# Patient Record
Sex: FEMALE | Race: BLACK OR AFRICAN AMERICAN | NOT HISPANIC OR LATINO | Employment: OTHER | URBAN - METROPOLITAN AREA
[De-identification: names, ages, dates, MRNs, and addresses within clinical notes are randomized per-mention and may not be internally consistent; named-entity substitution may affect disease eponyms.]

---

## 2017-01-05 ENCOUNTER — OFFICE VISIT (OUTPATIENT)
Dept: RHEUMATOLOGY | Facility: CLINIC | Age: 52
End: 2017-01-05
Payer: COMMERCIAL

## 2017-01-05 VITALS
BODY MASS INDEX: 26.19 KG/M2 | SYSTOLIC BLOOD PRESSURE: 115 MMHG | WEIGHT: 159.81 LBS | DIASTOLIC BLOOD PRESSURE: 78 MMHG | HEART RATE: 105 BPM

## 2017-01-05 DIAGNOSIS — G05.3 CNS LUPUS: Primary | ICD-10-CM

## 2017-01-05 DIAGNOSIS — M32.19 CNS LUPUS: Primary | ICD-10-CM

## 2017-01-05 PROCEDURE — 1159F MED LIST DOCD IN RCRD: CPT | Mod: S$GLB,,, | Performed by: INTERNAL MEDICINE

## 2017-01-05 PROCEDURE — 99215 OFFICE O/P EST HI 40 MIN: CPT | Mod: S$GLB,,, | Performed by: INTERNAL MEDICINE

## 2017-01-05 PROCEDURE — 99999 PR PBB SHADOW E&M-EST. PATIENT-LVL III: CPT | Mod: PBBFAC,,, | Performed by: INTERNAL MEDICINE

## 2017-01-05 RX ORDER — SODIUM CHLORIDE 0.9 % (FLUSH) 0.9 %
10 SYRINGE (ML) INJECTION
Status: CANCELLED | OUTPATIENT
Start: 2017-01-05

## 2017-01-05 RX ORDER — PREDNISONE 5 MG/1
5 TABLET ORAL DAILY
Qty: 30 TABLET | Refills: 3 | Status: SHIPPED | OUTPATIENT
Start: 2017-01-05 | End: 2017-05-26 | Stop reason: SDUPTHER

## 2017-01-05 RX ORDER — METHYLPREDNISOLONE SODIUM SUCCINATE 1 G/16ML
500 INJECTION INTRAMUSCULAR; INTRAVENOUS ONCE
Status: CANCELLED | OUTPATIENT
Start: 2017-01-05 | End: 2017-01-05

## 2017-01-05 RX ORDER — HEPARIN SODIUM (PORCINE) LOCK FLUSH IV SOLN 100 UNIT/ML 100 UNIT/ML
100 SOLUTION INTRAVENOUS
Status: CANCELLED | OUTPATIENT
Start: 2017-01-05

## 2017-01-05 NOTE — PROGRESS NOTES
RHEUMATOLOGY CLINIC FOLLOW UP VISIT  Chief complaints:-  I have no energy.    HPI:-  Ruth Melendrez a 51 y.o. pleasant female comes in for a follow up visit with above chief complaints. She has severe CNS lupus and is only on 5 mg daily prednisone because of intolerance to multiple therapies. She used to see rheumatologist here in our clinic - last visit was in 05/2013. Since then she has been following up with Dr. Sue at [facility] . She was hospitalized in 02/2016 and 05/2016 for severe flare up in her neuropsychiatric lupus with delusions, hallucinations and headaches which was treated with iv steroids. In her most recent visit with  , she was strongly recommended to try cytoxan or at least benlysta to treat her condition . She declined and establish care with me 4 weeks ago.  She reports running out of prednisone 2 weeks ago and has been having severe fatigue.  She denies any pain today. Her pain scale is zero. She denies alopecia, photosensitivity, malar rash, eye redness, dry eyes, dry mouth, nasal ulcers, oral ulcers, difficulty chewing, difficulty swallowing, choking on dry foods, exertional or respirophasic chest pain, abdominal pain with eating, blood in stools, nausea, vomiting, blood in urine, back pain radiating to legs, leg claudication, headaches, hallucinations, delusions, paranoia, numbness , tingling or weakness.     Review of Systems   Constitutional: Positive for malaise/fatigue. Negative for chills, fever and weight loss.   HENT: Negative for ear discharge, ear pain, hearing loss, nosebleeds and sore throat.    Eyes: Negative for blurred vision, double vision, photophobia, discharge and redness.   Respiratory: Negative for cough, hemoptysis, sputum production and shortness of breath.    Cardiovascular: Negative for chest pain, palpitations and claudication.   Gastrointestinal: Negative for abdominal pain, constipation, diarrhea, melena,  nausea and vomiting.   Genitourinary: Negative for dysuria, frequency, hematuria and urgency.   Musculoskeletal: Negative for back pain, falls, joint pain, myalgias and neck pain.   Skin: Negative for itching and rash.   Neurological: Negative for dizziness, tremors, sensory change, speech change, focal weakness, seizures, loss of consciousness, weakness and headaches.   Endo/Heme/Allergies: Negative for environmental allergies. Does not bruise/bleed easily.   Psychiatric/Behavioral: Negative for depression, hallucinations and memory loss. The patient is not nervous/anxious and does not have insomnia.        Past Medical History   Diagnosis Date    Hypertension     Lupus        Past Surgical History   Procedure Laterality Date    Widsom tooth extraction          Social History   Substance Use Topics    Smoking status: Never Smoker    Smokeless tobacco: None    Alcohol use No       Family History   Problem Relation Age of Onset    Arthritis Mother     Heart disease Mother     Hypertension Mother     Diabetes Father     Heart disease Father     Hypertension Father     Cancer Brother        Review of patient's allergies indicates:   Allergen Reactions    Ace inhibitors     Amoxicillin     Aspirin     Cellcept [mycophenolate mofetil]     Cephalosporins     Clindamycin     Hydrochlorothiazide     Imuran [azathioprine sodium]     Lasix [furosemide]     Methotrexate analogues     Nsaids (non-steroidal anti-inflammatory drug)     Pcn [penicillins]     Plaquenil [hydroxychloroquine]     Shellfish containing products     Sulfa (sulfonamide antibiotics)     Zofran [ondansetron hcl (pf)]            Physical examination:-    Vitals:    01/05/17 1014   BP: 115/78   Pulse: 105   Weight: 72.5 kg (159 lb 13.3 oz)   PainSc: 0-No pain       Physical Exam   Constitutional: She is oriented to person, place, and time and well-developed, well-nourished, and in no distress. No distress.   HENT:   Head:  Normocephalic and atraumatic.   Nose: Nose normal.   Mouth/Throat: Oropharynx is clear and moist. No oropharyngeal exudate.   Eyes: Conjunctivae and EOM are normal. Pupils are equal, round, and reactive to light. Right eye exhibits no discharge. Left eye exhibits no discharge. No scleral icterus.   Neck: Normal range of motion. Neck supple.   Cardiovascular: Normal rate and intact distal pulses.    Pulmonary/Chest: Effort normal. No respiratory distress. She exhibits no tenderness.   Abdominal: Soft. There is no tenderness.   Musculoskeletal:   No synovitis in small joints of hands or feet. No effusion in large joints. No tenderness.    Lymphadenopathy:     She has no cervical adenopathy.   Neurological: She is alert and oriented to person, place, and time. No cranial nerve deficit.   Skin: Skin is warm. No rash noted. She is not diaphoretic. No erythema. No pallor.   Psychiatric: Mood, memory, affect and judgment normal.   Nursing note and vitals reviewed.      Labs:-  Results for ADELA LEMON (MRN [id number]) as of 1/5/2017 12:40   Ref. Range 12/8/2016 09:04   SHEILA HEP-2 Titer Unknown Positive 1:640 Sp...   Anti-SSA Antibody Latest Ref Range: 0.00 - 19.99 EU 31.31 (H)   Anti-SSA Interpretation Latest Ref Range: Negative  Positive (A)   Anti-SSB Antibody Latest Ref Range: 0.00 - 19.99 EU 9.10   Anti-SSB Interpretation Latest Ref Range: Negative  Negative   ds DNA Ab Latest Ref Range: Negative 1:10  Positive 1:40 (A)   Anti Sm Antibody Latest Ref Range: 0.00 - 19.99 .08 (H)   Anti-Sm Interpretation Latest Ref Range: Negative  Positive (A)   Anti Sm/RNP Antibody Latest Ref Range: 0.00 - 19.99 .83 (H)   Anti-Sm/RNP Interpretation Latest Ref Range: Negative  Positive (A)   Complement (C-3) Latest Ref Range: 50 - 180 mg/dL 60   Complement (C-4) Latest Ref Range: 11 - 44 mg/dL 7 (L)     Old and Outside medical records :-  Reviewed old and all outside medical records available in Care Everywhere.      Medication List with Changes/Refills   Current Medications    AMLODIPINE (NORVASC) 5 MG TABLET    Take 5 mg by mouth once daily.    FLUTICASONE (FLONASE) 50 MCG/ACTUATION NASAL SPRAY    1 spray by Each Nare route 2 (two) times daily as needed for Rhinitis.    LOSARTAN (COZAAR) 100 MG TABLET    Take 100 mg by mouth once daily.    OLANZAPINE (ZYPREXA) 10 MG TABLET    Take 10 mg by mouth every evening.   Changed and/or Refilled Medications    Modified Medication Previous Medication    PREDNISONE (DELTASONE) 5 MG TABLET predniSONE (DELTASONE) 5 MG tablet       Take 1 tablet (5 mg total) by mouth once daily.    Take 5 mg by mouth once daily.       Assessment/Plans:-  # CNS lupus:-  Extremely complicated uncontrolled lupus with fatigue and periodic exacerbations of serious neurological and psychiatric complications , extreme serological activity of lupus, not on any medications other than 5 mg daily prednisone. She reports intolerance to all the lupus medications tried so far including plaquenil, cellcept, imuran and methotrexate. She does not want to try cytoxan or benlysta because of their potential side effect profile.  Labs from last visit shows severe activity of lupus.  Since she does not want to start any disease modifying agents for lupus will try at least 500 mg of Solu-Medrol since she did not have any adverse reactions to it in the past.  She comprehends the severity of her lupus and how it could affect her organs and even death , still she would like not to try any therapy which could cause any side effects. I discussed in detail about the lupus and its severe complications.  Will continue discussions in future visits.   - predniSONE (DELTASONE) 5 MG tablet; Take 1 tablet (5 mg total) by mouth once daily.  Dispense: 30 tablet; Refill: 3  1 dose of  mg Solu-Medrol as soon as possible.  Consider Imuran low-dose in future if she agrees.     # Return in about 2 months (around 3/5/2017).    Disclaimer:  This note was prepared using voice recognition system and is likely to have sound alike errors and is not proof read.  Please call me with any questions.

## 2017-01-05 NOTE — ASSESSMENT & PLAN NOTE
Extremely complicated uncontrolled lupus with fatigue and periodic exacerbations of serious neurological and psychiatric complications , extreme serological activity of lupus, not on any medications other than 5 mg daily prednisone. She reports intolerance to all the lupus medications tried so far including plaquenil, cellcept, imuran and methotrexate. She does not want to try cytoxan or benlysta because of their potential side effect profile.  Labs from last visit shows severe activity of lupus.  Since she does not want to start any disease modifying agents for lupus will try at least 500 mg of Solu-Medrol since she did not have any adverse reactions to it in the past.  She comprehends the severity of her lupus and how it could affect her organs and even death , still she would like not to try any therapy which could cause any side effects. I discussed in detail about the lupus and its severe complications.  Will continue discussions in future visits.

## 2017-01-05 NOTE — MR AVS SNAPSHOT
[facility] - Rheumatology  [address] [facility] [place] [address]  Phone: [phone]  Fax: [phone]                  Ruth Messina   2017 10:00 AM   Office Visit    Description:  Female : 1965   Provider:  Chirag Turner MD   Department:  [facility] - Rheumatology           Reason for Visit     Disease Management           Diagnoses this Visit        Comments    CNS lupus    -  Primary            To Do List           Goals (5 Years of Data)     None      Follow-Up and Disposition     Return in about 2 months (around 3/5/2017).       These Medications        Disp Refills Start End    predniSONE (DELTASONE) 5 MG tablet 30 tablet 3 2017     Take 1 tablet (5 mg total) by mouth once daily. - Oral      [facility]     [facility] -  Assistance  Registered nurses in the [facility] provide clinical advisement, health education, appointment booking, and other advisory services.  Call for this free service at [phone].             Medications           Message regarding Medications     Verify the changes and/or additions to your medication regime listed below are the same as discussed with your clinician today.  If any of these changes or additions are incorrect, please notify your healthcare provider.        CHANGE how you are taking these medications     Start Taking Instead of    predniSONE (DELTASONE) 5 MG tablet predniSONE (DELTASONE) 5 MG tablet    Dosage:  Take 1 tablet (5 mg total) by mouth once daily. Dosage:  Take 5 mg by mouth once daily.    Reason for Change:  Reorder            Verify that the below list of medications is an accurate representation of the medications you are currently taking.  If none reported, the list may be blank. If incorrect, please contact your healthcare provider. Carry this list with you in case of emergency.           Current Medications     amlodipine (NORVASC) 5 MG tablet Take 5 mg by mouth once daily.     fluticasone (FLONASE) 50 mcg/actuation nasal spray 1 spray by Each Nare route 2 (two) times daily as needed for Rhinitis.    losartan (COZAAR) 100 MG tablet Take 100 mg by mouth once daily.    olanzapine (ZYPREXA) 10 MG tablet Take 10 mg by mouth every evening.    predniSONE (DELTASONE) 5 MG tablet Take 1 tablet (5 mg total) by mouth once daily.           Clinical Reference Information           Vital Signs - Last Recorded  Most recent update: 1/5/2017 10:15 AM by Sumanth Puentes LPN    BP Pulse Wt BMI       115/78 105 72.5 kg (159 lb 13.3 oz) 26.19 kg/m2       Blood Pressure          Most Recent Value    BP  115/78      Allergies as of 1/5/2017     Ace Inhibitors    Amoxicillin    Aspirin    Cellcept [Mycophenolate Mofetil]    Cephalosporins    Clindamycin    Hydrochlorothiazide    Imuran [Azathioprine Sodium]    Lasix [Furosemide]    Methotrexate Analogues    Nsaids (Non-steroidal Anti-inflammatory Drug)    Pcn [Penicillins]    Plaquenil [Hydroxychloroquine]    Shellfish Containing Products    Sulfa (Sulfonamide Antibiotics)    Zofran [Ondansetron Hcl (Pf)]      Immunizations Administered on Date of Encounter - 1/5/2017     None

## 2017-01-09 ENCOUNTER — TELEPHONE (OUTPATIENT)
Dept: RHEUMATOLOGY | Facility: CLINIC | Age: 52
End: 2017-01-09

## 2017-01-09 DIAGNOSIS — G05.3 CNS LUPUS: Primary | ICD-10-CM

## 2017-01-09 DIAGNOSIS — M32.19 CNS LUPUS: Primary | ICD-10-CM

## 2017-01-09 RX ORDER — AZATHIOPRINE 75 MG/1
50 TABLET ORAL DAILY
Qty: 30 TABLET | Refills: 1 | Status: SHIPPED | OUTPATIENT
Start: 2017-01-09 | End: 2017-01-09 | Stop reason: DRUGHIGH

## 2017-01-09 RX ORDER — AZATHIOPRINE 50 MG/1
50 TABLET ORAL DAILY
Qty: 30 TABLET | Refills: 1 | Status: SHIPPED | OUTPATIENT
Start: 2017-01-09 | End: 2017-01-25

## 2017-01-09 NOTE — TELEPHONE ENCOUNTER
Spoke with pt and advised her of below. PT states she has a concern of taking the Imuran and it dropping her WBC. She would like to know if taking it would help her other labs like her sed rate and if the benefits out the risks. Please Advise.

## 2017-01-09 NOTE — TELEPHONE ENCOUNTER
Spoke with pt and she spoke with [facility] regarding solu medrol infusion, states she has a 500$ deductible and once that is met then she has 20% co insurance. States she spoke with the billing department here at [facility] and they told her that the only way they can tell her what she will be responsible for is after the infusion is completed and the claim is filed. Pt states that [facility] can not tell her how much she would have to pay for unless she can find out what [facility] allowable billed amount is. Pt states she does not want to get the infusion unless she knows how much it will be since she has a lot of medical bills from hospital stays. Pt states that she was on Imuran 50 mg in 2012 and you had talked to her about a lower dose. Please Advise

## 2017-01-09 NOTE — TELEPHONE ENCOUNTER
Sure. Lets hold off on infusion then. Start imuran 50 mg tablet once every other day and watch for any side effects like before. If no problem after a week, increase to 50 mg one time daily.

## 2017-01-09 NOTE — TELEPHONE ENCOUNTER
----- Message from Rehana Gan sent at 1/9/2017 11:35 AM CST -----  Contact: [employer] Cid called to speak with the nurse; the directions aren't clear for the azathioprine.    [place] [id number] [facility] [address]  Phone: [phone] Fax: [phone]    Thanks,  Rehana

## 2017-01-11 ENCOUNTER — TELEPHONE (OUTPATIENT)
Dept: RHEUMATOLOGY | Facility: CLINIC | Age: 52
End: 2017-01-11

## 2017-01-11 NOTE — TELEPHONE ENCOUNTER
----- Message from Negrito Ordonez sent at 1/11/2017  9:41 AM CST -----  Contact: [employer] ( [employer] )  [employer] ( [employer] ) is requesting a call from nurse to verify work restrictions on the pt.            Please call [employer] ( [employer] ) back at  [phone]

## 2017-01-12 ENCOUNTER — TELEPHONE (OUTPATIENT)
Dept: RHEUMATOLOGY | Facility: CLINIC | Age: 52
End: 2017-01-12

## 2017-01-12 NOTE — TELEPHONE ENCOUNTER
----- Message from Karina Manjarrez sent at 1/12/2017  4:12 PM CST -----  Patient would like for you to call her regarding something the doctor was ordering for her.   This is all she would tell me.  Call her at [phone].                                                  castillo

## 2017-01-12 NOTE — TELEPHONE ENCOUNTER
Spoke with pt and she states she was approved for financial assistance and she can do the infusion if you still wanted her to do that.Please Advise.

## 2017-01-16 ENCOUNTER — TELEPHONE (OUTPATIENT)
Dept: RHEUMATOLOGY | Facility: CLINIC | Age: 52
End: 2017-01-16

## 2017-01-16 NOTE — TELEPHONE ENCOUNTER
----- Message from Christel Alfred sent at 1/16/2017 10:28 AM CST -----  Contact: pt  Please call pt [phone] regarding new medication, pt states she have some issues with medication.

## 2017-01-16 NOTE — TELEPHONE ENCOUNTER
Please continue taking once every other day for now. Its very likely that these reactions would get better in a week or two.

## 2017-01-16 NOTE — TELEPHONE ENCOUNTER
Spoke with pt and she states she started in the Imuran, states it makes her want to sleep all the time, is causing her muscle pain, and she was lightheaded one day also. States she has been taking it every other day and she took it yesterday. Would like to know something before her infusion tomorrow. Please Advise.

## 2017-01-17 ENCOUNTER — INFUSION (OUTPATIENT)
Dept: RHEUMATOLOGY | Facility: HOSPITAL | Age: 52
End: 2017-01-17
Attending: INTERNAL MEDICINE
Payer: COMMERCIAL

## 2017-01-17 VITALS
WEIGHT: 159.81 LBS | DIASTOLIC BLOOD PRESSURE: 70 MMHG | HEART RATE: 93 BPM | SYSTOLIC BLOOD PRESSURE: 105 MMHG | BODY MASS INDEX: 26.19 KG/M2 | RESPIRATION RATE: 18 BRPM | TEMPERATURE: 99 F

## 2017-01-17 DIAGNOSIS — M32.19 CNS LUPUS: Primary | ICD-10-CM

## 2017-01-17 DIAGNOSIS — G05.3 CNS LUPUS: Primary | ICD-10-CM

## 2017-01-17 PROCEDURE — 96365 THER/PROPH/DIAG IV INF INIT: CPT | Mod: PO

## 2017-01-17 PROCEDURE — 25000003 PHARM REV CODE 250: Mod: PO | Performed by: INTERNAL MEDICINE

## 2017-01-17 PROCEDURE — 63600175 PHARM REV CODE 636 W HCPCS: Mod: PO | Performed by: INTERNAL MEDICINE

## 2017-01-17 RX ORDER — METHYLPREDNISOLONE SODIUM SUCCINATE 1 G/16ML
500 INJECTION INTRAMUSCULAR; INTRAVENOUS ONCE
Status: CANCELLED | OUTPATIENT
Start: 2017-01-17 | End: 2017-01-17

## 2017-01-17 RX ORDER — HEPARIN SODIUM (PORCINE) LOCK FLUSH IV SOLN 100 UNIT/ML 100 UNIT/ML
100 SOLUTION INTRAVENOUS
Status: CANCELLED | OUTPATIENT
Start: 2017-01-17

## 2017-01-17 RX ORDER — METHYLPREDNISOLONE SODIUM SUCCINATE 1 G/16ML
500 INJECTION INTRAMUSCULAR; INTRAVENOUS ONCE
Status: DISCONTINUED | OUTPATIENT
Start: 2017-01-17 | End: 2017-01-17

## 2017-01-17 RX ORDER — SODIUM CHLORIDE 0.9 % (FLUSH) 0.9 %
10 SYRINGE (ML) INJECTION
Status: CANCELLED | OUTPATIENT
Start: 2017-01-17

## 2017-01-17 RX ORDER — SODIUM CHLORIDE 0.9 % (FLUSH) 0.9 %
10 SYRINGE (ML) INJECTION
Status: DISCONTINUED | OUTPATIENT
Start: 2017-01-17 | End: 2017-01-17 | Stop reason: HOSPADM

## 2017-01-17 RX ADMIN — SODIUM CHLORIDE: 9 INJECTION, SOLUTION INTRAVENOUS at 11:01

## 2017-01-17 RX ADMIN — SODIUM CHLORIDE, PRESERVATIVE FREE 10 ML: 5 INJECTION INTRAVENOUS at 12:01

## 2017-01-17 NOTE — PROGRESS NOTES
Infusion# 1    Recent labs? 12/8/16    Premeds? no    Solumedrol 500 mg administered IV at a 30 minute rate per orders; see MAR and vitals for more  details. Tolerated well without adverse events, discharged and ambulatory out of clinic.

## 2017-01-17 NOTE — MR AVS SNAPSHOT
[facility]  [address] [facility] [place] [address]  Phone: [phone]  Fax: [phone]                  Ruth Messina   2017 11:00 AM   Infusion    Description:  Female : 1965   Provider:  RHEUMATOLOGY, INFUSION   Department:  [facility]           Diagnoses this Visit        Comments    CNS lupus    -  Primary            To Do List           Future Appointments        Provider Department Dept Phone    3/2/2017 11:00 AM Chirag Turner MD [facility] - Rheumatology [phone]      Goals (5 Years of Data)     None      [facility]     [facility] Care [facility] -  Assistance  Registered nurses in the [facility] provide clinical advisement, health education, appointment booking, and other advisory services.  Call for this free service at [phone].             Medications           Message regarding Medications     Verify the changes and/or additions to your medication regime listed below are the same as discussed with your clinician today.  If any of these changes or additions are incorrect, please notify your healthcare provider.        These medications were administered today        Dose Freq    sodium chloride 0.9% flush 10 mL 10 mL As needed (PRN)    Sig: Inject 10 mLs into the vein as needed for Line Care.    Class: Normal    Route: Intravenous    Non-formulary Exception Code: Specific indication for non-formulary alternative    methylPREDNISolone sod suc(PF) (SOLU-MEDROL) 125 mg/2 mL 500 mg in sodium chloride 0.9% 100 mL IVPB  Clinic/HOD 1 time    Sig: Inject into the vein one time.    Class: Normal    Route: Intravenous           Verify that the below list of medications is an accurate representation of the medications you are currently taking.  If none reported, the list may be blank. If incorrect, please contact your healthcare provider. Carry this list with you in case of emergency.           Current  Medications     amlodipine (NORVASC) 5 MG tablet Take 5 mg by mouth once daily.    azathioprine (IMURAN) 50 mg Tab Take 1 tablet (50 mg total) by mouth once daily.    fluticasone (FLONASE) 50 mcg/actuation nasal spray 1 spray by Each Nare route 2 (two) times daily as needed for Rhinitis.    losartan (COZAAR) 100 MG tablet Take 100 mg by mouth once daily.    olanzapine (ZYPREXA) 10 MG tablet Take 10 mg by mouth every evening.    predniSONE (DELTASONE) 5 MG tablet Take 1 tablet (5 mg total) by mouth once daily.           Clinical Reference Information           Vital Signs - Last Recorded  Most recent update: 1/17/2017  1:22 PM by Nneka Palm RN    BP Pulse Temp Resp Wt BMI    105/70 93 99.1 °F (37.3 °C) (Oral) 18 72.5 kg (159 lb 13.3 oz) 26.19 kg/m2      Allergies as of 1/17/2017     Ace Inhibitors    Amoxicillin    Aspirin    Cellcept [Mycophenolate Mofetil]    Cephalosporins    Clindamycin    Hydrochlorothiazide    Imuran [Azathioprine Sodium]    Lasix [Furosemide]    Methotrexate Analogues    Nsaids (Non-steroidal Anti-inflammatory Drug)    Pcn [Penicillins]    Plaquenil [Hydroxychloroquine]    Shellfish Containing Products    Sulfa (Sulfonamide Antibiotics)    Zofran [Ondansetron Hcl (Pf)]      Immunizations Administered on Date of Encounter - 1/17/2017     None      Orders Placed During Today's Visit      Normal Orders This Visit    Nursing communication     Saline lock IV       Administrations This Visit     methylPREDNISolone sod suc(PF) (SOLU-MEDROL) 125 mg/2 mL 500 mg in sodium chloride 0.9% 100 mL IVPB     Admin Date Action Dose Rate Route Administered By          01/17/2017 New Bag   200 mL/hr Intravenous Nneka Palm RN                   sodium chloride 0.9% flush 10 mL     Admin Date Action Dose Route Administered By             01/17/2017 Given 10 mL Intravenous Nneka Palm RN

## 2017-01-17 NOTE — PLAN OF CARE
Problem: Patient Care Overview  Goal: Plan of Care Review  Outcome: Ongoing (interventions implemented as appropriate)  I feel ok other than a little stomach pain.

## 2017-01-18 ENCOUNTER — TELEPHONE (OUTPATIENT)
Dept: ORTHOPEDICS | Facility: CLINIC | Age: 52
End: 2017-01-18

## 2017-01-18 NOTE — TELEPHONE ENCOUNTER
----- Message from Adelfo Guido sent at 1/18/2017  8:54 AM CST -----  Contact: pt  Stating she coming in for Carpal tunnel pain states left hand is worse than right hand pt will bring in past records and can be reached at [phone]//ThanksMW

## 2017-01-18 NOTE — TELEPHONE ENCOUNTER
Spoke with patient who stated that she is scheduled to come in on 2-. She also stated [facility] emg [facility].

## 2017-01-25 ENCOUNTER — OFFICE VISIT (OUTPATIENT)
Dept: DERMATOLOGY | Facility: CLINIC | Age: 52
End: 2017-01-25
Payer: COMMERCIAL

## 2017-01-25 DIAGNOSIS — L93.0 DISCOID LUPUS: Primary | ICD-10-CM

## 2017-01-25 DIAGNOSIS — L65.8 FEMALE PATTERN ALOPECIA: ICD-10-CM

## 2017-01-25 PROCEDURE — 99202 OFFICE O/P NEW SF 15 MIN: CPT | Mod: S$GLB,,, | Performed by: DERMATOLOGY

## 2017-01-25 PROCEDURE — 1159F MED LIST DOCD IN RCRD: CPT | Mod: S$GLB,,, | Performed by: DERMATOLOGY

## 2017-01-25 PROCEDURE — 99999 PR PBB SHADOW E&M-EST. PATIENT-LVL III: CPT | Mod: PBBFAC,,, | Performed by: DERMATOLOGY

## 2017-01-25 RX ORDER — FLUOCINOLONE ACETONIDE 0.11 MG/ML
OIL TOPICAL
Qty: 1 BOTTLE | Refills: 5 | Status: SHIPPED | OUTPATIENT
Start: 2017-01-25 | End: 2018-07-26

## 2017-01-25 NOTE — MR AVS SNAPSHOT
[facility]  [address]  [place] [address]  Phone: [phone]  Fax: [phone]                  Ruth Messina   2017 1:45 PM   Office Visit    Description:  Female : 1965   Provider:  Andreia Demarco MD   Department:  [facility]           Reason for Visit     Hair Loss           Diagnoses this Visit        Comments    Discoid lupus    -  Primary     Female pattern alopecia                To Do List           Future Appointments        Provider Department Dept Phone    2017 10:00 AM Abhishek Decker MD [facility] [phone]    3/2/2017 11:00 AM Chirag Turner MD [facility] [phone]      Goals (5 Years of Data)     None      Follow-Up and Disposition     Return in about 3 months (around 2017).       These Medications        Disp Refills Start End    fluocinolone (DERMA-SMOOTHE) 0.01 % external oil 1 Bottle 5 2017    Apply oil to scalp twice a day    Pharmacy: [address] #: [phone]         [facility]     [facility]  [facility]  Registered nurses in the [facility] provide clinical advisement, health education, appointment booking, and other advisory services.  Call for this free service at [phone].             Medications           Message regarding Medications     Verify the changes and/or additions to your medication regime listed below are the same as discussed with your clinician today.  If any of these changes or additions are incorrect, please notify your healthcare provider.        START taking these NEW medications        Refills    fluocinolone (DERMA-SMOOTHE) 0.01 % external oil 5    Sig: Apply oil to scalp twice a day    Class: Normal      STOP taking these medications     azathioprine (IMURAN) 50 mg Tab Take 1 tablet (50 mg total) by mouth once daily.    olanzapine  (ZYPREXA) 10 MG tablet Take 10 mg by mouth every evening.           Verify that the below list of medications is an accurate representation of the medications you are currently taking.  If none reported, the list may be blank. If incorrect, please contact your healthcare provider. Carry this list with you in case of emergency.           Current Medications     amlodipine (NORVASC) 5 MG tablet Take 5 mg by mouth once daily.    predniSONE (DELTASONE) 5 MG tablet Take 1 tablet (5 mg total) by mouth once daily.    fluocinolone (DERMA-SMOOTHE) 0.01 % external oil Apply oil to scalp twice a day    fluticasone (FLONASE) 50 mcg/actuation nasal spray 1 spray by Each Nare route 2 (two) times daily as needed for Rhinitis.    losartan (COZAAR) 100 MG tablet Take 100 mg by mouth once daily.           Clinical Reference Information           Allergies as of 1/25/2017     Ace Inhibitors    Amoxicillin    Aspirin    Cellcept [Mycophenolate Mofetil]    Cephalosporins    Clindamycin    Hydrochlorothiazide    Imuran [Azathioprine Sodium]    Lasix [Furosemide]    Methotrexate Analogues    Nsaids (Non-steroidal Anti-inflammatory Drug)    Pcn [Penicillins]    Plaquenil [Hydroxychloroquine]    Shellfish Containing Products    Sulfa (Sulfonamide Antibiotics)    Zofran [Ondansetron Hcl (Pf)]      Immunizations Administered on Date of Encounter - 1/25/2017     None      Instructions    Start biotin 1470-4176 mcg daily or purchase Purex Viviscal vitamin online    Use 5% Rogaine (or generic minoxidil) solution or foam daily.  Apply directly to balding areas of the scalp.  Use for at least 3 months to assess hair growth.     Patient Education   Minoxidil Topical foam   Minoxidil Topical solution    Minoxidil Topical solution   What is this medicine?   MINOXIDIL (mi NOX i dill) is used to increase new hair growth in cases of hereditary hair loss. When applied to the scalp, this medicine can promote hair growth in men with male pattern baldness.  This medicine can also help women with thin hair and frontal hair loss. Women should not use extra-strength minoxidil products.   This medicine may be used for other purposes; ask your health care provider or pharmacist if you have questions.   What should I tell my health care provider before I take this medicine?   They need to know if you have any of these conditions:   frontal hair loss or receding hairline   no family history of hair loss   sudden or patchy hair loss   unknown reason for hair loss   your scalp is red, inflamed, infected or painful   an unusual or allergic reaction to minoxidil, other medicines, foods, dyes, or preservatives   pregnant or trying to get pregnant   breast-feeding  How should I use this medicine?   This medicine is for external use on the scalp only. Do not take by mouth. Follow the directions that come with the product. The hair and scalp should be dry before you use this medicine. You do not need to shampoo your hair before each application. Do not use more often than directed.   Talk to your pediatrician regarding the use of this medicine in children. This medicine is not approved for use in children.   Overdosage: If you think you have taken too much of this medicine contact a poison control center or emergency room at once.   NOTE: This medicine is only for you. Do not share this medicine with others.   What if I miss a dose?   If you miss a dose, use it as soon as you can. If it is almost time for your next dose, use only that dose. Do not use double or extra doses.   What may interact with this medicine?   Interactions are not expected. Do not use any other medicines on the scalp without asking your doctor or health care professional.   This list may not describe all possible interactions. Give your health care provider a list of all the medicines, herbs, non-prescription drugs, or dietary supplements you use. Also tell them if you smoke, drink alcohol, or use illegal drugs.  Some items may interact with your medicine.   What should I watch for while using this medicine?   This medicine must be used on a regular basis for your hair to regrow. It may take 2 to 4 months of regular use before you notice any improvement. It is important to continue to use this product to maintain regrowth of hair. Once you stop using it, the regrown hair will usually fall out within 3 months. If you do not see any new hair growth after 4 months, stop using this product and contact your doctor or health care professional.   Do not get this medicine in your eyes, nose, mouth, or on other sensitive areas. If you do, rinse off with plenty of cool tap water. Always wash your hands after use. Do not apply if your scalp is cut, scraped, or sunburned.   Some people may notice changes in hair color or texture after using this medicine.   What side effects may I notice from receiving this medicine?   Side effects that you should report to your doctor or health care professional as soon as possible:   chest pain or palpitations   dizziness or fainting   skin rash, blisters, or itching   sudden weight gain   swelling of the hands or feet  Side effects that usually do not require medical attention (report to your doctor or health care professional if they continue or are bothersome):   headache   redness, irritation and itching at the site of application   unusual hair growth, on the face, arm, and back  This list may not describe all possible side effects. Call your doctor for medical advice about side effects. You may report side effects to FDA at 7-613-FDA-2866.   Where should I keep my medicine?   Keep out of the reach of children.   Store at room temperature between 20 and 25 degrees C (68 and 77 degrees F). Some products may be flammable. Keep away from heat, fire or flame. Throw away any unused medicine after the expiration date.   NOTE:This sheet is a summary. It may not cover all possible information. If you  have questions about this medicine, talk to your doctor, pharmacist, or health care provider. Copyright© 2014 Gold Standard

## 2017-01-25 NOTE — PATIENT INSTRUCTIONS
Start biotin 8297-9260 mcg daily or purchase Purex Viviscal vitamin online    Use 5% Rogaine (or generic minoxidil) solution or foam daily.  Apply directly to balding areas of the scalp.  Use for at least 3 months to assess hair growth.     Patient Education   Minoxidil Topical foam   Minoxidil Topical solution    Minoxidil Topical solution   What is this medicine?   MINOXIDIL (mi NOX i dill) is used to increase new hair growth in cases of hereditary hair loss. When applied to the scalp, this medicine can promote hair growth in men with male pattern baldness. This medicine can also help women with thin hair and frontal hair loss. Women should not use extra-strength minoxidil products.   This medicine may be used for other purposes; ask your health care provider or pharmacist if you have questions.   What should I tell my health care provider before I take this medicine?   They need to know if you have any of these conditions:   frontal hair loss or receding hairline   no family history of hair loss   sudden or patchy hair loss   unknown reason for hair loss   your scalp is red, inflamed, infected or painful   an unusual or allergic reaction to minoxidil, other medicines, foods, dyes, or preservatives   pregnant or trying to get pregnant   breast-feeding  How should I use this medicine?   This medicine is for external use on the scalp only. Do not take by mouth. Follow the directions that come with the product. The hair and scalp should be dry before you use this medicine. You do not need to shampoo your hair before each application. Do not use more often than directed.   Talk to your pediatrician regarding the use of this medicine in children. This medicine is not approved for use in children.   Overdosage: If you think you have taken too much of this medicine contact a poison control center or emergency room at once.   NOTE: This medicine is only for you. Do not share this medicine with others.   What if I miss a  dose?   If you miss a dose, use it as soon as you can. If it is almost time for your next dose, use only that dose. Do not use double or extra doses.   What may interact with this medicine?   Interactions are not expected. Do not use any other medicines on the scalp without asking your doctor or health care professional.   This list may not describe all possible interactions. Give your health care provider a list of all the medicines, herbs, non-prescription drugs, or dietary supplements you use. Also tell them if you smoke, drink alcohol, or use illegal drugs. Some items may interact with your medicine.   What should I watch for while using this medicine?   This medicine must be used on a regular basis for your hair to regrow. It may take 2 to 4 months of regular use before you notice any improvement. It is important to continue to use this product to maintain regrowth of hair. Once you stop using it, the regrown hair will usually fall out within 3 months. If you do not see any new hair growth after 4 months, stop using this product and contact your doctor or health care professional.   Do not get this medicine in your eyes, nose, mouth, or on other sensitive areas. If you do, rinse off with plenty of cool tap water. Always wash your hands after use. Do not apply if your scalp is cut, scraped, or sunburned.   Some people may notice changes in hair color or texture after using this medicine.   What side effects may I notice from receiving this medicine?   Side effects that you should report to your doctor or health care professional as soon as possible:   chest pain or palpitations   dizziness or fainting   skin rash, blisters, or itching   sudden weight gain   swelling of the hands or feet  Side effects that usually do not require medical attention (report to your doctor or health care professional if they continue or are bothersome):   headache   redness, irritation and itching at the site of application   unusual  hair growth, on the face, arm, and back  This list may not describe all possible side effects. Call your doctor for medical advice about side effects. You may report side effects to FDA at 4-473-WYF-1650.   Where should I keep my medicine?   Keep out of the reach of children.   Store at room temperature between 20 and 25 degrees C (68 and 77 degrees F). Some products may be flammable. Keep away from heat, fire or flame. Throw away any unused medicine after the expiration date.   NOTE:This sheet is a summary. It may not cover all possible information. If you have questions about this medicine, talk to your doctor, pharmacist, or health care provider. Copyright© 2014 Gold Standard

## 2017-01-25 NOTE — PROGRESS NOTES
Subjective:       Patient ID:  Ruth Messina is a 51 y.o. female who presents for   Chief Complaint   Patient presents with    Hair Loss     c/o alopecia x 1 yr     HPI Comments: Pt with CNS lupus (+ SHEILA 1:640 speckled, + anti-SSA, Sm, dsDNA, Sm/RNP), currently followed by Dr. Turner     History of Present Illness: The patient presents with chief complaint of alopecia.  Location: scalp  Duration: 1 year  Signs/Symptoms: hair loss    Prior treatments: ILK x 2, clobetasol foam       Hair Loss         Review of Systems   Constitutional: Negative for fever and chills.   Gastrointestinal: Negative for nausea and vomiting.   Skin: Positive for rash. Negative for itching, daily sunscreen use, activity-related sunscreen use and recent sunburn.   Hematologic/Lymphatic: Does not bruise/bleed easily.        Objective:    Physical Exam   Constitutional: She appears well-developed and well-nourished. No distress.   Neurological: She is alert and oriented to person, place, and time. She is not disoriented.   Psychiatric: She has a normal mood and affect.   Skin:   Areas Examined (abnormalities noted in diagram):   Scalp / Hair Palpated and Inspected  Head / Face Inspection Performed  Neck Inspection Performed  Chest / Axilla Inspection Performed  Abdomen Inspection Performed  Back Inspection Performed  RUE Inspected  LUE Inspection Performed  Nails and Digits Inspection Performed                                     Assessment / Plan:        Discoid lupus  Female pattern alopecia  -     fluocinolone (DERMA-SMOOTHE) 0.01 % external oil; Apply oil to scalp twice a day  Dispense: 1 Bottle; Refill: 5  -     Pt defers ILK.  Will start above med with rogaine and biotin.  Possible female pattern alopecia component with non-scarring alopecia noted of the vertex scalp.  Discussed risks, benefits and side effect profile of minoxidil, including irritation, dryness, and unwanted facial hair growth.  Discussed that patient should  undertake 6 month trial before cessation if no improvement.  Discussed that patient may note hair loss prior to hair regrowth in the first 4 weeks. Patient instructed on how to apply the medication.             Return in about 3 months (around 4/25/2017).

## 2017-01-25 NOTE — LETTER
January 25, 2017      Deja Brown, NP  [address] [place] [address] [address] [address]           [facility]  [address] [place] [address]  Phone: [phone]  Fax: [phone]          Patient: Ruth Messina   MR Number: [id number]   YOB: 1965   Date of Visit: 1/25/2017       Dear Deja Brown:    Thank you for referring Ruth Messina to me for evaluation. Attached you will find relevant portions of my assessment and plan of care.    If you have questions, please do not hesitate to call me. I look forward to following Ruth Messina along with you.    Sincerely,    Andreia Demarco MD    Enclosure  CC:  No Recipients    If you would like to receive this communication electronically, please contact [email or web address] or [phone] to request more information on [email or web address] Link access.    For providers and/or their staff who would like to refer a patient to [facility], please contact us through our one-stop-shop provider referral line, [facility] , at [phone].    If you feel you have received this communication in error or would no longer like to receive these types of communications, please e-mail [email or web address]

## 2017-01-27 ENCOUNTER — TELEPHONE (OUTPATIENT)
Dept: INTERNAL MEDICINE | Facility: CLINIC | Age: 52
End: 2017-01-27

## 2017-01-27 ENCOUNTER — TELEPHONE (OUTPATIENT)
Dept: RHEUMATOLOGY | Facility: CLINIC | Age: 52
End: 2017-01-27

## 2017-01-27 DIAGNOSIS — D63.8 ANEMIA OF CHRONIC ILLNESS: ICD-10-CM

## 2017-01-27 DIAGNOSIS — M32.19 OTHER SYSTEMIC LUPUS ERYTHEMATOSUS WITH OTHER ORGAN INVOLVEMENT: Primary | ICD-10-CM

## 2017-01-27 RX ORDER — METHYLPREDNISOLONE SODIUM SUCCINATE 1 G/16ML
500 INJECTION INTRAMUSCULAR; INTRAVENOUS DAILY
Status: CANCELLED | OUTPATIENT
Start: 2017-01-30

## 2017-01-27 RX ORDER — HEPARIN SODIUM (PORCINE) LOCK FLUSH IV SOLN 100 UNIT/ML 100 UNIT/ML
100 SOLUTION INTRAVENOUS
Status: CANCELLED | OUTPATIENT
Start: 2017-01-30

## 2017-01-27 RX ORDER — SODIUM CHLORIDE 0.9 % (FLUSH) 0.9 %
10 SYRINGE (ML) INJECTION
Status: CANCELLED | OUTPATIENT
Start: 2017-01-30

## 2017-01-27 NOTE — TELEPHONE ENCOUNTER
----- Message from Patricia Zaragoza sent at 1/26/2017 11:44 AM CST -----  Contact: pt  Please call pt at the above number re a notation she noticed on her med instructions

## 2017-01-27 NOTE — TELEPHONE ENCOUNTER
----- Message from Ana Pitt sent at 1/27/2017  3:03 PM CST -----  Contact: pt  Pt states calling concerning medication on having some problems would like the nurse give her a call...[phone] (home)

## 2017-01-27 NOTE — TELEPHONE ENCOUNTER
Systemic lupus erythematosus with CNS involvement.  Progressively worsening fatigue with no response to IV Solu-Medrol.  Along with worsening anemia the fatigue is likely secondary to anemia of chronic inflammation related to her systemic lupus erythematosus.  Referred to hematologist for iron infusions and for further anemia management.

## 2017-01-27 NOTE — TELEPHONE ENCOUNTER
Spoke with pt and advised her of below, pt stated that before she was on MTX and had blood work completed and the results showed abnormal levels and it was stopped, pt wanted to know if the same thing is happening with the Imuran and that is what is causing her these problems. Telephone call then transferred to Dr. MERCHANT

## 2017-01-27 NOTE — TELEPHONE ENCOUNTER
Spoke with pt and she states that she still does not have any energy, even after the infusion. States that after taking the imuran she is drained, light headed and dizzy. States she just wants to sleep all the time. Has been on this since the 9th and does not know if her body just needs to get used to it or what. Please Advise.

## 2017-01-27 NOTE — PROGRESS NOTES
Progressively worsening fatigue and couldn't tolerate imuran because of fatigue. Felt better after one dose of iv solumedrol. Give 3 more doses of 500 mg iv solumedrol . Not giving 1 gram because she couldn't tolerate 1 gram dose in past when given by her prior rheumatologist .

## 2017-01-30 ENCOUNTER — TELEPHONE (OUTPATIENT)
Dept: DERMATOLOGY | Facility: CLINIC | Age: 52
End: 2017-01-30

## 2017-01-30 ENCOUNTER — TELEPHONE (OUTPATIENT)
Dept: RHEUMATOLOGY | Facility: CLINIC | Age: 52
End: 2017-01-30

## 2017-01-30 NOTE — TELEPHONE ENCOUNTER
Spoke with pt and setup an apt with Dr. Montanez for 1.31.17 at 7.40 am. Pt verbalized understanding.

## 2017-01-30 NOTE — TELEPHONE ENCOUNTER
Pt contacted 's office to inform us that she is no longer taking the Imeran (spelling) per 's orders. Patient informed that this information will be noted in her chart. Patient verbalized an understanding.

## 2017-01-30 NOTE — TELEPHONE ENCOUNTER
----- Message from Evelin Tello sent at 1/30/2017  2:10 PM CST -----  Contact: pt  Please call pt regarding rescheduling the appt with Dr Montanez.

## 2017-01-30 NOTE — TELEPHONE ENCOUNTER
----- Message from Leticianeeru Avila sent at 1/27/2017  2:46 PM CST -----  Contact: pt  Pt calling to speak to nurse...states that she left previous message yesterday and has not heard anything back...please call pt back at [phone]///thx jw

## 2017-01-31 ENCOUNTER — INFUSION (OUTPATIENT)
Dept: RHEUMATOLOGY | Facility: HOSPITAL | Age: 52
End: 2017-01-31
Attending: INTERNAL MEDICINE
Payer: COMMERCIAL

## 2017-01-31 VITALS
RESPIRATION RATE: 18 BRPM | DIASTOLIC BLOOD PRESSURE: 60 MMHG | TEMPERATURE: 97 F | HEART RATE: 106 BPM | BODY MASS INDEX: 26.23 KG/M2 | WEIGHT: 160.06 LBS | SYSTOLIC BLOOD PRESSURE: 101 MMHG

## 2017-01-31 DIAGNOSIS — M32.19 CNS LUPUS: Primary | ICD-10-CM

## 2017-01-31 DIAGNOSIS — G05.3 CNS LUPUS: Primary | ICD-10-CM

## 2017-01-31 PROCEDURE — 25000003 PHARM REV CODE 250: Mod: PO | Performed by: INTERNAL MEDICINE

## 2017-01-31 PROCEDURE — 63600175 PHARM REV CODE 636 W HCPCS: Mod: PO | Performed by: INTERNAL MEDICINE

## 2017-01-31 PROCEDURE — 96365 THER/PROPH/DIAG IV INF INIT: CPT | Mod: PO

## 2017-01-31 RX ORDER — METHYLPREDNISOLONE SODIUM SUCCINATE 1 G/16ML
500 INJECTION INTRAMUSCULAR; INTRAVENOUS DAILY
Status: DISCONTINUED | OUTPATIENT
Start: 2017-01-31 | End: 2017-01-31

## 2017-01-31 RX ORDER — METHYLPREDNISOLONE SODIUM SUCCINATE 1 G/16ML
500 INJECTION INTRAMUSCULAR; INTRAVENOUS DAILY
Status: CANCELLED | OUTPATIENT
Start: 2017-01-31

## 2017-01-31 RX ORDER — SODIUM CHLORIDE 0.9 % (FLUSH) 0.9 %
10 SYRINGE (ML) INJECTION
Status: CANCELLED | OUTPATIENT
Start: 2017-01-31

## 2017-01-31 RX ORDER — HEPARIN SODIUM (PORCINE) LOCK FLUSH IV SOLN 100 UNIT/ML 100 UNIT/ML
100 SOLUTION INTRAVENOUS
Status: CANCELLED | OUTPATIENT
Start: 2017-01-31

## 2017-01-31 RX ORDER — SODIUM CHLORIDE 0.9 % (FLUSH) 0.9 %
10 SYRINGE (ML) INJECTION
Status: DISCONTINUED | OUTPATIENT
Start: 2017-01-31 | End: 2017-01-31 | Stop reason: HOSPADM

## 2017-01-31 RX ADMIN — SODIUM CHLORIDE: 9 INJECTION, SOLUTION INTRAVENOUS at 11:01

## 2017-01-31 RX ADMIN — Medication 10 ML: at 11:01

## 2017-01-31 NOTE — PROGRESS NOTES
Infusion# 1/3  S/S infection noted or voiced? denies  Recent labs? yes    Premeds? no    Solumedrol 100 mg administered IV at a 30 minute rate per orders; see MAR and vitals for more  details. Tolerated well without adverse events, discharged and ambulatory out of clinic.

## 2017-01-31 NOTE — MR AVS SNAPSHOT
[facility]  [address] [facility] [place] [address]  Phone: [phone]  Fax: [phone]                  Ruth Messina   2017 11:00 AM   Infusion    Description:  Female : 1965   Provider:  RHEUMATOLOGY, INFUSION   Department:  [facility]           Diagnoses this Visit        Comments    CNS lupus    -  Primary            To Do List           Future Appointments        Provider Department Dept Phone    2017 11:00 AM RHEUMATOLOGY, INFUSION [facility] [phone]    2017 8:00 AM Chris Montanez Jr., MD [facility] - Hemotology Oncology [phone]    2017 11:00 AM RHEUMATOLOGY, INFUSION [facility] [phone]    2017 10:00 AM Abhishek Decker MD '[place] - Orthopedics [phone]    3/2/2017 11:00 AM Chirag Turner MD [facility] - Rheumatology [phone]      Goals (5 Years of Data)     None      [facility]     [facility] -  Assistance  Registered nurses in the [facility] provide clinical advisement, health education, appointment booking, and other advisory services.  Call for this free service at [phone].             Medications           Message regarding Medications     Verify the changes and/or additions to your medication regime listed below are the same as discussed with your clinician today.  If any of these changes or additions are incorrect, please notify your healthcare provider.        These medications were administered today        Dose Freq    sodium chloride 0.9% flush 10 mL 10 mL As needed (PRN)    Sig: Inject 10 mLs into the vein as needed for Line Care.    Class: Normal    Route: Intravenous    Non-formulary Exception Code: Specific indication for non-formulary alternative    methylPREDNISolone sod suc(PF) (SOLU-MEDROL) 125 mg/2 mL 500 mg in sodium chloride 0.9% 100 mL IVPB  Clinic/HOD 1 time    Sig: Inject into the vein one time.     Class: Normal    Route: Intravenous      STOP taking these medications     fluticasone (FLONASE) 50 mcg/actuation nasal spray 1 spray by Each Nare route 2 (two) times daily as needed for Rhinitis.           Verify that the below list of medications is an accurate representation of the medications you are currently taking.  If none reported, the list may be blank. If incorrect, please contact your healthcare provider. Carry this list with you in case of emergency.           Current Medications     amlodipine (NORVASC) 5 MG tablet Take 5 mg by mouth once daily.    fluocinolone (DERMA-SMOOTHE) 0.01 % external oil Apply oil to scalp twice a day    losartan (COZAAR) 100 MG tablet Take 100 mg by mouth once daily.    predniSONE (DELTASONE) 5 MG tablet Take 1 tablet (5 mg total) by mouth once daily.           Clinical Reference Information           Vital Signs - Last Recorded  Most recent update: 1/31/2017 10:54 AM by Marycruz Shields RN    BP Pulse Temp Resp Wt BMI    102/73 110 97.4 °F (36.3 °C) 18 72.6 kg (160 lb 0.9 oz) 26.23 kg/m2      Allergies as of 1/31/2017     Ace Inhibitors    Amoxicillin    Aspirin    Cellcept [Mycophenolate Mofetil]    Cephalosporins    Clindamycin    Hydrochlorothiazide    Imuran [Azathioprine Sodium]    Lasix [Furosemide]    Methotrexate Analogues    Nsaids (Non-steroidal Anti-inflammatory Drug)    Pcn [Penicillins]    Plaquenil [Hydroxychloroquine]    Shellfish Containing Products    Sulfa (Sulfonamide Antibiotics)    Zofran [Ondansetron Hcl (Pf)]      Immunizations Administered on Date of Encounter - 1/31/2017     None      Administrations This Visit     methylPREDNISolone sod suc(PF) (SOLU-MEDROL) 125 mg/2 mL 500 mg in sodium chloride 0.9% 100 mL IVPB     Admin Date Action Dose Rate Route Administered By          01/31/2017 New Bag   200 mL/hr Intravenous Marycruz Shields RN                   sodium chloride 0.9% flush 10 mL     Admin Date Action Dose Route Administered By              01/31/2017 Given 10 mL Intravenous Marycruz Shields RN                      Instructions    Methylprednisolone Sodium Succinate Solution for injection  What is this medicine?  METHYLPREDNISOLONE (meth ill pred NISS oh lone) is a corticosteroid. It is commonly used to treat inflammation of the skin, joints, lungs, and other organs. Common conditions treated include asthma, allergies, and arthritis. It is also used for other conditions, such as blood disorders and diseases of the adrenal glands.  This medicine may be used for other purposes; ask your health care provider or pharmacist if you have questions.  What should I tell my health care provider before I take this medicine?  They need to know if you have any of these conditions:  · cataracts or glaucoma  · Cushing's syndrome  · heart disease  · high blood pressure  · infection including tuberculosis  · low calcium or potassium levels in the blood  · recent surgery  · seizures  · stomach or intestinal disease, including colitis  · threadworms  · thyroid problems  · an unusual or allergic reaction to methylprednisolone, corticosteroids, benzyl alcohol, other medicines, foods, dyes, or preservatives  · pregnant or trying to get pregnant  · breast-feeding  How should I use this medicine?  This medicine is for injection or infusion into a vein. It is also for injection into a muscle. It is given by a health care professional in a hospital or clinic setting.  Talk to your pediatrician regarding the use of this medicine in children. While this drug may be prescribed for selected conditions, precautions do apply.  Overdosage: If you think you have taken too much of this medicine contact a poison control center or emergency room at once.  NOTE: This medicine is only for you. Do not share this medicine with others.  What if I miss a dose?  This does not apply.  What may interact with this medicine?  Do not take this medicine with any of the following  medications:  · mifepristone  This medicine may also interact with the following medications:  · aspirin and aspirin-like medicines  · cyclosporin  · ketoconazole  · phenobarbital  · phenytoin  · rifampin  · tacrolimus  · troleandomycin  · vaccines  · warfarin  This list may not describe all possible interactions. Give your health care provider a list of all the medicines, herbs, non-prescription drugs, or dietary supplements you use. Also tell them if you smoke, drink alcohol, or use illegal drugs. Some items may interact with your medicine.  What should I watch for while using this medicine?  Visit your doctor or health care professional for regular checks on your progress. If you are taking this medicine for a long time, carry an identification card with your name and address, the type and dose of your medicine, and your doctor's name and address.  The medicine may increase your risk of getting an infection. Stay away from people who are sick. Tell your doctor or health care professional if you are around anyone with measles or chickenpox.  You may need to avoid some vaccines. Talk to your health care provider for more information.  If you are going to have surgery, tell your doctor or health care professional that you have taken this medicine within the last twelve months.  Ask your doctor or health care professional about your diet. You may need to lower the amount of salt you eat.  The medicine can increase your blood sugar. If you are a diabetic check with your doctor if you need help adjusting the dose of your diabetic medicine.  What side effects may I notice from receiving this medicine?  Side effects that you should report to your doctor or health care professional as soon as possible:  · allergic reactions like skin rash, itching or hives, swelling of the face, lips, or tongue  · bloody or tarry stools  · changes in vision  · eye pain or bulging eyes  · fever, sore throat, sneezing, cough, or other signs  of infection, wounds that will not heal  · increased thirst  · irregular heartbeat  · muscle cramps  · pain in hips, back, ribs, arms, shoulders, or legs  · swelling of the ankles, feet, hands  · trouble passing urine or change in the amount of urine  · unusual bleeding or bruising  · unusually weak or tired  · weight gain or weight loss  Side effects that usually do not require medical attention (report to your doctor or health care professional if they continue or are bothersome):  · changes in emotions or moods  · constipation or diarrhea  · headache  · irritation at site where injected  · nausea, vomiting  · skin problems, acne, thin and shiny skin  · trouble sleeping  · unusual hair growth on the face or body  This list may not describe all possible side effects. Call your doctor for medical advice about side effects. You may report side effects to FDA at 6-080-FDA-0873.  Where should I keep my medicine?  This drug is given in a hospital or clinic and will not be stored at home.  NOTE:This sheet is a summary. It may not cover all possible information. If you have questions about this medicine, talk to your doctor, pharmacist, or health care provider. Copyright© 2016 [employer]      WAYS TO HELP PREVENT INFECTION         WASH YOUR HANDS OFTEN DURING THE DAY, ESPECIALLY BEFORE YOU EAT, AFTER USING THE BATHROOM, AND AFTER TOUCHING ANIMALS     STAY AWAY FROM PEOPLE WHO HAVE ILLNESSES YOU CAN CATCH; SUCH AS COLDS, FLU, CHICKEN POX     TRY TO AVOID CROWDS     STAY AWAY FROM CHILDREN WHO RECENTLY HAVE RECEIVED LIVE VIRUS VACCINES     MAINTAIN GOOD MOUTH CARE     DO NOT SQUEEZE OR SCRATCH PIMPLES     CLEAN CUTS & SCRAPES RIGHT AWAY AND DAILY UNTIL HEALED WITH WARM WATER, SOAP & AN ANTISEPTIC     AVOID CONTACT WITH LITTER BOXES, BIRD CAGES, & FISH TANKS     AVOID STANDING WATER, IE., BIRD BATHS, FLOWER POTS/VASES, OR HUMIDIFIERS     WEAR GLOVES WHEN GARDENING OR CLEANING UP AFTER OTHERS, ESPECIALLY BABIES  & SMALL CHILDREN    DO NOT EAT RAW FISH, SEAFOOD, MEAT, OR EGGS

## 2017-01-31 NOTE — PATIENT INSTRUCTIONS
Methylprednisolone Sodium Succinate Solution for injection  What is this medicine?  METHYLPREDNISOLONE (meth ill pred NISS oh lone) is a corticosteroid. It is commonly used to treat inflammation of the skin, joints, lungs, and other organs. Common conditions treated include asthma, allergies, and arthritis. It is also used for other conditions, such as blood disorders and diseases of the adrenal glands.  This medicine may be used for other purposes; ask your health care provider or pharmacist if you have questions.  What should I tell my health care provider before I take this medicine?  They need to know if you have any of these conditions:  · cataracts or glaucoma  · Cushing's syndrome  · heart disease  · high blood pressure  · infection including tuberculosis  · low calcium or potassium levels in the blood  · recent surgery  · seizures  · stomach or intestinal disease, including colitis  · threadworms  · thyroid problems  · an unusual or allergic reaction to methylprednisolone, corticosteroids, benzyl alcohol, other medicines, foods, dyes, or preservatives  · pregnant or trying to get pregnant  · breast-feeding  How should I use this medicine?  This medicine is for injection or infusion into a vein. It is also for injection into a muscle. It is given by a health care professional in a hospital or clinic setting.  Talk to your pediatrician regarding the use of this medicine in children. While this drug may be prescribed for selected conditions, precautions do apply.  Overdosage: If you think you have taken too much of this medicine contact a poison control center or emergency room at once.  NOTE: This medicine is only for you. Do not share this medicine with others.  What if I miss a dose?  This does not apply.  What may interact with this medicine?  Do not take this medicine with any of the following medications:  · mifepristone  This medicine may also interact with the following medications:  · aspirin and  aspirin-like medicines  · cyclosporin  · ketoconazole  · phenobarbital  · phenytoin  · rifampin  · tacrolimus  · troleandomycin  · vaccines  · warfarin  This list may not describe all possible interactions. Give your health care provider a list of all the medicines, herbs, non-prescription drugs, or dietary supplements you use. Also tell them if you smoke, drink alcohol, or use illegal drugs. Some items may interact with your medicine.  What should I watch for while using this medicine?  Visit your doctor or health care professional for regular checks on your progress. If you are taking this medicine for a long time, carry an identification card with your name and address, the type and dose of your medicine, and your doctor's name and address.  The medicine may increase your risk of getting an infection. Stay away from people who are sick. Tell your doctor or health care professional if you are around anyone with measles or chickenpox.  You may need to avoid some vaccines. Talk to your health care provider for more information.  If you are going to have surgery, tell your doctor or health care professional that you have taken this medicine within the last twelve months.  Ask your doctor or health care professional about your diet. You may need to lower the amount of salt you eat.  The medicine can increase your blood sugar. If you are a diabetic check with your doctor if you need help adjusting the dose of your diabetic medicine.  What side effects may I notice from receiving this medicine?  Side effects that you should report to your doctor or health care professional as soon as possible:  · allergic reactions like skin rash, itching or hives, swelling of the face, lips, or tongue  · bloody or tarry stools  · changes in vision  · eye pain or bulging eyes  · fever, sore throat, sneezing, cough, or other signs of infection, wounds that will not heal  · increased thirst  · irregular heartbeat  · muscle cramps  · pain  in hips, back, ribs, arms, shoulders, or legs  · swelling of the ankles, feet, hands  · trouble passing urine or change in the amount of urine  · unusual bleeding or bruising  · unusually weak or tired  · weight gain or weight loss  Side effects that usually do not require medical attention (report to your doctor or health care professional if they continue or are bothersome):  · changes in emotions or moods  · constipation or diarrhea  · headache  · irritation at site where injected  · nausea, vomiting  · skin problems, acne, thin and shiny skin  · trouble sleeping  · unusual hair growth on the face or body  This list may not describe all possible side effects. Call your doctor for medical advice about side effects. You may report side effects to FDA at 3-383-QAV-7709.  Where should I keep my medicine?  This drug is given in a hospital or clinic and will not be stored at home.  NOTE:This sheet is a summary. It may not cover all possible information. If you have questions about this medicine, talk to your doctor, pharmacist, or health care provider. Copyright© 2016 [employer] Standard      WAYS TO HELP PREVENT INFECTION         WASH YOUR HANDS OFTEN DURING THE DAY, ESPECIALLY BEFORE YOU EAT, AFTER USING THE BATHROOM, AND AFTER TOUCHING ANIMALS     STAY AWAY FROM PEOPLE WHO HAVE ILLNESSES YOU CAN CATCH; SUCH AS COLDS, FLU, CHICKEN POX     TRY TO AVOID CROWDS     STAY AWAY FROM CHILDREN WHO RECENTLY HAVE RECEIVED LIVE VIRUS VACCINES     MAINTAIN GOOD MOUTH CARE     DO NOT SQUEEZE OR SCRATCH PIMPLES     CLEAN CUTS & SCRAPES RIGHT AWAY AND DAILY UNTIL HEALED WITH WARM WATER, SOAP & AN ANTISEPTIC     AVOID CONTACT WITH LITTER BOXES, BIRD CAGES, & FISH TANKS     AVOID STANDING WATER, IE., BIRD BATHS, FLOWER POTS/VASES, OR HUMIDIFIERS     WEAR GLOVES WHEN GARDENING OR CLEANING UP AFTER OTHERS, ESPECIALLY BABIES & SMALL CHILDREN    DO NOT EAT RAW FISH, SEAFOOD, MEAT, OR EGGS

## 2017-02-01 ENCOUNTER — INFUSION (OUTPATIENT)
Dept: RHEUMATOLOGY | Facility: HOSPITAL | Age: 52
End: 2017-02-01
Attending: INTERNAL MEDICINE
Payer: COMMERCIAL

## 2017-02-01 ENCOUNTER — TELEPHONE (OUTPATIENT)
Dept: HEMATOLOGY/ONCOLOGY | Facility: CLINIC | Age: 52
End: 2017-02-01

## 2017-02-01 VITALS
SYSTOLIC BLOOD PRESSURE: 112 MMHG | WEIGHT: 162.94 LBS | TEMPERATURE: 98 F | DIASTOLIC BLOOD PRESSURE: 70 MMHG | HEART RATE: 94 BPM | BODY MASS INDEX: 26.7 KG/M2

## 2017-02-01 DIAGNOSIS — G05.3 CNS LUPUS: Primary | ICD-10-CM

## 2017-02-01 DIAGNOSIS — M32.19 CNS LUPUS: Primary | ICD-10-CM

## 2017-02-01 PROCEDURE — 25000003 PHARM REV CODE 250: Mod: PO | Performed by: INTERNAL MEDICINE

## 2017-02-01 PROCEDURE — 63600175 PHARM REV CODE 636 W HCPCS: Mod: PO | Performed by: INTERNAL MEDICINE

## 2017-02-01 PROCEDURE — 96365 THER/PROPH/DIAG IV INF INIT: CPT | Mod: PO

## 2017-02-01 RX ORDER — HEPARIN SODIUM (PORCINE) LOCK FLUSH IV SOLN 100 UNIT/ML 100 UNIT/ML
100 SOLUTION INTRAVENOUS
Status: CANCELLED | OUTPATIENT
Start: 2017-02-01

## 2017-02-01 RX ORDER — SODIUM CHLORIDE 0.9 % (FLUSH) 0.9 %
10 SYRINGE (ML) INJECTION
Status: CANCELLED | OUTPATIENT
Start: 2017-02-01

## 2017-02-01 RX ORDER — SODIUM CHLORIDE 0.9 % (FLUSH) 0.9 %
10 SYRINGE (ML) INJECTION
Status: DISCONTINUED | OUTPATIENT
Start: 2017-02-01 | End: 2017-02-01 | Stop reason: HOSPADM

## 2017-02-01 RX ADMIN — Medication 10 ML: at 11:02

## 2017-02-01 RX ADMIN — SODIUM CHLORIDE: 9 INJECTION, SOLUTION INTRAVENOUS at 11:02

## 2017-02-01 NOTE — TELEPHONE ENCOUNTER
----- Message from Negrito Ordonez sent at 2/1/2017 10:05 AM CST -----  Pt is requesting a call from nurse to f/u on medical records request from another provider.          Please call pt back at [phone]

## 2017-02-01 NOTE — PROGRESS NOTES
Infusion# 2 of 3  S/S infection noted or voiced? None noted/denied  Recent labs? yes    Premeds? none    Solumedrol Pulse 500 mg administered IV at a 30 minute rate per orders; see MAR and vitals for more  details. Tolerated well without adverse events, discharged and ambulatory out of clinic.

## 2017-02-02 ENCOUNTER — TELEPHONE (OUTPATIENT)
Dept: RHEUMATOLOGY | Facility: CLINIC | Age: 52
End: 2017-02-02

## 2017-02-02 ENCOUNTER — INFUSION (OUTPATIENT)
Dept: RHEUMATOLOGY | Facility: HOSPITAL | Age: 52
End: 2017-02-02
Attending: INTERNAL MEDICINE
Payer: COMMERCIAL

## 2017-02-02 ENCOUNTER — INITIAL CONSULT (OUTPATIENT)
Dept: HEMATOLOGY/ONCOLOGY | Facility: CLINIC | Age: 52
End: 2017-02-02
Payer: COMMERCIAL

## 2017-02-02 ENCOUNTER — LAB VISIT (OUTPATIENT)
Dept: LAB | Facility: HOSPITAL | Age: 52
End: 2017-02-02
Attending: INTERNAL MEDICINE
Payer: COMMERCIAL

## 2017-02-02 VITALS
RESPIRATION RATE: 20 BRPM | SYSTOLIC BLOOD PRESSURE: 106 MMHG | HEART RATE: 86 BPM | TEMPERATURE: 98 F | BODY MASS INDEX: 27.11 KG/M2 | WEIGHT: 162.94 LBS | DIASTOLIC BLOOD PRESSURE: 69 MMHG

## 2017-02-02 VITALS
TEMPERATURE: 97 F | WEIGHT: 162.25 LBS | HEART RATE: 90 BPM | DIASTOLIC BLOOD PRESSURE: 70 MMHG | SYSTOLIC BLOOD PRESSURE: 120 MMHG | RESPIRATION RATE: 18 BRPM | OXYGEN SATURATION: 100 % | BODY MASS INDEX: 27.03 KG/M2 | HEIGHT: 65 IN

## 2017-02-02 DIAGNOSIS — D64.9 NORMOCYTIC ANEMIA: Primary | ICD-10-CM

## 2017-02-02 DIAGNOSIS — G05.3 CNS LUPUS: Primary | ICD-10-CM

## 2017-02-02 DIAGNOSIS — M32.19 CNS LUPUS: Primary | ICD-10-CM

## 2017-02-02 DIAGNOSIS — D64.9 NORMOCYTIC ANEMIA: ICD-10-CM

## 2017-02-02 LAB
ALBUMIN SERPL BCP-MCNC: 3.5 G/DL
ALP SERPL-CCNC: 90 U/L
ALT SERPL W/O P-5'-P-CCNC: 25 U/L
ANION GAP SERPL CALC-SCNC: 14 MMOL/L
AST SERPL-CCNC: 23 U/L
BASOPHILS # BLD AUTO: 0.01 K/UL
BASOPHILS NFR BLD: 0.1 %
BILIRUB SERPL-MCNC: 0.4 MG/DL
BUN SERPL-MCNC: 20 MG/DL
CALCIUM SERPL-MCNC: 9.8 MG/DL
CHLORIDE SERPL-SCNC: 105 MMOL/L
CO2 SERPL-SCNC: 21 MMOL/L
CREAT SERPL-MCNC: 0.9 MG/DL
CRP SERPL-MCNC: 0.3 MG/L
CRP SERPL-MCNC: 0.3 MG/L
DIFFERENTIAL METHOD: ABNORMAL
EOSINOPHIL # BLD AUTO: 0 K/UL
EOSINOPHIL NFR BLD: 0 %
ERYTHROCYTE [DISTWIDTH] IN BLOOD BY AUTOMATED COUNT: 14.3 %
EST. GFR  (AFRICAN AMERICAN): >60 ML/MIN/1.73 M^2
EST. GFR  (NON AFRICAN AMERICAN): >60 ML/MIN/1.73 M^2
FERRITIN SERPL-MCNC: 919 NG/ML
FOLATE SERPL-MCNC: 11.4 NG/ML
GLUCOSE SERPL-MCNC: 226 MG/DL
HAPTOGLOB SERPL-MCNC: 271 MG/DL
HCT VFR BLD AUTO: 30.8 %
HGB BLD-MCNC: 9.9 G/DL
IRON SERPL-MCNC: 103 UG/DL
LDH SERPL L TO P-CCNC: 180 U/L
LYMPHOCYTES # BLD AUTO: 1.3 K/UL
LYMPHOCYTES NFR BLD: 9.2 %
MCH RBC QN AUTO: 28.9 PG
MCHC RBC AUTO-ENTMCNC: 32.1 %
MCV RBC AUTO: 90 FL
MONOCYTES # BLD AUTO: 0.5 K/UL
MONOCYTES NFR BLD: 3.2 %
NEUTROPHILS # BLD AUTO: 12.5 K/UL
NEUTROPHILS NFR BLD: 87.8 %
PLATELET # BLD AUTO: 216 K/UL
PLATELET BLD QL SMEAR: ABNORMAL
PMV BLD AUTO: 10 FL
POTASSIUM SERPL-SCNC: 3.6 MMOL/L
PROT SERPL-MCNC: 8 G/DL
RBC # BLD AUTO: 3.42 M/UL
RETICS/RBC NFR AUTO: 1.6 %
SATURATED IRON: 37 %
SODIUM SERPL-SCNC: 140 MMOL/L
T4 FREE SERPL-MCNC: 0.9 NG/DL
TOTAL IRON BINDING CAPACITY: 277 UG/DL
TRANSFERRIN SERPL-MCNC: 187 MG/DL
TSH SERPL DL<=0.005 MIU/L-ACNC: 0.16 UIU/ML
VIT B12 SERPL-MCNC: 461 PG/ML
WBC # BLD AUTO: 14.32 K/UL

## 2017-02-02 PROCEDURE — 63600175 PHARM REV CODE 636 W HCPCS: Mod: PO | Performed by: INTERNAL MEDICINE

## 2017-02-02 PROCEDURE — 86334 IMMUNOFIX E-PHORESIS SERUM: CPT | Mod: 26,,, | Performed by: PATHOLOGY

## 2017-02-02 PROCEDURE — 96365 THER/PROPH/DIAG IV INF INIT: CPT | Mod: PO

## 2017-02-02 PROCEDURE — 82607 VITAMIN B-12: CPT

## 2017-02-02 PROCEDURE — 84165 PROTEIN E-PHORESIS SERUM: CPT

## 2017-02-02 PROCEDURE — 85045 AUTOMATED RETICULOCYTE COUNT: CPT

## 2017-02-02 PROCEDURE — 84439 ASSAY OF FREE THYROXINE: CPT

## 2017-02-02 PROCEDURE — 82728 ASSAY OF FERRITIN: CPT

## 2017-02-02 PROCEDURE — 84443 ASSAY THYROID STIM HORMONE: CPT

## 2017-02-02 PROCEDURE — 99205 OFFICE O/P NEW HI 60 MIN: CPT | Mod: S$GLB,,, | Performed by: INTERNAL MEDICINE

## 2017-02-02 PROCEDURE — 36415 COLL VENOUS BLD VENIPUNCTURE: CPT | Mod: PO

## 2017-02-02 PROCEDURE — 83010 ASSAY OF HAPTOGLOBIN QUANT: CPT

## 2017-02-02 PROCEDURE — 25000003 PHARM REV CODE 250: Mod: PO | Performed by: INTERNAL MEDICINE

## 2017-02-02 PROCEDURE — 86334 IMMUNOFIX E-PHORESIS SERUM: CPT

## 2017-02-02 PROCEDURE — 86140 C-REACTIVE PROTEIN: CPT

## 2017-02-02 PROCEDURE — 83520 IMMUNOASSAY QUANT NOS NONAB: CPT | Mod: 59

## 2017-02-02 PROCEDURE — 83615 LACTATE (LD) (LDH) ENZYME: CPT | Mod: PO

## 2017-02-02 PROCEDURE — 85025 COMPLETE CBC W/AUTO DIFF WBC: CPT | Mod: PO

## 2017-02-02 PROCEDURE — 82746 ASSAY OF FOLIC ACID SERUM: CPT

## 2017-02-02 PROCEDURE — 83540 ASSAY OF IRON: CPT

## 2017-02-02 PROCEDURE — 80053 COMPREHEN METABOLIC PANEL: CPT | Mod: PO

## 2017-02-02 PROCEDURE — 84165 PROTEIN E-PHORESIS SERUM: CPT | Mod: 26,,, | Performed by: PATHOLOGY

## 2017-02-02 PROCEDURE — 99999 PR PBB SHADOW E&M-EST. PATIENT-LVL III: CPT | Mod: PBBFAC,,, | Performed by: INTERNAL MEDICINE

## 2017-02-02 RX ORDER — SODIUM CHLORIDE 0.9 % (FLUSH) 0.9 %
10 SYRINGE (ML) INJECTION
Status: DISCONTINUED | OUTPATIENT
Start: 2017-02-02 | End: 2017-02-02 | Stop reason: HOSPADM

## 2017-02-02 RX ORDER — HEPARIN SODIUM (PORCINE) LOCK FLUSH IV SOLN 100 UNIT/ML 100 UNIT/ML
100 SOLUTION INTRAVENOUS
Status: CANCELLED | OUTPATIENT
Start: 2017-02-02

## 2017-02-02 RX ORDER — SODIUM CHLORIDE 0.9 % (FLUSH) 0.9 %
10 SYRINGE (ML) INJECTION
Status: CANCELLED | OUTPATIENT
Start: 2017-02-02

## 2017-02-02 RX ADMIN — SODIUM CHLORIDE: 9 INJECTION, SOLUTION INTRAVENOUS at 09:02

## 2017-02-02 RX ADMIN — Medication 10 ML: at 09:02

## 2017-02-02 NOTE — PLAN OF CARE
Problem: Patient Care Overview  Goal: Plan of Care Review  Outcome: Ongoing (interventions implemented as appropriate)  Pt verbalized understanding of POT

## 2017-02-02 NOTE — PROGRESS NOTES
Check activity labs ordered today next week- preferably after Wednesday to evaluate effects of the solumedrol infusion. Thanks.

## 2017-02-02 NOTE — MR AVS SNAPSHOT
[facility] Hemotology Oncology  [address] [place] [address]  Phone: [phone]  Fax: [phone]                  Ruth Messina   2017 8:00 AM   Initial consult    Description:  Female : 1965   Provider:  Chris Montanez Jr., MD   Department:  [facility] Hemotology Oncology           Reason for Visit     Anemia           Diagnoses this Visit        Comments    Normocytic anemia    -  Primary            To Do List           Future Appointments        Provider Department Dept Phone    2017 11:00 AM RHEUMATOLOGY, INFUSION [facility] [phone]    2017 11:30 AM LAB[place]ATORY, [place] [facility] [phone]    2017 8:40 AM Chris Montanez Jr., MD [facility] Hemotology Oncology [phone]    2017 10:00 AM Abhishek Decker MD '[place] - Orthopedics [phone]    3/2/2017 11:00 AM Chirag Turner MD [facility] Rheumatology [phone]      Goals (5 Years of Data)     None      [facility]     [facility] -  Assistance  Registered nurses in the [facility] provide clinical advisement, health education, appointment booking, and other advisory services.  Call for this free service at [phone].             Medications           Message regarding Medications     Verify the changes and/or additions to your medication regime listed below are the same as discussed with your clinician today.  If any of these changes or additions are incorrect, please notify your healthcare provider.             Verify that the below list of medications is an accurate representation of the medications you are currently taking.  If none reported, the list may be blank. If incorrect, please contact your healthcare provider. Carry this list with you in case of emergency.           Current Medications     amlodipine (NORVASC) 5 MG tablet Take 5 mg by mouth once daily.    fluocinolone (DERMA-SMOOTHE) 0.01 % external oil  "Apply oil to scalp twice a day    losartan (COZAAR) 100 MG tablet Take 100 mg by mouth once daily.    predniSONE (DELTASONE) 5 MG tablet Take 1 tablet (5 mg total) by mouth once daily.           Clinical Reference Information           Vital Signs - Last Recorded  Most recent update: 2/2/2017  8:10 AM by Valentina Mims LPN    BP Pulse Temp Resp Ht Wt    120/70 90 97.4 °F (36.3 °C) 18 5' 5" (1.651 m) 73.6 kg (162 lb 4.1 oz)    SpO2 BMI             100% 27 kg/m2         Blood Pressure          Most Recent Value    BP  120/70      Allergies as of 2/2/2017     Ace Inhibitors    Amoxicillin    Aspirin    Beta-blockers (Beta-adrenergic Blocking Agts)    Cellcept [Mycophenolate Mofetil]    Cephalosporins    Clindamycin    Hydrochlorothiazide    Ibuprofen    Imuran [Azathioprine Sodium]    Lasix [Furosemide]    Lisinopril    Methotrexate Analogues    Nsaids (Non-steroidal Anti-inflammatory Drug)    Pcn [Penicillins]    Plaquenil [Hydroxychloroquine]    Shellfish Containing Products    Sulfa (Sulfonamide Antibiotics)    Zofran [Ondansetron Hcl (Pf)]      Immunizations Administered on Date of Encounter - 2/2/2017     None      Orders Placed During Today's Visit     Future Labs/Procedures Expected by Expires    C-reactive protein  2/2/2017 4/3/2018    C-reactive protein  2/2/2017 4/3/2018    CBC auto differential  2/2/2017 4/3/2018    Comprehensive metabolic panel  2/2/2017 4/3/2018    Ferritin  2/2/2017 4/3/2018    Folate  2/2/2017 4/3/2018    Haptoglobin  2/2/2017 4/3/2018    Immunofixation electrophoresis  2/2/2017 4/3/2018    Immunoglobulin free LT chains blood  2/2/2017 4/3/2018    Iron and TIBC  2/2/2017 4/3/2018    Lactate dehydrogenase  2/2/2017 4/3/2018    Protein electrophoresis, serum  2/2/2017 4/3/2018    Reticulocytes  2/2/2017 4/3/2018    TSH  2/2/2017 4/3/2018    Vitamin B12  2/2/2017 4/3/2018      "

## 2017-02-02 NOTE — TELEPHONE ENCOUNTER
----- Message from Chirag Turner MD sent at 2/2/2017 10:00 AM CST -----  Please set up lab for next week. Thanks.

## 2017-02-02 NOTE — PROGRESS NOTES
Pulse 3 of 3  Solumedrol 500 mg administered IV over 30 min. See MAR and vitals for further details.  Pt tolerated well without adverse events.   Discharged ambulatory from clinic.

## 2017-02-02 NOTE — PROGRESS NOTES
Hematology/Oncology Office Note    Reason for referral:  Progressive normocytic anemia    CC:    Referred by:  Chirag Turner,*    Diagnosis:  Progressive normocytic anemia    Treatment:  Treated with IV iron in the past    History of present illness:  51-year-old female with long-standing history of SLE with skin, joint, and cytopenias from SLE.  She underwent a bone marrow biopsy in 2/2016 which demonstrated normal trilineage hematopoiesis without significant abnormality.  She underwent EGD and colonoscopy in 12/2016 which failed to identify any source of blood loss.  She was treated with IV iron by outside oncologist with some improvement in anemia.  Hemoglobin was recently noted to be 9.6 with MCV of 91.  She is currently on prednisone/Solu-Medrol for SLE.  She has complaints of mild fatigue and malaise without shortness of breath, chest pain, dizziness, or palpitations.      Past Medical History   Diagnosis Date    Hypertension     Lupus          Social History:  No tobacco, alcohol, or illicit drugs      Family History: family history includes Arthritis in her mother; Cancer in her brother; Diabetes in her father; Heart disease in her father and mother; Hypertension in her father and mother.    HPI  Review of Systems   Constitutional: Negative for activity change, appetite change, fatigue, fever and unexpected weight change.   HENT: Negative for congestion, facial swelling, mouth sores, nosebleeds, sore throat, trouble swallowing and voice change.    Eyes: Negative for photophobia, pain, discharge, itching and visual disturbance.   Respiratory: Negative for apnea, cough, shortness of breath, wheezing and stridor.    Cardiovascular: Negative for chest pain, palpitations and leg swelling.   Gastrointestinal: Negative for abdominal distention, abdominal pain, anal bleeding, blood in stool, constipation, diarrhea, nausea and vomiting.   Endocrine: Negative for cold intolerance, polydipsia and polyphagia.  "  Genitourinary: Negative for difficulty urinating, dyspareunia, dysuria, flank pain, frequency, hematuria, pelvic pain and vaginal bleeding.   Musculoskeletal: Negative for arthralgias, back pain, gait problem, joint swelling, myalgias and neck pain.   Skin: Negative for pallor, rash and wound.   Allergic/Immunologic: Negative for environmental allergies and immunocompromised state.   Neurological: Negative for dizziness, tremors, seizures, syncope, facial asymmetry, speech difficulty, weakness, light-headedness, numbness and headaches.   Hematological: Negative for adenopathy. Does not bruise/bleed easily.   Psychiatric/Behavioral: Negative for agitation, behavioral problems, confusion, dysphoric mood and hallucinations. The patient is not nervous/anxious and is not hyperactive.        Objective:       Vitals:    02/02/17 0809   BP: 120/70   Pulse: 90   Resp: 18   Temp: 97.4 °F (36.3 °C)   SpO2: 100%   Weight: 73.6 kg (162 lb 4.1 oz)   Height: 5' 5" (1.651 m)     Physical Exam   Constitutional: She is oriented to person, place, and time. She appears well-developed and well-nourished. No distress.   HENT:   Head: Normocephalic and atraumatic.   Right Ear: External ear normal.   Left Ear: External ear normal.   Nose: Nose normal.   Mouth/Throat: Oropharynx is clear and moist. No oropharyngeal exudate.   Eyes: Conjunctivae and EOM are normal. Pupils are equal, round, and reactive to light. No scleral icterus.   Neck: Normal range of motion. Neck supple. No JVD present. No tracheal deviation present. No thyromegaly present.   Cardiovascular: Normal rate, regular rhythm, normal heart sounds and intact distal pulses.  Exam reveals no gallop and no friction rub.    No murmur heard.  Pulmonary/Chest: Effort normal and breath sounds normal. She has no wheezes. She has no rales. She exhibits no tenderness.   Abdominal: Soft. Bowel sounds are normal. She exhibits no distension and no mass. There is no tenderness. There is no " rebound and no guarding.   Musculoskeletal: Normal range of motion. She exhibits no edema or tenderness.   Lymphadenopathy:     She has no cervical adenopathy.   Neurological: She is alert and oriented to person, place, and time. No cranial nerve deficit. She exhibits normal muscle tone. Coordination normal.   Skin: Skin is warm and dry. No rash noted. She is not diaphoretic. No erythema. No pallor.   Psychiatric: She has a normal mood and affect. Her behavior is normal. Judgment and thought content normal.       CT of Brain 2/25/16:  History:    Altered mental status    Findings: No intracranial hemorrhage or acute focal brain parenchymal abnormality is identified.  There is mild generalized atrophy. Calvarium is intact. Visualized paranasal sinuses and mastoid air cells are clear.    Assessment:       51-year-old postmenopausal female with a history of pancytopenia related to SLE and iron deficiency anemia who presents for worsening normocytic anemia.  She underwent a bone marrow biopsy on 2/19/2016 which demonstrated trilineage hematopoiesis without significant abnormality.  In 12/2016 she underwent EGD and colonoscopy ([facility]) without source of blood loss identified.    We will proceed with workup for anemia as noted below and have the patient follow-up in 2 weeks to discuss results.    Progressive normocytic anemia:  --CBC, CMP, reticulocyte count, LDH, haptoglobin, iron studies  --B12/folate, CRP, TSH, SPEP/MARISOL, serum free light chains  --Patient follow-up in 2-3 weeks to discuss results

## 2017-02-02 NOTE — TELEPHONE ENCOUNTER
Spoke with pt and made a lab apt at [facility] for 2.9.17 at 10 am. Pt verbalized understanding

## 2017-02-02 NOTE — LETTER
February 2, 2017      Chirag Turner MD  [address] [place] [address]           [facility] - Hemotology Oncology  [address] [place] [address]  Phone: [phone]  Fax: [phone]          Patient: Ruth Messina   MR Number: [id number]   YOB: 1965   Date of Visit: 2/2/2017       Dear Dr. Chirag Turner:    Thank you for referring Ruth Messina to me for evaluation. Attached you will find relevant portions of my assessment and plan of care.    If you have questions, please do not hesitate to call me. I look forward to following Ruth Messina along with you.    Sincerely,    Chrsi Montanez Jr., MD    Enclosure  CC:  No Recipients    If you would like to receive this communication electronically, please contact [email or web address] or [phone] to request more information on [employer] access.    For providers and/or their staff who would like to refer a patient to [facility], please contact us through our one-stop-shop provider referral line, [facility], at [phone].    If you feel you have received this communication in error or would no longer like to receive these types of communications, please e-mail [email or web address]

## 2017-02-03 LAB
ALBUMIN SERPL ELPH-MCNC: 3.88 G/DL
ALPHA1 GLOB SERPL ELPH-MCNC: 0.34 G/DL
ALPHA2 GLOB SERPL ELPH-MCNC: 0.94 G/DL
B-GLOBULIN SERPL ELPH-MCNC: 0.8 G/DL
GAMMA GLOB SERPL ELPH-MCNC: 1.85 G/DL
INTERPRETATION SERPL IFE-IMP: NORMAL
KAPPA LC SER QL IA: 2.83 MG/DL
KAPPA LC/LAMBDA SER IA: 0.57
LAMBDA LC SER QL IA: 4.95 MG/DL
PATHOLOGIST INTERPRETATION IFE: NORMAL
PATHOLOGIST INTERPRETATION SPE: NORMAL
PROT SERPL-MCNC: 7.8 G/DL

## 2017-02-06 ENCOUNTER — TELEPHONE (OUTPATIENT)
Dept: RHEUMATOLOGY | Facility: CLINIC | Age: 52
End: 2017-02-06

## 2017-02-06 ENCOUNTER — TELEPHONE (OUTPATIENT)
Dept: HEMATOLOGY/ONCOLOGY | Facility: CLINIC | Age: 52
End: 2017-02-06

## 2017-02-06 NOTE — TELEPHONE ENCOUNTER
Yes, It is from the prednisone. It should get better as prednisone is eliminated from her system. Thanks.

## 2017-02-06 NOTE — TELEPHONE ENCOUNTER
----- Message from Esperanza Lucero sent at 2/6/2017 11:37 AM CST -----  Call pt at [phone]//regarding lab results//luis enrique aguero

## 2017-02-06 NOTE — TELEPHONE ENCOUNTER
----- Message from Mignon Infanteite sent at 2/6/2017 11:30 AM CST -----  Contact: Pt   Pt called and stated she needed to speak to the nurse. She stated she needs her lab work and progress notes faxed to her pcp at [phone]. She can be reached at [phone].    Thanks,  TF

## 2017-02-09 ENCOUNTER — HOSPITAL ENCOUNTER (OUTPATIENT)
Facility: HOSPITAL | Age: 52
Discharge: HOME | End: 2017-02-10
Attending: EMERGENCY MEDICINE | Admitting: EMERGENCY MEDICINE
Payer: COMMERCIAL

## 2017-02-09 DIAGNOSIS — R06.02 SOB (SHORTNESS OF BREATH): ICD-10-CM

## 2017-02-09 DIAGNOSIS — R07.9 CHEST PAIN: ICD-10-CM

## 2017-02-09 DIAGNOSIS — G05.3 CNS LUPUS: ICD-10-CM

## 2017-02-09 DIAGNOSIS — R00.0 TACHYCARDIA: Primary | ICD-10-CM

## 2017-02-09 DIAGNOSIS — M32.19 CNS LUPUS: ICD-10-CM

## 2017-02-09 LAB
ANION GAP SERPL CALC-SCNC: 12 MMOL/L
BASOPHILS # BLD AUTO: 0.01 K/UL
BASOPHILS NFR BLD: 0.2 %
BNP SERPL-MCNC: <10 PG/ML
BUN SERPL-MCNC: 15 MG/DL
CALCIUM SERPL-MCNC: 8.9 MG/DL
CHLORIDE SERPL-SCNC: 103 MMOL/L
CO2 SERPL-SCNC: 24 MMOL/L
CREAT SERPL-MCNC: 0.9 MG/DL
D DIMER PPP IA.FEU-MCNC: 1.37 MG/L FEU
DIFFERENTIAL METHOD: ABNORMAL
EOSINOPHIL # BLD AUTO: 0 K/UL
EOSINOPHIL NFR BLD: 0.2 %
ERYTHROCYTE [DISTWIDTH] IN BLOOD BY AUTOMATED COUNT: 14.9 %
EST. GFR  (AFRICAN AMERICAN): >60 ML/MIN/1.73 M^2
EST. GFR  (NON AFRICAN AMERICAN): >60 ML/MIN/1.73 M^2
GLUCOSE SERPL-MCNC: 99 MG/DL
HCT VFR BLD AUTO: 28.8 %
HGB BLD-MCNC: 9.2 G/DL
INR PPP: 1
LYMPHOCYTES # BLD AUTO: 0.8 K/UL
LYMPHOCYTES NFR BLD: 17 %
MCH RBC QN AUTO: 28 PG
MCHC RBC AUTO-ENTMCNC: 31.9 %
MCV RBC AUTO: 88 FL
MONOCYTES # BLD AUTO: 0.5 K/UL
MONOCYTES NFR BLD: 10.9 %
NEUTROPHILS # BLD AUTO: 3.2 K/UL
NEUTROPHILS NFR BLD: 72.4 %
PLATELET # BLD AUTO: 166 K/UL
PMV BLD AUTO: 9.3 FL
POTASSIUM SERPL-SCNC: 4.2 MMOL/L
PROTHROMBIN TIME: 10.8 SEC
RBC # BLD AUTO: 3.28 M/UL
SODIUM SERPL-SCNC: 139 MMOL/L
TROPONIN I SERPL DL<=0.01 NG/ML-MCNC: <0.006 NG/ML
WBC # BLD AUTO: 4.4 K/UL

## 2017-02-09 PROCEDURE — 80048 BASIC METABOLIC PNL TOTAL CA: CPT

## 2017-02-09 PROCEDURE — 99284 EMERGENCY DEPT VISIT MOD MDM: CPT | Mod: 25

## 2017-02-09 PROCEDURE — 25500020 PHARM REV CODE 255: Performed by: EMERGENCY MEDICINE

## 2017-02-09 PROCEDURE — 84484 ASSAY OF TROPONIN QUANT: CPT

## 2017-02-09 PROCEDURE — 83880 ASSAY OF NATRIURETIC PEPTIDE: CPT

## 2017-02-09 PROCEDURE — 93005 ELECTROCARDIOGRAM TRACING: CPT

## 2017-02-09 PROCEDURE — 93010 ELECTROCARDIOGRAM REPORT: CPT | Mod: ,,, | Performed by: INTERNAL MEDICINE

## 2017-02-09 PROCEDURE — 96374 THER/PROPH/DIAG INJ IV PUSH: CPT

## 2017-02-09 PROCEDURE — 85025 COMPLETE CBC W/AUTO DIFF WBC: CPT

## 2017-02-09 PROCEDURE — 25000003 PHARM REV CODE 250: Performed by: EMERGENCY MEDICINE

## 2017-02-09 PROCEDURE — 96361 HYDRATE IV INFUSION ADD-ON: CPT

## 2017-02-09 PROCEDURE — 85379 FIBRIN DEGRADATION QUANT: CPT

## 2017-02-09 PROCEDURE — 63600175 PHARM REV CODE 636 W HCPCS: Performed by: EMERGENCY MEDICINE

## 2017-02-09 PROCEDURE — 85610 PROTHROMBIN TIME: CPT

## 2017-02-09 RX ORDER — DIPHENHYDRAMINE HYDROCHLORIDE 50 MG/ML
25 INJECTION INTRAMUSCULAR; INTRAVENOUS
Status: COMPLETED | OUTPATIENT
Start: 2017-02-09 | End: 2017-02-09

## 2017-02-09 RX ORDER — NITROGLYCERIN 0.4 MG/1
0.4 TABLET SUBLINGUAL
Status: DISPENSED | OUTPATIENT
Start: 2017-02-09 | End: 2017-02-10

## 2017-02-09 RX ORDER — AMLODIPINE BESYLATE 10 MG/1
10 TABLET ORAL DAILY
COMMUNITY
End: 2017-07-31 | Stop reason: SINTOL

## 2017-02-09 RX ADMIN — DIPHENHYDRAMINE HYDROCHLORIDE 25 MG: 50 INJECTION, SOLUTION INTRAMUSCULAR; INTRAVENOUS at 09:02

## 2017-02-09 RX ADMIN — IOHEXOL 100 ML: 350 INJECTION, SOLUTION INTRAVENOUS at 11:02

## 2017-02-09 RX ADMIN — SODIUM CHLORIDE 500 ML: 0.9 INJECTION, SOLUTION INTRAVENOUS at 09:02

## 2017-02-09 NOTE — ED AVS SNAPSHOT
[facility] -   [address]  [place] [address]               Ruth Messina   2017  5:22 PM   ED    Description:  Female : 1965   Department:  [facility]           Your Care was Coordinated By:     Provider Role From To    Teto Caban MD Attending Provider 17 1944 17    Baldo Pineda Jr., MD Attending Provider 17 --    Violeta Bernard MD Admitting Provider -- --      Reason for Visit     Chest Pain           Diagnoses this Visit        Comments    Tachycardia    -  Primary     Chest pain         SOB (shortness of breath)         CNS lupus           ED Disposition     ED Disposition Condition Comment    Admit  Ruth Messina should be admitted to Dr. Bernard.           To Do List           Follow-up Information     Follow up with Janeen Delacruz MD. Schedule an appointment as soon as possible for a visit in 3 days.    Specialty:  Family Medicine    Contact information:    [address]  [place] [address]  [phone]        [facility] On Call     [facility] On Call Nurse Care Line -  Assistance  Registered nurses in the [facility] provide clinical advisement, health education, appointment booking, and other advisory services.  Call for this free service at [phone].             Medications           Message regarding Medications     Verify the changes and/or additions to your medication regime listed below are the same as discussed with your clinician today.  If any of these changes or additions are incorrect, please notify your healthcare provider.        These medications were administered today        Dose Freq    nitroGLYCERIN SL tablet 0.4 mg 0.4 mg ED 1 Time    Sig: Place 1 tablet (0.4 mg total) under the tongue ED 1 Time.    Class: Normal    Route: Sublingual    sodium chloride 0.9% bolus 500 mL 500 mL ED 1 Time    Sig: Inject 500 mLs into the vein ED 1 Time.    Class: Normal  "   Route: Intravenous    diphenhydrAMINE injection 25 mg 25 mg ED 1 Time    Sig: Inject 0.5 mLs (25 mg total) into the vein ED 1 Time.    Class: Normal    Route: Intravenous    omnipaque 350 iohexol 100 mL 100 mL IMG once as needed    Sig: Inject 100 mLs into the vein ONCE PRN for contrast.    Class: Normal    Route: Intravenous           Verify that the below list of medications is an accurate representation of the medications you are currently taking.  If none reported, the list may be blank. If incorrect, please contact your healthcare provider. Carry this list with you in case of emergency.           Current Medications     amlodipine (NORVASC) 10 MG tablet Take 10 mg by mouth once daily.    losartan (COZAAR) 100 MG tablet Take 100 mg by mouth once daily.    predniSONE (DELTASONE) 5 MG tablet Take 1 tablet (5 mg total) by mouth once daily.    fluocinolone (DERMA-SMOOTHE) 0.01 % external oil Apply oil to scalp twice a day           Clinical Reference Information           Your Vitals Were     BP Pulse Temp Resp Height Weight    122/73 101 98.9 °F (37.2 °C) (Oral) 20 5' 5.5" (1.664 m) 72.6 kg (160 lb)    SpO2 BMI             99% 26.22 kg/m2         Allergies as of 2/10/2017        Reactions    Ace Inhibitors     Amoxicillin     Aspirin     Beta-blockers (Beta-adrenergic Blocking Agts)     Cellcept [Mycophenolate Mofetil]     Cephalosporins     Clindamycin     Hydrochlorothiazide     Ibuprofen     Imuran [Azathioprine Sodium]     Lasix [Furosemide]     Lisinopril     Methotrexate Analogues     Nsaids (Non-steroidal Anti-inflammatory Drug)     Pcn [Penicillins]     Plaquenil [Hydroxychloroquine]     Shellfish Containing Products     Sulfa (Sulfonamide Antibiotics)     Zofran [Ondansetron Hcl (Pf)]       Immunizations Administered on Date of Encounter - 2/10/2017     None      ED Micro, Lab, POCT     Start Ordered       Status Ordering Provider    02/10/17 0456 02/10/17 0455  Troponin I  STAT      Final result     " 02/09/17 2035 02/09/17 2034  D dimer, quantitative  Add-on      Completed     02/09/17 1949 02/09/17 1948  CBC auto differential  STAT      Final result     02/09/17 1949 02/09/17 1948  Basic metabolic panel  STAT      Final result     02/09/17 1949 02/09/17 1948  Protime-INR  STAT      Final result     02/09/17 1949 02/09/17 1948  Troponin I  STAT      Final result     02/09/17 1949 02/09/17 1948  Brain natriuretic peptide  STAT      Final result     02/09/17 1948 02/09/17 1948  D dimer, quantitative  Once      Final result       ED Imaging Orders     Start Ordered       Status Ordering Provider    02/09/17 2103 02/09/17 2103  CTA Chest Non-Coronary (PE Study)  1 time imaging      Final result     02/09/17 1949 02/09/17 1948  X-Ray Chest 1 View  1 time imaging      Final result       Your Scheduled Appointments     Feb 16, 2017  8:40 AM CST   Established Patient Visit with Chris Montanez Jr., MD   [facility] - Hemotology Oncology ([facility])    [address] [address]   [phone]            Feb 20, 2017 10:00 AM CST   New Patient with Abhishek Decker MD   [facility] ([facility] [address] [phone]            Mar 02, 2017 11:00 AM CST   Established Patient Visit with Chirag Turner MD   [facility] - Rheumatology ([facility])    [address] [place] [address]   [phone]               [facility] complies with applicable Federal civil rights laws and does not discriminate on the basis of race, color, national origin, age, disability, or sex.        Language Assistance Services     ATTENTION: Language assistance services are available, free of charge. Please call [phone].      ATENCIÓN: Si habla español, tiene a sanderson disposición servicios gratuitos de asistencia lingüística. Llame al [phone].     CHÚ Ý: N?u b?n nói Ti?ng Vi?t, có các d?ch v? h? tr? ngôn ng? mi?n phí dành cho b?n. G?i s? [phone].

## 2017-02-10 VITALS
RESPIRATION RATE: 20 BRPM | TEMPERATURE: 99 F | HEART RATE: 101 BPM | OXYGEN SATURATION: 99 % | SYSTOLIC BLOOD PRESSURE: 122 MMHG | WEIGHT: 160 LBS | BODY MASS INDEX: 25.71 KG/M2 | HEIGHT: 66 IN | DIASTOLIC BLOOD PRESSURE: 73 MMHG

## 2017-02-10 PROBLEM — R00.0 TACHYCARDIA: Status: ACTIVE | Noted: 2017-02-10

## 2017-02-10 PROBLEM — R06.02 SOB (SHORTNESS OF BREATH): Status: ACTIVE | Noted: 2017-02-10

## 2017-02-10 PROBLEM — R07.9 CHEST PAIN: Status: ACTIVE | Noted: 2017-02-10

## 2017-02-10 LAB — TROPONIN I SERPL DL<=0.01 NG/ML-MCNC: <0.006 NG/ML

## 2017-02-10 PROCEDURE — 84484 ASSAY OF TROPONIN QUANT: CPT

## 2017-02-10 PROCEDURE — G0378 HOSPITAL OBSERVATION PER HR: HCPCS

## 2017-02-10 PROCEDURE — 93010 ELECTROCARDIOGRAM REPORT: CPT | Mod: ,,, | Performed by: INTERNAL MEDICINE

## 2017-02-10 RX ORDER — ENOXAPARIN SODIUM 100 MG/ML
40 INJECTION SUBCUTANEOUS EVERY 24 HOURS
Status: CANCELLED | OUTPATIENT
Start: 2017-02-10

## 2017-02-10 NOTE — DISCHARGE SUMMARY
[facility] Hospital Medicine  Discharge Summary      Patient Name: Ruth Messina  MRN: [id number]  Admission Date: 2/9/2017  Hospital Length of Stay: 0 days  Discharge Date and Time:  02/10/2017 10:22 AM  Attending Physician: Dr. José Luis Smalls   Discharging Provider: Elizabeth Prakash NP  Primary Care Provider: Janeen Delacruz MD      HPI:   Ruth Nash is a 51 year old female with PMHx of HTN, SLE on prednisone 5 x 1 year, comes to ED c/o chest tightness which started gradually this AM around 0700. Symptoms are constant, moderate and are exacerbated by exertion and turning. No associated SOB or Palpitations, no radiation to L arm, neck or jaw. She denies any fever, N/V/D, chills, abd pain, SOB, dizziness, lightheadedness, radiating pain, leg swelling, cough, palpitations and all other sxs at this time. No further complaints or concerns at this time.     Hospital Course:   In the ER, she had a low grade temp of 100.8F and was mildly tachycardic and she was admitted to Obs to R/o MI. Her CTA Chest did now any PE or Pneumonia. 2 sets troponin was negative. ECG was negative for ischemia. Patient seen and examined and deemed stable for d/c.      * No surgery found *      Indwelling Lines/Drains at time of discharge:   Lines/Drains/Airways          No matching active lines, drains, or airways          Consults: NONE    Significant Diagnostic Studies: Labs:   CMP   Recent Labs  Lab 02/09/17 2000      K 4.2      CO2 24   GLU 99   BUN 15   CREATININE 0.9   CALCIUM 8.9   ANIONGAP 12   ESTGFRAFRICA >60   EGFRNONAA >60   , CBC   Recent Labs  Lab 02/09/17 2000   WBC 4.40   HGB 9.2*   HCT 28.8*       and Troponin   Recent Labs  Lab 02/10/17  0500   TROPONINI <0.006       Pending Diagnostic Studies:     None        Final Active Diagnoses:    Diagnosis Date Noted POA    PRINCIPAL PROBLEM:  Chest pain [R07.9] 02/10/2017 Yes    Tachycardia [R00.0] 02/10/2017 Yes    SOB (shortness of  breath) [R06.02] 02/10/2017 Yes      Problems Resolved During this Admission:    Diagnosis Date Noted Date Resolved POA      No new Assessment & Plan notes have been filed under this hospital service since the last note was generated.  Service: Hospital Medicine      Discharged Condition: stable    Disposition: Home or Self Care    Follow Up:  Follow-up Information     Follow up with Janeen Delacruz MD. Schedule an appointment as soon as possible for a visit in 3 days.    Specialty:  Family Medicine    Contact information:    [address] [place] [id number] [phone]          Patient Instructions:     Diet general   Order Specific Question Answer Comments   Additional restrictions: Cardiac (Low Na/Chol)      Activity as tolerated     Call MD for:  increased confusion or weakness     Call MD for:  persistent dizziness, light-headedness, or visual disturbances     Call MD for:  worsening rash     Call MD for:  severe persistent headache     Call MD for:  difficulty breathing or increased cough     Call MD for:  redness, tenderness, or signs of infection (pain, swelling, redness, odor or green/yellow discharge around incision site)     Call MD for:  severe uncontrolled pain     Call MD for:  persistent nausea and vomiting or diarrhea     Call MD for:  temperature >100.4       Medications:  Reconciled Home Medications:   Current Discharge Medication List      CONTINUE these medications which have NOT CHANGED    Details   amlodipine (NORVASC) 10 MG tablet Take 10 mg by mouth once daily.      losartan (COZAAR) 100 MG tablet Take 100 mg by mouth once daily.      predniSONE (DELTASONE) 5 MG tablet Take 1 tablet (5 mg total) by mouth once daily.  Qty: 30 tablet, Refills: 3    Associated Diagnoses: CNS lupus      fluocinolone (DERMA-SMOOTHE) 0.01 % external oil Apply oil to scalp twice a day  Qty: 1 Bottle, Refills: 5    Associated Diagnoses: Discoid lupus; Female pattern alopecia           Time spent on the  discharge of patient: 45 minutes    Elizabeth Prakash NP  Department of Hospital Medicine  [facility]

## 2017-02-10 NOTE — ED NOTES
Dr. Pineda at bedside. Reviewed results with pt. Discussed need for CTA of chest. Questions answered. Pt verbalizes understanding. No further complaints or concerns.

## 2017-02-10 NOTE — ED PROVIDER NOTES
Encounter Date: 2/9/2017      SCRIBE #2 NOTE: I, Sophy Worthy, am scribing for, and in the presence of,  Baldo Pineda MD. I have scribed the remaining portions of the note not scribed by Scribe #1.     History     Chief Complaint   Patient presents with    Chest Pain     pt reports tightness in chest since she woke up this morning with SOB     Review of patient's allergies indicates:   Allergen Reactions    Ace inhibitors     Amoxicillin     Aspirin     Beta-blockers (beta-adrenergic blocking agts)     Cellcept [mycophenolate mofetil]     Cephalosporins     Clindamycin     Hydrochlorothiazide     Ibuprofen     Imuran [azathioprine sodium]     Lasix [furosemide]     Lisinopril     Methotrexate analogues     Nsaids (non-steroidal anti-inflammatory drug)     Pcn [penicillins]     Plaquenil [hydroxychloroquine]     Shellfish containing products     Sulfa (sulfonamide antibiotics)     Zofran [ondansetron hcl (pf)]      HPI Comments: Pt reports to ED c/o chest tightness which onset gradually this AM around 0700. Symptoms are constant and moderate in severity. Sx are exacerbated with exertion and relieved by nothing. No other sxs reported. Patient denies any fever, N/V/D, chills, abd pain, SOB, dizziness, lightheadedness, radiating pain, leg swelling, cough, palpitations and all other sxs at this time. No further complaints or concerns at this time.       The history is provided by the patient.     Past Medical History   Diagnosis Date    Hypertension     Lupus      No past medical history pertinent negatives.  Past Surgical History   Procedure Laterality Date    Widsom tooth extraction       Family History   Problem Relation Age of Onset    Arthritis Mother     Heart disease Mother     Hypertension Mother     Diabetes Father     Heart disease Father     Hypertension Father     Cancer Brother      Social History   Substance Use Topics    Smoking status: Never Smoker    Smokeless tobacco:  Unknown    Alcohol use No     Review of Systems   Constitutional: Negative.    HENT: Negative.    Eyes: Negative.    Respiratory: Positive for chest tightness. Negative for shortness of breath, wheezing and stridor.    Cardiovascular: Positive for chest pain. Negative for leg swelling.   Gastrointestinal: Negative.  Negative for abdominal pain, nausea and vomiting.   Endocrine: Negative.    Genitourinary: Negative.    Musculoskeletal: Negative.    Hematological: Negative.    Psychiatric/Behavioral: Negative.    All other systems reviewed and are negative.      Physical Exam   Initial Vitals   BP Pulse Resp Temp SpO2   02/09/17 1720 02/09/17 1720 02/09/17 1720 02/09/17 1720 02/09/17 1720   146/80 120 20 100.8 °F (38.2 °C) 96 %     Physical Exam    Nursing note and vitals reviewed.  Constitutional: She appears well-developed.   HENT:   Head: Normocephalic and atraumatic.   Eyes: EOM are normal. Pupils are equal, round, and reactive to light.   Neck: Normal range of motion. Neck supple. No tracheal deviation present.   Cardiovascular: Exam reveals no gallop and no friction rub.    No murmur heard.  tachycardia   Pulmonary/Chest: Breath sounds normal. No stridor. No respiratory distress. She has no wheezes. She has no rhonchi. She has no rales. She exhibits no tenderness.   Abdominal: Soft. She exhibits no distension and no mass. There is no tenderness. There is no rebound and no guarding.   Musculoskeletal: Normal range of motion.   Neurological: She is alert and oriented to person, place, and time.   Grossly neuro intact   Skin: Skin is warm and dry.   Psychiatric: She has a normal mood and affect. Her behavior is normal. Thought content normal.         ED Course   Procedures  Labs Reviewed   CBC W/ AUTO DIFFERENTIAL - Abnormal; Notable for the following:        Result Value    RBC 3.28 (*)     Hemoglobin 9.2 (*)     Hematocrit 28.8 (*)     MCHC 31.9 (*)     RDW 14.9 (*)     Lymph # 0.8 (*)     Lymph% 17.0 (*)      All other components within normal limits   D DIMER, QUANTITATIVE - Abnormal; Notable for the following:     D-Dimer 1.37 (*)     All other components within normal limits   BASIC METABOLIC PANEL   PROTIME-INR   TROPONIN I   B-TYPE NATRIURETIC PEPTIDE   D DIMER, QUANTITATIVE       Lab Results:  Results for orders placed or performed during the hospital encounter of 02/09/17   CBC auto differential   Result Value Ref Range    WBC 4.40 3.90 - 12.70 K/uL    RBC 3.28 (L) 4.00 - 5.40 M/uL    Hemoglobin 9.2 (L) 12.0 - 16.0 g/dL    Hematocrit 28.8 (L) 37.0 - 48.5 %    MCV 88 82 - 98 fL    MCH 28.0 27.0 - 31.0 pg    MCHC 31.9 (L) 32.0 - 36.0 %    RDW 14.9 (H) 11.5 - 14.5 %    Platelets 166 150 - 350 K/uL    MPV 9.3 9.2 - 12.9 fL    Gran # 3.2 1.8 - 7.7 K/uL    Lymph # 0.8 (L) 1.0 - 4.8 K/uL    Mono # 0.5 0.3 - 1.0 K/uL    Eos # 0.0 0.0 - 0.5 K/uL    Baso # 0.01 0.00 - 0.20 K/uL    Gran% 72.4 38.0 - 73.0 %    Lymph% 17.0 (L) 18.0 - 48.0 %    Mono% 10.9 4.0 - 15.0 %    Eosinophil% 0.2 0.0 - 8.0 %    Basophil% 0.2 0.0 - 1.9 %    Differential Method Automated    Basic metabolic panel   Result Value Ref Range    Sodium 139 136 - 145 mmol/L    Potassium 4.2 3.5 - 5.1 mmol/L    Chloride 103 95 - 110 mmol/L    CO2 24 23 - 29 mmol/L    Glucose 99 70 - 110 mg/dL    BUN, Bld 15 6 - 20 mg/dL    Creatinine 0.9 0.5 - 1.4 mg/dL    Calcium 8.9 8.7 - 10.5 mg/dL    Anion Gap 12 8 - 16 mmol/L    eGFR if African American >60 >60 mL/min/1.73 m^2    eGFR if non African American >60 >60 mL/min/1.73 m^2   Protime-INR   Result Value Ref Range    Prothrombin Time 10.8 9.0 - 12.5 sec    INR 1.0 0.8 - 1.2   Troponin I   Result Value Ref Range    Troponin I <0.006 0.000 - 0.026 ng/mL   Brain natriuretic peptide   Result Value Ref Range    BNP <10 0 - 99 pg/mL   D dimer, quantitative   Result Value Ref Range    D-Dimer 1.37 (H) <0.50 mg/L FEU         Imaging:  Imaging Results         CTA Chest Non-Coronary (PE Study) (Final result) Result time:   02/09/17 23:37:56    Final result by Kristy Longoria MD (02/09/17 23:37:56)    Impression:        1.  Negative for pulmonary embolism.  2.  Normal aorta.  3.  Bibasilar pleural-parenchymal bands of atelectasis right more so than left.  Otherwise, clear lungs.    All CT scans at this facility use dose modulation, iterative reconstruction, and/or weight based dosing when appropriate to reduce radiation dose to as low as reasonably achievable.      Electronically signed by: KRISTY LONGORIA  Date:     02/09/17  Time:    23:37     Narrative:    Exam: CTA CHEST NON CORONARY     Indication: Chest pain.    Technique: CT angiogram chest performed with pulmonary embolism protocol.  100 cc Omnipaque 350.  Multiplanar maximum intensity projections.  Multiplanar reformations.    Comparison Study: None    Findings: There is excellent opacification of the pulmonary arteries without intraluminal filling defect to suggest pulmonary embolism.  Normal caliber pulmonary arteries.    Normal caliber aorta without dissection.  Normal size heart.  No pericardial effusion.  No adenopathy.    Trachea and central bronchi are widely patent.  Bibasilar pleural-parenchymal bands of atelectasis, more notable on the right.  Otherwise, lungs are clear.    Visualized portion of the upper abdominal viscera is unremarkable.  Minor rightward curvature thoracic spine.            X-Ray Chest 1 View (Final result) Result time:  02/09/17 21:22:07    Final result by Rey Sherwood MD (02/09/17 21:22:07)    Impression:      Nonspecific bibasilar discoid atelectasis.      Electronically signed by: REY SHERWOOD MD  Date:     02/09/17  Time:    21:22     Narrative:    Exam: XR CHEST 1 VIEW,    Date:  02/09/17 20:10:07    History: Chest pain    Comparison:  02/25/2016.    Findings: Lung volumes are decreased with bibasilar discoid atelectasis.  Heart size is normal.  Otherwise negative.                The EKG was ordered, reviewed, and independently  interpreted by the ED provider.  Interpretation time: 1726  Rate: 114 BPM  Rhythm: sinus tachycardia  Interpretation: ST segment, T wave, axis, and QRS is normal. No STEMI.      7:56 PM: Dr. Caban transfers care of pt to Dr. Pineda, pending imaging and lab results.    8:53 PM: Dr. Pineda re-evaluated pt. Pt given benadryl. CT scan ordered for possibility of PE. D/w pt all pertinent results. D/w pt any concerns expressed at this time. Answered all questions. Pt expresses understanding at this time.    12:58 AM: Discussed case with Dr. Violeta Bernard ([facility]). Dr. Bernard agrees with current care and management of pt and accepts admission.   Admitting Service: [facility]   Admitting Physician: Dr. Bernard  Admit to: Telemetry/Observation    1:53 AM: Re-evaluated pt. I have discussed test results, shared treatment plan, and the need for admission with patient and family at bedside. Pt and family express understanding at this time and agree with all information. All questions answered. Pt and family have no further questions or concerns at this time. Pt is ready for admit.         Medical Decision Making:   Clinical Tests:   Lab Tests: Reviewed and Ordered  Radiological Study: Ordered and Reviewed  Medical Tests: Reviewed and Ordered            Scribe Attestation:   Scribe #2: I performed the above scribed service and the documentation accurately describes the services I performed. I attest to the accuracy of the note.    Attending Attestation:           Physician Attestation for Scribe:    Physician Attestation Statement for Scribe #2: I, Baldo Pineda MD, reviewed documentation, as scribed by Sophy Worthy in my presence, and it is both accurate and complete. I also acknowledge and confirm the content of the note done by Scribe #1.              ED Course     Clinical Impression:   The primary encounter diagnosis was Tachycardia. Diagnoses of Chest pain and SOB (shortness of breath) were also  pertinent to this visit.    Disposition:   Disposition: Admitted  Condition: Ivan Pineda Jr., MD  02/10/17 0455

## 2017-02-10 NOTE — ED NOTES
No change in status. Pt resting comfortably in bed, NAD  Noted, RR even & unlabored. No complaints at this time. Denies need for assistance. No questions or concerns in regards to POC. VSS. Will continue to monitor.

## 2017-02-10 NOTE — ED NOTES
Pt awake, alert and oriented x4 with zero c/o chest pain or shortness of breath. Pt states she feel better and would like to go home if possible. Hospital medicine dr stephens.

## 2017-02-10 NOTE — ED NOTES
Pt presents to ED for c/o chest tightness that began when pt woke up this am. Tightness is constant & located to L chest wall. Rates pain 5/10. Associated symptoms include SOB. Hx of lupus, HTN. No further complaints or concerns.

## 2017-02-10 NOTE — ED NOTES
Blood drawn at this time for repeat troponin. No questions or concerns in regards to POC. VSS. Denies needs. Lights dimmed per request. Call light within reach. Will continue to monitor.

## 2017-02-10 NOTE — H&P
[facility] Hospital Medicine  History & Physical    Patient Name: Ruth Messina  MRN: [id number]  Admission Date: 2/9/2017  Attending Physician: Baldo Pineda Jr., MD   Primary Care Provider: Janeen Delacruz MD         Patient information was obtained from patient and ER records.     Subjective:     Principal Problem:<principal problem not specified>    Chief Complaint:   Chief Complaint   Patient presents with    Chest Pain     pt reports tightness in chest since she woke up this morning with SOB        HPI: 51 year female with PMH of HTN and SLE on prednisone 5 x 1 year, comes to ED c/o chest tightness which started gradually this AM around 0700. Symptoms are constant, moderate and are exacerbated by exertion and turning. No associated SOB or Palpitations, no radiation to L arm, neck or jaw. She denies any fever, N/V/D, chills, abd pain, SOB, dizziness, lightheadedness, radiating pain, leg swelling, cough, palpitations and all other sxs at this time. No further complaints or concerns at this time.     Past Medical History   Diagnosis Date    Hypertension     Lupus        Past Surgical History   Procedure Laterality Date    Widsom tooth extraction         Review of patient's allergies indicates:   Allergen Reactions    Ace inhibitors     Amoxicillin     Aspirin     Beta-blockers (beta-adrenergic blocking agts)     Cellcept [mycophenolate mofetil]     Cephalosporins     Clindamycin     Hydrochlorothiazide     Ibuprofen     Imuran [azathioprine sodium]     Lasix [furosemide]     Lisinopril     Methotrexate analogues     Nsaids (non-steroidal anti-inflammatory drug)     Pcn [penicillins]     Plaquenil [hydroxychloroquine]     Shellfish containing products     Sulfa (sulfonamide antibiotics)     Zofran [ondansetron hcl (pf)]        No current facility-administered medications on file prior to encounter.      Current Outpatient Prescriptions on File Prior to Encounter   Medication  Sig    fluocinolone (DERMA-SMOOTHE) 0.01 % external oil Apply oil to scalp twice a day    losartan (COZAAR) 100 MG tablet Take 100 mg by mouth once daily.    predniSONE (DELTASONE) 5 MG tablet Take 1 tablet (5 mg total) by mouth once daily.     Family History     Problem Relation (Age of Onset)    Arthritis Mother    Cancer Brother    Diabetes Father    Heart disease Mother, Father    Hypertension Mother, Father        Social History Main Topics    Smoking status: Never Smoker    Smokeless tobacco: Not on file    Alcohol use No    Drug use: No    Sexual activity: Not on file     Review of Systems   Constitutional: Positive for fever.   HENT: Negative.    Eyes: Negative.    Respiratory: Negative.    Cardiovascular: Positive for chest pain. Negative for palpitations and leg swelling.   Gastrointestinal: Negative.    Endocrine: Negative.    Genitourinary: Negative.    Musculoskeletal: Positive for arthralgias and myalgias.   Skin: Negative.    Neurological: Positive for weakness.   Psychiatric/Behavioral: Negative.      Objective:     Vital Signs (Most Recent):  Temp: 99.3 °F (37.4 °C) (02/09/17 1922)  Pulse: 104 (02/10/17 0137)  Resp: 20 (02/10/17 0137)  BP: 119/71 (02/10/17 0137)  SpO2: 96 % (02/10/17 0137) Vital Signs (24h Range):  Temp:  [99.3 °F (37.4 °C)-100.8 °F (38.2 °C)] 99.3 °F (37.4 °C)  Pulse:  [] 104  Resp:  [19-20] 20  SpO2:  [96 %-99 %] 96 %  BP: (119-146)/(71-86) 119/71     Weight: 72.6 kg (160 lb)  Body mass index is 26.22 kg/(m^2).    Physical Exam   Constitutional: She is oriented to person, place, and time. She appears well-developed and well-nourished.   HENT:   Head: Normocephalic and atraumatic.   Mouth/Throat: Oropharynx is clear and moist.   Eyes: Conjunctivae and EOM are normal. Pupils are equal, round, and reactive to light.   Neck: Normal range of motion. Neck supple.   Cardiovascular: Normal rate, regular rhythm, normal heart sounds and intact distal pulses.  Exam reveals no  gallop and no friction rub.    No murmur heard.  Pulmonary/Chest: Effort normal and breath sounds normal. No respiratory distress. She has no rales.   Abdominal: Soft. Bowel sounds are normal. She exhibits no distension.   Musculoskeletal: She exhibits no edema or tenderness.   Neurological: She is alert and oriented to person, place, and time. She has normal reflexes.   Skin: Skin is warm and dry.   Psychiatric: She has a normal mood and affect. Her behavior is normal. Judgment and thought content normal.   Nursing note and vitals reviewed.       Significant Labs:   ABGs: No results for input(s): PH, PCO2, HCO3, POCSATURATED, BE in the last 48 hours.  CBC:   Recent Labs  Lab 02/09/17 2000   WBC 4.40   HGB 9.2*   HCT 28.8*        CMP:   Recent Labs  Lab 02/09/17 2000      K 4.2      CO2 24   GLU 99   BUN 15   CREATININE 0.9   CALCIUM 8.9   ANIONGAP 12   EGFRNONAA >60     Cardiac Markers:   Recent Labs  Lab 02/09/17 2000   BNP <10     Troponin:   Recent Labs  Lab 02/09/17 2000   TROPONINI <0.006       All pertinent labs within the past 24 hours have been reviewed.  Imaging Results         CTA Chest Non-Coronary (PE Study) (Final result) Result time:  02/09/17 23:37:56    Final result by Kristy Longoria MD (02/09/17 23:37:56)    Impression:        1.  Negative for pulmonary embolism.  2.  Normal aorta.  3.  Bibasilar pleural-parenchymal bands of atelectasis right more so than left.  Otherwise, clear lungs.    All CT scans at this facility use dose modulation, iterative reconstruction, and/or weight based dosing when appropriate to reduce radiation dose to as low as reasonably achievable.      Electronically signed by: KRISTY LONGORIA  Date:     02/09/17  Time:    23:37     Narrative:    Exam: CTA CHEST NON CORONARY     Indication: Chest pain.    Technique: CT angiogram chest performed with pulmonary embolism protocol.  100 cc Omnipaque 350.  Multiplanar maximum intensity projections.  Multiplanar  reformations.    Comparison Study: None    Findings: There is excellent opacification of the pulmonary arteries without intraluminal filling defect to suggest pulmonary embolism.  Normal caliber pulmonary arteries.    Normal caliber aorta without dissection.  Normal size heart.  No pericardial effusion.  No adenopathy.    Trachea and central bronchi are widely patent.  Bibasilar pleural-parenchymal bands of atelectasis, more notable on the right.  Otherwise, lungs are clear.    Visualized portion of the upper abdominal viscera is unremarkable.  Minor rightward curvature thoracic spine.            X-Ray Chest 1 View (Final result) Result time:  02/09/17 21:22:07    Final result by Rey Espinoza MD (02/09/17 21:22:07)    Impression:      Nonspecific bibasilar discoid atelectasis.      Electronically signed by: REY ESPINOZA MD  Date:     02/09/17  Time:    21:22     Narrative:    Exam: XR CHEST 1 VIEW,    Date:  02/09/17 20:10:07    History: Chest pain    Comparison:  02/25/2016.    Findings: Lung volumes are decreased with bibasilar discoid atelectasis.  Heart size is normal.  Otherwise negative.            Significant Imaging: I have reviewed all pertinent imaging results/findings within the past 24 hours.    Assessment/Plan:     Chest pain  Atypical, likely muscular  R/O MI by serial enzymes  D/c soon      Tachycardia  Likely from her low grade fever  No leukocytosis  Will watch and pan cx if recurrent fever      VTE Risk Mitigation     None        Violeta Bernard MD  Department of Hospital Medicine   [facility]

## 2017-02-10 NOTE — SUBJECTIVE & OBJECTIVE
Past Medical History   Diagnosis Date    Hypertension     Lupus        Past Surgical History   Procedure Laterality Date    Widsom tooth extraction         Review of patient's allergies indicates:   Allergen Reactions    Ace inhibitors     Amoxicillin     Aspirin     Beta-blockers (beta-adrenergic blocking agts)     Cellcept [mycophenolate mofetil]     Cephalosporins     Clindamycin     Hydrochlorothiazide     Ibuprofen     Imuran [azathioprine sodium]     Lasix [furosemide]     Lisinopril     Methotrexate analogues     Nsaids (non-steroidal anti-inflammatory drug)     Pcn [penicillins]     Plaquenil [hydroxychloroquine]     Shellfish containing products     Sulfa (sulfonamide antibiotics)     Zofran [ondansetron hcl (pf)]        No current facility-administered medications on file prior to encounter.      Current Outpatient Prescriptions on File Prior to Encounter   Medication Sig    fluocinolone (DERMA-SMOOTHE) 0.01 % external oil Apply oil to scalp twice a day    losartan (COZAAR) 100 MG tablet Take 100 mg by mouth once daily.    predniSONE (DELTASONE) 5 MG tablet Take 1 tablet (5 mg total) by mouth once daily.     Family History     Problem Relation (Age of Onset)    Arthritis Mother    Cancer Brother    Diabetes Father    Heart disease Mother, Father    Hypertension Mother, Father        Social History Main Topics    Smoking status: Never Smoker    Smokeless tobacco: Not on file    Alcohol use No    Drug use: No    Sexual activity: Not on file     Review of Systems   Constitutional: Positive for fever.   HENT: Negative.    Eyes: Negative.    Respiratory: Negative.    Cardiovascular: Positive for chest pain. Negative for palpitations and leg swelling.   Gastrointestinal: Negative.    Endocrine: Negative.    Genitourinary: Negative.    Musculoskeletal: Positive for arthralgias and myalgias.   Skin: Negative.    Neurological: Positive for weakness.   Psychiatric/Behavioral: Negative.       Objective:     Vital Signs (Most Recent):  Temp: 99.3 °F (37.4 °C) (02/09/17 1922)  Pulse: 104 (02/10/17 0137)  Resp: 20 (02/10/17 0137)  BP: 119/71 (02/10/17 0137)  SpO2: 96 % (02/10/17 0137) Vital Signs (24h Range):  Temp:  [99.3 °F (37.4 °C)-100.8 °F (38.2 °C)] 99.3 °F (37.4 °C)  Pulse:  [] 104  Resp:  [19-20] 20  SpO2:  [96 %-99 %] 96 %  BP: (119-146)/(71-86) 119/71     Weight: 72.6 kg (160 lb)  Body mass index is 26.22 kg/(m^2).    Physical Exam   Constitutional: She is oriented to person, place, and time. She appears well-developed and well-nourished.   HENT:   Head: Normocephalic and atraumatic.   Mouth/Throat: Oropharynx is clear and moist.   Eyes: Conjunctivae and EOM are normal. Pupils are equal, round, and reactive to light.   Neck: Normal range of motion. Neck supple.   Cardiovascular: Normal rate, regular rhythm, normal heart sounds and intact distal pulses.  Exam reveals no gallop and no friction rub.    No murmur heard.  Pulmonary/Chest: Effort normal and breath sounds normal. No respiratory distress. She has no rales.   Abdominal: Soft. Bowel sounds are normal. She exhibits no distension.   Musculoskeletal: She exhibits no edema or tenderness.   Neurological: She is alert and oriented to person, place, and time. She has normal reflexes.   Skin: Skin is warm and dry.   Psychiatric: She has a normal mood and affect. Her behavior is normal. Judgment and thought content normal.   Nursing note and vitals reviewed.       Significant Labs:   ABGs: No results for input(s): PH, PCO2, HCO3, POCSATURATED, BE in the last 48 hours.  CBC:   Recent Labs  Lab 02/09/17 2000   WBC 4.40   HGB 9.2*   HCT 28.8*        CMP:   Recent Labs  Lab 02/09/17 2000      K 4.2      CO2 24   GLU 99   BUN 15   CREATININE 0.9   CALCIUM 8.9   ANIONGAP 12   EGFRNONAA >60     Cardiac Markers:   Recent Labs  Lab 02/09/17 2000   BNP <10     Troponin:   Recent Labs  Lab 02/09/17 2000   TROPONINI <0.006        All pertinent labs within the past 24 hours have been reviewed.  Imaging Results         CTA Chest Non-Coronary (PE Study) (Final result) Result time:  02/09/17 23:37:56    Final result by Kristy Longoria MD (02/09/17 23:37:56)    Impression:        1.  Negative for pulmonary embolism.  2.  Normal aorta.  3.  Bibasilar pleural-parenchymal bands of atelectasis right more so than left.  Otherwise, clear lungs.    All CT scans at this facility use dose modulation, iterative reconstruction, and/or weight based dosing when appropriate to reduce radiation dose to as low as reasonably achievable.      Electronically signed by: KRISTY LONGORIA  Date:     02/09/17  Time:    23:37     Narrative:    Exam: CTA CHEST NON CORONARY     Indication: Chest pain.    Technique: CT angiogram chest performed with pulmonary embolism protocol.  100 cc Omnipaque 350.  Multiplanar maximum intensity projections.  Multiplanar reformations.    Comparison Study: None    Findings: There is excellent opacification of the pulmonary arteries without intraluminal filling defect to suggest pulmonary embolism.  Normal caliber pulmonary arteries.    Normal caliber aorta without dissection.  Normal size heart.  No pericardial effusion.  No adenopathy.    Trachea and central bronchi are widely patent.  Bibasilar pleural-parenchymal bands of atelectasis, more notable on the right.  Otherwise, lungs are clear.    Visualized portion of the upper abdominal viscera is unremarkable.  Minor rightward curvature thoracic spine.            X-Ray Chest 1 View (Final result) Result time:  02/09/17 21:22:07    Final result by Rey Sherwood MD (02/09/17 21:22:07)    Impression:      Nonspecific bibasilar discoid atelectasis.      Electronically signed by: RYE SHERWOOD MD  Date:     02/09/17  Time:    21:22     Narrative:    Exam: XR CHEST 1 VIEW,    Date:  02/09/17 20:10:07    History: Chest pain    Comparison:  02/25/2016.    Findings: Lung volumes are  decreased with bibasilar discoid atelectasis.  Heart size is normal.  Otherwise negative.            Significant Imaging: I have reviewed all pertinent imaging results/findings within the past 24 hours.

## 2017-02-16 ENCOUNTER — TELEPHONE (OUTPATIENT)
Dept: RHEUMATOLOGY | Facility: CLINIC | Age: 52
End: 2017-02-16

## 2017-02-16 ENCOUNTER — OFFICE VISIT (OUTPATIENT)
Dept: HEMATOLOGY/ONCOLOGY | Facility: CLINIC | Age: 52
End: 2017-02-16
Payer: COMMERCIAL

## 2017-02-16 VITALS
WEIGHT: 167.75 LBS | HEART RATE: 110 BPM | BODY MASS INDEX: 26.96 KG/M2 | DIASTOLIC BLOOD PRESSURE: 80 MMHG | OXYGEN SATURATION: 100 % | HEIGHT: 66 IN | RESPIRATION RATE: 18 BRPM | TEMPERATURE: 98 F | SYSTOLIC BLOOD PRESSURE: 120 MMHG

## 2017-02-16 DIAGNOSIS — D63.8 ANEMIA OF OTHER CHRONIC DISEASE: Primary | ICD-10-CM

## 2017-02-16 PROCEDURE — 99999 PR PBB SHADOW E&M-EST. PATIENT-LVL III: CPT | Mod: PBBFAC,,, | Performed by: INTERNAL MEDICINE

## 2017-02-16 PROCEDURE — 99214 OFFICE O/P EST MOD 30 MIN: CPT | Mod: S$GLB,,, | Performed by: INTERNAL MEDICINE

## 2017-02-16 NOTE — TELEPHONE ENCOUNTER
Spoke with pt and she wanted to see if you wanted her to start taking the Imuran again or what. States she saw that a few of her labs improved but not her inflammation rate, and it increased. States she saw Dr. Montanez and was told that her anemia is from all the inflammation and her body is not making enough iron. States the Imuran still causes her to feel bad and sleep all the time. States she does not know if that is a side effect to the Imuran or not but she does not want to take a medication that causes that. Pt would like to know what to do now. Please Advise.

## 2017-02-16 NOTE — MR AVS SNAPSHOT
[facility]  [address] [place] [address]  Phone: [phone]  Fax: [phone]                  Ruth eMssina   2017 8:40 AM   Office Visit    Description:  Female : 1965   Provider:  Chris Montanez Jr., MD   Department:  [facility]           Reason for Visit     Follow-up           Diagnoses this Visit        Comments    Anemia of other chronic disease    -  Primary            To Do List           Future Appointments        Provider Department Dept Phone    2017 10:00 AM MD [facility] - Orthopedics [phone]    3/2/2017 11:00 AM Chirag Turner MD [facility] Rheumatology [phone]    2017 8:40 AM LABORATORY, [facility] [phone]    2017 9:00 AM Chris Montanez Jr., MD [facility] [phone]      Goals (5 Years of Data)     None      [facility] On Call     [facility] On Call Nurse [facility] Line -  Assistance  Registered nurses in the [facility] provide clinical advisement, health education, appointment booking, and other advisory services.  Call for this free service at [phone].             Medications           Message regarding Medications     Verify the changes and/or additions to your medication regime listed below are the same as discussed with your clinician today.  If any of these changes or additions are incorrect, please notify your healthcare provider.             Verify that the below list of medications is an accurate representation of the medications you are currently taking.  If none reported, the list may be blank. If incorrect, please contact your healthcare provider. Carry this list with you in case of emergency.           Current Medications     amlodipine (NORVASC) 10 MG tablet Take 10 mg by mouth once daily.    fluocinolone (DERMA-SMOOTHE) 0.01 % external oil Apply oil to scalp twice a day    losartan (COZAAR) 100 MG tablet Take  "100 mg by mouth once daily.    predniSONE (DELTASONE) 5 MG tablet Take 1 tablet (5 mg total) by mouth once daily.           Clinical Reference Information           Your Vitals Were     BP Pulse Temp Resp    120/80 (BP Location: Right arm, Patient Position: Sitting, BP Method: Manual) 110 98.1 °F (36.7 °C) (Oral) 18    Height Weight SpO2 BMI    5' 5.5" (1.664 m) 76.1 kg (167 lb 12.3 oz) 100% 27.49 kg/m2      Blood Pressure          Most Recent Value    BP  120/80      Allergies as of 2/16/2017     Ace Inhibitors    Amoxicillin    Aspirin    Beta-blockers (Beta-adrenergic Blocking Agts)    Cellcept [Mycophenolate Mofetil]    Cephalosporins    Clindamycin    Hydrochlorothiazide    Ibuprofen    Imuran [Azathioprine Sodium]    Lasix [Furosemide]    Lisinopril    Methotrexate Analogues    Nsaids (Non-steroidal Anti-inflammatory Drug)    Pcn [Penicillins]    Plaquenil [Hydroxychloroquine]    Shellfish Containing Products    Sulfa (Sulfonamide Antibiotics)    Zofran [Ondansetron Hcl (Pf)]      Immunizations Administered on Date of Encounter - 2/16/2017     None      Orders Placed During Today's Visit     Future Labs/Procedures Expected by Expires    C-reactive protein  2/16/2017 4/17/2018    CBC auto differential  2/16/2017 4/17/2018    Comprehensive metabolic panel  2/16/2017 4/17/2018    Ferritin  2/16/2017 4/17/2018    Iron and TIBC  2/16/2017 4/17/2018    Lactate dehydrogenase  2/16/2017 4/17/2018    Pathologist Interpretation Differential  2/16/2017 4/17/2018    Reticulocytes  2/16/2017 4/17/2018      Language Assistance Services     ATTENTION: Language assistance services are available, free of charge. Please call [phone].      ATENCIÓN: Si habla español, tiene a sanderson disposición servicios gratuitos de asistencia lingüística. Llame al [phone].     CHÚ Ý: N?u b?n nói Ti?ng Vi?t, có các d?ch v? h? tr? ngôn ng? mi?n phí dành cho b?n. G?i s? [phone].         [facility] complies with " applicable Federal civil rights laws and does not discriminate on the basis of race, color, national origin, age, disability, or sex.

## 2017-02-16 NOTE — PROGRESS NOTES
Hematology/Oncology Office Note    Reason for referral:  Progressive normocytic anemia    CC:    Referred by:  No ref. provider found    Diagnosis:  Progressive normocytic anemia    Treatment:  Treated with IV iron in the past    History of present illness:  51-year-old female with long-standing history of SLE with skin, joint, and cytopenias from SLE.  She underwent a bone marrow biopsy in 2/2016 which demonstrated normal trilineage hematopoiesis without significant abnormality.  She underwent EGD and colonoscopy in 12/2016 which failed to identify any source of blood loss.  She was treated with IV iron by outside oncologist with some improvement in anemia.  Hemoglobin was recently noted to be 9.6 with MCV of 91.  She is currently on prednisone/Solu-Medrol for SLE.  She has complaints of mild fatigue and malaise without shortness of breath, chest pain, dizziness, or palpitations.    I have reviewed and updated the HPI, ROS, PMHx, Social Hx, Family Hx and treatment history.    Today's visit:  Patient presents today with stable chronic fatigue and decreased exercise tolerance.      Past Medical History   Diagnosis Date    Hypertension     Lupus          Social History:  No tobacco, alcohol, or illicit drugs      Family History: family history includes Arthritis in her mother; Cancer in her brother; Diabetes in her father; Heart disease in her father and mother; Hypertension in her father and mother.    HPI    Review of Systems   Constitutional: Positive for activity change and fatigue. Negative for appetite change, chills, fever and unexpected weight change.   HENT: Negative for congestion, drooling, ear pain, facial swelling, mouth sores, nosebleeds, sore throat, trouble swallowing and voice change.    Eyes: Negative for photophobia, pain, discharge and visual disturbance.   Respiratory: Negative for apnea, cough, choking, chest tightness, shortness of breath, wheezing and stridor.    Cardiovascular: Negative for  "chest pain, palpitations and leg swelling.   Gastrointestinal: Negative for abdominal distention, abdominal pain, anal bleeding, blood in stool, constipation, diarrhea, nausea and vomiting.   Endocrine: Negative for cold intolerance, heat intolerance, polydipsia and polyphagia.   Genitourinary: Negative for difficulty urinating, dyspareunia, dysuria, flank pain, frequency, hematuria and pelvic pain.   Musculoskeletal: Negative for arthralgias, back pain, gait problem, joint swelling, myalgias and neck pain.   Skin: Negative for pallor, rash and wound.   Allergic/Immunologic: Negative for environmental allergies and immunocompromised state.   Neurological: Negative for dizziness, tremors, seizures, syncope, facial asymmetry, speech difficulty, weakness, light-headedness, numbness and headaches.   Hematological: Negative for adenopathy. Does not bruise/bleed easily.   Psychiatric/Behavioral: Negative for agitation, behavioral problems, confusion, dysphoric mood and hallucinations. The patient is not nervous/anxious and is not hyperactive.        Objective:       Vitals:    02/16/17 0900   BP: 120/80   BP Location: Right arm   Patient Position: Sitting   BP Method: Manual   Pulse: 110   Resp: 18   Temp: 98.1 °F (36.7 °C)   TempSrc: Oral   SpO2: 100%   Weight: 76.1 kg (167 lb 12.3 oz)   Height: 5' 5.5" (1.664 m)     Physical Exam   Constitutional: She is oriented to person, place, and time. She appears well-developed and well-nourished. No distress.   HENT:   Head: Normocephalic and atraumatic.   Right Ear: External ear normal.   Left Ear: External ear normal.   Nose: Nose normal.   Mouth/Throat: Oropharynx is clear and moist.   Eyes: Conjunctivae and EOM are normal. Pupils are equal, round, and reactive to light. No scleral icterus.   Neck: Normal range of motion. Neck supple. No JVD present. No tracheal deviation present. No thyromegaly present.   Cardiovascular: Normal rate, regular rhythm, normal heart sounds and " intact distal pulses.  Exam reveals no gallop and no friction rub.    No murmur heard.  Pulmonary/Chest: Effort normal and breath sounds normal. No accessory muscle usage. No respiratory distress. She has no decreased breath sounds. She has no wheezes. She has no rhonchi. She has no rales. She exhibits no tenderness.   Abdominal: Soft. Bowel sounds are normal. She exhibits no distension and no mass. There is no tenderness. There is no rebound and no guarding.   Musculoskeletal: Normal range of motion. She exhibits no edema or tenderness.   Lymphadenopathy:     She has no cervical adenopathy.   Neurological: She is alert and oriented to person, place, and time. No cranial nerve deficit. She exhibits normal muscle tone. Coordination normal.   Skin: Skin is warm and dry. She is not diaphoretic. There is erythema. No pallor.   Psychiatric: She has a normal mood and affect. Her behavior is normal. Judgment and thought content normal.       CT of Brain 2/25/16:  History:    Altered mental status    Findings: No intracranial hemorrhage or acute focal brain parenchymal abnormality is identified.  There is mild generalized atrophy. Calvarium is intact. Visualized paranasal sinuses and mastoid air cells are clear.    Assessment:       51-year-old postmenopausal female with a history of pancytopenia related to SLE and iron deficiency anemia who presents for worsening normocytic anemia.  She underwent a bone marrow biopsy on 2/19/2016 which demonstrated trilineage hematopoiesis without significant abnormality.  In 12/2016 she underwent EGD and colonoscopy ([facility]) without source of blood loss identified.    Workup has been unremarkable thus for, however, WBC and platelet count is now within normal limits.  Her anemia is secondary to chronic disease/inflammation from SLE.  We will continue to monitor and repeat bone marrow biopsy if there is significant progression of cytopenias.      Progressive normocytic anemia  secondary to chronic disease/SLE  --Continue to monitor  --Repeat bone marrow biopsy upon significant progression

## 2017-02-16 NOTE — TELEPHONE ENCOUNTER
----- Message from Maty Holden sent at 2/16/2017  9:28 AM CST -----  Contact: pt   Call pt regarding a question that she has.   ..[phone] (jmlf)

## 2017-02-17 ENCOUNTER — TELEPHONE (OUTPATIENT)
Dept: ORTHOPEDICS | Facility: CLINIC | Age: 52
End: 2017-02-17

## 2017-02-17 DIAGNOSIS — M32.19 OTHER SYSTEMIC LUPUS ERYTHEMATOSUS WITH OTHER ORGAN INVOLVEMENT: Primary | ICD-10-CM

## 2017-02-17 DIAGNOSIS — M32.19 CNS LUPUS: ICD-10-CM

## 2017-02-17 DIAGNOSIS — G05.3 CNS LUPUS: ICD-10-CM

## 2017-02-17 RX ORDER — AZATHIOPRINE 50 MG/1
50 TABLET ORAL DAILY
Qty: 30 TABLET | Refills: 2 | Status: ON HOLD | OUTPATIENT
Start: 2017-02-17 | End: 2017-03-24 | Stop reason: HOSPADM

## 2017-02-17 NOTE — TELEPHONE ENCOUNTER
----- Message from Patricia Zaragoza sent at 2/17/2017  8:11 AM CST -----  Contact: pt  Please call pt at [phone] to confirm whether or not the results from her nerve conduction study that were sent over.

## 2017-02-17 NOTE — TELEPHONE ENCOUNTER
----- Message from Nato Messina sent at 2/17/2017 10:42 AM CST -----  Contact: Pt   Pt is returning a missed call.  Please advise [phone]

## 2017-02-20 ENCOUNTER — OFFICE VISIT (OUTPATIENT)
Dept: ORTHOPEDICS | Facility: CLINIC | Age: 52
End: 2017-02-20
Payer: COMMERCIAL

## 2017-02-20 VITALS
WEIGHT: 167.75 LBS | BODY MASS INDEX: 26.96 KG/M2 | DIASTOLIC BLOOD PRESSURE: 72 MMHG | SYSTOLIC BLOOD PRESSURE: 100 MMHG | HEIGHT: 66 IN | HEART RATE: 81 BPM

## 2017-02-20 DIAGNOSIS — Z01.818 PRE-OPERATIVE CLEARANCE: Primary | ICD-10-CM

## 2017-02-20 DIAGNOSIS — G56.01 CARPAL TUNNEL SYNDROME, RIGHT: ICD-10-CM

## 2017-02-20 DIAGNOSIS — G56.02 CARPAL TUNNEL SYNDROME OF LEFT WRIST: ICD-10-CM

## 2017-02-20 PROCEDURE — 1160F RVW MEDS BY RX/DR IN RCRD: CPT | Mod: S$GLB,,, | Performed by: ORTHOPAEDIC SURGERY

## 2017-02-20 PROCEDURE — 3078F DIAST BP <80 MM HG: CPT | Mod: S$GLB,,, | Performed by: ORTHOPAEDIC SURGERY

## 2017-02-20 PROCEDURE — 3074F SYST BP LT 130 MM HG: CPT | Mod: S$GLB,,, | Performed by: ORTHOPAEDIC SURGERY

## 2017-02-20 PROCEDURE — 99999 PR PBB SHADOW E&M-EST. PATIENT-LVL III: CPT | Mod: PBBFAC,,, | Performed by: ORTHOPAEDIC SURGERY

## 2017-02-20 PROCEDURE — 99204 OFFICE O/P NEW MOD 45 MIN: CPT | Mod: S$GLB,,, | Performed by: ORTHOPAEDIC SURGERY

## 2017-02-20 NOTE — MR AVS SNAPSHOT
[facility]  [address]  [place] [address]  Phone: [phone]  Fax: [phone]                  Ruth Messina   2017 10:00 AM   Office Visit    Description:  Female : 1965   Provider:  Abhishek Decker MD   Department:  [facility]           Reason for Visit     Left Hand - Numbness           Diagnoses this Visit        Comments    Pre-operative clearance    -  Primary            To Do List           Future Appointments        Provider Department Dept Phone    2017 10:10 AM LAB[facility]ATORY, [facility] [phone]    2017 10:40 AM Ishaan Acuna MD [facility] Cardiology [phone]    3/2/2017 11:00 AM Chirag Turner MD [facility] Rheumatology [phone]    3/16/2017 10:00 [facility] [phone]    2017 8:40 AM LAB[facility]AT[facility], [facility] [phone]      Goals (5 Years of Data)     None      [facility]     [facility] Care Line -  Assistance  Registered nurses in the [facility] provide clinical advisement, health education, appointment booking, and other advisory services.  Call for this free service at [phone].             Medications           Message regarding Medications     Verify the changes and/or additions to your medication regime listed below are the same as discussed with your clinician today.  If any of these changes or additions are incorrect, please notify your healthcare provider.             Verify that the below list of medications is an accurate representation of the medications you are currently taking.  If none reported, the list may be blank. If incorrect, please contact your healthcare provider. Carry this list with you in case of emergency.           Current Medications     amlodipine (NORVASC) 10 MG tablet Take 10 mg by mouth once daily.    azathioprine (IMURAN) 50 mg Tab Take 1 tablet  "(50 mg total) by mouth once daily.    fluocinolone (DERMA-SMOOTHE) 0.01 % external oil Apply oil to scalp twice a day    losartan (COZAAR) 100 MG tablet Take 100 mg by mouth once daily.    predniSONE (DELTASONE) 5 MG tablet Take 1 tablet (5 mg total) by mouth once daily.           Clinical Reference Information           Your Vitals Were     BP Pulse Height Weight BMI    100/72 81 5' 5.5" (1.664 m) 76.1 kg (167 lb 12.3 oz) 27.49 kg/m2      Blood Pressure          Most Recent Value    BP  100/72      Allergies as of 2/20/2017     Ace Inhibitors    Amoxicillin    Aspirin    Beta-blockers (Beta-adrenergic Blocking Agts)    Cellcept [Mycophenolate Mofetil]    Cephalosporins    Clindamycin    Hydrochlorothiazide    Ibuprofen    Lasix [Furosemide]    Lisinopril    Methotrexate Analogues    Nsaids (Non-steroidal Anti-inflammatory Drug)    Pcn [Penicillins]    Plaquenil [Hydroxychloroquine]    Shellfish Containing Products    Sulfa (Sulfonamide Antibiotics)    Zofran [Ondansetron Hcl (Pf)]      Immunizations Administered on Date of Encounter - 2/20/2017     None      Orders Placed During Today's Visit     Future Labs/Procedures Expected by Expires    CBC auto differential  2/20/2017 4/21/2018    Comprehensive metabolic panel  2/20/2017 4/21/2018      Language Assistance Services     ATTENTION: Language assistance services are available, free of charge. Please call [phone].      ATENCIÓN: Si habla beañol, tiene a sanderson disposición servicios gratuitos de asistencia lingüística. Llame al [phone].     CHÚ Ý: N?u b?n nói Ti?ng Vi?t, có các d?ch v? h? tr? ngôn ng? mi?n phí dành cho b?n. G?i s? [phone].         [facility] complies with applicable Federal civil rights laws and does not discriminate on the basis of race, color, national origin, age, disability, or sex.        "

## 2017-02-21 PROBLEM — Z01.818 PRE-OPERATIVE CLEARANCE: Status: ACTIVE | Noted: 2017-02-21

## 2017-02-21 PROBLEM — G56.02 CARPAL TUNNEL SYNDROME OF LEFT WRIST: Status: ACTIVE | Noted: 2017-02-21

## 2017-02-22 NOTE — PROGRESS NOTES
Subjective:      Patient ID: Ruth Messina is a 51 y.o. female.    Chief Complaint: Numbness of the Left Hand    HPI: Tthe patient is seen today with paresthesias tingling and discomfort in her hands much worse in the left than the right.  She had electrical studies in the past which were indicative of carpal tunnel syndrome however the patient has never addressed these findings.  The patient is right-hand dominant.  Her left sided symptomatology, however, is worse than on the right side.                Review of Systems   Constitution: Negative for chills, decreased appetite, weight gain and weight loss.   HENT: Negative for congestion, ear pain, hearing loss and sore throat.    Eyes: Negative for blurred vision, double vision, vision loss in left eye, vision loss in right eye and visual disturbance.   Cardiovascular: Negative for chest pain, irregular heartbeat, leg swelling and palpitations.   Respiratory: Negative for cough and shortness of breath.    Endocrine: Negative for cold intolerance and heat intolerance.   Hematologic/Lymphatic: Negative for adenopathy. Does not bruise/bleed easily.   Skin: Negative for color change, nail changes, rash and suspicious lesions.   Musculoskeletal:        Positive for systemic lupus erythematosus, diagnosed in February 2016.   Gastrointestinal: Negative for abdominal pain, constipation, diarrhea, heartburn, nausea and vomiting.   Genitourinary: Negative for bladder incontinence and dysuria.   Neurological: Positive for paresthesias. Negative for dizziness and tremors.        Left worse than right   Psychiatric/Behavioral: Negative for altered mental status, depression, memory loss and substance abuse.   Allergic/Immunologic: Negative for hives and persistent infections.         Objective:            General    Nursing note and vitals reviewed.  Constitutional: She is oriented to person, place, and time. She appears well-developed and well-nourished.   HENT:   Head:  Normocephalic.   Nose: Nose normal.   Eyes: EOM are normal. Pupils are equal, round, and reactive to light.   Neck: Normal range of motion. Neck supple.   Cardiovascular: Normal rate and regular rhythm.    Pulmonary/Chest: Effort normal.   Abdominal: Soft. She exhibits no distension. There is no tenderness.   Neurological: She is alert and oriented to person, place, and time.   Psychiatric: She has a normal mood and affect. Her behavior is normal.     General Musculoskeletal Exam   Gait: normal   Pelvic Obliquity: none    Right Ankle/Foot Exam   Right ankle exam is normal.    Range of Motion   The patient has normal right ankle ROM.    Alignment   Forefoot Alignment: normal    Muscle Strength   The patient has normal right ankle strength.    Tests   Anterior drawer: negative  Varus tilt: negative  Heel Walk: able to perform  Tiptoe Walk: able to perform    Other   Sensation: normal  Peroneal Subluxation: negative    Left Ankle/Foot Exam   Left ankle exam is normal.    Range of Motion   The patient has normal left ankle ROM.     Alignment   Forefoot Alignment: normal    Muscle Strength   The patient has normal left ankle strength.    Tests   Anterior drawer: negative  Varus tilt: negative  Heel Walk: able to perform  Tiptoe Walk: able to perform    Other   Sensation: normal  Peroneal Subluxation: negative    Right Knee Exam   Right knee exam is normal.    Inspection   Erythema: absent  Swelling: absent  Effusion: effusion  Deformity: deformity  Bruising: absent    Range of Motion   The patient has normal right knee ROM.    Tests   Meniscus   Luis F:  Medial - negative Lateral - negative  Ligament Examination Lachman: normal (-1 to 2mm) PCL-Posterior Drawer: normal (0 to 2mm)     MCL - Valgus: normal (0 to 2mm)  LCL - Varus: normalPivot Shift: normal (Equal)  Posterolateral Corner: unstable (>15 degrees difference)  Patella   Patellar Apprehension: negative  Passive Patellar Tilt: neutral  Patellar Tracking:  normal  Patellar Grind: negative    Other   Sensation: normal    Left Knee Exam   Left knee exam is normal.    Inspection   Erythema: absent  Swelling: absent  Effusion: absent  Deformity: deformity  Bruising: absent    Range of Motion   The patient has normal left knee ROM.    Tests   Meniscus   Luis F:  Medial - negative Lateral - negative  Stability Lachman: normal (-1 to 2mm) PCL-Posterior Drawer: normal (0 to 2mm)  MCL - Valgus: normal (0 to 2mm)  LCL - Varus: normal (0 to 2mm)Pivot Shift: normal (Equal)  Posterolateral Corner: unstable (>15 degrees difference)  Patella   Patellar Apprehension: negative  Passive Patellar Tilt: neutral  Patellar Tracking: normal  Patellar Grind: negative    Other   Sensation: normal    Right Hip Exam   Right hip exam is normal.     Inspection   Swelling: absent  Bruising: absent    Muscle Strength   The patient has normal right hip strength.    Other   Sensation: normal  Left Hip Exam   Left hip exam is normal.    Inspection   Swelling: absent  Bruising: absent    Range of Motion   The patient has normal left hip ROM.    Muscle Strength   The patient has normal left hip strength.     Other   Sensation: normal      Back (L-Spine & T-Spine) / Neck (C-Spine) Exam   Back exam is normal.    Neck (C-Spine) Range of Motion   Flexion:     Normal  Extension: Normal  Right Lateral Bend: normal  Left Lateral Bend: normal  Right Rotation: normal  Left Rotation: normal    Spinal Sensation   Right Side Sensation  C-Spine Level: normal   L-Spine Level: normal  S-Spine Level: normal  T-Spine Level: normal  Left Side Sensation  C-Spine Level: normal  L-Spine Level: normal  S-Spine Level: normal  T-Spine Level: normal    Other She has no scoliosis .  Head Tilt:  Negative      Right Hand/Wrist Exam     Inspection   Deformity: Wrist - deformity     Tenderness   The patient is tender to palpation of the kaminski area.    Range of Motion   The patient has normal right hand/wrist ROM.    Tests    Phalens Sign: positive  Tinels Sign (Medial Nerve): positive  Finkelstein: negative  Cubital Tunnel Compression Test: negative      Other     Neuorologic Exam    Median Distribution: normal  Ulnar Distribution: normal  Radial Distribution: normal      Left Hand/Wrist Exam     Inspection   Deformity: Wrist - absent     Tenderness   The patient is tender to palpation of the kaminski area.     Range of Motion   The patient has normal left hand/wrist ROM.    Tests   Phalens Sign: negative  Tinels Sign (Medial Nerve): positive  Finkelstein: negative  Cubital Tunnel Compression Test: negative      Other     Sensory Exam  Median Distribution: normal  Ulnar Distribution: normal  Radial Distribution: normal      Right Elbow Exam   Right elbow exam is normal.    Inspection   Effusion: absent  Bruising: absent  Deformity: absent    Range of Motion   The patient has normal right elbow ROM.    Tests Tinel's Sign (cubital tunnel): negative    Other   Sensation: normal      Left Elbow Exam   Left elbow exam is normal.    Inspection   Effusion: absent  Bruising: absent  Deformity: absent    Range of Motion   The patient has normal left elbow ROM.    Tests Tinel's Sign (cubital tunnel): negative    Other   Sensation: normal    Right Shoulder Exam   Right shoulder exam is normal.    Tenderness   The patient is tender to palpation of the greater tuberosity.    Range of Motion   Active Abduction: normal   Passive Abduction: normal   Extension: normal   Forward Flexion: normal   Forward Elevation: normal  Adduction: normal  External Rotation 0 degrees: normal   Internal Rotation 0 degrees: normal     Muscle Strength   The patient has normal right shoulder strength.    Tests & Signs   Apprehension: negative  Cross Arm: negative  Impingement: negative    Other   Sensation: normal    Left Shoulder Exam   Left shoulder exam is normal.    Range of Motion   Active Abduction: normal   Passive Abduction: normal   Extension: normal   Forward  Flexion: normal   Forward Elevation: normal  Adduction: normal  External Rotation 0 degrees: normal   Internal Rotation 0 degrees: normal     Muscle Strength   The patient has normal left shoulder strength.    Tests & Signs   Apprehension: negative  Cross Arm: negative  Impingement: negative    Other   Sensation: normal       Muscle Strength   Right Upper Extremity   Wrist Extension:    Wrist Flexion:    :    Elbow Pronation:     Elbow Supination:     Elbow Extension:   Elbow Flexion:   Intrinsics:   Left Upper Extremity  Wrist Extension:    Wrist Flexion:    :     Elbow Pronation:     Elbow Supination:     Elbow Extension:   Elbow Flexion:   Intrinsics:   Right Lower Extremity   Hip Abduction:    Quadriceps:     Hamstrin/5   Left Lower Extremity   Hip Abduction:    Quadriceps:     Hamstrin/5     Reflexes     Left Side  Biceps:  2+  Triceps:  2+  Quadriceps:  2+  Achilles:  2+    Right Side   Biceps:  2+  Triceps:  2+  Quadriceps:  2+  Achilles:  2+    Vascular Exam     Right Pulses  Dorsalis Pedis:      2+  Posterior Tibial:      2+  Radial:                    2+      Left Pulses  Dorsalis Pedis:      2+  Posterior Tibial:      2+  Radial:                    2+      Capillary Refill  Right Hand: normal capillary refill  Left Hand: normal capillary refill                    Assessment:           Encounter Diagnoses   Name Primary?    Pre-operative clearance Yes    Carpal tunnel syndrome of left wrist     Carpal tunnel syndrome, right           Plan:     The patient has been scheduled for a left carpal tunnel release on 2017.  She understands the material risks of surgery, what is involved, and wishes to proceed.

## 2017-02-27 ENCOUNTER — LAB VISIT (OUTPATIENT)
Dept: LAB | Facility: HOSPITAL | Age: 52
End: 2017-02-27
Attending: ORTHOPAEDIC SURGERY
Payer: COMMERCIAL

## 2017-02-27 ENCOUNTER — OFFICE VISIT (OUTPATIENT)
Dept: CARDIOLOGY | Facility: CLINIC | Age: 52
End: 2017-02-27
Payer: COMMERCIAL

## 2017-02-27 VITALS
DIASTOLIC BLOOD PRESSURE: 70 MMHG | HEIGHT: 66 IN | BODY MASS INDEX: 26.84 KG/M2 | SYSTOLIC BLOOD PRESSURE: 110 MMHG | WEIGHT: 167 LBS | HEART RATE: 96 BPM

## 2017-02-27 DIAGNOSIS — D63.8 CHRONIC DISEASE ANEMIA: Chronic | ICD-10-CM

## 2017-02-27 DIAGNOSIS — Z01.818 PRE-OPERATIVE CLEARANCE: Primary | ICD-10-CM

## 2017-02-27 DIAGNOSIS — R94.31 ABNORMAL ECG: Chronic | ICD-10-CM

## 2017-02-27 DIAGNOSIS — I10 ESSENTIAL HYPERTENSION: ICD-10-CM

## 2017-02-27 DIAGNOSIS — Z01.818 PRE-OPERATIVE CLEARANCE: ICD-10-CM

## 2017-02-27 LAB
ALBUMIN SERPL BCP-MCNC: 3.5 G/DL
ALP SERPL-CCNC: 93 U/L
ALT SERPL W/O P-5'-P-CCNC: 52 U/L
ANION GAP SERPL CALC-SCNC: 10 MMOL/L
AST SERPL-CCNC: 49 U/L
BASOPHILS # BLD AUTO: 0.01 K/UL
BASOPHILS NFR BLD: 0.3 %
BILIRUB SERPL-MCNC: 0.4 MG/DL
BUN SERPL-MCNC: 16 MG/DL
CALCIUM SERPL-MCNC: 9.3 MG/DL
CHLORIDE SERPL-SCNC: 111 MMOL/L
CO2 SERPL-SCNC: 25 MMOL/L
CREAT SERPL-MCNC: 1 MG/DL
DIFFERENTIAL METHOD: ABNORMAL
EOSINOPHIL # BLD AUTO: 0 K/UL
EOSINOPHIL NFR BLD: 0 %
ERYTHROCYTE [DISTWIDTH] IN BLOOD BY AUTOMATED COUNT: 15 %
EST. GFR  (AFRICAN AMERICAN): >60 ML/MIN/1.73 M^2
EST. GFR  (NON AFRICAN AMERICAN): >60 ML/MIN/1.73 M^2
GLUCOSE SERPL-MCNC: 101 MG/DL
HCT VFR BLD AUTO: 30.8 %
HGB BLD-MCNC: 9.7 G/DL
LYMPHOCYTES # BLD AUTO: 1 K/UL
LYMPHOCYTES NFR BLD: 30.7 %
MCH RBC QN AUTO: 28.8 PG
MCHC RBC AUTO-ENTMCNC: 31.5 %
MCV RBC AUTO: 91 FL
MONOCYTES # BLD AUTO: 0.5 K/UL
MONOCYTES NFR BLD: 14.7 %
NEUTROPHILS # BLD AUTO: 1.7 K/UL
NEUTROPHILS NFR BLD: 54.3 %
PLATELET # BLD AUTO: 195 K/UL
PMV BLD AUTO: 9.5 FL
POTASSIUM SERPL-SCNC: 3.7 MMOL/L
PROT SERPL-MCNC: 7.8 G/DL
RBC # BLD AUTO: 3.37 M/UL
SODIUM SERPL-SCNC: 146 MMOL/L
WBC # BLD AUTO: 3.13 K/UL

## 2017-02-27 PROCEDURE — 93000 ELECTROCARDIOGRAM COMPLETE: CPT | Mod: S$GLB,,, | Performed by: NUCLEAR MEDICINE

## 2017-02-27 PROCEDURE — 36415 COLL VENOUS BLD VENIPUNCTURE: CPT | Mod: PO

## 2017-02-27 PROCEDURE — 3078F DIAST BP <80 MM HG: CPT | Mod: S$GLB,,, | Performed by: NUCLEAR MEDICINE

## 2017-02-27 PROCEDURE — 1160F RVW MEDS BY RX/DR IN RCRD: CPT | Mod: S$GLB,,, | Performed by: NUCLEAR MEDICINE

## 2017-02-27 PROCEDURE — 80053 COMPREHEN METABOLIC PANEL: CPT | Mod: PO

## 2017-02-27 PROCEDURE — 99999 PR PBB SHADOW E&M-EST. PATIENT-LVL III: CPT | Mod: PBBFAC,,, | Performed by: NUCLEAR MEDICINE

## 2017-02-27 PROCEDURE — 3074F SYST BP LT 130 MM HG: CPT | Mod: S$GLB,,, | Performed by: NUCLEAR MEDICINE

## 2017-02-27 PROCEDURE — 85025 COMPLETE CBC W/AUTO DIFF WBC: CPT | Mod: PO

## 2017-02-27 PROCEDURE — 99204 OFFICE O/P NEW MOD 45 MIN: CPT | Mod: S$GLB,,, | Performed by: NUCLEAR MEDICINE

## 2017-02-27 NOTE — LETTER
February 27, 2017      Abhishek Decker MD  [address] [place] [address] [address] [place] [address]           [facility]  [address] [place] [address]  Phone: [phone]  Fax: [phone]          Patient: Ruth Messina   MR Number: [id number]   YOB: 1965   Date of Visit: 2/27/2017       Dear Dr. Abhishek Decker:    Thank you for referring Ruth Messina to me for evaluation. Attached you will find relevant portions of my assessment and plan of care.    If you have questions, please do not hesitate to call me. I look forward to following Rtuh Messina along with you.    Sincerely,    Ishaan Acuna MD    Enclosure  CC:  No Recipients    If you would like to receive this communication electronically, please contact [email or web address] or [phone] to request more information on [employer] access.    For providers and/or their staff who would like to refer a patient to [facility], please contact us through our one-stop-shop provider referral line, [facility], at [phone].    If you feel you have received this communication in error or would no longer like to receive these types of communications, please e-mail [email or web address]

## 2017-02-27 NOTE — PROGRESS NOTES
Subjective:   Patient ID:  Ruth Messina is a 51 y.o. female who presents for evaluation of Pre-op Exam (Lt carpal tunnel 03/24/2017) and Abnormal ECG      HPI REFERRED BY ORTHOPEDIC CLINIC - DR LEO NEVRAEZ, FOR CARD EVALUATION.   LEFT CARPAL TUNNEL REPAIR.-  ROUTINE ECG-2/10/17-  REPORTED AS BORDERLINE ABNORMAL- MILD SINUS TACHY 102-  POSSIBLE LVH  HX OF ESSENTIAL HTN,  SLE,  AND CHRONIC NORMOCYTIC ANEMIA OF CHRONIC DISEAS  NO HX OF ANGINA. OR PLEURITIC CHEST PAIN. NO UNUSUAL PERAZA. NO DYSPNEA AT REST. NO ORTHOPNEA OR PND  NO PALPITATIONS. NO NEAR SYNCOPE OR SYNCOPE  NO EDEMA. NO INTERMITTENT CLAUDICATION  NO HX OF DVT OR PE  NO HX OF DM. NO DYSLIPIDEMIA. NO SMOKING, NO FM HX OF PREMATURE CAD  NO HX OF TIA OR STROKE  NO HX OF CKD. OR THYROID DISEASE      Review of Systems   Constitution: Negative for chills, decreased appetite, fever, weakness, malaise/fatigue, weight gain and weight loss.   HENT: Negative for headaches and nosebleeds.    Eyes: Negative for blurred vision, double vision and visual disturbance.   Cardiovascular: Negative for chest pain, claudication, cyanosis, dyspnea on exertion, irregular heartbeat, leg swelling, near-syncope, orthopnea, palpitations, paroxysmal nocturnal dyspnea and syncope.   Respiratory: Negative for cough, hemoptysis, shortness of breath, sleep disturbances due to breathing, snoring and wheezing.    Endocrine: Negative for cold intolerance, heat intolerance, polydipsia and polyuria.   Hematologic/Lymphatic: Does not bruise/bleed easily.   Skin: Negative for flushing and rash.   Musculoskeletal: Negative for gout, joint pain, joint swelling, muscle weakness and myalgias.   Gastrointestinal: Negative for abdominal pain, anorexia, change in bowel habit, constipation, diarrhea, dysphagia, heartburn, hematemesis, hematochezia, jaundice, melena, nausea and vomiting.   Genitourinary: Negative for decreased libido, frequency, hematuria, nocturia and urgency.   Neurological: Negative for  difficulty with concentration, excessive daytime sleepiness, dizziness, focal weakness, light-headedness, numbness, seizures, tremors and vertigo.   Psychiatric/Behavioral: Negative for depression and memory loss. The patient is not nervous/anxious.    Allergic/Immunologic: Negative for environmental allergies and hives.         Objective:     Physical Exam   Constitutional: She is oriented to person, place, and time. She appears well-developed. No distress.   HENT:   Head: Normocephalic.   Eyes: Conjunctivae are normal. Pupils are equal, round, and reactive to light. No scleral icterus.   Neck: Normal range of motion. Neck supple. Normal carotid pulses, no hepatojugular reflux and no JVD present. Carotid bruit is not present. No edema present. No thyroid mass and no thyromegaly present.   Cardiovascular: Normal rate, regular rhythm, S1 normal, S2 normal, normal heart sounds and intact distal pulses.  PMI is not displaced.  Exam reveals no gallop and no friction rub.    No murmur heard.  Pulses:       Carotid pulses are 2+ on the right side, and 2+ on the left side.       Radial pulses are 2+ on the right side, and 2+ on the left side.        Femoral pulses are 2+ on the right side, and 2+ on the left side.       Popliteal pulses are 2+ on the right side, and 2+ on the left side.        Dorsalis pedis pulses are 2+ on the right side, and 2+ on the left side.        Posterior tibial pulses are 2+ on the right side, and 2+ on the left side.   Pulmonary/Chest: Effort normal and breath sounds normal. She has no wheezes. She has no rales. She exhibits no tenderness.   Abdominal: Soft. Bowel sounds are normal. She exhibits no pulsatile midline mass and no mass. There is no hepatosplenomegaly. There is no tenderness.   Musculoskeletal: Normal range of motion. She exhibits no edema or tenderness.        Cervical back: Normal.        Thoracic back: Normal.        Lumbar back: Normal.   Lymphadenopathy:     She has no  cervical adenopathy.     She has no axillary adenopathy.        Right: No supraclavicular adenopathy present.        Left: No supraclavicular adenopathy present.   Neurological: She is alert and oriented to person, place, and time. She has normal strength and normal reflexes. No sensory deficit. Gait normal.   Skin: Skin is warm. No rash noted. No cyanosis. No pallor. Nails show no clubbing.   Psychiatric: She has a normal mood and affect. Her speech is normal and behavior is normal. Cognition and memory are normal.       Assessment:     1. Pre-operative clearance    2. Abnormal ECG    3. Essential hypertension    4. Chronic disease anemia      ECG TODAY- SR, 82 BMP,  SUGGESTIVE OF LVH BY VOLTAGE CRITERIA ?  NO CLINICAL EVIDENCE OF ACTIVE MYOCARDIAL ISCHEMIA  NO EVIDENCE OF CHF OR PERICARDITIS  NO ARRHYTHMIAS  CNS STATUS STABLE  BP CONTROLLED  Plan:     1- SCHEDULE ECHO TO EVALUATE FOR LVH AND HEART FUNCTION AND STRUCTURAL VALVE ABNORMALITIES    2- PHONE CALL, TO REVIEW RESULTS AND FURTHER RECOMMENDATIONS    3/9/17- ADDENDUM NOTE    ECHO FINDINGS WERE REVIEWED AND DISCUSSED-  MILD LVH.  NO CARDIOMEGALY. NORMAL LV SYSTOLIC AND DIASTOLIC FUNCTION. - LV EF 60%. NO STRUCTURAL VALVE ABNORMALITIES    1- CV STATUS STABLE- NO CV CONTRAINDICATIONS FOR CARPAL TUNNEL REPAIR AND ANESTHESIA    2-LOW RISK FOR YVONNE OPERATIVE CV EVENTS    3- NO FURTHER CARD WORK UP OR SPECIFIC CARD RECOMMENDATIONS

## 2017-02-27 NOTE — MR AVS SNAPSHOT
[facility]  [address] [address]  Phone: [phone]  Fax: [phone]                  Ruth Messina   2017 10:40 AM   Office Visit    Description:  Female : 1965   Provider:  Ishaan Acuna MD   Department:  [facility]           Reason for Visit     Pre-op Exam     Abnormal ECG           Diagnoses this Visit        Comments    Pre-operative clearance    -  Primary     Abnormal ECG         Essential hypertension         Chronic disease anemia                To Do List           Future Appointments        Provider Department Dept Phone    3/2/2017 11:00 AM Chirag Turner MD [facility] Rheumatology [phone]    3/8/2017 1:00 PM [facility] [phone]    3/16/2017 10:00 AM ORTHOPEDICS NURSE, [facility] [phone]    2017 8:40 AM LABORATORY, [facility] [phone]    2017 9:00 AM Chris Montanez Jr., MD [facility] Hemotology Oncology [phone]      Your Future Surgeries/Procedures     Mar 24, 2017   Surgery with Abhishek Decker MD   [facility])    [id number] [place] [address] [phone]              Goals (5 Years of Data)     None      Follow-Up and Disposition     Return PHONE CALL, REVIEW RESULTS.      [facility]     [facility] Care Line -  Assistance  Registered nurses in the [facility] provide clinical advisement, health education, appointment booking, and other advisory services.  Call for this free service at [phone].             Medications           Message regarding Medications     Verify the changes and/or additions to your medication regime listed below are the same as discussed with your clinician today.  If any of these changes or additions are incorrect, please notify your healthcare provider.             Verify that the below list of medications  is an accurate representation of the medications you are currently taking.  If none reported, the list may be blank. If incorrect, please contact your healthcare provider. Carry this list with you in case of emergency.           Current Medications     amlodipine (NORVASC) 10 MG tablet Take 10 mg by mouth once daily.    azathioprine (IMURAN) 50 mg Tab Take 1 tablet (50 mg total) by mouth once daily.    losartan (COZAAR) 100 MG tablet Take 100 mg by mouth once daily.    predniSONE (DELTASONE) 5 MG tablet Take 1 tablet (5 mg total) by mouth once daily.    fluocinolone (DERMA-SMOOTHE) 0.01 % external oil Apply oil to scalp twice a day           Clinical Reference Information           Your Vitals Were     BP                   110/70 (BP Location: Right arm, Patient Position: Sitting, BP Method: Manual)           Blood Pressure          Most Recent Value    Right Arm BP - Sitting  110/70    Right Arm BP - Standing  120/70    BP  110/70      Allergies as of 2/27/2017     Ace Inhibitors    Amoxicillin    Aspirin    Beta-blockers (Beta-adrenergic Blocking Agts)    Cellcept [Mycophenolate Mofetil]    Cephalosporins    Clindamycin    Hydrochlorothiazide    Ibuprofen    Lasix [Furosemide]    Lisinopril    Methotrexate Analogues    Nsaids (Non-steroidal Anti-inflammatory Drug)    Pcn [Penicillins]    Plaquenil [Hydroxychloroquine]    Shellfish Containing Products    Sulfa (Sulfonamide Antibiotics)    Zofran [Ondansetron Hcl (Pf)]      Immunizations Administered on Date of Encounter - 2/27/2017     None      Orders Placed During Today's Visit      Normal Orders This Visit    EKG 12-lead     Future Labs/Procedures Expected by Expires    2D echo with color flow doppler  As directed 2/27/2018      Language Assistance Services     ATTENTION: Language assistance services are available, free of charge. Please call [phone].      ATENCIÓN: Si habla beañol, tiene a sanderson disposición servicios gratuitos de asistencia lingüística.  Obie taylor [phone].     MORGAN Ý: N?u b?n nói Ti?ng Vi?t, có các d?ch v? h? tr? ngôn ng? mi?n phí dành cho b?n. G?i s? [phone].         [facility] complies with applicable Federal civil rights laws and does not discriminate on the basis of race, color, national origin, age, disability, or sex.

## 2017-03-01 ENCOUNTER — TELEPHONE (OUTPATIENT)
Dept: ORTHOPEDICS | Facility: CLINIC | Age: 52
End: 2017-03-01

## 2017-03-01 NOTE — TELEPHONE ENCOUNTER
Returned pt phone call. Pt had questions about labs she took on 2/27. Informed pt that the labs will be reviewed on her next visit.

## 2017-03-02 ENCOUNTER — LAB VISIT (OUTPATIENT)
Dept: LAB | Facility: HOSPITAL | Age: 52
End: 2017-03-02
Attending: INTERNAL MEDICINE
Payer: COMMERCIAL

## 2017-03-02 ENCOUNTER — OFFICE VISIT (OUTPATIENT)
Dept: RHEUMATOLOGY | Facility: CLINIC | Age: 52
End: 2017-03-02
Payer: COMMERCIAL

## 2017-03-02 VITALS
HEART RATE: 110 BPM | BODY MASS INDEX: 28.07 KG/M2 | SYSTOLIC BLOOD PRESSURE: 128 MMHG | DIASTOLIC BLOOD PRESSURE: 73 MMHG | WEIGHT: 171.31 LBS

## 2017-03-02 DIAGNOSIS — M32.19 CNS LUPUS: Primary | ICD-10-CM

## 2017-03-02 DIAGNOSIS — M32.19 CNS LUPUS: ICD-10-CM

## 2017-03-02 DIAGNOSIS — M32.19 OTHER SYSTEMIC LUPUS ERYTHEMATOSUS WITH OTHER ORGAN INVOLVEMENT: ICD-10-CM

## 2017-03-02 DIAGNOSIS — G05.3 CNS LUPUS: ICD-10-CM

## 2017-03-02 DIAGNOSIS — G05.3 CNS LUPUS: Primary | ICD-10-CM

## 2017-03-02 DIAGNOSIS — D63.8 ANEMIA OF CHRONIC ILLNESS: ICD-10-CM

## 2017-03-02 LAB
ALBUMIN SERPL BCP-MCNC: 3.4 G/DL
ALP SERPL-CCNC: 91 U/L
ALT SERPL W/O P-5'-P-CCNC: 33 U/L
ANION GAP SERPL CALC-SCNC: 10 MMOL/L
AST SERPL-CCNC: 30 U/L
BASOPHILS # BLD AUTO: 0.01 K/UL
BASOPHILS NFR BLD: 0.2 %
BILIRUB SERPL-MCNC: 0.5 MG/DL
BUN SERPL-MCNC: 17 MG/DL
C3 SERPL-MCNC: 66 MG/DL
C4 SERPL-MCNC: 9 MG/DL
CALCIUM SERPL-MCNC: 9.2 MG/DL
CHLORIDE SERPL-SCNC: 107 MMOL/L
CK SERPL-CCNC: 39 U/L
CO2 SERPL-SCNC: 26 MMOL/L
CREAT SERPL-MCNC: 0.9 MG/DL
CRP SERPL-MCNC: 0.2 MG/L
DIFFERENTIAL METHOD: ABNORMAL
EOSINOPHIL # BLD AUTO: 0 K/UL
EOSINOPHIL NFR BLD: 0 %
ERYTHROCYTE [DISTWIDTH] IN BLOOD BY AUTOMATED COUNT: 15.4 %
ERYTHROCYTE [SEDIMENTATION RATE] IN BLOOD BY WESTERGREN METHOD: 60 MM/HR
EST. GFR  (AFRICAN AMERICAN): >60 ML/MIN/1.73 M^2
EST. GFR  (NON AFRICAN AMERICAN): >60 ML/MIN/1.73 M^2
GLUCOSE SERPL-MCNC: 112 MG/DL
HCT VFR BLD AUTO: 31.3 %
HGB BLD-MCNC: 9.6 G/DL
LYMPHOCYTES # BLD AUTO: 1.1 K/UL
LYMPHOCYTES NFR BLD: 23.8 %
MCH RBC QN AUTO: 28.2 PG
MCHC RBC AUTO-ENTMCNC: 30.7 %
MCV RBC AUTO: 92 FL
MONOCYTES # BLD AUTO: 0.3 K/UL
MONOCYTES NFR BLD: 6.7 %
NEUTROPHILS # BLD AUTO: 3.2 K/UL
NEUTROPHILS NFR BLD: 68.9 %
PLATELET # BLD AUTO: 197 K/UL
PMV BLD AUTO: 10.8 FL
POTASSIUM SERPL-SCNC: 3.3 MMOL/L
PROT SERPL-MCNC: 7.8 G/DL
RBC # BLD AUTO: 3.41 M/UL
SODIUM SERPL-SCNC: 143 MMOL/L
WBC # BLD AUTO: 4.62 K/UL

## 2017-03-02 PROCEDURE — 3078F DIAST BP <80 MM HG: CPT | Mod: S$GLB,,, | Performed by: INTERNAL MEDICINE

## 2017-03-02 PROCEDURE — 86160 COMPLEMENT ANTIGEN: CPT

## 2017-03-02 PROCEDURE — 86140 C-REACTIVE PROTEIN: CPT

## 2017-03-02 PROCEDURE — 86160 COMPLEMENT ANTIGEN: CPT | Mod: 59

## 2017-03-02 PROCEDURE — 86235 NUCLEAR ANTIGEN ANTIBODY: CPT | Mod: 59

## 2017-03-02 PROCEDURE — 1160F RVW MEDS BY RX/DR IN RCRD: CPT | Mod: S$GLB,,, | Performed by: INTERNAL MEDICINE

## 2017-03-02 PROCEDURE — 80053 COMPREHEN METABOLIC PANEL: CPT

## 2017-03-02 PROCEDURE — 82085 ASSAY OF ALDOLASE: CPT

## 2017-03-02 PROCEDURE — 83516 IMMUNOASSAY NONANTIBODY: CPT | Mod: 59

## 2017-03-02 PROCEDURE — 85025 COMPLETE CBC W/AUTO DIFF WBC: CPT

## 2017-03-02 PROCEDURE — 82550 ASSAY OF CK (CPK): CPT

## 2017-03-02 PROCEDURE — 3074F SYST BP LT 130 MM HG: CPT | Mod: S$GLB,,, | Performed by: INTERNAL MEDICINE

## 2017-03-02 PROCEDURE — 86038 ANTINUCLEAR ANTIBODIES: CPT

## 2017-03-02 PROCEDURE — 85651 RBC SED RATE NONAUTOMATED: CPT | Mod: PO

## 2017-03-02 PROCEDURE — 99214 OFFICE O/P EST MOD 30 MIN: CPT | Mod: S$GLB,,, | Performed by: INTERNAL MEDICINE

## 2017-03-02 PROCEDURE — 99999 PR PBB SHADOW E&M-EST. PATIENT-LVL III: CPT | Mod: PBBFAC,,, | Performed by: INTERNAL MEDICINE

## 2017-03-02 PROCEDURE — 86039 ANTINUCLEAR ANTIBODIES (ANA): CPT

## 2017-03-02 PROCEDURE — 86235 NUCLEAR ANTIGEN ANTIBODY: CPT

## 2017-03-02 PROCEDURE — 36415 COLL VENOUS BLD VENIPUNCTURE: CPT | Mod: PO

## 2017-03-02 NOTE — MR AVS SNAPSHOT
[facility] Rheumatology  [address] [address]  Phone: [phone]  Fax: [phone]                  Ruth Messina   3/2/2017 11:00 AM   Office Visit    Description:  Female : 1965   Provider:  Chirag Turner MD   Department:  [facility] - Rheumatology           Reason for Visit     Disease Management           Diagnoses this Visit        Comments    CNS lupus    -  Primary     Other systemic lupus erythematosus with other organ involvement         Anemia of chronic illness                To Do List           Future Appointments        Provider Department Dept Phone    3[facility]8/2017 1:00 [phone]    3/16/2017 10:00 AM ORTHOPEDICS NURSE, [place]  [facility]th[facility]ics [phone]    2017 8:40 AM LABORATORY, [facility] [phone]    2017 9:00 AM Chris Montanez Jr., MD [facility] Hemotology Oncology [phone]      Your Future Surgeries/Procedures     Mar 24, 2017   Surgery with Abhishek Decker MD   [facility]  ([facility])    [address] [address] [phone]              Goals (5 Years of Data)     None      Follow-Up and Disposition     Return in about 3 months (around 2017).      [facility]     [facility] -  Assistance  Registered nurses in the [facility] provide clinical advisement, health education, appointment booking, and other advisory services.  Call for this free service at [phone].             Medications           Message regarding Medications     Verify the changes and/or additions to your medication regime listed below are the same as discussed with your clinician today.  If any of these changes or additions are incorrect, please notify your healthcare provider.             Verify that the below list of medications is an accurate representation of the medications you are currently  taking.  If none reported, the list may be blank. If incorrect, please contact your healthcare provider. Carry this list with you in case of emergency.           Current Medications     amlodipine (NORVASC) 10 MG tablet Take 10 mg by mouth once daily.    azathioprine (IMURAN) 50 mg Tab Take 1 tablet (50 mg total) by mouth once daily.    fluocinolone (DERMA-SMOOTHE) 0.01 % external oil Apply oil to scalp twice a day    losartan (COZAAR) 100 MG tablet Take 100 mg by mouth once daily.    predniSONE (DELTASONE) 5 MG tablet Take 1 tablet (5 mg total) by mouth once daily.           Clinical Reference Information           Your Vitals Were     BP                   128/73           Blood Pressure          Most Recent Value    BP  128/73      Allergies as of 3/2/2017     Ace Inhibitors    Amoxicillin    Aspirin    Beta-blockers (Beta-adrenergic Blocking Agts)    Cellcept [Mycophenolate Mofetil]    Cephalosporins    Clindamycin    Hydrochlorothiazide    Ibuprofen    Lasix [Furosemide]    Lisinopril    Methotrexate Analogues    Nsaids (Non-steroidal Anti-inflammatory Drug)    Pcn [Penicillins]    Plaquenil [Hydroxychloroquine]    Shellfish Containing Products    Sulfa (Sulfonamide Antibiotics)    Zofran [Ondansetron Hcl (Pf)]      Immunizations Administered on Date of Encounter - 3/2/2017     None      Orders Placed During Today's Visit     Future Labs/Procedures Expected by Expires    Aldolase  3/2/2017 5/1/2018    CK  3/2/2017 5/1/2018    Comprehensive metabolic panel  3/2/2017 3/2/2018    MyoMarker Panel 3  3/2/2017 5/1/2018      Language Assistance Services     ATTENTION: Language assistance services are available, free of charge. Please call [phone].      ATENCIÓN: Si habla español, tiene a sanderson disposición servicios gratuitos de asistencia lingüística. Llame al [phone].     CHÚ Ý: N?u b?n nói Ti?ng Vi?t, có các d?ch v? h? tr? ngôn ng? mi?n phí dành cho b?n. G?i s? [phone].         [facility] - [facility]ology  complies with applicable Federal civil rights laws and does not discriminate on the basis of race, color, national origin, age, disability, or sex.

## 2017-03-02 NOTE — PROGRESS NOTES
RHEUMATOLOGY CLINIC FOLLOW UP VISIT  Chief complaints:-  I have severe fatigue.     HPI:-  Ruth Melendrez a 51 y.o. pleasant female comes in for a follow up visit with above chief complaints. She has CNS lupus and is only on 50 mg daily imuran and 5 mg daily prednisone because of intolerance to multiple therapies. She used to see rheumatologist here in our clinic - last visit was in 05/2013. Since then she has been following up with Dr. Sue at [facility] . She was hospitalized in 02/2016 and 05/2016 for severe flare up in her neuropsychiatric lupus with delusions, hallucinations and headaches which was treated with iv steroids. In her last visit with  , she was strongly recommended to try cytoxan or at least benlysta to treat her condition . She declined and establish care with me.  Since her serologies showed very active lupus, after prolonged discussions she agreed to try imuran 50 mg and get 500mg solumedrol infusion. She denies any pain today. Her pain scale is zero. She denies alopecia, photosensitivity, malar rash, eye redness, dry eyes, dry mouth, nasal ulcers, oral ulcers, difficulty chewing, difficulty swallowing, choking on dry foods, exertional or respirophasic chest pain, abdominal pain with eating, blood in stools, nausea, vomiting, blood in urine, back pain radiating to legs, leg claudication, headaches, hallucinations, delusions, paranoia, numbness , tingling or weakness. Her only problem is lack of energy to do any activities.     Review of Systems   Constitutional: Positive for malaise/fatigue. Negative for chills, fever and weight loss.   HENT: Negative for ear discharge, ear pain, hearing loss, nosebleeds and sore throat.    Eyes: Negative for blurred vision, double vision, photophobia, discharge and redness.   Respiratory: Negative for cough, hemoptysis, sputum production and shortness of breath.    Cardiovascular: Negative for chest  pain, palpitations and claudication.   Gastrointestinal: Negative for abdominal pain, constipation, diarrhea, melena, nausea and vomiting.   Genitourinary: Negative for dysuria, frequency, hematuria and urgency.   Musculoskeletal: Negative for back pain, falls, joint pain, myalgias and neck pain.   Skin: Negative for itching and rash.   Neurological: Negative for dizziness, tremors, sensory change, speech change, focal weakness, seizures, loss of consciousness, weakness and headaches.   Endo/Heme/Allergies: Negative for environmental allergies. Does not bruise/bleed easily.   Psychiatric/Behavioral: Negative for depression, hallucinations and memory loss. The patient is not nervous/anxious and does not have insomnia.        Past Medical History:   Diagnosis Date    Heart murmur     MVP 1986    Hypertension     Lupus        Past Surgical History:   Procedure Laterality Date    widsom tooth extraction          Social History   Substance Use Topics    Smoking status: Never Smoker    Smokeless tobacco: Never Used    Alcohol use No       Family History   Problem Relation Age of Onset    Arthritis Mother     Heart disease Mother     Hypertension Mother     Diabetes Father     Heart disease Father     Hypertension Father     Cancer Brother        Review of patient's allergies indicates:   Allergen Reactions    Ace inhibitors     Amoxicillin     Aspirin     Cellcept [mycophenolate mofetil]     Cephalosporins     Clindamycin     Hydrochlorothiazide     Imuran [azathioprine sodium]     Lasix [furosemide]     Methotrexate analogues     Nsaids (non-steroidal anti-inflammatory drug)     Pcn [penicillins]     Plaquenil [hydroxychloroquine]     Shellfish containing products     Sulfa (sulfonamide antibiotics)     Zofran [ondansetron hcl (pf)]            Physical examination:-    Vitals:    03/02/17 1126   BP: 128/73   Pulse: 110   Weight: 77.7 kg (171 lb 4.8 oz)   PainSc: 0-No pain       Physical Exam    Constitutional: She is oriented to person, place, and time and well-developed, well-nourished, and in no distress. No distress.   HENT:   Head: Normocephalic.   Mouth/Throat: Oropharynx is clear and moist.   Eyes: Conjunctivae and EOM are normal. Pupils are equal, round, and reactive to light.   Neck: Normal range of motion. Neck supple.   Cardiovascular: Normal rate and intact distal pulses.    Pulmonary/Chest: Effort normal. No respiratory distress.   Abdominal: Soft. There is no tenderness.   Musculoskeletal:   No synovitis over small joints of hands or feet.  No effusion over large joints.   Neurological: She is alert and oriented to person, place, and time. No cranial nerve deficit.   Skin: Skin is warm. No rash noted. No erythema.   Psychiatric: Mood and affect normal.   Nursing note and vitals reviewed.      Labs:-  Results for ADELA LEMON (MRN [id number]) as of 1/5/2017 12:40   Ref. Range 12/8/2016 09:04   SHEILA HEP-2 Titer Unknown Positive 1:640 Sp...   Anti-SSA Antibody Latest Ref Range: 0.00 - 19.99 EU 31.31 (H)   Anti-SSA Interpretation Latest Ref Range: Negative  Positive (A)   Anti-SSB Antibody Latest Ref Range: 0.00 - 19.99 EU 9.10   Anti-SSB Interpretation Latest Ref Range: Negative  Negative   ds DNA Ab Latest Ref Range: Negative 1:10  Positive 1:40 (A)   Anti Sm Antibody Latest Ref Range: 0.00 - 19.99 .08 (H)   Anti-Sm Interpretation Latest Ref Range: Negative  Positive (A)   Anti Sm/RNP Antibody Latest Ref Range: 0.00 - 19.99 .83 (H)   Anti-Sm/RNP Interpretation Latest Ref Range: Negative  Positive (A)   Complement (C-3) Latest Ref Range: 50 - 180 mg/dL 60   Complement (C-4) Latest Ref Range: 11 - 44 mg/dL 7 (L)     Old and Outside medical records :-  Reviewed old and all outside medical records available in Care Everywhere.     Medication List with Changes/Refills   Current Medications    AMLODIPINE (NORVASC) 10 MG TABLET    Take 10 mg by mouth once daily.    AZATHIOPRINE (IMURAN)  50 MG TAB    Take 1 tablet (50 mg total) by mouth once daily.    FLUOCINOLONE (DERMA-SMOOTHE) 0.01 % EXTERNAL OIL    Apply oil to scalp twice a day    LOSARTAN (COZAAR) 100 MG TABLET    Take 100 mg by mouth once daily.    PREDNISONE (DELTASONE) 5 MG TABLET    Take 1 tablet (5 mg total) by mouth once daily.       Assessment/Plans:-  # CNS lupus:-  Extremely complicated uncontrolled lupus with fatigue and periodic exacerbations of serious neurological and psychiatric complications , extreme serological activity of lupus. Tolerating 50 mg daily imuran without any problem now. She does not want to try cytoxan or benlysta because of their potential side effect profile. She comprehends the severity of her lupus and how it could affect her organs and even death , still she would like not to try any therapy which could cause any side effects. I discussed in detail about the lupus and its severe complications.  Will continue discussions in future visits. In the mean time continue 50 mg daily imuran with 5 mg daily prednisone. Labs today.    - CK; Future  - Aldolase; Future  - MyoMarker Panel 3; Future  - Comprehensive metabolic panel; Future  - SHEILA; Future  - Sedimentation rate, manual; Future  - C-reactive protein; Future  - CBC auto differential; Future  - C3 complement; Future  - C4 complement; Future    # Return in about 3 months (around 6/2/2017).    Disclaimer: This note was prepared using voice recognition system and is likely to have sound alike errors and is not proof read.  Please call me with any questions.

## 2017-03-03 PROBLEM — G56.01 CARPAL TUNNEL SYNDROME, RIGHT: Status: ACTIVE | Noted: 2017-03-03

## 2017-03-03 LAB
ALDOLASE SERPL-CCNC: 4.2 U/L
ANA SER QL IF: POSITIVE
ANA TITR SER IF: NORMAL {TITER}

## 2017-03-06 LAB
ANTI SM ANTIBODY: 74.56 EU
ANTI SM/RNP ANTIBODY: 248.74 EU
ANTI-SM INTERPRETATION: POSITIVE
ANTI-SM/RNP INTERPRETATION: POSITIVE
ANTI-SSA ANTIBODY: 17.27 EU
ANTI-SSA INTERPRETATION: NEGATIVE
ANTI-SSB ANTIBODY: 3.91 EU
ANTI-SSB INTERPRETATION: NEGATIVE
DSDNA AB SER-ACNC: ABNORMAL [IU]/ML

## 2017-03-08 ENCOUNTER — CLINICAL SUPPORT (OUTPATIENT)
Dept: CARDIOLOGY | Facility: CLINIC | Age: 52
End: 2017-03-08
Payer: COMMERCIAL

## 2017-03-08 DIAGNOSIS — R94.31 ABNORMAL ECG: Chronic | ICD-10-CM

## 2017-03-08 DIAGNOSIS — I10 ESSENTIAL HYPERTENSION: ICD-10-CM

## 2017-03-08 DIAGNOSIS — Z01.818 PRE-OPERATIVE CLEARANCE: ICD-10-CM

## 2017-03-08 LAB
DIASTOLIC DYSFUNCTION: NO
MITRAL VALVE REGURGITATION: NORMAL
RETIRED EF AND QEF - SEE NOTES: 60 (ref 55–65)

## 2017-03-08 PROCEDURE — 93306 TTE W/DOPPLER COMPLETE: CPT | Mod: S$GLB,,, | Performed by: INTERNAL MEDICINE

## 2017-03-09 ENCOUNTER — TELEPHONE (OUTPATIENT)
Dept: RHEUMATOLOGY | Facility: CLINIC | Age: 52
End: 2017-03-09

## 2017-03-09 ENCOUNTER — TELEPHONE (OUTPATIENT)
Dept: HEMATOLOGY/ONCOLOGY | Facility: CLINIC | Age: 52
End: 2017-03-09

## 2017-03-09 ENCOUNTER — TELEPHONE (OUTPATIENT)
Dept: CARDIOLOGY | Facility: CLINIC | Age: 52
End: 2017-03-09

## 2017-03-09 ENCOUNTER — TELEPHONE (OUTPATIENT)
Dept: ORTHOPEDICS | Facility: CLINIC | Age: 52
End: 2017-03-09

## 2017-03-09 DIAGNOSIS — Z76.89 ESTABLISHING CARE WITH NEW DOCTOR, ENCOUNTER FOR: ICD-10-CM

## 2017-03-09 DIAGNOSIS — I10 BENIGN ESSENTIAL HTN: Primary | ICD-10-CM

## 2017-03-09 NOTE — TELEPHONE ENCOUNTER
----- Message from Aiden Sheffield sent at 3/9/2017  4:12 PM CST -----  Contact: pt  Pt is calling nurse staff regarding a request to Fax medical records to pt Primary Doctor. Pt tylor back to be advise with more information. Pt call back [phone] thanks

## 2017-03-09 NOTE — TELEPHONE ENCOUNTER
----- Message from Aiden Sheffield sent at 3/9/2017  4:07 PM CST -----  Contact: pt  Pt is calling nurse staff regarding medical records to be fax to pt primary care Doctor. Pt ask that staff call for more information [phone] to be advise. thanks

## 2017-03-09 NOTE — TELEPHONE ENCOUNTER
----- Message from Aiden Sheffield sent at 3/9/2017  4:03 PM CST -----  Contact: pt  Pt is calling Nurse staff regarding documents fax to patient primary Doctor. Pt is requesting staff top call to get more information. [phone] thanks

## 2017-03-09 NOTE — TELEPHONE ENCOUNTER
----- Message from Aiden Sheffield sent at 3/9/2017  4:10 PM CST -----  Contact: pt  Pt is calling Nurse staff regarding records to be fax to primary care. Pt call  Back to get more information. Pt call back [phone] thanks

## 2017-03-10 NOTE — TELEPHONE ENCOUNTER
Lupus could cause inflammation in the wrist which could reduce the size of the tunnel through which the median nerve pass through and cause carpal tunnel syndrome. If she does not have pain/stiffness over her wrists, then lupus could not be responsible for her carpal tunnel syndrome. Thanks.

## 2017-03-10 NOTE — TELEPHONE ENCOUNTER
Spoke with pt and advised her I would send over the last note and labs to Dr. Delacruz. Pt would also like to know if her lupus could have caused her carpal tunnel. Please Advise.

## 2017-03-10 NOTE — TELEPHONE ENCOUNTER
"Returned call. Patient request that medical rescords be faxed to PCP.    Patient was informed will need a signed Release of Information authorizing records to be released.    Patient responded by stating " I never needed it before"    I explained to patient it's requirement per HIPAA, which ensures patient medical privately.    Patient was can obtain copies of records via Patient portal, which she have an active account and can print records and give to PCP.    Patient ending conversation, by requesting my name and credential, because I'm incorrect in my knowledge of releasing records.  "

## 2017-03-13 ENCOUNTER — TELEPHONE (OUTPATIENT)
Dept: CARDIOLOGY | Facility: CLINIC | Age: 52
End: 2017-03-13

## 2017-03-13 ENCOUNTER — TELEPHONE (OUTPATIENT)
Dept: HEMATOLOGY/ONCOLOGY | Facility: CLINIC | Age: 52
End: 2017-03-13

## 2017-03-13 NOTE — TELEPHONE ENCOUNTER
----- Message from Levi Castellano sent at 3/13/2017  9:32 AM CDT -----  Contact: Pt  Pt request call from nurse to see if her medical records were fax to her PCP, please contact pt at [phone]

## 2017-03-13 NOTE — TELEPHONE ENCOUNTER
----- Message from Levi Castellano sent at 3/13/2017  9:30 AM CDT -----  Contact: Pt  Pt request call from nurse to say she is on her way to  the medical records, please contact pt at [phone]

## 2017-03-15 ENCOUNTER — TELEPHONE (OUTPATIENT)
Dept: ORTHOPEDICS | Facility: CLINIC | Age: 52
End: 2017-03-15

## 2017-03-21 RX ORDER — AMOXICILLIN 500 MG
2 CAPSULE ORAL DAILY
Status: ON HOLD | COMMUNITY
End: 2023-01-01 | Stop reason: HOSPADM

## 2017-03-21 RX ORDER — ACETAMINOPHEN 500 MG
5000 TABLET ORAL DAILY
COMMUNITY

## 2017-03-21 RX ORDER — FERROUS GLUCONATE 324(38)MG
324 TABLET ORAL
COMMUNITY
End: 2021-12-03

## 2017-03-21 NOTE — PRE ADMISSION SCREENING
Pre op instructions reviewed with patient per phone:    To confirm, Your surgeon has instructed you:  Surgery is scheduled 03/24/17 at 0815.      Please report to [facility] 1st floor main lobby by 0645  Pre admit office to call afternoon prior to surgery with final arrival time.      INSTRUCTIONS IMPORTANT!!!  ¨ Do not eat, drink, or smoke after 12 midnight-including water. OK to brush teeth, no gum, candy or mints!    ¨ Take only these medicines with a small swallow of water-morning of surgery.  Amlodipine, Losartan, Prednisone      Remove all artificial nails/polish prior to surgery      ____  Do not wear makeup, including mascara.  ____  No powder, lotions or creams to surgical area.  ____  Please remove all jewelry, including piercings and leave at home.  ____  No money or valuables needed. Please leave at home.  ____  Please bring identification and insurance information to hospital.  ____  If going home the same day, arrange for a ride home. You will not be able to   drive if Anesthesia was used.  ____  Children, under 12 years old, must remain in the waiting room with an adult.  They are not allowed in patient areas.  ____  Wear loose fitting clothing. Allow for dressings, bandages.  ____  Stop Aspirin, Ibuprofen, Motrin and Aleve at least 5-7 days before surgery, unless otherwise instructed by your doctor, or the nurse.   You MAY use Tylenol/acetaminophen until day of surgery.  ____  If you take diabetic medication, do not take am of surgery unless instructed by   Doctor.  ____ Stop taking any Fish Oil supplement or any Vitamins that contain Vitamin E at least 5 days prior to surgery.          Bathing Instructions-- The night before surgery and the morning prior to coming to the hospital:   -Do not shave the surgical area.   -Shower and wash your hair and body as usual with anti-bacterial  soap and shampoo.   -Rinse your hair and body completely.   -Use one packet of hibiclens to  wash the surgical site (using your hand) gently for 5 minutes.  Do not scrub you skin too hard.   -Do not use hibiclens on your head, face, or genitals.   -Do not wash with anti-bacterial soap after you use the hibiclens.   -Rinse your body thoroughly.   -Dry with clean, soft towel.  Do not use lotion, cream, deodorant, or powders on   the surgical site.    Use antibacterial soap in place of hibiclens if your surgery is on the head, face or genitals.         Surgical Site Infection    Prevention of surgical site infections:     -Keep incisions clean and dry.   -Do not soak/submerge incisions in water until completely healed.   -Do not apply lotions, powders, creams, or deodorants to site.   -Always make sure hands are cleaned with antibacterial soap/ alcohol-based   prior to touching the surgical site.  (This includes doctors, nurses, staff, and yourself.)    Signs and symptoms:   -Redness and pain around the area where you had surgery   -Drainage of cloudy fluid from your surgical wound   -Fever over 100.4  I have read or had read and explained to me, and understand the above information.

## 2017-03-24 ENCOUNTER — ANESTHESIA (OUTPATIENT)
Dept: SURGERY | Facility: HOSPITAL | Age: 52
End: 2017-03-24
Payer: COMMERCIAL

## 2017-03-24 ENCOUNTER — SURGERY (OUTPATIENT)
Age: 52
End: 2017-03-24

## 2017-03-24 ENCOUNTER — ANESTHESIA EVENT (OUTPATIENT)
Dept: SURGERY | Facility: HOSPITAL | Age: 52
End: 2017-03-24
Payer: COMMERCIAL

## 2017-03-24 ENCOUNTER — HOSPITAL ENCOUNTER (OUTPATIENT)
Facility: HOSPITAL | Age: 52
Discharge: HOME | End: 2017-03-24
Attending: ORTHOPAEDIC SURGERY | Admitting: ORTHOPAEDIC SURGERY
Payer: COMMERCIAL

## 2017-03-24 VITALS
TEMPERATURE: 98 F | BODY MASS INDEX: 28.5 KG/M2 | HEART RATE: 95 BPM | SYSTOLIC BLOOD PRESSURE: 135 MMHG | RESPIRATION RATE: 20 BRPM | DIASTOLIC BLOOD PRESSURE: 80 MMHG | WEIGHT: 171.06 LBS | HEIGHT: 65 IN | OXYGEN SATURATION: 96 %

## 2017-03-24 DIAGNOSIS — G56.02 CARPAL TUNNEL SYNDROME OF LEFT WRIST: Primary | ICD-10-CM

## 2017-03-24 DIAGNOSIS — G56.02 LEFT CARPAL TUNNEL SYNDROME: ICD-10-CM

## 2017-03-24 PROCEDURE — 63600175 PHARM REV CODE 636 W HCPCS: Performed by: NURSE ANESTHETIST, CERTIFIED REGISTERED

## 2017-03-24 PROCEDURE — 64721 CARPAL TUNNEL SURGERY: CPT | Mod: LT,,, | Performed by: ORTHOPAEDIC SURGERY

## 2017-03-24 PROCEDURE — 37000008 HC ANESTHESIA 1ST 15 MINUTES: Performed by: ORTHOPAEDIC SURGERY

## 2017-03-24 PROCEDURE — 25000003 PHARM REV CODE 250: Performed by: ORTHOPAEDIC SURGERY

## 2017-03-24 PROCEDURE — 36000707: Performed by: ORTHOPAEDIC SURGERY

## 2017-03-24 PROCEDURE — 63600175 PHARM REV CODE 636 W HCPCS: Performed by: ORTHOPAEDIC SURGERY

## 2017-03-24 PROCEDURE — 63600175 PHARM REV CODE 636 W HCPCS: Performed by: PHYSICIAN ASSISTANT

## 2017-03-24 PROCEDURE — 25000003 PHARM REV CODE 250: Performed by: PHYSICIAN ASSISTANT

## 2017-03-24 PROCEDURE — 71000015 HC POSTOP RECOV 1ST HR: Performed by: ORTHOPAEDIC SURGERY

## 2017-03-24 PROCEDURE — 71000033 HC RECOVERY, INTIAL HOUR: Performed by: ORTHOPAEDIC SURGERY

## 2017-03-24 PROCEDURE — 25000003 PHARM REV CODE 250: Performed by: NURSE ANESTHETIST, CERTIFIED REGISTERED

## 2017-03-24 PROCEDURE — 36000706: Performed by: ORTHOPAEDIC SURGERY

## 2017-03-24 PROCEDURE — 37000009 HC ANESTHESIA EA ADD 15 MINS: Performed by: ORTHOPAEDIC SURGERY

## 2017-03-24 RX ORDER — PROPOFOL 10 MG/ML
VIAL (ML) INTRAVENOUS
Status: DISCONTINUED | OUTPATIENT
Start: 2017-03-24 | End: 2017-03-24

## 2017-03-24 RX ORDER — FENTANYL CITRATE 50 UG/ML
INJECTION, SOLUTION INTRAMUSCULAR; INTRAVENOUS
Status: DISCONTINUED | OUTPATIENT
Start: 2017-03-24 | End: 2017-03-24

## 2017-03-24 RX ORDER — MEPERIDINE HYDROCHLORIDE 50 MG/ML
12.5 INJECTION INTRAMUSCULAR; INTRAVENOUS; SUBCUTANEOUS ONCE AS NEEDED
Status: DISCONTINUED | OUTPATIENT
Start: 2017-03-24 | End: 2017-03-24 | Stop reason: HOSPADM

## 2017-03-24 RX ORDER — LIDOCAINE HYDROCHLORIDE 10 MG/ML
1 INJECTION, SOLUTION EPIDURAL; INFILTRATION; INTRACAUDAL; PERINEURAL ONCE
Status: DISCONTINUED | OUTPATIENT
Start: 2017-03-24 | End: 2017-03-24 | Stop reason: HOSPADM

## 2017-03-24 RX ORDER — LIDOCAINE HCL/PF 100 MG/5ML
SYRINGE (ML) INTRAVENOUS
Status: DISCONTINUED | OUTPATIENT
Start: 2017-03-24 | End: 2017-03-24

## 2017-03-24 RX ORDER — BUPIVACAINE HYDROCHLORIDE 2.5 MG/ML
INJECTION, SOLUTION EPIDURAL; INFILTRATION; INTRACAUDAL
Status: DISCONTINUED | OUTPATIENT
Start: 2017-03-24 | End: 2017-03-24 | Stop reason: HOSPADM

## 2017-03-24 RX ORDER — MORPHINE SULFATE 10 MG/ML
2 INJECTION INTRAMUSCULAR; INTRAVENOUS; SUBCUTANEOUS EVERY 5 MIN PRN
Status: DISCONTINUED | OUTPATIENT
Start: 2017-03-24 | End: 2017-03-24 | Stop reason: HOSPADM

## 2017-03-24 RX ORDER — DIPHENHYDRAMINE HYDROCHLORIDE 50 MG/ML
25 INJECTION INTRAMUSCULAR; INTRAVENOUS EVERY 6 HOURS PRN
Status: DISCONTINUED | OUTPATIENT
Start: 2017-03-24 | End: 2017-03-24 | Stop reason: HOSPADM

## 2017-03-24 RX ORDER — HYDROMORPHONE HYDROCHLORIDE 2 MG/ML
0.2 INJECTION, SOLUTION INTRAMUSCULAR; INTRAVENOUS; SUBCUTANEOUS EVERY 5 MIN PRN
Status: DISCONTINUED | OUTPATIENT
Start: 2017-03-24 | End: 2017-03-24 | Stop reason: HOSPADM

## 2017-03-24 RX ORDER — SODIUM CHLORIDE, SODIUM LACTATE, POTASSIUM CHLORIDE, CALCIUM CHLORIDE 600; 310; 30; 20 MG/100ML; MG/100ML; MG/100ML; MG/100ML
INJECTION, SOLUTION INTRAVENOUS CONTINUOUS PRN
Status: DISCONTINUED | OUTPATIENT
Start: 2017-03-24 | End: 2017-03-24

## 2017-03-24 RX ORDER — MIDAZOLAM HYDROCHLORIDE 1 MG/ML
INJECTION, SOLUTION INTRAMUSCULAR; INTRAVENOUS
Status: DISCONTINUED | OUTPATIENT
Start: 2017-03-24 | End: 2017-03-24

## 2017-03-24 RX ORDER — OXYCODONE HYDROCHLORIDE 5 MG/1
5 TABLET ORAL
Status: DISCONTINUED | OUTPATIENT
Start: 2017-03-24 | End: 2017-03-24 | Stop reason: HOSPADM

## 2017-03-24 RX ORDER — HYDROCODONE BITARTRATE AND ACETAMINOPHEN 10; 325 MG/1; MG/1
TABLET ORAL
Qty: 30 TABLET | Refills: 0 | Status: ON HOLD | OUTPATIENT
Start: 2017-03-24 | End: 2017-05-11 | Stop reason: HOSPADM

## 2017-03-24 RX ORDER — HYDROMORPHONE HYDROCHLORIDE 2 MG/ML
0.5 INJECTION, SOLUTION INTRAMUSCULAR; INTRAVENOUS; SUBCUTANEOUS
Status: DISCONTINUED | OUTPATIENT
Start: 2017-03-24 | End: 2017-03-24 | Stop reason: HOSPADM

## 2017-03-24 RX ORDER — ACETAMINOPHEN 325 MG/1
1000 TABLET ORAL ONCE
Status: DISCONTINUED | OUTPATIENT
Start: 2017-03-24 | End: 2017-03-24 | Stop reason: HOSPADM

## 2017-03-24 RX ORDER — SODIUM CHLORIDE 9 MG/ML
INJECTION, SOLUTION INTRAVENOUS CONTINUOUS
Status: DISCONTINUED | OUTPATIENT
Start: 2017-03-25 | End: 2017-03-24 | Stop reason: HOSPADM

## 2017-03-24 RX ADMIN — LIDOCAINE HYDROCHLORIDE 40 MG: 20 INJECTION, SOLUTION INTRAVENOUS at 08:03

## 2017-03-24 RX ADMIN — HYDROCORTISONE SODIUM SUCCINATE 100 MG: 100 INJECTION, POWDER, FOR SOLUTION INTRAMUSCULAR; INTRAVENOUS at 09:03

## 2017-03-24 RX ADMIN — VANCOMYCIN HYDROCHLORIDE 1000 MG: 1 INJECTION, POWDER, LYOPHILIZED, FOR SOLUTION INTRAVENOUS at 07:03

## 2017-03-24 RX ADMIN — FENTANYL CITRATE 100 MCG: 50 INJECTION, SOLUTION INTRAMUSCULAR; INTRAVENOUS at 07:03

## 2017-03-24 RX ADMIN — SODIUM CHLORIDE, SODIUM LACTATE, POTASSIUM CHLORIDE, AND CALCIUM CHLORIDE: 600; 310; 30; 20 INJECTION, SOLUTION INTRAVENOUS at 07:03

## 2017-03-24 RX ADMIN — PROPOFOL 110 MG: 10 INJECTION, EMULSION INTRAVENOUS at 08:03

## 2017-03-24 RX ADMIN — MIDAZOLAM HYDROCHLORIDE 2 MG: 1 INJECTION, SOLUTION INTRAMUSCULAR; INTRAVENOUS at 07:03

## 2017-03-24 RX ADMIN — BUPIVACAINE HYDROCHLORIDE 10 ML: 2.5 INJECTION, SOLUTION EPIDURAL; INFILTRATION; INTRACAUDAL; PERINEURAL at 08:03

## 2017-03-24 NOTE — DISCHARGE INSTRUCTIONS
General Information:    1.  Do not drink alcoholic beverages including beer for 24 hours or as long as you are on pain medication..  2.  Do not drive a motor vehicle, operate machinery or power tools, or signs legal papers for 24 hours or as long as you are on pain medication.   3.  You may experience light-headedness, dizziness, and sleepiness following surgery. Please do not stay alone. A responsible adult should be with you for this 24 hour period.  4.  Go home and rest.    5. Progress slowly to a normal diet unless instructed.  Otherwise, begin with liquids such as soft drinks, then soup and crackers working up to solid foods. Drink plenty of nonalcoholic fluids.  6.  Certain anesthetics and pain medications produce nausea and vomiting in certain       individuals. If nausea becomes a problem at home, call you doctor.    7. A nurse will be calling you sometime after surgery. Do not be alarmed. This is our way of finding out how you are doing.    8. Several times every hour while you are awake, take 2-3 deep breaths and cough. If you had stomach surgery hold a pillow or rolled towel firmly against your stomach before you cough. This will help with any pain the cough might cause.  9. Several times every hour while you are awake, pump and flex your feet 5-6 times and do foot circles. This will help prevent blood clots.    10.Call your doctor for severe pain, bleeding, fever, or signs or symptoms of infection (pain, swelling, redness, foul odor, drainage).    11.You can contact your doctor anytime by [phone] for the [facility] (at [facility]) or [phone] for the [facility] on [facility].   [email or web address] is another way to contact your doctor if you are an active participant online with [facility].        Discharge Instructions for Carpal Tunnel Release  You had a carpal tunnel release procedure to help relieve the symptoms of carpal tunnel syndrome. In carpal tunnel syndrome, a  nerve in the wrist is compressed and irritated. This causes numbness and pain in the fingers and hand. Carpal tunnel release relieves the compression of the nerve. Here are instructions that will help you care for your arm and wrist when you are at home.  Home care  · Avoid gripping objects tightly or lifting with your affected arm.  · Wear your bandage, splint, or cast as directed by your doctor.  · Always keep the dressing, splint, or cast dry and clean.  · When showering, cover your hand and  wrist with plastic and use tape or rubberbands to keep the dressing, splint, or cast dry. Shower as necessary.    · Use an ice pack or bag of frozen peas -- or something similar -- wrapped in a thin towel on your wrist to reduce swelling for the first 48 hours. Leave the ice pack on for 20 minutes; then take it off for 20 minutes. Repeat as needed.  · Keep your arm elevated above your heart for 24 to 48 hours after surgery.  · Do the exercises you learned in the hospital, or as instructed by your doctor.  · Take pain medicine as directed.  · Dont drive until your doctor says its OK. Never drive while you are taking opioid pain medicine.  · Ask your doctor when you can return to work. If your job requires heavy lifting, you may not be able to begin working again for several weeks.  Follow-up care  Make a follow-up appointment as directed by your doctor.     When to seek medical care  Call 911 right away if you have any of the following:  · Chest pain  · Shortness of breath  Otherwise, call your doctor immediately if you have any of the following:  · A splint, cast, or dressing that has gotten wet  · Increased bleeding or drainage from the incision (cut)  · Opening of the incision  · Fever above 100.4°F (38.9°C) taken by mouth, or shaking chills  · Any new numbness in the fingers or thumb  · Blue hand or fingers  · Increased pain with or without activity  · Increased redness, tenderness, or swelling of the incision   Date  Last Reviewed: 11/15/2015  © 0806-3921 [employer]. [address] [place] [address], [place] [address]. All rights reserved. This information is not intended as a substitute for professional medical care. Always follow your healthcare professional's instructions.

## 2017-03-24 NOTE — BRIEF OP NOTE
[facility]  Brief Operative Note     SUMMARY     Surgery Date: 3/24/2017     Surgeon(s) and Role:     * Abhishek Decker MD - Primary    Assisting Surgeon: None    Pre-op Diagnosis:  Carpal tunnel syndrome of left wrist [G56.02]    Post-op Diagnosis:  Post-Op Diagnosis Codes:     * Carpal tunnel syndrome of left wrist [G56.02]    Procedure(s) (LRB):  RELEASE-CARPAL TUNNEL;left hand (Left)    Anesthesia: General    Description of the findings of the procedure: The patient had their procedure performed, had a routine post operative recovery, and was discharged home.    Findings/Key Components: L CTS    Estimated Blood Loss: 2 cc       Specimens:   Specimen     None          Discharge Note    SUMMARY     Admit Date: 3/24/2017    Discharge Date and Time:  03/24/2017 9:48 AM    Hospital Course (synopsis of major diagnoses, care, treatment, and services provided during the course of the hospital stay): The patient had their procedure performed, had a routine post operative recovery, and was discharged home.     Final Diagnosis: Post-Op Diagnosis Codes:     * Carpal tunnel syndrome of left wrist [G56.02]    Disposition: Home or Self Care    Follow Up/Patient Instructions:     Medications:  Reconciled Home Medications:   Current Discharge Medication List      START taking these medications    Details   hydrocodone-acetaminophen 10-325mg (NORCO)  mg Tab 1-2 tablets by mouth every 4-8 hours as necessary for pain  Qty: 30 tablet, Refills: 0         CONTINUE these medications which have NOT CHANGED    Details   amlodipine (NORVASC) 10 MG tablet Take 10 mg by mouth once daily.      CALCIUM-MAGNESIUM-ZINC ORAL Take by mouth once daily.      cholecalciferol, vitamin D3, (VITAMIN D3) 5,000 unit Tab Take 5,000 Units by mouth once daily.      ferrous gluconate (FERGON) 324 MG tablet Take 324 mg by mouth daily with breakfast.      fish oil-omega-3 fatty acids 300-1,000 mg capsule Take 2 g by mouth once daily.       fluocinolone (DERMA-SMOOTHE) 0.01 % external oil Apply oil to scalp twice a day  Qty: 1 Bottle, Refills: 5    Associated Diagnoses: Discoid lupus; Female pattern alopecia      losartan (COZAAR) 100 MG tablet Take 100 mg by mouth once daily.      multivitamin capsule Take 1 capsule by mouth once daily.      predniSONE (DELTASONE) 5 MG tablet Take 1 tablet (5 mg total) by mouth once daily.  Qty: 30 tablet, Refills: 3    Associated Diagnoses: CNS lupus         STOP taking these medications       azathioprine (IMURAN) 50 mg Tab Comments:   Reason for Stopping:               Discharge Procedure Orders  Diet general   Order Comments: Resume home diet.  If you are diabetic, resume an  ADA [id number]-[id number] diet.     Call MD for:   Order Comments: For fever greater than 102°, or new loss of sensation, numbness, or excessive pain, not relieved by icing, elevation, rest, and pain medication.     Leave dressing on - Keep it clean, dry, and intact until clinic visit       Follow-up Information     Follow up with Abhishek Decker MD On 4/5/2017.    Specialty:  Orthopedic Surgery    Why:  For suture removal, For wound re-check    Contact information:    [address] [place] [address] DR Sharon DELGADO [id number] [phone]

## 2017-03-24 NOTE — ANESTHESIA PREPROCEDURE EVALUATION
03/24/2017  Ruth Messina is a 51 y.o., female.    [facility] Anesthesia Evaluation    I have reviewed the Patient Summary Reports.    I have reviewed the Nursing Notes.   I have reviewed the Medications.     Review of Systems  Anesthesia Hx:  No problems with previous Anesthesia    Cardiovascular:   Hypertension ECG has been reviewed.    Pulmonary:   Shortness of breath    Renal/:  Renal/ Normal     Hepatic/GI:  Hepatic/GI Normal    Neurological:   Denies TIA. Denies CVA. Neuromuscular Disease, (Carpal tunnel)  Denies Seizures.    Endocrine:  Endocrine Normal        Physical Exam  General:  Well nourished    Airway/Jaw/Neck:  Airway Findings: Mouth Opening: Normal Tongue: Normal  General Airway Assessment: Adult  Mallampati: II  Jaw/Neck Findings:  Neck ROM: Normal ROM      Dental:  Dental Findings: In tact   Chest/Lungs:  Chest/Lungs Findings: Clear to auscultation, Normal Respiratory Rate     Heart/Vascular:  Heart Findings: Rate: Normal  Rhythm: Regular Rhythm  Sounds: Normal        Mental Status:  Mental Status Findings:  Cooperative, Alert and Oriented         Anesthesia Plan  Type of Anesthesia, risks & benefits discussed:  Anesthesia Type:  general, MAC  Patient's Preference:   Intra-op Monitoring Plan:   Intra-op Monitoring Plan Comments:   Post Op Pain Control Plan:   Post Op Pain Control Plan Comments:   Induction:   IV  Beta Blocker:  Patient is not currently on a Beta-Blocker (No further documentation required).       Informed Consent: Patient understands risks and agrees with Anesthesia plan.  Questions answered. Anesthesia consent signed with patient.  ASA Score: 2     Day of Surgery Review of History & Physical: I have interviewed and examined the patient. I have reviewed the patient's H&P dated:  There are no significant changes.          Ready For Surgery From Anesthesia Perspective.

## 2017-03-24 NOTE — INTERVAL H&P NOTE
The patient has been examined and the H&P has been reviewed:    I concur with the findings and no changes have occurred since H&P was written.    Anesthesia/Surgery risks, benefits and alternative options discussed and understood by patient/family.          Active Hospital Problems    Diagnosis  POA    Left carpal tunnel syndrome [G56.02]  Yes      Resolved Hospital Problems    Diagnosis Date Resolved POA   No resolved problems to display.

## 2017-03-24 NOTE — IP AVS SNAPSHOT
[facility]  [id number] [place] [address] Dr Sharon DELGADO [id number]           Patient Discharge Instructions     Our goal is to set you up for success. This packet includes information on your condition, medications, and your home care. It will help you to care for yourself so you don't get sicker and need to go back to the hospital.     Please ask your nurse if you have any questions.        There are many details to remember when preparing to leave the hospital. Here is what you will need to do:    1. Take your medicine. If you are prescribed medications, review your Medication List in the following pages. You may have new medications to  at the pharmacy and others that you'll need to stop taking. Review the instructions for how and when to take your medications. Talk with your doctor or nurses if you are unsure of what to do.     2. Go to your follow-up appointments. Specific follow-up information is listed in the following pages. Your may be contacted by a transition nurse or clinical provider about future appointments. Be sure we have all of the phone numbers to reach you, if needed. Please contact your provider's office if you are unable to make an appointment.     3. Watch for warning signs. Your doctor or nurse will give you detailed warning signs to watch for and when to call for assistance. These instructions may also include educational information about your condition. If you experience any of warning signs to your health, call your doctor.               ** Verify the list of medication(s) below is accurate and up to date. Carry this with you in case of emergency. If your medications have changed, please notify your healthcare provider.             Medication List      START taking these medications        Additional Info                      hydrocodone-acetaminophen 10-325mg  mg Tab   Commonly known as:  NORCO   Quantity:  30 tablet   Refills:  0    Instructions:  1-2 tablets  by mouth every 4-8 hours as necessary for pain     Begin Date    AM    Noon    PM    Bedtime         CHANGE how you take these medications        Additional Info                      predniSONE 5 MG tablet   Commonly known as:  DELTASONE   Quantity:  30 tablet   Refills:  3   Dose:  5 mg   What changed:  when to take this    Instructions:  Take 1 tablet (5 mg total) by mouth once daily.     Begin Date    AM    Noon    PM    Bedtime         CONTINUE taking these medications        Additional Info                      amlodipine 10 MG tablet   Commonly known as:  NORVASC   Refills:  0   Dose:  10 mg    Instructions:  Take 10 mg by mouth once daily.     Begin Date    AM    Noon    PM    Bedtime       CALCIUM-MAGNESIUM-ZINC ORAL   Refills:  0    Instructions:  Take by mouth once daily.     Begin Date    AM    Noon    PM    Bedtime       ferrous gluconate 324 MG tablet   Commonly known as:  FERGON   Refills:  0   Dose:  324 mg    Instructions:  Take 324 mg by mouth daily with breakfast.     Begin Date    AM    Noon    PM    Bedtime       fish oil-omega-3 fatty acids 300-1,000 mg capsule   Refills:  0   Dose:  2 g    Instructions:  Take 2 g by mouth once daily.     Begin Date    AM    Noon    PM    Bedtime       fluocinolone 0.01 % external oil   Commonly known as:  DERMA-SMOOTHE   Quantity:  1 Bottle   Refills:  5    Instructions:  Apply oil to scalp twice a day     Begin Date    AM    Noon    PM    Bedtime       losartan 100 MG tablet   Commonly known as:  COZAAR   Refills:  0   Dose:  100 mg    Instructions:  Take 100 mg by mouth once daily.     Begin Date    AM    Noon    PM    Bedtime       multivitamin capsule   Refills:  0   Dose:  1 capsule    Instructions:  Take 1 capsule by mouth once daily.     Begin Date    AM    Noon    PM    Bedtime       VITAMIN D3 5,000 unit Tab   Refills:  0   Dose:  5000 Units   Generic drug:  cholecalciferol (vitamin D3)    Instructions:  Take 5,000 Units by mouth once daily.     Begin  Date    AM    Noon    PM    Bedtime         STOP taking these medications     azathioprine 50 mg Tab   Commonly known as:  IMURAN            Where to Get Your Medications      These medications were sent to [address] [place] [facility] [address]  [address] [facility] [place] [address]     Phone:  [phone]     hydrocodone-acetaminophen 10-325mg  mg Tab                  Please bring to all follow up appointments:    1. A copy of your discharge instructions.  2. All medicines you are currently taking in their original bottles.  3. Identification and insurance card.    Please arrive 15 minutes ahead of scheduled appointment time.    Please call 24 hours in advance if you must reschedule your appointment and/or time.        Your Scheduled Appointments     Apr 07, 2017  9:30 AM CDT   Post OP with NATTY Pruitt - Orthopedics ([facility])    [address] [place] [address] [address]  [facility] [id number] [phone]            May 18, 2017  8:40 AM CDT   Non-Fasting Lab with [facility])    [address] [place] [id number] [phone]            May 18, 2017  9:00 AM CDT   Established Patient Visit with Chris Montanez Jr., MD   [facility] - Hemotology Oncology ([facility])    [facility] [id number] [phone]            Jun 01, 2017 11:00 AM CDT   Established Patient Visit with Chirag Turner MD   [facility] - Rheumatology ([facility])    [address] [place] [id number] [phone]              Follow-up Information     Follow up with Abhishek Decker MD On 4/5/2017.    Specialty:  Orthopedic Surgery    Why:  For suture removal, For wound re-check    Contact information:    [address] [place] [address] DR Sharon DELGADO [address]  [phone]          Discharge Instructions     Future Orders    Call MD for:     Comments:    For fever greater than 102°, or new loss of sensation, numbness,  or excessive pain, not relieved by icing, elevation, rest, and pain medication.    Diet general     Comments:    Resume home diet.  If you are diabetic, resume an  ADA [id number]-[id number] diet.    Questions:    Total calories:      Fat restriction, if any:      Protein restriction, if any:      Na restriction, if any:      Fluid restriction:      Additional restrictions:      Leave dressing on - Keep it clean, dry, and intact until clinic visit         Discharge Instructions         General Information:    1.  Do not drink alcoholic beverages including beer for 24 hours or as long as you are on pain medication..  2.  Do not drive a motor vehicle, operate machinery or power tools, or signs legal papers for 24 hours or as long as you are on pain medication.   3.  You may experience light-headedness, dizziness, and sleepiness following surgery. Please do not stay alone. A responsible adult should be with you for this 24 hour period.  4.  Go home and rest.    5. Progress slowly to a normal diet unless instructed.  Otherwise, begin with liquids such as soft drinks, then soup and crackers working up to solid foods. Drink plenty of nonalcoholic fluids.  6.  Certain anesthetics and pain medications produce nausea and vomiting in certain       individuals. If nausea becomes a problem at home, call you doctor.    7. A nurse will be calling you sometime after surgery. Do not be alarmed. This is our way of finding out how you are doing.    8. Several times every hour while you are awake, take 2-3 deep breaths and cough. If you had stomach surgery hold a pillow or rolled towel firmly against your stomach before you cough. This will help with any pain the cough might cause.  9. Several times every hour while you are awake, pump and flex your feet 5-6 times and do foot circles. This will help prevent blood clots.    10.Call your doctor for severe pain, bleeding, fever, or signs or symptoms of infection (pain, swelling, redness, foul  odor, drainage).    11.You can contact your doctor anytime by [phone] for the [facility] (at [facility]) or [phone] for the [facility] on [address].   [email or web address] is another way to contact your doctor if you are an active participant online with [facility].        Discharge Instructions for Carpal Tunnel Release  You had a carpal tunnel release procedure to help relieve the symptoms of carpal tunnel syndrome. In carpal tunnel syndrome, a nerve in the wrist is compressed and irritated. This causes numbness and pain in the fingers and hand. Carpal tunnel release relieves the compression of the nerve. Here are instructions that will help you care for your arm and wrist when you are at home.  Home care  · Avoid gripping objects tightly or lifting with your affected arm.  · Wear your bandage, splint, or cast as directed by your doctor.  · Always keep the dressing, splint, or cast dry and clean.  · When showering, cover your hand and  wrist with plastic and use tape or rubberbands to keep the dressing, splint, or cast dry. Shower as necessary.    · Use an ice pack or bag of frozen peas -- or something similar -- wrapped in a thin towel on your wrist to reduce swelling for the first 48 hours. Leave the ice pack on for 20 minutes; then take it off for 20 minutes. Repeat as needed.  · Keep your arm elevated above your heart for 24 to 48 hours after surgery.  · Do the exercises you learned in the hospital, or as instructed by your doctor.  · Take pain medicine as directed.  · Dont drive until your doctor says its OK. Never drive while you are taking opioid pain medicine.  · Ask your doctor when you can return to work. If your job requires heavy lifting, you may not be able to begin working again for several weeks.  Follow-up care  Make a follow-up appointment as directed by your doctor.     When to seek medical care  Call [phone] right away if you have any of the following:  · Chest  "pain  · Shortness of breath  Otherwise, call your doctor immediately if you have any of the following:  · A splint, cast, or dressing that has gotten wet  · Increased bleeding or drainage from the incision (cut)  · Opening of the incision  · Fever above 100.4°F (38.9°C) taken by mouth, or shaking chills  · Any new numbness in the fingers or thumb  · Blue hand or fingers  · Increased pain with or without activity  · Increased redness, tenderness, or swelling of the incision   Date Last Reviewed: 11/15/2015  © 8872-4303 [employer]. [address] [place] [address]. All rights reserved. This information is not intended as a substitute for professional medical care. Always follow your healthcare professional's instructions.            Primary Diagnosis     Your primary diagnosis was:  Carpal Tunnel Syndrome On Left      Admission Information     Date & Time Provider Department CSN    3/24/2017  6:34 AM Abhishek Decker MD [facility] -  [id number]      Care Providers     Provider Role Specialty Primary office phone    Abhishek Decker MD Attending Provider Orthopedic Surgery [phone]    Abhishek Decker MD Surgeon  Orthopedic Surgery [phone]      Your Vitals Were     BP Pulse Temp Resp Height Weight    127/79 83 98.1 °F (36.7 °C) (Temporal) 13 5' 5" (1.651 m) 77.6 kg (171 lb 1.2 oz)    SpO2 BMI             99% 28.47 kg/m2         Recent Lab Values     No lab values to display.      Allergies as of 3/24/2017        Reactions    Cellcept [Mycophenolate Mofetil] Swelling    Lips    Ace Inhibitors Other (See Comments)    unkown    Amoxicillin Other (See Comments)    + allergy test    Aspirin Other (See Comments)    G-6-PD def.     Beta-blockers (Beta-adrenergic Blocking Agts) Other (See Comments)    Cannot take due to Raynaud's syndrome    Cephalosporins Other (See Comments)    + allergy test    Clindamycin Other (See Comments)    + allergy test    Hydrochlorothiazide Other (See " Comments)    unknown    Ibuprofen Other (See Comments)    G-6-PD def.    Lasix [Furosemide] Other (See Comments)    unknown    Lisinopril Other (See Comments)    unknown    Methotrexate Analogues Other (See Comments)    Pancytopenia    Nsaids (Non-steroidal Anti-inflammatory Drug) Other (See Comments)    G-6-PD def.    Pcn [Penicillins] Other (See Comments)    + allergy test    Plaquenil [Hydroxychloroquine] Other (See Comments)    G-6-PD def.    Shellfish Containing Products Other (See Comments)    + allergy test    Zofran [Ondansetron Hcl (Pf)] Other (See Comments)    Chest pain    Sulfa (Sulfonamide Antibiotics) Rash      [facility]     [facility] Nurse Care Line - 24/7 Assistance  Unless otherwise directed by your provider, please contact [facility], our nurse care line that is available for 24/7 assistance.     Registered nurses in the [facility] provide clinical advisement, health education, appointment booking, and other advisory services.  Call for this free service at [phone].        Advance Directives     An advance directive is a document which, in the event you are no longer able to make decisions for yourself, tells your healthcare team what kind of treatment you do or do not want to receive, or who you would like to make those decisions for you.  If you do not currently have an advance directive, [facility] encourages you to create one.  For more information call:  [phone]), [phone] ([phone]),  or log on to [email or web address].        Language Assistance Services     ATTENTION: Language assistance services are available, free of charge. Please call [phone].      ATENCIÓN: Si habla español, tiene a sanderson disposición servicios gratuitos de asistencia lingüística. Llame al [phone].     MORGAN Ý: N?u b?n nói Ti?ng Vi?t, có các d?ch v? h? tr? ngôn ng? mi?n phí dành cho b?n. G?i s? [phone].         [facility]   complies with applicable Federal civil rights laws and does not discriminate on the basis of race, color, national origin, age, disability, or sex.

## 2017-03-24 NOTE — ANESTHESIA RELEASE NOTE
"Anesthesia Release from PACU Note    Patient: Ruth Messina    Procedure(s) Performed: Procedure(s) (LRB):  RELEASE-CARPAL TUNNEL;left hand (Left)    Anesthesia type: general    Post pain: Adequate analgesia    Post assessment: no apparent anesthetic complications    Last Vitals:   Visit Vitals    BP (!) 153/81 (BP Location: Right arm, Patient Position: Sitting, BP Method: Automatic)    Pulse 95    Temp 36.8 °C (98.3 °F) (Oral)    Resp 18    Ht 5' 5" (1.651 m)    Wt 77.6 kg (171 lb 1.2 oz)    SpO2 99%    Breastfeeding No    BMI 28.47 kg/m2       Post vital signs: stable    Level of consciousness: awake    Nausea/Vomiting: no nausea/no vomiting    Complications: none    Airway Patency: patent    Respiratory: unassisted    Cardiovascular: stable and blood pressure at baseline    Hydration: euvolemic  "

## 2017-03-24 NOTE — ANESTHESIA POSTPROCEDURE EVALUATION
"Anesthesia Post Evaluation    Patient: Ruth Messina    Procedure(s) Performed: Procedure(s) (LRB):  RELEASE-CARPAL TUNNEL;left hand (Left)    Final Anesthesia Type: general  Patient location during evaluation: PACU  Patient participation: Yes- Able to Participate  Level of consciousness: awake  Post-procedure vital signs: reviewed and stable  Pain management: adequate  Airway patency: patent  PONV status at discharge: No PONV  Anesthetic complications: no      Cardiovascular status: blood pressure returned to baseline  Respiratory status: unassisted, spontaneous ventilation and room air  Hydration status: euvolemic  Follow-up not needed.        Visit Vitals    /80    Pulse 95    Temp 36.7 °C (98 °F) (Temporal)    Resp 20    Ht 5' 5" (1.651 m)    Wt 77.6 kg (171 lb 1.2 oz)    SpO2 96%    Breastfeeding No    BMI 28.47 kg/m2       Pain/Marky Score: Pain Assessment Performed: Yes (3/24/2017 10:20 AM)  Presence of Pain: complains of pain/discomfort (3/24/2017 10:20 AM)  Marky Score: 10 (3/24/2017 10:20 AM)      "

## 2017-03-24 NOTE — TRANSFER OF CARE
"Anesthesia Transfer of Care Note    Patient: Ruth Messina    Procedure(s) Performed: Procedure(s) (LRB):  RELEASE-CARPAL TUNNEL;left hand (Left)    Patient location: PACU    Anesthesia Type: general    Transport from OR: Transported from OR on room air with adequate spontaneous ventilation    Post pain: adequate analgesia    Post assessment: no apparent anesthetic complications    Post vital signs: stable    Level of consciousness: awake    Nausea/Vomiting: no nausea/vomiting    Complications: none          Last vitals:   Visit Vitals    BP (!) 153/81 (BP Location: Right arm, Patient Position: Sitting, BP Method: Automatic)    Pulse 95    Temp 36.8 °C (98.3 °F) (Oral)    Resp 18    Ht 5' 5" (1.651 m)    Wt 77.6 kg (171 lb 1.2 oz)    SpO2 99%    Breastfeeding No    BMI 28.47 kg/m2     "

## 2017-03-24 NOTE — OP NOTE
Expand All Collapse All   DATE OF PROCEDURE:  03/24/2017  PREOPERATIVE DIAGNOSIS: left carpal tunnel syndrome.  POSTOPERATIVE DIAGNOSIS  left carpal tunnel syndrome.  OPERATION PERFORMED: left carpal tunnel release.  SURGEON: Abhishek Decker M.D.  ASSISTANT: None.  DRAINS: None.  SPECIMENS: None.  ESTIMATED BLOOD LOSS: Less than 5 mL.  COMPLICATIONS: None.  INDICATIONS: This 51 y.o. female presented with EMG/NCV documented left carpal tunnel syndrome. She was indicated for a left carpal tunnel release to   decrease her pain, diminish her morbidity, and improve her ultimate functional ability.    DESCRIPTION OF OPERATION: The patient was placed in the supine position on the   operating table and a satisfactory anesthesia was administered by   Anesthesia. A tourniquet was placed high on the patient's left upper arm and her left  hand and arm were then prepped and draped in the usual sterile fashion and positioned on an arm board. An Esmarch bandage was then utilized to exsanguinate the left upper extremity and the tourniquet was inflated to 250 mmHg pressure. A palmar longitudinal incision was then made in line with the thenar crease, taking care to avoid crossing the distal volar transverse wrist crease. Sharp dissection was carried down to the transverse carpal ligament, which was noted to be quite thickened. Hemostasis was maintained with needle tip electrocautery. The transverse carpal ligament was then divided in its entirety utilizing sharp dissection distally and blunt dissection proximally.   Care was taken to avoid injury to the underlying median nerve, which was noted   to be somewhat attenuated in the carpal tunnel. Epineural fibrotic tissue was   decompressed with blunt dissection. The motor branch of median nerve was   identified and care was taken to avoid any injury to it. Complete decompression  of the median nerve was achieved. The wound was then irrigated with sterile   saline and hemostasis was  maintained with needle-tip electrocautery. 0.25% plain Marcaine was then infiltrated into the wound for postoperative analgesia purposes. The wound was then closed utilizing 4-0 nylon running horizontal mattress sutures and a sterile compression dressing and Ace wrap was then applied. The patient was taken to the Recovery Room in satisfactory condition. There were no complications, blood loss was less than 5 ml,  and the patient tolerated the procedure well.      Abhishek Decker M.D.

## 2017-03-24 NOTE — PLAN OF CARE
Pt resting on stretcher. Pt currently rating pain 1/10 on pain scale and reports it is tolerable. Neurovascular checks intact. VSS. Will cont to monitor. See flow sheet for detailed assessment. Will cont to monitor.

## 2017-03-27 LAB
ANTI-PM/SCL AB: <20 UNITS
ANTI-SS-A 52 KD AB, IGG: 137 UNITS
EJ AB SER QL: NEGATIVE
ENA JO1 AB SER IA-ACNC: <20 UNITS
ENA SM+RNP AB SER IA-ACNC: >150 UNITS
FIBRILLARIN (U3 RNP): NEGATIVE
KU AB SER QL: NEGATIVE
MDA-5 (P140): <20 UNITS
MI2 AB SER QL: NEGATIVE
NXP-2 (P140): <20 UNITS
OJ AB SER QL: NEGATIVE
PL12 AB SER QL: NEGATIVE
PL7 AB SER QL: NEGATIVE
SRP AB SERPL QL: NEGATIVE
TIF1 GAMMA (P155/140): <20 UNITS
U2 SNRNP: ABNORMAL

## 2017-04-07 ENCOUNTER — OFFICE VISIT (OUTPATIENT)
Dept: ORTHOPEDICS | Facility: CLINIC | Age: 52
End: 2017-04-07
Payer: COMMERCIAL

## 2017-04-07 VITALS
SYSTOLIC BLOOD PRESSURE: 107 MMHG | HEART RATE: 115 BPM | BODY MASS INDEX: 28.5 KG/M2 | DIASTOLIC BLOOD PRESSURE: 76 MMHG | HEIGHT: 65 IN | WEIGHT: 171.06 LBS

## 2017-04-07 DIAGNOSIS — Z09 POSTOP CHECK: ICD-10-CM

## 2017-04-07 DIAGNOSIS — Z98.890 STATUS POST CARPAL TUNNEL RELEASE: Primary | ICD-10-CM

## 2017-04-07 PROCEDURE — 99024 POSTOP FOLLOW-UP VISIT: CPT | Mod: S$GLB,,, | Performed by: PHYSICIAN ASSISTANT

## 2017-04-07 PROCEDURE — 99999 PR PBB SHADOW E&M-EST. PATIENT-LVL III: CPT | Mod: PBBFAC,,, | Performed by: PHYSICIAN ASSISTANT

## 2017-04-12 ENCOUNTER — TELEPHONE (OUTPATIENT)
Dept: ORTHOPEDICS | Facility: CLINIC | Age: 52
End: 2017-04-12

## 2017-04-12 NOTE — TELEPHONE ENCOUNTER
----- Message from Leticia Smith sent at 4/11/2017  3:51 PM CDT -----  Contact: Pt  Pt is requesting to speak to the nurse regarding scheduling a surgery date for her right hand. Pt states that she prefers Fridays. Pls call pt back at [phone].

## 2017-04-12 NOTE — TELEPHONE ENCOUNTER
Return call requesting a Friday for surgery, will forward to nursing staff to schedule. Patient vocalized understanding.

## 2017-04-17 ENCOUNTER — TELEPHONE (OUTPATIENT)
Dept: ORTHOPEDICS | Facility: CLINIC | Age: 52
End: 2017-04-17

## 2017-04-17 NOTE — TELEPHONE ENCOUNTER
Calling pt back about scheduling sx date. I offered 5/11 which is a Thursday and pt states she would rather a Friday. The only available Friday we have is 5/26. Nurse visit is scheduled for 5/1 at 10:30am.

## 2017-04-20 NOTE — TELEPHONE ENCOUNTER
Calling pt to touch base with her about sx date. Pt will be scheduled for 5/11. Nurse visit on 5/1. Pt verbalized understanding.

## 2017-04-21 ENCOUNTER — TELEPHONE (OUTPATIENT)
Dept: ORTHOPEDICS | Facility: CLINIC | Age: 52
End: 2017-04-21

## 2017-04-21 NOTE — TELEPHONE ENCOUNTER
----- Message from Leticia Smith sent at 4/21/2017  7:27 AM CDT -----  Contact: Pt  Pt has some questions for the nurse regarding her surgery. Pls call pt back at [phone].

## 2017-04-21 NOTE — TELEPHONE ENCOUNTER
Called pt to inform of hand exercises and hand elevation. Offered pt anti inflammatory. Pt states she is already taking one. Informed pt i would speak with provider and give her a call later.

## 2017-04-21 NOTE — TELEPHONE ENCOUNTER
Returned pt phone call. Pt states that she thought her nurse visit was on 5/1. Pt states she received a call confirming her appointment for 4/24. Informed pt she could have her nurse visit on Monday or on May 1st . Pt states she will do it Monday. Pt also had concerns about fluid on the back of hand she had her CTR. Informed pt i would speak with provider and give her a call back. Pt verified understanding.

## 2017-04-24 ENCOUNTER — LAB VISIT (OUTPATIENT)
Dept: LAB | Facility: HOSPITAL | Age: 52
End: 2017-04-24
Attending: ORTHOPAEDIC SURGERY
Payer: COMMERCIAL

## 2017-04-24 ENCOUNTER — TELEPHONE (OUTPATIENT)
Dept: ORTHOPEDICS | Facility: CLINIC | Age: 52
End: 2017-04-24

## 2017-04-24 DIAGNOSIS — Z01.818 PRE-OP TESTING: Primary | ICD-10-CM

## 2017-04-24 DIAGNOSIS — Z01.818 PRE-OP TESTING: ICD-10-CM

## 2017-04-24 DIAGNOSIS — G56.00 CARPAL TUNNEL SYNDROME, UNSPECIFIED LATERALITY: Primary | ICD-10-CM

## 2017-04-24 LAB
BASOPHILS # BLD AUTO: 0 K/UL
BASOPHILS NFR BLD: 0 %
DIFFERENTIAL METHOD: ABNORMAL
EOSINOPHIL # BLD AUTO: 0 K/UL
EOSINOPHIL NFR BLD: 0.3 %
ERYTHROCYTE [DISTWIDTH] IN BLOOD BY AUTOMATED COUNT: 14.1 %
HCT VFR BLD AUTO: 34.3 %
HGB BLD-MCNC: 10.7 G/DL
LYMPHOCYTES # BLD AUTO: 1.2 K/UL
LYMPHOCYTES NFR BLD: 33.2 %
MCH RBC QN AUTO: 28.8 PG
MCHC RBC AUTO-ENTMCNC: 31.2 %
MCV RBC AUTO: 92 FL
MONOCYTES # BLD AUTO: 0.4 K/UL
MONOCYTES NFR BLD: 11.7 %
NEUTROPHILS # BLD AUTO: 2 K/UL
NEUTROPHILS NFR BLD: 54.8 %
PLATELET # BLD AUTO: 190 K/UL
PMV BLD AUTO: 10.4 FL
RBC # BLD AUTO: 3.72 M/UL
WBC # BLD AUTO: 3.58 K/UL

## 2017-04-24 PROCEDURE — 80048 BASIC METABOLIC PNL TOTAL CA: CPT

## 2017-04-24 PROCEDURE — 36415 COLL VENOUS BLD VENIPUNCTURE: CPT

## 2017-04-24 PROCEDURE — 85025 COMPLETE CBC W/AUTO DIFF WBC: CPT

## 2017-04-24 RX ORDER — METHYLPREDNISOLONE 4 MG/1
TABLET ORAL
Qty: 21 PACKAGE | Refills: 0 | Status: ON HOLD | OUTPATIENT
Start: 2017-04-24 | End: 2017-05-11 | Stop reason: HOSPADM

## 2017-04-24 NOTE — TELEPHONE ENCOUNTER
----- Message from Suzanne Quesada PA-C sent at 4/24/2017 12:24 PM CDT -----  I can prescribe her a medrol dose pack. Also, please make sure she is using ice on her hand.       Suzanne      ----- Message -----     From: Erin Montes MA     Sent: 4/21/2017   2:16 PM       To: Suzanne Quesada PA-C    Pt states she is elevating and taking an anti inflammatory. She also states the hand exercises are not working. pls advise  ----- Message -----     From: Suzanne Quesada PA-C     Sent: 4/21/2017   1:35 PM       To: Erin Montes MA    Is the patient letting her hand hang down all the time?   Have her do squeezing exercises and elevate PRN.     I can prescribe her an anti-inflammatory if necessary. Please ask the patient and I will know whether or not to E-scribe something.    Thanks,    MAGGIE Quesada PA-C      ----- Message -----     From: Erin Montes MA     Sent: 4/21/2017   1:09 PM       To: Suzanne Quesada PA-C    Pt states she has fluid on the back of her hand and she is concerned.There is no leaking, just swelling. pls advise

## 2017-04-24 NOTE — TELEPHONE ENCOUNTER
Called pt to see if she wanted the Medrol pack pt stated she would. Pt states she will put ice on her hand. Verified understanding

## 2017-04-25 LAB
ANION GAP SERPL CALC-SCNC: 13 MMOL/L
ANION GAP SERPL CALC-SCNC: 13 MMOL/L
BUN SERPL-MCNC: 12 MG/DL
BUN SERPL-MCNC: 12 MG/DL
CALCIUM SERPL-MCNC: 9.6 MG/DL
CALCIUM SERPL-MCNC: 9.6 MG/DL
CHLORIDE SERPL-SCNC: 105 MMOL/L
CHLORIDE SERPL-SCNC: 105 MMOL/L
CO2 SERPL-SCNC: 25 MMOL/L
CO2 SERPL-SCNC: 25 MMOL/L
CREAT SERPL-MCNC: 0.8 MG/DL
CREAT SERPL-MCNC: 0.8 MG/DL
EST. GFR  (AFRICAN AMERICAN): >60 ML/MIN/1.73 M^2
EST. GFR  (AFRICAN AMERICAN): >60 ML/MIN/1.73 M^2
EST. GFR  (NON AFRICAN AMERICAN): >60 ML/MIN/1.73 M^2
EST. GFR  (NON AFRICAN AMERICAN): >60 ML/MIN/1.73 M^2
GLUCOSE SERPL-MCNC: 67 MG/DL
GLUCOSE SERPL-MCNC: 67 MG/DL
POTASSIUM SERPL-SCNC: 3.4 MMOL/L
POTASSIUM SERPL-SCNC: 3.4 MMOL/L
SODIUM SERPL-SCNC: 143 MMOL/L
SODIUM SERPL-SCNC: 143 MMOL/L

## 2017-05-02 ENCOUNTER — TELEPHONE (OUTPATIENT)
Dept: RHEUMATOLOGY | Facility: CLINIC | Age: 52
End: 2017-05-02

## 2017-05-02 RX ORDER — SODIUM CHLORIDE 0.9 % (FLUSH) 0.9 %
10 SYRINGE (ML) INJECTION
Status: CANCELLED | OUTPATIENT
Start: 2017-05-02

## 2017-05-02 RX ORDER — METHYLPREDNISOLONE SODIUM SUCCINATE 1 G/16ML
250 INJECTION INTRAMUSCULAR; INTRAVENOUS ONCE
Status: CANCELLED | OUTPATIENT
Start: 2017-05-02 | End: 2017-05-02

## 2017-05-02 NOTE — TELEPHONE ENCOUNTER
Dr CHUNG, pt called and wanted to know if she should get IV solumedrol push before her carpel tunnel surgery. I ask her if she thought she was in a lupus flair and she stated no, no problems. Her only complaint was being very tired. I told her I'd run it by you but I felt pretty sure she didn't need one.

## 2017-05-02 NOTE — TELEPHONE ENCOUNTER
----- Message from Rehana Gan sent at 5/1/2017  4:54 PM CDT -----  Contact: Patient  Patient called to speak with the nurse; she needs Dr. Turner's expertise on something she had a question about. She can be contacted at [phone].    Thanks,  Rehana

## 2017-05-02 NOTE — TELEPHONE ENCOUNTER
She has a special circumstance. Her lupus is serologically very active, but she is not taking stronger medications or higher dose of imuran in fear of side effects and tolerability.   In this scenario, it is ok to get one dose of IV solumedrol for active lupus. I have placed the infusion order for 250 mg IV solumedrol . Try to get this infusion done within the next 3 days ( before 05/04/2017). Stop Imuran starting today and restart a week after surgery. Continue daily 5 mg prednisone dose throughout the perioperative period without any change. Thanks.

## 2017-05-04 ENCOUNTER — INFUSION (OUTPATIENT)
Dept: RHEUMATOLOGY | Facility: HOSPITAL | Age: 52
End: 2017-05-04
Attending: INTERNAL MEDICINE
Payer: COMMERCIAL

## 2017-05-04 VITALS
HEART RATE: 90 BPM | TEMPERATURE: 98 F | BODY MASS INDEX: 29.17 KG/M2 | SYSTOLIC BLOOD PRESSURE: 128 MMHG | RESPIRATION RATE: 20 BRPM | DIASTOLIC BLOOD PRESSURE: 81 MMHG | WEIGHT: 175.25 LBS

## 2017-05-04 DIAGNOSIS — G05.3 CNS LUPUS: Primary | ICD-10-CM

## 2017-05-04 DIAGNOSIS — M32.19 CNS LUPUS: Primary | ICD-10-CM

## 2017-05-04 PROCEDURE — 96365 THER/PROPH/DIAG IV INF INIT: CPT | Mod: PO

## 2017-05-04 PROCEDURE — 63600175 PHARM REV CODE 636 W HCPCS: Mod: PO | Performed by: INTERNAL MEDICINE

## 2017-05-04 PROCEDURE — 25000003 PHARM REV CODE 250: Mod: PO | Performed by: INTERNAL MEDICINE

## 2017-05-04 RX ORDER — METHYLPREDNISOLONE SODIUM SUCCINATE 1 G/16ML
250 INJECTION INTRAMUSCULAR; INTRAVENOUS ONCE
Status: CANCELLED | OUTPATIENT
Start: 2017-05-04 | End: 2017-05-04

## 2017-05-04 RX ORDER — SODIUM CHLORIDE 0.9 % (FLUSH) 0.9 %
10 SYRINGE (ML) INJECTION
Status: CANCELLED | OUTPATIENT
Start: 2017-05-04

## 2017-05-04 RX ORDER — SODIUM CHLORIDE 0.9 % (FLUSH) 0.9 %
10 SYRINGE (ML) INJECTION
Status: DISCONTINUED | OUTPATIENT
Start: 2017-05-04 | End: 2017-05-04 | Stop reason: HOSPADM

## 2017-05-04 RX ADMIN — SODIUM CHLORIDE: 9 INJECTION, SOLUTION INTRAVENOUS at 10:05

## 2017-05-04 RX ADMIN — Medication 10 ML: at 10:05

## 2017-05-04 NOTE — PROGRESS NOTES
Pulse 1 of 1  Solumedrol 250 mg administered IV over 30 min. Per orders. See MAR and vitals for further details. (Started at 1025 and ended at 1055)  Pt tolerated well without adverse events.   Discharged ambulatory from clinic.    NOTE: reviewed with pt Dr. Turner's instructions to hold Imuran and restart 1 week after surgery.               Decrease Prednisone to 5 mg daily starting today.              Pt verbalized understanding.

## 2017-05-04 NOTE — MR AVS SNAPSHOT
[facility]  [address]  [place] [address]  Phone: [phone]  Fax: [phone]                  Ruth Messina   2017 10:00 AM   Infusion    Description:  Female : 1965   Provider:  RHEUMATOLOGY, INFUSION   Department:  [facility]           Diagnoses this Visit        Comments    CNS lupus    -  Primary            To Do List           Future Appointments        Provider Department Dept Phone    2017 9:15 AM NATTY Pruitt - Orthopedics [phone]    2017 10:20 AM Tameka Randhawa NP [facility] Dermatology [phone]    2017 8:40 AM LABORATORY, [facility] [phone]    2017 9:00 AM Chris Montanez Jr., MD [facility] - Hemotology Oncology [phone]    2017 11:00 AM Chirag Turner MD [facility] - Rheumatology [phone]      Your Future Surgeries/Procedures     May 11, 2017   Surgery with Abhishek Decker MD   [facility])    [id number] [place] [id number] [phone]              Goals (5 Years of Data)     None      [facility]     [facility] Nurse Care Line -  Assistance  Unless otherwise directed by your provider, please contact [facility], our nurse care line that is available for / assistance.     Registered nurses in the [facility] provide: appointment scheduling, clinical advisement, health education, and other advisory services.  Call: [phone] (toll free)               Medications           Message regarding Medications     Verify the changes and/or additions to your medication regime listed below are the same as discussed with your clinician today.  If any of these changes or additions are incorrect, please notify your healthcare provider.        These medications were administered today        Dose Freq    sodium chloride 0.9% 100 mL with  methylPREDNISolone sodium succinate (SOLU-MEDROL) 250 mg  Once    Sig: Inject into the vein once.    Class: Normal    Route: Intravenous    Non-formulary Exception Code: Specific indication for non-formulary alternative    sodium chloride 0.9% flush 10 mL 10 mL As needed (PRN)    Sig: Inject 10 mLs into the vein as needed for Line Care.    Class: Normal    Route: Intravenous    Non-formulary Exception Code: Specific indication for non-formulary alternative           Verify that the below list of medications is an accurate representation of the medications you are currently taking.  If none reported, the list may be blank. If incorrect, please contact your healthcare provider. Carry this list with you in case of emergency.           Current Medications     amlodipine (NORVASC) 10 MG tablet Take 10 mg by mouth once daily.    CALCIUM-MAGNESIUM-ZINC ORAL Take by mouth once daily.    cholecalciferol, vitamin D3, (VITAMIN D3) 5,000 unit Tab Take 5,000 Units by mouth once daily.    ferrous gluconate (FERGON) 324 MG tablet Take 324 mg by mouth daily with breakfast.    fish oil-omega-3 fatty acids 300-1,000 mg capsule Take 2 g by mouth once daily.    fluocinolone (DERMA-SMOOTHE) 0.01 % external oil Apply oil to scalp twice a day    hydrocodone-acetaminophen 10-325mg (NORCO)  mg Tab 1-2 tablets by mouth every 4-8 hours as necessary for pain    losartan (COZAAR) 100 MG tablet Take 100 mg by mouth once daily.    methylPREDNISolone (MEDROL DOSEPACK) 4 mg tablet use as directed    multivitamin capsule Take 1 capsule by mouth once daily.    predniSONE (DELTASONE) 5 MG tablet Take 1 tablet (5 mg total) by mouth once daily.           Clinical Reference Information           Your Vitals Were     BP Pulse Temp Resp Weight BMI    128/81 90 98 °F (36.7 °C) 20 79.5 kg (175 lb 4.3 oz) 29.17 kg/m2      Allergies as of 5/4/2017     Cellcept [Mycophenolate Mofetil]    Ace Inhibitors    Amoxicillin    Aspirin    Beta-blockers  (Beta-adrenergic Blocking Agts)    Cephalosporins    Clindamycin    Hydrochlorothiazide    Ibuprofen    Lasix [Furosemide]    Lisinopril    Methotrexate Analogues    Nsaids (Non-steroidal Anti-inflammatory Drug)    Pcn [Penicillins]    Plaquenil [Hydroxychloroquine]    Shellfish Containing Products    Zofran [Ondansetron Hcl (Pf)]    Sulfa (Sulfonamide Antibiotics)      Immunizations Administered on Date of Encounter - 5/4/2017     None      Administrations This Visit     sodium chloride 0.9% 100 mL with methylPREDNISolone sodium succinate (SOLU-MEDROL) 250 mg     Admin Date Action Dose Route Administered By             05/04/2017 Given   Intravenous Mik Lange RN                    sodium chloride 0.9% flush 10 mL     Admin Date Action Dose Route Administered By             05/04/2017 Given 10 mL Intravenous Mik Lange RN                      Language Assistance Services     ATTENTION: Language assistance services are available, free of charge. Please call [phone].      ATENCIÓN: Si habla hesham, tiene a sanderson disposición servicios gratuitos de asistencia lingüística. Llame al [phone].     CHÚ Ý: N?u b?n nói Ti?ng Vi?t, có các d?ch v? h? tr? ngôn ng? mi?n phí dành cho b?n. G?i s? [phone].         [facility] complies with applicable Federal civil rights laws and does not discriminate on the basis of race, color, national origin, age, disability, or sex.

## 2017-05-08 ENCOUNTER — OFFICE VISIT (OUTPATIENT)
Dept: ORTHOPEDICS | Facility: CLINIC | Age: 52
End: 2017-05-08
Payer: COMMERCIAL

## 2017-05-08 VITALS
BODY MASS INDEX: 29.2 KG/M2 | HEIGHT: 65 IN | SYSTOLIC BLOOD PRESSURE: 134 MMHG | WEIGHT: 175.25 LBS | DIASTOLIC BLOOD PRESSURE: 89 MMHG | HEART RATE: 98 BPM

## 2017-05-08 DIAGNOSIS — Z98.890 STATUS POST CARPAL TUNNEL RELEASE: Primary | ICD-10-CM

## 2017-05-08 DIAGNOSIS — Z09 POSTOP CHECK: ICD-10-CM

## 2017-05-08 PROCEDURE — 99024 POSTOP FOLLOW-UP VISIT: CPT | Mod: S$GLB,,, | Performed by: PHYSICIAN ASSISTANT

## 2017-05-08 PROCEDURE — 99999 PR PBB SHADOW E&M-EST. PATIENT-LVL III: CPT | Mod: PBBFAC,,, | Performed by: PHYSICIAN ASSISTANT

## 2017-05-08 NOTE — PROGRESS NOTES
Subjective:      Patient ID: Ruth Messina is a 51 y.o. female.    Chief Complaint: Pain of the Left Wrist    HPI  The patient is seen today status post left carpal tunnel release performed on March 24. She does not report any increased pain, swelling, incision drainage or numbness and tingling.   She has been doing at home exercises and massage.     Review of Systems   Constitution: Negative for chills, fever and night sweats.   Respiratory: Negative for cough, shortness of breath and wheezing.    Gastrointestinal: Negative for diarrhea, nausea and vomiting.   Neurological: Negative for brief paralysis.   Psychiatric/Behavioral: Negative for altered mental status.         Objective:            General    Constitutional: She is oriented to person, place, and time. She appears well-developed and well-nourished.   Neck: Normal range of motion.   Cardiovascular: Normal rate and regular rhythm.    Pulmonary/Chest: Effort normal.   Abdominal: Soft.   Neurological: She is alert and oriented to person, place, and time.   Psychiatric: She has a normal mood and affect. Her behavior is normal.         Left Hand/Wrist Exam     Inspection   Scars: Hand -  present    Range of Motion     Wrist   Extension: normal   Flexion: normal   Pronation: normal   Supination: normal     Comments:  Incision has healed very well.     It remains clean, dry and intact.           Muscle Strength   Left Upper Extremity  :  4/5/5                     Assessment:       Encounter Diagnoses   Name Primary?    Status post carpal tunnel release Yes    Postop check           Plan:       Ruth was seen today for pain.    Diagnoses and all orders for this visit:    Status post carpal tunnel release    Postop check      The patient is doing well following a left carpal tunnel release. She can continue with desensitizing massage. Should she have any questions or concerns, she will return PRN.     The patient is scheduled for a right CTR on 5/11.            Suzanne Quesada PA-C

## 2017-05-09 NOTE — PRE ADMISSION SCREENING
Pre op instructions reviewed with patient per phone:    To confirm, Your surgeon has instructed you:  Surgery is scheduled 05/11/17 at 0700.      Please report to [facility] 1st floor main lobby by 0530  Pre admit office to call afternoon prior to surgery with final arrival time.      INSTRUCTIONS IMPORTANT!!!  ¨ Do not eat, drink, or smoke after 12 midnight-including water. OK to brush teeth, no gum, candy or mints!    ¨ Take only these medicines with a small swallow of water-morning of surgery.  Amlodipine, Losartan, Prednisone    REMOVE ALL ARTIFICIAL NAILS/POLISH PRIOR TO SURGERY        ____  Do not wear makeup, including mascara.  ____  No powder, lotions or creams to surgical area.  ____  Please remove all jewelry, including piercings and leave at home.  ____  No money or valuables needed. Please leave at home.  ____  Please bring identification and insurance information to hospital.  ____  If going home the same day, arrange for a ride home. You will not be able to   drive if Anesthesia was used.  ____  Children, under 12 years old, must remain in the waiting room with an adult.  They are not allowed in patient areas.  ____  Wear loose fitting clothing. Allow for dressings, bandages.  ____  Stop Aspirin, Ibuprofen, Motrin and Aleve at least 5-7 days before surgery, unless otherwise instructed by your doctor, or the nurse.   You MAY use Tylenol/acetaminophen until day of surgery.  ____  If you take diabetic medication, do not take am of surgery unless instructed by   Doctor.  ____ Stop taking any Fish Oil supplement or any Vitamins that contain Vitamin E at least 5 days prior to surgery.          Bathing Instructions-- The night before surgery and the morning prior to coming to the hospital:   -Do not shave the surgical area.   -Shower and wash your hair and body as usual with anti-bacterial  soap and shampoo.   -Rinse your hair and body completely.   -Use one packet of hibiclens to  wash the surgical site (using your hand) gently for 5 minutes.  Do not scrub you skin too hard.   -Do not use hibiclens on your head, face, or genitals.   -Do not wash with anti-bacterial soap after you use the hibiclens.   -Rinse your body thoroughly.   -Dry with clean, soft towel.  Do not use lotion, cream, deodorant, or powders on   the surgical site.    Use antibacterial soap in place of hibiclens if your surgery is on the head, face or genitals.         Surgical Site Infection    Prevention of surgical site infections:     -Keep incisions clean and dry.   -Do not soak/submerge incisions in water until completely healed.   -Do not apply lotions, powders, creams, or deodorants to site.   -Always make sure hands are cleaned with antibacterial soap/ alcohol-based   prior to touching the surgical site.  (This includes doctors, nurses, staff, and yourself.)    Signs and symptoms:   -Redness and pain around the area where you had surgery   -Drainage of cloudy fluid from your surgical wound   -Fever over 100.4  I have read or had read and explained to me, and understand the above information.

## 2017-05-10 ENCOUNTER — ANESTHESIA EVENT (OUTPATIENT)
Dept: SURGERY | Facility: HOSPITAL | Age: 52
End: 2017-05-10
Payer: COMMERCIAL

## 2017-05-11 ENCOUNTER — HOSPITAL ENCOUNTER (OUTPATIENT)
Facility: HOSPITAL | Age: 52
Discharge: HOME | End: 2017-05-11
Attending: ORTHOPAEDIC SURGERY | Admitting: ORTHOPAEDIC SURGERY
Payer: COMMERCIAL

## 2017-05-11 ENCOUNTER — ANESTHESIA (OUTPATIENT)
Dept: SURGERY | Facility: HOSPITAL | Age: 52
End: 2017-05-11
Payer: COMMERCIAL

## 2017-05-11 ENCOUNTER — SURGERY (OUTPATIENT)
Age: 52
End: 2017-05-11

## 2017-05-11 VITALS
DIASTOLIC BLOOD PRESSURE: 74 MMHG | SYSTOLIC BLOOD PRESSURE: 128 MMHG | HEIGHT: 66 IN | HEART RATE: 79 BPM | WEIGHT: 171.75 LBS | OXYGEN SATURATION: 99 % | TEMPERATURE: 98 F | BODY MASS INDEX: 27.6 KG/M2 | RESPIRATION RATE: 20 BRPM

## 2017-05-11 DIAGNOSIS — G56.01 RIGHT CARPAL TUNNEL SYNDROME: ICD-10-CM

## 2017-05-11 DIAGNOSIS — G56.01 CARPAL TUNNEL SYNDROME, RIGHT: Primary | ICD-10-CM

## 2017-05-11 PROCEDURE — 63600175 PHARM REV CODE 636 W HCPCS: Performed by: NURSE ANESTHETIST, CERTIFIED REGISTERED

## 2017-05-11 PROCEDURE — 25000003 PHARM REV CODE 250: Performed by: PHYSICIAN ASSISTANT

## 2017-05-11 PROCEDURE — 25000003 PHARM REV CODE 250: Performed by: ORTHOPAEDIC SURGERY

## 2017-05-11 PROCEDURE — 25000003 PHARM REV CODE 250: Performed by: NURSE ANESTHETIST, CERTIFIED REGISTERED

## 2017-05-11 PROCEDURE — 25000003 PHARM REV CODE 250: Performed by: ANESTHESIOLOGY

## 2017-05-11 PROCEDURE — 63600175 PHARM REV CODE 636 W HCPCS: Performed by: PHYSICIAN ASSISTANT

## 2017-05-11 PROCEDURE — 37000008 HC ANESTHESIA 1ST 15 MINUTES: Performed by: ORTHOPAEDIC SURGERY

## 2017-05-11 PROCEDURE — 64721 CARPAL TUNNEL SURGERY: CPT | Mod: 79,RT,, | Performed by: ORTHOPAEDIC SURGERY

## 2017-05-11 PROCEDURE — 71000033 HC RECOVERY, INTIAL HOUR: Performed by: ORTHOPAEDIC SURGERY

## 2017-05-11 PROCEDURE — 36000707: Performed by: ORTHOPAEDIC SURGERY

## 2017-05-11 PROCEDURE — 36000706: Performed by: ORTHOPAEDIC SURGERY

## 2017-05-11 PROCEDURE — 71000015 HC POSTOP RECOV 1ST HR: Performed by: ORTHOPAEDIC SURGERY

## 2017-05-11 PROCEDURE — 37000009 HC ANESTHESIA EA ADD 15 MINS: Performed by: ORTHOPAEDIC SURGERY

## 2017-05-11 RX ORDER — FENTANYL CITRATE 50 UG/ML
INJECTION, SOLUTION INTRAMUSCULAR; INTRAVENOUS
Status: DISCONTINUED | OUTPATIENT
Start: 2017-05-11 | End: 2017-05-11

## 2017-05-11 RX ORDER — LIDOCAINE HYDROCHLORIDE 10 MG/ML
INJECTION INFILTRATION; PERINEURAL
Status: DISCONTINUED | OUTPATIENT
Start: 2017-05-11 | End: 2017-05-11

## 2017-05-11 RX ORDER — OXYCODONE AND ACETAMINOPHEN 5; 325 MG/1; MG/1
1 TABLET ORAL
Status: DISCONTINUED | OUTPATIENT
Start: 2017-05-11 | End: 2017-05-11 | Stop reason: HOSPADM

## 2017-05-11 RX ORDER — MIDAZOLAM HYDROCHLORIDE 1 MG/ML
INJECTION, SOLUTION INTRAMUSCULAR; INTRAVENOUS
Status: DISCONTINUED | OUTPATIENT
Start: 2017-05-11 | End: 2017-05-11

## 2017-05-11 RX ORDER — SODIUM CHLORIDE, SODIUM LACTATE, POTASSIUM CHLORIDE, CALCIUM CHLORIDE 600; 310; 30; 20 MG/100ML; MG/100ML; MG/100ML; MG/100ML
125 INJECTION, SOLUTION INTRAVENOUS CONTINUOUS
Status: DISCONTINUED | OUTPATIENT
Start: 2017-05-11 | End: 2017-05-11 | Stop reason: HOSPADM

## 2017-05-11 RX ORDER — HYDROCODONE BITARTRATE AND ACETAMINOPHEN 10; 325 MG/1; MG/1
TABLET ORAL
Qty: 40 TABLET | Refills: 0 | Status: SHIPPED | OUTPATIENT
Start: 2017-05-11 | End: 2017-07-13

## 2017-05-11 RX ORDER — MEPERIDINE HYDROCHLORIDE 50 MG/ML
12.5 INJECTION INTRAMUSCULAR; INTRAVENOUS; SUBCUTANEOUS ONCE AS NEEDED
Status: DISCONTINUED | OUTPATIENT
Start: 2017-05-11 | End: 2017-05-11 | Stop reason: HOSPADM

## 2017-05-11 RX ORDER — MORPHINE SULFATE 10 MG/ML
2 INJECTION INTRAMUSCULAR; INTRAVENOUS; SUBCUTANEOUS EVERY 5 MIN PRN
Status: DISCONTINUED | OUTPATIENT
Start: 2017-05-11 | End: 2017-05-11 | Stop reason: HOSPADM

## 2017-05-11 RX ORDER — SODIUM CHLORIDE 9 MG/ML
3 INJECTION, SOLUTION INTRAMUSCULAR; INTRAVENOUS; SUBCUTANEOUS
Status: DISCONTINUED | OUTPATIENT
Start: 2017-05-11 | End: 2017-05-11 | Stop reason: HOSPADM

## 2017-05-11 RX ORDER — BUPIVACAINE HYDROCHLORIDE 2.5 MG/ML
INJECTION, SOLUTION EPIDURAL; INFILTRATION; INTRACAUDAL
Status: DISCONTINUED | OUTPATIENT
Start: 2017-05-11 | End: 2017-05-11 | Stop reason: HOSPADM

## 2017-05-11 RX ORDER — PROPOFOL 10 MG/ML
VIAL (ML) INTRAVENOUS
Status: DISCONTINUED | OUTPATIENT
Start: 2017-05-11 | End: 2017-05-11

## 2017-05-11 RX ORDER — SODIUM CHLORIDE 9 MG/ML
INJECTION, SOLUTION INTRAVENOUS CONTINUOUS
Status: DISCONTINUED | OUTPATIENT
Start: 2017-05-12 | End: 2017-05-11 | Stop reason: HOSPADM

## 2017-05-11 RX ORDER — SODIUM CHLORIDE, SODIUM LACTATE, POTASSIUM CHLORIDE, CALCIUM CHLORIDE 600; 310; 30; 20 MG/100ML; MG/100ML; MG/100ML; MG/100ML
INJECTION, SOLUTION INTRAVENOUS CONTINUOUS PRN
Status: DISCONTINUED | OUTPATIENT
Start: 2017-05-11 | End: 2017-05-11

## 2017-05-11 RX ORDER — LIDOCAINE HYDROCHLORIDE 10 MG/ML
1 INJECTION, SOLUTION EPIDURAL; INFILTRATION; INTRACAUDAL; PERINEURAL ONCE
Status: DISCONTINUED | OUTPATIENT
Start: 2017-05-11 | End: 2017-05-11 | Stop reason: HOSPADM

## 2017-05-11 RX ADMIN — SODIUM CHLORIDE, SODIUM LACTATE, POTASSIUM CHLORIDE, AND CALCIUM CHLORIDE: 600; 310; 30; 20 INJECTION, SOLUTION INTRAVENOUS at 07:05

## 2017-05-11 RX ADMIN — OXYCODONE HYDROCHLORIDE AND ACETAMINOPHEN 1 TABLET: 5; 325 TABLET ORAL at 08:05

## 2017-05-11 RX ADMIN — FENTANYL CITRATE 25 MCG: 50 INJECTION, SOLUTION INTRAMUSCULAR; INTRAVENOUS at 07:05

## 2017-05-11 RX ADMIN — LIDOCAINE HYDROCHLORIDE 40 MG: 10 INJECTION, SOLUTION INFILTRATION; PERINEURAL at 07:05

## 2017-05-11 RX ADMIN — MIDAZOLAM HYDROCHLORIDE 2 MG: 1 INJECTION, SOLUTION INTRAMUSCULAR; INTRAVENOUS at 07:05

## 2017-05-11 RX ADMIN — PROPOFOL 150 MG: 10 INJECTION, EMULSION INTRAVENOUS at 07:05

## 2017-05-11 RX ADMIN — BUPIVACAINE HYDROCHLORIDE 10 ML: 2.5 INJECTION, SOLUTION EPIDURAL; INFILTRATION; INTRACAUDAL; PERINEURAL at 07:05

## 2017-05-11 RX ADMIN — VANCOMYCIN HYDROCHLORIDE 1000 MG: 1 INJECTION, POWDER, LYOPHILIZED, FOR SOLUTION INTRAVENOUS at 06:05

## 2017-05-11 NOTE — IP AVS SNAPSHOT
[address] [place] [address]           Patient Discharge Instructions   Our goal is to set you up for success. This packet includes information on your condition, medications, and your home care.  It will help you care for yourself to prevent having to return to the hospital.     Please ask your nurse if you have any questions.      There are many details to remember when preparing to leave the hospital. Here is what you will need to do:    1. Take your medicine. If you are prescribed medications, review your Medication List on the following pages. You may have new medications to  at the pharmacy and others that you'll need to stop taking. Review the instructions for how and when to take your medications. Talk with your doctor or nurses if you are unsure of what to do.     2. Go to your follow-up appointments. Specific follow-up information is listed in the following pages. Your may be contacted by a nurse or clinical provider about future appointments. Be sure we have all of the phone numbers to reach you. Please contact your provider's office if you are unable to make an appointment.     3. Watch for warning signs. Your doctor or nurse will give you detailed warning signs to watch for and when to call for assistance. These instructions may also include educational information about your condition. If you experience any of warning signs to your health, call your doctor.               ** Verify the list of medication(s) below is accurate and up to date. Carry this with you in case of emergency. If your medications have changed, please notify your healthcare provider.             Medication List      CHANGE how you take these medications        Additional Info                      hydrocodone-acetaminophen 10-325mg  mg Tab   Commonly known as:  NORCO   Quantity:  40 tablet   Refills:  0   What changed:  additional instructions    Instructions:  Take 1-2  tablets every 3-8 hrs as needed for pain.     Begin Date    AM    Noon    PM    Bedtime         CONTINUE taking these medications        Additional Info                      amlodipine 10 MG tablet   Commonly known as:  NORVASC   Refills:  0   Dose:  10 mg    Instructions:  Take 10 mg by mouth once daily.     Begin Date    AM    Noon    PM    Bedtime       CALCIUM-MAGNESIUM-ZINC ORAL   Refills:  0    Instructions:  Take by mouth once daily.     Begin Date    AM    Noon    PM    Bedtime       ferrous gluconate 324 MG tablet   Commonly known as:  FERGON   Refills:  0   Dose:  324 mg    Instructions:  Take 324 mg by mouth daily with breakfast.     Begin Date    AM    Noon    PM    Bedtime       fish oil-omega-3 fatty acids 300-1,000 mg capsule   Refills:  0   Dose:  2 g    Instructions:  Take 2 g by mouth once daily.     Begin Date    AM    Noon    PM    Bedtime       fluocinolone 0.01 % external oil   Commonly known as:  DERMA-SMOOTHE   Quantity:  1 Bottle   Refills:  5    Instructions:  Apply oil to scalp twice a day     Begin Date    AM    Noon    PM    Bedtime       losartan 100 MG tablet   Commonly known as:  COZAAR   Refills:  0   Dose:  100 mg    Instructions:  Take 100 mg by mouth once daily.     Begin Date    AM    Noon    PM    Bedtime       multivitamin capsule   Refills:  0   Dose:  1 capsule    Instructions:  Take 1 capsule by mouth once daily.     Begin Date    AM    Noon    PM    Bedtime       predniSONE 5 MG tablet   Commonly known as:  DELTASONE   Quantity:  30 tablet   Refills:  3   Dose:  5 mg    Instructions:  Take 1 tablet (5 mg total) by mouth once daily.     Begin Date    AM    Noon    PM    Bedtime       VITAMIN D3 5,000 unit Tab   Refills:  0   Dose:  5000 Units   Generic drug:  cholecalciferol (vitamin D3)    Instructions:  Take 5,000 Units by mouth once daily.     Begin Date    AM    Noon    PM    Bedtime         STOP taking these medications     methylPREDNISolone 4 mg tablet   Commonly  known as:  MEDROL DOSEPACK            Where to Get Your Medications      You can get these medications from any pharmacy     Bring a paper prescription for each of these medications     hydrocodone-acetaminophen 10-325mg  mg Tab                  Please bring to all follow up appointments:    1. A copy of your discharge instructions.  2. All medicines you are currently taking in their original bottles.  3. Identification and insurance card.    Please arrive 15 minutes ahead of scheduled appointment time.    Please call 24 hours in advance if you must reschedule your appointment and/or time.        Your Scheduled Appointments     May 17, 2017 10:20 AM CDT   Established Patient Visit with Tameka Randhawa NP   [facility] - Dermatology ([facility])    [address] [address]   [phone]            May 18, 2017  8:40 AM CDT   Non-Fasting Lab with [facility], [facility]    [address] [facility]  [place] [address] [phone]            May 18, 2017  9:00 AM CDT   Established Patient Visit with Chris Montanez Jr., MD   [facility] - Hemotology Oncology ([facility])    [address] [facility]  [place] [address]   [phone]            May 22, 2017 10:00 AM CDT   Established Patient Visit with NATTY Pruittal - Orthopedics ([facility])    [address] [place] [address] [address]  [facility] [id number] [phone]            Jun 01, 2017 11:00 AM CDT   Established Patient Visit with Chirag Turner MD   [facility] - Rheumatology ([facility])    [address] [facility] [address] [phone]              Follow-up Information     Follow up with Germania Quesada PA-C. Schedule an appointment as soon as possible for a visit on 5/22/2017.    Specialty:  Orthopedic Surgery    Why:  For suture removal, For wound re-check    Contact information:    [address] [place] [address] [address] [place] [address]  [phone]          Discharge  Instructions     Future Orders    Activity as tolerated     Call MD for:     Comments:    Temperature greater than 102 degrees, pain out of control while taking medications, or excessive wound drainage    Diet general     Questions:    Total calories:      Fat restriction, if any:      Protein restriction, if any:      Na restriction, if any:      Fluid restriction:      Additional restrictions:      Leave dressing on - Keep it clean, dry, and intact until clinic visit     No driving, operating heavy equipment or signing legal documents while taking pain medication         Discharge Instructions         Discharge Instructions for Carpal Tunnel Release  You had a carpal tunnel release procedure to help relieve the symptoms of carpal tunnel syndrome. In carpal tunnel syndrome, a nerve in the wrist is compressed and irritated. This causes numbness and pain in the fingers and hand. Carpal tunnel release relieves the compression of the nerve. Here are instructions that will help you care for your arm and wrist when you are at home.  Home care  · Avoid gripping objects tightly or lifting with your affected arm.  · Wear your bandage, splint, or cast as directed by your doctor.  · Always keep the dressing, splint, or cast dry and clean.  · When showering, cover your hand and  wrist with plastic and use tape or rubberbands to keep the dressing, splint, or cast dry. Shower as necessary.    · Use an ice pack or bag of frozen peas -- or something similar -- wrapped in a thin towel on your wrist to reduce swelling for the first 48 hours. Leave the ice pack on for 20 minutes; then take it off for 20 minutes. Repeat as needed.  · Keep your arm elevated above your heart for 24 to 48 hours after surgery.  · Do the exercises you learned in the hospital, or as instructed by your doctor.  · Take pain medicine as directed.  · Dont drive until your doctor says its OK. Never drive while you are taking opioid pain medicine.  · Ask your  doctor when you can return to work. If your job requires heavy lifting, you may not be able to begin working again for several weeks.  Follow-up care  Make a follow-up appointment as directed by your doctor.     When to seek medical care  Call 911 right away if you have any of the following:  · Chest pain  · Shortness of breath  Otherwise, call your doctor immediately if you have any of the following:  · A splint, cast, or dressing that has gotten wet  · Increased bleeding or drainage from the incision (cut)  · Opening of the incision  · Fever above 100.4°F (38.9°C) taken by mouth, or shaking chills  · Any new numbness in the fingers or thumb  · Blue hand or fingers  · Increased pain with or without activity  · Increased redness, tenderness, or swelling of the incision   Date Last Reviewed: 11/15/2015  © 4394-9859 [employer]. [address] [place] [address]. All rights reserved. This information is not intended as a substitute for professional medical care. Always follow your healthcare professional's instructions.        General Information:    1.  Do not drink alcoholic beverages including beer for 24 hours or as long as you are on pain medication..  2.  Do not drive a motor vehicle, operate machinery or power tools, or signs legal papers for 24 hours or as long as you are on pain medication.   3.  You may experience light-headedness, dizziness, and sleepiness following surgery. Please do not stay alone. A responsible adult should be with you for this 24 hour period.  4.  Go home and rest.  5. Progress slowly to a normal diet unless instructed.  Otherwise, begin with liquids such as soft drinks, then soup and crackers working up to solid foods. Drink plenty of nonalcoholic fluids.  6.  Certain anesthetics and pain medications produce nausea and vomiting in certain       individuals. If nausea becomes a problem at home, call you doctor.  7. A nurse will be calling you sometime after  surgery. Do not be alarmed. This is our way of finding out how you are doing.  8. Several times every hour while you are awake, take 2-3 deep breaths and cough. If you had stomach surgery hold a pillow or rolled towel firmly against your stomach before you cough. This will help with any pain the cough might cause.  9. Several times every hour while you are awake, pump and flex your feet 5-6 times and do foot circles. This will help prevent blood clots.  10.Call your doctor for severe pain, bleeding, fever, or signs or symptoms of infection (pain, swelling, redness, foul odor, drainage).  11.You can contact your doctor anytime by [phone] for the [facility] (at [facility]) or [phone] for the [facility] on [facility].   [email or web address] is another way to contact your doctor if you are an active participant online with [facility].        Acetaminophen; Hydrocodone tablets or capsules  What is this medicine?  ACETAMINOPHEN; HYDROCODONE (a set a TAHIRA abilio fen; carolin droe KOE done) is a pain reliever. It is used to treat moderate to severe pain.  How should I use this medicine?  Take this medicine by mouth with a glass of water. Follow the directions on the prescription label. You can take it with or without food. If it upsets your stomach, take it with food. Do not take your medicine more often than directed.  A special MedGuide will be given to you by the pharmacist with each prescription and refill. Be sure to read this information carefully each time.  Talk to your pediatrician regarding the use of this medicine in children. Special care may be needed.  What side effects may I notice from receiving this medicine?  Side effects that you should report to your doctor or health care professional as soon as possible:  · allergic reactions like skin rash, itching or hives, swelling of the face, lips, or tongue  · breathing problems  · confusion  · redness, blistering, peeling or loosening of the  skin, including inside the mouth  · signs and symptoms of low blood pressure like dizziness; feeling faint or lightheaded, falls; unusually weak or tired  · trouble passing urine or change in the amount of urine  · yellowing of the eyes or skin  Side effects that usually do not require medical attention (report to your doctor or health care professional if they continue or are bothersome):  · constipation  · dry mouth  · nausea, vomiting  · tiredness  What may interact with this medicine?  This medicine may interact with the following medications:  · alcohol  · antiviral medicines for HIV or AIDS  · atropine  · antihistamines for allergy, cough and cold  · certain antibiotics like erythromycin, clarithromycin  · certain medicines for anxiety or sleep  · certain medicines for bladder problems like oxybutynin, tolterodine  · certain medicines for depression like amitriptyline, fluoxetine, sertraline  · certain medicines for fungal infections like ketoconazole and itraconazole  · certain medicines for Parkinson's disease like benztropine, trihexyphenidyl  · certain medicines for seizures like carbamazepine, phenobarbital, phenytoin, primidone  · certain medicines for stomach problems like dicyclomine, hyoscyamine  · certain medicines for travel sickness like scopolamine  · general anesthetics like halothane, isoflurane, methoxyflurane, propofol  · ipratropium  · local anesthetics like lidocaine, pramoxine, tetracaine  · MAOIs like Carbex, Eldepryl, Marplan, Nardil, and Parnate  · medicines that relax muscles for surgery  · other medicines with acetaminophen  · other narcotic medicines for pain or cough  · phenothiazines like chlorpromazine, mesoridazine, prochlorperazine, thioridazine  · rifampin  What if I miss a dose?  If you miss a dose, take it as soon as you can. If it is almost time for your next dose, take only that dose. Do not take double or extra doses.  Where should I keep my medicine?  Keep out of the reach  of children. This medicine can be abused. Keep your medicine in a safe place to protect it from theft. Do not share this medicine with anyone. Selling or giving away this medicine is dangerous and against the law.  This medicine may cause accidental overdose and death if it taken by other adults, children, or pets. Mix any unused medicine with a substance like cat litter or coffee grounds. Then throw the medicine away in a sealed container like a sealed bag or a coffee can with a lid. Do not use the medicine after the expiration date.  Store at room temperature between 15 and 30 degrees C (59 and 86 degrees F).  What should I tell my health care provider before I take this medicine?  They need to know if you have any of these conditions:  · brain tumor  · Crohn's disease, inflammatory bowel disease, or ulcerative colitis  · drug abuse or addiction  · head injury  · heart or circulation problems  · if you often drink alcohol  · kidney disease or problems going to the bathroom  · liver disease  · lung disease, asthma, or breathing problems  · an unusual or allergic reaction to acetaminophen, hydrocodone, other opioid analgesics, other medicines, foods, dyes, or preservatives  · pregnant or trying to get pregnant  · breast-feeding  What should I watch for while using this medicine?  Tell your doctor or health care professional if your pain does not go away, if it gets worse, or if you have new or a different type of pain. You may develop tolerance to the medicine. Tolerance means that you will need a higher dose of the medicine for pain relief. Tolerance is normal and is expected if you take the medicine for a long time.  Do not suddenly stop taking your medicine because you may develop a severe reaction. Your body becomes used to the medicine. This does NOT mean you are addicted. Addiction is a behavior related to getting and using a drug for a non-medical reason. If you have pain, you have a medical reason to take  pain medicine. Your doctor will tell you how much medicine to take. If your doctor wants you to stop the medicine, the dose will be slowly lowered over time to avoid any side effects.  There are different types of narcotic medicines (opiates). If you take more than one type at the same time or if you are taking another medicine that also causes drowsiness, you may have more side effects. Give your health care provider a list of all medicines you use. Your doctor will tell you how much medicine to take. Do not take more medicine than directed. Call emergency for help if you have problems breathing or unusual sleepiness.  Do not take other medicines that contain acetaminophen with this medicine. Always read labels carefully. If you have questions, ask your doctor or pharmacist.  If you take too much acetaminophen get medical help right away. Too much acetaminophen can be very dangerous and cause liver damage. Even if you do not have symptoms, it is important to get help right away.  You may get drowsy or dizzy. Do not drive, use machinery, or do anything that needs mental alertness until you know how this medicine affects you. Do not stand or sit up quickly, especially if you are an older patient. This reduces the risk of dizzy or fainting spells. Alcohol may interfere with the effect of this medicine. Avoid alcoholic drinks.  The medicine will cause constipation. Try to have a bowel movement at least every 2 to 3 days. If you do not have a bowel movement for 3 days, call your doctor or health care professional.  Your mouth may get dry. Chewing sugarless gum or sucking hard candy, and drinking plenty of water may help. Contact your doctor if the problem does not go away or is severe.  Date Last Reviewed:   NOTE:This sheet is a summary. It may not cover all possible information. If you have questions about this medicine, talk to your doctor, pharmacist, or health care provider. Copyright© 2016 [employer]  "Standard            Primary Diagnosis     Your primary diagnosis was:  Carpal Tunnel Syndrome On Right      Admission Information     Date & Time Provider Department CSN    5/11/2017  5:43 AM Abhishek Decker MD [facility] [id number]      Care Providers     Provider Role Specialty Primary office phone    Abhishek Decker MD Attending Provider Orthopedic Surgery [phone]    Abhishek Decker MD Surgeon  Orthopedic Surgery [phone]      Your Vitals Were     BP Pulse Temp Resp Height Weight    136/59 79 97.5 °F (36.4 °C) (Temporal) 21 5' 5.5" (1.664 m) 77.9 kg (171 lb 11.8 oz)    SpO2 BMI             95% 28.14 kg/m2         Recent Lab Values     No lab values to display.      Allergies as of 5/11/2017        Reactions    Cellcept [Mycophenolate Mofetil] Swelling    Lips    Ace Inhibitors Other (See Comments)    unkown    Amoxicillin Other (See Comments)    + allergy test    Aspirin Other (See Comments)    G-6-PD def.     Beta-blockers (Beta-adrenergic Blocking Agts) Other (See Comments)    Cannot take due to Raynaud's syndrome    Cephalosporins Other (See Comments)    + allergy test    Clindamycin Other (See Comments)    + allergy test    Hydrochlorothiazide Other (See Comments)    unknown    Ibuprofen Other (See Comments)    G-6-PD def.    Lasix [Furosemide] Other (See Comments)    unknown    Lisinopril Other (See Comments)    unknown    Methotrexate Analogues Other (See Comments)    Pancytopenia    Nsaids (Non-steroidal Anti-inflammatory Drug) Other (See Comments)    G-6-PD def.    Pcn [Penicillins] Other (See Comments)    + allergy test    Plaquenil [Hydroxychloroquine] Other (See Comments)    G-6-PD def.    Shellfish Containing Products Other (See Comments)    + allergy test    Zofran [Ondansetron Hcl (Pf)] Other (See Comments)    Chest pain    Sulfa (Sulfonamide Antibiotics) Rash      [facility] - 24/7 Assistance  Unless otherwise directed by your " provider, please contact [facility], our nurse care line that is available for 24/7 assistance.     Registered nurses in the [facility] Center provide clinical advisement, health education, appointment booking, and other advisory services.  Call for this free service at [phone].        Advance Directives     An advance directive is a document which, in the event you are no longer able to make decisions for yourself, tells your healthcare team what kind of treatment you do or do not want to receive, or who you would like to make those decisions for you.  If you do not currently have an advance directive, [facility] encourages you to create one.  For more information call:  [phone]), [phone] ([phone]),  or log on to [email or web address].        Language Assistance Services     ATTENTION: Language assistance services are available, free of charge. Please call [phone].      ATENCIÓN: Si habla español, tiene a sanderson disposición servicios gratuitos de asistencia lingüística. Llame al [phone].     [facility] Ý: N?u b?n nói Ti?ng Vi?t, có các d?ch v? h? tr? ngôn ng? mi?n phí dành cho b?n. G?i s? [phone].         [facility] complies with applicable Federal civil rights laws and does not discriminate on the basis of race, color, national origin, age, disability, or sex.

## 2017-05-11 NOTE — ANESTHESIA POSTPROCEDURE EVALUATION
"Anesthesia Post Evaluation    Patient: Ruth Messina    Procedure(s) Performed: Procedure(s) (LRB):  RELEASE-CARPAL TUNNEL-right (Right)    Final Anesthesia Type: general  Patient location during evaluation: PACU  Patient participation: Yes- Able to Participate  Level of consciousness: awake and alert and oriented  Post-procedure vital signs: reviewed and stable  Pain management: adequate  Airway patency: patent  PONV status at discharge: No PONV  Anesthetic complications: no      Cardiovascular status: blood pressure returned to baseline and hemodynamically stable  Respiratory status: unassisted and room air  Hydration status: euvolemic  Follow-up not needed.        Visit Vitals    /74    Pulse 79    Temp 36.7 °C (98.1 °F) (Temporal)    Resp 20    Ht 5' 5.5" (1.664 m)    Wt 77.9 kg (171 lb 11.8 oz)    SpO2 99%    Breastfeeding No    BMI 28.14 kg/m2       Pain/Marky Score: Pain Assessment Performed: Yes (5/11/2017  8:19 AM)  Presence of Pain: complains of pain/discomfort (5/11/2017  8:48 AM)  Pain Rating Prior to Med Admin: 3 (5/11/2017  8:03 AM)  Marky Score: 10 (5/11/2017  8:48 AM)      "

## 2017-05-11 NOTE — OP NOTE
Expand All Collapse All   DATE OF PROCEDURE:  05/11/2017  PREOPERATIVE DIAGNOSIS: right carpal tunnel syndrome.  POSTOPERATIVE DIAGNOSIS  right carpal tunnel syndrome.  OPERATION PERFORMED: right carpal tunnel release.  SURGEON: Abhishek Decker M.D.  ASSISTANT: None.  DRAINS: None.  SPECIMENS: None.  ESTIMATED BLOOD LOSS: Less than 5 mL.  COMPLICATIONS: None.  INDICATIONS: This 51 y.o. female presented with EMG/NCV documented right carpal tunnel syndrome. She was indicated for a right carpal tunnel release to   decrease her pain, diminish her morbidity, and improve her ultimate functional ability.    DESCRIPTION OF OPERATION: The patient was placed in the supine position on the   operating table and a satisfactory anesthesia was administered by   Anesthesia. A tourniquet was placed high on the patient's right upper arm and her right  hand and arm were then prepped and draped in the usual sterile fashion and positioned on an arm board. An Esmarch bandage was then utilized to exsanguinate the right upper extremity and the tourniquet was inflated to 250 mmHg pressure. A palmar longitudinal incision was then made in line with the thenar crease, taking care to avoid crossing the distal volar transverse wrist crease. Sharp dissection was carried down to the transverse carpal ligament, which was noted to be quite thickened. Hemostasis was maintained with needle tip electrocautery. The transverse carpal ligament was then divided in its entirety utilizing sharp dissection distally and blunt dissection proximally.   Care was taken to avoid injury to the underlying median nerve, which was noted   to be somewhat attenuated in the carpal tunnel. Epineural fibrotic tissue was   decompressed with blunt dissection. The motor branch of median nerve was   identified and care was taken to avoid any injury to it. Complete decompression  of the median nerve was achieved. The wound was then irrigated with sterile   saline and  hemostasis was maintained with needle-tip electrocautery. 0.25% plain Marcaine was then infiltrated into the wound for postoperative analgesia purposes. The wound was then closed utilizing 4-0 nylon running horizontal mattress sutures and a sterile compression dressing and Ace wrap was then applied. The patient was taken to the Recovery Room in satisfactory condition. There were no complications, blood loss was less than 5 ml,  and the patient tolerated the procedure well.      Abhishek Decker M.D.

## 2017-05-11 NOTE — ANESTHESIA PREPROCEDURE EVALUATION
05/10/2017  Ruth Messina is a 51 y.o., female.    Anesthesia Evaluation    I have reviewed the Patient Summary Reports.    I have reviewed the Nursing Notes.   I have reviewed the Medications.     Review of Systems  Anesthesia Hx:  No problems with previous Anesthesia    Social:  Non-Smoker    Hematology/Oncology:         -- Anemia: Hematology Comments: G6PD deficiency   Cardiovascular:   Hypertension Tachycardia  Mitral valve prolapse   Pulmonary:   Shortness of breath    Musculoskeletal:   Lupus  Raynaud's syndrome   Neurological:   Neuromuscular Disease, Carpal tunnel syndrome (right)       Physical Exam  General:  Well nourished    Airway/Jaw/Neck:  Airway Findings: Mouth Opening: Normal Tongue: Normal  General Airway Assessment: Adult  Mallampati: II  TM Distance: Normal, at least 6 cm  Jaw/Neck Findings:  Neck ROM: Normal ROM      Dental:  Dental Findings: In tact   Chest/Lungs:  Chest/Lungs Findings: Clear to auscultation, Normal Respiratory Rate     Heart/Vascular:  Heart Findings: Rate: Normal  Rhythm: Regular Rhythm        Mental Status:  Mental Status Findings:  Cooperative, Alert and Oriented         Anesthesia Plan  Type of Anesthesia, risks & benefits discussed:  Anesthesia Type:  general, MAC  Patient's Preference:   Intra-op Monitoring Plan: standard ASA monitors  Intra-op Monitoring Plan Comments:   Post Op Pain Control Plan:   Post Op Pain Control Plan Comments: Per surgeon  Induction:   IV  Beta Blocker:  Patient is not currently on a Beta-Blocker (No further documentation required).       Informed Consent: Patient understands risks and agrees with Anesthesia plan.  Questions answered. Anesthesia consent signed with patient.  ASA Score: 2     Day of Surgery Review of History & Physical: I have interviewed and examined the patient. I have reviewed the patient's H&P dated:  There are no  significant changes.  H&P update referred to the surgeon.     Anesthesia Plan Notes: On prednisone for lupus- will likely need stress dose  hgb 10.7, plt 190, k 3.4, cr 0.8, inr 1.0  multiple allergies including NSAIDs, PCN, cephalosporins, and Zofran          Ready For Surgery From Anesthesia Perspective.

## 2017-05-11 NOTE — PLAN OF CARE
Pt resting on stretcher, c/o mild wrist pain rating it 2/10 on pain scale. Neurovascular checks remain intact. VSS. See flow sheet for detailed assessment. Will cont to monitor.

## 2017-05-11 NOTE — DISCHARGE INSTRUCTIONS
Discharge Instructions for Carpal Tunnel Release  You had a carpal tunnel release procedure to help relieve the symptoms of carpal tunnel syndrome. In carpal tunnel syndrome, a nerve in the wrist is compressed and irritated. This causes numbness and pain in the fingers and hand. Carpal tunnel release relieves the compression of the nerve. Here are instructions that will help you care for your arm and wrist when you are at home.  Home care  · Avoid gripping objects tightly or lifting with your affected arm.  · Wear your bandage, splint, or cast as directed by your doctor.  · Always keep the dressing, splint, or cast dry and clean.  · When showering, cover your hand and  wrist with plastic and use tape or rubberbands to keep the dressing, splint, or cast dry. Shower as necessary.    · Use an ice pack or bag of frozen peas -- or something similar -- wrapped in a thin towel on your wrist to reduce swelling for the first 48 hours. Leave the ice pack on for 20 minutes; then take it off for 20 minutes. Repeat as needed.  · Keep your arm elevated above your heart for 24 to 48 hours after surgery.  · Do the exercises you learned in the hospital, or as instructed by your doctor.  · Take pain medicine as directed.  · Dont drive until your doctor says its OK. Never drive while you are taking opioid pain medicine.  · Ask your doctor when you can return to work. If your job requires heavy lifting, you may not be able to begin working again for several weeks.  Follow-up care  Make a follow-up appointment as directed by your doctor.     When to seek medical care  Call 911 right away if you have any of the following:  · Chest pain  · Shortness of breath  Otherwise, call your doctor immediately if you have any of the following:  · A splint, cast, or dressing that has gotten wet  · Increased bleeding or drainage from the incision (cut)  · Opening of the incision  · Fever above 100.4°F (38.9°C) taken by mouth, or shaking  chills  · Any new numbness in the fingers or thumb  · Blue hand or fingers  · Increased pain with or without activity  · Increased redness, tenderness, or swelling of the incision   Date Last Reviewed: 11/15/2015  © 2128-6144 [employer]. [address] [place] [address]. All rights reserved. This information is not intended as a substitute for professional medical care. Always follow your healthcare professional's instructions.        General Information:    1.  Do not drink alcoholic beverages including beer for 24 hours or as long as you are on pain medication..  2.  Do not drive a motor vehicle, operate machinery or power tools, or signs legal papers for 24 hours or as long as you are on pain medication.   3.  You may experience light-headedness, dizziness, and sleepiness following surgery. Please do not stay alone. A responsible adult should be with you for this 24 hour period.  4.  Go home and rest.  5. Progress slowly to a normal diet unless instructed.  Otherwise, begin with liquids such as soft drinks, then soup and crackers working up to solid foods. Drink plenty of nonalcoholic fluids.  6.  Certain anesthetics and pain medications produce nausea and vomiting in certain       individuals. If nausea becomes a problem at home, call you doctor.  7. A nurse will be calling you sometime after surgery. Do not be alarmed. This is our way of finding out how you are doing.  8. Several times every hour while you are awake, take 2-3 deep breaths and cough. If you had stomach surgery hold a pillow or rolled towel firmly against your stomach before you cough. This will help with any pain the cough might cause.  9. Several times every hour while you are awake, pump and flex your feet 5-6 times and do foot circles. This will help prevent blood clots.  10.Call your doctor for severe pain, bleeding, fever, or signs or symptoms of infection (pain, swelling, redness, foul odor, drainage).  11.You can  contact your doctor anytime by [phone] for the [facility] (at [facility]) or [phone] for the [facility] on [facility].   [email or web address] is another way to contact your doctor if you are an active participant online with [facility].        Acetaminophen; Hydrocodone tablets or capsules  What is this medicine?  ACETAMINOPHEN; HYDROCODONE (a set a TAHIRA abilio fen; carolin droe KOE done) is a pain reliever. It is used to treat moderate to severe pain.  How should I use this medicine?  Take this medicine by mouth with a glass of water. Follow the directions on the prescription label. You can take it with or without food. If it upsets your stomach, take it with food. Do not take your medicine more often than directed.  A special MedGuide will be given to you by the pharmacist with each prescription and refill. Be sure to read this information carefully each time.  Talk to your pediatrician regarding the use of this medicine in children. Special care may be needed.  What side effects may I notice from receiving this medicine?  Side effects that you should report to your doctor or health care professional as soon as possible:  · allergic reactions like skin rash, itching or hives, swelling of the face, lips, or tongue  · breathing problems  · confusion  · redness, blistering, peeling or loosening of the skin, including inside the mouth  · signs and symptoms of low blood pressure like dizziness; feeling faint or lightheaded, falls; unusually weak or tired  · trouble passing urine or change in the amount of urine  · yellowing of the eyes or skin  Side effects that usually do not require medical attention (report to your doctor or health care professional if they continue or are bothersome):  · constipation  · dry mouth  · nausea, vomiting  · tiredness  What may interact with this medicine?  This medicine may interact with the following medications:  · alcohol  · antiviral medicines for HIV or  AIDS  · atropine  · antihistamines for allergy, cough and cold  · certain antibiotics like erythromycin, clarithromycin  · certain medicines for anxiety or sleep  · certain medicines for bladder problems like oxybutynin, tolterodine  · certain medicines for depression like amitriptyline, fluoxetine, sertraline  · certain medicines for fungal infections like ketoconazole and itraconazole  · certain medicines for Parkinson's disease like benztropine, trihexyphenidyl  · certain medicines for seizures like carbamazepine, phenobarbital, phenytoin, primidone  · certain medicines for stomach problems like dicyclomine, hyoscyamine  · certain medicines for travel sickness like scopolamine  · general anesthetics like halothane, isoflurane, methoxyflurane, propofol  · ipratropium  · local anesthetics like lidocaine, pramoxine, tetracaine  · MAOIs like Carbex, Eldepryl, Marplan, Nardil, and Parnate  · medicines that relax muscles for surgery  · other medicines with acetaminophen  · other narcotic medicines for pain or cough  · phenothiazines like chlorpromazine, mesoridazine, prochlorperazine, thioridazine  · rifampin  What if I miss a dose?  If you miss a dose, take it as soon as you can. If it is almost time for your next dose, take only that dose. Do not take double or extra doses.  Where should I keep my medicine?  Keep out of the reach of children. This medicine can be abused. Keep your medicine in a safe place to protect it from theft. Do not share this medicine with anyone. Selling or giving away this medicine is dangerous and against the law.  This medicine may cause accidental overdose and death if it taken by other adults, children, or pets. Mix any unused medicine with a substance like cat litter or coffee grounds. Then throw the medicine away in a sealed container like a sealed bag or a coffee can with a lid. Do not use the medicine after the expiration date.  Store at room temperature between 15 and 30 degrees C  (59 and 86 degrees F).  What should I tell my health care provider before I take this medicine?  They need to know if you have any of these conditions:  · brain tumor  · Crohn's disease, inflammatory bowel disease, or ulcerative colitis  · drug abuse or addiction  · head injury  · heart or circulation problems  · if you often drink alcohol  · kidney disease or problems going to the bathroom  · liver disease  · lung disease, asthma, or breathing problems  · an unusual or allergic reaction to acetaminophen, hydrocodone, other opioid analgesics, other medicines, foods, dyes, or preservatives  · pregnant or trying to get pregnant  · breast-feeding  What should I watch for while using this medicine?  Tell your doctor or health care professional if your pain does not go away, if it gets worse, or if you have new or a different type of pain. You may develop tolerance to the medicine. Tolerance means that you will need a higher dose of the medicine for pain relief. Tolerance is normal and is expected if you take the medicine for a long time.  Do not suddenly stop taking your medicine because you may develop a severe reaction. Your body becomes used to the medicine. This does NOT mean you are addicted. Addiction is a behavior related to getting and using a drug for a non-medical reason. If you have pain, you have a medical reason to take pain medicine. Your doctor will tell you how much medicine to take. If your doctor wants you to stop the medicine, the dose will be slowly lowered over time to avoid any side effects.  There are different types of narcotic medicines (opiates). If you take more than one type at the same time or if you are taking another medicine that also causes drowsiness, you may have more side effects. Give your health care provider a list of all medicines you use. Your doctor will tell you how much medicine to take. Do not take more medicine than directed. Call emergency for help if you have problems  breathing or unusual sleepiness.  Do not take other medicines that contain acetaminophen with this medicine. Always read labels carefully. If you have questions, ask your doctor or pharmacist.  If you take too much acetaminophen get medical help right away. Too much acetaminophen can be very dangerous and cause liver damage. Even if you do not have symptoms, it is important to get help right away.  You may get drowsy or dizzy. Do not drive, use machinery, or do anything that needs mental alertness until you know how this medicine affects you. Do not stand or sit up quickly, especially if you are an older patient. This reduces the risk of dizzy or fainting spells. Alcohol may interfere with the effect of this medicine. Avoid alcoholic drinks.  The medicine will cause constipation. Try to have a bowel movement at least every 2 to 3 days. If you do not have a bowel movement for 3 days, call your doctor or health care professional.  Your mouth may get dry. Chewing sugarless gum or sucking hard candy, and drinking plenty of water may help. Contact your doctor if the problem does not go away or is severe.  Date Last Reviewed:   NOTE:This sheet is a summary. It may not cover all possible information. If you have questions about this medicine, talk to your doctor, pharmacist, or health care provider. Copyright© 2016 [employer]

## 2017-05-11 NOTE — TRANSFER OF CARE
"Anesthesia Transfer of Care Note    Patient: Ruth Messina    Procedure(s) Performed: Procedure(s) (LRB):  RELEASE-CARPAL TUNNEL-right (Right)    Patient location: PACU    Anesthesia Type: general    Transport from OR: Transported from OR on room air with adequate spontaneous ventilation    Post pain: adequate analgesia    Post assessment: no apparent anesthetic complications    Post vital signs: stable    Level of consciousness: awake    Nausea/Vomiting: no nausea/vomiting    Complications: none    Transfer of care protocol was followed      Last vitals:   Visit Vitals    /77 (BP Location: Left arm, Patient Position: Lying, BP Method: Automatic)    Pulse 85    Temp 36.4 °C (97.5 °F) (Temporal)    Resp 14    Ht 5' 5.5" (1.664 m)    Wt 77.9 kg (171 lb 11.8 oz)    SpO2 100%    Breastfeeding No    BMI 28.14 kg/m2     "

## 2017-05-11 NOTE — BRIEF OP NOTE
[facility]  Brief Operative Note     SUMMARY     Surgery Date: 5/11/2017     Surgeon(s) and Role:     * Abhishek Decker MD - Primary    Assisting Surgeon: Suzanne Quesada PA-C    Pre-op Diagnosis:  Carpal tunnel syndrome, unspecified laterality [G56.00]    Post-op Diagnosis:  Post-Op Diagnosis Codes:     * Carpal tunnel syndrome, unspecified laterality [G56.00]    Procedure(s) (LRB):  RELEASE-CARPAL TUNNEL-right (Right)    Anesthesia: General    Description of the findings of the procedure: R CTS    Findings/Key Components: R CTS    Estimated Blood Loss: 5 mL         Specimens:   Specimen     None          Discharge Note    SUMMARY     Admit Date: 5/11/2017    Discharge Date and Time:  05/11/2017 7:45 AM    Hospital Course (synopsis of major diagnoses, care, treatment, and services provided during the course of the hospital stay): The patient had their procedure performed, had a routine post operative recovery, and was discharged home.       Final Diagnosis: Post-Op Diagnosis Codes:     * Carpal tunnel syndrome, unspecified laterality [G56.00]    Disposition: Home or Self Care    Follow Up/Patient Instructions:     Medications:  Reconciled Home Medications:   Current Discharge Medication List      CONTINUE these medications which have CHANGED    Details   hydrocodone-acetaminophen 10-325mg (NORCO)  mg Tab Take 1-2 tablets every 3-8 hrs as needed for pain.  Qty: 40 tablet, Refills: 0         CONTINUE these medications which have NOT CHANGED    Details   amlodipine (NORVASC) 10 MG tablet Take 10 mg by mouth once daily.      CALCIUM-MAGNESIUM-ZINC ORAL Take by mouth once daily.      cholecalciferol, vitamin D3, (VITAMIN D3) 5,000 unit Tab Take 5,000 Units by mouth once daily.      ferrous gluconate (FERGON) 324 MG tablet Take 324 mg by mouth daily with breakfast.      fish oil-omega-3 fatty acids 300-1,000 mg capsule Take 2 g by mouth once daily.      fluocinolone (DERMA-SMOOTHE) 0.01 % external  oil Apply oil to scalp twice a day  Qty: 1 Bottle, Refills: 5    Associated Diagnoses: Discoid lupus; Female pattern alopecia      losartan (COZAAR) 100 MG tablet Take 100 mg by mouth once daily.      multivitamin capsule Take 1 capsule by mouth once daily.      predniSONE (DELTASONE) 5 MG tablet Take 1 tablet (5 mg total) by mouth once daily.  Qty: 30 tablet, Refills: 3    Associated Diagnoses: CNS lupus         STOP taking these medications       methylPREDNISolone (MEDROL DOSEPACK) 4 mg tablet Comments:   Reason for Stopping:             No discharge procedures on file.

## 2017-05-11 NOTE — H&P
Subjective:     Patient is a 51 y.o. female presented with a history of carpal tunnel syndrome of both UE. The patient is status post left carpal tunnel release performed on March 24. She does not report any increased pain, swelling, incision drainage or numbness and tingling.   She has been doing at home exercises and massage.     She continues to have discomfort, numbness and tingling of her right hand and wrist. She is scheduled for this release on 5/11/17.    She is being admitted for surgical management of this condition. The indications for the procedure include CTS.     Patient Active Problem List    Diagnosis Date Noted    Left carpal tunnel syndrome 03/24/2017    Carpal tunnel syndrome, right 03/03/2017    Abnormal ECG 02/27/2017    Essential hypertension 02/27/2017    Chronic disease anemia 02/27/2017    Carpal tunnel syndrome of left wrist 02/21/2017    Pre-operative clearance 02/21/2017    Chest pain 02/10/2017    Tachycardia 02/10/2017    SOB (shortness of breath) 02/10/2017    CNS lupus 12/08/2016     Past Medical History:   Diagnosis Date    Anemia     with chronic disease lupus    Encounter for blood transfusion     G-6-PD deficiency     Heart murmur     MVP 1986    Hypertension     Lupus     Raynaud's syndrome       Past Surgical History:   Procedure Laterality Date    BONE MARROW BIOPSY      CARPAL TUNNEL RELEASE Left     widsom tooth extraction        No prescriptions prior to admission.     Review of patient's allergies indicates:   Allergen Reactions    Cellcept [mycophenolate mofetil] Swelling     Lips      Ace inhibitors Other (See Comments)     unkown    Amoxicillin Other (See Comments)     + allergy test    Aspirin Other (See Comments)     G-6-PD def.     Beta-blockers (beta-adrenergic blocking agts) Other (See Comments)     Cannot take due to Raynaud's syndrome    Cephalosporins Other (See Comments)     + allergy test    Clindamycin Other (See Comments)     +  "allergy test    Hydrochlorothiazide Other (See Comments)     unknown    Ibuprofen Other (See Comments)     G-6-PD def.    Lasix [furosemide] Other (See Comments)     unknown    Lisinopril Other (See Comments)     unknown    Methotrexate analogues Other (See Comments)     Pancytopenia      Nsaids (non-steroidal anti-inflammatory drug) Other (See Comments)     G-6-PD def.    Pcn [penicillins] Other (See Comments)     + allergy test    Plaquenil [hydroxychloroquine] Other (See Comments)     G-6-PD def.    Shellfish containing products Other (See Comments)     + allergy test    Zofran [ondansetron hcl (pf)] Other (See Comments)     Chest pain      Sulfa (sulfonamide antibiotics) Rash      Social History   Substance Use Topics    Smoking status: Never Smoker    Smokeless tobacco: Never Used    Alcohol use No      Family History   Problem Relation Age of Onset    Arthritis Mother     Heart disease Mother     Hypertension Mother     Diabetes Father     Heart disease Father     Hypertension Father     Cancer Brother       Review of Systems  A comprehensive review of systems was negative except for: Musculoskeletal: positive for stiff joints  Neurological: positive for paresthesia    Objective:     No data found.    Ht 5' 5.5" (1.664 m)  Wt 77.1 kg (170 lb)  BMI 27.86 kg/m2  General appearance: alert, appears stated age and cooperative  Head: Normocephalic, without obvious abnormality, atraumatic  Lungs: clear to auscultation bilaterally  Heart: regular rate and rhythm, S1, S2 normal, no murmur, click, rub or gallop  Skin: Skin color, texture, turgor normal. No rashes or lesions  Ortho: + Phalen's and Carpal tunnel compression test of right hand/wrist. Abnormal median nerve sensation.  Incision of left hand/wrist is clean, dry and intact. No evidence of infection.       Imaging Review  none    Assessment:     Status post carpal tunnel release of left side    Carpal tunnel Syndrome of right " hand    Plan:     The various methods of treatment have been discussed with the patient and family.   After consideration of risks, benefits and other options for treatment, the patient has consented to surgical interventions for an outpatient, right carpal tunnel release on 5/11/17. She understands the material risks of surgery, what's involved and desires to proceed.

## 2017-05-15 ENCOUNTER — TELEPHONE (OUTPATIENT)
Dept: CARDIOLOGY | Facility: CLINIC | Age: 52
End: 2017-05-15

## 2017-05-15 NOTE — TELEPHONE ENCOUNTER
Called back to patient--informed of Dr. Acuna's recommendation to hold Amlodipine for 72 hours and to call our office back with progress--advised if blood pressure becomes increasingly high to give our office a call back --patient verbalizes understanding

## 2017-05-15 NOTE — TELEPHONE ENCOUNTER
"Called to patient--states since taking Amlodipine she has been experiencing "bumps" in the mouth---need to know if can change to different medication  "

## 2017-05-15 NOTE — TELEPHONE ENCOUNTER
----- Message from Thais Brand sent at 5/15/2017  9:01 AM CDT -----  Pt needs to speak to the nurse regarding her blood pressure meds being changed/ please call [phone]/ma

## 2017-05-17 ENCOUNTER — OFFICE VISIT (OUTPATIENT)
Dept: DERMATOLOGY | Facility: CLINIC | Age: 52
End: 2017-05-17
Payer: COMMERCIAL

## 2017-05-17 DIAGNOSIS — L93.0 DISCOID LUPUS: Primary | ICD-10-CM

## 2017-05-17 DIAGNOSIS — L65.8 FEMALE PATTERN ALOPECIA: ICD-10-CM

## 2017-05-17 PROCEDURE — 1160F RVW MEDS BY RX/DR IN RCRD: CPT | Mod: S$GLB,,,

## 2017-05-17 PROCEDURE — 11900 INJECT SKIN LESIONS </W 7: CPT | Mod: S$GLB,,,

## 2017-05-17 PROCEDURE — 99999 PR PBB SHADOW E&M-EST. PATIENT-LVL III: CPT | Mod: PBBFAC,,,

## 2017-05-17 PROCEDURE — 99213 OFFICE O/P EST LOW 20 MIN: CPT | Mod: 25,S$GLB,,

## 2017-05-17 NOTE — PROGRESS NOTES
Subjective:       Patient ID:  Ruth Messina is a 51 y.o. female who presents for   Chief Complaint   Patient presents with    Alopecia     f/u     HPI Comments: 52 yo with history of CNS lupus (+ SHEILA 1:640 speckled, + anti-SSA, Sm, dsDNA, Sm/RNP), currently followed by Dr. Turner. Pt is here for follow-up of alopecia. She is currently using derma-smoothe bid, rogain 5% qd, and taking Biotin 5000 mcg qd with improvement. Pt would like ILK injections today.      Prior treatments: ILK x 2, clobetasol foam         Review of Systems   Constitutional: Negative for fever and chills.   Skin: Positive for rash. Negative for itching, daily sunscreen use, activity-related sunscreen use and recent sunburn.   Hematologic/Lymphatic: Does not bruise/bleed easily.        Objective:    Physical Exam   Constitutional: She appears well-developed and well-nourished. No distress.   Neurological: She is alert and oriented to person, place, and time. She is not disoriented.   Psychiatric: She has a normal mood and affect.   Skin:   Areas Examined (abnormalities noted in diagram):   Scalp / Hair Palpated and Inspected  Head / Face Inspection Performed  Neck Inspection Performed              Diagram Legend     Erythematous scaling macule/papule c/w actinic keratosis       Vascular papule c/w angioma      Pigmented verrucoid papule/plaque c/w seborrheic keratosis      Yellow umbilicated papule c/w sebaceous hyperplasia      Irregularly shaped tan macule c/w lentigo     1-2 mm smooth white papules consistent with Milia      Movable subcutaneous cyst with punctum c/w epidermal inclusion cyst      Subcutaneous movable cyst c/w pilar cyst      Firm pink to brown papule c/w dermatofibroma      Pedunculated fleshy papule(s) c/w skin tag(s)      Evenly pigmented macule c/w junctional nevus     Mildly variegated pigmented, slightly irregular-bordered macule c/w mildly atypical nevus      Flesh colored to evenly pigmented papule c/w  intradermal nevus       Pink pearly papule/plaque c/w basal cell carcinoma      Erythematous hyperkeratotic cursted plaque c/w SCC      Surgical scar with no sign of skin cancer recurrence      Open and closed comedones      Inflammatory papules and pustules      Verrucoid papule consistent consistent with wart     Erythematous eczematous patches and plaques     Dystrophic onycholytic nail with subungual debris c/w onychomycosis     Umbilicated papule    Erythematous-base heme-crusted tan verrucoid plaque consistent with inflamed seborrheic keratosis     Erythematous Silvery Scaling Plaque c/w Psoriasis     See annotation      Assessment / Plan:        Discoid lupus  Female pattern alopecia  -     triamcinolone acetonide injection 10 mg; 1 mL (10 mg total) by Other route one time.  Intralesional Kenalog 5mg/cc (1 cc total) injected into 2 lesions on the bitemporal scalp today after obtaining verbal consent including risk of surrounding hypopigmentation. Patient tolerated procedure well.  Continue Biotin 5000 mcg qd  Continue Rogain 5% qd       Return in about 4 weeks (around 6/14/2017).

## 2017-05-17 NOTE — MR AVS SNAPSHOT
[facility]  [address] [place] [address]  Phone: [phone]  Fax: [phone]                  Ruth Messina   2017 10:20 AM   Office Visit    Description:  Female : 1965   Provider:  Tameka Randhawa NP   Department:  [facility] - Dermatology           Reason for Visit     Alopecia                To Do List           Future Appointments        Provider Department Dept Phone    2017 8:40 AM LABORATORY, [facility] [phone]    2017 9:00 AM Chris Montanez Jr., MD [facility]cology [phone]    2017 10:00 AM [facility] - Orthopedics [phone]    2017 11:00 AM Chirag Turner MD [facility] - Rheumatology [phone]    2017 8:00 AM Ishaan Acuna MD [facility] - Cardiology [phone]      Goals (5 Years of Data)     None      [facility]     [facility] Nurse Care Line -  Assistance  Unless otherwise directed by your provider, please contact [facility], our nurse care line that is available for  assistance.     Registered nurses in the [facility] provide: appointment scheduling, clinical advisement, health education, and other advisory services.  Call: [phone] (toll free)               Medications           Message regarding Medications     Verify the changes and/or additions to your medication regime listed below are the same as discussed with your clinician today.  If any of these changes or additions are incorrect, please notify your healthcare provider.             Verify that the below list of medications is an accurate representation of the medications you are currently taking.  If none reported, the list may be blank. If incorrect, please contact your healthcare provider. Carry this list with you in case of emergency.           Current Medications     amlodipine (NORVASC) 10 MG tablet Take 10 mg by mouth once daily.     CALCIUM-MAGNESIUM-ZINC ORAL Take by mouth once daily.    cholecalciferol, vitamin D3, (VITAMIN D3) 5,000 unit Tab Take 5,000 Units by mouth once daily.    ferrous gluconate (FERGON) 324 MG tablet Take 324 mg by mouth daily with breakfast.    fish oil-omega-3 fatty acids 300-1,000 mg capsule Take 2 g by mouth once daily.    fluocinolone (DERMA-SMOOTHE) 0.01 % external oil Apply oil to scalp twice a day    hydrocodone-acetaminophen 10-325mg (NORCO)  mg Tab Take 1-2 tablets every 3-8 hrs as needed for pain.    losartan (COZAAR) 100 MG tablet Take 100 mg by mouth once daily.    multivitamin capsule Take 1 capsule by mouth once daily.    predniSONE (DELTASONE) 5 MG tablet Take 1 tablet (5 mg total) by mouth once daily.           Clinical Reference Information           Allergies as of 5/17/2017     Cellcept [Mycophenolate Mofetil]    Ace Inhibitors    Amoxicillin    Aspirin    Beta-blockers (Beta-adrenergic Blocking Agts)    Cephalosporins    Clindamycin    Hydrochlorothiazide    Ibuprofen    Lasix [Furosemide]    Lisinopril    Methotrexate Analogues    Nsaids (Non-steroidal Anti-inflammatory Drug)    Pcn [Penicillins]    Plaquenil [Hydroxychloroquine]    Shellfish Containing Products    Zofran [Ondansetron Hcl (Pf)]    Sulfa (Sulfonamide Antibiotics)      Immunizations Administered on Date of Encounter - 5/17/2017     None      Language Assistance Services     ATTENTION: Language assistance services are available, free of charge. Please call [phone].      ATENCIÓN: Si habla hesham, tiene a sanderson disposición servicios gratuitos de asistencia lingüística. Llame al [phone].     [facility] Ý: N?u b?n nói Ti?ng Vi?t, có các d?ch v? h? tr? ngôn ng? mi?n phí dành cho b?n. G?i s? [phone].         [facility] - Dermat[facility] complies with applicable Federal civil rights laws and does not discriminate on the basis of race, color, national origin, age, disability, or sex.

## 2017-05-18 ENCOUNTER — LAB VISIT (OUTPATIENT)
Dept: LAB | Facility: HOSPITAL | Age: 52
End: 2017-05-18
Attending: INTERNAL MEDICINE
Payer: COMMERCIAL

## 2017-05-18 ENCOUNTER — OFFICE VISIT (OUTPATIENT)
Dept: HEMATOLOGY/ONCOLOGY | Facility: CLINIC | Age: 52
End: 2017-05-18
Payer: COMMERCIAL

## 2017-05-18 VITALS
WEIGHT: 172.19 LBS | BODY MASS INDEX: 27.67 KG/M2 | RESPIRATION RATE: 18 BRPM | HEART RATE: 120 BPM | SYSTOLIC BLOOD PRESSURE: 104 MMHG | TEMPERATURE: 99 F | OXYGEN SATURATION: 96 % | HEIGHT: 66 IN | DIASTOLIC BLOOD PRESSURE: 70 MMHG

## 2017-05-18 DIAGNOSIS — D63.8 ANEMIA OF OTHER CHRONIC DISEASE: Primary | ICD-10-CM

## 2017-05-18 DIAGNOSIS — D63.8 ANEMIA OF OTHER CHRONIC DISEASE: ICD-10-CM

## 2017-05-18 DIAGNOSIS — D64.9 NORMOCYTIC ANEMIA: ICD-10-CM

## 2017-05-18 LAB
ALBUMIN SERPL BCP-MCNC: 3.5 G/DL
ALP SERPL-CCNC: 77 U/L
ALT SERPL W/O P-5'-P-CCNC: 14 U/L
ANION GAP SERPL CALC-SCNC: 12 MMOL/L
AST SERPL-CCNC: 19 U/L
BASOPHILS # BLD AUTO: 0.01 K/UL
BASOPHILS NFR BLD: 0.3 %
BILIRUB SERPL-MCNC: 0.6 MG/DL
BUN SERPL-MCNC: 13 MG/DL
CALCIUM SERPL-MCNC: 9.6 MG/DL
CHLORIDE SERPL-SCNC: 108 MMOL/L
CO2 SERPL-SCNC: 24 MMOL/L
CREAT SERPL-MCNC: 1 MG/DL
CRP SERPL-MCNC: 0.9 MG/L
DIFFERENTIAL METHOD: ABNORMAL
EOSINOPHIL # BLD AUTO: 0 K/UL
EOSINOPHIL NFR BLD: 0.3 %
ERYTHROCYTE [DISTWIDTH] IN BLOOD BY AUTOMATED COUNT: 13.7 %
EST. GFR  (AFRICAN AMERICAN): >60 ML/MIN/1.73 M^2
EST. GFR  (NON AFRICAN AMERICAN): >60 ML/MIN/1.73 M^2
FERRITIN SERPL-MCNC: 802 NG/ML
GLUCOSE SERPL-MCNC: 103 MG/DL
HCT VFR BLD AUTO: 33.5 %
HGB BLD-MCNC: 10.6 G/DL
IRON SERPL-MCNC: 47 UG/DL
LDH SERPL L TO P-CCNC: 153 U/L
LYMPHOCYTES # BLD AUTO: 1.1 K/UL
LYMPHOCYTES NFR BLD: 36 %
MCH RBC QN AUTO: 28.6 PG
MCHC RBC AUTO-ENTMCNC: 31.6 %
MCV RBC AUTO: 90 FL
MONOCYTES # BLD AUTO: 0.2 K/UL
MONOCYTES NFR BLD: 4.9 %
NEUTROPHILS # BLD AUTO: 1.8 K/UL
NEUTROPHILS NFR BLD: 58.5 %
PATH REV BLD -IMP: NORMAL
PATH REV BLD -IMP: NORMAL
PLATELET # BLD AUTO: 174 K/UL
PMV BLD AUTO: 9.8 FL
POTASSIUM SERPL-SCNC: 3.6 MMOL/L
PROT SERPL-MCNC: 7.6 G/DL
RBC # BLD AUTO: 3.71 M/UL
RETICS/RBC NFR AUTO: 1.3 %
SATURATED IRON: 17 %
SODIUM SERPL-SCNC: 144 MMOL/L
TOTAL IRON BINDING CAPACITY: 271 UG/DL
TRANSFERRIN SERPL-MCNC: 183 MG/DL
WBC # BLD AUTO: 3.08 K/UL

## 2017-05-18 PROCEDURE — 83615 LACTATE (LD) (LDH) ENZYME: CPT | Mod: PO

## 2017-05-18 PROCEDURE — 99214 OFFICE O/P EST MOD 30 MIN: CPT | Mod: S$GLB,,, | Performed by: INTERNAL MEDICINE

## 2017-05-18 PROCEDURE — 3074F SYST BP LT 130 MM HG: CPT | Mod: S$GLB,,, | Performed by: INTERNAL MEDICINE

## 2017-05-18 PROCEDURE — 85060 BLOOD SMEAR INTERPRETATION: CPT | Mod: ,,, | Performed by: PATHOLOGY

## 2017-05-18 PROCEDURE — 85045 AUTOMATED RETICULOCYTE COUNT: CPT

## 2017-05-18 PROCEDURE — 86140 C-REACTIVE PROTEIN: CPT

## 2017-05-18 PROCEDURE — 85025 COMPLETE CBC W/AUTO DIFF WBC: CPT | Mod: PO

## 2017-05-18 PROCEDURE — 99999 PR PBB SHADOW E&M-EST. PATIENT-LVL III: CPT | Mod: PBBFAC,,, | Performed by: INTERNAL MEDICINE

## 2017-05-18 PROCEDURE — 80053 COMPREHEN METABOLIC PANEL: CPT | Mod: PO

## 2017-05-18 PROCEDURE — 1160F RVW MEDS BY RX/DR IN RCRD: CPT | Mod: S$GLB,,, | Performed by: INTERNAL MEDICINE

## 2017-05-18 PROCEDURE — 3078F DIAST BP <80 MM HG: CPT | Mod: S$GLB,,, | Performed by: INTERNAL MEDICINE

## 2017-05-18 PROCEDURE — 82728 ASSAY OF FERRITIN: CPT

## 2017-05-18 PROCEDURE — 36415 COLL VENOUS BLD VENIPUNCTURE: CPT | Mod: PO

## 2017-05-18 PROCEDURE — 83540 ASSAY OF IRON: CPT

## 2017-05-18 NOTE — MR AVS SNAPSHOT
[facility]  [address] [place] [address]  Phone: [phone]  Fax: [phone]                  Ruth Messina   2017 9:00 AM   Office Visit    Description:  Female : 1965   Provider:  Chris Montanez Jr., MD   Department:  [facility] Oncology           Diagnoses this Visit        Comments    Anemia of other chronic disease    -  Primary     Normocytic anemia                To Do List           Future Appointments        Provider Department Dept Phone    2017 10:00 AM [facility] - Orthopedics [phone]    2017 11:00 AM Chirag Turner MD [facility] Rheumatology [phone]    2017 8:00 AM Ishaan Acuna MD [facility] Cardiology [phone]    2017 9:40 AM Tameka Randhawa NP [facility] Dermatology [phone]    2017 9:20 AM LABORATORY, [facility] [phone]      Goals (5 Years of Data)     None      [facility]     [facility] Nurse Care Line -  Assistance  Unless otherwise directed by your provider, please contact [facility], our nurse care line that is available for  assistance.     Registered nurses in the [facility] provide: appointment scheduling, clinical advisement, health education, and other advisory services.  Call: [phone] (toll free)               Medications           Message regarding Medications     Verify the changes and/or additions to your medication regime listed below are the same as discussed with your clinician today.  If any of these changes or additions are incorrect, please notify your healthcare provider.             Verify that the below list of medications is an accurate representation of the medications you are currently taking.  If none reported, the list may be blank. If incorrect, please contact your healthcare provider. Carry this list with you in case of emergency.           Current Medications      "amlodipine (NORVASC) 10 MG tablet Take 10 mg by mouth once daily.    CALCIUM-MAGNESIUM-ZINC ORAL Take by mouth once daily.    cholecalciferol, vitamin D3, (VITAMIN D3) 5,000 unit Tab Take 5,000 Units by mouth once daily.    ferrous gluconate (FERGON) 324 MG tablet Take 324 mg by mouth daily with breakfast.    fish oil-omega-3 fatty acids 300-1,000 mg capsule Take 2 g by mouth once daily.    fluocinolone (DERMA-SMOOTHE) 0.01 % external oil Apply oil to scalp twice a day    hydrocodone-acetaminophen 10-325mg (NORCO)  mg Tab Take 1-2 tablets every 3-8 hrs as needed for pain.    losartan (COZAAR) 100 MG tablet Take 100 mg by mouth once daily.    multivitamin capsule Take 1 capsule by mouth once daily.    predniSONE (DELTASONE) 5 MG tablet Take 1 tablet (5 mg total) by mouth once daily.           Clinical Reference Information           Your Vitals Were     BP Pulse Temp Resp Height Weight    104/70 120 98.7 °F (37.1 °C) (Oral) 18 5' 5.5" (1.664 m) 78.1 kg (172 lb 2.9 oz)    SpO2 BMI             96% 28.22 kg/m2         Blood Pressure          Most Recent Value    BP  104/70      Allergies as of 5/18/2017     Cellcept [Mycophenolate Mofetil]    Ace Inhibitors    Amoxicillin    Aspirin    Beta-blockers (Beta-adrenergic Blocking Agts)    Cephalosporins    Clindamycin    Hydrochlorothiazide    Ibuprofen    Lasix [Furosemide]    Lisinopril    Methotrexate Analogues    Nsaids (Non-steroidal Anti-inflammatory Drug)    Pcn [Penicillins]    Plaquenil [Hydroxychloroquine]    Shellfish Containing Products    Zofran [Ondansetron Hcl (Pf)]    Sulfa (Sulfonamide Antibiotics)      Immunizations Administered on Date of Encounter - 5/18/2017     None      Orders Placed During Today's Visit     Future Labs/Procedures Expected by Expires    CBC auto differential  5/18/2017 7/17/2018    Comprehensive metabolic panel  5/18/2017 7/17/2018    Ferritin  5/18/2017 7/17/2018    Iron and TIBC  5/18/2017 7/17/2018    Lactate dehydrogenase  " 5/18/2017 7/17/2018    Reticulocytes  5/18/2017 7/17/2018      Language Assistance Services     ATTENTION: Language assistance services are available, free of charge. Please call [phone].      ATENCIÓN: Si dellla hesham, tiene a sanderson disposición servicios gratuitos de asistencia lingüística. Llame al [phone].     CHÚ Ý: N?u b?n nói Ti?ng Vi?t, có các d?ch v? h? tr? ngôn ng? mi?n phí dành cho b?n. G?i s? [phone].         [facility] - [facility]cology complies with applicable Federal civil rights laws and does not discriminate on the basis of race, color, national origin, age, disability, or sex.

## 2017-05-18 NOTE — PROGRESS NOTES
Hematology/Oncology Office Note    Reason for referral:  Progressive normocytic anemia    CC:    Referred by:  No ref. provider found    Diagnosis:  Progressive normocytic anemia    Treatment:  Treated with IV iron in the past    History of present illness:  51-year-old female with long-standing history of SLE with skin, joint, and cytopenias from SLE.  She underwent a bone marrow biopsy in 2/2016 which demonstrated normal trilineage hematopoiesis without significant abnormality.  She underwent EGD and colonoscopy in 12/2016 which failed to identify any source of blood loss.  She was treated with IV iron by outside oncologist with some improvement in anemia.  Hemoglobin was recently noted to be 9.6 with MCV of 91.  She is currently on prednisone/Solu-Medrol for SLE.  She has complaints of mild fatigue and malaise without shortness of breath, chest pain, dizziness, or palpitations.    I have reviewed and updated the HPI, ROS, PMHx, Social Hx, Family Hx and treatment history.    Today's visit:  Patient continues to have chronic stable fatigue.  Denies fever, chills, night sweats, weight loss, or infections.      Past Medical History:   Diagnosis Date    Anemia     with chronic disease lupus    Encounter for blood transfusion     G-6-PD deficiency     Heart murmur     MVP 1986    Hypertension     Lupus     Raynaud's syndrome          Social History:  No tobacco, alcohol, or illicit drugs      Family History: family history includes Arthritis in her mother; Cancer in her brother; Diabetes in her father; Heart disease in her father and mother; Hypertension in her father and mother.    HPI    Review of Systems   Constitutional: Positive for activity change and fatigue. Negative for appetite change, chills, fever and unexpected weight change.   HENT: Negative for congestion, dental problem, drooling, ear discharge, ear pain, facial swelling, mouth sores, nosebleeds, rhinorrhea, sinus pressure, sore throat, trouble  "swallowing and voice change.    Eyes: Negative.    Respiratory: Negative for apnea, cough, shortness of breath, wheezing and stridor.    Cardiovascular: Negative for chest pain and palpitations.   Gastrointestinal: Negative for abdominal distention, abdominal pain, anal bleeding, blood in stool, constipation, diarrhea, nausea and vomiting.   Endocrine: Negative.    Genitourinary: Negative for dysuria, flank pain, frequency, hematuria and pelvic pain.   Musculoskeletal: Negative for arthralgias, back pain, gait problem, joint swelling, myalgias and neck pain.   Skin: Negative for pallor, rash and wound.   Allergic/Immunologic: Negative.    Neurological: Negative for dizziness, tremors, seizures, syncope, facial asymmetry, speech difficulty, weakness, light-headedness, numbness and headaches.   Hematological: Negative for adenopathy. Does not bruise/bleed easily.   Psychiatric/Behavioral: Negative for agitation, behavioral problems, confusion, dysphoric mood and hallucinations. The patient is not nervous/anxious and is not hyperactive.        Objective:       Vitals:    05/18/17 0910   BP: 104/70   Pulse: (!) 120   Resp: 18   Temp: 98.7 °F (37.1 °C)   TempSrc: Oral   SpO2: 96%   Weight: 78.1 kg (172 lb 2.9 oz)   Height: 5' 5.5" (1.664 m)     Physical Exam   Constitutional: She is oriented to person, place, and time. She appears well-developed and well-nourished. No distress.   HENT:   Head: Normocephalic and atraumatic.   Right Ear: External ear normal.   Left Ear: External ear normal.   Nose: Nose normal.   Mouth/Throat: Oropharynx is clear and moist.   Eyes: Conjunctivae and EOM are normal. Pupils are equal, round, and reactive to light. No scleral icterus.   Neck: Normal range of motion. Neck supple. No JVD present. No tracheal deviation present. No thyromegaly present.   Cardiovascular: Normal rate, regular rhythm, normal heart sounds and intact distal pulses.  Exam reveals no gallop and no friction rub.    No " murmur heard.  Pulmonary/Chest: Effort normal and breath sounds normal. No accessory muscle usage. No respiratory distress. She has no decreased breath sounds. She has no rhonchi. She has no rales. She exhibits no tenderness.   Abdominal: Soft. Bowel sounds are normal. She exhibits no distension and no mass. There is no tenderness. There is no rebound and no guarding.   Musculoskeletal: Normal range of motion. She exhibits no edema, tenderness or deformity.   Lymphadenopathy:     She has no cervical adenopathy.   Neurological: She is alert and oriented to person, place, and time. No cranial nerve deficit. She exhibits normal muscle tone. Coordination normal.   Skin: Skin is warm, dry and intact. No bruising and no ecchymosis noted. She is not diaphoretic. No cyanosis or erythema. No pallor. Nails show no clubbing.   Psychiatric: She has a normal mood and affect. Her behavior is normal. Judgment and thought content normal.       CT of Brain 2/25/16:  History:    Altered mental status  Findings: No intracranial hemorrhage or acute focal brain parenchymal abnormality is identified.  There is mild generalized atrophy. Calvarium is intact. Visualized paranasal sinuses and mastoid air cells are clear.    Assessment:       51-year-old postmenopausal female with a history of pancytopenia related to SLE and iron deficiency anemia who presents for worsening normocytic anemia.  She underwent a bone marrow biopsy on 2/19/2016 which demonstrated trilineage hematopoiesis without significant abnormality.  In 12/2016 she underwent EGD and colonoscopy ([facility]) without source of blood loss identified.    Workup has been unremarkable thus far and I believe anemia is secondary to chronic disease/inflammation from SLE.  The leukopenia is likely autoimmune in etiology.  Counts today remain stable with ANC 1.8, hemoglobin 10.6, and platelet count 174.  We will have the patient follow-up in 4 months with repeat  labs.      Progressive normocytic anemia secondary to chronic disease/SLE  --Continue to monitor, repeat CBC, CMP, LDH, reticulocyte count, iron studies in 3-4 months  --Repeat bone marrow biopsy upon significant progression

## 2017-05-22 ENCOUNTER — OFFICE VISIT (OUTPATIENT)
Dept: OTOLARYNGOLOGY | Facility: CLINIC | Age: 52
End: 2017-05-22
Payer: COMMERCIAL

## 2017-05-22 ENCOUNTER — OFFICE VISIT (OUTPATIENT)
Dept: ORTHOPEDICS | Facility: CLINIC | Age: 52
End: 2017-05-22
Payer: COMMERCIAL

## 2017-05-22 VITALS
WEIGHT: 175.25 LBS | TEMPERATURE: 99 F | HEIGHT: 65 IN | BODY MASS INDEX: 29.2 KG/M2 | HEART RATE: 96 BPM | SYSTOLIC BLOOD PRESSURE: 108 MMHG | DIASTOLIC BLOOD PRESSURE: 77 MMHG

## 2017-05-22 VITALS
WEIGHT: 173.31 LBS | DIASTOLIC BLOOD PRESSURE: 81 MMHG | TEMPERATURE: 99 F | BODY MASS INDEX: 28.84 KG/M2 | HEART RATE: 92 BPM | SYSTOLIC BLOOD PRESSURE: 121 MMHG

## 2017-05-22 DIAGNOSIS — M32.9 SYSTEMIC LUPUS ERYTHEMATOSUS, UNSPECIFIED SLE TYPE, UNSPECIFIED ORGAN INVOLVEMENT STATUS: ICD-10-CM

## 2017-05-22 DIAGNOSIS — Z98.890 STATUS POST CARPAL TUNNEL RELEASE: Primary | ICD-10-CM

## 2017-05-22 DIAGNOSIS — Z09 POSTOP CHECK: ICD-10-CM

## 2017-05-22 DIAGNOSIS — R68.2 DRY MOUTH: Primary | ICD-10-CM

## 2017-05-22 PROCEDURE — 3079F DIAST BP 80-89 MM HG: CPT | Mod: S$GLB,,, | Performed by: ORTHOPAEDIC SURGERY

## 2017-05-22 PROCEDURE — 99024 POSTOP FOLLOW-UP VISIT: CPT | Mod: S$GLB,,, | Performed by: PHYSICIAN ASSISTANT

## 2017-05-22 PROCEDURE — 3074F SYST BP LT 130 MM HG: CPT | Mod: S$GLB,,, | Performed by: ORTHOPAEDIC SURGERY

## 2017-05-22 PROCEDURE — 99999 PR PBB SHADOW E&M-EST. PATIENT-LVL IV: CPT | Mod: PBBFAC,,, | Performed by: PHYSICIAN ASSISTANT

## 2017-05-22 PROCEDURE — 99999 PR PBB SHADOW E&M-EST. PATIENT-LVL III: CPT | Mod: PBBFAC,,, | Performed by: ORTHOPAEDIC SURGERY

## 2017-05-22 PROCEDURE — 1160F RVW MEDS BY RX/DR IN RCRD: CPT | Mod: S$GLB,,, | Performed by: ORTHOPAEDIC SURGERY

## 2017-05-22 PROCEDURE — 99204 OFFICE O/P NEW MOD 45 MIN: CPT | Mod: S$GLB,,, | Performed by: ORTHOPAEDIC SURGERY

## 2017-05-22 RX ORDER — AZATHIOPRINE 50 MG/1
50 TABLET ORAL DAILY
COMMUNITY
End: 2017-06-01 | Stop reason: SDUPTHER

## 2017-05-22 NOTE — PROGRESS NOTES
Subjective:       Patient ID: Ruth Messina is a 51 y.o. female.    Chief Complaint: Mouth Injury    Patient is a very pleasant 51 year old female here to see me today for the first time for evaluation of her mouth.  She says that her symptoms first started after starting on Norvasc.  She has recently stopped her Norvasc, and her symptoms did improve.  She then resumed her Norvasc and her symptoms did return as far as oral cavity pain and dryness.  She uses Biotene mouth rinse once daily, and Crest toothpaste.  She does not smoke, never smoker.  She finds that her mouth is dry all day, not worse at any specific time of day.  She does have a history of lupus, is on prednisone 5 mg daily.  She does not have an issue with dry eye.  She last saw her dentist several months ago, and had some treatments done at that time.        Review of Systems   Constitutional: Positive for fatigue. Negative for chills, fever and unexpected weight change.   HENT: Positive for mouth sores. Negative for congestion, dental problem, ear discharge, ear pain, facial swelling, hearing loss, nosebleeds, postnasal drip, rhinorrhea, sinus pressure, sneezing, sore throat, tinnitus, trouble swallowing and voice change.    Eyes: Negative for redness, itching and visual disturbance.   Respiratory: Positive for shortness of breath (with exertion). Negative for cough, choking and wheezing.    Cardiovascular: Negative for chest pain and palpitations.   Gastrointestinal: Negative for abdominal pain.        No reflux.   Musculoskeletal: Negative for gait problem.   Skin: Negative for rash.   Neurological: Positive for dizziness (she relates possibly to her BP medication). Negative for light-headedness and headaches.   All other systems reviewed and are negative.      Objective:      Physical Exam   Constitutional: She is oriented to person, place, and time. She appears well-developed and well-nourished. No distress.   HENT:   Head: Normocephalic and  atraumatic.   Right Ear: Tympanic membrane, external ear and ear canal normal.   Left Ear: Tympanic membrane, external ear and ear canal normal.   Nose: Nose normal. No mucosal edema, rhinorrhea, nasal deformity or septal deviation. No epistaxis. Right sinus exhibits no maxillary sinus tenderness and no frontal sinus tenderness. Left sinus exhibits no maxillary sinus tenderness and no frontal sinus tenderness.   Mouth/Throat: Uvula is midline, oropharynx is clear and moist and mucous membranes are normal. Mucous membranes are not pale and not dry. No dental caries. No oropharyngeal exudate or posterior oropharyngeal erythema.   Diffuse nodularity of minor salivary glands of lower lip, no ulceration or masses seen or felt, saliva thick   Eyes: Conjunctivae, EOM and lids are normal. Pupils are equal, round, and reactive to light. Right eye exhibits no chemosis. Left eye exhibits no chemosis. Right conjunctiva is not injected. Left conjunctiva is not injected. No scleral icterus. Right eye exhibits normal extraocular motion and no nystagmus. Left eye exhibits normal extraocular motion and no nystagmus.   Neck: Trachea normal and phonation normal. No tracheal tenderness present. No tracheal deviation present. No thyroid mass and no thyromegaly present.   Cardiovascular: Intact distal pulses.    Pulmonary/Chest: Effort normal. No stridor. No respiratory distress.   Abdominal: She exhibits no distension.   Lymphadenopathy:        Head (right side): No submental, no submandibular, no preauricular, no posterior auricular and no occipital adenopathy present.        Head (left side): No submental, no submandibular, no preauricular, no posterior auricular and no occipital adenopathy present.     She has no cervical adenopathy.   Neurological: She is alert and oriented to person, place, and time. No cranial nerve deficit.   Skin: Skin is warm and dry. No rash noted. No erythema.   Psychiatric: She has a normal mood and affect.  Her behavior is normal.       Anti-SSA: elevated, done 3/2013      Assessment:       1. Dry mouth    2. Systemic lupus erythematosus, unspecified SLE type, unspecified organ involvement status        Plan:       1.  Dry mouth:  She has not had issue with dry mouth previously, but it has been an issue for her over the last few months.  She has noted some improvement with stopping her Norvasc.  She does have an autoimmune disorder, and has had an elevated Anti-SSA titer in the past.  I will send a copy of this note to her rheumatologist for further evaluation and to see if a salivary gland biopsy is necessary (may not be necessary if diagnosis is complete with above lab testing and clinical symptoms).  If minor salivary gland biopsy is necessary, will have her return to clinic.  In the interim, will try a steroid containing mouth rinse from [facility].  2.  SLE:  As above.

## 2017-05-22 NOTE — PROGRESS NOTES
Subjective:      Patient ID: Ruth Messina is a 51 y.o. female.    Chief Complaint: Pain of the Right Wrist    HPI   The patient is status post right carpal tunnel release performed on 5/11/17. She is having some pain with this side compared to the left side. The patient is here today for suture removal.   The patient denies any numbness or tingling.     Review of Systems   Constitution: Negative for chills, fever and night sweats.   Respiratory: Negative for cough, shortness of breath and wheezing.    Gastrointestinal: Negative for diarrhea, nausea and vomiting.   Neurological: Negative for brief paralysis.   Psychiatric/Behavioral: Negative for altered mental status.         Objective:            General    Constitutional: She is oriented to person, place, and time. She appears well-developed and well-nourished.   Neck: Normal range of motion.   Cardiovascular: Normal rate and regular rhythm.    Pulmonary/Chest: Effort normal.   Abdominal: Soft.   Neurological: She is alert and oriented to person, place, and time.   Psychiatric: She has a normal mood and affect. Her behavior is normal.             Right Hand/Wrist Exam     Inspection   Effusion: Wrist - absent Hand -  absent  Deformity: Wrist - deformity Hand -  deformity    Tenderness   The patient is tender to palpation of the kaminski area.    Comments:  Incision is clean, dry and intact.  No evidence of infection.                          Assessment:       Encounter Diagnoses   Name Primary?    Status post carpal tunnel release Yes    Postop check           Plan:       Ruth was seen today for pain.    Diagnoses and all orders for this visit:    Status post carpal tunnel release    Postop check        The patient had sutures removed today without incident.  She was instructed to leave hand clean and dry for 24 hours.  She was educated on at-home exercises and desensitizing massage.  Patient will return for what may be a final checkup in 4 weeks.    The  patient is continuing to do well with her left side which was released on 3/24/17.          Suzanne Quesada PA-C

## 2017-05-22 NOTE — Clinical Note
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  Would you want to see her?  Any need for a salivary biopsy?  Thanks.

## 2017-05-22 NOTE — LETTER
May 22, 2017      Janeen Delacruz MD  [id number] [facility]  [place] [address]           [facility]  [address] [place] [address]  [place] [address]  Phone: [phone]  Fax: [phone]          Patient: Ruth Messina   MR Number: [id number]   YOB: 1965   Date of Visit: 5/22/2017       Dear Dr. Janeen Delacruz:    Thank you for referring Ruth Messina to me for evaluation. Attached you will find relevant portions of my assessment and plan of care.    If you have questions, please do not hesitate to call me. I look forward to following Ruth Messina along with you.    Sincerely,    Suzanne Quesada PA-C    Enclosure  CC:  No Recipients    If you would like to receive this communication electronically, please contact [email or web address] or [phone] to request more information on [employer] Link access.    For providers and/or their staff who would like to refer a patient to [facility], please contact us through our one-stop-shop provider referral line, [facility] , at [phone].    If you feel you have received this communication in error or would no longer like to receive these types of communications, please e-mail [email or web address]

## 2017-05-26 DIAGNOSIS — M32.19 CNS LUPUS: ICD-10-CM

## 2017-05-26 DIAGNOSIS — G05.3 CNS LUPUS: ICD-10-CM

## 2017-05-26 RX ORDER — PREDNISONE 5 MG/1
TABLET ORAL
Qty: 30 TABLET | Refills: 0 | Status: SHIPPED | OUTPATIENT
Start: 2017-05-26 | End: 2017-07-28 | Stop reason: SDUPTHER

## 2017-05-26 RX ORDER — PREDNISONE 5 MG/1
5 TABLET ORAL DAILY
Qty: 30 TABLET | Refills: 0 | OUTPATIENT
Start: 2017-05-26

## 2017-06-01 ENCOUNTER — LAB VISIT (OUTPATIENT)
Dept: LAB | Facility: HOSPITAL | Age: 52
End: 2017-06-01
Attending: INTERNAL MEDICINE
Payer: COMMERCIAL

## 2017-06-01 ENCOUNTER — OFFICE VISIT (OUTPATIENT)
Dept: RHEUMATOLOGY | Facility: CLINIC | Age: 52
End: 2017-06-01
Payer: COMMERCIAL

## 2017-06-01 VITALS
BODY MASS INDEX: 28.65 KG/M2 | DIASTOLIC BLOOD PRESSURE: 80 MMHG | SYSTOLIC BLOOD PRESSURE: 139 MMHG | HEART RATE: 104 BPM | WEIGHT: 172.19 LBS

## 2017-06-01 DIAGNOSIS — G05.3 CNS LUPUS: ICD-10-CM

## 2017-06-01 DIAGNOSIS — G05.3 CNS LUPUS: Primary | ICD-10-CM

## 2017-06-01 DIAGNOSIS — M35.00 SECONDARY SJOGREN'S SYNDROME: ICD-10-CM

## 2017-06-01 DIAGNOSIS — M32.19 OTHER SYSTEMIC LUPUS ERYTHEMATOSUS WITH OTHER ORGAN INVOLVEMENT: ICD-10-CM

## 2017-06-01 DIAGNOSIS — M32.19 CNS LUPUS: ICD-10-CM

## 2017-06-01 DIAGNOSIS — M32.19 CNS LUPUS: Primary | ICD-10-CM

## 2017-06-01 PROBLEM — M32.9 SYSTEMIC LUPUS ERYTHEMATOSUS: Status: ACTIVE | Noted: 2017-06-01

## 2017-06-01 LAB
ALBUMIN SERPL BCP-MCNC: 3.8 G/DL
ALP SERPL-CCNC: 89 U/L
ALT SERPL W/O P-5'-P-CCNC: 14 U/L
ANION GAP SERPL CALC-SCNC: 14 MMOL/L
AST SERPL-CCNC: 20 U/L
BACTERIA #/AREA URNS HPF: ABNORMAL /HPF
BASOPHILS # BLD AUTO: 0.01 K/UL
BASOPHILS NFR BLD: 0.2 %
BILIRUB SERPL-MCNC: 0.6 MG/DL
BILIRUB UR QL STRIP: NEGATIVE
BUN SERPL-MCNC: 12 MG/DL
C3 SERPL-MCNC: 107 MG/DL
C4 SERPL-MCNC: 18 MG/DL
CALCIUM SERPL-MCNC: 10.1 MG/DL
CHLORIDE SERPL-SCNC: 107 MMOL/L
CLARITY UR: CLEAR
CO2 SERPL-SCNC: 25 MMOL/L
COLOR UR: YELLOW
CREAT SERPL-MCNC: 1 MG/DL
CREAT UR-MCNC: 119 MG/DL
CRP SERPL-MCNC: 0.4 MG/L
DIFFERENTIAL METHOD: ABNORMAL
EOSINOPHIL # BLD AUTO: 0 K/UL
EOSINOPHIL NFR BLD: 0.4 %
ERYTHROCYTE [DISTWIDTH] IN BLOOD BY AUTOMATED COUNT: 13.9 %
ERYTHROCYTE [SEDIMENTATION RATE] IN BLOOD BY WESTERGREN METHOD: 65 MM/HR
EST. GFR  (AFRICAN AMERICAN): >60 ML/MIN/1.73 M^2
EST. GFR  (NON AFRICAN AMERICAN): >60 ML/MIN/1.73 M^2
GLUCOSE SERPL-MCNC: 102 MG/DL
GLUCOSE UR QL STRIP: NEGATIVE
HCT VFR BLD AUTO: 34.7 %
HGB BLD-MCNC: 10.7 G/DL
HGB UR QL STRIP: NEGATIVE
KETONES UR QL STRIP: NEGATIVE
LEUKOCYTE ESTERASE UR QL STRIP: ABNORMAL
LYMPHOCYTES # BLD AUTO: 1.2 K/UL
LYMPHOCYTES NFR BLD: 26.1 %
MCH RBC QN AUTO: 28 PG
MCHC RBC AUTO-ENTMCNC: 30.8 %
MCV RBC AUTO: 91 FL
MICROSCOPIC COMMENT: ABNORMAL
MONOCYTES # BLD AUTO: 0.5 K/UL
MONOCYTES NFR BLD: 10.4 %
NEUTROPHILS # BLD AUTO: 2.9 K/UL
NEUTROPHILS NFR BLD: 62.9 %
NITRITE UR QL STRIP: NEGATIVE
PH UR STRIP: 6 [PH] (ref 5–8)
PLATELET # BLD AUTO: 227 K/UL
PMV BLD AUTO: 10.6 FL
POTASSIUM SERPL-SCNC: 3.1 MMOL/L
PROT SERPL-MCNC: 8.1 G/DL
PROT UR QL STRIP: NEGATIVE
PROT UR-MCNC: 11 MG/DL
PROT/CREAT RATIO, UR: 0.09
RBC # BLD AUTO: 3.82 M/UL
SODIUM SERPL-SCNC: 146 MMOL/L
SP GR UR STRIP: <=1.005 (ref 1–1.03)
SQUAMOUS #/AREA URNS HPF: 3 /HPF
URN SPEC COLLECT METH UR: ABNORMAL
WBC # BLD AUTO: 4.6 K/UL
WBC #/AREA URNS HPF: 10 /HPF (ref 0–5)

## 2017-06-01 PROCEDURE — 85025 COMPLETE CBC W/AUTO DIFF WBC: CPT

## 2017-06-01 PROCEDURE — 85651 RBC SED RATE NONAUTOMATED: CPT | Mod: PO

## 2017-06-01 PROCEDURE — 80053 COMPREHEN METABOLIC PANEL: CPT

## 2017-06-01 PROCEDURE — 81000 URINALYSIS NONAUTO W/SCOPE: CPT | Mod: PO

## 2017-06-01 PROCEDURE — 99214 OFFICE O/P EST MOD 30 MIN: CPT | Mod: S$GLB,,, | Performed by: INTERNAL MEDICINE

## 2017-06-01 PROCEDURE — 84156 ASSAY OF PROTEIN URINE: CPT

## 2017-06-01 PROCEDURE — 86160 COMPLEMENT ANTIGEN: CPT | Mod: 59

## 2017-06-01 PROCEDURE — 36415 COLL VENOUS BLD VENIPUNCTURE: CPT | Mod: PO

## 2017-06-01 PROCEDURE — 99999 PR PBB SHADOW E&M-EST. PATIENT-LVL III: CPT | Mod: PBBFAC,,, | Performed by: INTERNAL MEDICINE

## 2017-06-01 PROCEDURE — 86140 C-REACTIVE PROTEIN: CPT

## 2017-06-01 PROCEDURE — 86160 COMPLEMENT ANTIGEN: CPT

## 2017-06-01 RX ORDER — AZATHIOPRINE 50 MG/1
50 TABLET ORAL 2 TIMES DAILY
Qty: 60 TABLET | Refills: 2 | Status: SHIPPED | OUTPATIENT
Start: 2017-06-01 | End: 2017-11-20 | Stop reason: SDUPTHER

## 2017-06-01 NOTE — PATIENT INSTRUCTIONS
Rituximab injection  What is this medicine?  RITUXIMAB (ri TUX i mab) is a monoclonal antibody. It is used commonly to treat non-Hodgkin lymphoma and other conditions. It is also used to treat rheumatoid arthritis (RA). In RA, this medicine slows the inflammatory process and help reduce joint pain and swelling. This medicine is often used with other cancer or arthritis medications.  How should I use this medicine?  This medicine is for infusion into a vein. It is administered in a hospital or clinic by a specially trained health care professional.  A special MedGuide will be given to you by the pharmacist with each prescription and refill. Be sure to read this information carefully each time.  Talk to your pediatrician regarding the use of this medicine in children. This medicine is not approved for use in children.  What side effects may I notice from receiving this medicine?  Side effects that you should report to your doctor or health care professional as soon as possible:  · allergic reactions like skin rash, itching or hives, swelling of the face, lips, or tongue  · low blood counts - this medicine may decrease the number of white blood cells, red blood cells and platelets. You may be at increased risk for infections and bleeding.  · signs of infection - fever or chills, cough, sore throat, pain or difficulty passing urine  · signs of decreased platelets or bleeding - bruising, pinpoint red spots on the skin, black, tarry stools, blood in the urine  · signs of decreased red blood cells - unusually weak or tired, fainting spells, lightheadedness  · breathing problems  · confused, not responsive  · chest pain  · fast, irregular heartbeat  · feeling faint or lightheaded, falls  · mouth sores  · redness, blistering, peeling or loosening of the skin, including inside the mouth  · stomach pain  · swelling of the ankles, feet, or hands  · trouble passing urine or change in the amount of urine  Side effects that  usually do not require medical attention (report to your doctor or other health care professional if they continue or are bothersome):  · anxiety  · headache  · loss of appetite  · muscle aches  · nausea  · night sweats  What may interact with this medicine?  · cisplatin  · medicines for blood pressure  · some other medicines for arthritis  · vaccines  What if I miss a dose?  It is important not to miss a dose. Call your doctor or health care professional if you are unable to keep an appointment.  Where should I keep my medicine?  This drug is given in a hospital or clinic and will not be stored at home.  What should I tell my health care provider before I take this medicine?  They need to know if you have any of these conditions:  · blood disorders  · heart disease  · history of hepatitis B  · infection (especially a virus infection such as chickenpox, cold sores, or herpes)  · irregular heartbeat  · kidney disease  · lung or breathing disease, like asthma  · lupus  · an unusual or allergic reaction to rituximab, mouse proteins, other medicines, foods, dyes, or preservatives  · pregnant or trying to get pregnant  · breast-feeding  What should I watch for while using this medicine?  Report any side effects that you notice during your treatment right away, such as changes in your breathing, fever, chills, dizziness or lightheadedness. These effects are more common with the first dose.  Visit your prescriber or health care professional for checks on your progress. You will need to have regular blood work. Report any other side effects. The side effects of this medicine can continue after you finish your treatment. Continue your course of treatment even though you feel ill unless your doctor tells you to stop.  Call your doctor or health care professional for advice if you get a fever, chills or sore throat, or other symptoms of a cold or flu. Do not treat yourself. This drug decreases your body's ability to fight  infections. Try to avoid being around people who are sick.  This medicine may increase your risk to bruise or bleed. Call your doctor or health care professional if you notice any unusual bleeding.  Be careful brushing and flossing your teeth or using a toothpick because you may get an infection or bleed more easily. If you have any dental work done, tell your dentist you are receiving this medicine.  Avoid taking products that contain aspirin, acetaminophen, ibuprofen, naproxen, or ketoprofen unless instructed by your doctor. These medicines may hide a fever.  Do not become pregnant while taking this medicine. Women should inform their doctor if they wish to become pregnant or think they might be pregnant. There is a potential for serious side effects to an unborn child. Talk to your health care professional or pharmacist for more information. Do not breast-feed an infant while taking this medicine.  Date Last Reviewed:   NOTE:This sheet is a summary. It may not cover all possible information. If you have questions about this medicine, talk to your doctor, pharmacist, or health care provider. Copyright© 2016 [employer]

## 2017-06-01 NOTE — PROGRESS NOTES
RHEUMATOLOGY CLINIC FOLLOW UP VISIT  Chief complaints:-  I have severe fatigue.     HPI:-  Ruth Melendrez a 51 y.o. pleasant female comes in for a follow up visit with above chief complaints. She has CNS lupus and is only on 50 mg daily imuran and 5 mg daily prednisone because of intolerance to multiple therapies. She used to see rheumatologist here in our clinic - last visit was in 05/2013. Since then she has been following up with Dr. Sue at [facility] . She was hospitalized in 02/2016 and 05/2016 for severe flare up in her neuropsychiatric lupus with delusions, hallucinations and headaches which was treated with iv steroids. In her last visit with  , she was strongly recommended to try cytoxan or at least benlysta to treat her condition . She declined and establish care with me.  Since her serologies showed very active lupus, after prolonged discussions she agreed to try imuran 50 mg and got 500mg solumedrol infusion. She denies any pain today. Worsening fatigue is her only problem today. Her pain scale is zero. She denies alopecia, photosensitivity, malar rash, eye redness, dry eyes, dry mouth, nasal ulcers, oral ulcers, difficulty chewing, difficulty swallowing, choking on dry foods, exertional or respirophasic chest pain, abdominal pain with eating, blood in stools, nausea, vomiting, blood in urine, back pain radiating to legs, leg claudication, headaches, hallucinations, delusions, paranoia, numbness , tingling or weakness.    Review of Systems   Constitutional: Positive for malaise/fatigue. Negative for chills, fever and weight loss.   HENT: Negative for ear discharge, ear pain, hearing loss, nosebleeds and sore throat.    Eyes: Negative for blurred vision, double vision, photophobia, discharge and redness.   Respiratory: Negative for cough, hemoptysis, sputum production and shortness of breath.    Cardiovascular: Negative for chest pain,  palpitations and claudication.   Gastrointestinal: Negative for abdominal pain, constipation, diarrhea, melena, nausea and vomiting.   Genitourinary: Negative for dysuria, frequency, hematuria and urgency.   Musculoskeletal: Negative for back pain, falls, joint pain, myalgias and neck pain.   Skin: Negative for itching and rash.   Neurological: Negative for dizziness, tremors, sensory change, speech change, focal weakness, seizures, loss of consciousness, weakness and headaches.   Endo/Heme/Allergies: Negative for environmental allergies. Does not bruise/bleed easily.   Psychiatric/Behavioral: Negative for depression, hallucinations and memory loss. The patient is not nervous/anxious and does not have insomnia.        Past Medical History:   Diagnosis Date    Anemia     with chronic disease lupus    Encounter for blood transfusion     G-6-PD deficiency     Heart murmur     MVP 1986    Hypertension     Lupus     Raynaud's syndrome        Past Surgical History:   Procedure Laterality Date    BONE MARROW BIOPSY      CARPAL TUNNEL RELEASE Left     widsom tooth extraction          Social History   Substance Use Topics    Smoking status: Never Smoker    Smokeless tobacco: Never Used    Alcohol use No       Family History   Problem Relation Age of Onset    Arthritis Mother     Heart disease Mother     Hypertension Mother     Diabetes Father     Heart disease Father     Hypertension Father     Cancer Brother        Review of patient's allergies indicates:   Allergen Reactions    Ace inhibitors     Amoxicillin     Aspirin     Cellcept [mycophenolate mofetil]     Cephalosporins     Clindamycin     Hydrochlorothiazide     Imuran [azathioprine sodium]     Lasix [furosemide]     Methotrexate analogues     Nsaids (non-steroidal anti-inflammatory drug)     Pcn [penicillins]     Plaquenil [hydroxychloroquine]     Shellfish containing products     Sulfa (sulfonamide antibiotics)     Zofran  [ondansetron hcl (pf)]            Physical examination:-    Vitals:    06/01/17 1041   BP: 139/80   Pulse: 104   Weight: 78.1 kg (172 lb 2.9 oz)   PainSc: 0-No pain       Physical Exam   Constitutional: She is oriented to person, place, and time and well-developed, well-nourished, and in no distress. No distress.   HENT:   Head: Normocephalic.   Mouth/Throat: Oropharynx is clear and moist.   Eyes: Conjunctivae and EOM are normal. Pupils are equal, round, and reactive to light.   Neck: Normal range of motion. Neck supple.   Cardiovascular: Normal rate and intact distal pulses.    Pulmonary/Chest: Effort normal. No respiratory distress.   Abdominal: Soft. There is no tenderness.   Musculoskeletal:   No synovitis over small joints of hands or feet.  No effusion over large joints.   Neurological: She is alert and oriented to person, place, and time. No cranial nerve deficit.   Skin: Skin is warm. No rash noted. No erythema.   Psychiatric: Mood and affect normal.   Nursing note and vitals reviewed.      Labs:-  Results for ADELA LEMON (MRN [id number]) as of 1/5/2017 12:40   Ref. Range 12/8/2016 09:04   SHEILA HEP-2 Titer Unknown Positive 1:640 Sp...   Anti-SSA Antibody Latest Ref Range: 0.00 - 19.99 EU 31.31 (H)   Anti-SSA Interpretation Latest Ref Range: Negative  Positive (A)   Anti-SSB Antibody Latest Ref Range: 0.00 - 19.99 EU 9.10   Anti-SSB Interpretation Latest Ref Range: Negative  Negative   ds DNA Ab Latest Ref Range: Negative 1:10  Positive 1:40 (A)   Anti Sm Antibody Latest Ref Range: 0.00 - 19.99 .08 (H)   Anti-Sm Interpretation Latest Ref Range: Negative  Positive (A)   Anti Sm/RNP Antibody Latest Ref Range: 0.00 - 19.99 .83 (H)   Anti-Sm/RNP Interpretation Latest Ref Range: Negative  Positive (A)   Complement (C-3) Latest Ref Range: 50 - 180 mg/dL 60   Complement (C-4) Latest Ref Range: 11 - 44 mg/dL 7 (L)     Old and Outside medical records :-  Reviewed old and all outside medical records  available in Care Everywhere.     Medication List with Changes/Refills   Current Medications    AMLODIPINE (NORVASC) 10 MG TABLET    Take 10 mg by mouth once daily.    CALCIUM-MAGNESIUM-ZINC ORAL    Take by mouth once daily.    CHOLECALCIFEROL, VITAMIN D3, (VITAMIN D3) 5,000 UNIT TAB    Take 5,000 Units by mouth once daily.    FERROUS GLUCONATE (FERGON) 324 MG TABLET    Take 324 mg by mouth daily with breakfast.    FISH OIL-OMEGA-3 FATTY ACIDS 300-1,000 MG CAPSULE    Take 2 g by mouth once daily.    FLUOCINOLONE (DERMA-SMOOTHE) 0.01 % EXTERNAL OIL    Apply oil to scalp twice a day    HYDROCODONE-ACETAMINOPHEN 10-325MG (NORCO)  MG TAB    Take 1-2 tablets every 3-8 hrs as needed for pain.    LOSARTAN (COZAAR) 100 MG TABLET    Take 100 mg by mouth once daily.    MULTIVITAMIN CAPSULE    Take 1 capsule by mouth once daily.    PREDNISONE (DELTASONE) 5 MG TABLET    TAKE ONE TABLET BY MOUTH ONCE DAILY   Changed and/or Refilled Medications    Modified Medication Previous Medication    AZATHIOPRINE (IMURAN) 50 MG TAB azathioprine (IMURAN) 50 mg Tab       Take 1 tablet (50 mg total) by mouth 2 (two) times daily.    Take 50 mg by mouth once daily.       Assessment/Plans:-  # CNS lupus:-  Extremely complicated uncontrolled lupus with fatigue and periodic exacerbations of serious neurological and psychiatric complications , extreme serological activity of lupus. Tolerating 50 mg daily imuran without any problem now. She does not want to try rituxan or cytoxan or benlysta because of their potential side effect profile. I requested to start Rituximab and she denied that. After prolonged discussion, she agreed to increase dose of imuran to 50mg twice daily. Closely monitor for lupus activity.   - azathioprine (IMURAN) 50 mg Tab; Take 1 tablet (50 mg total) by mouth 2 (two) times daily.  Dispense: 60 tablet; Refill: 2  - C3 complement; Future  - C4 complement; Future  - CBC auto differential; Future  - Comprehensive metabolic  panel; Future  - C-reactive protein; Future  - Sedimentation rate, manual; Future  - Protein / creatinine ratio, urine; Future  - Urinalysis; Future    # Other systemic lupus erythematosus with other organ involvement  Check serologies.   - azathioprine (IMURAN) 50 mg Tab; Take 1 tablet (50 mg total) by mouth 2 (two) times daily.  Dispense: 60 tablet; Refill: 2  - C3 complement; Future  - C4 complement; Future  - CBC auto differential; Future  - Comprehensive metabolic panel; Future  - C-reactive protein; Future  - Sedimentation rate, manual; Future  - Protein / creatinine ratio, urine; Future  - Urinalysis; Future    # Secondary Sjogren's syndrome:-  Requested to try artifical salivary substitutes for dry mouth and continue follow up with .  - azathioprine (IMURAN) 50 mg Tab; Take 1 tablet (50 mg total) by mouth 2 (two) times daily.  Dispense: 60 tablet; Refill: 2  - C3 complement; Future  - C4 complement; Future  - CBC auto differential; Future  - Comprehensive metabolic panel; Future  - C-reactive protein; Future  - Sedimentation rate, manual; Future  - Protein / creatinine ratio, urine; Future  - Urinalysis; Future       # RTC in 3 months.     Time spent: 40 minutes in face to face discussion concerning diagnosis, prognosis, review of lab and test results, benefits of treatment as well as management of disease, counseling of patient and coordination of care between various health care providers . Greater than half the time spent was used for coordination of care and counseling of patient.    Disclaimer: This note was prepared using voice recognition system and is likely to have sound alike errors .  Please call me with any questions

## 2017-06-02 ENCOUNTER — TELEPHONE (OUTPATIENT)
Dept: RHEUMATOLOGY | Facility: CLINIC | Age: 52
End: 2017-06-02

## 2017-06-02 NOTE — TELEPHONE ENCOUNTER
"Spoke with pt and she read her office notes on the patient protal from her visit on 6/1/17 and saw the follow parts " She does not want to put any medicine into her body since "they are all poison" . Her fear for adverse effects is because of her mother who was diagnosed with rheumatoid arthritis had multiple complications from remicade. "  Pt states that she did not say that. States she said that medications were necessary evils because that is how she feels but she does take her medications. States that she did not expect anything regarding her mother to be in her notes either. States that this is why she left her previous Rheumatologist. Advised her that I would send a message to Dr. CHUNG regarding her concerns and we will let her know what the next step is. Pt verbalized understanding.   "

## 2017-06-02 NOTE — TELEPHONE ENCOUNTER
----- Message from Leticia Smith sent at 6/2/2017 12:07 PM CDT -----  Contact: Pt  Pt has some questions for the nurse regarding her office visit from yesterday. Pls call pt back at [phone].

## 2017-06-06 ENCOUNTER — TELEPHONE (OUTPATIENT)
Dept: RHEUMATOLOGY | Facility: CLINIC | Age: 52
End: 2017-06-06

## 2017-06-06 ENCOUNTER — TELEPHONE (OUTPATIENT)
Dept: HEMATOLOGY/ONCOLOGY | Facility: CLINIC | Age: 52
End: 2017-06-06

## 2017-06-06 NOTE — TELEPHONE ENCOUNTER
----- Message from Nato Messina sent at 6/6/2017 10:08 AM CDT -----  Contact: Pt   Pt is requesting to speak with the nurse in regards to getting office notes and labs faxed to her pcp../ It can be faxed to Dr. Janeen Delacruz at #[phone]/fax./ Pt can be reached at [phone]

## 2017-06-06 NOTE — TELEPHONE ENCOUNTER
Spoke with pt and advised that I faxed over the office notes on 6.2.17 to Dr. Delacruz and received confirmation it was sent at 1:22 pm. Advised her that I re faxed them today just in case. Pt verbalized understanding

## 2017-06-06 NOTE — TELEPHONE ENCOUNTER
----- Message from Tom Holden sent at 6/6/2017  1:03 PM CDT -----  Contact: nurse from Janeen Shaw (Nurse) Called in regards to pt  urinalyses result for follow up visit  Please call [phone]   Or  fax # [phone]   Thank You

## 2017-06-07 ENCOUNTER — TELEPHONE (OUTPATIENT)
Dept: RHEUMATOLOGY | Facility: CLINIC | Age: 52
End: 2017-06-07

## 2017-06-07 NOTE — TELEPHONE ENCOUNTER
----- Message from Calvin Hinds sent at 6/7/2017  1:10 PM CDT -----  Contact: [phone]  Returning call ,please call back  @ [phone].  Thanks-[facility]

## 2017-06-07 NOTE — TELEPHONE ENCOUNTER
Spoke with pt and she states that since the Imuran was increased to 1 tablet two times daily she has been having swelling and pain in her mouth and pressure behind her eyes. States that it began on 6/1/17 when she started it twice a day and she thought as more medication got into her system it would get better but it has not and it has gotten worse. Rates her pain at a 5 on a scale of 0 to 10 since she has taken a Zyrtec. States she one 1 tablet this morning. Please Advise.

## 2017-06-07 NOTE — TELEPHONE ENCOUNTER
Advised patient of below, pt verbalized understanding. States that she tolerates the Imuran once daily better but she still takes Zyrtec every few days with it.

## 2017-06-07 NOTE — TELEPHONE ENCOUNTER
----- Message from Jocelyne Espinoza sent at 6/7/2017 12:29 PM CDT -----  Contact: patient  Calling concerning having a reaction to Imuran. Please call patient ASAP @ [phone]. Thanks, domenico Brush [place] [address] - [facility] [address] [facility]  [address] [facility] [address]  Phone: [phone] Fax: [phone]

## 2017-06-14 ENCOUNTER — OFFICE VISIT (OUTPATIENT)
Dept: DERMATOLOGY | Facility: CLINIC | Age: 52
End: 2017-06-14
Payer: COMMERCIAL

## 2017-06-14 DIAGNOSIS — L65.8 FEMALE PATTERN ALOPECIA: ICD-10-CM

## 2017-06-14 DIAGNOSIS — L93.0 DISCOID LUPUS: Primary | ICD-10-CM

## 2017-06-14 PROCEDURE — 99213 OFFICE O/P EST LOW 20 MIN: CPT | Mod: 25,S$GLB,,

## 2017-06-14 PROCEDURE — 11900 INJECT SKIN LESIONS </W 7: CPT | Mod: S$GLB,,,

## 2017-06-14 PROCEDURE — 99999 PR PBB SHADOW E&M-EST. PATIENT-LVL III: CPT | Mod: PBBFAC,,,

## 2017-06-14 NOTE — PROGRESS NOTES
Subjective:       Patient ID:  Ruth Messina is a 51 y.o. female who presents for   Chief Complaint   Patient presents with    Follow-up     50 yo with history of CNS lupus (+ SHEILA 1:640 speckled, + anti-SSA, Sm, dsDNA, Sm/RNP), currently followed by Dr. Turner. 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 She is currently using derma-smoothe qd, rogain 5% qd, and taking Biotin 5000 mcg qd with improvement.      Prior treatments: ILK x 3, clobetasol foam         Review of Systems   Constitutional: Negative for fever and chills.   Skin: Positive for rash. Negative for itching, daily sunscreen use, activity-related sunscreen use and recent sunburn.   Hematologic/Lymphatic: Does not bruise/bleed easily.        Objective:    Physical Exam   Constitutional: She appears well-developed and well-nourished. No distress.   Neurological: She is alert and oriented to person, place, and time. She is not disoriented.   Psychiatric: She has a normal mood and affect.   Skin:   Areas Examined (abnormalities noted in diagram):   Scalp / Hair Palpated and Inspected  Head / Face Inspection Performed  Neck Inspection Performed              Diagram Legend     Erythematous scaling macule/papule c/w actinic keratosis       Vascular papule c/w angioma      Pigmented verrucoid papule/plaque c/w seborrheic keratosis      Yellow umbilicated papule c/w sebaceous hyperplasia      Irregularly shaped tan macule c/w lentigo     1-2 mm smooth white papules consistent with Milia      Movable subcutaneous cyst with punctum c/w epidermal inclusion cyst      Subcutaneous movable cyst c/w pilar cyst      Firm pink to brown papule c/w dermatofibroma      Pedunculated fleshy papule(s) c/w skin tag(s)      Evenly pigmented macule c/w junctional nevus     Mildly variegated pigmented, slightly irregular-bordered macule c/w mildly atypical nevus      Flesh colored to evenly pigmented papule c/w intradermal nevus       Pink  pearly papule/plaque c/w basal cell carcinoma      Erythematous hyperkeratotic cursted plaque c/w SCC      Surgical scar with no sign of skin cancer recurrence      Open and closed comedones      Inflammatory papules and pustules      Verrucoid papule consistent consistent with wart     Erythematous eczematous patches and plaques     Dystrophic onycholytic nail with subungual debris c/w onychomycosis     Umbilicated papule    Erythematous-base heme-crusted tan verrucoid plaque consistent with inflamed seborrheic keratosis     Erythematous Silvery Scaling Plaque c/w Psoriasis     See annotation      Assessment / Plan:        Discoid lupus  Female pattern alopecia  -     triamcinolone acetonide injection 10 mg; 1 mL (10 mg total) by Other route one time.  Intralesional Kenalog 5mg/cc (1 cc total) injected into 2 lesions on the scalp today after obtaining verbal consent including risk of surrounding hypopigmentation. Patient tolerated procedure well.  Continue derma-smooth scalp oil qd  Continue Biotin 5000 mcg qd  Continue Rogain 5% qd  Discussed importance of sun protection with at least SPF 50 daily. Cetaphil with SPF samples provided.         Return in about 4 weeks (around 7/12/2017).

## 2017-06-16 ENCOUNTER — TELEPHONE (OUTPATIENT)
Dept: OTOLARYNGOLOGY | Facility: CLINIC | Age: 52
End: 2017-06-16

## 2017-06-16 NOTE — TELEPHONE ENCOUNTER
[facility] and [facility] have no record of a prescription for her. Please send in on Monday. Will contact the patient to notify this will be sent for her.

## 2017-06-16 NOTE — TELEPHONE ENCOUNTER
She should have had a prescription for a mouth rinse sent to [facility]- if it was not sent in I can fill out on Monday.

## 2017-06-16 NOTE — TELEPHONE ENCOUNTER
----- Message from Erin Messina sent at 6/16/2017  9:14 AM CDT -----  Patient states she is checking the status of her Rx. Patient states she do not know the name of the medication. Please adv/call [phone].//thanks. cw

## 2017-06-19 ENCOUNTER — OFFICE VISIT (OUTPATIENT)
Dept: ORTHOPEDICS | Facility: CLINIC | Age: 52
End: 2017-06-19
Payer: COMMERCIAL

## 2017-06-19 VITALS
DIASTOLIC BLOOD PRESSURE: 78 MMHG | WEIGHT: 175.25 LBS | HEIGHT: 65 IN | HEART RATE: 105 BPM | BODY MASS INDEX: 29.2 KG/M2 | SYSTOLIC BLOOD PRESSURE: 103 MMHG

## 2017-06-19 DIAGNOSIS — Z09 POSTOP CHECK: ICD-10-CM

## 2017-06-19 DIAGNOSIS — Z98.890 STATUS POST CARPAL TUNNEL RELEASE: Primary | ICD-10-CM

## 2017-06-19 PROCEDURE — 99999 PR PBB SHADOW E&M-EST. PATIENT-LVL III: CPT | Mod: PBBFAC,,, | Performed by: PHYSICIAN ASSISTANT

## 2017-06-19 PROCEDURE — 99024 POSTOP FOLLOW-UP VISIT: CPT | Mod: S$GLB,,, | Performed by: PHYSICIAN ASSISTANT

## 2017-06-19 NOTE — PROGRESS NOTES
Subjective:      Patient ID: Ruth Messina is a 51 y.o. female.    Chief Complaint: Pain of the Right Wrist    HPI  The patient is status post right carpal tunnel release performed on 5/11/17. She also had a left carpal tunnel release performed on 3/24/17.   The patient is doing very well.  She denies any pain, numbness or tingling.    Review of Systems   Constitution: Negative for chills, fever and night sweats.   Respiratory: Negative for cough, shortness of breath and wheezing.    Musculoskeletal: Negative for joint pain and joint swelling.   Gastrointestinal: Negative for diarrhea, nausea and vomiting.   Neurological: Negative for brief paralysis, numbness and paresthesias.   Psychiatric/Behavioral: Negative for altered mental status.         Objective:            General    Constitutional: She is oriented to person, place, and time. She appears well-developed and well-nourished.   Neck: Normal range of motion.   Cardiovascular: Normal rate and regular rhythm.    Pulmonary/Chest: Effort normal.   Abdominal: Soft.   Neurological: She is alert and oriented to person, place, and time.   Psychiatric: She has a normal mood and affect. Her behavior is normal.             Right Hand/Wrist Exam     Inspection   Scars: Hand -  present    Range of Motion     Wrist   Extension: normal   Flexion: normal   Pronation: normal   Supination: normal       Left Hand/Wrist Exam     Inspection   Scars: Hand -  present    Range of Motion     Wrist   Extension: normal   Flexion: normal   Pronation: normal   Supination: normal                           Assessment:       Encounter Diagnoses   Name Primary?    Status post carpal tunnel release Yes    Postop check           Plan:       Ruth was seen today for pain.    Diagnoses and all orders for this visit:    Status post carpal tunnel release    Postop check      Patient is doing very well following bilateral carpal tunnel release surgery.  She has no restrictions regarding her  hands and/or wrist.    This is her final check up regarding her recent surgeries.           Suzanne Quesada PA-C

## 2017-06-19 NOTE — LETTER
June 19, 2017      Janeen Delacruz MD  [id number] [facility]  [place] [address]           [facility]  [address] [place] [address]  [place] [address]  Phone: [phone]  Fax: [phone]          Patient: Ruth Messina   MR Number: [id number]   YOB: 1965   Date of Visit: 6/19/2017       Dear Dr. Janeen Delacruz:    Thank you for referring Ruth Messina to me for evaluation. Attached you will find relevant portions of my assessment and plan of care.    If you have questions, please do not hesitate to call me. I look forward to following Ruth Messina along with you.    Sincerely,    Suzanne Quesada PA-C    Enclosure  CC:  No Recipients    If you would like to receive this communication electronically, please contact [email or web address] or [phone] to request more information on [facility] access.    For providers and/or their staff who would like to refer a patient to [facility], please contact us through our one-stop-shop provider referral line, [facility] , at [phone].    If you feel you have received this communication in error or would no longer like to receive these types of communications, please e-mail [email or web address]

## 2017-07-10 ENCOUNTER — OFFICE VISIT (OUTPATIENT)
Dept: SURGERY | Facility: CLINIC | Age: 52
End: 2017-07-10
Payer: COMMERCIAL

## 2017-07-10 VITALS
TEMPERATURE: 79 F | BODY MASS INDEX: 28.84 KG/M2 | HEART RATE: 102 BPM | SYSTOLIC BLOOD PRESSURE: 123 MMHG | WEIGHT: 173.31 LBS | DIASTOLIC BLOOD PRESSURE: 97 MMHG

## 2017-07-10 DIAGNOSIS — K42.9 UMBILICAL HERNIA WITHOUT OBSTRUCTION AND WITHOUT GANGRENE: Primary | ICD-10-CM

## 2017-07-10 PROBLEM — G56.01 RIGHT CARPAL TUNNEL SYNDROME: Chronic | Status: RESOLVED | Noted: 2017-05-11 | Resolved: 2017-07-10

## 2017-07-10 PROBLEM — G56.02 CARPAL TUNNEL SYNDROME OF LEFT WRIST: Status: RESOLVED | Noted: 2017-02-21 | Resolved: 2017-07-10

## 2017-07-10 PROBLEM — Z01.818 PRE-OPERATIVE CLEARANCE: Status: RESOLVED | Noted: 2017-02-21 | Resolved: 2017-07-10

## 2017-07-10 PROBLEM — G56.02 LEFT CARPAL TUNNEL SYNDROME: Status: RESOLVED | Noted: 2017-03-24 | Resolved: 2017-07-10

## 2017-07-10 PROBLEM — G56.01 CARPAL TUNNEL SYNDROME, RIGHT: Status: RESOLVED | Noted: 2017-03-03 | Resolved: 2017-07-10

## 2017-07-10 PROCEDURE — 99999 PR PBB SHADOW E&M-EST. PATIENT-LVL IV: CPT | Mod: PBBFAC,,, | Performed by: SURGERY

## 2017-07-10 PROCEDURE — 99244 OFF/OP CNSLTJ NEW/EST MOD 40: CPT | Mod: S$GLB,,, | Performed by: SURGERY

## 2017-07-10 RX ORDER — METHYLPREDNISOLONE SODIUM SUCCINATE 500 MG/8ML
100 INJECTION INTRAMUSCULAR; INTRAVENOUS ONCE
Status: CANCELLED | OUTPATIENT
Start: 2017-09-29

## 2017-07-10 RX ORDER — ONDANSETRON 4 MG/1
8 TABLET, ORALLY DISINTEGRATING ORAL EVERY 8 HOURS PRN
Status: CANCELLED | OUTPATIENT
Start: 2017-07-10

## 2017-07-10 NOTE — PATIENT INSTRUCTIONS
We can schedule your surgery for September 29 at 7 in the morning.  The hospital will call you with a time for arrival.    You'll need to stop taking your Imuran and fish oil on September 22.    We'll arrange for you to see me on September 25 as we have to do a preop history and physical.    We will arrange for you to have labs on that day.    Typically the hospital calls you with a time for arrival on the day before your surgery as surgery times are subject to change.  You may be able to go home the same day or you may have to stay overnight depending on your postoperative discomfort

## 2017-07-10 NOTE — PROGRESS NOTES
Patient ID: Ruth Messina is a 51 y.o. female.    Umbilical hernia  Chief Complaint: Consult and Hernia      HPI:  Patient has had an umbilical hernia for many years.  Is slowly enlarging causing her occasional pain.  She presents now to discuss hernia repair.    The patient had multiple questions about the operative approach, need for mesh, antibiotics that would be used due to her multiple ALLERGIES, recurrence and the need for steroids in the postoperative period      Washings were answered.    The patient states that the hernia has slowly enlarged over time.  It occasionally bulges but she is able to push it back in.  It is not associated with any significant abdominal pain, nausea or vomiting.        Review of Systems   Constitutional: Negative for appetite change, chills, fatigue, fever and unexpected weight change.   HENT: Negative for hearing loss and rhinorrhea.    Eyes: Negative for visual disturbance.   Respiratory: Negative for apnea, cough, shortness of breath and wheezing.    Cardiovascular: Negative for chest pain and palpitations.   Gastrointestinal: Negative for abdominal distention, abdominal pain, blood in stool, constipation, diarrhea, nausea and vomiting.        Bulge at the umbilicus   Genitourinary: Negative for dysuria, frequency and urgency.   Musculoskeletal: Negative for arthralgias and neck pain.   Skin: Negative for rash.   Neurological: Negative for seizures, weakness, numbness and headaches.   Hematological: Negative for adenopathy. Does not bruise/bleed easily.   Psychiatric/Behavioral: Negative for hallucinations. The patient is not nervous/anxious.        Current Outpatient Prescriptions   Medication Sig Dispense Refill    amlodipine (NORVASC) 10 MG tablet Take 10 mg by mouth once daily.      azathioprine (IMURAN) 50 mg Tab Take 1 tablet (50 mg total) by mouth 2 (two) times daily. (Patient taking differently: Take 50 mg by mouth once daily. ) 60 tablet 2     CALCIUM-MAGNESIUM-ZINC ORAL Take by mouth once daily.      cholecalciferol, vitamin D3, (VITAMIN D3) 5,000 unit Tab Take 5,000 Units by mouth once daily.      ferrous gluconate (FERGON) 324 MG tablet Take 324 mg by mouth daily with breakfast.      fish oil-omega-3 fatty acids 300-1,000 mg capsule Take 2 g by mouth once daily.      fluocinolone (DERMA-SMOOTHE) 0.01 % external oil Apply oil to scalp twice a day 1 Bottle 5    hydrocodone-acetaminophen 10-325mg (NORCO)  mg Tab Take 1-2 tablets every 3-8 hrs as needed for pain. 40 tablet 0    losartan (COZAAR) 100 MG tablet Take 100 mg by mouth once daily.      multivitamin capsule Take 1 capsule by mouth once daily.      predniSONE (DELTASONE) 5 MG tablet TAKE ONE TABLET BY MOUTH ONCE DAILY 30 tablet 0     No current facility-administered medications for this visit.        Review of patient's allergies indicates:   Allergen Reactions    Cellcept [mycophenolate mofetil] Swelling     Lips      Ace inhibitors Other (See Comments)     unkown    Amoxicillin Other (See Comments)     + allergy test    Aspirin Other (See Comments)     G-6-PD def.     Beta-blockers (beta-adrenergic blocking agts) Other (See Comments)     Cannot take due to Raynaud's syndrome    Cephalosporins Other (See Comments)     + allergy test    Clindamycin Other (See Comments)     + allergy test    Hydrochlorothiazide Other (See Comments)     unknown    Ibuprofen Other (See Comments)     G-6-PD def.    Lasix [furosemide] Other (See Comments)     unknown    Lisinopril Other (See Comments)     unknown    Methotrexate analogues Other (See Comments)     Pancytopenia      Nsaids (non-steroidal anti-inflammatory drug) Other (See Comments)     G-6-PD def.    Pcn [penicillins] Other (See Comments)     + allergy test    Plaquenil [hydroxychloroquine] Other (See Comments)     G-6-PD def.    Shellfish containing products Other (See Comments)     + allergy test    Zofran [ondansetron  hcl (pf)] Other (See Comments)     Chest pain      Sulfa (sulfonamide antibiotics) Rash       Past Medical History:   Diagnosis Date    Anemia     with chronic disease lupus    Encounter for blood transfusion     G-6-PD deficiency     Heart murmur     MVP 1986    Hypertension     Lupus     Raynaud's syndrome        Past Surgical History:   Procedure Laterality Date    BONE MARROW BIOPSY      CARPAL TUNNEL RELEASE Left     widsom tooth extraction         Family History   Problem Relation Age of Onset    Arthritis Mother     Heart disease Mother     Hypertension Mother     Diabetes Father     Heart disease Father     Hypertension Father     Cancer Brother        Social History     Social History    Marital status: Single     Spouse name: N/A    Number of children: N/A    Years of education: N/A     Occupational History    Not on file.     Social History Main Topics    Smoking status: Never Smoker    Smokeless tobacco: Never Used    Alcohol use No    Drug use: No    Sexual activity: Not on file     Other Topics Concern    Are You Pregnant Or Think You May Be? No    Breast-Feeding No     Social History Narrative    No narrative on file       Vitals:    07/10/17 1552   BP: (!) 123/97   Pulse: 102   Temp: (!) 78.6 °F (25.9 °C)       Physical Exam   Constitutional: She is oriented to person, place, and time. She appears well-developed and well-nourished.   HENT:   Head: Normocephalic.   Eyes: EOM are normal. Pupils are equal, round, and reactive to light.   Neck: No JVD present. No tracheal deviation present. No thyromegaly present.   Cardiovascular: Normal rate, regular rhythm and normal heart sounds.    Pulmonary/Chest: Breath sounds normal. She has no wheezes.   Abdominal: Soft. Bowel sounds are normal. She exhibits no distension and no mass. There is no hepatosplenomegaly. There is no tenderness. There is no rigidity, no rebound and no guarding. A hernia (reducible umbilical with a small  hernia superior and to the right) is present.   Musculoskeletal: Normal range of motion. She exhibits no edema.   Lymphadenopathy:     She has no cervical adenopathy.   Neurological: She is oriented to person, place, and time.   Skin: Skin is warm and dry. No rash noted. No erythema.   Psychiatric: She has a normal mood and affect.   Vitals reviewed.      Please note that the EKG, echocardiogram and CTA done recently were all reviewed    Assessment & Plan:    umbilical hernia.  There is also a smaller hernia just above and to the right of the umbilicus.    I recommend a robotic hernia repair with primary closure of the hernia and then mesh placement.  The rationale for this is the size of the hernia, approximately 5 cm combined and the fact that she is on chronic steroids for her lupus.    The risk of hernia repair includes infection, bleeding, abscess, hematoma and a small chance of recurrence.    We will schedule surgery for September 29 at the patient's request.  She will follow-up with us on September 25 for preop exam.  We will ask her to stop her Imuran and fish oil on September 22    Please note that 40 minutes were spent

## 2017-07-12 ENCOUNTER — OFFICE VISIT (OUTPATIENT)
Dept: DERMATOLOGY | Facility: CLINIC | Age: 52
End: 2017-07-12
Payer: COMMERCIAL

## 2017-07-12 DIAGNOSIS — L65.8 FEMALE PATTERN ALOPECIA: ICD-10-CM

## 2017-07-12 DIAGNOSIS — L93.0 DISCOID LUPUS: Primary | ICD-10-CM

## 2017-07-12 PROCEDURE — 99999 PR PBB SHADOW E&M-EST. PATIENT-LVL III: CPT | Mod: PBBFAC,,,

## 2017-07-12 PROCEDURE — 11900 INJECT SKIN LESIONS </W 7: CPT | Mod: S$GLB,,,

## 2017-07-12 PROCEDURE — 99499 UNLISTED E&M SERVICE: CPT | Mod: S$GLB,,,

## 2017-07-12 NOTE — PROGRESS NOTES
Subjective:       Patient ID:  Ruth Messina is a 51 y.o. female who presents for   Chief Complaint   Patient presents with    Follow-up     improving     52 yo with history of CNS lupus (+ SHEILA 1:640 speckled, + anti-SSA, Sm, dsDNA, Sm/RNP), currently followed by Dr. Turner. 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 Last seen 6/14/2017 for ILK injections to scalp. She is also currently using derma-smoothe qd, rogain 5% qd, and taking Biotin 5000 mcg qd with improvement.      Prior treatments: ILK x 4, clobetasol foam           Review of Systems   Constitutional: Negative for fever and chills.   Skin: Positive for itching (scalp), daily sunscreen use and activity-related sunscreen use. Negative for rash.   Hematologic/Lymphatic: Does not bruise/bleed easily.        Objective:    Physical Exam   Constitutional: She appears well-developed and well-nourished. No distress.   Neurological: She is alert and oriented to person, place, and time. She is not disoriented.   Psychiatric: She has a normal mood and affect.   Skin:   Areas Examined (abnormalities noted in diagram):   Scalp / Hair Palpated and Inspected  Head / Face Inspection Performed              Diagram Legend     Erythematous scaling macule/papule c/w actinic keratosis       Vascular papule c/w angioma      Pigmented verrucoid papule/plaque c/w seborrheic keratosis      Yellow umbilicated papule c/w sebaceous hyperplasia      Irregularly shaped tan macule c/w lentigo     1-2 mm smooth white papules consistent with Milia      Movable subcutaneous cyst with punctum c/w epidermal inclusion cyst      Subcutaneous movable cyst c/w pilar cyst      Firm pink to brown papule c/w dermatofibroma      Pedunculated fleshy papule(s) c/w skin tag(s)      Evenly pigmented macule c/w junctional nevus     Mildly variegated pigmented, slightly irregular-bordered macule c/w mildly atypical nevus      Flesh colored to evenly pigmented papule  c/w intradermal nevus       Pink pearly papule/plaque c/w basal cell carcinoma      Erythematous hyperkeratotic cursted plaque c/w SCC      Surgical scar with no sign of skin cancer recurrence      Open and closed comedones      Inflammatory papules and pustules      Verrucoid papule consistent consistent with wart     Erythematous eczematous patches and plaques     Dystrophic onycholytic nail with subungual debris c/w onychomycosis     Umbilicated papule    Erythematous-base heme-crusted tan verrucoid plaque consistent with inflamed seborrheic keratosis     Erythematous Silvery Scaling Plaque c/w Psoriasis     See annotation      Assessment / Plan:        Discoid lupus  Female pattern alopecia  -     triamcinolone acetonide injection 5 mg; 0.5 mLs (5 mg total) by Other route one time.  ILK injections #5 performed today: Intralesional Kenalog 5mg/cc (1 cc total) injected into 2 lesions on the scalp today after obtaining verbal consent including risk of surrounding hypopigmentation. Patient tolerated procedure well.  Continue derma-smooth scalp oil qd  Continue Biotin 5000 mcg qd  Continue Rogain 5% qd  Discussed importance of sun protection with at least SPF 50 daily. Cetaphil with SPF samples provided.         Return in about 4 weeks (around 8/9/2017).

## 2017-07-13 ENCOUNTER — OFFICE VISIT (OUTPATIENT)
Dept: OBSTETRICS AND GYNECOLOGY | Facility: CLINIC | Age: 52
End: 2017-07-13
Payer: COMMERCIAL

## 2017-07-13 VITALS
DIASTOLIC BLOOD PRESSURE: 78 MMHG | SYSTOLIC BLOOD PRESSURE: 122 MMHG | HEIGHT: 65 IN | WEIGHT: 172 LBS | BODY MASS INDEX: 28.66 KG/M2

## 2017-07-13 DIAGNOSIS — R92.8 ABNORMAL MAMMOGRAM: Primary | ICD-10-CM

## 2017-07-13 DIAGNOSIS — N95.1 HOT FLASHES DUE TO MENOPAUSE: ICD-10-CM

## 2017-07-13 PROCEDURE — 99203 OFFICE O/P NEW LOW 30 MIN: CPT | Mod: S$GLB,,, | Performed by: NURSE PRACTITIONER

## 2017-07-13 PROCEDURE — 99999 PR PBB SHADOW E&M-EST. PATIENT-LVL III: CPT | Mod: PBBFAC,,, | Performed by: NURSE PRACTITIONER

## 2017-07-13 RX ORDER — PAROXETINE HYDROCHLORIDE HEMIHYDRATE 12.5 MG/1
12.5 TABLET, FILM COATED, EXTENDED RELEASE ORAL EVERY MORNING
Qty: 30 TABLET | Refills: 11 | Status: SHIPPED | OUTPATIENT
Start: 2017-07-13 | End: 2018-01-10

## 2017-07-13 NOTE — PROGRESS NOTES
"  CC: hot flashes and abnormal mmg  Ruth Messina is a 51 y.o. female  presents for hot flashes.  LMP: No LMP recorded. Patient is postmenopausal..    Seemed to have worsened over last 2 yrs.  Does not want to try OTC meds such as estroven and does not want HRT use. Would like to try an antidepressant was told by last gyn this may help.   Moving all her care to [facility] - had mmg at [facility] and needed repeat in 6 mths with mmg and ultrasound on both breast and axilla due to axillary nodes noted.   We are requesting films for pt to have when she comes for appointment.   Not due for annual exam until     Past Medical History:   Diagnosis Date    Anemia     with chronic disease lupus    Encounter for blood transfusion     G-6-PD deficiency     Heart murmur     MVP     Hypertension     Lupus     Raynaud's syndrome      Past Surgical History:   Procedure Laterality Date    BONE MARROW BIOPSY      CARPAL TUNNEL RELEASE Left     widsom tooth extraction       Social History     Social History    Marital status: Single     Spouse name: N/A    Number of children: N/A    Years of education: N/A     Occupational History    Not on file.     Social History Main Topics    Smoking status: Never Smoker    Smokeless tobacco: Never Used    Alcohol use No    Drug use: No    Sexual activity: No     Other Topics Concern    Are You Pregnant Or Think You May Be? No    Breast-Feeding No     Social History Narrative    No narrative on file     Family History   Problem Relation Age of Onset    Arthritis Mother     Heart disease Mother     Hypertension Mother     Diabetes Father     Heart disease Father     Hypertension Father     Cancer Brother     Eczema Neg Hx     Lupus Neg Hx     Psoriasis Neg Hx      OB History      Para Term  AB Living    2 2 2          SAB TAB Ectopic Multiple Live Births                       /78   Ht 5' 5" (1.651 m)   Wt 78 kg (172 lb)   BMI 28.62 " kg/m²       ROS:  GENERAL: Denies weight gain or weight loss. Feeling well overall.   SKIN: Denies rash or lesions.   HEAD: Denies head injury or headache.   NODES: Denies enlarged lymph nodes.   CHEST: Denies chest pain or shortness of breath.   CARDIOVASCULAR: Denies palpitations or left sided chest pain.   ABDOMEN: No abdominal pain, constipation, diarrhea, nausea, vomiting or rectal bleeding.   URINARY: No frequency, dysuria, hematuria, or burning on urination.  REPRODUCTIVE: See HPI.   BREASTS: The patient performs breast self-examination and denies pain, lumps, or nipple discharge.   HEMATOLOGIC: No easy bruisability or excessive bleeding.   MUSCULOSKELETAL: Denies joint pain or swelling.   NEUROLOGIC: Denies syncope or weakness.   PSYCHIATRIC: Denies depression, anxiety or mood swings.    PHYSICAL EXAM:  APPEARANCE: Well nourished, well developed, in no acute distress.  AFFECT: WNL, alert and oriented x 3  SKIN: No acne or hirsutism  deferred  Physical Exam    1. Abnormal mammogram  US Breast Bilateral Complete    Mammo Digital Diagnostic Bilateral With CAD   2. Hot flashes due to menopause  paroxetine (PAXIL-CR) 12.5 MG 24 hr tablet    AND PLAN:    Patient was counseled today on A.C.S. Pap guidelines and recommendations for yearly pelvic exams, mammograms and monthly self breast exams; to see her PCP for other health maintenance.     Start paxil for hot flashes - see back in December when due for annual exam and see how medication is working

## 2017-07-24 ENCOUNTER — TELEPHONE (OUTPATIENT)
Dept: RADIOLOGY | Facility: HOSPITAL | Age: 52
End: 2017-07-24

## 2017-07-25 ENCOUNTER — HOSPITAL ENCOUNTER (OUTPATIENT)
Dept: RADIOLOGY | Facility: HOSPITAL | Age: 52
Discharge: HOME | End: 2017-07-25
Attending: NURSE PRACTITIONER
Payer: COMMERCIAL

## 2017-07-25 DIAGNOSIS — R92.8 ABNORMAL MAMMOGRAM: ICD-10-CM

## 2017-07-25 DIAGNOSIS — I10 BENIGN ESSENTIAL HTN: Primary | ICD-10-CM

## 2017-07-25 DIAGNOSIS — Z01.810 PREOP CARDIOVASCULAR EXAM: ICD-10-CM

## 2017-07-25 PROCEDURE — 77062 BREAST TOMOSYNTHESIS BI: CPT | Mod: 26,,, | Performed by: RADIOLOGY

## 2017-07-25 PROCEDURE — 77066 DX MAMMO INCL CAD BI: CPT | Mod: 26,,, | Performed by: RADIOLOGY

## 2017-07-25 PROCEDURE — 76642 ULTRASOUND BREAST LIMITED: CPT | Mod: 26,50,, | Performed by: RADIOLOGY

## 2017-07-25 PROCEDURE — 77062 BREAST TOMOSYNTHESIS BI: CPT | Mod: TC

## 2017-07-25 PROCEDURE — 76642 ULTRASOUND BREAST LIMITED: CPT | Mod: TC,50,PO

## 2017-07-26 DIAGNOSIS — R92.8 FOLLOW-UP EXAMINATION OF ABNORMAL MAMMOGRAM: Primary | ICD-10-CM

## 2017-07-28 DIAGNOSIS — M32.19 CNS LUPUS: ICD-10-CM

## 2017-07-28 DIAGNOSIS — G05.3 CNS LUPUS: ICD-10-CM

## 2017-07-28 RX ORDER — PREDNISONE 5 MG/1
TABLET ORAL
Qty: 30 TABLET | Refills: 0 | Status: SHIPPED | OUTPATIENT
Start: 2017-07-28 | End: 2017-08-26 | Stop reason: SDUPTHER

## 2017-07-31 ENCOUNTER — TELEPHONE (OUTPATIENT)
Dept: CARDIOLOGY | Facility: CLINIC | Age: 52
End: 2017-07-31

## 2017-07-31 ENCOUNTER — OFFICE VISIT (OUTPATIENT)
Dept: CARDIOLOGY | Facility: CLINIC | Age: 52
End: 2017-07-31
Payer: COMMERCIAL

## 2017-07-31 VITALS
BODY MASS INDEX: 29.32 KG/M2 | WEIGHT: 176 LBS | DIASTOLIC BLOOD PRESSURE: 80 MMHG | HEART RATE: 94 BPM | HEIGHT: 65 IN | SYSTOLIC BLOOD PRESSURE: 122 MMHG

## 2017-07-31 DIAGNOSIS — Z76.89 ESTABLISHING CARE WITH NEW DOCTOR, ENCOUNTER FOR: ICD-10-CM

## 2017-07-31 DIAGNOSIS — I10 BENIGN ESSENTIAL HTN: ICD-10-CM

## 2017-07-31 DIAGNOSIS — I10 ESSENTIAL HYPERTENSION: Primary | ICD-10-CM

## 2017-07-31 PROBLEM — D63.8 CHRONIC DISEASE ANEMIA: Chronic | Status: RESOLVED | Noted: 2017-02-27 | Resolved: 2017-07-31

## 2017-07-31 PROBLEM — R94.31 ABNORMAL ECG: Chronic | Status: RESOLVED | Noted: 2017-02-27 | Resolved: 2017-07-31

## 2017-07-31 PROBLEM — R07.9 CHEST PAIN: Status: RESOLVED | Noted: 2017-02-10 | Resolved: 2017-07-31

## 2017-07-31 PROBLEM — R00.0 TACHYCARDIA: Status: RESOLVED | Noted: 2017-02-10 | Resolved: 2017-07-31

## 2017-07-31 PROCEDURE — 93000 ELECTROCARDIOGRAM COMPLETE: CPT | Mod: S$GLB,,, | Performed by: NUCLEAR MEDICINE

## 2017-07-31 PROCEDURE — 99215 OFFICE O/P EST HI 40 MIN: CPT | Mod: S$GLB,,, | Performed by: NUCLEAR MEDICINE

## 2017-07-31 PROCEDURE — 99999 PR PBB SHADOW E&M-EST. PATIENT-LVL III: CPT | Mod: PBBFAC,,, | Performed by: NUCLEAR MEDICINE

## 2017-07-31 RX ORDER — SPIRONOLACTONE 25 MG/1
25 TABLET ORAL DAILY
Qty: 30 TABLET | Refills: 3 | Status: SHIPPED | OUTPATIENT
Start: 2017-07-31 | End: 2017-09-14 | Stop reason: SDUPTHER

## 2017-07-31 RX ORDER — CHLORTHALIDONE 25 MG/1
25 TABLET ORAL DAILY
Qty: 30 TABLET | Refills: 3 | Status: SHIPPED | OUTPATIENT
Start: 2017-07-31 | End: 2017-07-31

## 2017-07-31 NOTE — TELEPHONE ENCOUNTER
Called [facility] spoke with Rolanda informed pharmacy to cancel medication order Clorthalidone 25 mg and only fill medication spironolactone. Rolanda verbalized understanding states will deactivate medication clorthalidone.

## 2017-07-31 NOTE — PROGRESS NOTES
Subjective:   Patient ID:  Ruth Messina is a 51 y.o. female who presents for follow-up of Hypertension      HPI 1 - ESSENTIAL HYPERTENSION  HAVING PROBLEMS WITH GUM SWELLING AND TENDERNESS-  AMLODIPINE?  NO HX OF ANGINA OR EQUIVALENT  NO UNUSUAL PERAZA. NO ORTHOPNEA OR PND  NO PALPITATIONS. NO NEAR SYNCOPE OR SYNCOPE  NO EDEMA. NO CALVE TENDERNESS. NO INTERMITTENT CLAUDICATION  NO FOCAL CNS SYMPTOMS OR SIGNS TO SUGGEST TIA OR STROKE    Review of Systems   Constitution: Negative for chills, fever, weakness, night sweats, weight gain and weight loss.   HENT: Negative for headaches and nosebleeds.    Eyes: Negative for blurred vision, double vision and visual disturbance.   Cardiovascular: Negative for chest pain, dyspnea on exertion, irregular heartbeat, leg swelling, orthopnea, palpitations, paroxysmal nocturnal dyspnea and syncope.   Respiratory: Negative for cough, hemoptysis and wheezing.    Endocrine: Negative for polydipsia and polyuria.   Hematologic/Lymphatic: Does not bruise/bleed easily.   Skin: Negative for rash.   Musculoskeletal: Negative for joint pain, joint swelling, muscle weakness and myalgias.   Gastrointestinal: Negative for abdominal pain, hematemesis, jaundice and melena.   Genitourinary: Negative for dysuria, hematuria and nocturia.   Neurological: Negative for dizziness, focal weakness and sensory change.   Psychiatric/Behavioral: Negative for depression. The patient does not have insomnia and is not nervous/anxious.      Family History   Problem Relation Age of Onset    Arthritis Mother     Heart disease Mother     Hypertension Mother     Diabetes Father     Heart disease Father     Hypertension Father     Cancer Brother     Eczema Neg Hx     Lupus Neg Hx     Psoriasis Neg Hx      Past Medical History:   Diagnosis Date    Anemia     with chronic disease lupus    Encounter for blood transfusion     G-6-PD deficiency     Heart murmur     MVP 1986    Hypertension     Lupus      Raynaud's syndrome      Current Outpatient Prescriptions on File Prior to Visit   Medication Sig Dispense Refill    amlodipine (NORVASC) 10 MG tablet Take 10 mg by mouth once daily.      azathioprine (IMURAN) 50 mg Tab Take 1 tablet (50 mg total) by mouth 2 (two) times daily. (Patient taking differently: Take 50 mg by mouth once daily. ) 60 tablet 2    CALCIUM-MAGNESIUM-ZINC ORAL Take by mouth once daily.      cholecalciferol, vitamin D3, (VITAMIN D3) 5,000 unit Tab Take 5,000 Units by mouth once daily.      ferrous gluconate (FERGON) 324 MG tablet Take 324 mg by mouth daily with breakfast.      fish oil-omega-3 fatty acids 300-1,000 mg capsule Take 2 g by mouth once daily.      fluocinolone (DERMA-SMOOTHE) 0.01 % external oil Apply oil to scalp twice a day 1 Bottle 5    losartan (COZAAR) 100 MG tablet Take 100 mg by mouth once daily.      multivitamin capsule Take 1 capsule by mouth once daily.      predniSONE (DELTASONE) 5 MG tablet TAKE ONE TABLET BY MOUTH ONCE DAILY 30 tablet 0    paroxetine (PAXIL-CR) 12.5 MG 24 hr tablet Take 1 tablet (12.5 mg total) by mouth every morning. 30 tablet 11     No current facility-administered medications on file prior to visit.      Review of patient's allergies indicates:   Allergen Reactions    Cellcept [mycophenolate mofetil] Swelling     Lips      Ace inhibitors Other (See Comments)     unkown    Amoxicillin Other (See Comments)     + allergy test    Aspirin Other (See Comments)     G-6-PD def.     Beta-blockers (beta-adrenergic blocking agts) Other (See Comments)     Cannot take due to Raynaud's syndrome    Cephalosporins Other (See Comments)     + allergy test    Clindamycin Other (See Comments)     + allergy test    Hydrochlorothiazide Other (See Comments)     unknown    Ibuprofen Other (See Comments)     G-6-PD def.    Lasix [furosemide] Other (See Comments)     unknown    Lisinopril Other (See Comments)     unknown    Methotrexate analogues Other  (See Comments)     Pancytopenia      Nsaids (non-steroidal anti-inflammatory drug) Other (See Comments)     G-6-PD def.    Pcn [penicillins] Other (See Comments)     + allergy test    Plaquenil [hydroxychloroquine] Other (See Comments)     G-6-PD def.    Shellfish containing products Other (See Comments)     + allergy test    Zofran [ondansetron hcl (pf)] Other (See Comments)     Chest pain      Sulfa (sulfonamide antibiotics) Rash       Objective:     Physical Exam   Constitutional: She is oriented to person, place, and time. She appears well-developed. No distress.   HENT:   Head: Normocephalic.   Eyes: Conjunctivae are normal. Pupils are equal, round, and reactive to light. No scleral icterus.   Neck: Normal range of motion. Neck supple. Normal carotid pulses, no hepatojugular reflux and no JVD present. Carotid bruit is not present. No edema present. No thyroid mass and no thyromegaly present.   Cardiovascular: Normal rate, regular rhythm, S1 normal, S2 normal, normal heart sounds and intact distal pulses.  PMI is not displaced.  Exam reveals no gallop and no friction rub.    No murmur heard.  Pulses:       Carotid pulses are 2+ on the right side, and 2+ on the left side.       Radial pulses are 2+ on the right side, and 2+ on the left side.        Femoral pulses are 2+ on the right side, and 2+ on the left side.       Popliteal pulses are 2+ on the right side, and 2+ on the left side.        Dorsalis pedis pulses are 2+ on the right side, and 2+ on the left side.        Posterior tibial pulses are 2+ on the right side, and 2+ on the left side.   Pulmonary/Chest: Effort normal and breath sounds normal. She has no wheezes. She has no rales. She exhibits no tenderness.   Abdominal: Soft. Bowel sounds are normal. She exhibits no pulsatile midline mass and no mass. There is no hepatosplenomegaly. There is no tenderness.   Musculoskeletal: Normal range of motion. She exhibits no edema or tenderness.         Cervical back: Normal.        Thoracic back: Normal.        Lumbar back: Normal.   Lymphadenopathy:     She has no cervical adenopathy.     She has no axillary adenopathy.        Right: No supraclavicular adenopathy present.        Left: No supraclavicular adenopathy present.   Neurological: She is alert and oriented to person, place, and time. She has normal strength and normal reflexes. No sensory deficit. Gait normal.   Skin: Skin is warm. No rash noted. No cyanosis. No pallor. Nails show no clubbing.   Psychiatric: She has a normal mood and affect. Her speech is normal and behavior is normal. Cognition and memory are normal.       Assessment:     1. Essential hypertension      NO CLINICAL EVIDENCE OF ACTIVE MYOCARDIAL ISCHEMIA  NO ARRHYTHMIAS. NO ACUTE HF  CNS STATUS STABLE  Plan:     Essential hypertension  -     chlorthalidone (HYGROTEN) 25 MG Tab; Take 1 tablet (25 mg total) by mouth once daily.  Dispense: 30 tablet; Refill: 3      1- DC AMLODIPINE  2- START DIURETIC- CHLORTHALIDONE  3- RETURN IN 6 WEEKS.

## 2017-08-03 ENCOUNTER — TELEPHONE (OUTPATIENT)
Dept: CARDIOLOGY | Facility: CLINIC | Age: 52
End: 2017-08-03

## 2017-08-03 NOTE — TELEPHONE ENCOUNTER
----- Message from Claire Holden sent at 8/3/2017 12:19 PM CDT -----  Contact: Patient  Patient request a call back at [phone], Regards to faxing over information to her PCP.    Thanks  td

## 2017-08-07 ENCOUNTER — TELEPHONE (OUTPATIENT)
Dept: CARDIOLOGY | Facility: CLINIC | Age: 52
End: 2017-08-07

## 2017-08-25 DIAGNOSIS — G05.3 CNS LUPUS: ICD-10-CM

## 2017-08-25 DIAGNOSIS — M32.19 CNS LUPUS: ICD-10-CM

## 2017-08-26 RX ORDER — PREDNISONE 5 MG/1
TABLET ORAL
Qty: 30 TABLET | Refills: 0 | Status: SHIPPED | OUTPATIENT
Start: 2017-08-26 | End: 2017-10-10 | Stop reason: SDUPTHER

## 2017-09-05 ENCOUNTER — LAB VISIT (OUTPATIENT)
Dept: LAB | Facility: HOSPITAL | Age: 52
End: 2017-09-05
Attending: INTERNAL MEDICINE
Payer: COMMERCIAL

## 2017-09-05 ENCOUNTER — OFFICE VISIT (OUTPATIENT)
Dept: RHEUMATOLOGY | Facility: CLINIC | Age: 52
End: 2017-09-05
Payer: COMMERCIAL

## 2017-09-05 VITALS
SYSTOLIC BLOOD PRESSURE: 132 MMHG | DIASTOLIC BLOOD PRESSURE: 92 MMHG | WEIGHT: 177 LBS | HEART RATE: 105 BPM | BODY MASS INDEX: 29.46 KG/M2

## 2017-09-05 DIAGNOSIS — M32.19 CNS LUPUS: ICD-10-CM

## 2017-09-05 DIAGNOSIS — M32.19 CNS LUPUS: Primary | ICD-10-CM

## 2017-09-05 DIAGNOSIS — M35.00 SECONDARY SJOGREN'S SYNDROME: ICD-10-CM

## 2017-09-05 DIAGNOSIS — G05.3 CNS LUPUS: Primary | ICD-10-CM

## 2017-09-05 DIAGNOSIS — G05.3 CNS LUPUS: ICD-10-CM

## 2017-09-05 DIAGNOSIS — M32.19 OTHER SYSTEMIC LUPUS ERYTHEMATOSUS WITH OTHER ORGAN INVOLVEMENT: ICD-10-CM

## 2017-09-05 LAB
ALBUMIN SERPL BCP-MCNC: 3.5 G/DL
ALP SERPL-CCNC: 92 U/L
ALT SERPL W/O P-5'-P-CCNC: 12 U/L
ANION GAP SERPL CALC-SCNC: 7 MMOL/L
AST SERPL-CCNC: 17 U/L
BASOPHILS # BLD AUTO: 0.02 K/UL
BASOPHILS NFR BLD: 0.5 %
BILIRUB SERPL-MCNC: 0.5 MG/DL
BUN SERPL-MCNC: 15 MG/DL
CALCIUM SERPL-MCNC: 9.9 MG/DL
CHLORIDE SERPL-SCNC: 109 MMOL/L
CO2 SERPL-SCNC: 29 MMOL/L
CREAT SERPL-MCNC: 0.9 MG/DL
CRP SERPL-MCNC: 0.4 MG/L
DIFFERENTIAL METHOD: ABNORMAL
EOSINOPHIL # BLD AUTO: 0 K/UL
EOSINOPHIL NFR BLD: 0.8 %
ERYTHROCYTE [DISTWIDTH] IN BLOOD BY AUTOMATED COUNT: 14.2 %
ERYTHROCYTE [SEDIMENTATION RATE] IN BLOOD BY WESTERGREN METHOD: 63 MM/HR
EST. GFR  (AFRICAN AMERICAN): >60 ML/MIN/1.73 M^2
EST. GFR  (NON AFRICAN AMERICAN): >60 ML/MIN/1.73 M^2
GLUCOSE SERPL-MCNC: 75 MG/DL
HCT VFR BLD AUTO: 33.4 %
HGB BLD-MCNC: 10.7 G/DL
LYMPHOCYTES # BLD AUTO: 1.4 K/UL
LYMPHOCYTES NFR BLD: 36.4 %
MCH RBC QN AUTO: 28.4 PG
MCHC RBC AUTO-ENTMCNC: 32 G/DL
MCV RBC AUTO: 89 FL
MONOCYTES # BLD AUTO: 0.3 K/UL
MONOCYTES NFR BLD: 8.5 %
NEUTROPHILS # BLD AUTO: 2 K/UL
NEUTROPHILS NFR BLD: 53.8 %
PLATELET # BLD AUTO: 203 K/UL
PMV BLD AUTO: 10.3 FL
POTASSIUM SERPL-SCNC: 3.8 MMOL/L
PROT SERPL-MCNC: 8.2 G/DL
RBC # BLD AUTO: 3.77 M/UL
SODIUM SERPL-SCNC: 145 MMOL/L
WBC # BLD AUTO: 3.76 K/UL

## 2017-09-05 PROCEDURE — 86140 C-REACTIVE PROTEIN: CPT

## 2017-09-05 PROCEDURE — 3080F DIAST BP >= 90 MM HG: CPT | Mod: S$GLB,,, | Performed by: INTERNAL MEDICINE

## 2017-09-05 PROCEDURE — 3008F BODY MASS INDEX DOCD: CPT | Mod: S$GLB,,, | Performed by: INTERNAL MEDICINE

## 2017-09-05 PROCEDURE — 3075F SYST BP GE 130 - 139MM HG: CPT | Mod: S$GLB,,, | Performed by: INTERNAL MEDICINE

## 2017-09-05 PROCEDURE — 80053 COMPREHEN METABOLIC PANEL: CPT | Mod: PO

## 2017-09-05 PROCEDURE — 86160 COMPLEMENT ANTIGEN: CPT

## 2017-09-05 PROCEDURE — 86160 COMPLEMENT ANTIGEN: CPT | Mod: 59

## 2017-09-05 PROCEDURE — 99214 OFFICE O/P EST MOD 30 MIN: CPT | Mod: S$GLB,,, | Performed by: INTERNAL MEDICINE

## 2017-09-05 PROCEDURE — 85025 COMPLETE CBC W/AUTO DIFF WBC: CPT | Mod: PO

## 2017-09-05 PROCEDURE — 85651 RBC SED RATE NONAUTOMATED: CPT | Mod: PO

## 2017-09-05 PROCEDURE — 99999 PR PBB SHADOW E&M-EST. PATIENT-LVL III: CPT | Mod: PBBFAC,,, | Performed by: INTERNAL MEDICINE

## 2017-09-05 PROCEDURE — 36415 COLL VENOUS BLD VENIPUNCTURE: CPT | Mod: PO

## 2017-09-05 NOTE — PROGRESS NOTES
RHEUMATOLOGY CLINIC FOLLOW UP VISIT  Chief complaints:-  I'm tired    HPI:-  Ruth Melendrez a 51 y.o. pleasant female with PMH of anemia, CNS lupus, Raynauds, G6PD deficiency comes in for a follow up visit with complaint of fatigue. Pt stated she sleeps about 7-8 hours a night and that her fatigue does not keep from her doing her ADLs or her usual activities, she just tabares through she stated. She has CNS lupus and is only on 50 mg daily imuran and 5 mg daily prednisone because of intolerance to multiple therapies. She used to see rheumatologist here in our clinic - last visit was in 05/2013. Since then she had been following up with Dr. Sue at [facility] . She was hospitalized in 02/2016 and 05/2016 for severe flare up in her neuropsychiatric lupus with delusions, hallucinations and headaches which was treated with iv steroids. In her last visit with  , she was strongly recommended to try cytoxan or at least benlysta to treat her condition . She declined at that time and establish care here.  Since her serologies showed very active lupus, after prolonged discussions she agreed to try imuran 50 mg and got 500mg solumedrol infusion.  Worsening fatigue is her only problem today.  Pt does admit to ongoing hair loss but she has been undergoing kenalog injections with a dermatologist which has been helping. She denies photosensitivity, malar rash, eye redness, dry eyes, dry mouth, nasal ulcers, oral ulcers, abdominal pain with eating, nausea, vomiting, blood in urine, no vision loss, no blurry vision, however pt does have some pain behind her eyes for the last few weeks to months and is scheduled to see a retinal specialist soon as well as a neurologist.     Review of Systems   Constitutional: Positive for malaise/fatigue. Negative for chills, fever and weight loss.   HENT: Negative for ear pain, hearing loss and sore throat.    Eyes: Positive for pain.  Negative for blurred vision, double vision, photophobia, discharge and redness.   Respiratory: Negative for cough, sputum production and shortness of breath.    Cardiovascular: Negative for chest pain and palpitations.   Gastrointestinal: Negative for abdominal pain, constipation, diarrhea, nausea and vomiting.   Genitourinary: Negative for dysuria and hematuria.   Musculoskeletal: Negative for back pain, joint pain, myalgias and neck pain.   Skin: Negative for itching and rash.   Neurological: Negative for dizziness, tremors, seizures, weakness and headaches.   Psychiatric/Behavioral: Negative for depression. The patient is not nervous/anxious.        Past Medical History:   Diagnosis Date    Anemia     with chronic disease lupus    Encounter for blood transfusion     G-6-PD deficiency     Heart murmur     MVP 1986    Hypertension     Lupus     Raynaud's syndrome        Past Surgical History:   Procedure Laterality Date    BONE MARROW BIOPSY      CARPAL TUNNEL RELEASE Left     widsom tooth extraction          Social History   Substance Use Topics    Smoking status: Never Smoker    Smokeless tobacco: Never Used    Alcohol use No       Family History   Problem Relation Age of Onset    Arthritis Mother     Heart disease Mother     Hypertension Mother     Diabetes Father     Heart disease Father     Hypertension Father     Cancer Brother     Eczema Neg Hx     Lupus Neg Hx     Psoriasis Neg Hx        Review of patient's allergies indicates:   Allergen Reactions    Ace inhibitors     Amoxicillin     Aspirin     Cellcept [mycophenolate mofetil]     Cephalosporins     Clindamycin     Hydrochlorothiazide     Imuran [azathioprine sodium]     Lasix [furosemide]     Methotrexate analogues     Nsaids (non-steroidal anti-inflammatory drug)     Pcn [penicillins]     Plaquenil [hydroxychloroquine]     Shellfish containing products     Sulfa (sulfonamide antibiotics)     Zofran [ondansetron  hcl (pf)]            Physical examination:-    Vitals:    09/05/17 1026   BP: (!) 132/92   Pulse: 105   Weight: 80.3 kg (177 lb 0.5 oz)   PainSc: 0-No pain       Physical Exam   Constitutional: She is oriented to person, place, and time and well-developed, well-nourished, and in no distress. No distress.   HENT:   Head: Normocephalic.   Mouth/Throat: Oropharynx is clear and moist.   Eyes: Conjunctivae and EOM are normal. Right eye exhibits no discharge. Left eye exhibits no discharge.   Neck: Normal range of motion. Neck supple.   Pulmonary/Chest: No respiratory distress.   Abdominal: Soft. There is no tenderness.   Musculoskeletal: She exhibits no edema, tenderness or deformity.   No synovitis over small joints of hands or feet.  No effusion over large joints.   Neurological: She is alert and oriented to person, place, and time.   Skin: Skin is warm. No rash noted. She is not diaphoretic. No erythema.   Psychiatric: Mood and affect normal.   Nursing note and vitals reviewed.      Labs:-  Results for ADELA LEMON (MRN [id number]) as of 1/5/2017 12:40   Ref. Range 12/8/2016 09:04   SHEILA HEP-2 Titer Unknown Positive 1:640 Sp...   Anti-SSA Antibody Latest Ref Range: 0.00 - 19.99 EU 31.31 (H)   Anti-SSA Interpretation Latest Ref Range: Negative  Positive (A)   Anti-SSB Antibody Latest Ref Range: 0.00 - 19.99 EU 9.10   Anti-SSB Interpretation Latest Ref Range: Negative  Negative   ds DNA Ab Latest Ref Range: Negative 1:10  Positive 1:40 (A)   Anti Sm Antibody Latest Ref Range: 0.00 - 19.99 .08 (H)   Anti-Sm Interpretation Latest Ref Range: Negative  Positive (A)   Anti Sm/RNP Antibody Latest Ref Range: 0.00 - 19.99 .83 (H)   Anti-Sm/RNP Interpretation Latest Ref Range: Negative  Positive (A)   Complement (C-3) Latest Ref Range: 50 - 180 mg/dL 60   Complement (C-4) Latest Ref Range: 11 - 44 mg/dL 7 (L)    Results for ADELA LEMON (MRN [id number]) as of 9/5/2017 12:15   Ref. Range 6/1/2017 11:16 6/1/2017 11:34    WBC Latest Ref Range: 3.90 - 12.70 K/uL  4.60   RBC Latest Ref Range: 4.00 - 5.40 M/uL  3.82 (L)   Hemoglobin Latest Ref Range: 12.0 - 16.0 g/dL  10.7 (L)   Hematocrit Latest Ref Range: 37.0 - 48.5 %  34.7 (L)   MCV Latest Ref Range: 82 - 98 fL  91   MCH Latest Ref Range: 27.0 - 31.0 pg  28.0   MCHC Latest Ref Range: 32.0 - 36.0 %  30.8 (L)   RDW Latest Ref Range: 11.5 - 14.5 %  13.9   Platelets Latest Ref Range: 150 - 350 K/uL  227   MPV Latest Ref Range: 9.2 - 12.9 fL  10.6   Gran% Latest Ref Range: 38.0 - 73.0 %  62.9   Gran # Latest Ref Range: 1.8 - 7.7 K/uL  2.9   Lymph% Latest Ref Range: 18.0 - 48.0 %  26.1   Lymph # Latest Ref Range: 1.0 - 4.8 K/uL  1.2   Mono% Latest Ref Range: 4.0 - 15.0 %  10.4   Mono # Latest Ref Range: 0.3 - 1.0 K/uL  0.5   Eosinophil% Latest Ref Range: 0.0 - 8.0 %  0.4   Eos # Latest Ref Range: 0.0 - 0.5 K/uL  0.0   Basophil% Latest Ref Range: 0.0 - 1.9 %  0.2   Baso # Latest Ref Range: 0.00 - 0.20 K/uL  0.01   Sed Rate Latest Ref Range: 0 - 20 mm/Hr  65 (H)   Sodium Latest Ref Range: 136 - 145 mmol/L  146 (H)   Potassium Latest Ref Range: 3.5 - 5.1 mmol/L  3.1 (L)   Chloride Latest Ref Range: 95 - 110 mmol/L  107   CO2 Latest Ref Range: 23 - 29 mmol/L  25   Anion Gap Latest Ref Range: 8 - 16 mmol/L  14   BUN, Bld Latest Ref Range: 6 - 20 mg/dL  12   Creatinine Latest Ref Range: 0.5 - 1.4 mg/dL  1.0   eGFR if non African American Latest Ref Range: >60 mL/min/1.73 m^2  >60.0   eGFR if African American Latest Ref Range: >60 mL/min/1.73 m^2  >60.0   Glucose Latest Ref Range: 70 - 110 mg/dL  102   Calcium Latest Ref Range: 8.7 - 10.5 mg/dL  10.1   Alkaline Phosphatase Latest Ref Range: 55 - 135 U/L  89   Total Protein Latest Ref Range: 6.0 - 8.4 g/dL  8.1   Albumin Latest Ref Range: 3.5 - 5.2 g/dL  3.8   Total Bilirubin Latest Ref Range: 0.1 - 1.0 mg/dL  0.6   AST Latest Ref Range: 10 - 40 U/L  20   ALT Latest Ref Range: 10 - 44 U/L  14   CRP Latest Ref Range: 0.0 - 8.2 mg/L  0.4    Complement (C-3) Latest Ref Range: 50 - 180 mg/dL  107   Complement (C-4) Latest Ref Range: 11 - 44 mg/dL  18   Specimen UA Unknown Urine, Clean Catch    Color, UA Latest Ref Range: Yellow, Straw, April  Yellow    pH, UA Latest Ref Range: 5.0 - 8.0  6.0    Specific Gravity, UA Latest Ref Range: 1.005 - 1.030  <=1.005 (A)    Appearance, UA Latest Ref Range: Clear  Clear    Protein, UA Latest Ref Range: Negative  Negative    Glucose, UA Latest Ref Range: Negative  Negative    Ketones, UA Latest Ref Range: Negative  Negative    Occult Blood UA Latest Ref Range: Negative  Negative    Nitrite, UA Latest Ref Range: Negative  Negative    Bilirubin (UA) Latest Ref Range: Negative  Negative    Leukocytes, UA Latest Ref Range: Negative  1+ (A)    WBC, UA Latest Ref Range: 0 - 5 /hpf 10 (H)    Bacteria, UA Latest Ref Range: None-Occ /hpf Rare    Squam Epithel, UA Latest Units: /hpf 3    Microscopic Comment Unknown SEE COMMENT    Prot/Creat Ratio, Ur Latest Ref Range: 0.00 - 0.20  0.09    Protein, Urine Random Latest Ref Range: 0 - 15 mg/dL 11    Creatinine, Random Ur Latest Ref Range: 15.0 - 325.0 mg/dL 119.0    Differential Method Unknown  Automated       Assessment/Plans:-  # CNS lupus:-  Extremely complicated uncontrolled lupus with fatigue and periodic exacerbations of serious neurological and psychiatric complications , extreme serological activity of lupus initially with improvement in complement and inflammatory markers currently. Tolerating 50 mg daily imuran without any problem now. She does not want to try rituxan or cytoxan or benlysta because of their potential side effect profile. Previously requested to start Rituximab and she denied that. Continue imuran 50mg daily and prednisone 5mg daily.  -will check blood work today including cbc/dcmp/esr/crp/c3/c4  -per patient she believes she had a DEXA scan over a year ago but no greater than 2 years, pt will need repeat DEXA within one year.    # Other systemic lupus  erythematosus with other organ involvement  Continue imuran 50mg daily and prednisone 5mg daily  -will check blood work today including cbc/dcmp/esr/crp/c3/c4    # Secondary Sjogren's syndrome:-  will check blood work today including cbc/dcmp/esr/crp/c3/c4  -continue mouth rinse and follow up with Dr. Salomon, pt noticed improvement after adjustment made to BP medication.    # RTC in 4 months.

## 2017-09-05 NOTE — PROGRESS NOTES
I have reviewed and concur with the resident's history, physical, assessment, and plan.  I have personally interviewed and examined the patient.

## 2017-09-06 ENCOUNTER — TELEPHONE (OUTPATIENT)
Dept: OTOLARYNGOLOGY | Facility: CLINIC | Age: 52
End: 2017-09-06

## 2017-09-06 ENCOUNTER — TELEPHONE (OUTPATIENT)
Dept: PHARMACY | Facility: CLINIC | Age: 52
End: 2017-09-06

## 2017-09-06 LAB
C3 SERPL-MCNC: 87 MG/DL
C4 SERPL-MCNC: 12 MG/DL

## 2017-09-06 NOTE — TELEPHONE ENCOUNTER
Patient states that you sent a script to [facility] in June that was not covered by her insurance.  She would like something else called in.  I told patient that I Dr. Salomon was not in today and would be back next week.  I told her I would check with her then and call her back.  Patient verbalized understanding.

## 2017-09-06 NOTE — TELEPHONE ENCOUNTER
----- Message from Erin Mahan sent at 9/6/2017 11:33 AM CDT -----  Would like to speak to nurse. Please call back at [phone]. thanks

## 2017-09-06 NOTE — TELEPHONE ENCOUNTER
Faxed received from [facility]:    The recommendation that you signed off on was:  Mupirocin 20 mg ($60),   Fluconazole 15 mg capsule and Betamethasone 0.75 mg capsule ($60),   Acyclovir 200 mg ($7).    So the total for 1 month supply is $127.  I have a copy of the fax.  Patient was suppose to get back with them on what she wanted to do.  They never heard back from her.      Please advise on how you would like to proceed.  Thanks.

## 2017-09-06 NOTE — TELEPHONE ENCOUNTER
I called [facility] and spoke with Lisa.  She states that back in June, she sent a request to change the medication and Dr. Salomon ok'd change.  The prices for the new medicines were discussed with the patient and patient told them she would check with Dr. Salomon as to exactly what she needed to buy and then she told [facility] she would call them back.  They have never heard back from patient since then.  Lisa will fax me the new recommended medication along with the prices so that I can show Dr. Salomon.

## 2017-09-06 NOTE — TELEPHONE ENCOUNTER
Patient knows that Dr. Salomon will not be back until next week.  Its documented in the call from 11:48 today.  It is fine to wait until then.  Thanks..

## 2017-09-06 NOTE — TELEPHONE ENCOUNTER
Spoke with patient about magic mouth wash.  There is no assistance available for this medication.  Explained this to the patient and she understands her insurance will not cover this medication.

## 2017-09-08 ENCOUNTER — TELEPHONE (OUTPATIENT)
Dept: RHEUMATOLOGY | Facility: CLINIC | Age: 52
End: 2017-09-08

## 2017-09-08 ENCOUNTER — TELEPHONE (OUTPATIENT)
Dept: CARDIOLOGY | Facility: CLINIC | Age: 52
End: 2017-09-08

## 2017-09-08 NOTE — TELEPHONE ENCOUNTER
"Patient was checking to see if we faxed over the office note to her PCP, advised patient that I did. Pt also states that her note from her visit on 9.5.17 is not on the patient portal. Advised patient that is not something I see we can "release"  to patient portal. Will send message with [employer] support's number to assist patient with issue.   "

## 2017-09-08 NOTE — TELEPHONE ENCOUNTER
Patient called requesting card notes be sent to PCP.  Patient was instructed medical release of information form needed and PCP can request records.    Patient also was give option of obtaining from patient portal or go to HIM with ID for records.    Patient disconnected from call.

## 2017-09-08 NOTE — TELEPHONE ENCOUNTER
----- Message from Nato Messina sent at 9/8/2017  3:12 PM CDT -----  Contact: Pt   Pt is requesting to speak with the nurse./ She would not give any other information. Pt can be reached at [phone] (sgdr)

## 2017-09-08 NOTE — TELEPHONE ENCOUNTER
----- Message from Nato Messina sent at 9/8/2017  3:10 PM CDT -----  Contact: Pt   Pt is requesting to speak with the nurse in regards to records she needs sent to her pcp./ Pt can be reached at [phone].

## 2017-09-11 ENCOUNTER — OFFICE VISIT (OUTPATIENT)
Dept: OPHTHALMOLOGY | Facility: CLINIC | Age: 52
End: 2017-09-11
Payer: COMMERCIAL

## 2017-09-11 DIAGNOSIS — H57.11 PAIN IN OR AROUND EYE, RIGHT: Primary | ICD-10-CM

## 2017-09-11 DIAGNOSIS — H35.40 PERIPHERAL RETINAL DEGENERATION OF BOTH EYES: ICD-10-CM

## 2017-09-11 DIAGNOSIS — I10 ESSENTIAL HYPERTENSION: ICD-10-CM

## 2017-09-11 DIAGNOSIS — M35.00 SECONDARY SJOGREN'S SYNDROME: ICD-10-CM

## 2017-09-11 DIAGNOSIS — H40.003 GLAUCOMA SUSPECT, BILATERAL: ICD-10-CM

## 2017-09-11 DIAGNOSIS — M35.01 KERATOCONJUNCTIVITIS SICCA OF BOTH EYES: ICD-10-CM

## 2017-09-11 DIAGNOSIS — H43.813 POSTERIOR VITREOUS DETACHMENT, BILATERAL: ICD-10-CM

## 2017-09-11 DIAGNOSIS — G50.0 TRIGEMINAL NEURALGIA OF RIGHT SIDE OF FACE: ICD-10-CM

## 2017-09-11 DIAGNOSIS — G05.3 CNS LUPUS: ICD-10-CM

## 2017-09-11 DIAGNOSIS — M32.19 CNS LUPUS: ICD-10-CM

## 2017-09-11 PROCEDURE — 92225 PR SPECIAL EYE EXAM, INITIAL: CPT | Mod: 59,LT,S$GLB, | Performed by: OPHTHALMOLOGY

## 2017-09-11 PROCEDURE — 92004 COMPRE OPH EXAM NEW PT 1/>: CPT | Mod: S$GLB,,, | Performed by: OPHTHALMOLOGY

## 2017-09-11 PROCEDURE — 99999 PR PBB SHADOW E&M-EST. PATIENT-LVL III: CPT | Mod: PBBFAC,,, | Performed by: OPHTHALMOLOGY

## 2017-09-11 NOTE — PROGRESS NOTES
===============================  09/11/2017   Ruth Messina,   51 y.o. female   Last visit [facility]: :Visit date not found   Last visit eye dept. Visit date not found  [facility]:  Corrected distance visual acuity was 20/25 in the right eye and 20/30 in the left eye.  Tonometry     Tonometry (Applanation, 8:53 AM)       Right Left    Pressure 14 14              Wearing Rx     Wearing Rx       Sphere Cylinder Axis    Right -3.25 +2.00 105    Left -4.00 +1.25 090    Type:  SVL              Manifest Refraction     Manifest Refraction       Sphere Cylinder [place] [facility]    Right -3.25 +2.00 105 20/25    Left -4.00 +1.25 090 20/30              Chief Complaint   Patient presents with    Concerns About Ocular Health     Pt states that it has been a year since she visited her retinal doctor on [facility].  she thinks that he told her that she has amd.          HPI     Concerns About Ocular Health    Additional comments: Pt states that it has been a year since she visited   her retinal doctor on [facility].  she thinks that he told her that she   has amd.             Comments   No dm  No sx's       Last edited by Candace Mercado on 9/11/2017  8:41 AM. (History)      Read Studies:   Chhaya  ________________  9/11/2017  Problem List Items Addressed This Visit        Eye/Vision problems    Secondary Sjogren's syndrome    Keratoconjunctivitis sicca of both eyes  Recommend frequent at's, preservative free  Continue restasis  Would like to resume evoxac, defer to rheumatology    Peripheral retinal degeneration of both eyes  No holes or tears in retina  rvw'd s/s rt, rd with patient, gave number to call right away with these symptoms.    Posterior vitreous detachment of both eyes       Other    CNS lupus, get ct today    Essential hypertension  No htnr, no vasculopathy    Glaucoma suspect of both eyes - Primary    Overview     -- asymmetric c:d OD>OS  rtc for vf, goct, check vision with glasses                 Trigeminal neuralgia  of right side of face  Past 3 years      Other Visit Diagnoses    None.       .  .  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

## 2017-09-11 NOTE — TELEPHONE ENCOUNTER
She can try with just the Fluconazole 15 mg capsule and Betamethasone 0.75 mg capsule ($60), or if she cannot afford that then even the steroid alone (betamethasone) may be helpful.

## 2017-09-11 NOTE — TELEPHONE ENCOUNTER
Received return call from pt and discussed medication options and prices.  Patient has decided to go with the fluconazole 15mg and Betamethasone 0.75mg.  States she is on food stamps and does not have the money for it at this time and would like [facility] to hold the medication till end of month.  Advised pt we will notify [facility] that she wants to delay delivery and for them to call prior to shipment.  Pt also wants to cancel appt with Dr. Salomon and will reschedule once she has medication.

## 2017-09-14 ENCOUNTER — TELEPHONE (OUTPATIENT)
Dept: DERMATOLOGY | Facility: CLINIC | Age: 52
End: 2017-09-14

## 2017-09-14 ENCOUNTER — OFFICE VISIT (OUTPATIENT)
Dept: CARDIOLOGY | Facility: CLINIC | Age: 52
End: 2017-09-14
Payer: COMMERCIAL

## 2017-09-14 VITALS
WEIGHT: 175.5 LBS | BODY MASS INDEX: 29.24 KG/M2 | HEART RATE: 90 BPM | SYSTOLIC BLOOD PRESSURE: 126 MMHG | DIASTOLIC BLOOD PRESSURE: 84 MMHG | HEIGHT: 65 IN

## 2017-09-14 DIAGNOSIS — I10 ESSENTIAL HYPERTENSION: Primary | ICD-10-CM

## 2017-09-14 DIAGNOSIS — I10 ESSENTIAL HYPERTENSION: ICD-10-CM

## 2017-09-14 PROCEDURE — 99999 PR PBB SHADOW E&M-EST. PATIENT-LVL III: CPT | Mod: PBBFAC,,, | Performed by: NUCLEAR MEDICINE

## 2017-09-14 PROCEDURE — 3074F SYST BP LT 130 MM HG: CPT | Mod: S$GLB,,, | Performed by: NUCLEAR MEDICINE

## 2017-09-14 PROCEDURE — 3079F DIAST BP 80-89 MM HG: CPT | Mod: S$GLB,,, | Performed by: NUCLEAR MEDICINE

## 2017-09-14 PROCEDURE — 3008F BODY MASS INDEX DOCD: CPT | Mod: S$GLB,,, | Performed by: NUCLEAR MEDICINE

## 2017-09-14 PROCEDURE — 99214 OFFICE O/P EST MOD 30 MIN: CPT | Mod: S$GLB,,, | Performed by: NUCLEAR MEDICINE

## 2017-09-14 RX ORDER — LOSARTAN POTASSIUM 100 MG/1
100 TABLET ORAL DAILY
Qty: 30 TABLET | Refills: 11 | Status: SHIPPED | OUTPATIENT
Start: 2017-09-14 | End: 2018-10-01 | Stop reason: SDUPTHER

## 2017-09-14 RX ORDER — SPIRONOLACTONE 25 MG/1
25 TABLET ORAL DAILY
Qty: 30 TABLET | Refills: 11 | Status: SHIPPED | OUTPATIENT
Start: 2017-09-14 | End: 2018-02-15 | Stop reason: SDUPTHER

## 2017-09-14 NOTE — TELEPHONE ENCOUNTER
----- Message from Karina Manjarrez sent at 9/14/2017 10:06 AM CDT -----  Patient would like for you to call her at [phone].  This is all she would tell me.                                castillo

## 2017-09-14 NOTE — PROGRESS NOTES
Subjective:   Patient ID:  Ruth Messina is a 51 y.o. female who presents for follow-up of Hypertension      HPI 1- CHRONIC ESSENTIAL HTN  DOING WELL . NO RECENT HOSPITALIZATIONS OR ED VISITS FOR ACS OR ADHF OR CARD ARRHYTHMIAS  NO HX OF ANGINA .OR EQUIVALENT  NO UNUSUAL PERAZA. NO ORTHOPNEA OR PND  NO PLEURITIC CHEST PAIN  NO PALPITATIONS. NO SYNCOPE  NO EDEMA. NO CALVE TENDERNESS  NO INTERMITTENT CLAUDICATION  NO FOCAL CNS SYMPTOMS OR SIGNS TO SUGGEST TIA OR STROKE  CARD MED WELL TOLERATED    Review of Systems   Constitution: Positive for malaise/fatigue. Negative for chills, fever, weakness, night sweats, weight gain and weight loss.   HENT: Negative for nosebleeds.    Eyes: Negative for blurred vision, double vision and visual disturbance.   Cardiovascular: Negative for chest pain, dyspnea on exertion, irregular heartbeat, leg swelling, orthopnea, palpitations, paroxysmal nocturnal dyspnea and syncope.   Respiratory: Negative for cough, hemoptysis and wheezing.    Endocrine: Negative for polydipsia and polyuria.   Hematologic/Lymphatic: Does not bruise/bleed easily.   Skin: Negative for rash.   Musculoskeletal: Negative for joint pain, joint swelling, muscle weakness and myalgias.   Gastrointestinal: Negative for abdominal pain, hematemesis, jaundice and melena.   Genitourinary: Negative for dysuria, hematuria and nocturia.   Neurological: Negative for dizziness, focal weakness, headaches and sensory change.   Psychiatric/Behavioral: Negative for depression. The patient does not have insomnia and is not nervous/anxious.      Family History   Problem Relation Age of Onset    Arthritis Mother     Heart disease Mother     Hypertension Mother     Diabetes Father     Heart disease Father     Hypertension Father     Cancer Brother     Eczema Neg Hx     Lupus Neg Hx     Psoriasis Neg Hx      Past Medical History:   Diagnosis Date    Anemia     with chronic disease lupus    Encounter for blood transfusion      G-6-PD deficiency     Heart murmur     MVP 1986    Hypertension     Lupus     Raynaud's syndrome      Current Outpatient Prescriptions on File Prior to Visit   Medication Sig Dispense Refill    azathioprine (IMURAN) 50 mg Tab Take 1 tablet (50 mg total) by mouth 2 (two) times daily. (Patient taking differently: Take 50 mg by mouth once daily. ) 60 tablet 2    CALCIUM-MAGNESIUM-ZINC ORAL Take by mouth once daily.      cholecalciferol, vitamin D3, (VITAMIN D3) 5,000 unit Tab Take 5,000 Units by mouth once daily.      ferrous gluconate (FERGON) 324 MG tablet Take 324 mg by mouth daily with breakfast.      fish oil-omega-3 fatty acids 300-1,000 mg capsule Take 2 g by mouth once daily.      fluocinolone (DERMA-SMOOTHE) 0.01 % external oil Apply oil to scalp twice a day 1 Bottle 5    multivitamin capsule Take 1 capsule by mouth once daily.      paroxetine (PAXIL-CR) 12.5 MG 24 hr tablet Take 1 tablet (12.5 mg total) by mouth every morning. 30 tablet 11    predniSONE (DELTASONE) 5 MG tablet TAKE ONE TABLET BY MOUTH ONCE DAILY 30 tablet 0    [DISCONTINUED] losartan (COZAAR) 100 MG tablet Take 100 mg by mouth once daily.      [DISCONTINUED] spironolactone (ALDACTONE) 25 MG tablet Take 1 tablet (25 mg total) by mouth once daily. 30 tablet 3     No current facility-administered medications on file prior to visit.      Review of patient's allergies indicates:   Allergen Reactions    Cellcept [mycophenolate mofetil] Swelling     Lips      Ace inhibitors Other (See Comments)     unkown    Amoxicillin Other (See Comments)     + allergy test    Aspirin Other (See Comments)     G-6-PD def.     Beta-blockers (beta-adrenergic blocking agts) Other (See Comments)     Cannot take due to Raynaud's syndrome    Cephalosporins Other (See Comments)     + allergy test    Clindamycin Other (See Comments)     + allergy test    Hydrochlorothiazide Other (See Comments)     unknown    Ibuprofen Other (See Comments)      G-6-PD def.    Lasix [furosemide] Other (See Comments)     unknown    Lisinopril Other (See Comments)     unknown    Methotrexate analogues Other (See Comments)     Pancytopenia      Nsaids (non-steroidal anti-inflammatory drug) Other (See Comments)     G-6-PD def.    Pcn [penicillins] Other (See Comments)     + allergy test    Plaquenil [hydroxychloroquine] Other (See Comments)     G-6-PD def.    Shellfish containing products Other (See Comments)     + allergy test    Zofran [ondansetron hcl (pf)] Other (See Comments)     Chest pain      Sulfa (sulfonamide antibiotics) Rash       Objective:     Physical Exam   Constitutional: She is oriented to person, place, and time. She appears well-developed. No distress.   HENT:   Head: Normocephalic.   Eyes: Conjunctivae are normal. Pupils are equal, round, and reactive to light. No scleral icterus.   Neck: Normal range of motion. Neck supple. Normal carotid pulses, no hepatojugular reflux and no JVD present. Carotid bruit is not present. No edema present. No thyroid mass and no thyromegaly present.   Cardiovascular: Normal rate, regular rhythm, S1 normal, S2 normal, normal heart sounds and intact distal pulses.  PMI is not displaced.  Exam reveals no gallop and no friction rub.    No murmur heard.  Pulses:       Carotid pulses are 2+ on the right side, and 2+ on the left side.       Radial pulses are 2+ on the right side, and 2+ on the left side.        Femoral pulses are 2+ on the right side, and 2+ on the left side.       Popliteal pulses are 2+ on the right side, and 2+ on the left side.        Dorsalis pedis pulses are 2+ on the right side, and 2+ on the left side.        Posterior tibial pulses are 2+ on the right side, and 2+ on the left side.   Pulmonary/Chest: Effort normal and breath sounds normal. She has no wheezes. She has no rales. She exhibits no tenderness.   Abdominal: Soft. Bowel sounds are normal. She exhibits no pulsatile midline mass and no mass.  There is no hepatosplenomegaly. There is no tenderness.   Musculoskeletal: Normal range of motion. She exhibits no edema or tenderness.        Cervical back: Normal.        Thoracic back: Normal.        Lumbar back: Normal.   Lymphadenopathy:     She has no cervical adenopathy.     She has no axillary adenopathy.        Right: No supraclavicular adenopathy present.        Left: No supraclavicular adenopathy present.   Neurological: She is alert and oriented to person, place, and time. She has normal strength and normal reflexes. No sensory deficit. Gait normal.   Skin: Skin is warm. No rash noted. No cyanosis. No pallor. Nails show no clubbing.   Psychiatric: She has a normal mood and affect. Her speech is normal and behavior is normal. Cognition and memory are normal.       Assessment:     1. Essential hypertension      WELL CONTROLLED BP  STABLE CV STATUS  STABLE CNS STATUS  CARD MED WELL TOLERATED  Plan:     Essential hypertension    1- CONTINUE PRESENT CARD MANAGEMENT    2- RETURN IN 6 MONTHS

## 2017-09-15 ENCOUNTER — HOSPITAL ENCOUNTER (EMERGENCY)
Facility: HOSPITAL | Age: 52
Discharge: HOME | End: 2017-09-15
Attending: EMERGENCY MEDICINE
Payer: COMMERCIAL

## 2017-09-15 VITALS
RESPIRATION RATE: 18 BRPM | BODY MASS INDEX: 29.16 KG/M2 | SYSTOLIC BLOOD PRESSURE: 148 MMHG | WEIGHT: 175 LBS | TEMPERATURE: 98 F | HEIGHT: 65 IN | DIASTOLIC BLOOD PRESSURE: 86 MMHG | HEART RATE: 88 BPM | OXYGEN SATURATION: 97 %

## 2017-09-15 DIAGNOSIS — G50.0 TRIGEMINAL NEURALGIA OF RIGHT SIDE OF FACE: Primary | ICD-10-CM

## 2017-09-15 PROCEDURE — 99283 EMERGENCY DEPT VISIT LOW MDM: CPT

## 2017-09-15 RX ORDER — GABAPENTIN 300 MG/1
300 CAPSULE ORAL 3 TIMES DAILY
Qty: 90 CAPSULE | Refills: 1 | Status: SHIPPED | OUTPATIENT
Start: 2017-09-15 | End: 2018-01-22 | Stop reason: SDUPTHER

## 2017-09-15 NOTE — ED PROVIDER NOTES
SCRIBE #1 NOTE: I, Kirby Pacheco, am scribing for, and in the presence of, Balod Pineda Jr., MD. I have scribed the entire note.      History      Chief Complaint   Patient presents with    Facial Pain     right side of face hurts. denies injury but diagnosed with trigemeny        Review of patient's allergies indicates:   Allergen Reactions    Cellcept [mycophenolate mofetil] Swelling     Lips      Ace inhibitors Other (See Comments)     unkown    Amoxicillin Other (See Comments)     + allergy test    Aspirin Other (See Comments)     G-6-PD def.     Beta-blockers (beta-adrenergic blocking agts) Other (See Comments)     Cannot take due to Raynaud's syndrome    Cephalosporins Other (See Comments)     + allergy test    Clindamycin Other (See Comments)     + allergy test    Hydrochlorothiazide Other (See Comments)     unknown    Ibuprofen Other (See Comments)     G-6-PD def.    Lasix [furosemide] Other (See Comments)     unknown    Lisinopril Other (See Comments)     unknown    Methotrexate analogues Other (See Comments)     Pancytopenia      Nsaids (non-steroidal anti-inflammatory drug) Other (See Comments)     G-6-PD def.    Pcn [penicillins] Other (See Comments)     + allergy test    Plaquenil [hydroxychloroquine] Other (See Comments)     G-6-PD def.    Shellfish containing products Other (See Comments)     + allergy test    Zofran [ondansetron hcl (pf)] Other (See Comments)     Chest pain      Sulfa (sulfonamide antibiotics) Rash        HPI   HPI    9/15/2017, 4:58 AM   History obtained from the patient      History of Present Illness: Ruth Messina is a 51 y.o. female patient who presents to the Emergency Department for ***      Arrival mode: ***Personal vehicle  EMS  [facility]***    PCP: Janeen Delacruz MD       Past Medical History:  Past Medical History:   Diagnosis Date    Anemia     with chronic disease lupus    Encounter for blood transfusion     G-6-PD deficiency     Heart murmur      MVP 1986    Hypertension     Lupus     Raynaud's syndrome        Past Surgical History:  Past Surgical History:   Procedure Laterality Date    BONE MARROW BIOPSY      CARPAL TUNNEL RELEASE Left     widsom tooth extraction           Family History:  Family History   Problem Relation Age of Onset    Arthritis Mother     Heart disease Mother     Hypertension Mother     Diabetes Father     Heart disease Father     Hypertension Father     Cancer Brother     Eczema Neg Hx     Lupus Neg Hx     Psoriasis Neg Hx        Social History:  Social History     Social History Main Topics    Smoking status: Never Smoker    Smokeless tobacco: Never Used    Alcohol use No    Drug use: No    Sexual activity: No       ROS   Review of Systems  ***  Physical Exam      Initial Vitals [09/15/17 0438]   BP Pulse Resp Temp SpO2   (!) 151/99 91 20 98.1 °F (36.7 °C) 97 %      MAP       116.33          Physical Exam  Nursing Notes and Vital Signs Reviewed.  Constitutional: Patient is in {DISTRESS LEVEL:24545}. Well-developed and well-nourished.  Head: Atraumatic. Normocephalic.  Eyes: PERRL. EOM intact. Conjunctivae are not pale. No scleral icterus.  ENT: Mucous membranes are moist. Oropharynx is clear and symmetric***.    Neck: Supple. Full ROM. No lymphadenopathy.  Cardiovascular: Regular rate. Regular rhythm***. No murmurs, rubs, or gallops. Distal pulses are 2+ and symmetric***.  Pulmonary/Chest: No respiratory distress. Clear to auscultation bilaterally***. No wheezing, rales, or rhonchi.  Abdominal: Soft and non-distended.  There is no tenderness.  No rebound, guarding, or rigidity. Good bowel sounds***.  Genitourinary: No*** CVA tenderness  Musculoskeletal: Moves all extremities. No obvious deformities. No edema. No calf tenderness***.  Skin: Warm and dry.  Neurological:  Alert, awake, and appropriate.  Normal speech.  No acute focal neurological deficits are appreciated.  Psychiatric: Normal affect. Good eye contact.  "Appropriate in content.    ED Course    Procedures  ED Vital Signs:  Vitals:    09/15/17 0438   BP: (!) 151/99   Pulse: 91   Resp: 20   Temp: 98.1 °F (36.7 °C)   TempSrc: Oral   SpO2: 97%   Weight: 79.4 kg (175 lb)   Height: 5' 5" (1.651 m)       Abnormal Lab Results:  Labs Reviewed - No data to display     All Lab Results:  ***    Imaging Results:  Imaging Results    None                 The Emergency Provider reviewed the vital signs and test results, which are outlined above.    ED Discussion     ***    ED Medication(s):  Medications - No data to display    New Prescriptions    No medications on file             Medical Decision Making              Scribe Attestation:   Scribe #1: I performed the above scribed service and the documentation accurately describes the services I performed. I attest to the accuracy of the note.    Attending:   Physician Attestation Statement for Scribe #1: I, Baldo Pineda Jr., MD, personally performed the services described in this documentation, as scribed by Kirby Pacheco, in my presence, and it is both accurate and complete.          Clinical Impression     No diagnosis found.         "

## 2017-09-15 NOTE — TELEPHONE ENCOUNTER
----- Message from Adelfo Guido sent at 9/15/2017  2:03 PM CDT -----  Contact: pt   States she's rtn nurses call from yesterday and can be reached at [phone]//thanks/dbw

## 2017-09-15 NOTE — ED PROVIDER NOTES
SCRIBE #1 NOTE: I, Alonzo Vail, am scribing for, and in the presence of, Elizabeth Mendieta MD. I have scribed the entire note.      History      Chief Complaint   Patient presents with    Facial Pain     right side of face hurts. denies injury but diagnosed with trigemeny        Review of patient's allergies indicates:   Allergen Reactions    Cellcept [mycophenolate mofetil] Swelling     Lips      Ace inhibitors Other (See Comments)     unkown    Amoxicillin Other (See Comments)     + allergy test    Aspirin Other (See Comments)     G-6-PD def.     Beta-blockers (beta-adrenergic blocking agts) Other (See Comments)     Cannot take due to Raynaud's syndrome    Cephalosporins Other (See Comments)     + allergy test    Clindamycin Other (See Comments)     + allergy test    Hydrochlorothiazide Other (See Comments)     unknown    Ibuprofen Other (See Comments)     G-6-PD def.    Lasix [furosemide] Other (See Comments)     unknown    Lisinopril Other (See Comments)     unknown    Methotrexate analogues Other (See Comments)     Pancytopenia      Nsaids (non-steroidal anti-inflammatory drug) Other (See Comments)     G-6-PD def.    Pcn [penicillins] Other (See Comments)     + allergy test    Plaquenil [hydroxychloroquine] Other (See Comments)     G-6-PD def.    Shellfish containing products Other (See Comments)     + allergy test    Zofran [ondansetron hcl (pf)] Other (See Comments)     Chest pain      Sulfa (sulfonamide antibiotics) Rash        HPI   HPI    9/15/2017, 6:28 AM   History obtained from the patient      History of Present Illness: Ruth Messina is a 51 y.o. female patient who presents to the Emergency Department for R sided facial pain which onset gradually at 12am. Symptoms are intermittent and moderate in severity. Pt states she has had this pain in the past and was prescribed Neurontin with some relief. Pt states she has trigemeny neuralgia. No mitigating or exacerbating factors  reported. There are no associated sxs at this time. Patient denies any fever, chills, n/v/d, facial swelling, drooling, facial asymmetry, weakness, numbness, paresthesia, and all other sxs at this time. Pt has appointment scheduled with Dr. Lozano on 10/4/17. No further complaints or concerns at this time.       Arrival mode: Personal vehicle    PCP: Janeen Delacruz MD       Past Medical History:  Past Medical History:   Diagnosis Date    Anemia     with chronic disease lupus    Encounter for blood transfusion     G-6-PD deficiency     Heart murmur     MVP 1986    Hypertension     Lupus     Raynaud's syndrome        Past Surgical History:  Past Surgical History:   Procedure Laterality Date    BONE MARROW BIOPSY      CARPAL TUNNEL RELEASE Bilateral     widsom tooth extraction           Family History:  Family History   Problem Relation Age of Onset    Arthritis Mother     Heart disease Mother     Hypertension Mother     Diabetes Father     Heart disease Father     Hypertension Father     Cancer Brother     Eczema Neg Hx     Lupus Neg Hx     Psoriasis Neg Hx        Social History:  Social History     Social History Main Topics    Smoking status: Never Smoker    Smokeless tobacco: Never Used    Alcohol use No    Drug use: No    Sexual activity: No       ROS   Review of Systems   Constitutional: Negative for chills and fever.   HENT: Negative for drooling, facial swelling and sore throat.         (+) R sided facial pain   Respiratory: Negative for shortness of breath.    Cardiovascular: Negative for chest pain.   Gastrointestinal: Negative for diarrhea, nausea and vomiting.   Genitourinary: Negative for dysuria.   Musculoskeletal: Negative for back pain.   Skin: Negative for rash.   Neurological: Negative for facial asymmetry, weakness and numbness.        (-) paresthesia   Hematological: Does not bruise/bleed easily.   All other systems reviewed and are negative.      Physical Exam      Initial  "Vitals [09/15/17 0438]   BP Pulse Resp Temp SpO2   (!) 151/99 91 20 98.1 °F (36.7 °C) 97 %      MAP       116.33          Physical Exam  Nursing Notes and Vital Signs Reviewed.  Constitutional: Patient is in no acute distress. Well-developed and well-nourished.  Head: Atraumatic. Normocephalic.  Eyes: PERRL. EOM intact. Conjunctivae are not pale. No scleral icterus.  ENT: Mucous membranes are moist.  Neck: Supple. Full ROM. No lymphadenopathy.  Cardiovascular: Regular rate. Regular rhythm. No murmurs, rubs, or gallops.   Pulmonary/Chest: No respiratory distress. Clear to auscultation bilaterally. No wheezing, rales, or rhonchi.  Abdominal: Soft and non-distended.    Musculoskeletal: Moves all extremities. No obvious deformities. No edema.   Skin: Warm and dry.  Neurological: Patient is alert and oriented to person, place and time. Pupils ERRL and EOM normal. Cranial nerves II-XII are intact. Strength is full bilaterally; it is equal and 5/5 in bilateral upper and lower extremities. There is no pronator drift of outstretched arms. Light touch sense is intact. Speech is clear and normal. No acute focal neurological deficits noted.  Psychiatric: Normal affect. Good eye contact. Appropriate in content.    ED Course    Procedures  ED Vital Signs:  Vitals:    09/15/17 0438 09/15/17 0645   BP: (!) 151/99 (!) 148/86   Pulse: 91 88   Resp: 20 18   Temp: 98.1 °F (36.7 °C)    TempSrc: Oral    SpO2: 97% 97%   Weight: 79.4 kg (175 lb)    Height: 5' 5" (1.651 m)             The Emergency Provider reviewed the vital signs and test results, which are outlined above.    ED Discussion     6:28 AM: Initial assessment of pt at this time.  Discussed with pt all pertinent ED information. Discussed pt dx and plan of tx. Gave pt all f/u and return to the ED instructions. All questions and concerns were addressed at this time. Pt expresses understanding of information and instructions, and is comfortable with plan to discharge. Pt is stable " for discharge.    I discussed with patient and/or family/caretaker that evaluation in the ED does not suggest any emergent or life threatening medical conditions requiring immediate intervention beyond what was provided in the ED, and I believe patient is safe for discharge.  Regardless, an unremarkable evaluation in the ED does not preclude the development or presence of a serious of life threatening condition. As such, patient was instructed to return immediately for any worsening or change in current symptoms.        Discharge Medication List as of 9/15/2017  6:30 AM      START taking these medications    Details   gabapentin (NEURONTIN) 300 MG capsule Take 1 capsule (300 mg total) by mouth 3 (three) times daily., Starting Fri 9/15/2017, Until Sat 9/15/2018, Print             Follow-up Information     Janeen Delacruz MD. Schedule an appointment as soon as possible for a visit in 2 days.    Specialty:  Family Medicine  Why:  Return to the Emergency Room  Contact information:  [id number] [place] [id number]  [phone]                     Medical Decision Making              Scribe Attestation:   Scribe #1: I performed the above scribed service and the documentation accurately describes the services I performed. I attest to the accuracy of the note.    Attending:   Physician Attestation Statement for Scribe #1: I, Elizabeth Mendieta MD, personally performed the services described in this documentation, as scribed by Alonzo Vail, in my presence, and it is both accurate and complete.          Clinical Impression       ICD-10-CM ICD-9-CM   1. Trigeminal neuralgia of right side of face G50.0 350.1       Disposition:   Disposition: Discharged  Condition: Stable         Elizabeth Mendieta MD  09/28/17 1102

## 2017-09-16 ENCOUNTER — HOSPITAL ENCOUNTER (OUTPATIENT)
Dept: RADIOLOGY | Facility: HOSPITAL | Age: 52
Discharge: HOME | End: 2017-09-16
Attending: OPHTHALMOLOGY
Payer: COMMERCIAL

## 2017-09-16 DIAGNOSIS — M32.19 CNS LUPUS: ICD-10-CM

## 2017-09-16 DIAGNOSIS — H57.11 PAIN IN OR AROUND EYE, RIGHT: ICD-10-CM

## 2017-09-16 DIAGNOSIS — G05.3 CNS LUPUS: ICD-10-CM

## 2017-09-16 DIAGNOSIS — G50.0 TRIGEMINAL NEURALGIA OF RIGHT SIDE OF FACE: ICD-10-CM

## 2017-09-16 PROCEDURE — 70470 CT HEAD/BRAIN W/O & W/DYE: CPT | Mod: TC

## 2017-09-16 PROCEDURE — 70481 CT ORBIT/EAR/FOSSA W/DYE: CPT | Mod: TC

## 2017-09-16 PROCEDURE — 25500020 PHARM REV CODE 255: Performed by: OPHTHALMOLOGY

## 2017-09-16 RX ADMIN — IOHEXOL 75 ML: 350 INJECTION, SOLUTION INTRAVENOUS at 09:09

## 2017-09-18 RX ORDER — DESOXIMETASONE 2.5 MG/G
CREAM TOPICAL 2 TIMES DAILY PRN
Qty: 60 G | Refills: 3 | Status: SHIPPED | OUTPATIENT
Start: 2017-09-18 | End: 2018-07-26

## 2017-09-19 ENCOUNTER — TELEPHONE (OUTPATIENT)
Dept: DERMATOLOGY | Facility: CLINIC | Age: 52
End: 2017-09-19

## 2017-09-19 NOTE — TELEPHONE ENCOUNTER
----- Message from Virgil Luna sent at 9/19/2017  8:11 AM CDT -----  Contact: Pt   Pt returning a phone call..[phone] (home)

## 2017-09-21 ENCOUNTER — OFFICE VISIT (OUTPATIENT)
Dept: HEMATOLOGY/ONCOLOGY | Facility: CLINIC | Age: 52
End: 2017-09-21
Payer: COMMERCIAL

## 2017-09-21 VITALS
HEART RATE: 85 BPM | HEIGHT: 66 IN | OXYGEN SATURATION: 99 % | SYSTOLIC BLOOD PRESSURE: 114 MMHG | BODY MASS INDEX: 28.23 KG/M2 | WEIGHT: 175.69 LBS | RESPIRATION RATE: 18 BRPM | TEMPERATURE: 98 F | DIASTOLIC BLOOD PRESSURE: 70 MMHG

## 2017-09-21 DIAGNOSIS — D64.9 NORMOCYTIC ANEMIA: ICD-10-CM

## 2017-09-21 DIAGNOSIS — D63.8 ANEMIA OF OTHER CHRONIC DISEASE: Primary | ICD-10-CM

## 2017-09-21 PROCEDURE — 99214 OFFICE O/P EST MOD 30 MIN: CPT | Mod: S$GLB,,, | Performed by: INTERNAL MEDICINE

## 2017-09-21 PROCEDURE — 3008F BODY MASS INDEX DOCD: CPT | Mod: S$GLB,,, | Performed by: INTERNAL MEDICINE

## 2017-09-21 PROCEDURE — 3074F SYST BP LT 130 MM HG: CPT | Mod: S$GLB,,, | Performed by: INTERNAL MEDICINE

## 2017-09-21 PROCEDURE — 3078F DIAST BP <80 MM HG: CPT | Mod: S$GLB,,, | Performed by: INTERNAL MEDICINE

## 2017-09-21 PROCEDURE — 99999 PR PBB SHADOW E&M-EST. PATIENT-LVL III: CPT | Mod: PBBFAC,,, | Performed by: INTERNAL MEDICINE

## 2017-09-21 NOTE — PROGRESS NOTES
Hematology/Oncology Office Note    Reason for referral:  Progressive normocytic anemia    CC:    Referred by:  No ref. provider found    Diagnosis:  Progressive normocytic anemia    Treatment:  Treated with IV iron in the past    History of present illness:  51-year-old female with long-standing history of SLE with skin, joint, and cytopenias from SLE.  She underwent a bone marrow biopsy in 2/2016 which demonstrated normal trilineage hematopoiesis without significant abnormality.  She underwent EGD and colonoscopy in 12/2016 which failed to identify any source of blood loss.  She was treated with IV iron by outside oncologist with some improvement in anemia.  Hemoglobin was recently noted to be 9.6 with MCV of 91.  She is currently on prednisone/Solu-Medrol for SLE.  She has complaints of mild fatigue and malaise without shortness of breath, chest pain, dizziness, or palpitations.    I have reviewed and updated the HPI, ROS, PMHx, Social Hx, Family Hx and treatment history.    Today's visit:  Patient is without new complaints today      Past Medical History:   Diagnosis Date    Anemia     with chronic disease lupus    Encounter for blood transfusion     G-6-PD deficiency     Heart murmur     MVP 1986    Hypertension     Lupus     Raynaud's syndrome          Social History:  No tobacco, alcohol, or illicit drugs      Family History: family history includes Arthritis in her mother; Cancer in her brother; Diabetes in her father; Heart disease in her father and mother; Hypertension in her father and mother.    HPI    Review of Systems   Constitutional: Positive for activity change and fatigue. Negative for appetite change, chills, fever and unexpected weight change.   HENT: Negative for congestion, dental problem, drooling, ear discharge, ear pain, facial swelling, mouth sores, nosebleeds, postnasal drip and rhinorrhea.    Eyes: Negative.    Respiratory: Negative for apnea, cough, choking, shortness of breath and  "wheezing.    Cardiovascular: Negative for chest pain and palpitations.   Gastrointestinal: Negative for abdominal distention, abdominal pain, anal bleeding, blood in stool, constipation, diarrhea and nausea.   Endocrine: Negative.    Genitourinary: Negative for difficulty urinating, dyspareunia, dysuria, flank pain and frequency.   Musculoskeletal: Negative for arthralgias, back pain, myalgias and neck pain.   Skin: Negative for color change, pallor, rash and wound.   Allergic/Immunologic: Negative.    Neurological: Negative for dizziness, tremors, seizures, syncope, facial asymmetry, speech difficulty, weakness, light-headedness, numbness and headaches.   Hematological: Negative for adenopathy. Does not bruise/bleed easily.   Psychiatric/Behavioral: Negative for agitation, behavioral problems and hallucinations. The patient is not nervous/anxious and is not hyperactive.        Objective:       Vitals:    09/21/17 0959   BP: 114/70   Pulse: 85   Resp: 18   Temp: 97.6 °F (36.4 °C)   TempSrc: Oral   SpO2: 99%   Weight: 79.7 kg (175 lb 11.3 oz)   Height: 5' 5.5" (1.664 m)     Physical Exam   Constitutional: She is oriented to person, place, and time. She appears well-developed and well-nourished. No distress.   HENT:   Head: Normocephalic and atraumatic.   Right Ear: External ear normal.   Left Ear: External ear normal.   Nose: Nose normal.   Mouth/Throat: Oropharynx is clear and moist.   Eyes: Conjunctivae and EOM are normal. Pupils are equal, round, and reactive to light. No scleral icterus.   Neck: Normal range of motion. Neck supple. No tracheal deviation present. No thyromegaly present.   Cardiovascular: Normal rate, regular rhythm, normal heart sounds and intact distal pulses.  Exam reveals no gallop and no friction rub.    No murmur heard.  Pulmonary/Chest: Effort normal and breath sounds normal. No accessory muscle usage. No respiratory distress. She has no decreased breath sounds. She has no rhonchi. She has " no rales. She exhibits no tenderness.   Abdominal: Soft. Bowel sounds are normal. She exhibits no distension and no mass. There is no tenderness. There is no rebound and no guarding.   Musculoskeletal: Normal range of motion. She exhibits no edema, tenderness or deformity.   Neurological: She is alert and oriented to person, place, and time. No cranial nerve deficit. Coordination normal.   Skin: Skin is warm, dry and intact. She is not diaphoretic. No cyanosis. No pallor. Nails show no clubbing.   Psychiatric: She has a normal mood and affect. Her behavior is normal. Judgment and thought content normal.       CT of Brain 2/25/16:  History:    Altered mental status  Findings: No intracranial hemorrhage or acute focal brain parenchymal abnormality is identified.  There is mild generalized atrophy. Calvarium is intact. Visualized paranasal sinuses and mastoid air cells are clear.    Assessment:       51-year-old postmenopausal female with a history of pancytopenia related to SLE and iron deficiency anemia who presents for worsening normocytic anemia.  She underwent a bone marrow biopsy on 2/19/2016 which demonstrated trilineage hematopoiesis without significant abnormality.  In 12/2016 she underwent EGD and colonoscopy ([facility]) without source of blood loss identified.    Workup has been unremarkable thus far and I believe anemia is secondary to chronic disease/inflammation from SLE.  The leukopenia is likely autoimmune in etiology.  counts remain stable today with normal ANC of 2.0, mild normocytic anemia at 10.1, and normal platelet count 180.  We will continue to monitor with repeat CBC in 4 months.  Patient was instructed to call the clinic in the meantime with symptoms of anemia or worsening neutrophil count.    Progressive normocytic anemia secondary to chronic disease/SLE  --Continue to monitor, repeat CBC, CMP, LDH, reticulocyte count, iron studies in 4 months  --Repeat bone marrow biopsy upon significant  progression  --Neutropenia precautions provided to the patient

## 2017-09-25 ENCOUNTER — OFFICE VISIT (OUTPATIENT)
Dept: SURGERY | Facility: CLINIC | Age: 52
End: 2017-09-25
Payer: COMMERCIAL

## 2017-09-25 ENCOUNTER — LAB VISIT (OUTPATIENT)
Dept: LAB | Facility: HOSPITAL | Age: 52
End: 2017-09-25
Attending: SURGERY
Payer: COMMERCIAL

## 2017-09-25 VITALS
WEIGHT: 176.38 LBS | SYSTOLIC BLOOD PRESSURE: 144 MMHG | TEMPERATURE: 98 F | HEART RATE: 98 BPM | DIASTOLIC BLOOD PRESSURE: 91 MMHG | BODY MASS INDEX: 28.9 KG/M2

## 2017-09-25 DIAGNOSIS — K42.9 UMBILICAL HERNIA WITHOUT OBSTRUCTION AND WITHOUT GANGRENE: ICD-10-CM

## 2017-09-25 DIAGNOSIS — K42.9 UMBILICAL HERNIA WITHOUT OBSTRUCTION AND WITHOUT GANGRENE: Primary | ICD-10-CM

## 2017-09-25 LAB
ALBUMIN SERPL BCP-MCNC: 3.5 G/DL
ALP SERPL-CCNC: 85 U/L
ALT SERPL W/O P-5'-P-CCNC: 11 U/L
ANION GAP SERPL CALC-SCNC: 8 MMOL/L
AST SERPL-CCNC: 18 U/L
BASOPHILS # BLD AUTO: 0.01 K/UL
BASOPHILS NFR BLD: 0.2 %
BILIRUB SERPL-MCNC: 0.8 MG/DL
BUN SERPL-MCNC: 11 MG/DL
CALCIUM SERPL-MCNC: 9.4 MG/DL
CHLORIDE SERPL-SCNC: 110 MMOL/L
CO2 SERPL-SCNC: 26 MMOL/L
CREAT SERPL-MCNC: 1 MG/DL
DIFFERENTIAL METHOD: ABNORMAL
EOSINOPHIL # BLD AUTO: 0 K/UL
EOSINOPHIL NFR BLD: 0.4 %
ERYTHROCYTE [DISTWIDTH] IN BLOOD BY AUTOMATED COUNT: 14.4 %
EST. GFR  (AFRICAN AMERICAN): >60 ML/MIN/1.73 M^2
EST. GFR  (NON AFRICAN AMERICAN): >60 ML/MIN/1.73 M^2
GLUCOSE SERPL-MCNC: 87 MG/DL
HCT VFR BLD AUTO: 31.6 %
HGB BLD-MCNC: 10 G/DL
LYMPHOCYTES # BLD AUTO: 1.2 K/UL
LYMPHOCYTES NFR BLD: 24.5 %
MCH RBC QN AUTO: 27.8 PG
MCHC RBC AUTO-ENTMCNC: 31.6 G/DL
MCV RBC AUTO: 88 FL
MONOCYTES # BLD AUTO: 0.4 K/UL
MONOCYTES NFR BLD: 7.9 %
NEUTROPHILS # BLD AUTO: 3.2 K/UL
NEUTROPHILS NFR BLD: 67 %
PLATELET # BLD AUTO: 208 K/UL
PMV BLD AUTO: 10.3 FL
POTASSIUM SERPL-SCNC: 3.7 MMOL/L
PROT SERPL-MCNC: 7.8 G/DL
RBC # BLD AUTO: 3.6 M/UL
SODIUM SERPL-SCNC: 144 MMOL/L
WBC # BLD AUTO: 4.7 K/UL

## 2017-09-25 PROCEDURE — 3077F SYST BP >= 140 MM HG: CPT | Mod: S$GLB,,, | Performed by: SURGERY

## 2017-09-25 PROCEDURE — 36415 COLL VENOUS BLD VENIPUNCTURE: CPT

## 2017-09-25 PROCEDURE — 99213 OFFICE O/P EST LOW 20 MIN: CPT | Mod: S$GLB,,, | Performed by: SURGERY

## 2017-09-25 PROCEDURE — 3008F BODY MASS INDEX DOCD: CPT | Mod: S$GLB,,, | Performed by: SURGERY

## 2017-09-25 PROCEDURE — 85025 COMPLETE CBC W/AUTO DIFF WBC: CPT

## 2017-09-25 PROCEDURE — 99999 PR PBB SHADOW E&M-EST. PATIENT-LVL III: CPT | Mod: PBBFAC,,, | Performed by: SURGERY

## 2017-09-25 PROCEDURE — 80053 COMPREHEN METABOLIC PANEL: CPT

## 2017-09-25 PROCEDURE — 3080F DIAST BP >= 90 MM HG: CPT | Mod: S$GLB,,, | Performed by: SURGERY

## 2017-09-25 NOTE — PATIENT INSTRUCTIONS
"Your surgery is scheduled for the 29th, this Friday at 7 in the morning.  The hospital call you with a time for arrival.    No solid food after midnight but you can have clear liquids up to 3 hours before your surgery.    If any of your preop labs are abnormal we will let you know    [facility]  Instructions for Patients and Families    You are invited to share your experience with me and my staff.  If you receive a survey in the mail, please return it at your convenience, or complete a brief survey on [employer].  We value your opinion!        Did you know that [facility] can be used to make appointments?  Just select "Schedule Appointment" under the "Visits" menu.    Notify you if any of your preop laboratories show abnormalities.  I    Before surgery:  The pre-op nurse from the hospital will call you before the day of your surgery to confirm your arrival time.  The time of your surgery may change due to emergencies or other unforeseen events.  Do not eat or drink anything after midnight the night before your procedure, except for clear liquids up to three hours before your surgery time, and sips of water with medication.  If you are not diabetic, it is recommended that you drink a glass of clear fruit juice (apple, grape, cranberry, not orange) three hours before your surgery time, but nothing after that.  If you are diabetic, you may have water or sugar-free clear liquids such as Crystal Light up to three hours before your surgery time.    Day of Surgery:  · You will go to a pre-op area where an IV will be started and you will speak to the anesthesiologist and surgeon.  · Your family will be updated throughout the operation.  After surgery, your family member may be taken to a private room for consultation with the surgeon.  This is for the privacy of your medical information and does not necessarily mean there is anything wrong.    If your incisions have:  · Glue:  You may take a " bath or shower immediately and wash your skin as you normally do.  The glue will eventually crumble or peel off. Do not let your incisions soak under water.  · Strips: Leave them on, but it is OK if they fall off on their own. It is OK to get them wet 48 hours after surgery.  · Bandage: You may remove it 2 days after your surgery, and then you may leave the incision open and take a shower or bath, unless otherwise instructed. If your bandage has clear plastic, you may shower with it on and then remove it 2 days after surgery.    Activities  · Walking is recommended after surgery; bed rest is not recommended unless specifically ordered.  · If you have had abdominal surgery, do not lift over 20 pounds for 4 weeks after surgery.  · If you have had hernia surgery, do not lift over 20 pounds for 6 weeks after surgery.  · You may drive when you are off your post-operative pain medication.  · Do not smoke after surgery, it decreases your ability to heal and increases the risk of infection and pneumonia.    Diet:  Drink lots of fluids after surgery.  You might not have much of an appetite at first, you may eat regular food when you feel ready, unless you are given special diet instructions.    Post-operative symptoms and medications  · It is safe to take over-the-counter medications for constipation, heartburn, sleep, or itching if needed.  Prescription pain medication may contain acetaminophen (Tylenol), so you should not take additional acetaminophen (Tylenol) at the same time as your pain medication.  · You may experience nausea, low fever/chills, and clear drainage from your incision, sometimes up to a month after surgery.  Notify our office if you have fever over 101 degrees, worsening redness around your incision, thick cloudy drainage, or inability to drink any liquids.  · You will experience some level of pain after surgery.  Your pain medication should help with the pain, but may not be able to eliminate it  "entirely.  Pain will decrease with time, and most pain will be gone by 4 to 6 weeks after surgery.  · We are not able to call in prescriptions for pain medication after hours or on weekends.  If your pain medication is ineffective or you will run out soon and need a refill, please call our office at [phone].  We are not able to replace pain medication that has been lost or stolen.    After surgery, you will either be discharged home or admitted to the hospital.  If you are admitted to the hospital, one of the surgeons or a physician assistant will see you once a day.  Due to scheduled surgery, we may see you in the afternoon or at night; however, your nurse is able to page us at any time.  If you feel there is a situation that is not being addressed properly, please dial [phone] from the phone in your room.    Follow-up appointment  · You will see your surgeon or a physician assistant in clinic for a follow-up appointment at either our [facility] (off [facility]) or [facility] (off [facility]) locations, usually between one and four weeks after your surgery.  · The hospital nurses can make your follow up appointment, or you can make it online at [email or web address] or call [phone].  · If you have a smartphone with the i[employer] duane, please let us know if you would like to do a phone visit instead of a post-op office visit.    If you are signed up for [facility], install the "iPowow" duane to access your test results, send messages to your doctors, and schedule appointments from your smartphone!    "

## 2017-09-25 NOTE — PROGRESS NOTES
Patient ID: Ruth Messina is a 51 y.o. female.    Umbilical hernia    Chief Complaint: Umbilical Hernia      HPI:  A shin has had an umbilical hernia that is enlarged over the years.  She had one transient episode of incarceration.  The patient presents now to discuss hernia repair.    She offers no complaints.  She says the hernia bulges but is able to be pushed back in.  She does have some issues with sleeping at night        Review of Systems   Constitutional: Negative for appetite change, chills, fatigue, fever and unexpected weight change.   HENT: Negative for hearing loss and rhinorrhea.    Eyes: Negative for visual disturbance.   Respiratory: Negative for apnea, cough, shortness of breath and wheezing.    Cardiovascular: Negative for chest pain and palpitations.   Gastrointestinal: Negative for abdominal distention, abdominal pain, blood in stool, constipation, diarrhea, nausea and vomiting.   Genitourinary: Negative for dysuria, frequency and urgency.   Musculoskeletal: Negative for arthralgias and neck pain.   Skin: Negative for rash.   Neurological: Negative for seizures, weakness, numbness and headaches.   Hematological: Negative for adenopathy. Does not bruise/bleed easily.   Psychiatric/Behavioral: Negative for hallucinations. The patient is not nervous/anxious.        Current Outpatient Prescriptions   Medication Sig Dispense Refill    azathioprine (IMURAN) 50 mg Tab Take 1 tablet (50 mg total) by mouth 2 (two) times daily. (Patient taking differently: Take 50 mg by mouth once daily. ) 60 tablet 2    CALCIUM-MAGNESIUM-ZINC ORAL Take by mouth once daily.      cholecalciferol, vitamin D3, (VITAMIN D3) 5,000 unit Tab Take 5,000 Units by mouth once daily.      desoximetasone (TOPICORT) 0.25 % cream Apply topically 2 (two) times daily as needed. Do not use on face, underarms or groin 60 g 3    ferrous gluconate (FERGON) 324 MG tablet Take 324 mg by mouth daily with breakfast.      fish oil-omega-3  fatty acids 300-1,000 mg capsule Take 2 g by mouth once daily.      fluocinolone (DERMA-SMOOTHE) 0.01 % external oil Apply oil to scalp twice a day 1 Bottle 5    gabapentin (NEURONTIN) 300 MG capsule Take 1 capsule (300 mg total) by mouth 3 (three) times daily. 90 capsule 1    losartan (COZAAR) 100 MG tablet Take 1 tablet (100 mg total) by mouth once daily. 30 tablet 11    multivitamin capsule Take 1 capsule by mouth once daily.      paroxetine (PAXIL-CR) 12.5 MG 24 hr tablet Take 1 tablet (12.5 mg total) by mouth every morning. 30 tablet 11    predniSONE (DELTASONE) 5 MG tablet TAKE ONE TABLET BY MOUTH ONCE DAILY 30 tablet 0    spironolactone (ALDACTONE) 25 MG tablet Take 1 tablet (25 mg total) by mouth once daily. 30 tablet 11     No current facility-administered medications for this visit.        Review of patient's allergies indicates:   Allergen Reactions    Cellcept [mycophenolate mofetil] Swelling     Lips      Ace inhibitors Other (See Comments)     unkown    Amoxicillin Other (See Comments)     + allergy test    Aspirin Other (See Comments)     G-6-PD def.     Beta-blockers (beta-adrenergic blocking agts) Other (See Comments)     Cannot take due to Raynaud's syndrome    Cephalosporins Other (See Comments)     + allergy test    Clindamycin Other (See Comments)     + allergy test    Hydrochlorothiazide Other (See Comments)     unknown    Ibuprofen Other (See Comments)     G-6-PD def.    Lasix [furosemide] Other (See Comments)     unknown    Lisinopril Other (See Comments)     unknown    Methotrexate analogues Other (See Comments)     Pancytopenia      Nsaids (non-steroidal anti-inflammatory drug) Other (See Comments)     G-6-PD def.    Pcn [penicillins] Other (See Comments)     + allergy test    Plaquenil [hydroxychloroquine] Other (See Comments)     G-6-PD def.    Shellfish containing products Other (See Comments)     + allergy test    Zofran [ondansetron hcl (pf)] Other (See  Comments)     Chest pain      Sulfa (sulfonamide antibiotics) Rash       Past Medical History:   Diagnosis Date    Anemia     with chronic disease lupus    Encounter for blood transfusion     G-6-PD deficiency     Heart murmur     MVP 1986    Hypertension     Lupus     Raynaud's syndrome        Past Surgical History:   Procedure Laterality Date    BONE MARROW BIOPSY      CARPAL TUNNEL RELEASE Left     widsom tooth extraction         Family History   Problem Relation Age of Onset    Arthritis Mother     Heart disease Mother     Hypertension Mother     Diabetes Father     Heart disease Father     Hypertension Father     Cancer Brother     Eczema Neg Hx     Lupus Neg Hx     Psoriasis Neg Hx        Social History     Social History    Marital status: Single     Spouse name: N/A    Number of children: N/A    Years of education: N/A     Occupational History    Not on file.     Social History Main Topics    Smoking status: Never Smoker    Smokeless tobacco: Never Used    Alcohol use No    Drug use: No    Sexual activity: No     Other Topics Concern    Are You Pregnant Or Think You May Be? No    Breast-Feeding No     Social History Narrative    No narrative on file       Vitals:    09/25/17 1019   BP: (!) 144/91   Pulse: 98       Physical Exam   Constitutional: She is oriented to person, place, and time. She appears well-developed and well-nourished.   HENT:   Head: Normocephalic.   Eyes: EOM are normal. Pupils are equal, round, and reactive to light.   Neck: No JVD present. No tracheal deviation present. No thyromegaly present.   Cardiovascular: Normal rate, regular rhythm and normal heart sounds.    Pulmonary/Chest: Breath sounds normal. She has no wheezes.   Abdominal: Soft. Bowel sounds are normal. She exhibits no distension and no mass. There is no hepatosplenomegaly. There is no tenderness. There is no rigidity, no rebound and no guarding. Hernia: reducible umbilical hernia  approximately 3-4 cm in size.   Musculoskeletal: Normal range of motion. She exhibits no edema.   Lymphadenopathy:     She has no cervical adenopathy.   Neurological: She is oriented to person, place, and time.   Skin: Skin is warm and dry. No rash noted. No erythema.   Psychiatric: She has a normal mood and affect.       Assessment & Plan:    reducible umbilical hernia.    Robotic repair with mesh.  The rationale for the robotic repair and the use of mesh were discussed.  Questions were answered.  Preop labs were drawn today.    Preoperative instructions were provided.  Consent was reviewed

## 2017-09-28 ENCOUNTER — TELEPHONE (OUTPATIENT)
Dept: SURGERY | Facility: CLINIC | Age: 52
End: 2017-09-28

## 2017-10-04 ENCOUNTER — OFFICE VISIT (OUTPATIENT)
Dept: NEUROLOGY | Facility: CLINIC | Age: 52
End: 2017-10-04
Payer: COMMERCIAL

## 2017-10-04 VITALS
WEIGHT: 174.63 LBS | DIASTOLIC BLOOD PRESSURE: 80 MMHG | HEART RATE: 78 BPM | HEIGHT: 66 IN | BODY MASS INDEX: 28.06 KG/M2 | SYSTOLIC BLOOD PRESSURE: 130 MMHG

## 2017-10-04 DIAGNOSIS — G50.1 ATYPICAL FACE PAIN: ICD-10-CM

## 2017-10-04 DIAGNOSIS — M32.19 OTHER SYSTEMIC LUPUS ERYTHEMATOSUS WITH OTHER ORGAN INVOLVEMENT: Primary | ICD-10-CM

## 2017-10-04 PROCEDURE — 99204 OFFICE O/P NEW MOD 45 MIN: CPT | Mod: S$GLB,,, | Performed by: PSYCHIATRY & NEUROLOGY

## 2017-10-04 PROCEDURE — 99999 PR PBB SHADOW E&M-EST. PATIENT-LVL III: CPT | Mod: PBBFAC,,, | Performed by: PSYCHIATRY & NEUROLOGY

## 2017-10-04 NOTE — LETTER
October 4, 2017      Janeen Delacruz MD  [id number] [facility]  [place] [address]           [address] [place] [address]  Phone: [phone]  Fax: [phone]          Patient: Ruth Messina   MR Number: [id number]   YOB: 1965   Date of Visit: 10/4/2017       Dear Dr. Janeen Delacruz:    Thank you for referring Ruth Messina to me for evaluation. Attached you will find relevant portions of my assessment and plan of care.    If you have questions, please do not hesitate to call me. I look forward to following Ruth Messina along with you.    Sincerely,    Iggy Lozano MD    Enclosure  CC:  No Recipients    If you would like to receive this communication electronically, please contact [email or web address] or [phone] to request more information on [employer] Link access.    For providers and/or their staff who would like to refer a patient to [facility], please contact us through our one-stop-shop provider referral line, [facility] , at [phone].    If you feel you have received this communication in error or would no longer like to receive these types of communications, please e-mail [email or web address]

## 2017-10-04 NOTE — PROGRESS NOTES
This is a 51-year-old right-handed patient who has a known and established history of lupus but also has a history of right sided facial pain that she states is been present for at least 5 years.  Within the last several months, the pain apparently has seem to be slightly worse.    The patient describes a sharp constant continuous pain that she feels around the right eye under the orbit and into the jaw line.  The patient states that this is a continuous pain when present and does not describe sudden sharp shooting or lancinating type of pain.  The patient is not able to identify any triggers such as chewing, talking, hot or cold foods or washing the face.  Patient does feel however that when cold air from the air conditioning is blowing against her at night, this may make the pain slightly worse.    Over the years, the patient is found gabapentin 300 mg 3 times a day to be effective in controlling her discomfort.  She is previously had MRI of the brain done on multiple occasions as well as a CT scan of the brain, and all of these studies have been normal.    Of note is the fact that the patient does have lupus.  She states that she has had an episode of central nervous system involvement by her lupus although she's not certain when this occurred and cannot give any further details.  She is aware of the fact that she has some difficulty however with recent memory indicating that she may forget events that occur from 1 day to the next.  She may occasionally misplace objects and can't find them again.  The patient states that she also has difficulty with her concentration and focus.    The patient has not been aware of any noticeable weakness, awkwardness, or clumsiness of the upper or lower extremities.  She denies any change in walking or standing balance.  She is not aware of any vision loss or double vision.  She denies any difficulty with articulation or swallowing.  She is not aware of any numbness, tingling, or  other paresthesia.      ROS:  GENERAL: No fever, chills,  or weight loss.  She does complain of fatigue.  SKIN: No rashes, itching or changes in color or texture of skin.  HEAD: No headaches or recent head trauma.  EYES: Visual acuity fine. No photophobia, ocular pain or diplopia.  EARS: Denies ear pain, discharge or vertigo.  NOSE: No loss of smell, no epistaxis or postnasal drip.  MOUTH & THROAT: No hoarseness or change in voice. No excessive gum bleeding.  NODES: Denies swollen glands.  CHEST: Denies PERAZA, cyanosis, wheezing, cough and sputum production.  CARDIOVASCULAR: Denies chest pain, PND, orthopnea or reduced exercise tolerance.  ABDOMEN: Appetite fine. No weight loss. Denies diarrhea, abdominal pain, hematemesis or blood in stool.  URINARY: No flank pain, dysuria or hematuria.  PERIPHERAL VASCULAR: No claudication or cyanosis.  MUSCULOSKELETAL: The patient reports intermittent pain in multiple joints.  NEUROLOGIC: No history of seizures, paralysis, alteration of gait or coordination.    PAST HISTORY:  Surgery: [place] tooth extraction, bone marrow biopsy, carpal tunnel release  Medical: Lupus, renounced syndrome, hypertension, G6PD deficiency, anemia  ALLERGIES: Aspirin, beta blockers, cephalosporin, clindamycin, HCTZ, ibuprofen, Lasix, lisinopril, methotrexate, and a silicone, Plaquenil, Zofran, sulfa, shellfish, and NSAIDs    FAMILY HISTORY:  The patient's parents are both living but have a history of heart disease and hypertension.  She had a brother who  from cancer.    SOCIAL HISTORY:  The patient is a nonsmoker.  She is living independently.    PE:   VITAL SIGNS: Blood pressure 130/80, pulse 78, weight 79.2 kg, height 65.5 inches, BMI 28.61  APPEARANCE: Well nourished, well developed, in no acute distress.    HEAD: Normocephalic, atraumatic.  No TMJ tenderness or temporal fossa tenderness.  EYES: PERRL. EOMI.  Non-icteric sclerae.    EARS: TM's intact. Light reflex normal. No retraction or  perforation.    NOSE: Mucosa pink. Airway clear.  MOUTH & THROAT: No tonsillar enlargement. No pharyngeal erythema or exudate. No stridor.  NECK: Supple. No bruits.  CHEST: Lungs clear to auscultation.  CARDIOVASCULAR: Regular rhythm without significant murmurs.  ABDOMEN: Bowel sounds normal. Not distended.   MUSCULOSKELETAL:  No bony deformity seen.  Muscle tone and muscle mass are normal both upper and both lower extremities.  NEUROLOGIC:   Mental Status:  The patient is well oriented to person, time, place, and situation.  The patient is attentive to the environment and cooperative for the exam.  Cranial Nerves: II-XII grossly intact.  The patient perceives the color red to be the same shade of red with each eye.  Light intensity is the same with each eye also.  Visual fields are intact by confrontation.  Fundoscopic exam is normal.  No hemorrhage, exudate or papilledema is present. The extraocular muscles are intact in the cardinal directions of gaze.  No ptosis is present.  The patient has intact perception of cotton wisp in all 3 divisions of the fifth cranial nerve bilaterally.  Masseter function is equally strong also.  Facial features are symmetrical.  Hearing is intact to voice and finger rub.  Air conduction is greater than bone conduction bilaterally.  Speech is normal in fluency, diction, and phrasing.  Tongue protrudes in the midline.    Gait and Station:  Romberg is negative.  Good alternate armswing with normal gait.  No ataxia.  Motor:  No downdrift of either arm when held at shoulder level.  Manual muscle testing of proximal and distal muscles of both upper and lower extremities is normal.   Sensory:  Intact both upper and lower extremities to pin prick, touch, and vibration.  Cerebellar:  Finger to nose done well.  Alternating movements intact.  No involuntary movements or tremor seen.  Reflexes:  Stretch reflexes are 2+ both upper and lower extremities.  Plantar stimulation is flexor bilaterally  and no pathological reflexes are seen     ASSESSMENT:  1.  Atypical facial pain  2.  History of lupus  3.  Memory loss and loss of concentration/focus    DISCUSSION:  The patient's description of her facial pain suggestive is to be more likely atypical facial pain rather than trigeminal neuralgia.  She is not able to trigger the pain and she does not describe shooting sharp lancinating type pain that is more typical for trigeminal neuralgia.  However, she has found benefit from taking gabapentin so this should continue.    She is also describing difficulties with concentration, focus, and memory.  With her prior history of lupus with central nervous system following, I would recommend neuropsychometric testing.    RECOMMENDATIONS:  1.  Continue gabapentin 300 mg 3 times a day for atypical facial pain  2.  Neuropsychometric evaluation  3.  Return to neurology as needed     This note is generated with speech recognition software and is subject to transcription error and sound alike phrases that may be missed by proofreading.

## 2017-10-06 ENCOUNTER — TELEPHONE (OUTPATIENT)
Dept: ORTHOPEDICS | Facility: CLINIC | Age: 52
End: 2017-10-06

## 2017-10-06 ENCOUNTER — PATIENT MESSAGE (OUTPATIENT)
Dept: ORTHOPEDICS | Facility: CLINIC | Age: 52
End: 2017-10-06

## 2017-10-06 NOTE — TELEPHONE ENCOUNTER
The patient has multiple Orthopedic appointments.   She only needs 1 (one) appointment to be seen by a provider.     Candace LENZ will be out of office Tuesday afternoon.     Also she should probably see Podiatry for her foot pain/problems. The Foot surgeons in Podiatry can evaluate her foot problems.     Orthopedics usually sees foot/ankle fractures or achilles tendon issues.

## 2017-10-09 ENCOUNTER — TELEPHONE (OUTPATIENT)
Dept: ORTHOPEDICS | Facility: CLINIC | Age: 52
End: 2017-10-09

## 2017-10-10 DIAGNOSIS — M32.19 CNS LUPUS: ICD-10-CM

## 2017-10-10 DIAGNOSIS — G05.3 CNS LUPUS: ICD-10-CM

## 2017-10-10 RX ORDER — PREDNISONE 5 MG/1
TABLET ORAL
Qty: 30 TABLET | Refills: 0 | Status: SHIPPED | OUTPATIENT
Start: 2017-10-10 | End: 2017-11-11 | Stop reason: SDUPTHER

## 2017-10-16 ENCOUNTER — OFFICE VISIT (OUTPATIENT)
Dept: OPHTHALMOLOGY | Facility: CLINIC | Age: 52
End: 2017-10-16
Payer: COMMERCIAL

## 2017-10-16 DIAGNOSIS — G05.3 CNS LUPUS: ICD-10-CM

## 2017-10-16 DIAGNOSIS — H40.003 GLAUCOMA SUSPECT OF BOTH EYES: ICD-10-CM

## 2017-10-16 DIAGNOSIS — M32.19 CNS LUPUS: ICD-10-CM

## 2017-10-16 DIAGNOSIS — M32.19 OTHER SYSTEMIC LUPUS ERYTHEMATOSUS WITH OTHER ORGAN INVOLVEMENT: Primary | ICD-10-CM

## 2017-10-16 PROCEDURE — 99212 OFFICE O/P EST SF 10 MIN: CPT | Mod: S$GLB,,, | Performed by: OPHTHALMOLOGY

## 2017-10-16 PROCEDURE — 99999 PR PBB SHADOW E&M-EST. PATIENT-LVL II: CPT | Mod: PBBFAC,,, | Performed by: OPHTHALMOLOGY

## 2017-10-16 PROCEDURE — 92133 CPTRZD OPH DX IMG PST SGM ON: CPT | Mod: S$GLB,,, | Performed by: OPHTHALMOLOGY

## 2017-10-16 PROCEDURE — 92083 EXTENDED VISUAL FIELD XM: CPT | Mod: S$GLB,,, | Performed by: OPHTHALMOLOGY

## 2017-10-17 NOTE — PROGRESS NOTES
===============================  10/17/2017   Ruth Messina,   51 y.o. female   Last visit [facility]: :9/11/2017   Last visit eye dept. 9/11/2017  [facility]:  Visual acuity was not recorded.   Not recorded         Not recorded         Not recorded        Chief Complaint   Patient presents with    Glaucoma Suspect     HVF / GOCT        HPI     Glaucoma Suspect    Additional comments: HVF / GOCT           Comments         COAG SUSP OU  SECONDARY SJOGREN'S SYNDROME  KERATOCONJUNCTIVITIS SICCA OU  PRD OU  PVD OU    PF AT'S  RESTASIS BID       Last edited by Dixon Alberts MA on 10/16/2017 12:24 PM. (History)          ________________  10/16/2017  Problem List Items Addressed This Visit     CNS lupus    Relevant Orders    Posterior Segment OCT Optic Nerve- Both eyes (Completed)    Becerra Visual Field - OU - Extended - Both Eyes (Completed)    Systemic lupus erythematosus - Primary    Glaucoma suspect of both eyes    Overview     -- asymmetric c:d OD>OS                 Relevant Orders    Posterior Segment OCT Optic Nerve- Both eyes (Completed)    Becerra Visual Field - OU - Extended - Both Eyes (Completed)      Other Visit Diagnoses    None.       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       ===========================

## 2017-10-20 ENCOUNTER — TELEPHONE (OUTPATIENT)
Dept: OTOLARYNGOLOGY | Facility: CLINIC | Age: 52
End: 2017-10-20

## 2017-10-20 ENCOUNTER — TELEPHONE (OUTPATIENT)
Dept: RHEUMATOLOGY | Facility: CLINIC | Age: 52
End: 2017-10-20

## 2017-10-20 NOTE — TELEPHONE ENCOUNTER
Phoned patient back and was able to answer her questions in regards to a possible mouth biopsy she may want to have done. The call ended well.

## 2017-10-20 NOTE — TELEPHONE ENCOUNTER
----- Message from Maty Holden sent at 10/20/2017  7:50 AM CDT -----  Contact: pt   Call pt regarding a question about a procedure.    ..[phone] (yhcr)

## 2017-10-20 NOTE — TELEPHONE ENCOUNTER
Spoke with pt and advised her of below. Pt states that her PCP feels like the biopsy should be done confirm the Sjogren's and make sure nothing else is going on with her mouth and pt agree's. Advised patient again of below but we could advised Dr. CHUNG of her PCP's advice per pt and let her know. Pt stated that Dr. CHUNG probably wouldn't change his mind but would like to let him know as well. Pt states she is not sure of Sjogren's is the only cause of her mouth problems. Please Advise.

## 2017-10-20 NOTE — TELEPHONE ENCOUNTER
----- Message from Enio Rivera sent at 10/20/2017  7:56 AM CDT -----  Contact: pt  She's calling in regards to a personal question concerning a procedure, please advise, [phone] (home)

## 2017-10-20 NOTE — TELEPHONE ENCOUNTER
Spoke with pt and she saw Dr. Salomon she mentioned a mouth biopsy and Dr. CHUNG advised pt that a biopsy was not needed since she does have secondary Sjogren's syndrome. Pt states that she spoke with her PCP regarding an biopsy of her mouth as well and her PCP advised her of doing the biopsy of her mouth as well to confirm the diagnosis of Sjogren's syndrome. Pt would like to know why Dr. CHUNG had advised her that it was not needed. Pt states when she called to leave the message she was advised by the person taking the message that Dr. CHUNG was out of clinic today and she wanted to know If another Rheumatologist here could tell her why Dr. CHUNG advised her the mouth biopsy was not needed. Advised her I could send a message to another provider in the department but I would have to wait until Dr. CHUNG is back in clinic on Monday to speak with him regarding this. Pt also wanted to know if getting the biopsy would cause any further damage or any other problems. Pt states that this problem with her mouth has been going on for awhile now and would like to know for sure if it from Sjogren's or if it could be something else. Please Advise.         Staff message sent to Dr. CHUNG as well for his return to clinic.

## 2017-10-20 NOTE — TELEPHONE ENCOUNTER
----- Message from Chirag Turner MD sent at 10/20/2017  9:52 AM CDT -----  Regarding: RE:  Because her Sjögren's syndrome antibody - SSA is positive.     Thanks  Dr. CHUNG.  ----- Message -----  From: Sumanth Puentes LPN  Sent: 10/20/2017   8:29 AM  To: Chirag Turner MD    Spoke with pt and she saw Dr. Salomon she mentioned a mouth biopsy and Dr. CHUNG advised pt that a biopsy was not needed since she does have secondary Sjogren's syndrome. Pt states that she spoke with her PCP regarding an biopsy of her mouth as well and her PCP advised her of doing the biopsy of her mouth as well to confirm the diagnosis of Sjogren's syndrome. Pt would like to know why Dr. CHUNG had advised her that it was not needed. Pt states when she called to leave the message she was advised by the person taking the message that Dr. CHUNG was out of clinic today and she wanted to know If another Rheumatologist here could tell her why Dr. CHUNG advised her the mouth biopsy was not needed. Advised her I could send a message to another provider in the department but I would have to wait until Dr. CHUNG is back in clinic on Monday to speak with him regarding this. Pt also wanted to know if getting the biopsy would cause any further damage or any other problems. Pt states that this problem with her mouth has been going on for awhile now and would like to know for sure if it from Sjogren's or if it could be something else. Please Advise.

## 2017-10-20 NOTE — TELEPHONE ENCOUNTER
NATTY Hamlin, JONAHN   Caller: Unspecified (Today,  8:10 AM)             All doctors are different and practice differently.  I can't speak for Dr. CHUNG but I imagine the biopsy would not change her plan of treatment so would be unnecessary to have to go through that.  Can discuss with him Monday.      Spoke with pt and advised her of above

## 2017-10-23 NOTE — TELEPHONE ENCOUNTER
MD Sumanth Grant, LPN   Caller: Unspecified (3 days ago, 11:50 AM)             Sure. Please make an appointment with ENT. I am positive that she has Sjögren's. But she could also have other problems in mouth which wasn't present when I saw her. Thanks.      Spoke with pt and advised her of above. Scheduled appointment with Dr. Drummond for 10.24.17 at 8.45 am, advised patient that it is scheduled for a follow up visit only and not for a procedure. Pt verbalized understanding.

## 2017-10-24 ENCOUNTER — TELEPHONE (OUTPATIENT)
Dept: OTOLARYNGOLOGY | Facility: CLINIC | Age: 52
End: 2017-10-24

## 2017-10-24 ENCOUNTER — OFFICE VISIT (OUTPATIENT)
Dept: OTOLARYNGOLOGY | Facility: CLINIC | Age: 52
End: 2017-10-24
Payer: COMMERCIAL

## 2017-10-24 VITALS
RESPIRATION RATE: 18 BRPM | WEIGHT: 177.69 LBS | DIASTOLIC BLOOD PRESSURE: 84 MMHG | HEIGHT: 66 IN | SYSTOLIC BLOOD PRESSURE: 139 MMHG | BODY MASS INDEX: 28.56 KG/M2 | TEMPERATURE: 98 F | HEART RATE: 100 BPM

## 2017-10-24 DIAGNOSIS — M35.01 SJOGREN'S SYNDROME WITH KERATOCONJUNCTIVITIS SICCA: ICD-10-CM

## 2017-10-24 DIAGNOSIS — M32.9 SYSTEMIC LUPUS ERYTHEMATOSUS, UNSPECIFIED SLE TYPE, UNSPECIFIED ORGAN INVOLVEMENT STATUS: ICD-10-CM

## 2017-10-24 DIAGNOSIS — R68.2 DRY MOUTH: Primary | ICD-10-CM

## 2017-10-24 PROCEDURE — 99214 OFFICE O/P EST MOD 30 MIN: CPT | Mod: S$GLB,,, | Performed by: ORTHOPAEDIC SURGERY

## 2017-10-24 PROCEDURE — 99999 PR PBB SHADOW E&M-EST. PATIENT-LVL IV: CPT | Mod: PBBFAC,,, | Performed by: ORTHOPAEDIC SURGERY

## 2017-10-24 NOTE — TELEPHONE ENCOUNTER
Has she tried evoxac yet?  It is a medication that is used to increase saliva.  If she wants, I can send in a trial or we can discuss when she returns to clinic for her biopsy.

## 2017-10-24 NOTE — TELEPHONE ENCOUNTER
Patient just saw you today.  She forgot to ask you if there is anything else that can be done for dry mouth besides Imuran, Prednisone and Biotene?  Patient can be reached back at [phone].

## 2017-10-24 NOTE — TELEPHONE ENCOUNTER
I conveyed the message below to the patient.  She has a few questions about it and will discuss it more with Dr. Salomon on tomorrow.  I conveyed this to Dr. Salomon.

## 2017-10-24 NOTE — PROGRESS NOTES
"Subjective:       Patient ID: Ruth Messina is a 51 y.o. female.    Chief Complaint: Consult    Patient is a very pleasant 51 year old female here to see me today for evaluation of continued issues with her oral cavity.  She reports that she continues to have issues with dry mouth, and has continued burning sensation in the right side of her mouth more than the left.  She follows with a periodontist, and is due for her cleaning soon.  She has a known diagnosis of Sjogren's, positive antibodies.  She is currently on Imuran and prednisone 5 mg daily.  She is using Biotene mouth wash.  She has "bumps" in her mouth, but denies any ulcers.      Review of Systems   Constitutional: Positive for fatigue. Negative for chills, fever and unexpected weight change.   HENT: Negative for congestion, dental problem, ear discharge, ear pain, facial swelling, hearing loss, nosebleeds, postnasal drip, rhinorrhea, sinus pressure, sneezing, sore throat, tinnitus, trouble swallowing and voice change.    Eyes: Negative for redness, itching and visual disturbance.   Respiratory: Negative for cough, choking, shortness of breath (on exertion) and wheezing.    Cardiovascular: Negative for chest pain and palpitations.   Gastrointestinal: Negative for abdominal pain.        No reflux.   Musculoskeletal: Negative for gait problem.   Skin: Negative for rash.   Neurological: Negative for dizziness, light-headedness and headaches.       Objective:      Physical Exam   Constitutional: She is oriented to person, place, and time. She appears well-developed and well-nourished. No distress.   HENT:   Head: Normocephalic and atraumatic.   Right Ear: Tympanic membrane, external ear and ear canal normal.   Left Ear: Tympanic membrane, external ear and ear canal normal.   Nose: Nose normal. No mucosal edema, rhinorrhea, nasal deformity or septal deviation. No epistaxis. Right sinus exhibits no maxillary sinus tenderness and no frontal sinus tenderness. " Left sinus exhibits no maxillary sinus tenderness and no frontal sinus tenderness.   Mouth/Throat: Uvula is midline, oropharynx is clear and moist and mucous membranes are normal. Mucous membranes are not pale and not dry. No dental caries. No oropharyngeal exudate or posterior oropharyngeal erythema.   Diffuse nodularity of minor salivary glands of lower lip, no ulceration or masses seen or felt, saliva thick   Eyes: Conjunctivae, EOM and lids are normal. Pupils are equal, round, and reactive to light. Right eye exhibits no chemosis. Left eye exhibits no chemosis. Right conjunctiva is not injected. Left conjunctiva is not injected. No scleral icterus. Right eye exhibits normal extraocular motion and no nystagmus. Left eye exhibits normal extraocular motion and no nystagmus.   Neck: Trachea normal and phonation normal. No tracheal tenderness present. No tracheal deviation present. No thyroid mass and no thyromegaly present.   Cardiovascular: Intact distal pulses.    Pulmonary/Chest: Effort normal. No stridor. No respiratory distress.   Abdominal: She exhibits no distension.   Lymphadenopathy:        Head (right side): No submental, no submandibular, no preauricular, no posterior auricular and no occipital adenopathy present.        Head (left side): No submental, no submandibular, no preauricular, no posterior auricular and no occipital adenopathy present.     She has no cervical adenopathy.   Neurological: She is alert and oriented to person, place, and time. No cranial nerve deficit.   Skin: Skin is warm and dry. No rash noted. No erythema.   Psychiatric: She has a normal mood and affect. Her behavior is normal.       Assessment:       1. Dry mouth    2. Sjogren's syndrome with keratoconjunctivitis sicca    3. Systemic lupus erythematosus, unspecified SLE type, unspecified organ involvement status        Plan:       1.  Dry mouth, Sjogren's:  I had a very detailed and realistic discussion with the patient  regarding the information that could be obtained from a minor salivary gland biopsy.  She has no overlying ulceration, so a mucosal biopsy is not indicated at this time.  In this case, a minor salivary gland biopsy will evaluate her minor salivary glands for inflammation, and the number of certain inflammatory cells is supportive of the diagnosis of Sjogren's.  We discussed that she has positive antibodies for Sjogren's, and that is considered to be a more sensitive and specific method of diagnosis over biopsy.  In general, biopsy is indicated when blood work is negative, but there is a high clinical suspicion for Sjogren's.  We discussed that even if her biopsy is negative, I would still consider her to have Sjogren's based on her clinical picture and laboratory findings.  She still wants to proceed with biopsy, will schedule at her convenience.  Will send results to her rheumatologist to evaluate when complete.

## 2017-10-25 ENCOUNTER — OFFICE VISIT (OUTPATIENT)
Dept: OTOLARYNGOLOGY | Facility: CLINIC | Age: 52
End: 2017-10-25
Payer: COMMERCIAL

## 2017-10-25 VITALS
TEMPERATURE: 98 F | SYSTOLIC BLOOD PRESSURE: 160 MMHG | BODY MASS INDEX: 28.56 KG/M2 | HEIGHT: 66 IN | DIASTOLIC BLOOD PRESSURE: 100 MMHG | WEIGHT: 177.69 LBS | RESPIRATION RATE: 18 BRPM | HEART RATE: 105 BPM

## 2017-10-25 DIAGNOSIS — M35.01 SJOGREN'S SYNDROME WITH KERATOCONJUNCTIVITIS SICCA: Primary | ICD-10-CM

## 2017-10-25 PROCEDURE — 99999 PR PBB SHADOW E&M-EST. PATIENT-LVL IV: CPT | Mod: PBBFAC,,, | Performed by: ORTHOPAEDIC SURGERY

## 2017-10-25 PROCEDURE — 88305 TISSUE EXAM BY PATHOLOGIST: CPT | Mod: 26,,,

## 2017-10-25 PROCEDURE — 88305 TISSUE EXAM BY PATHOLOGIST: CPT

## 2017-10-25 PROCEDURE — 99499 UNLISTED E&M SERVICE: CPT | Mod: S$GLB,,, | Performed by: ORTHOPAEDIC SURGERY

## 2017-10-25 PROCEDURE — 42405 BIOPSY OF SALIVARY GLAND: CPT | Mod: S$GLB,,, | Performed by: ORTHOPAEDIC SURGERY

## 2017-10-25 RX ORDER — CHLORHEXIDINE GLUCONATE ORAL RINSE 1.2 MG/ML
SOLUTION DENTAL
Qty: 473 ML | Refills: 0 | Status: SHIPPED | OUTPATIENT
Start: 2017-10-25 | End: 2018-01-10

## 2017-10-27 NOTE — PROGRESS NOTES
Subjective:       Patient ID: Ruth Messina is a 51 y.o. female.    Chief Complaint: Biopsy (Salivary gland)    Patient is a very pleasant 51 year old female here to see me today for a minor salivary gland biopsy.  She was last seen here yesterday, and risks and benefits of the procedure were discussed extensively at that time, and again today.  We specifically discussed that she has positive serology and symptoms consistent with Sjogren's, and that her biopsy must be evaluated with that in mind.      Review of Systems   HENT:        Dry mouth       Objective:      Physical Exam   HENT:   Dry oral secretions, diffusely nodular buccal mucosa, no overlying ulcerations         Preprocedure diagnosis:  Dry mouth, possible Sjogren's  Postproecdure diagnosis:   Same    Procedure:  Minor salivary gland biopsy    Complications:  None  Specimen:  Minor salivary glands, lower lip  Blood loss:   Minimal  Anesthesia:  Local, lidocaine    Procedure in detail:  After appropriate consents were obtained, the right lower lip was anesthetized using 1.5 cc of 1% lidocaine with epinephrine.  A #15 blade was used to make an incision over the right lower lip, mucosal surface.  A pair of pickups with teeth and fine scissors was then used to dissect five to seven minor salivary glands, all superficial to the musculature.  The incision was then closed in a running locking fashion with a 4-0 Vicryl suture.  The patient tolerated the procedure without difficulty, and there were no complications.          Assessment:       1. Sjogren's syndrome with keratoconjunctivitis sicca        Plan:       1.  Sjogren's syndrome:  Patient underwent minor salivary gland biopsy today in clinic without difficulty.  The patient will follow with their rheumatologist in 1-2 weeks for pathology results.  I gave the patient a prescription for peridex mouth rinse to be use 2-3 times daily for the next week.  I also instructed the patient to avoid trauma to the  area, though it is fine to brush teeth normally.  Return to clinic as needed should any issues arise including bleeding, swelling, pain, or redness.

## 2017-11-01 ENCOUNTER — TELEPHONE (OUTPATIENT)
Dept: RHEUMATOLOGY | Facility: CLINIC | Age: 52
End: 2017-11-01

## 2017-11-01 NOTE — TELEPHONE ENCOUNTER
Spoke with pt and she states that she received a message from Dr. Salomon regarding her biopsy but she would like a full breakdown of exactly what was found on the biopsy. Pt states that the report was not avaliable on her patient portal. Advised patient that Dr. CHUNG is out of clinic on Wednesday afternoons so it would not be until tomorrow morning that we will call her back. Pt also request biopsy to be sent to PCP. Please Advise.

## 2017-11-01 NOTE — TELEPHONE ENCOUNTER
----- Message from Aiden Sheffield sent at 11/1/2017 12:05 PM CDT -----  Contact: pt  Pt is calling nurse staff regarding the results of a biopsy. Pt call back today [phone] thanks

## 2017-11-02 ENCOUNTER — TELEPHONE (OUTPATIENT)
Dept: OTOLARYNGOLOGY | Facility: CLINIC | Age: 52
End: 2017-11-02

## 2017-11-02 NOTE — TELEPHONE ENCOUNTER
Biopsy shows significant lymphocyte infiltration into salivary glands consistent with Sjogren's syndrome. Please mail a copy of the biopsy report to patient and her PCP. Thanks.

## 2017-11-02 NOTE — TELEPHONE ENCOUNTER
----- Message from Evelin Orr sent at 11/2/2017  2:29 PM CDT -----  Please call pt back at [phone] in regards to a lip biopsy. Pt is having nerve pain on her mouth.

## 2017-11-02 NOTE — TELEPHONE ENCOUNTER
Spoke with pt and advised her of below and we would be mailing her a copy of the report. Advised patient that once she recieves the report and reviews over it she can call us if she has any additional questions but Dr. CHUNG will be out of clinic until 11.13.17. Advised patient the report was sent to her PCP as well. Pt verbalized understanding.

## 2017-11-03 ENCOUNTER — TELEPHONE (OUTPATIENT)
Dept: OTOLARYNGOLOGY | Facility: CLINIC | Age: 52
End: 2017-11-03

## 2017-11-03 NOTE — TELEPHONE ENCOUNTER
Advised patient of Dr Salomon request to come back in 2-3 weeks if pain is persistent. Verbalized understanding and will call if appointment is needed.

## 2017-11-03 NOTE — TELEPHONE ENCOUNTER
She can massage the area to help release scar tissue.  There are no major nerves in the area, so should resolve with time.  If she can tolerate with her other medications, she can take OTC ibuprofen or tylenol as needed.  Followup with Rheumatology for discussion regarding pathology results, and return to clinic in 2-3 weeks if pain persists.

## 2017-11-11 DIAGNOSIS — G05.3 CNS LUPUS: ICD-10-CM

## 2017-11-11 DIAGNOSIS — M32.19 CNS LUPUS: ICD-10-CM

## 2017-11-13 ENCOUNTER — OFFICE VISIT (OUTPATIENT)
Dept: DERMATOLOGY | Facility: CLINIC | Age: 52
End: 2017-11-13
Payer: COMMERCIAL

## 2017-11-13 ENCOUNTER — CLINICAL SUPPORT (OUTPATIENT)
Dept: AUDIOLOGY | Facility: CLINIC | Age: 52
End: 2017-11-13
Payer: COMMERCIAL

## 2017-11-13 ENCOUNTER — OFFICE VISIT (OUTPATIENT)
Dept: OTOLARYNGOLOGY | Facility: CLINIC | Age: 52
End: 2017-11-13
Payer: COMMERCIAL

## 2017-11-13 DIAGNOSIS — L65.8 FEMALE PATTERN HAIR LOSS: ICD-10-CM

## 2017-11-13 DIAGNOSIS — R94.120 FAILED HEARING SCREENING: ICD-10-CM

## 2017-11-13 DIAGNOSIS — L93.0 DISCOID LUPUS: Primary | ICD-10-CM

## 2017-11-13 DIAGNOSIS — H90.41 SENSORINEURAL HEARING LOSS (SNHL) OF RIGHT EAR WITH UNRESTRICTED HEARING OF LEFT EAR: Primary | ICD-10-CM

## 2017-11-13 DIAGNOSIS — H61.21 IMPACTED CERUMEN OF RIGHT EAR: Primary | ICD-10-CM

## 2017-11-13 PROCEDURE — 99213 OFFICE O/P EST LOW 20 MIN: CPT | Mod: S$GLB,,, | Performed by: DERMATOLOGY

## 2017-11-13 PROCEDURE — 99999 PR PBB SHADOW E&M-EST. PATIENT-LVL II: CPT | Mod: PBBFAC,,, | Performed by: DERMATOLOGY

## 2017-11-13 PROCEDURE — 69210 REMOVE IMPACTED EAR WAX UNI: CPT | Mod: S$GLB,,, | Performed by: OTOLARYNGOLOGY

## 2017-11-13 PROCEDURE — 92567 TYMPANOMETRY: CPT | Mod: S$GLB,,, | Performed by: AUDIOLOGIST

## 2017-11-13 PROCEDURE — 99213 OFFICE O/P EST LOW 20 MIN: CPT | Mod: 25,S$GLB,, | Performed by: OTOLARYNGOLOGY

## 2017-11-13 PROCEDURE — 92557 COMPREHENSIVE HEARING TEST: CPT | Mod: S$GLB,,, | Performed by: AUDIOLOGIST

## 2017-11-13 RX ORDER — PREDNISONE 5 MG/1
TABLET ORAL
Qty: 30 TABLET | Refills: 0 | Status: SHIPPED | OUTPATIENT
Start: 2017-11-13 | End: 2017-12-10 | Stop reason: SDUPTHER

## 2017-11-13 NOTE — PROGRESS NOTES
Ruth Messina was seen 11/13/2017 for an audiological evaluation.  Patient failed a hearing screen at her PCP office and was referred for complete audiogram.  Otoscopic examination revealed cerumen impaction in the right ear.  She was worked in to see Dr. Garcia for wax removal.      Results reveal essentially normal hearing sensitivity 250-8000 Hz bilaterally. (mild SNHL at 4000Hz AD.) Speech Reception Thresholds were  15 dBHL for the right ear and 15 dBHL for the left ear.   Word recognition scores were excellent bilaterally.  Tympanograms were Type A for the right ear and Type A for the left ear.    Patient was counseled on the above findings.    REC:  Repeat audiogram in one year

## 2017-11-13 NOTE — PROGRESS NOTES
Subjective:       Patient ID:  Ruth Messina is a 52 y.o. 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 Grider, + SHEILA 1:640 speckled, + anti-SSA, Sm, dsDNA, Sm/RNP) and DLE c/ hair loss, last seen by RIAN Randhawa on 7/12/17.  She was s/p ILK #3.  Pt states she flared with ILK.  She discontinued fluocinolone scalp oil due to burning with application.  She currently uses minoxidil 5% qD, biotin 5000 mcg daily, topicort 0.25% cream daily, clobetasol foam qD.  + improvement in scalp with decreased irritation.        Review of Systems   Constitutional: Negative for fever and chills.   Gastrointestinal: Negative for nausea and vomiting.   Skin: Positive for itching and rash. Negative for daily sunscreen use, activity-related sunscreen use and recent sunburn.   Hematologic/Lymphatic: Does not bruise/bleed easily.        Objective:    Physical Exam   Constitutional: She appears well-developed and well-nourished. No distress.   Neurological: She is alert and oriented to person, place, and time. She is not disoriented.   Psychiatric: She has a normal mood and affect.   Skin:   Areas Examined (abnormalities noted in diagram):   Scalp / Hair Palpated and Inspected  Head / Face Inspection Performed  Neck Inspection Performed  RUE Inspected  LUE Inspection Performed  Nails and Digits Inspection Performed                Assessment / Plan:        Discoid lupus  Female pattern hair loss  Will avoid ILK since pt states has worsened scalp with irritation.  Will add topical finasteride or change to minoxidil 7 % compounded.  Consider change to viviscal or AG pro vitamins.  Continue topicort 0.25% cream and clobetasol foam.            Return in about 6 months (around 5/13/2018).

## 2017-11-13 NOTE — PROGRESS NOTES
REFERRING PROVIDER  No referring provider defined for this encounter.  Subjective:   Patient: Ruth Messina [id number], :1965   Visit date:2017 4:01 PM    Chief Complaint:  Hearing loss.    HPI:     Ruth Messina is a 52 y.o. female  Patient failed a hearing screen at her PCP office and was referred for complete audiogram.  No subjective hearing loss or ear pain.      Her meds, allergies, medical, surgical, social & family histories were reviewed & updated:  -     She has a current medication list which includes the following prescription(s): azathioprine, calcium/magnesium/zinc, chlorhexidine, cholecalciferol (vitamin d3), cyclosporine, desoximetasone, ferrous gluconate, fish oil-omega-3 fatty acids, fluocinolone, gabapentin, losartan, multivitamin, paroxetine, prednisone, and spironolactone.  -     She  has a past medical history of Anemia; Encounter for blood transfusion; G-6-PD deficiency; Heart murmur; Hypertension; Lupus; and Raynaud's syndrome.   -     She  does not have any pertinent problems on file.   -     She  has a past surgical history that includes widsom tooth extraction; Bone marrow biopsy; and Carpal tunnel release (Bilateral).  -     She  reports that she has never smoked. She has never used smokeless tobacco. She reports that she does not drink alcohol or use drugs.  -     Her family history includes Arthritis in her mother; Cancer in her brother; Diabetes in her father; Heart disease in her father and mother; Hypertension in her father and mother.  -     She is allergic to cellcept [mycophenolate mofetil]; ace inhibitors; amoxicillin; aspirin; beta-blockers (beta-adrenergic blocking agts); cephalosporins; clindamycin; hydrochlorothiazide; ibuprofen; lasix [furosemide]; lisinopril; methotrexate analogues; nsaids (non-steroidal anti-inflammatory drug); pcn [penicillins]; plaquenil [hydroxychloroquine]; shellfish containing products; zofran [ondansetron hcl (pf)]; and sulfa  (sulfonamide antibiotics).      Review of Systems:  -     Allergic/Immunologic: is allergic to cellcept [mycophenolate mofetil]; ace inhibitors; amoxicillin; aspirin; beta-blockers (beta-adrenergic blocking agts); cephalosporins; clindamycin; hydrochlorothiazide; ibuprofen; lasix [furosemide]; lisinopril; methotrexate analogues; nsaids (non-steroidal anti-inflammatory drug); pcn [penicillins]; plaquenil [hydroxychloroquine]; shellfish containing products; zofran [ondansetron hcl (pf)]; and sulfa (sulfonamide antibiotics)..  -     Constitutional: Current temp:          Objective:     Physical Exam:  Vitals:  There were no vitals taken for this visit.  Communication:  Able to communicate, no hoarseness.  Head & Face:  Normocephalic, atraumatic, no sinus tenderness.  Eyes:  Extraocular motions intact.  Right Ear:  Cerumen occluding right ear canal, unable to see TM.  After removal- No mass/lesion of auricle. The external auditory canals is without erythema or discharge. Pneumatic otoscopy of the tympanic membrane revealed no perforation and good mobility, with no fluid in middle ear. Clinical speech reception thresholds grossly normal.  Left Ear:  No mass/lesion of auricle. The external auditory canals is without erythema or discharge. Pneumatic otoscopy of the tympanic membrane revealed no perforation and good mobility, with no fluid in middle ear. Clinical speech reception thresholds grossly normal.    Nose:  No masses/lesions of external nose, nasal mucosa, septum, and turbinates were within normal limits.  Mouth:  No mass/lesion of lips, teeth, gums, hard/soft palate, tongue, tonsils, or oropharynx.  Neck & Lymphatics:  No cervical lymphadenopathy, no neck mass/crepitus/ asymmetry, trachea is midline, no thyroid enlargement/tenderness/mass.  Neuro/Psych: Alert with normal mood and affect.   Respiration/Chest:  Symmetric expansion during respiration, normal respiratory effort.  Skin:  Warm and intact.    Assessment  & Plan:     -     Failed hearing screening- audiogram shows normal hearing bilaterally.  Recommend hearing conservation.  -     Cerumen Impaction - uRth has cerumen impaction.  We discussed preventative measures and treatment options.  Q-tips must be avoided, instead the ears can be cleaned with OTC ear rinses (or a mixture of alcohol & vinegar in equal parts).   For hard wax, Ruth may place mineral oil/baby oil in the ear with a cotton ball at night and remove in the shower.  This will assist in softening the wax and allow it to drain out on its own. If the cerumen impacts the ear canal and causes hearing loss or infection she needs to follow-up in the clinic for treatment and cleaning.      Patient: Ruth Messina [id number], :1965  Procedure date:2017  Patient's medications, allergies, past medical, surgical, social and family histories were reviewed and updated as appropriate.  Chief Complaint:  No chief complaint on file.    HPI:  Ruth is a 52 y.o. female with the history of present illness as discussed in the clinic note from today.  During this unrelated patient encounter I observed impacted cerumen.  The otoscopic examination of the tympanic membrane was not possible due to copious cerumen impaction.      Procedure: The patient was in agreement with the examination and debridement of the ears. Removal of the cerumen required a high level of expertise and use of an operating microscope and multiple micro-instruments.     With the patient in the supine position, we used the operating microscope to examine both ears with the appropriate sized ear speculum.  A variety of sterile, micro-instruments were utilized to remove the cerumen atraumatically.  I performed the procedure which required a significant amount of time and effort. The tympanic membrane was then well visualized.  The patient tolerated the procedure well and there were no complications.    Findings:   Right ear had significant wax, the  EAC was normal, and the tympanic membrane was intact with no evidence of middle ear fluid.    Left ear had no wax, the EAC was normal, and the tympanic membrane was intact with no evidence of middle ear fluid.

## 2017-11-16 ENCOUNTER — TELEPHONE (OUTPATIENT)
Dept: OTOLARYNGOLOGY | Facility: CLINIC | Age: 52
End: 2017-11-16

## 2017-11-16 DIAGNOSIS — L65.8 FEMALE PATTERN HAIR LOSS: ICD-10-CM

## 2017-11-16 DIAGNOSIS — L65.9 ALOPECIA: Primary | ICD-10-CM

## 2017-11-16 DIAGNOSIS — M79.672 LEFT FOOT PAIN: Primary | ICD-10-CM

## 2017-11-16 DIAGNOSIS — L93.0 DISCOID LUPUS: ICD-10-CM

## 2017-11-16 RX ORDER — CLOBETASOL PROPIONATE 0.5 MG/G
AEROSOL, FOAM TOPICAL 2 TIMES DAILY
Qty: 50 G | Refills: 3 | Status: SHIPPED | OUTPATIENT
Start: 2017-11-16 | End: 2018-09-19 | Stop reason: SDUPTHER

## 2017-11-16 RX ORDER — CLOBETASOL PROPIONATE 0.5 MG/G
AEROSOL, FOAM TOPICAL 2 TIMES DAILY
Qty: 50 G | Refills: 3 | Status: SHIPPED | OUTPATIENT
Start: 2017-11-16 | End: 2017-11-16 | Stop reason: SDUPTHER

## 2017-11-16 NOTE — TELEPHONE ENCOUNTER
Pt states she still has one stitch left in her mouth.  Advised pt that the length of time for them to dissolve can vary.  It has only been 3 wks since her mouth biopsy and she should not worry.  Pt verbalized understanding and will call office again in a few weeks if it does not dissolve or it starts to bother her.

## 2017-11-16 NOTE — TELEPHONE ENCOUNTER
----- Message from Emperatriz Luna sent at 11/16/2017  3:14 PM CST -----  Contact: [phone]  Would like to consult with nurse about stitiches; please call pt yash at [phone]. allie martinez

## 2017-11-17 ENCOUNTER — HOSPITAL ENCOUNTER (OUTPATIENT)
Dept: RADIOLOGY | Facility: HOSPITAL | Age: 52
Discharge: HOME | End: 2017-11-17
Attending: PODIATRIST
Payer: COMMERCIAL

## 2017-11-17 ENCOUNTER — OFFICE VISIT (OUTPATIENT)
Dept: PODIATRY | Facility: CLINIC | Age: 52
End: 2017-11-17
Payer: COMMERCIAL

## 2017-11-17 VITALS
WEIGHT: 177.69 LBS | SYSTOLIC BLOOD PRESSURE: 123 MMHG | DIASTOLIC BLOOD PRESSURE: 79 MMHG | HEIGHT: 66 IN | HEART RATE: 97 BPM | BODY MASS INDEX: 28.56 KG/M2

## 2017-11-17 DIAGNOSIS — M79.671 RIGHT FOOT PAIN: ICD-10-CM

## 2017-11-17 DIAGNOSIS — M79.672 LEFT FOOT PAIN: ICD-10-CM

## 2017-11-17 DIAGNOSIS — R25.2 NOCTURNAL FOOT CRAMPS: Primary | ICD-10-CM

## 2017-11-17 DIAGNOSIS — M21.40 ACQUIRED PES PLANOVALGUS, UNSPECIFIED LATERALITY: ICD-10-CM

## 2017-11-17 PROCEDURE — 99999 PR PBB SHADOW E&M-EST. PATIENT-LVL IV: CPT | Mod: PBBFAC,,, | Performed by: PODIATRIST

## 2017-11-17 PROCEDURE — 73630 X-RAY EXAM OF FOOT: CPT | Mod: 50,TC

## 2017-11-17 PROCEDURE — 73630 X-RAY EXAM OF FOOT: CPT | Mod: 26,50,, | Performed by: RADIOLOGY

## 2017-11-17 PROCEDURE — 99243 OFF/OP CNSLTJ NEW/EST LOW 30: CPT | Mod: S$GLB,,, | Performed by: PODIATRIST

## 2017-11-17 RX ORDER — CYCLOBENZAPRINE HCL 10 MG
10 TABLET ORAL 3 TIMES DAILY PRN
COMMUNITY
End: 2018-11-14 | Stop reason: SDUPTHER

## 2017-11-17 NOTE — PROGRESS NOTES
[facility]  PODIATRIC MEDICINE AND SURGERY  PROGRESS NOTE  11/17/2017    PODIATRY NOTE  Requesting Consult: Dr. Decker  PCP: Dr. Janeen Delacruz MD    CHIEF COMPLAINT   Chief Complaint   Patient presents with    Foot Pain     bilateral great toe pain and cramping lasting for years       HPI  Ruth Messina is a 52 y.o. female who has a past medical history of Anemia; Encounter for blood transfusion; G-6-PD deficiency; Heart murmur; Hypertension; Lupus; and Raynaud's syndrome.   Ruth presents to clinic today complaining of bilateral great toe and left 2nd toe cramping and pain.    Patient describes pain as:   Location:bilateral hallux  Quality: spastic   Severity:6/10  Duration: several years  Modifying Factors (Aggravating): no triggering factors although she does admit to symptoms occurring more frequently at night time. She also notes history of jaw spasm and hand- carpal tunnel release  Modifying Factors (Alleviating): none    Patient denies other pedal complaints at this time.     PMH  Past Medical History:   Diagnosis Date    Anemia     with chronic disease lupus    Encounter for blood transfusion     G-6-PD deficiency     Heart murmur     MVP 1986    Hypertension     Lupus     Raynaud's syndrome        PROBLEM LIST  Patient Active Problem List    Diagnosis Date Noted    Atypical face pain 10/04/2017     Class: Chronic    Glaucoma suspect of both eyes 09/11/2017    Keratoconjunctivitis sicca of both eyes 09/11/2017    Peripheral retinal degeneration of both eyes 09/11/2017    Posterior vitreous detachment of both eyes 09/11/2017    Trigeminal neuralgia of right side of face 09/11/2017    Systemic lupus erythematosus 06/01/2017    Secondary Sjogren's syndrome 06/01/2017    Essential hypertension 02/27/2017    SOB (shortness of breath) 02/10/2017    CNS lupus 12/08/2016    G6PD deficiency 05/13/2016       MEDS  Current Outpatient Prescriptions on File Prior to Visit    Medication Sig Dispense Refill    azathioprine (IMURAN) 50 mg Tab Take 1 tablet (50 mg total) by mouth 2 (two) times daily. (Patient taking differently: Take 50 mg by mouth once daily. ) 60 tablet 2    CALCIUM-MAGNESIUM-ZINC ORAL Take by mouth once daily.      chlorhexidine (PERIDEX) 0.12 % solution Swish and spit three times daily after eating 473 mL 0    cholecalciferol, vitamin D3, (VITAMIN D3) 5,000 unit Tab Take 5,000 Units by mouth once daily.      clobetasol (OLUX) 0.05 % Foam Apply topically 2 (two) times daily. For scalp only.  Do not use on face. 50 g 3    CYCLOSPORINE (RESTASIS OPHT) Apply to eye.      ferrous gluconate (FERGON) 324 MG tablet Take 324 mg by mouth daily with breakfast.      fish oil-omega-3 fatty acids 300-1,000 mg capsule Take 2 g by mouth once daily.      fluocinolone (DERMA-SMOOTHE) 0.01 % external oil Apply oil to scalp twice a day 1 Bottle 5    gabapentin (NEURONTIN) 300 MG capsule Take 1 capsule (300 mg total) by mouth 3 (three) times daily. 90 capsule 1    losartan (COZAAR) 100 MG tablet Take 1 tablet (100 mg total) by mouth once daily. 30 tablet 11    multivitamin capsule Take 1 capsule by mouth once daily.      predniSONE (DELTASONE) 5 MG tablet TAKE ONE TABLET BY MOUTH ONCE DAILY 30 tablet 0    desoximetasone (TOPICORT) 0.25 % cream Apply topically 2 (two) times daily as needed. Do not use on face, underarms or groin 60 g 3    paroxetine (PAXIL-CR) 12.5 MG 24 hr tablet Take 1 tablet (12.5 mg total) by mouth every morning. 30 tablet 11    spironolactone (ALDACTONE) 25 MG tablet Take 1 tablet (25 mg total) by mouth once daily. (Patient taking differently: Take 25 mg by mouth nightly. ) 30 tablet 11     No current facility-administered medications on file prior to visit.        Medication List with Changes/Refills   Current Medications    AZATHIOPRINE (IMURAN) 50 MG TAB    Take 1 tablet (50 mg total) by mouth 2 (two) times daily.    CALCIUM-MAGNESIUM-ZINC ORAL     Take by mouth once daily.    CHLORHEXIDINE (PERIDEX) 0.12 % SOLUTION    Swish and spit three times daily after eating    CHOLECALCIFEROL, VITAMIN D3, (VITAMIN D3) 5,000 UNIT TAB    Take 5,000 Units by mouth once daily.    CLOBETASOL (OLUX) 0.05 % FOAM    Apply topically 2 (two) times daily. For scalp only.  Do not use on face.    CYCLOBENZAPRINE (FLEXERIL) 10 MG TABLET    Take 10 mg by mouth 3 (three) times daily as needed for Muscle spasms.    CYCLOSPORINE (RESTASIS OPHT)    Apply to eye.    DESOXIMETASONE (TOPICORT) 0.25 % CREAM    Apply topically 2 (two) times daily as needed. Do not use on face, underarms or groin    FERROUS GLUCONATE (FERGON) 324 MG TABLET    Take 324 mg by mouth daily with breakfast.    FISH OIL-OMEGA-3 FATTY ACIDS 300-1,000 MG CAPSULE    Take 2 g by mouth once daily.    FLUOCINOLONE (DERMA-SMOOTHE) 0.01 % EXTERNAL OIL    Apply oil to scalp twice a day    GABAPENTIN (NEURONTIN) 300 MG CAPSULE    Take 1 capsule (300 mg total) by mouth 3 (three) times daily.    LOSARTAN (COZAAR) 100 MG TABLET    Take 1 tablet (100 mg total) by mouth once daily.    MULTIVITAMIN CAPSULE    Take 1 capsule by mouth once daily.    PAROXETINE (PAXIL-CR) 12.5 MG 24 HR TABLET    Take 1 tablet (12.5 mg total) by mouth every morning.    PREDNISONE (DELTASONE) 5 MG TABLET    TAKE ONE TABLET BY MOUTH ONCE DAILY    SPIRONOLACTONE (ALDACTONE) 25 MG TABLET    Take 1 tablet (25 mg total) by mouth once daily.       PSH     Past Surgical History:   Procedure Laterality Date    BONE MARROW BIOPSY      CARPAL TUNNEL RELEASE Bilateral     widsom tooth extraction          ALL  Review of patient's allergies indicates:   Allergen Reactions    Cellcept [mycophenolate mofetil] Swelling     Lips      Ace inhibitors Other (See Comments)     unkown    Amoxicillin Other (See Comments)     + allergy test    Aspirin Other (See Comments)     G-6-PD def.     Beta-blockers (beta-adrenergic blocking agts) Other (See Comments)     Cannot  "take due to Raynaud's syndrome    Cephalosporins Other (See Comments)     + allergy test    Clindamycin Other (See Comments)     + allergy test    Hydrochlorothiazide Other (See Comments)     unknown    Ibuprofen Other (See Comments)     G-6-PD def.    Lasix [furosemide] Other (See Comments)     unknown    Lisinopril Other (See Comments)     unknown    Methotrexate analogues Other (See Comments)     Pancytopenia      Nsaids (non-steroidal anti-inflammatory drug) Other (See Comments)     G-6-PD def.    Pcn [penicillins] Other (See Comments)     + allergy test    Plaquenil [hydroxychloroquine] Other (See Comments)     G-6-PD def.    Shellfish containing products Other (See Comments)     + allergy test    Zofran [ondansetron hcl (pf)] Other (See Comments)     Chest pain      Sulfa (sulfonamide antibiotics) Rash       SOC     Social History   Substance Use Topics    Smoking status: Never Smoker    Smokeless tobacco: Never Used    Alcohol use No         FAMILY HX    Family History   Problem Relation Age of Onset    Arthritis Mother     Heart disease Mother     Hypertension Mother     Diabetes Father     Heart disease Father     Hypertension Father     Cancer Brother     Eczema Neg Hx     Lupus Neg Hx     Psoriasis Neg Hx             REVIEW OF SYSTEMS  Constitutional: Negative for chills and fever.   Respiratory: Negative for shortness of breath.    Cardiovascular: Negative for chest pain, palpitations, orthopnea, claudication and leg swelling.   Gastrointestinal: Negative for diarrhea, nausea and vomiting.   Musculoskeletal: Negative for joint pain.   Skin: Negative for rash.   Neurological: Negative for dizziness, tingling, sensory change, focal weakness and weakness.   Psychiatric/Behavioral: Negative.    PHYSICAL EXAM  Vitals:    11/17/17 1037   BP: 123/79   Pulse: 97   Weight: 80.6 kg (177 lb 11.1 oz)   Height: 5' 5.5" (1.664 m)   PainSc:   6   PainLoc: Toe       General: This patient is " well-developed, well-nourished and appears stated age, well-oriented to person, place and time, and cooperative and pleasant on today's visit    LOWER EXTREMITY  Vascular exam:   · Dorsalis pedis and posterior tibial pulses palpable 2/4 bilaterally.   · Capillary refill time immediate to the toes.   · Feet are warm to the touch. Skin temperature warm to warm from proximally to distally   · There are no varicosities, telangiectasias noted to bilateral foot and ankle regions.   · There are no ecchymoses noted to bilateral foot and ankle regions.   · There is no gross lower extremity edema.    Dermatologic exam:   · Skin moist with healthy texture and turgor.  · There are no open ulcerations, lacerations, or fissures to bilateral foot and ankle regions. There are no signs of infection as there is no erythema, no proximal-extending lymphangiitis, no fluctuance, or crepitus noted on palpation to bilateral foot and ankle regions.   · There is no interdigital maceration.   · There are hyperkeratotic lesions noted to sub hallux, sub first metatarsal head, bilateral. Nails are well-trimmed.    Neurologic exam:  · Epicritic sensation is intact as the patient is able to sense light touch to bilateral foot and ankle regions.   · Achilles and patellar deep tendon reflexes intact  · Babinski reflex absent    Musculoskeletal/Orthopedic exam:   · No symptomatic structural abnormalities noted  · Muscle strength AT/EHL/EDL/PT: 5/5; Achilles/Gastroc/Soleus: 5/5; PB/PL: 5/5 Muscle tone is normal.  · Ankle joint ROM  B/L supple DF/PF, non-crepitus  · STJ ROM supple inv/ev, non crepitus   · MTPJ b/l supple DF/PF, non crepitus  · Foot type: Maximally pronated    IMAGING   Reviewed by me and I agree with radiologist findings, 3 views of foot/ankle, reveal:  Maximally pronated foot type, ANNEMARIE decreased, TDA increased    ASSESSMENT  Encounter Diagnoses   Name Primary?    Nocturnal foot cramps Yes    Acquired pes planovalgus, unspecified  laterality          PLAN  1. Patient was educated about clinical and imaging findings, and verbalizes understanding of above.     Diagnoses and all orders for this visit:  Nocturnal foot cramps  -     X-Ray Foot Complete Right; Future; Expected date: 11/17/2017    Acquired pes planovalgus, unspecified laterality      2. Treatment plan:we discussed potential causes for muscle cramps- dehydration, electrolyte balance. If symptoms persists will perform further work up;    Discussed pes planovalgus deformity and provided Rx for orthotics and motion control tennis shoes      3. RTC  for follow up/evaluation as scheduled       Future Appointments  Date Time Provider Department Center   1/10/2018 9:30 AM Chirag Turner MD [facility] RHEUM [place]   1/26/2018 9:00 AM [facility] [id number]-DX [facility] MAMMO [place]   1/26/2018 9:30 AM [facility] US3 [facility] ULSOUND [place]   3/20/2018 9:40 AM LABORATORY, [facility] LAB [place]   3/20/2018 10:00 AM Chris Montanez Jr., MD [facility] HEM ONC [place]       Report Electronically Signed By:  Amie Murillo DPM   Podiatric Medicine & Surgery  [facility]  11/17/2017

## 2017-11-17 NOTE — PATIENT INSTRUCTIONS
Muscle Spasm  A muscle spasm is a sudden tightening of the muscle you cant control. This may be caused by strain, overworking the muscle, or injury. It can also be caused by dehydration, electrolyte imbalance, diabetes, alcohol use, and certain medicines. If it goes on long enough the muscle spasm causes pain. Common areas for muscle spasm are the legs, neck, and back.  Home care  · Heat, massage, and stretching will help relax muscle spasm.  · When the spasm is in your arm or leg, stretch the muscle passively. To do this, have someone bend or straighten the joint above or below the muscle until you feel the stretch on the sore muscle. You can stretch the muscle actively by moving the affected body part. This will stretch the muscle that is in spasm. For example, if the spasm is in your calf, bend the ankle so your toes point upward toward your knee. This will stretch your calf muscle.  · You may use over-the-counter pain medicine to control pain, unless another medicine was prescribed. If you have chronic liver or kidney disease or ever had a stomach ulcer or GI bleeding, talk with your healthcare provider before using these medicines.  Follow-up care  Follow up with your healthcare provider, or as advised.    When to seek medical advice  Call your healthcare provider right away if any of the following occur:  · Fingers or toes become swollen, cold, blue, numb, or tingly  · You develop weakness in the affected arm or leg  · Pain increases and is not controlled by the above measures  Date Last Reviewed: 11/21/2015  © 0625-3960 [employer]. [address] [place] [address], [place] [address]. All rights reserved. This information is not intended as a substitute for professional medical care. Always follow your healthcare professional's instructions.

## 2017-11-17 NOTE — LETTER
November 17, 2017      Abhishek Decker MD  [address] [place] [address] Dr Sharon [place] [id number]           [facility]  [address] [place] [address] Aime [place] [address]  Phone: [phone]  Fax: [phone]          Patient: Ruth Messina   MR Number: [id number]   YOB: 1965   Date of Visit: 11/17/2017       Dear Dr. Abhishek Decker:    Thank you for referring Ruth Messina to me for evaluation. Attached you will find relevant portions of my assessment and plan of care.    If you have questions, please do not hesitate to call me. I look forward to following Ruth Messina along with you.    Sincerely,    Amie Murillo DPM    Enclosure  CC:  No Recipients    If you would like to receive this communication electronically, please contact [email or web address] or [phone] to request more information on [employer] Link access.    For providers and/or their staff who would like to refer a patient to [facility], please contact us through our one-stop-shop provider referral line, [facility] , at [phone].    If you feel you have received this communication in error or would no longer like to receive these types of communications, please e-mail [email or web address]

## 2017-11-20 DIAGNOSIS — G05.3 CNS LUPUS: ICD-10-CM

## 2017-11-20 DIAGNOSIS — M32.19 CNS LUPUS: ICD-10-CM

## 2017-11-20 DIAGNOSIS — M32.19 OTHER SYSTEMIC LUPUS ERYTHEMATOSUS WITH OTHER ORGAN INVOLVEMENT: ICD-10-CM

## 2017-11-20 DIAGNOSIS — M35.00 SECONDARY SJOGREN'S SYNDROME: ICD-10-CM

## 2017-11-20 RX ORDER — AZATHIOPRINE 50 MG/1
TABLET ORAL
Qty: 60 TABLET | Refills: 1 | Status: SHIPPED | OUTPATIENT
Start: 2017-11-20 | End: 2018-03-29 | Stop reason: SDUPTHER

## 2017-12-06 ENCOUNTER — OFFICE VISIT (OUTPATIENT)
Dept: OPHTHALMOLOGY | Facility: CLINIC | Age: 52
End: 2017-12-06
Payer: COMMERCIAL

## 2017-12-06 DIAGNOSIS — H43.813 POSTERIOR VITREOUS DETACHMENT OF BOTH EYES: Primary | ICD-10-CM

## 2017-12-06 PROCEDURE — 92012 INTRM OPH EXAM EST PATIENT: CPT | Mod: S$GLB,,, | Performed by: OPHTHALMOLOGY

## 2017-12-06 PROCEDURE — 99999 PR PBB SHADOW E&M-EST. PATIENT-LVL II: CPT | Mod: PBBFAC,,, | Performed by: OPHTHALMOLOGY

## 2017-12-06 NOTE — PROGRESS NOTES
===============================  12/06/2017   Ruth Messina,   52 y.o. female   Last visit [facility]: :10/16/2017   Last visit eye dept. 10/16/2017  [facility]:  Corrected distance visual acuity was 20/30 in the right eye and 20/30 14 in the left eye.  Tonometry     Tonometry ([facility], 11:25 AM)       Right Left    Pressure 14 15               Not recorded         Not recorded        Chief Complaint   Patient presents with    Spots and/or Floaters     started seeing floaters yesterday        HPI     Spots and/or Floaters    Additional comments: started seeing floaters yesterday           Comments   COAG Suspect  Sjogren's Syndrome  Keratoconjunctivitis Sicca OU  PRP OU  PVD OU  Lupus with Plaquenil use             Last edited by LACI Naylor on 12/6/2017  1:01 PM. (History)          ________________  12/6/2017  Problem List Items Addressed This Visit        Eye/Vision problems    Posterior vitreous detachment of both eyes - Primary          .symptomatic PVD, no holes or tears in retina       ===========================

## 2017-12-10 ENCOUNTER — TELEPHONE (OUTPATIENT)
Dept: RHEUMATOLOGY | Facility: CLINIC | Age: 52
End: 2017-12-10

## 2017-12-10 DIAGNOSIS — M32.19 CNS LUPUS: ICD-10-CM

## 2017-12-10 DIAGNOSIS — G05.3 CNS LUPUS: ICD-10-CM

## 2017-12-10 RX ORDER — PREDNISONE 5 MG/1
TABLET ORAL
Qty: 30 TABLET | Refills: 0 | Status: SHIPPED | OUTPATIENT
Start: 2017-12-10 | End: 2018-01-10

## 2017-12-11 NOTE — TELEPHONE ENCOUNTER
Spoke with pt and she states that she had to go to the ER on Sunday. States that she has been feeling very bad for about 1.5 weeks and thought it was her lupus. Thursday she felt some bumps on her stomach and when she looked she had a lot of bumps and some blisters. States that she has them on her back as well. Was unable to go to the ER until Sunday but was told that it was shingles. States she was given valtrex and her prednisone was increased to 20 mg daily since the last time she had shingles she had a lupus flare. Pt would like to know fi she needs to come in for a solumedrol infusion or if she needs to just continue on the 20 mg prednisone. Please Advise.

## 2017-12-11 NOTE — TELEPHONE ENCOUNTER
----- Message from Virgil Luna sent at 12/11/2017  8:15 AM CST -----  Contact: Pt   Pt called and requested a callback in regards to an ER visit last night callback number..[phone] (home)

## 2017-12-11 NOTE — TELEPHONE ENCOUNTER
Recommend to continue valtrex and prednisone as advised at this time. IV solumedrol is not ideal with the recent shingles infection. Will check lupus activity after shingles resolves in January visit . Thanks.

## 2017-12-18 RX ORDER — CYCLOSPORINE 0.5 MG/ML
1 EMULSION OPHTHALMIC 2 TIMES DAILY
Qty: 60 VIAL | Refills: 11 | Status: SHIPPED | OUTPATIENT
Start: 2017-12-18 | End: 2018-12-07 | Stop reason: SDUPTHER

## 2018-01-10 ENCOUNTER — OFFICE VISIT (OUTPATIENT)
Dept: RHEUMATOLOGY | Facility: CLINIC | Age: 53
End: 2018-01-10
Payer: COMMERCIAL

## 2018-01-10 ENCOUNTER — OFFICE VISIT (OUTPATIENT)
Dept: OTOLARYNGOLOGY | Facility: CLINIC | Age: 53
End: 2018-01-10
Payer: COMMERCIAL

## 2018-01-10 VITALS
WEIGHT: 178.81 LBS | BODY MASS INDEX: 29.3 KG/M2 | DIASTOLIC BLOOD PRESSURE: 87 MMHG | SYSTOLIC BLOOD PRESSURE: 124 MMHG | HEART RATE: 96 BPM

## 2018-01-10 VITALS
TEMPERATURE: 98 F | SYSTOLIC BLOOD PRESSURE: 125 MMHG | WEIGHT: 179 LBS | DIASTOLIC BLOOD PRESSURE: 87 MMHG | BODY MASS INDEX: 29.34 KG/M2 | HEART RATE: 96 BPM

## 2018-01-10 DIAGNOSIS — J01.90 ACUTE NON-RECURRENT SINUSITIS, UNSPECIFIED LOCATION: Primary | ICD-10-CM

## 2018-01-10 DIAGNOSIS — M32.19 CNS LUPUS: Primary | ICD-10-CM

## 2018-01-10 DIAGNOSIS — D63.8 ANEMIA OF CHRONIC ILLNESS: ICD-10-CM

## 2018-01-10 DIAGNOSIS — G05.3 CNS LUPUS: Primary | ICD-10-CM

## 2018-01-10 DIAGNOSIS — R09.81 NASAL CONGESTION: ICD-10-CM

## 2018-01-10 DIAGNOSIS — M35.00 SECONDARY SJOGREN'S SYNDROME: ICD-10-CM

## 2018-01-10 DIAGNOSIS — M32.19 OTHER SYSTEMIC LUPUS ERYTHEMATOSUS WITH OTHER ORGAN INVOLVEMENT: ICD-10-CM

## 2018-01-10 PROCEDURE — 99999 PR PBB SHADOW E&M-EST. PATIENT-LVL III: CPT | Mod: PBBFAC,,, | Performed by: ORTHOPAEDIC SURGERY

## 2018-01-10 PROCEDURE — 99999 PR PBB SHADOW E&M-EST. PATIENT-LVL III: CPT | Mod: PBBFAC,,, | Performed by: INTERNAL MEDICINE

## 2018-01-10 PROCEDURE — 99214 OFFICE O/P EST MOD 30 MIN: CPT | Mod: S$GLB,,, | Performed by: INTERNAL MEDICINE

## 2018-01-10 PROCEDURE — 99214 OFFICE O/P EST MOD 30 MIN: CPT | Mod: S$GLB,,, | Performed by: ORTHOPAEDIC SURGERY

## 2018-01-10 RX ORDER — LEVOFLOXACIN 500 MG/1
500 TABLET, FILM COATED ORAL DAILY
Qty: 14 TABLET | Refills: 0 | Status: SHIPPED | OUTPATIENT
Start: 2018-01-10 | End: 2018-01-24

## 2018-01-10 NOTE — LETTER
January 11, 2018        Janeen Delacruz MD  [address]  [place] [address]             [facility]  [address]  [place] [address]  Phone: [phone]  Fax: [phone]   Patient: Ruth Messina   MR Number: [id number]   YOB: 1965   Date of Visit: 1/10/2018       Dear Dr. Delacruz:    Thank you for referring Ruth Messina to me for evaluation. Attached you will find relevant portions of my assessment and plan of care.    If you have questions, please do not hesitate to call me. I look forward to following Ruth Messina along with you.    Sincerely,      Tayler Salomon MD          CC  No Recipients    Enclosure

## 2018-01-10 NOTE — PROGRESS NOTES
RHEUMATOLOGY CLINIC FOLLOW UP VISIT  Chief complaints:-  I have a sinus infection. My fatigue haven't changed much.     HPI:-  Ruth Melendrez a 52 y.o. pleasant female comes in for a follow up visit with above chief complaints. She has CNS lupus and is on 50 mg daily imuran ( please refer to prior HPI's for reason behind imuran monotherapy) . She has sinusitis and has been prescribed antibiotics by  today. She denies any joint pain  Or skin rash.     Review of Systems   Constitutional: Positive for malaise/fatigue. Negative for chills, fever and weight loss.   HENT: Positive for congestion and sore throat. Negative for ear discharge, ear pain, hearing loss and nosebleeds.    Eyes: Negative for blurred vision, double vision, photophobia, discharge and redness.   Respiratory: Negative for cough, hemoptysis, sputum production and shortness of breath.    Cardiovascular: Negative for chest pain, palpitations and claudication.   Gastrointestinal: Negative for abdominal pain, constipation, diarrhea, melena, nausea and vomiting.   Genitourinary: Negative for dysuria, frequency, hematuria and urgency.   Musculoskeletal: Negative for back pain, falls, joint pain, myalgias and neck pain.   Skin: Negative for itching and rash.   Neurological: Negative for dizziness, tremors, sensory change, speech change, focal weakness, seizures, loss of consciousness, weakness and headaches.   Endo/Heme/Allergies: Negative for environmental allergies. Does not bruise/bleed easily.   Psychiatric/Behavioral: Negative for depression, hallucinations and memory loss. The patient is not nervous/anxious and does not have insomnia.        Past Medical History:   Diagnosis Date    Anemia     with chronic disease lupus    Encounter for blood transfusion     G-6-PD deficiency     Heart murmur     MVP 1986    Hypertension     Lupus     Raynaud's syndrome        Past Surgical  History:   Procedure Laterality Date    BONE MARROW BIOPSY      CARPAL TUNNEL RELEASE Bilateral     widsom tooth extraction          Social History   Substance Use Topics    Smoking status: Never Smoker    Smokeless tobacco: Never Used    Alcohol use No       Family History   Problem Relation Age of Onset    Arthritis Mother     Heart disease Mother     Hypertension Mother     Diabetes Father     Heart disease Father     Hypertension Father     Cancer Brother     Eczema Neg Hx     Lupus Neg Hx     Psoriasis Neg Hx        Review of patient's allergies indicates:   Allergen Reactions    Ace inhibitors     Amoxicillin     Aspirin     Cellcept [mycophenolate mofetil]     Cephalosporins     Clindamycin     Hydrochlorothiazide     Imuran [azathioprine sodium]     Lasix [furosemide]     Methotrexate analogues     Nsaids (non-steroidal anti-inflammatory drug)     Pcn [penicillins]     Plaquenil [hydroxychloroquine]     Shellfish containing products     Sulfa (sulfonamide antibiotics)     Zofran [ondansetron hcl (pf)]            Physical examination:-    Vitals:    01/10/18 0926   BP: 124/87   Pulse: 96   Weight: 81.1 kg (178 lb 12.7 oz)   PainSc: 0-No pain       Physical Exam   Constitutional: She is oriented to person, place, and time and well-developed, well-nourished, and in no distress. No distress.   HENT:   Head: Normocephalic.   Mouth/Throat: Oropharynx is clear and moist.   Eyes: Conjunctivae and EOM are normal. Pupils are equal, round, and reactive to light.   Neck: Normal range of motion. Neck supple.   Cardiovascular: Normal rate and intact distal pulses.    Pulmonary/Chest: Effort normal. No respiratory distress.   Abdominal: Soft. There is no tenderness.   Musculoskeletal:   No synovitis over small joints of hands or feet.  No effusion over large joints.   Neurological: She is alert and oriented to person, place, and time. No cranial nerve deficit.   Skin: Skin is warm. No rash  noted. No erythema.   Psychiatric: Mood and affect normal.   Nursing note and vitals reviewed.      Labs:-  Results for ADELA LEMON (MRN [id number]) as of 1/5/2017 12:40   Ref. Range 12/8/2016 09:04   SHEILA HEP-2 Titer Unknown Positive 1:640 Sp...   Anti-SSA Antibody Latest Ref Range: 0.00 - 19.99 EU 31.31 (H)   Anti-SSA Interpretation Latest Ref Range: Negative  Positive (A)   Anti-SSB Antibody Latest Ref Range: 0.00 - 19.99 EU 9.10   Anti-SSB Interpretation Latest Ref Range: Negative  Negative   ds DNA Ab Latest Ref Range: Negative 1:10  Positive 1:40 (A)   Anti Sm Antibody Latest Ref Range: 0.00 - 19.99 .08 (H)   Anti-Sm Interpretation Latest Ref Range: Negative  Positive (A)   Anti Sm/RNP Antibody Latest Ref Range: 0.00 - 19.99 .83 (H)   Anti-Sm/RNP Interpretation Latest Ref Range: Negative  Positive (A)   Complement (C-3) Latest Ref Range: 50 - 180 mg/dL 60   Complement (C-4) Latest Ref Range: 11 - 44 mg/dL 7 (L)     Old and Outside medical records :-  Reviewed old and all outside medical records available in Care Everywhere.     Medication List with Changes/Refills   Current Medications    AZATHIOPRINE (IMURAN) 50 MG TAB    TAKE ONE TABLET BY MOUTH TWICE DAILY    CALCIUM-MAGNESIUM-ZINC ORAL    Take by mouth once daily.    CHOLECALCIFEROL, VITAMIN D3, (VITAMIN D3) 5,000 UNIT TAB    Take 5,000 Units by mouth once daily.    CLOBETASOL (OLUX) 0.05 % FOAM    Apply topically 2 (two) times daily. For scalp only.  Do not use on face.    CYCLOBENZAPRINE (FLEXERIL) 10 MG TABLET    Take 10 mg by mouth 3 (three) times daily as needed for Muscle spasms.    CYCLOSPORINE (RESTASIS) 0.05 % OPHTHALMIC EMULSION    Place 0.4 mLs (1 drop total) into both eyes 2 (two) times daily.    DESOXIMETASONE (TOPICORT) 0.25 % CREAM    Apply topically 2 (two) times daily as needed. Do not use on face, underarms or groin    FERROUS GLUCONATE (FERGON) 324 MG TABLET    Take 324 mg by mouth daily with breakfast.    FISH OIL-OMEGA-3  FATTY ACIDS 300-1,000 MG CAPSULE    Take 2 g by mouth once daily.    FLUOCINOLONE (DERMA-SMOOTHE) 0.01 % EXTERNAL OIL    Apply oil to scalp twice a day    GABAPENTIN (NEURONTIN) 300 MG CAPSULE    Take 1 capsule (300 mg total) by mouth 3 (three) times daily.    LEVOFLOXACIN (LEVAQUIN) 500 MG TABLET    Take 1 tablet (500 mg total) by mouth once daily.    LOSARTAN (COZAAR) 100 MG TABLET    Take 1 tablet (100 mg total) by mouth once daily.    MULTIVITAMIN CAPSULE    Take 1 capsule by mouth once daily.    SPIRONOLACTONE (ALDACTONE) 25 MG TABLET    Take 1 tablet (25 mg total) by mouth once daily.   Discontinued Medications    PAROXETINE (PAXIL-CR) 12.5 MG 24 HR TABLET    Take 1 tablet (12.5 mg total) by mouth every morning.       Assessment/Plans:-  # CNS lupus:-  Extremely complicated uncontrolled lupus with fatigue and periodic exacerbations of serious neurological and psychiatric complications , extreme serological activity of lupus. Tolerating 50 mg daily imuran without any problem now. Continue the same. Check serological activity today.   Hold Imuran until she recovers from the recent sinus infection.     # Return in about 3 months (around 4/10/2018).    Disclaimer: This note was prepared using voice recognition system and is likely to have sound alike errors and is not proof read.  Please call me with any questions.

## 2018-01-10 NOTE — PROGRESS NOTES
Subjective:      Patient ID: Ruth Messina is a 52 y.o. female.    Chief Complaint: Sore Throat and Sinus Problem    Patient is a very pleasant 52 year old female here to see me today for evaluation of a one month history of increasing illness.  She has known lupus, and is on Imuran.  She first presented to the ER with shingles, was treated with increased prednisone and valtrex (now back to 5 mg).  She has no facial pain and pressure, but has had some slight headaches, most recently last night.  She initially had nasal drainage, but now it is so congested she does not have much drainage.  She is not currently using any nasal sprays.  She has been sneezing, no coughing.  She has had a sore throat and a sensation of postnasal drip.  In reviewing her chart, her last course of antibiotics was in 2016.          Review of Systems   Constitutional: Positive for fatigue. Negative for chills, fever and unexpected weight change.   HENT: Positive for ear pain, postnasal drip, rhinorrhea, sneezing and sore throat. Negative for congestion, dental problem, ear discharge, facial swelling, hearing loss, nosebleeds, sinus pressure, tinnitus, trouble swallowing and voice change.    Eyes: Negative for redness, itching and visual disturbance.   Respiratory: Negative for cough, choking, shortness of breath and wheezing.    Cardiovascular: Negative for chest pain and palpitations.   Gastrointestinal: Negative for abdominal pain.        No reflux.   Musculoskeletal: Negative for gait problem.   Skin: Negative for rash.   Neurological: Positive for headaches (slight). Negative for dizziness and light-headedness.       Objective:       Physical Exam   Constitutional: She is oriented to person, place, and time. She appears well-developed and well-nourished. No distress.   HENT:   Head: Normocephalic and atraumatic.   Right Ear: Tympanic membrane, external ear and ear canal normal.   Left Ear: Tympanic membrane, external ear and ear canal  normal.   Nose: Mucosal edema and rhinorrhea present. No nasal deformity or septal deviation. No epistaxis. Right sinus exhibits no maxillary sinus tenderness and no frontal sinus tenderness. Left sinus exhibits no maxillary sinus tenderness and no frontal sinus tenderness.   Mouth/Throat: Uvula is midline, oropharynx is clear and moist and mucous membranes are normal. Mucous membranes are not pale and not dry. No dental caries. No oropharyngeal exudate or posterior oropharyngeal erythema.   Dry mouth   Eyes: Conjunctivae, EOM and lids are normal. Pupils are equal, round, and reactive to light. Right eye exhibits no chemosis. Left eye exhibits no chemosis. Right conjunctiva is not injected. Left conjunctiva is not injected. No scleral icterus. Right eye exhibits normal extraocular motion and no nystagmus. Left eye exhibits normal extraocular motion and no nystagmus.   Neck: Trachea normal and phonation normal. No tracheal tenderness present. No tracheal deviation present. No thyroid mass and no thyromegaly present.   Cardiovascular: Intact distal pulses.    Pulmonary/Chest: Effort normal. No stridor. No respiratory distress.   Abdominal: She exhibits no distension.   Lymphadenopathy:        Head (right side): No submental, no submandibular, no preauricular, no posterior auricular and no occipital adenopathy present.        Head (left side): No submental, no submandibular, no preauricular, no posterior auricular and no occipital adenopathy present.     She has no cervical adenopathy.   Neurological: She is alert and oriented to person, place, and time. No cranial nerve deficit.   Skin: Skin is warm and dry. No rash noted. No erythema.   Psychiatric: She has a normal mood and affect. Her behavior is normal.       Assessment:       1. Acute non-recurrent sinusitis, unspecified location    2. Other systemic lupus erythematosus with other organ involvement    3. Nasal congestion        Plan:     Acute non-recurrent  sinusitis, unspecified location:    She has now had symptoms for over one month, and I would recommend oral antibiotics.   Considering her multiple drug allergies, will send in prescription for 10 days of Levaquin.  She took in 2016, and tolerated at that time.  She will call if not improved after this course.  I would also recommend she resume Flonase, try Flonase sensimist, and mucinex twice daily.  Saline spray as needed.    Other systemic lupus erythematosus with other organ involvement:  She is at higher risk for infection due to her medication, will treat as above.    Nasal congestion

## 2018-01-10 NOTE — LETTER
January 10, 2018      Janeen Delacruz MD  [address]  [place] [address]           [facility]  [address]  [place] [address]  Phone: [phone]  Fax: [phone]          Patient: Ruth Messina   MR Number: [id number]   YOB: 1965   Date of Visit: 1/10/2018       Dear Dr. Janeen Delacruz:    Thank you for referring Ruth Messina to me for evaluation. Attached you will find relevant portions of my assessment and plan of care.    If you have questions, please do not hesitate to call me. I look forward to following Ruth Messina along with you.    Sincerely,    Tayler Salomon MD    Enclosure  CC:  No Recipients    If you would like to receive this communication electronically, please contact [email or web address] or [phone] to request more information on [employer] access.    For providers and/or their staff who would like to refer a patient to [facility], please contact us through our one-stop-shop provider referral line, [facility], at [phone].    If you feel you have received this communication in error or would no longer like to receive these types of communications, please e-mail [email or web address]

## 2018-01-11 ENCOUNTER — LAB VISIT (OUTPATIENT)
Dept: LAB | Facility: HOSPITAL | Age: 53
End: 2018-01-11
Attending: INTERNAL MEDICINE
Payer: COMMERCIAL

## 2018-01-11 DIAGNOSIS — D63.8 ANEMIA OF CHRONIC ILLNESS: ICD-10-CM

## 2018-01-11 DIAGNOSIS — M35.00 SECONDARY SJOGREN'S SYNDROME: ICD-10-CM

## 2018-01-11 DIAGNOSIS — M32.19 CNS LUPUS: ICD-10-CM

## 2018-01-11 DIAGNOSIS — M32.19 OTHER SYSTEMIC LUPUS ERYTHEMATOSUS WITH OTHER ORGAN INVOLVEMENT: ICD-10-CM

## 2018-01-11 DIAGNOSIS — G05.3 CNS LUPUS: ICD-10-CM

## 2018-01-11 LAB
ALBUMIN SERPL BCP-MCNC: 3.7 G/DL
ALP SERPL-CCNC: 110 U/L
ALT SERPL W/O P-5'-P-CCNC: 14 U/L
ANION GAP SERPL CALC-SCNC: 13 MMOL/L
AST SERPL-CCNC: 17 U/L
BASOPHILS # BLD AUTO: 0.04 K/UL
BASOPHILS NFR BLD: 0.6 %
BILIRUB SERPL-MCNC: 0.6 MG/DL
BUN SERPL-MCNC: 18 MG/DL
C3 SERPL-MCNC: 104 MG/DL
C4 SERPL-MCNC: 15 MG/DL
CALCIUM SERPL-MCNC: 9.7 MG/DL
CHLORIDE SERPL-SCNC: 105 MMOL/L
CO2 SERPL-SCNC: 23 MMOL/L
CREAT SERPL-MCNC: 1.1 MG/DL
DIFFERENTIAL METHOD: ABNORMAL
EOSINOPHIL # BLD AUTO: 0 K/UL
EOSINOPHIL NFR BLD: 0.5 %
ERYTHROCYTE [DISTWIDTH] IN BLOOD BY AUTOMATED COUNT: 14.5 %
EST. GFR  (AFRICAN AMERICAN): >60 ML/MIN/1.73 M^2
EST. GFR  (NON AFRICAN AMERICAN): 57 ML/MIN/1.73 M^2
GLUCOSE SERPL-MCNC: 93 MG/DL
HCT VFR BLD AUTO: 34.4 %
HGB BLD-MCNC: 10.9 G/DL
LYMPHOCYTES # BLD AUTO: 2.2 K/UL
LYMPHOCYTES NFR BLD: 33.7 %
MCH RBC QN AUTO: 28.6 PG
MCHC RBC AUTO-ENTMCNC: 31.7 G/DL
MCV RBC AUTO: 90 FL
MONOCYTES # BLD AUTO: 0.9 K/UL
MONOCYTES NFR BLD: 13.3 %
NEUTROPHILS # BLD AUTO: 3.4 K/UL
NEUTROPHILS NFR BLD: 52.4 %
PLATELET # BLD AUTO: 224 K/UL
PLATELET BLD QL SMEAR: ABNORMAL
PMV BLD AUTO: 10 FL
POTASSIUM SERPL-SCNC: 4.3 MMOL/L
PROT SERPL-MCNC: 8.4 G/DL
RBC # BLD AUTO: 3.81 M/UL
SODIUM SERPL-SCNC: 141 MMOL/L
WBC # BLD AUTO: 6.55 K/UL

## 2018-01-11 PROCEDURE — 85025 COMPLETE CBC W/AUTO DIFF WBC: CPT | Mod: PO

## 2018-01-11 PROCEDURE — 86160 COMPLEMENT ANTIGEN: CPT

## 2018-01-11 PROCEDURE — 86160 COMPLEMENT ANTIGEN: CPT | Mod: 59

## 2018-01-11 PROCEDURE — 80053 COMPREHEN METABOLIC PANEL: CPT | Mod: PO

## 2018-01-11 PROCEDURE — 36415 COLL VENOUS BLD VENIPUNCTURE: CPT | Mod: PO

## 2018-01-22 ENCOUNTER — OFFICE VISIT (OUTPATIENT)
Dept: NEUROLOGY | Facility: CLINIC | Age: 53
End: 2018-01-22
Payer: COMMERCIAL

## 2018-01-22 ENCOUNTER — TELEPHONE (OUTPATIENT)
Dept: RHEUMATOLOGY | Facility: CLINIC | Age: 53
End: 2018-01-22

## 2018-01-22 VITALS
BODY MASS INDEX: 31.14 KG/M2 | HEIGHT: 65 IN | DIASTOLIC BLOOD PRESSURE: 80 MMHG | WEIGHT: 186.94 LBS | SYSTOLIC BLOOD PRESSURE: 160 MMHG | HEART RATE: 60 BPM

## 2018-01-22 DIAGNOSIS — G05.3 CNS LUPUS: ICD-10-CM

## 2018-01-22 DIAGNOSIS — G50.0 TRIGEMINAL NEURALGIA OF RIGHT SIDE OF FACE: Primary | ICD-10-CM

## 2018-01-22 DIAGNOSIS — M32.19 CNS LUPUS: ICD-10-CM

## 2018-01-22 PROCEDURE — 99999 PR PBB SHADOW E&M-EST. PATIENT-LVL III: CPT | Mod: PBBFAC,,, | Performed by: PSYCHIATRY & NEUROLOGY

## 2018-01-22 PROCEDURE — 99214 OFFICE O/P EST MOD 30 MIN: CPT | Mod: S$GLB,,, | Performed by: PSYCHIATRY & NEUROLOGY

## 2018-01-22 RX ORDER — GABAPENTIN 300 MG/1
300 CAPSULE ORAL 3 TIMES DAILY
Qty: 90 CAPSULE | Refills: 5 | Status: SHIPPED | OUTPATIENT
Start: 2018-01-22 | End: 2018-07-30 | Stop reason: SDUPTHER

## 2018-01-22 RX ORDER — DEXTROAMPHETAMINE SACCHARATE, AMPHETAMINE ASPARTATE, DEXTROAMPHETAMINE SULFATE AND AMPHETAMINE SULFATE 2.5; 2.5; 2.5; 2.5 MG/1; MG/1; MG/1; MG/1
1 TABLET ORAL 2 TIMES DAILY
Qty: 60 TABLET | Refills: 0 | Status: SHIPPED | OUTPATIENT
Start: 2018-01-22 | End: 2018-02-16 | Stop reason: SINTOL

## 2018-01-22 NOTE — PROGRESS NOTES
"This is a 52-year-old patient who has a history of systemic lupus including an episode of lupus cerebritis.  The patient has a prior history of trigeminal neuralgia on the right side of the face as well and has had a history of significant cognitive difficulties.  On her last visit, neuropsychometric evaluation was requested and she is here to review those studies.    The patient indicates that her facial pain does not occur very frequently and is not a major issue for her at the present time.  However, she does have significant difficulties with cognitive difficulty described as loss of memory.  The patient states for example that in a conversation if she is not allowed to the voice or opinion when she has that thought, she will lose the thought and then cannot contribute to the conversation later.    The neuropsychometric evaluation does disclose significant cognitive difficulties and the recommendation is being made by the psychologist to try a stimulant medications to see if this would help the patient's cognitive functioning and her recent memory functioning.    The patient also reports that she continues to have intermittent "spasms" that occur in various areas of the body.  Apparently this is a very painful involuntary muscle contraction that occurs.  The patient states that this is random in appearance and in location.  Patient states that if she takes a gabapentin and a Flexeril at the same time, she will become very sleepy and the spasm will dissipate and is no longer present when she awakens from her nap several hours later.      ROS:  GENERAL: No fever, chills, fatigability or weight loss.  SKIN: No rashes, itching or changes in color or texture of skin.  HEAD: No headaches or recent head trauma.  EYES: Visual acuity fine. No photophobia, ocular pain or diplopia.  EARS: Denies ear pain, discharge or vertigo.  NOSE: No loss of smell, no epistaxis or postnasal drip.  MOUTH & THROAT: No hoarseness or change " in voice. No excessive gum bleeding.  NODES: Denies swollen glands.  CHEST: Denies PERAZA, cyanosis, wheezing, cough and sputum production.  CARDIOVASCULAR: Denies chest pain, PND, orthopnea or reduced exercise tolerance.  ABDOMEN: Appetite fine. No weight loss. Denies diarrhea, abdominal pain, hematemesis or blood in stool.  URINARY: No flank pain, dysuria or hematuria.  PERIPHERAL VASCULAR: No claudication or cyanosis.  MUSCULOSKELETAL: No joint stiffness or swelling. Denies back pain.  NEUROLOGIC: No history of  paralysis, alteration of gait or coordination.    The patient's past history, family history and social history were reviewed with the patient and her local records.  The neuropsychometric report was also reviewed personally with the patient and a copy was given to her.    PE:   VITAL SIGNS: Blood pressure 160/80, pulse 60, weight 84.8 kg, height 5 foot 5 inches, BMI 31.11  APPEARANCE: Well nourished, well developed, in no acute distress.    HEAD: Normocephalic, atraumatic.  EYES: PERRL. EOMI.  Non-icteric sclerae.    EARS: TM's intact. Light reflex normal. No retraction or perforation.    NOSE: Mucosa pink. Airway clear.  MOUTH & THROAT: No tonsillar enlargement. No pharyngeal erythema or exudate. No stridor.  NECK: Supple. No bruits.  CHEST: Lungs clear to auscultation.  CARDIOVASCULAR: Regular rhythm without significant murmurs.  MUSCULOSKELETAL:  No bony deformity seen.  Muscle tone and muscle mass are normal in both upper and both lower extremities.  NEUROLOGIC:   Mental Status:  The patient is well oriented to person, time, place, and situation.  The patient is attentive to the environment and cooperative for the exam.  Cranial Nerves: II-XII grossly intact. Fundoscopic exam is normal.  No hemorrhage, exudate or papilledema is present. The extraocular muscles are intact in the cardinal directions of gaze.  No ptosis is present. Facial features are symmetrical.  Speech is normal in fluency, diction, and  phrasing.  Tongue protrudes in the midline.    Gait and Station:  Romberg is negative.  Good alternate armswing with normal gait.  Motor:  No downdrift of either arm when held at shoulder level.  Manual muscle testing of proximal and distal muscles of both upper and lower extremities is normal.   Sensory:  Intact both upper and lower extremities to pin prick, touch, and vibration.  Cerebellar:  Finger to nose done well.  Alternating movements intact.  No involuntary movements or tremor seen.  Reflexes:  Stretch reflexes are 2+ both upper and lower extremities.  Plantar stimulation is flexor bilaterally and no pathological reflexes are seen      ASSESSMENT:  1.  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  Trigeminal neuralgia, right    RECOMMENDATIONS:  1.  Based upon the report from the neuropsychologist, we will offer the patient trial of Adderall 10 mg in the morning and 10 mg at noon to see if this will help cognitive functioning as well as recent memory.  2.  Continue gabapentin 300 mg 3 times a day as this will be of benefit in controlling the trigeminal neuralgia as well as possible spasm  3.  Routine follow-up with this office in 4-6 months.    This note is generated with speech recognition software and is subject to transcription error and sound alike phrases that may be missed by proofreading.

## 2018-01-22 NOTE — TELEPHONE ENCOUNTER
"Office note and labs sent to PCP as requested. Pt states that she forgot to mention at her visit that she has been having spasms in kerri feet, hands, up right arm, face and neck. States that the spasms in her feet and hands last for about 1 min. She saw her PCP regarding the facial spasm due to pain she rates higher than a 10 and was told that it may have been due to having the biopsy done since she had to hold hr mouth out and neck back. States PCP gave her Flexeril which did not help alone but pt took a flexeril and gabapentin which did help since it "knocks you out"  "

## 2018-01-22 NOTE — TELEPHONE ENCOUNTER
----- Message from Sarita Kasandra sent at 1/22/2018  3:03 PM CST -----  Pt at [phone]//states she needs her medical records from her last visit faxed to her pcp//Dr Janeen Delacruz//does not have dr phone or fax number//please call//[email or web address]

## 2018-01-23 ENCOUNTER — TELEPHONE (OUTPATIENT)
Dept: DERMATOLOGY | Facility: CLINIC | Age: 53
End: 2018-01-23

## 2018-01-23 ENCOUNTER — TELEPHONE (OUTPATIENT)
Dept: NEUROLOGY | Facility: CLINIC | Age: 53
End: 2018-01-23

## 2018-01-23 NOTE — TELEPHONE ENCOUNTER
Spoke with pt and advised her of below. Scheduled appointment with Dr. Drummond for 1.29.18 at 10 am as pt requested. Pt verbalized understanding.

## 2018-01-23 NOTE — TELEPHONE ENCOUNTER
The recent blood work showed no abnormalities in electrolytes. Please make appointment with  for frequent muscle spasms if not resolved with treatment provided by PCP.Thanks.

## 2018-01-23 NOTE — TELEPHONE ENCOUNTER
----- Message from Tayler Mayer sent at 1/23/2018 10:35 AM CST -----  Contact: self [phone]  States that she needs to speak to nurse regarding records being sent to PCP. Please call back at [phone]//thank you acc

## 2018-01-23 NOTE — TELEPHONE ENCOUNTER
----- Message from Tayler Mayer sent at 1/23/2018 10:33 AM CST -----  Contact: self [phone]  States that she would like to speak to nurse regarding her visit from yesterday. Please call back at [phone]//thank you acc

## 2018-01-24 DIAGNOSIS — G05.3 CNS LUPUS: ICD-10-CM

## 2018-01-24 DIAGNOSIS — M32.19 CNS LUPUS: ICD-10-CM

## 2018-01-24 RX ORDER — PREDNISONE 5 MG/1
TABLET ORAL
Qty: 30 TABLET | Refills: 0 | Status: SHIPPED | OUTPATIENT
Start: 2018-01-24 | End: 2018-03-21 | Stop reason: SDUPTHER

## 2018-01-26 ENCOUNTER — HOSPITAL ENCOUNTER (OUTPATIENT)
Dept: RADIOLOGY | Facility: HOSPITAL | Age: 53
Discharge: HOME | End: 2018-01-26
Attending: NURSE PRACTITIONER
Payer: COMMERCIAL

## 2018-01-26 VITALS — HEIGHT: 65 IN | BODY MASS INDEX: 30.99 KG/M2 | WEIGHT: 186 LBS

## 2018-01-26 DIAGNOSIS — R92.8 FOLLOW-UP EXAMINATION OF ABNORMAL MAMMOGRAM: ICD-10-CM

## 2018-01-26 PROCEDURE — 77062 BREAST TOMOSYNTHESIS BI: CPT | Mod: 26,,, | Performed by: RADIOLOGY

## 2018-01-26 PROCEDURE — 77062 BREAST TOMOSYNTHESIS BI: CPT | Mod: TC,PO

## 2018-01-26 PROCEDURE — 77066 DX MAMMO INCL CAD BI: CPT | Mod: 26,,, | Performed by: RADIOLOGY

## 2018-01-29 ENCOUNTER — OFFICE VISIT (OUTPATIENT)
Dept: PHYSICAL MEDICINE AND REHAB | Facility: CLINIC | Age: 53
End: 2018-01-29
Payer: COMMERCIAL

## 2018-01-29 VITALS
BODY MASS INDEX: 30.99 KG/M2 | SYSTOLIC BLOOD PRESSURE: 137 MMHG | WEIGHT: 186 LBS | HEART RATE: 108 BPM | RESPIRATION RATE: 14 BRPM | DIASTOLIC BLOOD PRESSURE: 82 MMHG | HEIGHT: 65 IN

## 2018-01-29 DIAGNOSIS — R29.898 WEAKNESS OF BOTH HIPS: ICD-10-CM

## 2018-01-29 DIAGNOSIS — R29.898 ARM WEAKNESS: ICD-10-CM

## 2018-01-29 DIAGNOSIS — M79.7 FIBROMYALGIA: Primary | ICD-10-CM

## 2018-01-29 PROCEDURE — 99999 PR PBB SHADOW E&M-EST. PATIENT-LVL III: CPT | Mod: PBBFAC,,, | Performed by: PHYSICAL MEDICINE & REHABILITATION

## 2018-01-29 PROCEDURE — 99204 OFFICE O/P NEW MOD 45 MIN: CPT | Mod: S$GLB,,, | Performed by: PHYSICAL MEDICINE & REHABILITATION

## 2018-01-29 NOTE — PATIENT INSTRUCTIONS
Myofascial Pain Syndrome: Fibrositis  Your pain is caused by a state of chronic muscle tension. This condition is called by various names: myofascial pain, fibrositis and trigger point pain. This can also be due to mechanical stress (such as working at a computer terminal for long periods; or work that requires repetitive motions of the arms or hands) or emotional stress (such as problems on the job or in your personal life). Sometimes there is no obvious cause. The pain can occur in the area of the muscle spasm or at a site distant to it. For example, spasm of a neck muscle can cause headache. Spasm of the muscle near the shoulder blade can cause pain shooting down the arm.  Home Care:  · Try to identify the factors that may be causing your problem and change them:  ¨ If you feel that emotional stress is a cause of your pain, learn methods to deal more effectively with the stress in your life. These may include regular exercise, muscle relaxation techniques, meditation or simply taking time out for yourself. Consult your doctor or go to a local bookstore and review the many books and tapes available on the subject of stress reduction.  ¨ If you feel that physical stress is a cause for your pain, try to modify any poor work habits.  · You may use acetaminophen (Tylenol) or ibuprofen (Motrin, Advil) to control pain, unless another medicine was prescribed. [NOTE: If you have chronic liver or kidney disease or ever had a stomach ulcer or GI bleeding, talk with your doctor before using these medicines.]  · The use of heat to the muscle (hot compress or heating pad) will be helpful to reduce muscle spasm. Some persons get relief with ice packs. Apply an ice pack (crushed or cubed ice in a plastic bag, wrapped in a towel) for 20 minutes at a time as needed. Use the method that feels best to you.  · Massaging the trigger point and stretching out the muscle are an important parts of prevention and treatment. Trigger point  massage can be done by first applying heat to the area to warm and prepare the muscle. Have someone apply steady thumb pressure directly on the knot in the muscle (the most tender point) for 30 seconds. Release the pressure, then massage the surrounding muscle. Repeat the process, applying more pressure to the trigger point each time. Do this up to the limit of pain. With each treatment, the trigger point should become less tender and the pain should decrease. You can apply local pressure to trigger points in the back by lying on the floor with a tennis ball under the trigger point.  Follow Up  with your doctor as advised or if not improving within the next week. It may be necessary for you to receive physical therapy if you do not respond to home treatment alone.  Get Prompt Medical Attention  if any of the following occur:  · If your trigger point is in the chest muscles, observe for pain that becomes more severe, lasts longer, or spreads into your shoulder/arm, neck or back; you develop trouble breathing, sweating, nausea or vomiting in association with chest pain  · If you develop weakness or numbness in an extremity  · If your pain worsens, regardless of its location  © 1022-6072 [employer]. [address] [place] [address]. All rights reserved. This information is not intended as a substitute for professional medical care. Always follow your healthcare professional's instructions.          Trigger Point Injection  The cause of your muscle pain or spasms may be one or more trigger points. Your health care provider may decide to inject the painful spots to relax the muscle. This can help relieve your pain. Relaxing the muscle can also make movement easier. You may then be able to exercise to strengthen the muscle and help it heal.    What is a trigger point?  A trigger point is a tight, painful knot of muscle fiber. It can form where a muscle is strained or injured. The knot can  sometimes be felt under the skin. A trigger point is very tender to the touch. Pain may also spread to other parts of the affected muscle. Muscles around a knee, shoulder blade, or other bones are prone to trigger points. This is because these muscles are more likely to be injured.    About the injections  Any muscle in the body can have one or more trigger points. Several injections may be needed in each trigger point to best relieve pain. These injections may be given in sessions about 2 weeks apart, depending on the preference of your health care provider. In some cases, you may not feel much change in your symptoms until after the third injection.     © 0784-0003 [employer]. [address] [place] [address]. All rights reserved. This information is not intended as a substitute for professional medical care. Always follow your healthcare professional's instructions.            Your Neck Muscles  The muscles in the neck and shoulders need to be strong to hold the neck and head in place. These muscles also help move the neck and shoulders. Your health care provider can recommend exercises to help stretch and strengthen your neck muscles.    © 1139-6089 [employer]. [address] [place] [address]. All rights reserved. This information is not intended as a substitute for professional medical care. Always follow your healthcare professional's instructions.          Neck Problems: Relieving Your Symptoms  The first goal of treatment is to relieve your symptoms. Your health care provider may recommend self-care treatments. These include resting, applying ice and heat, taking medication, and doing exercises. Your health care provider may also recommend that you see a physical therapist, who can teach you ways to care for and strengthen your neck.    Self-Care Treatments  Pain can end quickly or last awhile. Either way, youll want relief as soon as possible. Your health  care provider can tell you which treatments to do at home to help relieve your pain.  · Lying down for a short time takes pressure from the head off the neck.  · Ice and heat can help reduce pain. To bring down swelling, rest an ice pack wrapped in a thin towel on your neck for 15 minutes. To relax sore muscles, apply a warm, wet towel to the area. Or take a warm bath or shower.  · Over-the-counter medications, such as ibuprofen, naproxen, and aspirin, can help reduce pain and swelling. Acetaminophen can help relieve pain. Use these only as directed.  · Exercises can relax muscles and prevent stiffness. To prepare, drape a warm, wet towel around your neck and shoulders for 5 minutes. Remove the towel. Then do any exercises recommended to you by your health care provider.  Physical Therapy  If self-care treatments arent helping relieve neck pain, your health care provider may suggest one or more sessions of physical therapy. Physical therapy is performed by a specialist trained to treat injuries. Your physical therapist (PT) will teach you how to strengthen muscles, improve the spines alignment, and help you move properly. Treatment methods used in physical therapy may include:  · Heat. A special heating pad called a neck pack may be applied to your neck.  · Exercises. Your PT will teach you exercises to help strengthen your neck and improve its range of motion.  · Joint mobilization. The PT gently moves your vertebrae to help restore motion in your neck joints and reduce neck pain.  · Soft tissue mobilization. The PT massages and stretches the muscles in your neck and shoulders.  · Electrical stimulation. Electrical impulses are sent into your neck. This helps reduce soreness and inflammation.  · Education in body mechanics. The PT shows you ways to position and move your body that protect the neck.  Other Treatments  If physical therapy doesnt relieve your neck pain, your health care provider may suggest other  treatments. For example, medications or injections can help relieve pain and swelling. In some cases, surgery may be needed to treat neck problems.  © 4587-8716 [employer]. [address] [place] [address], [place] [address]. All rights reserved. This information is not intended as a substitute for professional medical care. Always follow your healthcare professional's instructions.          Understanding Neck Problems       If you suffer from neck pain, youre not alone. Many people have neck pain at some point in their lives. Problems such as poor posture, injury, and wear and tear can lead to neck pain. Your health care provider will work with you to find the treatment thats best for your neck.  Types of Neck Problems  The following problems can cause pain or injury in your neck:  · Strains and sprains: Strains (stretched or torn muscles) and sprains (stretched or torn ligaments) can cause neck pain. Strains and sprains can occur during an accident, or when you overuse your neck through repetitive motion. They can also cause your muscles and ligaments to become inflamed (swollen and painful).  · Whiplash and other injuries: Whiplash can result when an impact throws your head, forcing your neck too far forward (hyperflexion), then too far backward (hyperextension). When combined, the two motions can cause a painful injury to different parts of your neck, such as muscles, ligaments, or joints. The most common cause of whiplash is a car accident. But it can also happen during a fall or sports injury.  · Weakened disks: A simple action, such as a sneeze or a cough, can cause one of your disks to bulge (herniate). A herniated disk can put pressure on your nerve and cause pain. Over time, disks can also thin out (degenerate). Flattened disks dont cushion vertebrae well and can cause vertebrae to rub together. Rubbing vertebrae can pinch nerves and cause pain.  · Weakened joints: Aging and injury can cause joints  to slowly degenerate. Thinned joints can also cause vertebrae to rub together. This can cause abnormal growths of bone (bone spurs) to form on vertebrae. Bone spurs put pressure on nerves, causing pain.  Common Symptoms  If you have a neck problem, you may have one or more of the following symptoms:  · Muscle tension and spasm: You may not be able to move your neck, arms, or shoulders comfortably if you have muscle tension or stiffness in your neck. If your symptoms arent relieved, you may experience muscle spasms, or knots of contracted tissue (trigger points) in areas of your neck and shoulders.  · Aches and pains: Dull aches in your head or neck, sharp pains, and swelling of the soft tissue of your neck and shoulders are common symptoms. If theres pressure on the nerves in your neck, you may feel pain in your arms or hands (referred pain).  · Numbness or weakness: If you injure the nerves in your neck, you may experience numbness, tingling, or weakness in your shoulders, arms, or hands. These symptoms arise when disks or bone spurs press on the nerves in your neck.  © 9748-4220 [employer]. [address] [place] [address]. All rights reserved. This information is not intended as a substitute for professional medical care. Always follow your healthcare professional's instructions.          Neck Spasm [No Trauma]    Spasm of the neck muscles can occur after a sudden awkward neck movement. Sleeping with your neck in a crooked position can also cause spasm. Some persons respond to emotional stress by tensing the muscles of their neck, shoulders and upper back. If neck spasm lasts long enough, it can cause headache.  The treatment described below will usually help the pain to go away in 5-7 days. Pain that continues may require further evaluation or other types of treatment such as physical therapy.  Home Care:  1. Rest and relax the muscles. Use a comfortable pillow that supports the head  and keeps the spine in a neutral position. The position of the head should not be tilted forward or backward. A rolled up towel may help for a custom fit.  2. Some persons find relief with heat (hot shower, hot bath or heating pad) and massage, while others prefer cold packs (crushed or cubed ice in a plastic bag, wrapped in a towel). Try both and use the method that feels best for 20 minutes several times a day.  3. You may use acetaminophen (Tylenol) or ibuprofen (Motrin, Advil) to control pain, unless another medicine was prescribed. [NOTE: If you have chronic liver or kidney disease or ever had a stomach ulcer or GI bleeding, talk with your doctor before using these medicines.]  Follow Up  with your doctor or this facility if your symptoms do not show signs of improvement after one week. Physical therapy or further evaluation may be needed.  [NOTE: If x-rays were taken, they will be reviewed by a radiologist. You will be notified of any new findings that may affect your care.]  Return Promptly  or contact your doctor if any of the following occurs:  · Pain becomes worse or spreads into one or both arms  · Weakness or numbness in one or both arms  · Increasing headache with nausea or vomiting  · Fever over 100.4ºF (38.0ºC)  © 2992-6580 [employer]. [address] [place] [address], [place] [address]. All rights reserved. This information is not intended as a substitute for professional medical care. Always follow your healthcare professional's instructions.          Know Your Neck: The Cervical Spine  By learning about the parts of the neck, you can better understand your neck problem. The bones of the neck are called cervical vertebrae, commonly identified as C1 through C7. Together, they form a bony column called the spine. Vertebrae also protect the spinal cord, a pathway for messages to reach the brain. Surrounding the spine are soft tissues such as muscles, tendons, and nerves.        Flexibility Is  Key  For the neck to function normally, it has to be flexible enough to move without discomfort. A healthy neck can move easily in six different directions.    © 7879-6904 [employer]. [address] [place] [address], [place] [address]. All rights reserved. This information is not intended as a substitute for professional medical care. Always follow your healthcare professional's instructions.          Protecting Your Neck: Posture and Body Mechanics  Protecting your neck from injuries and pain involves practicing good posture and body mechanics. This may mean correcting bad habits you have related to the way you hold and move your body. The tips below can help you improve your posture and body mechanics.    What Is Posture and Why Does It Matter?  Posture is the way you hold your body. For many of us, this means hunching over, thrusting the chin forward, and slouching the shoulders. But this kind of poor posture keeps muscles from properly supporting the neck and puts stress on muscles, disks, ligaments, and joints in your neck. As a result, injury and pain can occur.  How Is Your Posture?  Use a full-length mirror to check your posture. To begin, stand normally. Then slowly back up against a wall. Is there space between your head and the wall? Do you slouch your shoulders? Is your chin pointing up or down? All these can cause neck pain and injury.  Improving Your Posture  Follow these steps to improve your posture:  · Pull your shoulders back.  · Think of the ears, shoulders, and hips as a series of dots. Now, adjust your body to connect the dots in a straight line.  · Keep your chin level.  What Are Body Mechanics and Why Do They Matter?  The way you move and position your body during daily activities is called body mechanics. Good body mechanics help protect the neck. This means learning the right ways to stand, sit, and even sleep. So do whats best for your neck and practice good body  mechanics.  Standing   To protect your neck while standing:  · Carry objects close to your body.  · Keep your ears and shoulders in a line while standing or walking.  · To lower yourself, bend at the knees with a straight back. Do this instead of looking down and reaching for objects.  · Work at eye level. Dont reach above your head or tilt your head back.  Sitting   To protect your neck while sitting:  · Set up your workstation so your monitor is at eye level. Also, use a document [email or web address] when viewing papers or books.  · Keep your knees at or slightly below the level of your hips.  · Sit up straight, with feet flat on the floor. If your feet dont touch the floor, use a footrest.  · Avoid sitting or driving for long periods. Take frequent breaks.  Sleeping   To protect your neck while sleeping:  · Sleep on your back with a pillow under your knees, or on your side with a pillow between bent knees. This helps align the spine.  · Avoid using pillows that are too high or too low. Instead, use a neck roll or pillow under your neck while you sleep to keep the neck straight.  · Sleep on a mattress that supports you, with a pillow under your neck.  © 9695-5551 [employer]. [address] [place] [address], [place] [address]. All rights reserved. This information is not intended as a substitute for professional medical care. Always follow your healthcare professional's instructions.          Exercises at Your Workstation: Eyes, Neck, and Head     Tired eyes? Stiff neck? A few easy moves can help prevent these kinds of problems. Take a few minutes during your day to do these exercises--right at your desk. They'll loosen up your muscles, keep you more alert, and make a big difference in how you work and feel.    For your eyes  Eye cup  · Lean forward with your elbows on your desk.  · Cup your hands and place them lightly over your closed eyes. Hold for a minute, while breathing deeply in and out.  · Slowly uncover and  open your eyes. Repeat 2 times.  Eye roll  · Close your eyes. Slowly roll your eyeballs clockwise all the way around. Repeat 3 times.  · Now slowly roll them all the way around counterclockwise. Repeat 3 times.  Eye rest  · Every 20 minutes, look away from the computer screen. Focus on an object at least 20 feet away. Stay focused on this object for a full 20 seconds.    For your neck and head  Warm-up  · Drop your head gently to your chest. While breathing in, slowly roll your head up to your left shoulder. While breathing out, slowly roll your head back to center. Repeat to the right.  · Repeat 3 times on each side.  Head tilt  · Sit up straight. Tuck in your chin.  · Slowly tip your head to the left. Return to the center. Then, tip your head to the right.  · Repeat 3 times on each side.    Head turn  · Sit up straight.  · Slowly turn your head and look over your left shoulder. Hold for a few seconds. Go back to the center, then repeat to your right.  · Repeat 3 times on each side.  © 7888-7387 [employer], [employer]. [address] [place], [place] [id number]. All rights reserved. This information is not intended as a substitute for professional medical care. Always follow your healthcare professional's instructions.          Reach and Hold Exercise    Do this exercise on your hands and knees. Keep your knees under your hips and your hands under your shoulders. Keep your spine in a neutral position (not arched or sagging). Keep your ears in line with your shoulders. Hold for a few seconds before starting the exercise:  4. Tighten your abdominal muscles and raise one arm straight in front of you, palm down. Hold for 5 seconds, then lower. Repeat 5 times.  5. Do the exercise again, this time lifting your arm to the side. Repeat 5 times.  6. Do the exercise again, this time lifting your arm backward, palm up. Repeat 5 times.  Switch sides and do each exercise with the other arm.  © 2842-7305 [employer],  [employer]. [address] [place] [address]. All rights reserved. This information is not intended as a substitute for professional medical care. Always follow your healthcare professional's instructions.        Shoulder and Upper Back Stretch  To start, stand tall with your ears, shoulders, and hips in line. Your feet should be slightly apart, positioned just under your hips. Focus your eyes directly in front of you.  this position for a few seconds before starting your exercise. This helps increase your awareness of proper posture.  Reach overhead and slightly back with both arms. Keep your shoulders and neck aligned and your elbows behind your shoulders:  · With your palms facing the ceiling, turn your fingers inward.  · Take a deep breath. Breathe out, and lower your elbows toward your buttocks. Hold for 5 seconds, then return to starting position.  · Repeat 3 times.    © 3772-1554 [employer]. [address] [place] [address]. All rights reserved. This information is not intended as a substitute for professional medical care. Always follow your healthcare professional's instructions.          Shoulder Clock Exercise  To start, stand tall with your ears, shoulders, and hips in line. Your feet should be slightly apart, positioned just under your hips. Focus your eyes directly in front of you.  this position for a few seconds before starting your exercise. This helps increase your awareness of proper posture.  · Imagine that your right shoulder is the center of a clock. With the outer point of your shoulder, roll it around to slowly trace the outer edge of the clock.  · Move clockwise first, then counterclockwise.  · Repeat 3 times. Switch shoulders.   © 1609-8523 [employer]. [address] [place] [address]. All rights reserved. This information is not intended as a substitute for professional medical care. Always follow your healthcare  professional's instructions.          Shoulder Girdle Stretch     To start, sit in a chair with your feet flat on the floor. Your weight should be slightly forward so that youre balanced evenly on your buttocks. Relax your shoulders and keep your head level. Using a chair with arms may help you keep your balance:  · Place 1 hand on the outside elbow of the other arm.  · Pull the arm across your body. Hold for 30 to 60 seconds. Repeat once.  · Switch sides.    © 6046-0197 [employer]. [address] [place] [address]. All rights reserved. This information is not intended as a substitute for professional medical care. Always follow your healthcare professional's instructions.          Shoulder Exercises      To start, sit in a chair with your feet flat on the floor. Your weight should be slightly forward so that youre balanced evenly on your buttocks. Relax your shoulders and keep your head level. Avoid arching your back or rounding your shoulders. Using a chair with arms may help you keep your balance.  · Raise your arms, elbows bent, to shoulder height.  · Slowly move your forearms together. Hold for 5 seconds.  · Return to starting position. Repeat 5 times.  © 5425-3349 [employer]. [address] [place] [address]. All rights reserved. This information is not intended as a substitute for professional medical care. Always follow your healthcare professional's instructions.        Shoulder Shrug Exercise  To start, sit in a chair with your feet flat on the floor. Shift your weight slightly forward to avoid rounding your back. Relax. Keep your ears, shoulders, and hips aligned:  · Raise both of your shoulders as high as you can, as if you were trying to touch them to your ears. Keep your head and neck still and relaxed.  · Hold for a count of 10. Release.  · Repeat 5 times.    © 4441-9996 [employer]. [address] [place] [address]. All rights  reserved. This information is not intended as a substitute for professional medical care. Always follow your healthcare professional's instructions.          Shoulder Squeeze Exercise     To start, sit in a chair with your feet flat on the floor. Shift your weight slightly forward to avoid rounding your back. Relax. Keep your ears, shoulders, and hips aligned:  · Raise your arms to shoulder height, elbows bent and palms forward.  · Move your arms back, squeezing your shoulder blades together.  · Hold for 10 seconds. Return to starting position.   · Repeat 5 times.     © 8085-6958 [employer]. [address] [place] [address]. All rights reserved. This information is not intended as a substitute for professional medical care. Always follow your healthcare professional's instructions.        Fibromyalgia  Fibromyalgia is a chronic condition. It causes pain and tenderness in connective tissues. This causes muscle pain. Often, there are also many tender areas throughout the body. Symptoms may also include stiffness and feelings of numbness and tingling. Symptoms may be worse upon waking up. They may increase with poor sleep, heavy activity, cold or damp weather, anxiety, or stress.  People with fibromyalgia often feel tired. They may have trouble sleeping. Other symptoms include morning stiffness, headaches, and painful menstrual periods. Some people have problems with thinking clearly and changes in memory.  The cause of fibromyalgia is not known. Symptoms are similar to that of other diseases. These include rheumatoid arthritis, low thyroid, chronic fatigue syndrome, and Lyme disease. In some cases, these diseases may occur together.  Fibromyalgia is often treated with medicines. You and your healthcare provider can discuss the medicine that may work best for you. You may have to try more than one medicine or combination of medicines before you find what works for you.  Home care  · If  your healthcare provider has prescribed or recommended medicines, take them as directed.  · Rest as needed. Try to get enough sleep. If you have trouble sleeping, discuss this with your healthcare provider.   · Be active. Regular exercise can help manage symptoms. Some options include walking, swimming, and biking. Strengthening exercises may also be helpful. Talk to your healthcare provider about the best ways to be active.  · Follow a healthy diet. Limit caffeine and alcohol. If you smoke, ask your healthcare provider for help to stop.  · Notice how your body reacts to stress. Learn to listen to your body signals. This will help you take action before the stress becomes severe.  · Learn relaxation techniques. Also consider joining a stress reduction program or class.  · Talk to your healthcare provider about trying complementary treatments. These include acupuncture, hypnosis, and biofeedback. Yoga and enrique chi may be helpful.  · Ask your healthcare provider about cognitive behavioral therapy (CBT). This type of counseling can help people with fibromyalgia cope better with their illness.  Follow-up care  Follow up with your healthcare provider or as advised by our staff. In many cases, fibromyalgia is best treated with a team approach.  This may involve your primary care provider, a rheumatologist, a physical therapist, and a mental health professional.   For more information: National [place] of Arthritis and Musculoskeletal and Skin Diseases (NIAMS)  www.niams.nih.gov 126-172-3947  When to seek medical advice  Contact your healthcare provider right away if any of these occur:  · Symptoms getting worse or new symptoms developing  · You feel hopeless, helpless, or lose interest in day-to-day life  Date Last Reviewed: 3/1/2017  © 6052-7263 [employer], [facility]. [address] [place] [address], [place] [address]. All rights reserved. This information is not intended as a substitute for professional medical care. Always  follow your healthcare professional's instructions.        Managing Fibromyalgia    Fibromyalgia is a chronic illness that causes pain in specific points on the body, stiffness, and fatigue. But it doesnt have to keep you from doing what you enjoy. You can feel better. You and your healthcare provider can work together to develop a plan.  Take medications as directed  You may be given medications to help reduce pain and improve sleep.  Several medications are approved to treat fibromyalgia. Two were originally designed to treat depression. They are duloxetine, and milnacipran. A third, called pregaballin, was developed to treat nerve pain. Other medications include pain relievers such as acetaminophen or stronger narcotics. These may be prescribed short term.  NSAIDs (non-steroidal anti-inflammatory drugs) include aspirin, ibuprofen and naproxen are used to relieve pain.  Get exercise  Gentle exercise can help lessen your pain. Try these tips:  · Choose activities that are gentle on your joints, such as walking, biking, and swimming or other water exercises.  · Dont push yourself too hard. Build up your strength and endurance slowly, over time.  · Stick to it. For the most relief, exercise should become part of your daily life.  Get a good nights rest  To help you get more sleep, try the tips below:  · Sleep only in a bed, not on a couch or chair.  · Dont watch TV, read, or work in bed.  · Go to bed and get up at the same time each day.  · Try to avoid naps.  · Avoid alcohol, caffeine, and tobacco for at least 3 hours before going to bed.  · Avoid fluids in the evening to avoid having to get up to urinate.  Other things you can do  No one knows what causes fibromyalgia. But stress, poor eating habits, and extra weight can make it worse. These tips may help you feel better:  · Eat a balanced diet with plenty of fruits and vegetables, whole grains, lean protein, and low-fat or nonfat dairy products.  · Maintain a  healthy weight.  · Learn ways to reduce or manage the stress in your life.  · Ask your healthcare provider for resources to help you make changes. Or check out the resources below.  Resources  · Arthritis Foundation  www.arthritis.org  · National Fibromyalgia Association  www.fmaware.org  · American Fibromyalgia Syndrome Association  www.afsafund.org  Date Last Reviewed: 2/14/2016 © 2000-2017 [employer]. [address] [place] [address], [place] [address]. All rights reserved. This information is not intended as a substitute for professional medical care. Always follow your healthcare professional's instructions.        Understanding Fibromyalgia     People with fibromyalgia tend to have at least 11 of the 18 tender points shown above.     Fibromyalgia is a condition that causes pain at specific points on the body, stiffness, and fatigue.  What are the symptoms of fibromyalgia?  In most cases, you will have tender points on your body. These points are very sore, especially when touched. Finding several of these points helps your healthcare provider diagnose fibromyalgia.  Along with the tender points, you may have some or all of the following symptoms:  · Constant tiredness (fatigue), even after a full nights sleep (non-restorative sleep)  · A burning or throbbing pain in many parts of the body (this pain may vary during the day)  · Stiffness or aching all over your body  · Numbness or tingling in your arms and legs  · Trouble sleeping  · Bowel problems (bloating, diarrhea, constipation)  · Headaches  · Depression  How is fibromyalgia diagnosed?  There is no lab test to diagnose fibromyalgia. Instead, your healthcare provider will take your health history and examine your joints and muscles. Certain criteria are used when diagnosing fibromyalgia. Symptoms need to be present for at least 3 months. Tests may be done to rule out other conditions with similar symptoms. Then, together you can design a plan to help  you manage your symptoms.   How is fibromyalgia treated?  Several medications are approved to treat fibromyalgia. Two were originally made to treat depression. They are duloxetine, and milnacipran. A third, called pregaballin, was developed to treat nerve pain. Certain medicines used to treat depression have been helpful with fibromyalgia. Other medications include pain relievers such as acetaminophen or stronger narcotics. These may be prescribed short term.  NSAIDs (nonsteroidal anti-inflammatory drugs) including aspirin, ibuprofen, and naproxen are used to relieve pain.  Getting enough sleep, regular exercise, and eating a healthy diet can help manage symptoms. Cognitive behavioral therapy (a form of psychotherapy) can be helpful for fibromyalgia. Some people find alternative treatments such as massage, chiropractic treatments, biofeedback, or acupuncture also help with symptoms.  Date Last Reviewed: 2/14/2016  © 1059-4181 [employer], [employer]. [address] [place] [address], [place] [address]. All rights reserved. This information is not intended as a substitute for professional medical care. Always follow your healthcare professional's instructions.

## 2018-01-29 NOTE — PROGRESS NOTES
PM&R NEW PATIENT HISTORY & PHYSICAL :    Referring Physician:    Chief Complaint   Patient presents with    Spasms     generalized muscle        HPI: This is a 52 y.o.  female being seen in clinic today for evaluation of generalized muscle spasms and achy pain.  Her symptoms can occur at anytime, but are worse with increased activity at times.  She feel weakness and admits to lack of energy.  Nothing really provides relief. She admits to insomnia on occasion, but has been sleeping ok lately.     History obtained from patient    Functional History:  Walking: Not limited  Transfers: Independent  Assistive devices: No  Power mobility: No  Falls: None   Directional preference:  Employment status:    Needs help with:  Nothing - all ADLS normal    Cooking   Cleaning  Bathing   Dressing   Toileting     Past family, medical, social, and surgical history reviewed in chart    Review of Systems:     General- +lethargy, +weight gain with prednisone, neg fever, chills  Head/neck- denies swallowing difficulties  ENT- denies hearing changes  Cardiovascular-denies chest pain  Pulmonary- denies shortness of breath  GI- denies constipation or bowel incontinence  - denies bladder incontinence  Skin- denies wounds or rashes  Musculoskeletal- +weakness, +pain  Neurologic- denies numbness and tingling  Psychiatric- denies depressive or psychotic features, denies anxiety  Lymphatic-denies swelling  Endocrine- denies hypoglycemic symptoms/DM history  All other pertinent systems negative     Physical Examination:  General: Well developed, well nourished female, NAD  HEENT:NCAT EOMI bilaterally   Pulmonary:Normal respirations    Spinal Examination: CERVICAL  Active ROM is within normal limits.  Inspection: No deformity of spinal alignment.  Palpation: No vertebral tenderness to percussion.    Spurling test:   ttp at 11/18 fibro points*      Spinal Examination: LUMBAR or THORACIC  Active ROM is within normal limits.  Inspection: No  deformity of spinal alignment.  No palpable olisthesis.  Palpation: No vertebral tenderness to percussion.  ttp at si joints, glut musculature   MC: negative  SLR Test (seated ):negative  bilaterally  Able to stand on heels and toes  Trendelenburg test: negative    Musculoskeletal Tests:    Elbow compression (ulnar):   Tinels at wrist:   Phalen:     Bilateral Upper and Lower Extremities:  Pulses are 2+ at radial, DP and PT bilaterally.  Shoulder/Elbow/Wrist/Hand ROM wnl except rot cuff weakness  Hip/Knee/Ankle ROM wnl  Bilateral Extremities show normal capillary refill.  No signs of cyanosis, rubor, edema, skin changes, or dysvascular changes of appendages.  Nails appear intact.    Neurological Exam:  Cranial Nerves:  II-XII grossly intact    Manual Muscle Testing: (Motor 5=normal)    RIGHT Upper extremity: Shoulder abduction 5/5, Biceps 5/5, Triceps 5/5, Wrist extension 5/5, Abductor pollicis brevis 5/5, Ulnar hand intrinsics 5/5,  LEFT Upper extremity: Shoulder abduction 5/5, Biceps 5/5, Triceps 5/5, Wrist extension 5/5, Abductor pollicis brevis 5/5, Ulnar hand intrinsics 5/5,  RIGHT Lower extremity: Hip flexion 5/5, Hip Abduction 4/5, Hip Adduction 4/5, Knee extension 5/5, Knee flexion 5/5, Ankle dorsiflexion 5/5, Extensor hallucis longus 5/5, Ankle plantarflexion 5/5  LEFT Lower extremity:  Hip flexion 5/5, Hip Abduction 4/5,Hip Adduction 4/5, Knee extension 5/5, Knee flexion 5/5, Ankle dorsiflexion 5/5, Extensor hallucis longus 5/5, Ankle plantarflexion 5/5    No focal atrophy is noted of either upper or lower extremity.    Bilateral Reflexes:hypo at bic tric br patellar   Aguilar's response is absent bilaterally.  No clonus at knee or ankle.    Sensation: tested to light touch  - intact in all four limbs.    Gait: Narrow base and good arm swing.      IMPRESSION/PLAN: This is a 52 y.o.  female with myofascial pain/fibromyalgia, rotator cuff and hip weakness    1. Rx for PT-here-Core and rotator cuff  strengthening, stretch, ROM, myofascial release, posture, modalities   2. Ice/heat modalities, natural supplementation for muscle health, inc water intake  3. Topical compound from [facility] ordered, massage therapy rx  4. Handouts provided  5. Alexi Drummond M.D.  Physical Medicine and Rehab

## 2018-02-06 ENCOUNTER — TELEPHONE (OUTPATIENT)
Dept: PHYSICAL MEDICINE AND REHAB | Facility: CLINIC | Age: 53
End: 2018-02-06

## 2018-02-06 ENCOUNTER — TELEPHONE (OUTPATIENT)
Dept: RHEUMATOLOGY | Facility: CLINIC | Age: 53
End: 2018-02-06

## 2018-02-06 NOTE — TELEPHONE ENCOUNTER
Spoke with pt and she was calling to have Dr. CHUNG review over her records from the neurologist, Dr. Lozano and the neuropsychologist, Dr. Paiz-scanned under media tab

## 2018-02-06 NOTE — TELEPHONE ENCOUNTER
----- Message from Regina Flowers sent at 2/6/2018  2:08 PM CST -----  Contact: Patient  Patient states she needs to talk to nurse regarding a dr visit, would not state which one, please call her back at [phone]. Thank you

## 2018-02-06 NOTE — TELEPHONE ENCOUNTER
Advised patient of below. Pt verbalized understanding. Also states that she was Dr. Drummond and will be doing physical therapy beginning on 2.15.18. Wanted to make sure Dr. CHUNG was aware.

## 2018-02-06 NOTE — TELEPHONE ENCOUNTER
----- Message from Karina Manjarrez sent at 2/6/2018  2:14 PM CST -----  Patient would like for you to call her at [phone].                                     castillo

## 2018-02-15 ENCOUNTER — OFFICE VISIT (OUTPATIENT)
Dept: CARDIOLOGY | Facility: CLINIC | Age: 53
End: 2018-02-15
Payer: COMMERCIAL

## 2018-02-15 ENCOUNTER — TELEPHONE (OUTPATIENT)
Dept: CARDIOLOGY | Facility: CLINIC | Age: 53
End: 2018-02-15

## 2018-02-15 ENCOUNTER — CLINICAL SUPPORT (OUTPATIENT)
Dept: REHABILITATION | Facility: HOSPITAL | Age: 53
End: 2018-02-15
Attending: PHYSICAL MEDICINE & REHABILITATION
Payer: COMMERCIAL

## 2018-02-15 ENCOUNTER — TELEPHONE (OUTPATIENT)
Dept: NEUROLOGY | Facility: CLINIC | Age: 53
End: 2018-02-15

## 2018-02-15 VITALS
HEIGHT: 65 IN | HEART RATE: 84 BPM | DIASTOLIC BLOOD PRESSURE: 90 MMHG | WEIGHT: 185 LBS | BODY MASS INDEX: 30.82 KG/M2 | SYSTOLIC BLOOD PRESSURE: 154 MMHG

## 2018-02-15 DIAGNOSIS — M79.7 SCAPULOHUMERAL FIBROSITIS: Primary | ICD-10-CM

## 2018-02-15 DIAGNOSIS — R29.898 DEFICIENCIES OF LIMBS: ICD-10-CM

## 2018-02-15 DIAGNOSIS — I10 ESSENTIAL HYPERTENSION: Primary | ICD-10-CM

## 2018-02-15 DIAGNOSIS — F98.8 ADULT ATTENTION DEFICIT DISORDER: Primary | ICD-10-CM

## 2018-02-15 PROCEDURE — 97161 PT EVAL LOW COMPLEX 20 MIN: CPT | Mod: PO | Performed by: PHYSICAL THERAPIST

## 2018-02-15 PROCEDURE — 99214 OFFICE O/P EST MOD 30 MIN: CPT | Mod: S$GLB,,, | Performed by: NUCLEAR MEDICINE

## 2018-02-15 PROCEDURE — 99999 PR PBB SHADOW E&M-EST. PATIENT-LVL III: CPT | Mod: PBBFAC,,, | Performed by: NUCLEAR MEDICINE

## 2018-02-15 PROCEDURE — 97110 THERAPEUTIC EXERCISES: CPT | Mod: PO | Performed by: PHYSICAL THERAPIST

## 2018-02-15 PROCEDURE — 3008F BODY MASS INDEX DOCD: CPT | Mod: S$GLB,,, | Performed by: NUCLEAR MEDICINE

## 2018-02-15 RX ORDER — SPIRONOLACTONE 25 MG/1
50 TABLET ORAL DAILY
Qty: 60 TABLET | Refills: 6 | Status: SHIPPED | OUTPATIENT
Start: 2018-02-15 | End: 2018-02-19 | Stop reason: DRUGHIGH

## 2018-02-15 NOTE — TELEPHONE ENCOUNTER
Returned call. Patient wanted to know if ok to resume PT.    Spoke with jey Pena to continue Physiccal Therapy.    Patient informed.

## 2018-02-15 NOTE — TELEPHONE ENCOUNTER
----- Message from Archana Smith sent at 2/15/2018  4:03 PM CST -----  Contact: pt  Pt would like a return call she can be reached at  [phone] Thanks

## 2018-02-15 NOTE — TELEPHONE ENCOUNTER
Returned call to pt., states she would like an alternate RX, states the adderall gave her chest pain and does not want to take it. Please advise.

## 2018-02-15 NOTE — TELEPHONE ENCOUNTER
----- Message from Edith Moon sent at 2/15/2018 12:09 PM CST -----  Contact: self [phone]  Would like to consult with nurse regarding medication.   Please call back at  [phone].  Md Sarah

## 2018-02-15 NOTE — PROGRESS NOTES
"    DATE OF INITIAL PHYSICAL THERAPY EVALUATION:             February 15, 2018      REFERRING PROVIDER:       Radha Drummond MD      REFERRING DIAGNOSIS:       M79.7 (ICD-10-CM) - Fibromyalgia   R29.898 (ICD-10-CM) - Arm weakness   R29.898 (ICD-10-CM) - Weakness of both hips           ORDERS:   Evaluate and treat as indicated 2 x week x 60 days    Orders will     April 15, 2018        VISIT    #1       SUBJECTIVE:      Patients primary complaint(s):  Lumbar myofascial pain and stiffness  Joint soreness (hips and shoulders)  Fatigue  Poor endurance  Neck pain  Headaches    History of present condition:    Patient states she has been experiencing joint and muscle pain throughout her trunk and extremities.  She also complains of muscle tenderness in the shoulder girdle, hip girdle, and cervical spine, and lumbar spine muscle groups.  Patient reports persistent symptoms are impairing most of her ADL's and is preventing her from working.  She reports and increase in stiffness in the early AM, and increasing discomfort with activity.     Patient also reported experiencing a "bad headache which lasted all day yesterday,"  She states she rarely has headaches.  She has a history of HPN and is currently on medication.    BP taken by therapist; reading 138//100 with patient in sitting.      Pain Ratin/10  Lumbar myofascial pain      Patient reports the following functional activity limitations:  ADL's impaired  Long distance walking impaired due to soreness and fatigue  Bending, stooping, squatting, lifting, and carrying impaired due to joint and muscle pain      (FUNCTIONAL ASSESSMENT TOOL)    At the time of the initial evaluation, the patient reported the following self limitations on the Optimal Instrument functional assessment tool:  (Rating Scale: #1=Able to do without difficulty, #2=Able to do with little difficulty, #3=Able to do with moderate difficulty, #4=Able to do with much difficulty, #5=Unable to " do, #9=Not applicable)    Optimal Instrument Scores reported by the patient:  Completed on February 15, 2018    1.  Lying flat    3  2.  Rolling over   3  3.  Moving - lying to sitting  3  4.  Sitting    3  5.  Squatting    3  6.  Bending/stooping   3  7.  Balancing    3  8.  Kneeling    3  9.  Standing    3  10.  Walking - short distance  2  11.  Walking - long distance  3  12.  Walking - outdoors  3  13.  Climbing stairs   3  14.  Hopping    3  15.  Jumping    3  16.  Running    3  17.  Pushing    3  18.  Pulling    3  19.  Reaching    3  20.  Grasping    3  21.  Lifting    3  22.  Carrying    3    Total Impairment Rating  49%      Past Medical History and co-morbidities:  Past Medical History:   Diagnosis Date    Anemia     with chronic disease lupus    Encounter for blood transfusion     G-6-PD deficiency     Heart murmur     MVP 1986    Hypertension     Lupus     Raynaud's syndrome      Patient Active Problem List   Diagnosis    CNS lupus    SOB (shortness of breath)    Essential hypertension    Systemic lupus erythematosus    Secondary Sjogren's syndrome    Glaucoma suspect of both eyes    Keratoconjunctivitis sicca of both eyes    Peripheral retinal degeneration of both eyes    Posterior vitreous detachment of both eyes    G6PD deficiency    Trigeminal neuralgia of right side of face    Atypical face pain       Current Outpatient Prescriptions:     azaTHIOprine (IMURAN) 50 mg Tab, TAKE ONE TABLET BY MOUTH TWICE DAILY (Patient taking differently: TAKE ONE TABLET BY MOUTH DAILY), Disp: 60 tablet, Rfl: 1    CALCIUM-MAGNESIUM-ZINC ORAL, Take by mouth once daily., Disp: , Rfl:     cholecalciferol, vitamin D3, (VITAMIN D3) 5,000 unit Tab, Take 5,000 Units by mouth once daily., Disp: , Rfl:     clobetasol (OLUX) 0.05 % Foam, Apply topically 2 (two) times daily. For scalp only.  Do not use on face., Disp: 50 g, Rfl: 3    cyclobenzaprine (FLEXERIL) 10 MG tablet, Take 10 mg by mouth 3 (three)  times daily as needed for Muscle spasms., Disp: , Rfl:     cycloSPORINE (RESTASIS) 0.05 % ophthalmic emulsion, Place 0.4 mLs (1 drop total) into both eyes 2 (two) times daily., Disp: 60 vial, Rfl: 11    desoximetasone (TOPICORT) 0.25 % cream, Apply topically 2 (two) times daily as needed. Do not use on face, underarms or groin, Disp: 60 g, Rfl: 3    dextroamphetamine-amphetamine 10 mg Tab, Take 1 tablet by mouth 2 (two) times daily., Disp: 60 tablet, Rfl: 0    ferrous gluconate (FERGON) 324 MG tablet, Take 324 mg by mouth daily with breakfast., Disp: , Rfl:     fish oil-omega-3 fatty acids 300-1,000 mg capsule, Take 2 g by mouth once daily., Disp: , Rfl:     fluocinolone (DERMA-SMOOTHE) 0.01 % external oil, Apply oil to scalp twice a day, Disp: 1 Bottle, Rfl: 5    gabapentin (NEURONTIN) 300 MG capsule, Take 1 capsule (300 mg total) by mouth 3 (three) times daily., Disp: 90 capsule, Rfl: 5    losartan (COZAAR) 100 MG tablet, Take 1 tablet (100 mg total) by mouth once daily., Disp: 30 tablet, Rfl: 11    multivitamin capsule, Take 1 capsule by mouth once daily., Disp: , Rfl:     predniSONE (DELTASONE) 5 MG tablet, TAKE ONE TABLET BY MOUTH ONCE DAILY, Disp: 30 tablet, Rfl: 0    spironolactone (ALDACTONE) 25 MG tablet, Take 1 tablet (25 mg total) by mouth once daily. (Patient taking differently: Take 25 mg by mouth nightly. ), Disp: 30 tablet, Rfl: 11    Previous physical therapy and treatments:  Patient previously treated by a chiropractor for complaints of lumbar pain    Occupational/psychosocial/educational profile:  Not employed at this time; patient reports she is unable to tolerate work due to myofascial pain.    OBJECTIVE:    Musculoskeletal Exam:    Postural Exam  No postural deviations noted.  SI exam negative for ilial rotation.    ROM  Cervical spine ROM WNL's  Patient complaints of tightness throughout the cervical paraspinals at the end of full flexion  Lumbar spine ROM is WNL's  Patient complains  of tightness at the end point into full flexion; complains of pain at the end point into full extension in the mid lumbar region  Hip ROM is WNL's bilaterally  Shoulder ROM is WNL's bilaterally     Flexibility  HS, ITB, piriformis flexibility limited bilaterally    Functional Strength Testing  5/5 strength throughout the upper quadrant bilaterally except for:  Supraspinatus 4-/5 bilaterally, Infraspinatus 4-/5 bilaterally   5/5 strength throughout the lower quadrant bilaterally      Palpation  Multiple areas of diffuse tenderness throughout the upper quadrant bilaterally; especially upper traps, levator, rhomboids  Multiple areas of diffuse tenderness throughout the lower quadrant bilaterally; especially the gluteus medius, ITB      Neuromuscular Exam    Gait  No significant gait deviations noted    Transitional Movements  Patient complains of discomfort with sit to stand, sit to supine, supine to sit      Sensation  No sensory disturbances noted throughout the LE's or UE's  Patient did not complain of paresthesias.    PROBLEM LIST - ASSESSMENT  Patient presents with multiple areas of myofascial tenderness throughout the upper and lower quadrants.  Inactivity due to the patients complaints of myofascial pain is likely contributing to joint stiffness and deconditioning leading to fatigue.  Patient would benefit from and out-patient rehab program to address flexibility, deconditioning, and functional weakness.  However, due to the patient's elevated blood pressure today it will be necessary for the patient to be cleared by her cardiologist prior to initiating strengthening and conditioning exercises.  Patient would benefit from initiating gentle stretching exercises to address lumbar stiffness.    Activity Limitations, Participation Restrictions, and CO-MORBIDITIES which may impact the plan of care and potentially impede the patient's progress in therapy include:  Patient DX with lupus; hypertension to be evaluated  prior to progressing into out-patient rehab    The patient does not present with any learning or communication barriers which may impact the plan of care and potentially impede the patient's progress in therapy.      CLINICAL PRESENTATION:  Patient's Clinical Presentation is STABLE.       RECOMMENDED PLAN OF CARE:  Stretching exercises program for shoulder girdle and hip girdle muscle groups  Stretching exercises for the cervical and lumbar muscle groups   Postural correction exercises  Endurance training  RTC strengthening   Core strengthening     TREATMENT PROVIDED:       (one on one with therapist for therapeutic exercises    15 Mins)    The patient was instructed in the following exercises today:  -Lumbar rotational ROM and stretching in supine  -Lumbar flexion stretches in supine  -hamstring stretching  -piriformis stretching  -IT Band stretching  -gluteus medius stretching  -stretching of the hip internal rotators      PLAN: Patient to follow-up with Cardiologist to assess BP and to obtain clearance for rehab.      SHORT TERM GOALS:    1. Patient compliant with daily stretching exercises   2. Patient will report pain level of 2/10 or less  3. Patient to follow up with Cardiologist for assessment of HPN    LONG TERM GOALS:  1. Patient will report pain level of 1/10 or less  2. Patient will report 25% or greater decrease in functional impairment on the Optimal Instrument      These services are reasonable and necessary for the conditions set forth above while under my care.

## 2018-02-15 NOTE — PROGRESS NOTES
Subjective:   Patient ID:  Ruth Messina is a 52 y.o. female who presents for follow-up of Hypertension and Shortness of Breath      HPI 1- ESSENTIAL HTN  TODAY AT PT, WAS NOTICED INCREASE /100  PT HAS BEEN UNDER MORE EMOTIONAL STRESS  AND HAVING RECURRENT JOINT PAINS- LUPUS ARTHRITIS-  NO ANGINA OR PLEURITIC CP  NO UNUSUAL PERAZA. NO ORTHOPNEA OR PND  FEELING MORE TIRED AND FATIGUE  NO EDEMA. NO CALVE TENDERNESS  NO FOCAL CNS SYMPTOMS OR SIGNS TO SUGGEST TIA OR STROKE  NO NEAR SYNCOPE OR SYNCOPE  CARD MED - GOOD COMPLIANCE    Review of Systems   Constitution: Positive for weakness and malaise/fatigue. Negative for chills, fever, night sweats, weight gain and weight loss.   HENT: Negative for nosebleeds.    Eyes: Negative for blurred vision, double vision and visual disturbance.   Cardiovascular: Negative for chest pain, dyspnea on exertion, irregular heartbeat, leg swelling, orthopnea, palpitations, paroxysmal nocturnal dyspnea and syncope.   Respiratory: Negative for cough, hemoptysis and wheezing.    Endocrine: Negative for polydipsia and polyuria.   Hematologic/Lymphatic: Does not bruise/bleed easily.   Skin: Negative for rash.   Musculoskeletal: Positive for arthritis and joint pain. Negative for joint swelling, muscle weakness and myalgias.   Gastrointestinal: Negative for abdominal pain, hematemesis, jaundice and melena.   Genitourinary: Negative for dysuria, hematuria and nocturia.   Neurological: Negative for dizziness, focal weakness, headaches and sensory change.   Psychiatric/Behavioral: Negative for depression. The patient does not have insomnia and is not nervous/anxious.      Family History   Problem Relation Age of Onset    Arthritis Mother     Heart disease Mother     Hypertension Mother     Diabetes Father     Heart disease Father     Hypertension Father     Cancer Brother     Eczema Neg Hx     Lupus Neg Hx     Psoriasis Neg Hx      Past Medical History:   Diagnosis Date    Anemia      with chronic disease lupus    Encounter for blood transfusion     G-6-PD deficiency     Heart murmur     MVP 1986    Hypertension     Lupus     Raynaud's syndrome      Current Outpatient Prescriptions on File Prior to Visit   Medication Sig Dispense Refill    azaTHIOprine (IMURAN) 50 mg Tab TAKE ONE TABLET BY MOUTH TWICE DAILY (Patient taking differently: TAKE ONE TABLET BY MOUTH DAILY) 60 tablet 1    CALCIUM-MAGNESIUM-ZINC ORAL Take by mouth once daily.      cholecalciferol, vitamin D3, (VITAMIN D3) 5,000 unit Tab Take 5,000 Units by mouth once daily.      clobetasol (OLUX) 0.05 % Foam Apply topically 2 (two) times daily. For scalp only.  Do not use on face. 50 g 3    cyclobenzaprine (FLEXERIL) 10 MG tablet Take 10 mg by mouth 3 (three) times daily as needed for Muscle spasms.      cycloSPORINE (RESTASIS) 0.05 % ophthalmic emulsion Place 0.4 mLs (1 drop total) into both eyes 2 (two) times daily. 60 vial 11    desoximetasone (TOPICORT) 0.25 % cream Apply topically 2 (two) times daily as needed. Do not use on face, underarms or groin 60 g 3    ferrous gluconate (FERGON) 324 MG tablet Take 324 mg by mouth daily with breakfast.      fish oil-omega-3 fatty acids 300-1,000 mg capsule Take 2 g by mouth once daily.      fluocinolone (DERMA-SMOOTHE) 0.01 % external oil Apply oil to scalp twice a day 1 Bottle 5    gabapentin (NEURONTIN) 300 MG capsule Take 1 capsule (300 mg total) by mouth 3 (three) times daily. 90 capsule 5    losartan (COZAAR) 100 MG tablet Take 1 tablet (100 mg total) by mouth once daily. 30 tablet 11    multivitamin capsule Take 1 capsule by mouth once daily.      predniSONE (DELTASONE) 5 MG tablet TAKE ONE TABLET BY MOUTH ONCE DAILY 30 tablet 0    [DISCONTINUED] spironolactone (ALDACTONE) 25 MG tablet Take 1 tablet (25 mg total) by mouth once daily. (Patient taking differently: Take 25 mg by mouth nightly. ) 30 tablet 11    dextroamphetamine-amphetamine 10 mg Tab Take 1 tablet  by mouth 2 (two) times daily. 60 tablet 0     No current facility-administered medications on file prior to visit.      Review of patient's allergies indicates:   Allergen Reactions    Cellcept [mycophenolate mofetil] Swelling     Lips      Ace inhibitors Other (See Comments)     unkown    Amoxicillin Other (See Comments)     + allergy test    Aspirin Other (See Comments)     G-6-PD def.     Beta-blockers (beta-adrenergic blocking agts) Other (See Comments)     Cannot take due to Raynaud's syndrome    Cephalosporins Other (See Comments)     + allergy test    Clindamycin Other (See Comments)     + allergy test    Hydrochlorothiazide Other (See Comments)     unknown    Ibuprofen Other (See Comments)     G-6-PD def.    Lasix [furosemide] Other (See Comments)     unknown    Lisinopril Other (See Comments)     unknown    Methotrexate analogues Other (See Comments)     Pancytopenia      Nsaids (non-steroidal anti-inflammatory drug) Other (See Comments)     G-6-PD def.    Pcn [penicillins] Other (See Comments)     + allergy test    Plaquenil [hydroxychloroquine] Other (See Comments)     G-6-PD def.    Shellfish containing products Other (See Comments)     + allergy test    Zofran [ondansetron hcl (pf)] Other (See Comments)     Chest pain      Sulfa (sulfonamide antibiotics) Rash       Objective:     Physical Exam   Constitutional: She is oriented to person, place, and time. She appears well-developed. No distress.   HENT:   Head: Normocephalic.   Eyes: Conjunctivae are normal. Pupils are equal, round, and reactive to light. No scleral icterus.   Neck: Normal range of motion. Neck supple. Normal carotid pulses, no hepatojugular reflux and no JVD present. Carotid bruit is not present. No edema present. No thyroid mass and no thyromegaly present.   Cardiovascular: Normal rate, regular rhythm, S1 normal, S2 normal, normal heart sounds and intact distal pulses.  PMI is not displaced.  Exam reveals no gallop  and no friction rub.    No murmur heard.  Pulses:       Carotid pulses are 2+ on the right side, and 2+ on the left side.       Radial pulses are 2+ on the right side, and 2+ on the left side.        Femoral pulses are 2+ on the right side, and 2+ on the left side.       Popliteal pulses are 2+ on the right side, and 2+ on the left side.        Dorsalis pedis pulses are 2+ on the right side, and 2+ on the left side.        Posterior tibial pulses are 2+ on the right side, and 2+ on the left side.   Pulmonary/Chest: Effort normal and breath sounds normal. She has no wheezes. She has no rales. She exhibits no tenderness.   Abdominal: Soft. Bowel sounds are normal. She exhibits no pulsatile midline mass and no mass. There is no hepatosplenomegaly. There is no tenderness.   Musculoskeletal: Normal range of motion. She exhibits no edema or tenderness.        Cervical back: Normal.        Thoracic back: Normal.        Lumbar back: Normal.   Lymphadenopathy:     She has no cervical adenopathy.     She has no axillary adenopathy.        Right: No supraclavicular adenopathy present.        Left: No supraclavicular adenopathy present.   Neurological: She is alert and oriented to person, place, and time. She has normal strength and normal reflexes. No sensory deficit. Gait normal.   Skin: Skin is warm. No rash noted. No cyanosis. No pallor. Nails show no clubbing.   Psychiatric: She has a normal mood and affect. Her speech is normal and behavior is normal. Cognition and memory are normal.       Assessment:     1. Essential hypertension      BP NOT AT GOAL- LESS 140/80  NO CLINICAL EVIDENCE OF ACTIVE MYOCARDIAL ISCHEMIA. NO ARRHYTHMIAS. NO ACUTE HF  CNS STATUS STABLE  Plan:     Essential hypertension  -     spironolactone (ALDACTONE) 25 MG tablet; Take 2 tablets (50 mg total) by mouth once daily.  Dispense: 60 tablet; Refill: 6    PLAN- OMT   ALDACTONE    RETURN - KEEP OLD APPT, NEXT MONTH

## 2018-02-16 ENCOUNTER — TELEPHONE (OUTPATIENT)
Dept: GASTROENTEROLOGY | Facility: CLINIC | Age: 53
End: 2018-02-16

## 2018-02-16 RX ORDER — METHYLPHENIDATE HYDROCHLORIDE 10 MG/1
10 TABLET ORAL 2 TIMES DAILY
Qty: 60 TABLET | Refills: 0 | Status: SHIPPED | OUTPATIENT
Start: 2018-02-16 | End: 2018-07-30

## 2018-02-16 NOTE — TELEPHONE ENCOUNTER
----- Message from Denisse Starr sent at 2/16/2018  9:35 AM CST -----  Contact: [facility]  Please contact the pharmacy regarding the pt medication being changed to ADDERRALL.        ..  [place] [id number] [facility] [address]  Phone: [phone] Fax: [phone]

## 2018-02-19 ENCOUNTER — TELEPHONE (OUTPATIENT)
Dept: CARDIOLOGY | Facility: CLINIC | Age: 53
End: 2018-02-19

## 2018-02-19 RX ORDER — SPIRONOLACTONE 25 MG/1
25 TABLET ORAL DAILY
Qty: 30 TABLET | Refills: 11
Start: 2018-02-19 | End: 2018-12-21 | Stop reason: SDUPTHER

## 2018-02-19 NOTE — TELEPHONE ENCOUNTER
Returned call and gave instructions to hold aldactone.  Patient in disagreement and stated would like to return to lower doasage of 25mg as before.    Spoke with Dr. johnson patient to return to aldactone 25mg as before and notify if symptoms return.    Patient verbalized understanding.

## 2018-02-19 NOTE — TELEPHONE ENCOUNTER
Returned call. Patient stating after increasing spironolactone to 50mg daily, experienced swelling of lips, itchy ears and throat.    Patient stated had not taken anymore since first day of increased dosage.    Yudith advise.

## 2018-02-19 NOTE — TELEPHONE ENCOUNTER
----- Message from Christel Alfred sent at 2/19/2018  9:26 AM CST -----  Contact: pt  Please call pt @ [phone] regarding medication.

## 2018-02-21 ENCOUNTER — TELEPHONE (OUTPATIENT)
Dept: PHYSICAL MEDICINE AND REHAB | Facility: CLINIC | Age: 53
End: 2018-02-21

## 2018-02-21 NOTE — TELEPHONE ENCOUNTER
----- Message from Edith Moon sent at 2/21/2018  2:30 PM CST -----  Contact: self [phone]  Would like to consult with nurse.  Please call back at [phone].  Md Sarah

## 2018-02-22 ENCOUNTER — CLINICAL SUPPORT (OUTPATIENT)
Dept: REHABILITATION | Facility: HOSPITAL | Age: 53
End: 2018-02-22
Attending: PHYSICAL MEDICINE & REHABILITATION
Payer: COMMERCIAL

## 2018-02-22 DIAGNOSIS — M79.7 SCAPULOHUMERAL FIBROSITIS: Primary | ICD-10-CM

## 2018-02-22 DIAGNOSIS — R29.898 DEFICIENCIES OF LIMBS: ICD-10-CM

## 2018-02-22 PROCEDURE — 97110 THERAPEUTIC EXERCISES: CPT | Mod: PO | Performed by: PHYSICAL THERAPIST

## 2018-02-22 RX ORDER — CLONIDINE HYDROCHLORIDE 0.1 MG/1
0.1 TABLET ORAL NIGHTLY
Qty: 30 TABLET | Refills: 3 | Status: SHIPPED | OUTPATIENT
Start: 2018-02-22 | End: 2018-03-07 | Stop reason: SINTOL

## 2018-02-22 NOTE — PROGRESS NOTES
DATE OF INITIAL PHYSICAL THERAPY EVALUATION:             February 15, 2018      REFERRING PROVIDER:       Radha Drummond MD      REFERRING DIAGNOSIS:       M79.7 (ICD-10-CM) - Fibromyalgia   R29.898 (ICD-10-CM) - Arm weakness   R29.898 (ICD-10-CM) - Weakness of both hips           ORDERS:   Evaluate and treat as indicated 2 x week x 60 days    Orders will     April 15, 2018        VISIT    #2      SUBJECTIVE:  Patient completed follow-up with Cardiology regarding HPN.  Dr. Martinez has cleared her for exercise.    Patient's BP today taken at rest was 161/105; therapist again discussed findings with Dr. Martinez who approved proceeding with the recommended plan of care.      Patient is complaining of lumbar stiffness and myofascial pain.  Reports working on the lumbar exercises without difficulty.    She is not complaining of discomfort in the cervical region today.    Patients primary complaint(s):  Lumbar myofascial pain and stiffness  Joint soreness (hips and shoulders)  Fatigue  Poor endurance  Neck pain  Headaches    History of present condition:  (provided by patient at the time of the initial evaluation)  Patient states she has been experiencing joint and muscle pain throughout her trunk and extremities.  She also complains of muscle tenderness in the shoulder girdle, hip girdle, and cervical spine, and lumbar spine muscle groups.  Patient reports persistent symptoms are impairing most of her ADL's and is preventing her from working.  She reports and increase in stiffness in the early AM, and increasing discomfort with activity.     Pain Ratin/10  Lumbar myofascial pain      Patient reports the following functional activity limitations:  ADL's impaired  Long distance walking impaired due to soreness and fatigue  Bending, stooping, squatting, lifting, and carrying impaired due to joint and muscle pain      (FUNCTIONAL ASSESSMENT TOOL)    At the time of the initial evaluation, the patient  reported the following self limitations on the Optimal Instrument functional assessment tool:  (Rating Scale: #1=Able to do without difficulty, #2=Able to do with little difficulty, #3=Able to do with moderate difficulty, #4=Able to do with much difficulty, #5=Unable to do, #9=Not applicable)    Optimal Instrument Scores reported by the patient:  Completed on February 15, 2018    1.  Lying flat    3  2.  Rolling over   3  3.  Moving - lying to sitting  3  4.  Sitting    3  5.  Squatting    3  6.  Bending/stooping   3  7.  Balancing    3  8.  Kneeling    3  9.  Standing    3  10.  Walking - short distance  2  11.  Walking - long distance  3  12.  Walking - outdoors  3  13.  Climbing stairs   3  14.  Hopping    3  15.  Jumping    3  16.  Running    3  17.  Pushing    3  18.  Pulling    3  19.  Reaching    3  20.  Grasping    3  21.  Lifting    3  22.  Carrying    3    Total Impairment Rating  49%      Past Medical History and co-morbidities:  Past Medical History:   Diagnosis Date    Anemia     with chronic disease lupus    Encounter for blood transfusion     G-6-PD deficiency     Heart murmur     MVP 1986    Hypertension     Lupus     Raynaud's syndrome      Patient Active Problem List   Diagnosis    CNS lupus    SOB (shortness of breath)    Essential hypertension    Systemic lupus erythematosus    Secondary Sjogren's syndrome    Glaucoma suspect of both eyes    Keratoconjunctivitis sicca of both eyes    Peripheral retinal degeneration of both eyes    Posterior vitreous detachment of both eyes    G6PD deficiency    Trigeminal neuralgia of right side of face    Atypical face pain       Current Outpatient Prescriptions:     azaTHIOprine (IMURAN) 50 mg Tab, TAKE ONE TABLET BY MOUTH TWICE DAILY (Patient taking differently: TAKE ONE TABLET BY MOUTH DAILY), Disp: 60 tablet, Rfl: 1    CALCIUM-MAGNESIUM-ZINC ORAL, Take by mouth once daily., Disp: , Rfl:     cholecalciferol, vitamin D3, (VITAMIN D3)  5,000 unit Tab, Take 5,000 Units by mouth once daily., Disp: , Rfl:     clobetasol (OLUX) 0.05 % Foam, Apply topically 2 (two) times daily. For scalp only.  Do not use on face., Disp: 50 g, Rfl: 3    cloNIDine (CATAPRES) 0.1 MG tablet, Take 1 tablet (0.1 mg total) by mouth nightly., Disp: 30 tablet, Rfl: 3    cyclobenzaprine (FLEXERIL) 10 MG tablet, Take 10 mg by mouth 3 (three) times daily as needed for Muscle spasms., Disp: , Rfl:     cycloSPORINE (RESTASIS) 0.05 % ophthalmic emulsion, Place 0.4 mLs (1 drop total) into both eyes 2 (two) times daily., Disp: 60 vial, Rfl: 11    desoximetasone (TOPICORT) 0.25 % cream, Apply topically 2 (two) times daily as needed. Do not use on face, underarms or groin, Disp: 60 g, Rfl: 3    ferrous gluconate (FERGON) 324 MG tablet, Take 324 mg by mouth daily with breakfast., Disp: , Rfl:     fish oil-omega-3 fatty acids 300-1,000 mg capsule, Take 2 g by mouth once daily., Disp: , Rfl:     fluocinolone (DERMA-SMOOTHE) 0.01 % external oil, Apply oil to scalp twice a day, Disp: 1 Bottle, Rfl: 5    gabapentin (NEURONTIN) 300 MG capsule, Take 1 capsule (300 mg total) by mouth 3 (three) times daily., Disp: 90 capsule, Rfl: 5    losartan (COZAAR) 100 MG tablet, Take 1 tablet (100 mg total) by mouth once daily., Disp: 30 tablet, Rfl: 11    methylphenidate HCl (RITALIN) 10 MG tablet, Take 1 tablet (10 mg total) by mouth 2 (two) times daily., Disp: 60 tablet, Rfl: 0    multivitamin capsule, Take 1 capsule by mouth once daily., Disp: , Rfl:     predniSONE (DELTASONE) 5 MG tablet, TAKE ONE TABLET BY MOUTH ONCE DAILY, Disp: 30 tablet, Rfl: 0    spironolactone (ALDACTONE) 25 MG tablet, Take 1 tablet (25 mg total) by mouth once daily., Disp: 30 tablet, Rfl: 11    Previous physical therapy and treatments:  Patient previously treated by a chiropractor for complaints of lumbar pain    Occupational/psychosocial/educational profile:  Not employed at this time; patient reports she is  unable to tolerate work due to myofascial pain.    OBJECTIVE:    Musculoskeletal Exam:    Postural Exam  No postural deviations noted.  SI exam negative for ilial rotation.    ROM  Cervical spine ROM WNL's  Patient complaints of tightness throughout the cervical paraspinals at the end of full flexion  Lumbar spine ROM is WNL's  Patient complains of tightness at the end point into full flexion; complains of pain at the end point into full extension in the mid lumbar region  Hip ROM is WNL's bilaterally  Shoulder ROM is WNL's bilaterally     Flexibility  HS, ITB, piriformis flexibility limited bilaterally    Functional Strength Testing  5/5 strength throughout the upper quadrant bilaterally except for:  Supraspinatus 4-/5 bilaterally, Infraspinatus 4-/5 bilaterally   5/5 strength throughout the lower quadrant bilaterally      Palpation  Multiple areas of diffuse tenderness throughout the upper quadrant bilaterally; especially upper traps, levator, rhomboids  Multiple areas of diffuse tenderness throughout the lower quadrant bilaterally; especially the gluteus medius, ITB      Neuromuscular Exam    Gait  No significant gait deviations noted    Transitional Movements  Patient complains of discomfort with sit to stand, sit to supine, supine to sit      Sensation  No sensory disturbances noted throughout the LE's or UE's  Patient did not complain of paresthesias.    PROBLEM LIST - ASSESSMENT  Patient presents with multiple areas of myofascial tenderness throughout the upper and lower quadrants.  Inactivity due to the patients complaints of myofascial pain is likely contributing to joint stiffness and deconditioning leading to fatigue.  Patient would benefit from and out-patient rehab program to address flexibility, deconditioning, and functional weakness.  Patient should continue with stretching exercises to address lumbar stiffness.    Activity Limitations, Participation Restrictions, and CO-MORBIDITIES which may impact  the plan of care and potentially impede the patient's progress in therapy include:  Patient DX with lupus    The patient does not present with any learning or communication barriers which may impact the plan of care and potentially impede the patient's progress in therapy.      CLINICAL PRESENTATION:  Patient's Clinical Presentation is STABLE.       RECOMMENDED PLAN OF CARE:  Stretching exercises program for shoulder girdle and hip girdle muscle groups  Stretching exercises for the cervical and lumbar muscle groups   Postural correction exercises  Endurance training  RTC strengthening   Core strengthening     TREATMENT PROVIDED:       (one on one with therapist for therapeutic exercises    25 Mins)  -passive stretching provided by therapist for lumbar, hip girdle, and LE muscle groups  -guided core strengthening on the functional   ---modified lateral pull downs  ---cross body punch outs  -strengthening for the upper quadrant  ---chest press  ---scapular retraction  -conditioning exercises  ---upper extremity ergometer  ---lower extremity ergometer    The patient has been previously instructed in the following exercises:  -Lumbar rotational ROM and stretching in supine  -Lumbar flexion stretches in supine  -hamstring stretching  -piriformis stretching  -IT Band stretching  -gluteus medius stretching  -stretching of the hip internal rotators      PLAN:  Continue therapy 2 x week to continue the recommended plan of care.      SHORT TERM GOALS:    1. Patient compliant with daily stretching exercises   2. Patient will report pain level of 2/10 or less  3. Patient to follow up with Cardiologist for assessment of HPN    LONG TERM GOALS:  1. Patient will report pain level of 1/10 or less  2. Patient will report 25% or greater decrease in functional impairment on the Optimal Instrument      These services are reasonable and necessary for the conditions set forth above while under my care.

## 2018-02-27 ENCOUNTER — CLINICAL SUPPORT (OUTPATIENT)
Dept: REHABILITATION | Facility: HOSPITAL | Age: 53
End: 2018-02-27
Attending: PHYSICAL MEDICINE & REHABILITATION
Payer: COMMERCIAL

## 2018-02-27 DIAGNOSIS — R29.898 DEFICIENCIES OF LIMBS: ICD-10-CM

## 2018-02-27 DIAGNOSIS — M79.7 SCAPULOHUMERAL FIBROSITIS: Primary | ICD-10-CM

## 2018-02-27 PROCEDURE — 97110 THERAPEUTIC EXERCISES: CPT | Mod: PO | Performed by: PHYSICAL THERAPIST

## 2018-02-27 NOTE — PROGRESS NOTES
DATE OF INITIAL PHYSICAL THERAPY EVALUATION:             February 15, 2018      REFERRING PROVIDER:       Radha Drummond MD      REFERRING DIAGNOSIS:       M79.7 (ICD-10-CM) - Fibromyalgia   R29.898 (ICD-10-CM) - Arm weakness   R29.898 (ICD-10-CM) - Weakness of both hips           ORDERS:   Evaluate and treat as indicated 2 x week x 60 days    Orders will     April 15, 2018        VISIT    #3      SUBJECTIVE:  Patient completed follow-up with Cardiology regarding HPN.  Dr. Martinez has cleared her for exercise.    Patient's BP taken prior to exercise:   148/96                Patient is complaining of lumbar stiffness and myofascial pain.  Reports working on the lumbar exercises daily.    Patient reports she is continuing to experience hip flexor spasms at home.    Patients primary complaint(s):  Lumbar myofascial pain and stiffness  Joint soreness (hips and shoulders)  Fatigue  Poor endurance  Neck pain  Headaches    History of present condition:  (provided by patient at the time of the initial evaluation)  Patient states she has been experiencing joint and muscle pain throughout her trunk and extremities.  She also complains of muscle tenderness in the shoulder girdle, hip girdle, and cervical spine, and lumbar spine muscle groups.  Patient reports persistent symptoms are impairing most of her ADL's and is preventing her from working.  She reports and increase in stiffness in the early AM, and increasing discomfort with activity.     Pain Ratin/10  Lumbar myofascial pain      Patient reports the following functional activity limitations:  ADL's impaired  Long distance walking impaired due to soreness and fatigue  Bending, stooping, squatting, lifting, and carrying impaired due to joint and muscle pain      (FUNCTIONAL ASSESSMENT TOOL)    At the time of the initial evaluation, the patient reported the following self limitations on the Optimal Instrument functional assessment tool:  (Rating Scale:  #1=Able to do without difficulty, #2=Able to do with little difficulty, #3=Able to do with moderate difficulty, #4=Able to do with much difficulty, #5=Unable to do, #9=Not applicable)    Optimal Instrument Scores reported by the patient:  Completed on February 15, 2018    1.  Lying flat    3  2.  Rolling over   3  3.  Moving - lying to sitting  3  4.  Sitting    3  5.  Squatting    3  6.  Bending/stooping   3  7.  Balancing    3  8.  Kneeling    3  9.  Standing    3  10.  Walking - short distance  2  11.  Walking - long distance  3  12.  Walking - outdoors  3  13.  Climbing stairs   3  14.  Hopping    3  15.  Jumping    3  16.  Running    3  17.  Pushing    3  18.  Pulling    3  19.  Reaching    3  20.  Grasping    3  21.  Lifting    3  22.  Carrying    3    Total Impairment Rating  49%      Past Medical History and co-morbidities:  Past Medical History:   Diagnosis Date    Anemia     with chronic disease lupus    Encounter for blood transfusion     G-6-PD deficiency     Heart murmur     MVP 1986    Hypertension     Lupus     Raynaud's syndrome      Patient Active Problem List   Diagnosis    CNS lupus    SOB (shortness of breath)    Essential hypertension    Systemic lupus erythematosus    Secondary Sjogren's syndrome    Glaucoma suspect of both eyes    Keratoconjunctivitis sicca of both eyes    Peripheral retinal degeneration of both eyes    Posterior vitreous detachment of both eyes    G6PD deficiency    Trigeminal neuralgia of right side of face    Atypical face pain       Current Outpatient Prescriptions:     azaTHIOprine (IMURAN) 50 mg Tab, TAKE ONE TABLET BY MOUTH TWICE DAILY (Patient taking differently: TAKE ONE TABLET BY MOUTH DAILY), Disp: 60 tablet, Rfl: 1    CALCIUM-MAGNESIUM-ZINC ORAL, Take by mouth once daily., Disp: , Rfl:     cholecalciferol, vitamin D3, (VITAMIN D3) 5,000 unit Tab, Take 5,000 Units by mouth once daily., Disp: , Rfl:     clobetasol (OLUX) 0.05 % Foam, Apply  topically 2 (two) times daily. For scalp only.  Do not use on face., Disp: 50 g, Rfl: 3    cloNIDine (CATAPRES) 0.1 MG tablet, Take 1 tablet (0.1 mg total) by mouth nightly., Disp: 30 tablet, Rfl: 3    cyclobenzaprine (FLEXERIL) 10 MG tablet, Take 10 mg by mouth 3 (three) times daily as needed for Muscle spasms., Disp: , Rfl:     cycloSPORINE (RESTASIS) 0.05 % ophthalmic emulsion, Place 0.4 mLs (1 drop total) into both eyes 2 (two) times daily., Disp: 60 vial, Rfl: 11    desoximetasone (TOPICORT) 0.25 % cream, Apply topically 2 (two) times daily as needed. Do not use on face, underarms or groin, Disp: 60 g, Rfl: 3    ferrous gluconate (FERGON) 324 MG tablet, Take 324 mg by mouth daily with breakfast., Disp: , Rfl:     fish oil-omega-3 fatty acids 300-1,000 mg capsule, Take 2 g by mouth once daily., Disp: , Rfl:     fluocinolone (DERMA-SMOOTHE) 0.01 % external oil, Apply oil to scalp twice a day, Disp: 1 Bottle, Rfl: 5    gabapentin (NEURONTIN) 300 MG capsule, Take 1 capsule (300 mg total) by mouth 3 (three) times daily., Disp: 90 capsule, Rfl: 5    losartan (COZAAR) 100 MG tablet, Take 1 tablet (100 mg total) by mouth once daily., Disp: 30 tablet, Rfl: 11    methylphenidate HCl (RITALIN) 10 MG tablet, Take 1 tablet (10 mg total) by mouth 2 (two) times daily., Disp: 60 tablet, Rfl: 0    multivitamin capsule, Take 1 capsule by mouth once daily., Disp: , Rfl:     predniSONE (DELTASONE) 5 MG tablet, TAKE ONE TABLET BY MOUTH ONCE DAILY, Disp: 30 tablet, Rfl: 0    spironolactone (ALDACTONE) 25 MG tablet, Take 1 tablet (25 mg total) by mouth once daily., Disp: 30 tablet, Rfl: 11    Previous physical therapy and treatments:  Patient previously treated by a chiropractor for complaints of lumbar pain    Occupational/psychosocial/educational profile:  Not employed at this time; patient reports she is unable to tolerate work due to myofascial pain.    OBJECTIVE:    Musculoskeletal Exam:    Postural Exam  No  postural deviations noted.  SI exam negative for ilial rotation.    ROM  Cervical spine ROM WNL's  Patient complaints of tightness throughout the cervical paraspinals at the end of full flexion  Lumbar spine ROM is WNL's  Patient complains of tightness at the end point into full flexion; complains of pain at the end point into full extension in the mid lumbar region  Hip ROM is WNL's bilaterally  Shoulder ROM is WNL's bilaterally     Flexibility  HS, ITB, piriformis flexibility limited bilaterally    Functional Strength Testing  5/5 strength throughout the upper quadrant bilaterally except for:  Supraspinatus 4-/5 bilaterally, Infraspinatus 4-/5 bilaterally   5/5 strength throughout the lower quadrant bilaterally      Palpation  Multiple areas of diffuse tenderness throughout the upper quadrant bilaterally; especially upper traps, levator, rhomboids  Multiple areas of diffuse tenderness throughout the lower quadrant bilaterally; especially the gluteus medius, ITB      Neuromuscular Exam    Gait  No significant gait deviations noted    Transitional Movements  Patient complains of discomfort with sit to stand, sit to supine, supine to sit      Sensation  No sensory disturbances noted throughout the LE's or UE's  Patient did not complain of paresthesias.    PROBLEM LIST - ASSESSMENT  Patient presents with multiple areas of myofascial tenderness throughout the upper and lower quadrants.  Inactivity due to the patients complaints of myofascial pain is likely contributing to joint stiffness and deconditioning leading to fatigue.  Patient would benefit from and out-patient rehab program to address flexibility, deconditioning, and functional weakness.  Patient should continue with stretching exercises to address lumbar stiffness.  Exercises should also emphasize core stabilization.    Activity Limitations, Participation Restrictions, and CO-MORBIDITIES which may impact the plan of care and potentially impede the patient's  progress in therapy include:  Patient DX with lupus    The patient does not present with any learning or communication barriers which may impact the plan of care and potentially impede the patient's progress in therapy.      CLINICAL PRESENTATION:  Patient's Clinical Presentation is STABLE.       RECOMMENDED PLAN OF CARE:  Stretching exercise program for shoulder girdle and hip girdle muscle groups  Stretching exercises for the cervical and lumbar muscle groups   Postural correction exercises  Endurance training  RTC strengthening   Core strengthening     TREATMENT PROVIDED:       (one on one with therapist for therapeutic exercises    30 Mins)  -passive stretching provided by therapist for lumbar, hip girdle, and LE muscle groups  -guided core strengthening on the functional   ---modified lateral pull downs  ---cross body punch outs  -strengthening for the upper quadrant  ---chest press  ---scapular retraction  -conditioning exercises  ---upper extremity ergometer  ---lower extremity ergometer    The patient has been previously instructed in the following exercises:  -Lumbar rotational ROM and stretching in supine  -Lumbar flexion stretches in supine  -hamstring stretching  -piriformis stretching  -IT Band stretching  -gluteus medius stretching  -stretching of the hip internal rotators      PLAN:  Continue therapy 2 x week to continue the recommended plan of care.      SHORT TERM GOALS:    1. Patient compliant with daily stretching exercises   2. Patient will report pain level of 2/10 or less  3. Patient to follow up with Cardiologist for assessment of HPN    LONG TERM GOALS:  1. Patient will report pain level of 1/10 or less  2. Patient will report 25% or greater decrease in functional impairment on the Optimal Instrument      These services are reasonable and necessary for the conditions set forth above while under my care.

## 2018-03-01 ENCOUNTER — CLINICAL SUPPORT (OUTPATIENT)
Dept: REHABILITATION | Facility: HOSPITAL | Age: 53
End: 2018-03-01
Attending: PHYSICAL MEDICINE & REHABILITATION
Payer: COMMERCIAL

## 2018-03-01 DIAGNOSIS — R29.898 DEFICIENCIES OF LIMBS: ICD-10-CM

## 2018-03-01 DIAGNOSIS — M79.7 SCAPULOHUMERAL FIBROSITIS: Primary | ICD-10-CM

## 2018-03-01 PROCEDURE — 97110 THERAPEUTIC EXERCISES: CPT | Mod: PO | Performed by: PHYSICAL THERAPIST

## 2018-03-01 NOTE — PROGRESS NOTES
DATE OF INITIAL PHYSICAL THERAPY EVALUATION:             February 15, 2018      REFERRING PROVIDER:       Radha Drummond MD      REFERRING DIAGNOSIS:       M79.7 (ICD-10-CM) - Fibromyalgia   R29.898 (ICD-10-CM) - Arm weakness   R29.898 (ICD-10-CM) - Weakness of both hips           ORDERS:   Evaluate and treat as indicated 2 x week x 60 days    Orders will     April 15, 2018        VISIT    #4      SUBJECTIVE:  Patient completed follow-up with Cardiology regarding HPN.  Dr. Martinez has cleared her for exercise.    Patient's BP taken prior to exercise:   142/90               Patient is complaining again of lumbar stiffness and myofascial pain.  Reports working on the lumbar exercises daily.    Patient reports she is continuing to experience hip flexor spasms at home; however, not as often    Patients primary complaint(s):  Lumbar myofascial pain and stiffness  Joint soreness (hips and shoulders)  Fatigue  Poor endurance  Neck pain  Headaches    History of present condition:  (provided by patient at the time of the initial evaluation)  Patient states she has been experiencing joint and muscle pain throughout her trunk and extremities.  She also complains of muscle tenderness in the shoulder girdle, hip girdle, and cervical spine, and lumbar spine muscle groups.  Patient reports persistent symptoms are impairing most of her ADL's and is preventing her from working.  She reports and increase in stiffness in the early AM, and increasing discomfort with activity.     Pain Ratin/10  Lumbar myofascial pain      Patient reports the following functional activity limitations:  ADL's impaired  Long distance walking impaired due to soreness and fatigue  Bending, stooping, squatting, lifting, and carrying impaired due to joint and muscle pain      (FUNCTIONAL ASSESSMENT TOOL)    At the time of the initial evaluation, the patient reported the following self limitations on the Optimal Instrument functional  assessment tool:  (Rating Scale: #1=Able to do without difficulty, #2=Able to do with little difficulty, #3=Able to do with moderate difficulty, #4=Able to do with much difficulty, #5=Unable to do, #9=Not applicable)    Optimal Instrument Scores reported by the patient:  Completed on February 15, 2018    1.  Lying flat    3  2.  Rolling over   3  3.  Moving - lying to sitting  3  4.  Sitting    3  5.  Squatting    3  6.  Bending/stooping   3  7.  Balancing    3  8.  Kneeling    3  9.  Standing    3  10.  Walking - short distance  2  11.  Walking - long distance  3  12.  Walking - outdoors  3  13.  Climbing stairs   3  14.  Hopping    3  15.  Jumping    3  16.  Running    3  17.  Pushing    3  18.  Pulling    3  19.  Reaching    3  20.  Grasping    3  21.  Lifting    3  22.  Carrying    3    Total Impairment Rating  49%      Past Medical History and co-morbidities:  Past Medical History:   Diagnosis Date    Anemia     with chronic disease lupus    Encounter for blood transfusion     G-6-PD deficiency     Heart murmur     MVP 1986    Hypertension     Lupus     Raynaud's syndrome      Patient Active Problem List   Diagnosis    CNS lupus    SOB (shortness of breath)    Essential hypertension    Systemic lupus erythematosus    Secondary Sjogren's syndrome    Glaucoma suspect of both eyes    Keratoconjunctivitis sicca of both eyes    Peripheral retinal degeneration of both eyes    Posterior vitreous detachment of both eyes    G6PD deficiency    Trigeminal neuralgia of right side of face    Atypical face pain       Current Outpatient Prescriptions:     azaTHIOprine (IMURAN) 50 mg Tab, TAKE ONE TABLET BY MOUTH TWICE DAILY (Patient taking differently: TAKE ONE TABLET BY MOUTH DAILY), Disp: 60 tablet, Rfl: 1    CALCIUM-MAGNESIUM-ZINC ORAL, Take by mouth once daily., Disp: , Rfl:     cholecalciferol, vitamin D3, (VITAMIN D3) 5,000 unit Tab, Take 5,000 Units by mouth once daily., Disp: , Rfl:      clobetasol (OLUX) 0.05 % Foam, Apply topically 2 (two) times daily. For scalp only.  Do not use on face., Disp: 50 g, Rfl: 3    cloNIDine (CATAPRES) 0.1 MG tablet, Take 1 tablet (0.1 mg total) by mouth nightly., Disp: 30 tablet, Rfl: 3    cyclobenzaprine (FLEXERIL) 10 MG tablet, Take 10 mg by mouth 3 (three) times daily as needed for Muscle spasms., Disp: , Rfl:     cycloSPORINE (RESTASIS) 0.05 % ophthalmic emulsion, Place 0.4 mLs (1 drop total) into both eyes 2 (two) times daily., Disp: 60 vial, Rfl: 11    desoximetasone (TOPICORT) 0.25 % cream, Apply topically 2 (two) times daily as needed. Do not use on face, underarms or groin, Disp: 60 g, Rfl: 3    ferrous gluconate (FERGON) 324 MG tablet, Take 324 mg by mouth daily with breakfast., Disp: , Rfl:     fish oil-omega-3 fatty acids 300-1,000 mg capsule, Take 2 g by mouth once daily., Disp: , Rfl:     fluocinolone (DERMA-SMOOTHE) 0.01 % external oil, Apply oil to scalp twice a day, Disp: 1 Bottle, Rfl: 5    gabapentin (NEURONTIN) 300 MG capsule, Take 1 capsule (300 mg total) by mouth 3 (three) times daily., Disp: 90 capsule, Rfl: 5    losartan (COZAAR) 100 MG tablet, Take 1 tablet (100 mg total) by mouth once daily., Disp: 30 tablet, Rfl: 11    methylphenidate HCl (RITALIN) 10 MG tablet, Take 1 tablet (10 mg total) by mouth 2 (two) times daily., Disp: 60 tablet, Rfl: 0    multivitamin capsule, Take 1 capsule by mouth once daily., Disp: , Rfl:     predniSONE (DELTASONE) 5 MG tablet, TAKE ONE TABLET BY MOUTH ONCE DAILY, Disp: 30 tablet, Rfl: 0    spironolactone (ALDACTONE) 25 MG tablet, Take 1 tablet (25 mg total) by mouth once daily., Disp: 30 tablet, Rfl: 11    Previous physical therapy and treatments:  Patient previously treated by a chiropractor for complaints of lumbar pain    Occupational/psychosocial/educational profile:  Not employed at this time; patient reports she is unable to tolerate work due to myofascial  pain.    OBJECTIVE:    Musculoskeletal Exam:    Postural Exam  No postural deviations noted.  SI exam negative for ilial rotation.    ROM  Cervical spine ROM WNL's  Patient complaints of tightness throughout the cervical paraspinals at the end of full flexion  Lumbar spine ROM is WNL's  Patient complains of tightness at the end point into full flexion; complains of pain at the end point into full extension in the mid lumbar region  Hip ROM is WNL's bilaterally  Shoulder ROM is WNL's bilaterally     Flexibility  HS, ITB, piriformis flexibility limited bilaterally    Functional Strength Testing  5/5 strength throughout the upper quadrant bilaterally except for:  Supraspinatus 4-/5 bilaterally, Infraspinatus 4-/5 bilaterally   5/5 strength throughout the lower quadrant bilaterally      Palpation  Multiple areas of diffuse tenderness throughout the upper quadrant bilaterally; especially upper traps, levator, rhomboids  Multiple areas of diffuse tenderness throughout the lower quadrant bilaterally; especially the gluteus medius, ITB      Neuromuscular Exam    Gait  No significant gait deviations noted    Transitional Movements  Patient complains of discomfort with sit to stand, sit to supine, supine to sit      Sensation  No sensory disturbances noted throughout the LE's or UE's  Patient did not complain of paresthesias.    PROBLEM LIST - ASSESSMENT  Patient presents with multiple areas of myofascial tenderness throughout the upper and lower quadrants.  Inactivity due to the patients complaints of myofascial pain is likely contributing to joint stiffness and deconditioning leading to fatigue.  Patient is benefiting from  out-patient rehab program for flexibility, deconditioning, and functional weakness.  Patient should continue with stretching exercises to address lumbar stiffness.  Exercises should also emphasize core stabilization.    Activity Limitations, Participation Restrictions, and CO-MORBIDITIES which may impact  the plan of care and potentially impede the patient's progress in therapy include:  Patient DX with lupus    The patient does not present with any learning or communication barriers which may impact the plan of care and potentially impede the patient's progress in therapy.      CLINICAL PRESENTATION:  Patient's Clinical Presentation is STABLE.       RECOMMENDED PLAN OF CARE:  Stretching exercise program for shoulder girdle and hip girdle muscle groups  Stretching exercises for the cervical and lumbar muscle groups   Postural correction exercises  Endurance training  RTC strengthening   Core strengthening     TREATMENT PROVIDED:       (one on one with therapist for therapeutic exercises    30 Mins)  -passive stretching provided by therapist for lumbar, hip girdle, and LE muscle groups  -guided core strengthening on the functional   ---modified lateral pull downs  ---cross body punch outs  -strengthening for the upper quadrant  ---chest press  ---scapular retraction  -conditioning exercises  ---upper extremity ergometer  ---lower extremity ergometer    The patient has been previously instructed in the following exercises:  -Lumbar rotational ROM and stretching in supine  -Lumbar flexion stretches in supine  -hamstring stretching  -piriformis stretching  -IT Band stretching  -gluteus medius stretching  -stretching of the hip internal rotators      PLAN:  Continue therapy 2 x week to continue the recommended plan of care.      SHORT TERM GOALS:    1. Patient compliant with daily stretching exercises   2. Patient will report pain level of 2/10 or less  3. Patient to follow up with Cardiologist for assessment of HPN    LONG TERM GOALS:  1. Patient will report pain level of 1/10 or less  2. Patient will report 25% or greater decrease in functional impairment on the Optimal Instrument      These services are reasonable and necessary for the conditions set forth above while under my care.

## 2018-03-06 ENCOUNTER — CLINICAL SUPPORT (OUTPATIENT)
Dept: REHABILITATION | Facility: HOSPITAL | Age: 53
End: 2018-03-06
Attending: PHYSICAL MEDICINE & REHABILITATION
Payer: COMMERCIAL

## 2018-03-06 DIAGNOSIS — R29.898 DEFICIENCIES OF LIMBS: ICD-10-CM

## 2018-03-06 DIAGNOSIS — M79.7 SCAPULOHUMERAL FIBROSITIS: Primary | ICD-10-CM

## 2018-03-06 PROCEDURE — 97110 THERAPEUTIC EXERCISES: CPT | Mod: PO | Performed by: PHYSICAL THERAPIST

## 2018-03-06 PROCEDURE — 97014 ELECTRIC STIMULATION THERAPY: CPT | Mod: PO | Performed by: PHYSICAL THERAPIST

## 2018-03-06 NOTE — PROGRESS NOTES
DATE OF INITIAL PHYSICAL THERAPY EVALUATION:             February 15, 2018      REFERRING PROVIDER:       Radha Drummond MD      REFERRING DIAGNOSIS:       M79.7 (ICD-10-CM) - Fibromyalgia   R29.898 (ICD-10-CM) - Arm weakness   R29.898 (ICD-10-CM) - Weakness of both hips           ORDERS:   Evaluate and treat as indicated 2 x week x 60 days    Orders will     April 15, 2018        VISIT    #5      SUBJECTIVE:  Patient completed follow-up with Cardiology regarding HPN.  Dr. Martinez has cleared her for exercise.    Patient's BP taken prior to exercise:   150/96          Patient reports lumbar stiffness and myofascial pain.  Complaining today of right leg pain.    Reports working on the lumbar exercises daily.        Patients primary complaint(s):  Lumbar myofascial pain and stiffness  Joint soreness (hips and shoulders)  Fatigue  Poor endurance  Neck pain  Headaches    History of present condition:  (provided by patient at the time of the initial evaluation)  Patient states she has been experiencing joint and muscle pain throughout her trunk and extremities.  She also complains of muscle tenderness in the shoulder girdle, hip girdle, and cervical spine, and lumbar spine muscle groups.  Patient reports persistent symptoms are impairing most of her ADL's and is preventing her from working.  She reports and increase in stiffness in the early AM, and increasing discomfort with activity.     Pain Ratin/10  Lumbar myofascial pain      Patient reports the following functional activity limitations:  ADL's impaired  Long distance walking impaired due to soreness and fatigue  Bending, stooping, squatting, lifting, and carrying impaired due to joint and muscle pain      (FUNCTIONAL ASSESSMENT TOOL)    At the time of the initial evaluation, the patient reported the following self limitations on the Optimal Instrument functional assessment tool:  (Rating Scale: #1=Able to do without difficulty, #2=Able to do  with little difficulty, #3=Able to do with moderate difficulty, #4=Able to do with much difficulty, #5=Unable to do, #9=Not applicable)    Optimal Instrument Scores reported by the patient:  Completed on February 15, 2018    1.  Lying flat    3  2.  Rolling over   3  3.  Moving - lying to sitting  3  4.  Sitting    3  5.  Squatting    3  6.  Bending/stooping   3  7.  Balancing    3  8.  Kneeling    3  9.  Standing    3  10.  Walking - short distance  2  11.  Walking - long distance  3  12.  Walking - outdoors  3  13.  Climbing stairs   3  14.  Hopping    3  15.  Jumping    3  16.  Running    3  17.  Pushing    3  18.  Pulling    3  19.  Reaching    3  20.  Grasping    3  21.  Lifting    3  22.  Carrying    3    Total Impairment Rating  49%      Past Medical History and co-morbidities:  Past Medical History:   Diagnosis Date    Anemia     with chronic disease lupus    Encounter for blood transfusion     G-6-PD deficiency     Heart murmur     MVP 1986    Hypertension     Lupus     Raynaud's syndrome      Patient Active Problem List   Diagnosis    CNS lupus    SOB (shortness of breath)    Essential hypertension    Systemic lupus erythematosus    Secondary Sjogren's syndrome    Glaucoma suspect of both eyes    Keratoconjunctivitis sicca of both eyes    Peripheral retinal degeneration of both eyes    Posterior vitreous detachment of both eyes    G6PD deficiency    Trigeminal neuralgia of right side of face    Atypical face pain       Current Outpatient Prescriptions:     azaTHIOprine (IMURAN) 50 mg Tab, TAKE ONE TABLET BY MOUTH TWICE DAILY (Patient taking differently: TAKE ONE TABLET BY MOUTH DAILY), Disp: 60 tablet, Rfl: 1    CALCIUM-MAGNESIUM-ZINC ORAL, Take by mouth once daily., Disp: , Rfl:     cholecalciferol, vitamin D3, (VITAMIN D3) 5,000 unit Tab, Take 5,000 Units by mouth once daily., Disp: , Rfl:     clobetasol (OLUX) 0.05 % Foam, Apply topically 2 (two) times daily. For scalp only.  Do  not use on face., Disp: 50 g, Rfl: 3    cloNIDine (CATAPRES) 0.1 MG tablet, Take 1 tablet (0.1 mg total) by mouth nightly., Disp: 30 tablet, Rfl: 3    cyclobenzaprine (FLEXERIL) 10 MG tablet, Take 10 mg by mouth 3 (three) times daily as needed for Muscle spasms., Disp: , Rfl:     cycloSPORINE (RESTASIS) 0.05 % ophthalmic emulsion, Place 0.4 mLs (1 drop total) into both eyes 2 (two) times daily., Disp: 60 vial, Rfl: 11    desoximetasone (TOPICORT) 0.25 % cream, Apply topically 2 (two) times daily as needed. Do not use on face, underarms or groin, Disp: 60 g, Rfl: 3    ferrous gluconate (FERGON) 324 MG tablet, Take 324 mg by mouth daily with breakfast., Disp: , Rfl:     fish oil-omega-3 fatty acids 300-1,000 mg capsule, Take 2 g by mouth once daily., Disp: , Rfl:     fluocinolone (DERMA-SMOOTHE) 0.01 % external oil, Apply oil to scalp twice a day, Disp: 1 Bottle, Rfl: 5    gabapentin (NEURONTIN) 300 MG capsule, Take 1 capsule (300 mg total) by mouth 3 (three) times daily., Disp: 90 capsule, Rfl: 5    losartan (COZAAR) 100 MG tablet, Take 1 tablet (100 mg total) by mouth once daily., Disp: 30 tablet, Rfl: 11    methylphenidate HCl (RITALIN) 10 MG tablet, Take 1 tablet (10 mg total) by mouth 2 (two) times daily., Disp: 60 tablet, Rfl: 0    multivitamin capsule, Take 1 capsule by mouth once daily., Disp: , Rfl:     predniSONE (DELTASONE) 5 MG tablet, TAKE ONE TABLET BY MOUTH ONCE DAILY, Disp: 30 tablet, Rfl: 0    spironolactone (ALDACTONE) 25 MG tablet, Take 1 tablet (25 mg total) by mouth once daily., Disp: 30 tablet, Rfl: 11    Previous physical therapy and treatments:  Patient previously treated by a chiropractor for complaints of lumbar pain    Occupational/psychosocial/educational profile:  Not employed at this time; patient reports she is unable to tolerate work due to myofascial pain.    OBJECTIVE:    Musculoskeletal Exam:    Postural Exam  No postural deviations noted.  SI exam negative for ilial  rotation.    ROM  Cervical spine ROM WNL's  Patient complaints of tightness throughout the cervical paraspinals at the end of full flexion  Lumbar spine ROM is WNL's  Patient complains of tightness at the end point into full flexion; complains of pain at the end point into full extension in the mid lumbar region  Hip ROM is WNL's bilaterally  Shoulder ROM is WNL's bilaterally     Flexibility  HS, ITB, piriformis flexibility limited bilaterally    Functional Strength Testing  5/5 strength throughout the upper quadrant bilaterally except for:  Supraspinatus 4-/5 bilaterally, Infraspinatus 4-/5 bilaterally   5/5 strength throughout the lower quadrant bilaterally      Palpation  Multiple areas of diffuse tenderness throughout the upper quadrant bilaterally; especially upper traps, levator, rhomboids  Multiple areas of diffuse tenderness throughout the lower quadrant bilaterally; especially the gluteus medius, ITB      Neuromuscular Exam    Gait  No significant gait deviations noted    Transitional Movements  Patient complains of discomfort with sit to stand, sit to supine, supine to sit      Sensation  No sensory disturbances noted throughout the LE's or UE's  Patient did not complain of paresthesias.    PROBLEM LIST - ASSESSMENT  Patient presents with multiple areas of myofascial tenderness throughout the upper and lower quadrants.  Inactivity due to the patients complaints of myofascial pain is likely contributing to joint stiffness and deconditioning leading to fatigue.  Patient is benefiting from  out-patient rehab program for flexibility, deconditioning, and functional weakness.  Patient should continue with stretching exercises to address lumbar stiffness.  Exercises should also emphasize core stabilization.    Activity Limitations, Participation Restrictions, and CO-MORBIDITIES which may impact the plan of care and potentially impede the patient's progress in therapy include:  Patient DX with lupus    The patient  does not present with any learning or communication barriers which may impact the plan of care and potentially impede the patient's progress in therapy.      CLINICAL PRESENTATION:  Patient's Clinical Presentation is STABLE.       RECOMMENDED PLAN OF CARE:  Stretching exercise program for shoulder girdle and hip girdle muscle groups  Stretching exercises for the cervical and lumbar muscle groups   Postural correction exercises  Endurance training  RTC strengthening   Core strengthening     TREATMENT PROVIDED:       (one on one with therapist for therapeutic exercises    30 Mins)  -passive stretching provided by therapist for lumbar, hip girdle, and LE muscle groups  -guided core strengthening on the functional   ---modified lateral pull downs  ---cross body punch outs  -strengthening for the upper quadrant  ---chest press  ---scapular retraction  -conditioning exercises  ---upper extremity ergometer  ---lower extremity ergometer    The patient has been previously instructed in the following exercises:  -Lumbar rotational ROM and stretching in supine  -Lumbar flexion stretches in supine  -hamstring stretching  -piriformis stretching  -IT Band stretching  -gluteus medius stretching  -stretching of the hip internal rotators      PLAN:  Continue therapy 2 x week to continue the recommended plan of care.      SHORT TERM GOALS:    1. Patient compliant with daily stretching exercises   2. Patient will report pain level of 2/10 or less  3. Patient to follow up with Cardiologist for assessment of HPN    LONG TERM GOALS:  1. Patient will report pain level of 1/10 or less  2. Patient will report 25% or greater decrease in functional impairment on the Optimal Instrument      These services are reasonable and necessary for the conditions set forth above while under my care.

## 2018-03-07 ENCOUNTER — TELEPHONE (OUTPATIENT)
Dept: CARDIOLOGY | Facility: CLINIC | Age: 53
End: 2018-03-07

## 2018-03-07 NOTE — TELEPHONE ENCOUNTER
Patient called and stated having problems with medication clonidine, starting more sores and tenderness.    Patient also stated was doing well with losartan 100mg, but I stopped it when aldactone was increased to 50mg daily, but now blood pressures are high.    Patient was informed Losartan 100mg was not stopped at visit on 02/14/2018, only change was to increase aldactone to 50mg daily. Patient stated since he added more aldactone, I stopped losartan.    Patient was reminded call earlier 02/19//2018 and stated increased aldactone cause soreness of the mouth and was instructed to hold medication and see if mouth improves, but patient did not agree and wanted to go back to aldactone at lower dose of 25mg .    Patient was instructed ok to decrease aldactone back to 25mg daily.    On 2/22/2019 Physical therapy reported elevated blood pressure and patient was notified and stated on Clonidine 0.1mg at night.    Today patient called and stated clonidine starting more soreness. Patient was reminded of earlier call related to mouth soreness and aldactone.    Patient stated that's not the way she remember it and asked to speak with Dr. Acuna personally.    Dr. Acuna spoke with patients and gave the following instructions  1. Stop clonidine  2. Continue losartan 100mg  3. Continue aldactone 25mg   4. Call if SBP greater than 200 or DBP greater than 120.

## 2018-03-07 NOTE — TELEPHONE ENCOUNTER
----- Message from Christel Alfred sent at 3/7/2018  9:47 AM CST -----  Contact: pt  Please call pt @ [phone] regarding medication.

## 2018-03-08 ENCOUNTER — CLINICAL SUPPORT (OUTPATIENT)
Dept: REHABILITATION | Facility: HOSPITAL | Age: 53
End: 2018-03-08
Attending: PHYSICAL MEDICINE & REHABILITATION
Payer: COMMERCIAL

## 2018-03-08 DIAGNOSIS — R29.898 DEFICIENCIES OF LIMBS: ICD-10-CM

## 2018-03-08 DIAGNOSIS — M79.7 SCAPULOHUMERAL FIBROSITIS: Primary | ICD-10-CM

## 2018-03-08 PROCEDURE — 97110 THERAPEUTIC EXERCISES: CPT | Mod: PO | Performed by: PHYSICAL THERAPIST

## 2018-03-08 NOTE — PROGRESS NOTES
DATE OF INITIAL PHYSICAL THERAPY EVALUATION:             February 15, 2018      REFERRING PROVIDER:       Radha Drummond MD      REFERRING DIAGNOSIS:       M79.7 (ICD-10-CM) - Fibromyalgia   R29.898 (ICD-10-CM) - Arm weakness   R29.898 (ICD-10-CM) - Weakness of both hips           ORDERS:   Evaluate and treat as indicated 2 x week x 60 days    Orders will     April 15, 2018        VISIT    #6      SUBJECTIVE:  Patient completed follow-up with Cardiology regarding HPN.  Dr. Martinez has cleared her for exercise.    Patient's BP taken prior to exercise:   148/100       Patient reports lumbar stiffness and myofascial pain.  She states she is experiencing less right LE pain today.    Reports working on the lumbar exercises daily.        Patients primary complaint(s):  Lumbar myofascial pain and stiffness  Joint soreness (hips and shoulders)  Fatigue  Poor endurance  Neck pain  Headaches    History of present condition:  (provided by patient at the time of the initial evaluation)  Patient states she has been experiencing joint and muscle pain throughout her trunk and extremities.  She also complains of muscle tenderness in the shoulder girdle, hip girdle, and cervical spine, and lumbar spine muscle groups.  Patient reports persistent symptoms are impairing most of her ADL's and is preventing her from working.  She reports and increase in stiffness in the early AM, and increasing discomfort with activity.     Pain Ratin/10  Lumbar myofascial pain      Patient reports the following functional activity limitations:  ADL's impaired  Long distance walking impaired due to soreness and fatigue  Bending, stooping, squatting, lifting, and carrying impaired due to joint and muscle pain      (FUNCTIONAL ASSESSMENT TOOL)    At the time of the initial evaluation, the patient reported the following self limitations on the Optimal Instrument functional assessment tool:  (Rating Scale: #1=Able to do without  difficulty, #2=Able to do with little difficulty, #3=Able to do with moderate difficulty, #4=Able to do with much difficulty, #5=Unable to do, #9=Not applicable)    Optimal Instrument Scores reported by the patient:  Completed on February 15, 2018    1.  Lying flat    3  2.  Rolling over   3  3.  Moving - lying to sitting  3  4.  Sitting    3  5.  Squatting    3  6.  Bending/stooping   3  7.  Balancing    3  8.  Kneeling    3  9.  Standing    3  10.  Walking - short distance  2  11.  Walking - long distance  3  12.  Walking - outdoors  3  13.  Climbing stairs   3  14.  Hopping    3  15.  Jumping    3  16.  Running    3  17.  Pushing    3  18.  Pulling    3  19.  Reaching    3  20.  Grasping    3  21.  Lifting    3  22.  Carrying    3    Total Impairment Rating  49%      Past Medical History and co-morbidities:  Past Medical History:   Diagnosis Date    Anemia     with chronic disease lupus    Encounter for blood transfusion     G-6-PD deficiency     Heart murmur     MVP 1986    Hypertension     Lupus     Raynaud's syndrome      Patient Active Problem List   Diagnosis    CNS lupus    SOB (shortness of breath)    Essential hypertension    Systemic lupus erythematosus    Secondary Sjogren's syndrome    Glaucoma suspect of both eyes    Keratoconjunctivitis sicca of both eyes    Peripheral retinal degeneration of both eyes    Posterior vitreous detachment of both eyes    G6PD deficiency    Trigeminal neuralgia of right side of face    Atypical face pain       Current Outpatient Prescriptions:     azaTHIOprine (IMURAN) 50 mg Tab, TAKE ONE TABLET BY MOUTH TWICE DAILY (Patient taking differently: TAKE ONE TABLET BY MOUTH DAILY), Disp: 60 tablet, Rfl: 1    CALCIUM-MAGNESIUM-ZINC ORAL, Take by mouth once daily., Disp: , Rfl:     cholecalciferol, vitamin D3, (VITAMIN D3) 5,000 unit Tab, Take 5,000 Units by mouth once daily., Disp: , Rfl:     clobetasol (OLUX) 0.05 % Foam, Apply topically 2 (two) times  daily. For scalp only.  Do not use on face., Disp: 50 g, Rfl: 3    cyclobenzaprine (FLEXERIL) 10 MG tablet, Take 10 mg by mouth 3 (three) times daily as needed for Muscle spasms., Disp: , Rfl:     cycloSPORINE (RESTASIS) 0.05 % ophthalmic emulsion, Place 0.4 mLs (1 drop total) into both eyes 2 (two) times daily., Disp: 60 vial, Rfl: 11    desoximetasone (TOPICORT) 0.25 % cream, Apply topically 2 (two) times daily as needed. Do not use on face, underarms or groin, Disp: 60 g, Rfl: 3    ferrous gluconate (FERGON) 324 MG tablet, Take 324 mg by mouth daily with breakfast., Disp: , Rfl:     fish oil-omega-3 fatty acids 300-1,000 mg capsule, Take 2 g by mouth once daily., Disp: , Rfl:     fluocinolone (DERMA-SMOOTHE) 0.01 % external oil, Apply oil to scalp twice a day, Disp: 1 Bottle, Rfl: 5    gabapentin (NEURONTIN) 300 MG capsule, Take 1 capsule (300 mg total) by mouth 3 (three) times daily., Disp: 90 capsule, Rfl: 5    losartan (COZAAR) 100 MG tablet, Take 1 tablet (100 mg total) by mouth once daily., Disp: 30 tablet, Rfl: 11    methylphenidate HCl (RITALIN) 10 MG tablet, Take 1 tablet (10 mg total) by mouth 2 (two) times daily., Disp: 60 tablet, Rfl: 0    multivitamin capsule, Take 1 capsule by mouth once daily., Disp: , Rfl:     predniSONE (DELTASONE) 5 MG tablet, TAKE ONE TABLET BY MOUTH ONCE DAILY, Disp: 30 tablet, Rfl: 0    spironolactone (ALDACTONE) 25 MG tablet, Take 1 tablet (25 mg total) by mouth once daily., Disp: 30 tablet, Rfl: 11    Previous physical therapy and treatments:  Patient previously treated by a chiropractor for complaints of lumbar pain    Occupational/psychosocial/educational profile:  Not employed at this time; patient reports she is unable to tolerate work due to myofascial pain.    OBJECTIVE:    Musculoskeletal Exam:    Postural Exam  No postural deviations noted.  SI exam negative for ilial rotation.    ROM  Cervical spine ROM WNL's  Patient complaints of tightness throughout  the cervical paraspinals at the end of full flexion  Lumbar spine ROM is WNL's  Patient complains of tightness at the end point into full flexion; complains of pain at the end point into full extension in the mid lumbar region  Hip ROM is WNL's bilaterally  Shoulder ROM is WNL's bilaterally     Flexibility  HS, ITB, piriformis flexibility limited bilaterally    Functional Strength Testing  5/5 strength throughout the upper quadrant bilaterally except for:  Supraspinatus 4-/5 bilaterally, Infraspinatus 4-/5 bilaterally   5/5 strength throughout the lower quadrant bilaterally      Palpation  Multiple areas of diffuse tenderness throughout the upper quadrant bilaterally; especially upper traps, levator, rhomboids  Multiple areas of diffuse tenderness throughout the lower quadrant bilaterally; especially the gluteus medius, ITB      Neuromuscular Exam    Gait  No significant gait deviations noted    Transitional Movements  Patient complains of discomfort with sit to stand, sit to supine, supine to sit      Sensation  No sensory disturbances noted throughout the LE's or UE's  Patient did not complain of paresthesias.    PROBLEM LIST - ASSESSMENT  Patient presents with multiple areas of myofascial tenderness throughout the upper and lower quadrants.  Inactivity due to the patients complaints of myofascial pain is likely contributing to joint stiffness and deconditioning leading to fatigue.  Patient is benefiting from  out-patient rehab program for flexibility, deconditioning, and functional weakness.  Patient should continue with stretching exercises to address lumbar stiffness.  Exercises should also emphasize core stabilization.    Activity Limitations, Participation Restrictions, and CO-MORBIDITIES which may impact the plan of care and potentially impede the patient's progress in therapy include:  Patient DX with lupus    The patient does not present with any learning or communication barriers which may impact the plan  of care and potentially impede the patient's progress in therapy.      CLINICAL PRESENTATION:  Patient's Clinical Presentation is STABLE.       RECOMMENDED PLAN OF CARE:  Stretching exercise program for shoulder girdle and hip girdle muscle groups  Stretching exercises for the cervical and lumbar muscle groups   Postural correction exercises  Endurance training  RTC strengthening   Core strengthening     TREATMENT PROVIDED:       (one on one with therapist for therapeutic exercises    30 Mins; supervised exercises 15 mins)  -passive stretching provided by therapist for lumbar, hip girdle, and LE muscle groups  -guided core strengthening on the functional   ---modified lateral pull downs  ---cross body punch outs  -strengthening for the upper quadrant  ---chest press  ---scapular retraction  -conditioning exercises  ---upper extremity ergometer  ---lower extremity ergometer    The patient has been previously instructed in the following exercises:  -Lumbar rotational ROM and stretching in supine  -Lumbar flexion stretches in supine  -hamstring stretching  -piriformis stretching  -IT Band stretching  -gluteus medius stretching  -stretching of the hip internal rotators      PLAN:  Continue therapy 2 x week to continue the recommended plan of care.      SHORT TERM GOALS:    1. Patient compliant with daily stretching exercises   2. Patient will report pain level of 2/10 or less  3. Patient to follow up with Cardiologist for assessment of HPN    LONG TERM GOALS:  1. Patient will report pain level of 1/10 or less  2. Patient will report 25% or greater decrease in functional impairment on the Optimal Instrument      These services are reasonable and necessary for the conditions set forth above while under my care.

## 2018-03-12 ENCOUNTER — TELEPHONE (OUTPATIENT)
Dept: RHEUMATOLOGY | Facility: CLINIC | Age: 53
End: 2018-03-12

## 2018-03-12 ENCOUNTER — PATIENT MESSAGE (OUTPATIENT)
Dept: REHABILITATION | Facility: HOSPITAL | Age: 53
End: 2018-03-12

## 2018-03-12 ENCOUNTER — OFFICE VISIT (OUTPATIENT)
Dept: ALLERGY | Facility: CLINIC | Age: 53
End: 2018-03-12
Payer: COMMERCIAL

## 2018-03-12 ENCOUNTER — CLINICAL SUPPORT (OUTPATIENT)
Dept: REHABILITATION | Facility: HOSPITAL | Age: 53
End: 2018-03-12
Attending: PHYSICAL MEDICINE & REHABILITATION
Payer: COMMERCIAL

## 2018-03-12 ENCOUNTER — LAB VISIT (OUTPATIENT)
Dept: LAB | Facility: HOSPITAL | Age: 53
End: 2018-03-12
Attending: ALLERGY & IMMUNOLOGY
Payer: COMMERCIAL

## 2018-03-12 VITALS
SYSTOLIC BLOOD PRESSURE: 150 MMHG | WEIGHT: 187.38 LBS | RESPIRATION RATE: 15 BRPM | HEART RATE: 80 BPM | TEMPERATURE: 99 F | DIASTOLIC BLOOD PRESSURE: 84 MMHG | BODY MASS INDEX: 30.11 KG/M2 | HEIGHT: 66 IN

## 2018-03-12 DIAGNOSIS — K90.49 FOOD INTOLERANCE: ICD-10-CM

## 2018-03-12 DIAGNOSIS — M32.19 CNS LUPUS: ICD-10-CM

## 2018-03-12 DIAGNOSIS — M32.19 SYSTEMIC LUPUS ERYTHEMATOSUS WITH OTHER ORGAN INVOLVEMENT, UNSPECIFIED SLE TYPE: ICD-10-CM

## 2018-03-12 DIAGNOSIS — R29.898 DEFICIENCIES OF LIMBS: ICD-10-CM

## 2018-03-12 DIAGNOSIS — M79.7 SCAPULOHUMERAL FIBROSITIS: Primary | ICD-10-CM

## 2018-03-12 DIAGNOSIS — G05.3 CNS LUPUS: ICD-10-CM

## 2018-03-12 DIAGNOSIS — Z78.9 MEDICATION INTOLERANCE: ICD-10-CM

## 2018-03-12 DIAGNOSIS — L30.9 DERMATITIS: ICD-10-CM

## 2018-03-12 DIAGNOSIS — Z78.9 MEDICATION INTOLERANCE: Primary | ICD-10-CM

## 2018-03-12 DIAGNOSIS — R60.9 SWELLING: ICD-10-CM

## 2018-03-12 PROCEDURE — 86003 ALLG SPEC IGE CRUDE XTRC EA: CPT

## 2018-03-12 PROCEDURE — 3077F SYST BP >= 140 MM HG: CPT | Mod: CPTII,S$GLB,, | Performed by: ALLERGY & IMMUNOLOGY

## 2018-03-12 PROCEDURE — 86003 ALLG SPEC IGE CRUDE XTRC EA: CPT | Mod: 59

## 2018-03-12 PROCEDURE — 97110 THERAPEUTIC EXERCISES: CPT | Mod: PO | Performed by: PHYSICAL THERAPIST

## 2018-03-12 PROCEDURE — 99999 PR PBB SHADOW E&M-EST. PATIENT-LVL III: CPT | Mod: PBBFAC,,, | Performed by: ALLERGY & IMMUNOLOGY

## 2018-03-12 PROCEDURE — 99204 OFFICE O/P NEW MOD 45 MIN: CPT | Mod: S$GLB,,, | Performed by: ALLERGY & IMMUNOLOGY

## 2018-03-12 PROCEDURE — 36415 COLL VENOUS BLD VENIPUNCTURE: CPT | Mod: PO

## 2018-03-12 PROCEDURE — 3079F DIAST BP 80-89 MM HG: CPT | Mod: CPTII,S$GLB,, | Performed by: ALLERGY & IMMUNOLOGY

## 2018-03-12 NOTE — PROGRESS NOTES
Chief complaint: Lupus, concerned about immune system, drug ALLERGIES, food ALLERGIES    This note was dictated using voice recognition software and may contain errors.    History: She made an appointment for herself today at 9 AM on Monday, March 12, 2018.  Information in her medical record regarding her past medical history family history and social history was reviewed and updated today.  Significant additions if any are as noted below.    She stated in the past she had an ALLERGY evaluation performed.  She is uncertain as to the name of the allergist she saw.  She stated that she was told she was ALLERGIC to shellfish and penicillin.  She has a history of adverse reactions associated with the administration of multiple medications.    Review of systems: At the time of her appointment this morning she is feeling fairly well in general and had no additional symptoms to mentioned.    Family history: She stated her mother has rheumatoid arthritis.    Exam:    In general she is in no distress she is alert oriented well-developed in good mood and attentive gait steady  Skin no rash noted  Head no swelling noted  Eyes sclera white conjunctiva pink  Nose patent no polyps seen  Mouth no inflammation or swelling of the lips tongue or in the throat noted  Ears not inflamed tympanic membranes not inflamed  Neck no masses or thyromegaly noted  Lymph nodes no significant cervical or epitrochlear lymphadenopathy noted  Lungs clear to auscultation  Heart regular rhythm no murmurs heard  Extremities no swelling or inflammation of the hands or legs noted    Impression:    #1 medication intolerance  #2 food intolerance  #3 multiple other health concerns as noted in her medical record    Assessment and plan:    Her appointment was 45 minutes in duration spent in face-to-face contact.  More than 50% of the visit was spent in counseling and coordination of care.    We had a lengthy discussion regarding the problem of intolerance.  We  discussed medication intolerance and food intolerance.  I reviewed with her that intolerance may be ALLERGIC in nature and may also be nonallergic in nature.  I reviewed with her the treatment of choice for intolerance is avoidance.    She is interested in pursuing evaluation of her history of food and medication intolerance.    I have suggested that blood be drawn for measurement of Ig E against penicillin GE and Ig E directed against penicillin V.  I have also suggested that blood be drawn for measurement of Ig E against crab, crawfish and shrimp.  This is agreeable with her.  Arrangements were made to have blood drawn after her appointment with me today.  When the results are available to review with her I will do so.    She was given the office phone number.  Should she have additional questions or concerns she may call.

## 2018-03-12 NOTE — TELEPHONE ENCOUNTER
----- Message from Adeel Recinos sent at 3/12/2018 11:33 AM CDT -----  Contact: [employer]  Call [employer] [phone] ext [phone] at calling to follow up on medical records request for pt that were faxed on Feb 8,2018.

## 2018-03-12 NOTE — TELEPHONE ENCOUNTER
Spoke with [employer] and advised that medical records request are sent to the medical records department and a message was sent via [employer]. Provided number for medical records department as well.

## 2018-03-13 NOTE — PROGRESS NOTES
DATE OF INITIAL PHYSICAL THERAPY EVALUATION:             February 15, 2018      REFERRING PROVIDER:       Radha Drummond MD      REFERRING DIAGNOSIS:       M79.7 (ICD-10-CM) - Fibromyalgia   R29.898 (ICD-10-CM) - Arm weakness   R29.898 (ICD-10-CM) - Weakness of both hips           ORDERS:   Evaluate and treat as indicated 2 x week x 60 days    Orders will     April 15, 2018        VISIT    #7      SUBJECTIVE:  Patient completed follow-up with Cardiology regarding HPN.  Dr. Martinez has cleared her for exercise.    Patient's BP taken prior to exercise:   152/86      Patient reports lumbar stiffness and myofascial pain.  She states she is experiencing right LE pain today.  Reports working on the lumbar exercises daily.        Patients primary complaint(s):  Lumbar myofascial pain and stiffness  Joint soreness (hips and shoulders)  Fatigue  Poor endurance  Neck pain  Headaches    History of present condition:  (provided by patient at the time of the initial evaluation)  Patient states she has been experiencing joint and muscle pain throughout her trunk and extremities.  She also complains of muscle tenderness in the shoulder girdle, hip girdle, and cervical spine, and lumbar spine muscle groups.  Patient reports persistent symptoms are impairing most of her ADL's and is preventing her from working.  She reports and increase in stiffness in the early AM, and increasing discomfort with activity.     Pain Ratin/10  Lumbar myofascial pain      Patient reports the following functional activity limitations:  ADL's impaired  Long distance walking impaired due to soreness and fatigue  Bending, stooping, squatting, lifting, and carrying impaired due to joint and muscle pain      (FUNCTIONAL ASSESSMENT TOOL)    At the time of the initial evaluation, the patient reported the following self limitations on the Optimal Instrument functional assessment tool:  (Rating Scale: #1=Able to do without difficulty,  #2=Able to do with little difficulty, #3=Able to do with moderate difficulty, #4=Able to do with much difficulty, #5=Unable to do, #9=Not applicable)    Optimal Instrument Scores reported by the patient:  Completed on February 15, 2018    1.  Lying flat    3  2.  Rolling over   3  3.  Moving - lying to sitting  3  4.  Sitting    3  5.  Squatting    3  6.  Bending/stooping   3  7.  Balancing    3  8.  Kneeling    3  9.  Standing    3  10.  Walking - short distance  2  11.  Walking - long distance  3  12.  Walking - outdoors  3  13.  Climbing stairs   3  14.  Hopping    3  15.  Jumping    3  16.  Running    3  17.  Pushing    3  18.  Pulling    3  19.  Reaching    3  20.  Grasping    3  21.  Lifting    3  22.  Carrying    3    Total Impairment Rating  49%      Past Medical History and co-morbidities:  Past Medical History:   Diagnosis Date    Anemia     with chronic disease lupus    Encounter for blood transfusion     G-6-PD deficiency     Heart murmur     MVP 1986    Hypertension     Lupus     Raynaud's syndrome      Patient Active Problem List   Diagnosis    CNS lupus    SOB (shortness of breath)    Essential hypertension    Systemic lupus erythematosus    Secondary Sjogren's syndrome    Glaucoma suspect of both eyes    Keratoconjunctivitis sicca of both eyes    Peripheral retinal degeneration of both eyes    Posterior vitreous detachment of both eyes    G6PD deficiency    Trigeminal neuralgia of right side of face    Atypical face pain       Current Outpatient Prescriptions:     azaTHIOprine (IMURAN) 50 mg Tab, TAKE ONE TABLET BY MOUTH TWICE DAILY (Patient taking differently: TAKE ONE TABLET BY MOUTH DAILY), Disp: 60 tablet, Rfl: 1    CALCIUM-MAGNESIUM-ZINC ORAL, Take by mouth once daily., Disp: , Rfl:     cholecalciferol, vitamin D3, (VITAMIN D3) 5,000 unit Tab, Take 5,000 Units by mouth once daily., Disp: , Rfl:     clobetasol (OLUX) 0.05 % Foam, Apply topically 2 (two) times daily. For  scalp only.  Do not use on face., Disp: 50 g, Rfl: 3    cyclobenzaprine (FLEXERIL) 10 MG tablet, Take 10 mg by mouth 3 (three) times daily as needed for Muscle spasms., Disp: , Rfl:     cycloSPORINE (RESTASIS) 0.05 % ophthalmic emulsion, Place 0.4 mLs (1 drop total) into both eyes 2 (two) times daily., Disp: 60 vial, Rfl: 11    desoximetasone (TOPICORT) 0.25 % cream, Apply topically 2 (two) times daily as needed. Do not use on face, underarms or groin, Disp: 60 g, Rfl: 3    ferrous gluconate (FERGON) 324 MG tablet, Take 324 mg by mouth daily with breakfast., Disp: , Rfl:     fish oil-omega-3 fatty acids 300-1,000 mg capsule, Take 2 g by mouth once daily., Disp: , Rfl:     fluocinolone (DERMA-SMOOTHE) 0.01 % external oil, Apply oil to scalp twice a day, Disp: 1 Bottle, Rfl: 5    gabapentin (NEURONTIN) 300 MG capsule, Take 1 capsule (300 mg total) by mouth 3 (three) times daily., Disp: 90 capsule, Rfl: 5    losartan (COZAAR) 100 MG tablet, Take 1 tablet (100 mg total) by mouth once daily., Disp: 30 tablet, Rfl: 11    methylphenidate HCl (RITALIN) 10 MG tablet, Take 1 tablet (10 mg total) by mouth 2 (two) times daily., Disp: 60 tablet, Rfl: 0    multivitamin capsule, Take 1 capsule by mouth once daily., Disp: , Rfl:     predniSONE (DELTASONE) 5 MG tablet, TAKE ONE TABLET BY MOUTH ONCE DAILY, Disp: 30 tablet, Rfl: 0    spironolactone (ALDACTONE) 25 MG tablet, Take 1 tablet (25 mg total) by mouth once daily., Disp: 30 tablet, Rfl: 11    Previous physical therapy and treatments:  Patient previously treated by a chiropractor for complaints of lumbar pain    Occupational/psychosocial/educational profile:  Not employed at this time; patient reports she is unable to tolerate work due to myofascial pain.    OBJECTIVE:    Musculoskeletal Exam:    Postural Exam  No postural deviations noted.  SI exam negative for ilial rotation.    ROM  Cervical spine ROM WNL's  Patient complaints of tightness throughout the cervical  paraspinals at the end of full flexion  Lumbar spine ROM is WNL's  Patient complains of tightness at the end point into full flexion; complains of pain at the end point into full extension in the mid lumbar region  Hip ROM is WNL's bilaterally  Shoulder ROM is WNL's bilaterally     Flexibility  HS, ITB, piriformis flexibility limited bilaterally    Functional Strength Testing  5/5 strength throughout the upper quadrant bilaterally except for:  Supraspinatus 4-/5 bilaterally, Infraspinatus 4-/5 bilaterally   5/5 strength throughout the lower quadrant bilaterally      Palpation  Multiple areas of diffuse tenderness throughout the upper quadrant bilaterally; especially upper traps, levator, rhomboids  Multiple areas of diffuse tenderness throughout the lower quadrant bilaterally; especially the gluteus medius, ITB      Neuromuscular Exam    Gait  No significant gait deviations noted    Transitional Movements  Patient complains of discomfort with sit to stand, sit to supine, supine to sit      Sensation  No sensory disturbances noted throughout the LE's or UE's  Patient did not complain of paresthesias.    PROBLEM LIST - ASSESSMENT  Patient presents with multiple areas of myofascial tenderness throughout the upper and lower quadrants.  Inactivity due to the patients complaints of myofascial pain is likely contributing to joint stiffness and deconditioning leading to fatigue.  Patient is benefiting from  out-patient rehab program for flexibility, deconditioning, and functional weakness.  Patient should continue with stretching exercises to address lumbar stiffness.  Exercises should also emphasize core stabilization.    Activity Limitations, Participation Restrictions, and CO-MORBIDITIES which may impact the plan of care and potentially impede the patient's progress in therapy include:  Patient DX with lupus    The patient does not present with any learning or communication barriers which may impact the plan of care and  potentially impede the patient's progress in therapy.      CLINICAL PRESENTATION:  Patient's Clinical Presentation is STABLE.       RECOMMENDED PLAN OF CARE:  Stretching exercise program for shoulder girdle and hip girdle muscle groups  Stretching exercises for the cervical and lumbar muscle groups   Postural correction exercises  Endurance training  RTC strengthening   Core strengthening     TREATMENT PROVIDED:       (one on one with therapist for therapeutic exercises    20 Mins; supervised exercises 10 mins)  -passive stretching provided by therapist for lumbar, hip girdle, and LE muscle groups - declined  -guided core strengthening on the functional   ---modified lateral pull downs  ---cross body punch outs  -strengthening for the upper quadrant  ---chest press  ---scapular retraction  -conditioning exercises  ---upper extremity ergometer  ---lower extremity ergometer    The patient has been previously instructed in the following exercises:  -Lumbar rotational ROM and stretching in supine  -Lumbar flexion stretches in supine  -hamstring stretching  -piriformis stretching  -IT Band stretching  -gluteus medius stretching  -stretching of the hip internal rotators      PLAN:  Continue therapy 2 x week to continue the recommended plan of care.      SHORT TERM GOALS:    1. Patient compliant with daily stretching exercises   2. Patient will report pain level of 2/10 or less  3. Patient to follow up with Cardiologist for assessment of HPN    LONG TERM GOALS:  1. Patient will report pain level of 1/10 or less  2. Patient will report 25% or greater decrease in functional impairment on the Optimal Instrument      These services are reasonable and necessary for the conditions set forth above while under my care.

## 2018-03-14 ENCOUNTER — TELEPHONE (OUTPATIENT)
Dept: ALLERGY | Facility: CLINIC | Age: 53
End: 2018-03-14

## 2018-03-14 LAB
ALLERGEN PENICILLIN G  IGE: <0.35 KU/L
CRAB IGE QN: <0.35 KU/L
CRAWFISH IGE QN: <0.35 KU/L
DEPRECATED CRAB IGE RAST QL: NORMAL
DEPRECATED CRAWFISH IGE RAST QL: NORMAL
DEPRECATED SHRIMP IGE RAST QL: NORMAL
Lab: <0.35 KU/L
Lab: NORMAL
PENICILLIN G CLASS: NORMAL
SHRIMP IGE QN: <0.35 KU/L

## 2018-03-14 NOTE — TELEPHONE ENCOUNTER
This note was dictated using voice recognition software and may contain errors.    I spoke with her on the telephone on the afternoon of Wednesday, March 14, 2018.  We concluded our conversation at 5:34 PM.  She had an appointment with me on March 12.  I reviewed with her the results of the laboratory tests which I had ordered on March 12.  Results for Ig E directed against crawfish, crab, and shrimp were all negative.  Results for Ig E directed against penicillin G and penicillin V were each negative.    We have agreed that she will schedule an appointment at her convenience for the performance of immediate hypersensitivity skin tests.  She does take Zyrtec on a daily basis.  She took Zyrtec today.  I told her the first day that I would consider recommending an appointment for the performance of immediate hypersensitivity skin tests is March 23, 2018.  She will consider the date she wishes to schedule an appointment and she will do so.

## 2018-03-21 DIAGNOSIS — G05.3 CNS LUPUS: ICD-10-CM

## 2018-03-21 DIAGNOSIS — M32.19 CNS LUPUS: ICD-10-CM

## 2018-03-22 RX ORDER — PREDNISONE 5 MG/1
TABLET ORAL
Qty: 30 TABLET | Refills: 0 | Status: SHIPPED | OUTPATIENT
Start: 2018-03-22 | End: 2018-06-28 | Stop reason: SDUPTHER

## 2018-03-22 NOTE — TELEPHONE ENCOUNTER
----- Message from Christel Alfred sent at 3/22/2018  9:49 AM CDT -----  Contact: pt  Please call pt @ [phone] , pt have a questions, will discuss with nurse.

## 2018-03-22 NOTE — TELEPHONE ENCOUNTER
Spoke with pt and she was calling to see if she could reschedule her appointment to 4-16-18 or 4-18-18. Advised patient at this time we do not have any openings on those days. Pt asked we we had any openings any time before or after 4-17-18. Advised patient that we have openings today, 3-22-18 at 1:30 pm and 4:30 pm with Dr. CHUNG and the next would be for June 4th. Pt stated that she went online and saw appointments for March. Advised patient again that we have appointment open for today, March 22 nd but as of now that is the only openings we currently have. Pt verbalized understanding.

## 2018-03-27 ENCOUNTER — CLINICAL SUPPORT (OUTPATIENT)
Dept: REHABILITATION | Facility: HOSPITAL | Age: 53
End: 2018-03-27
Attending: PHYSICAL MEDICINE & REHABILITATION
Payer: COMMERCIAL

## 2018-03-27 DIAGNOSIS — R29.898 DEFICIENCIES OF LIMBS: ICD-10-CM

## 2018-03-27 DIAGNOSIS — M79.7 SCAPULOHUMERAL FIBROSITIS: Primary | ICD-10-CM

## 2018-03-27 PROCEDURE — 97110 THERAPEUTIC EXERCISES: CPT | Mod: PO | Performed by: PHYSICAL THERAPIST

## 2018-03-27 NOTE — PROGRESS NOTES
DATE OF INITIAL PHYSICAL THERAPY EVALUATION:             February 15, 2018      REFERRING PROVIDER:       Radha Drummond MD      REFERRING DIAGNOSIS:       M79.7 (ICD-10-CM) - Fibromyalgia   R29.898 (ICD-10-CM) - Arm weakness   R29.898 (ICD-10-CM) - Weakness of both hips           ORDERS:   Evaluate and treat as indicated 2 x week x 60 days    Orders will     April 15, 2018        VISIT    #8      SUBJECTIVE:  Patient completed follow-up with Cardiology regarding HPN.  Dr. Martinez has cleared her for exercise.    Patient's BP taken today by therapist:   136/82      Patient reports lumbar stiffness and mild myofascial pain in the lumbar paraspinals.    Reports working on the lumbar exercises daily and tolerating them well.      Patients primary complaint(s):  Lumbar myofascial pain and stiffness  Joint soreness (hips and shoulders)  Fatigue  Poor endurance  Neck pain  Headaches    History of present condition:  (provided by patient at the time of the initial evaluation)  Patient states she has been experiencing joint and muscle pain throughout her trunk and extremities.  She also complains of muscle tenderness in the shoulder girdle, hip girdle, and cervical spine, and lumbar spine muscle groups.  Patient reports persistent symptoms are impairing most of her ADL's and is preventing her from working.  She reports and increase in stiffness in the early AM, and increasing discomfort with activity.     Pain Ratin/10  Lumbar myofascial pain      Patient reports the following functional activity limitations:  ADL's impaired  Long distance walking impaired due to soreness and fatigue  Bending, stooping, squatting, lifting, and carrying impaired due to joint and muscle pain      (FUNCTIONAL ASSESSMENT TOOL)    At the time of the initial evaluation, the patient reported the following self limitations on the Optimal Instrument functional assessment tool:  (Rating Scale: #1=Able to do without difficulty,  #2=Able to do with little difficulty, #3=Able to do with moderate difficulty, #4=Able to do with much difficulty, #5=Unable to do, #9=Not applicable)    Optimal Instrument Scores reported by the patient:  Completed on February 15, 2018    1.  Lying flat    3  2.  Rolling over   3  3.  Moving - lying to sitting  3  4.  Sitting    3  5.  Squatting    3  6.  Bending/stooping   3  7.  Balancing    3  8.  Kneeling    3  9.  Standing    3  10.  Walking - short distance  2  11.  Walking - long distance  3  12.  Walking - outdoors  3  13.  Climbing stairs   3  14.  Hopping    3  15.  Jumping    3  16.  Running    3  17.  Pushing    3  18.  Pulling    3  19.  Reaching    3  20.  Grasping    3  21.  Lifting    3  22.  Carrying    3    Total Impairment Rating  49%      Past Medical History and co-morbidities:  Past Medical History:   Diagnosis Date    Anemia     with chronic disease lupus    Encounter for blood transfusion     G-6-PD deficiency     Heart murmur     MVP 1986    Hypertension     Lupus     Raynaud's syndrome      Patient Active Problem List   Diagnosis    CNS lupus    SOB (shortness of breath)    Essential hypertension    Systemic lupus erythematosus    Secondary Sjogren's syndrome    Glaucoma suspect of both eyes    Keratoconjunctivitis sicca of both eyes    Peripheral retinal degeneration of both eyes    Posterior vitreous detachment of both eyes    G6PD deficiency    Trigeminal neuralgia of right side of face    Atypical face pain       Current Outpatient Prescriptions:     azaTHIOprine (IMURAN) 50 mg Tab, TAKE ONE TABLET BY MOUTH TWICE DAILY (Patient taking differently: TAKE ONE TABLET BY MOUTH DAILY), Disp: 60 tablet, Rfl: 1    CALCIUM-MAGNESIUM-ZINC ORAL, Take by mouth once daily., Disp: , Rfl:     cholecalciferol, vitamin D3, (VITAMIN D3) 5,000 unit Tab, Take 5,000 Units by mouth once daily., Disp: , Rfl:     clobetasol (OLUX) 0.05 % Foam, Apply topically 2 (two) times daily. For  scalp only.  Do not use on face., Disp: 50 g, Rfl: 3    cyclobenzaprine (FLEXERIL) 10 MG tablet, Take 10 mg by mouth 3 (three) times daily as needed for Muscle spasms., Disp: , Rfl:     cycloSPORINE (RESTASIS) 0.05 % ophthalmic emulsion, Place 0.4 mLs (1 drop total) into both eyes 2 (two) times daily., Disp: 60 vial, Rfl: 11    desoximetasone (TOPICORT) 0.25 % cream, Apply topically 2 (two) times daily as needed. Do not use on face, underarms or groin, Disp: 60 g, Rfl: 3    ferrous gluconate (FERGON) 324 MG tablet, Take 324 mg by mouth daily with breakfast., Disp: , Rfl:     fish oil-omega-3 fatty acids 300-1,000 mg capsule, Take 2 g by mouth once daily., Disp: , Rfl:     fluocinolone (DERMA-SMOOTHE) 0.01 % external oil, Apply oil to scalp twice a day, Disp: 1 Bottle, Rfl: 5    gabapentin (NEURONTIN) 300 MG capsule, Take 1 capsule (300 mg total) by mouth 3 (three) times daily., Disp: 90 capsule, Rfl: 5    losartan (COZAAR) 100 MG tablet, Take 1 tablet (100 mg total) by mouth once daily., Disp: 30 tablet, Rfl: 11    methylphenidate HCl (RITALIN) 10 MG tablet, Take 1 tablet (10 mg total) by mouth 2 (two) times daily., Disp: 60 tablet, Rfl: 0    multivitamin capsule, Take 1 capsule by mouth once daily., Disp: , Rfl:     predniSONE (DELTASONE) 5 MG tablet, TAKE ONE TABLET BY MOUTH ONCE DAILY, Disp: 30 tablet, Rfl: 0    spironolactone (ALDACTONE) 25 MG tablet, Take 1 tablet (25 mg total) by mouth once daily., Disp: 30 tablet, Rfl: 11    Previous physical therapy and treatments:  Patient previously treated by a chiropractor for complaints of lumbar pain    Occupational/psychosocial/educational profile:  Not employed at this time; patient reports she is unable to tolerate work due to myofascial pain.    OBJECTIVE:    Musculoskeletal Exam:    Postural Exam  No postural deviations noted.  SI exam negative for ilial rotation.    ROM  Cervical spine ROM WNL's  Patient complaints of tightness throughout the cervical  paraspinals at the end of full flexion  Lumbar spine ROM is WNL's  Patient complains of tightness at the end point into full flexion; complains of pain at the end point into full extension in the mid lumbar region  Hip ROM is WNL's bilaterally  Shoulder ROM is WNL's bilaterally     Flexibility  HS, ITB, piriformis flexibility limited bilaterally    Functional Strength Testing  5/5 strength throughout the upper quadrant bilaterally except for:  Supraspinatus 4-/5 bilaterally, Infraspinatus 4-/5 bilaterally   5/5 strength throughout the lower quadrant bilaterally      Palpation  Multiple areas of diffuse tenderness throughout the upper quadrant bilaterally; especially upper traps, levator, rhomboids  Multiple areas of diffuse tenderness throughout the lower quadrant bilaterally; especially the gluteus medius, ITB      Neuromuscular Exam    Gait  No significant gait deviations noted    Transitional Movements  Patient complains of discomfort with sit to stand, sit to supine, supine to sit      Sensation  No sensory disturbances noted throughout the LE's or UE's  Patient did not complain of paresthesias.    PROBLEM LIST - ASSESSMENT  Patient continues to complain of multiple areas of myofascial tenderness throughout the upper and lower quadrants.  Inactivity due to the patients complaints of myofascial pain is likely contributing to joint stiffness and deconditioning leading to fatigue.  Patient is benefiting from  out-patient rehab program for flexibility, deconditioning, and functional weakness.  Patient should continue with stretching exercises to address lumbar stiffness.  Exercises should also emphasize core stabilization.    Activity Limitations, Participation Restrictions, and CO-MORBIDITIES which may impact the plan of care and potentially impede the patient's progress in therapy include:  Patient DX with lupus    The patient does not present with any learning or communication barriers which may impact the plan  of care and potentially impede the patient's progress in therapy.      CLINICAL PRESENTATION:  Patient's Clinical Presentation is STABLE.       RECOMMENDED PLAN OF CARE:  Stretching exercise program for shoulder girdle and hip girdle muscle groups  Stretching exercises for the cervical and lumbar muscle groups   Postural correction exercises  Endurance training  RTC strengthening   Core strengthening     TREATMENT PROVIDED:       (one on one with therapist for therapeutic exercises    35 Mins; supervised exercises 10 mins)  -passive stretching provided by therapist for lumbar, hip girdle, and LE muscle groups   -guided core strengthening on the functional   ---modified lateral pull downs  ---cross body punch outs  -strengthening for the upper quadrant  ---chest press  ---scapular retraction  -conditioning exercises  ---upper extremity ergometer  ---lower extremity ergometer    Moist heat applied to the lumbar paraspinal muscle groups to decrease stiffness.     The patient has been previously instructed in the following exercises:  -Lumbar rotational ROM and stretching in supine  -Lumbar flexion stretches in supine  -hamstring stretching  -piriformis stretching  -IT Band stretching  -gluteus medius stretching  -stretching of the hip internal rotators      PLAN:  Continue therapy 2 x week to continue the recommended plan of care.      SHORT TERM GOALS:    1. Patient compliant with daily stretching exercises   2. Patient will report pain level of 2/10 or less  3. Patient to follow up with Cardiologist for assessment of HPN    LONG TERM GOALS:  1. Patient will report pain level of 1/10 or less  2. Patient will report 25% or greater decrease in functional impairment on the Optimal Instrument      These services are reasonable and necessary for the conditions set forth above while under my care.

## 2018-03-29 ENCOUNTER — LAB VISIT (OUTPATIENT)
Dept: LAB | Facility: HOSPITAL | Age: 53
End: 2018-03-29
Attending: INTERNAL MEDICINE
Payer: COMMERCIAL

## 2018-03-29 ENCOUNTER — OFFICE VISIT (OUTPATIENT)
Dept: RHEUMATOLOGY | Facility: CLINIC | Age: 53
End: 2018-03-29
Payer: COMMERCIAL

## 2018-03-29 VITALS
SYSTOLIC BLOOD PRESSURE: 123 MMHG | WEIGHT: 184.75 LBS | DIASTOLIC BLOOD PRESSURE: 78 MMHG | BODY MASS INDEX: 30.28 KG/M2 | HEART RATE: 92 BPM

## 2018-03-29 DIAGNOSIS — M32.19 CNS LUPUS: Primary | ICD-10-CM

## 2018-03-29 DIAGNOSIS — M32.19 OTHER SYSTEMIC LUPUS ERYTHEMATOSUS WITH OTHER ORGAN INVOLVEMENT: ICD-10-CM

## 2018-03-29 DIAGNOSIS — M35.00 SECONDARY SJOGREN'S SYNDROME: ICD-10-CM

## 2018-03-29 DIAGNOSIS — G05.3 CNS LUPUS: ICD-10-CM

## 2018-03-29 DIAGNOSIS — D63.8 ANEMIA OF CHRONIC ILLNESS: ICD-10-CM

## 2018-03-29 DIAGNOSIS — Z79.899 LONG-TERM CURRENT USE OF HIGH RISK MEDICATION OTHER THAN ANTICOAGULANT: ICD-10-CM

## 2018-03-29 DIAGNOSIS — Z79.52 LONG TERM (CURRENT) USE OF SYSTEMIC STEROIDS: ICD-10-CM

## 2018-03-29 DIAGNOSIS — G05.3 CNS LUPUS: Primary | ICD-10-CM

## 2018-03-29 DIAGNOSIS — M32.19 CNS LUPUS: ICD-10-CM

## 2018-03-29 LAB
ALBUMIN SERPL BCP-MCNC: 3.6 G/DL
ALP SERPL-CCNC: 102 U/L
ALT SERPL W/O P-5'-P-CCNC: 18 U/L
ANION GAP SERPL CALC-SCNC: 10 MMOL/L
AST SERPL-CCNC: 22 U/L
BASOPHILS # BLD AUTO: 0.01 K/UL
BASOPHILS NFR BLD: 0.2 %
BILIRUB SERPL-MCNC: 0.7 MG/DL
BUN SERPL-MCNC: 12 MG/DL
CALCIUM SERPL-MCNC: 9.4 MG/DL
CHLORIDE SERPL-SCNC: 106 MMOL/L
CO2 SERPL-SCNC: 23 MMOL/L
CREAT SERPL-MCNC: 0.9 MG/DL
DIFFERENTIAL METHOD: ABNORMAL
EOSINOPHIL # BLD AUTO: 0 K/UL
EOSINOPHIL NFR BLD: 0.4 %
ERYTHROCYTE [DISTWIDTH] IN BLOOD BY AUTOMATED COUNT: 14.8 %
EST. GFR  (AFRICAN AMERICAN): >60 ML/MIN/1.73 M^2
EST. GFR  (NON AFRICAN AMERICAN): >60 ML/MIN/1.73 M^2
GLUCOSE SERPL-MCNC: 104 MG/DL
HCT VFR BLD AUTO: 33.2 %
HGB BLD-MCNC: 10.7 G/DL
LYMPHOCYTES # BLD AUTO: 1.9 K/UL
LYMPHOCYTES NFR BLD: 39.6 %
MCH RBC QN AUTO: 28.6 PG
MCHC RBC AUTO-ENTMCNC: 32.2 G/DL
MCV RBC AUTO: 89 FL
MONOCYTES # BLD AUTO: 0.4 K/UL
MONOCYTES NFR BLD: 9 %
NEUTROPHILS # BLD AUTO: 2.4 K/UL
NEUTROPHILS NFR BLD: 50.8 %
PLATELET # BLD AUTO: 188 K/UL
PMV BLD AUTO: 9.6 FL
POTASSIUM SERPL-SCNC: 4 MMOL/L
PROT SERPL-MCNC: 8 G/DL
RBC # BLD AUTO: 3.74 M/UL
SODIUM SERPL-SCNC: 139 MMOL/L
WBC # BLD AUTO: 4.77 K/UL

## 2018-03-29 PROCEDURE — 3074F SYST BP LT 130 MM HG: CPT | Mod: CPTII,S$GLB,, | Performed by: INTERNAL MEDICINE

## 2018-03-29 PROCEDURE — 99999 PR PBB SHADOW E&M-EST. PATIENT-LVL III: CPT | Mod: PBBFAC,,, | Performed by: INTERNAL MEDICINE

## 2018-03-29 PROCEDURE — 80053 COMPREHEN METABOLIC PANEL: CPT | Mod: PO

## 2018-03-29 PROCEDURE — 36415 COLL VENOUS BLD VENIPUNCTURE: CPT | Mod: PO

## 2018-03-29 PROCEDURE — 85025 COMPLETE CBC W/AUTO DIFF WBC: CPT | Mod: PO

## 2018-03-29 PROCEDURE — 3078F DIAST BP <80 MM HG: CPT | Mod: CPTII,S$GLB,, | Performed by: INTERNAL MEDICINE

## 2018-03-29 PROCEDURE — 99214 OFFICE O/P EST MOD 30 MIN: CPT | Mod: S$GLB,,, | Performed by: INTERNAL MEDICINE

## 2018-03-29 RX ORDER — CHOLECALCIFEROL (VITAMIN D3) 125 MCG
CAPSULE ORAL
COMMUNITY
End: 2021-12-03

## 2018-03-29 RX ORDER — AZATHIOPRINE 50 MG/1
50 TABLET ORAL DAILY
Qty: 90 TABLET | Refills: 3 | Status: SHIPPED | OUTPATIENT
Start: 2018-03-29 | End: 2019-05-14 | Stop reason: SDUPTHER

## 2018-03-29 NOTE — ASSESSMENT & PLAN NOTE
Long-term current use of prednisone 5 mg once daily.  She is at the lowest dose at this time.  Check DEXA.

## 2018-03-29 NOTE — PROGRESS NOTES
RHEUMATOLOGY CLINIC FOLLOW UP VISIT  Chief complaints:-  Follow-up for lupus.    HPI:-  Ruth ISSA Siddharthsofiya a 52 y.o. pleasant female comes in for a follow up visit with above chief complaints. She has CNS lupus and is on 50 mg daily imuran ( please refer to prior HPI's for reason behind imuran monotherapy) .  She denies any significant problems today.  Has some mild aches and pains over his joints in the morning and at night but not severe enough to consider therapies.  She also had noticed some lower abdominal pain once or twice per week unrelated to food, bowel habits not associated with fever or chills.  No blood in stool, constipation or diarrhea.     Review of Systems   Constitutional: Negative for chills, fever, malaise/fatigue and weight loss.   HENT: Negative for congestion, ear discharge, ear pain, hearing loss, nosebleeds and sore throat.    Eyes: Negative for blurred vision, double vision, photophobia, discharge and redness.   Respiratory: Negative for cough, hemoptysis, sputum production and shortness of breath.    Cardiovascular: Negative for chest pain, palpitations and claudication.   Gastrointestinal: Positive for abdominal pain. Negative for constipation, diarrhea, melena, nausea and vomiting.   Genitourinary: Negative for dysuria, frequency, hematuria and urgency.   Musculoskeletal: Negative for back pain, falls, joint pain, myalgias and neck pain.   Skin: Negative for itching and rash.   Neurological: Negative for dizziness, tremors, sensory change, speech change, focal weakness, seizures, loss of consciousness, weakness and headaches.   Endo/Heme/Allergies: Negative for environmental allergies. Does not bruise/bleed easily.   Psychiatric/Behavioral: Negative for depression, hallucinations and memory loss. The patient is not nervous/anxious and does not have insomnia.        Past Medical History:   Diagnosis Date    Anemia     with chronic  disease lupus    Encounter for blood transfusion     G-6-PD deficiency     Heart murmur     MVP 1986    Hypertension     Lupus     Raynaud's syndrome        Past Surgical History:   Procedure Laterality Date    BONE MARROW BIOPSY      CARPAL TUNNEL RELEASE Bilateral     widsom tooth extraction          Social History   Substance Use Topics    Smoking status: Never Smoker    Smokeless tobacco: Never Used    Alcohol use No       Family History   Problem Relation Age of Onset    Arthritis Mother     Heart disease Mother     Hypertension Mother     Diabetes Father     Heart disease Father     Hypertension Father     Cancer Brother     Eczema Neg Hx     Lupus Neg Hx     Psoriasis Neg Hx        Review of patient's allergies indicates:   Allergen Reactions    Ace inhibitors     Amoxicillin     Aspirin     Cellcept [mycophenolate mofetil]     Cephalosporins     Clindamycin     Hydrochlorothiazide     Imuran [azathioprine sodium]     Lasix [furosemide]     Methotrexate analogues     Nsaids (non-steroidal anti-inflammatory drug)     Pcn [penicillins]     Plaquenil [hydroxychloroquine]     Shellfish containing products     Sulfa (sulfonamide antibiotics)     Zofran [ondansetron hcl (pf)]            Physical examination:-    Vitals:    03/29/18 0939   BP: 123/78   Pulse: 92   Weight: 83.8 kg (184 lb 11.9 oz)   PainSc: 0-No pain       Physical Exam   Constitutional: She is oriented to person, place, and time and well-developed, well-nourished, and in no distress. No distress.   HENT:   Head: Normocephalic.   Mouth/Throat: Oropharynx is clear and moist.   Eyes: Conjunctivae and EOM are normal. Pupils are equal, round, and reactive to light.   Neck: Normal range of motion. Neck supple.   Cardiovascular: Normal rate and intact distal pulses.    Pulmonary/Chest: Effort normal. No respiratory distress.   Abdominal: Soft. There is no tenderness.   Musculoskeletal:   No synovitis over small joints  of hands or feet.  No effusion over large joints.   Neurological: She is alert and oriented to person, place, and time. No cranial nerve deficit.   Skin: Skin is warm. No rash noted. No erythema.   Psychiatric: Mood and affect normal.   Nursing note and vitals reviewed.      Labs:-  Results for ADELA LEMON (MRN [id number]) as of 1/5/2017 12:40   Ref. Range 12/8/2016 09:04   SHEILA HEP-2 Titer Unknown Positive 1:640 Sp...   Anti-SSA Antibody Latest Ref Range: 0.00 - 19.99 EU 31.31 (H)   Anti-SSA Interpretation Latest Ref Range: Negative  Positive (A)   Anti-SSB Antibody Latest Ref Range: 0.00 - 19.99 EU 9.10   Anti-SSB Interpretation Latest Ref Range: Negative  Negative   ds DNA Ab Latest Ref Range: Negative 1:10  Positive 1:40 (A)   Anti Sm Antibody Latest Ref Range: 0.00 - 19.99 .08 (H)   Anti-Sm Interpretation Latest Ref Range: Negative  Positive (A)   Anti Sm/RNP Antibody Latest Ref Range: 0.00 - 19.99 .83 (H)   Anti-Sm/RNP Interpretation Latest Ref Range: Negative  Positive (A)   Complement (C-3) Latest Ref Range: 50 - 180 mg/dL 60   Complement (C-4) Latest Ref Range: 11 - 44 mg/dL 7 (L)     Old and Outside medical records :-  Reviewed old and all outside medical records available in Care Everywhere.     Medication List with Changes/Refills   Current Medications    B1/B2/NIACIN/B12/PROTEASE (B-COMPLEX WITH B-12 ORAL)    Take by mouth.    BIOTIN 5,000 MCG TBDL    Take by mouth.    CALCIUM-MAGNESIUM-ZINC ORAL    Take by mouth once daily.    CHOLECALCIFEROL, VITAMIN D3, (VITAMIN D3) 5,000 UNIT TAB    Take 5,000 Units by mouth once daily.    CLOBETASOL (OLUX) 0.05 % FOAM    Apply topically 2 (two) times daily. For scalp only.  Do not use on face.    CYCLOBENZAPRINE (FLEXERIL) 10 MG TABLET    Take 10 mg by mouth 3 (three) times daily as needed for Muscle spasms.    CYCLOSPORINE (RESTASIS) 0.05 % OPHTHALMIC EMULSION    Place 0.4 mLs (1 drop total) into both eyes 2 (two) times daily.    DESOXIMETASONE  (TOPICORT) 0.25 % CREAM    Apply topically 2 (two) times daily as needed. Do not use on face, underarms or groin    FERROUS GLUCONATE (FERGON) 324 MG TABLET    Take 324 mg by mouth daily with breakfast.    FISH OIL-OMEGA-3 FATTY ACIDS 300-1,000 MG CAPSULE    Take 2 g by mouth once daily.    FLUOCINOLONE (DERMA-SMOOTHE) 0.01 % EXTERNAL OIL    Apply oil to scalp twice a day    GABAPENTIN (NEURONTIN) 300 MG CAPSULE    Take 1 capsule (300 mg total) by mouth 3 (three) times daily.    LOSARTAN (COZAAR) 100 MG TABLET    Take 1 tablet (100 mg total) by mouth once daily.    METHYLPHENIDATE HCL (RITALIN) 10 MG TABLET    Take 1 tablet (10 mg total) by mouth 2 (two) times daily.    MULTIVITAMIN CAPSULE    Take 1 capsule by mouth once daily.    PREDNISONE (DELTASONE) 5 MG TABLET    TAKE ONE TABLET BY MOUTH ONCE DAILY    SPIRONOLACTONE (ALDACTONE) 25 MG TABLET    Take 1 tablet (25 mg total) by mouth once daily.   Changed and/or Refilled Medications    Modified Medication Previous Medication    AZATHIOPRINE (IMURAN) 50 MG TAB azaTHIOprine (IMURAN) 50 mg Tab       Take 1 tablet (50 mg total) by mouth once daily.    TAKE ONE TABLET BY MOUTH TWICE DAILY     Assessment/Plans:-  # CNS lupus  Extremely complicated uncontrolled lupus with fatigue and periodic exacerbations of serious neurological and psychiatric complications , extreme serological activity of lupus, not on any medications other than Imuran 50 mg once daily and 5 mg daily prednisone. She has intolerance to multiple medications including plaquenil, cellcept and methotrexate.  She has intermittent arthritis for which I recommended benlysta.  She wants to think more about it.  - azaTHIOprine (IMURAN) 50 mg Tab; Take 1 tablet (50 mg total) by mouth once daily.  Dispense: 90 tablet; Refill: 3    # Secondary Sjogren's syndrome  Stable disease.  Continue symptomatic therapy.  Monitor.  - azaTHIOprine (IMURAN) 50 mg Tab; Take 1 tablet (50 mg total) by mouth once daily.  Dispense:  90 tablet; Refill: 3    # Long term (current) use of systemic steroids  Long-term current use of prednisone 5 mg once daily.  She is at the lowest dose at this time.  Check DEXA.  - DXA Bone Density Spine And Hip; Future    # Long-term current use of high risk medication other than anticoagulant  Hold Imuran if any infection.  Check CBC and CMP.  # Follow-up in about 4 months (around 7/29/2018).    Disclaimer: This note was prepared using voice recognition system and is likely to have sound alike errors and is not proof read.  Please call me with any questions.

## 2018-03-29 NOTE — ASSESSMENT & PLAN NOTE
Extremely complicated uncontrolled lupus with fatigue and periodic exacerbations of serious neurological and psychiatric complications , extreme serological activity of lupus, not on any medications other than Imuran 50 mg once daily and 5 mg daily prednisone. She has intolerance to multiple medications including plaquenil, cellcept and methotrexate.  She has intermittent arthritis for which I recommended benlysta.  She wants to think more about it.

## 2018-04-03 ENCOUNTER — LAB VISIT (OUTPATIENT)
Dept: LAB | Facility: HOSPITAL | Age: 53
End: 2018-04-03
Attending: INTERNAL MEDICINE
Payer: COMMERCIAL

## 2018-04-03 ENCOUNTER — CLINICAL SUPPORT (OUTPATIENT)
Dept: REHABILITATION | Facility: HOSPITAL | Age: 53
End: 2018-04-03
Attending: PHYSICAL MEDICINE & REHABILITATION
Payer: COMMERCIAL

## 2018-04-03 ENCOUNTER — OFFICE VISIT (OUTPATIENT)
Dept: HEMATOLOGY/ONCOLOGY | Facility: CLINIC | Age: 53
End: 2018-04-03
Payer: COMMERCIAL

## 2018-04-03 VITALS
SYSTOLIC BLOOD PRESSURE: 122 MMHG | TEMPERATURE: 98 F | DIASTOLIC BLOOD PRESSURE: 78 MMHG | RESPIRATION RATE: 20 BRPM | WEIGHT: 184.06 LBS | BODY MASS INDEX: 29.58 KG/M2 | HEART RATE: 89 BPM | HEIGHT: 66 IN | OXYGEN SATURATION: 99 %

## 2018-04-03 DIAGNOSIS — M79.7 SCAPULOHUMERAL FIBROSITIS: Primary | ICD-10-CM

## 2018-04-03 DIAGNOSIS — D64.9 NORMOCYTIC ANEMIA: Primary | ICD-10-CM

## 2018-04-03 DIAGNOSIS — D64.9 NORMOCYTIC ANEMIA: ICD-10-CM

## 2018-04-03 DIAGNOSIS — D63.8 ANEMIA OF CHRONIC DISEASE: ICD-10-CM

## 2018-04-03 DIAGNOSIS — R29.898 DEFICIENCIES OF LIMBS: ICD-10-CM

## 2018-04-03 LAB
ALBUMIN SERPL BCP-MCNC: 3.7 G/DL
ALP SERPL-CCNC: 109 U/L
ALT SERPL W/O P-5'-P-CCNC: 20 U/L
ANION GAP SERPL CALC-SCNC: 12 MMOL/L
AST SERPL-CCNC: 20 U/L
BASOPHILS # BLD AUTO: 0.01 K/UL
BASOPHILS NFR BLD: 0.2 %
BILIRUB SERPL-MCNC: 0.7 MG/DL
BUN SERPL-MCNC: 13 MG/DL
CALCIUM SERPL-MCNC: 9.8 MG/DL
CHLORIDE SERPL-SCNC: 103 MMOL/L
CO2 SERPL-SCNC: 25 MMOL/L
CREAT SERPL-MCNC: 1 MG/DL
DIFFERENTIAL METHOD: ABNORMAL
EOSINOPHIL # BLD AUTO: 0 K/UL
EOSINOPHIL NFR BLD: 0.7 %
ERYTHROCYTE [DISTWIDTH] IN BLOOD BY AUTOMATED COUNT: 14.6 %
EST. GFR  (AFRICAN AMERICAN): >60 ML/MIN/1.73 M^2
EST. GFR  (NON AFRICAN AMERICAN): >60 ML/MIN/1.73 M^2
FERRITIN SERPL-MCNC: 1015 NG/ML
GLUCOSE SERPL-MCNC: 122 MG/DL
HCT VFR BLD AUTO: 33.9 %
HGB BLD-MCNC: 10.7 G/DL
IRON SERPL-MCNC: 73 UG/DL
LDH SERPL L TO P-CCNC: 126 U/L
LYMPHOCYTES # BLD AUTO: 2.1 K/UL
LYMPHOCYTES NFR BLD: 46 %
MCH RBC QN AUTO: 28 PG
MCHC RBC AUTO-ENTMCNC: 31.6 G/DL
MCV RBC AUTO: 89 FL
MONOCYTES # BLD AUTO: 0.3 K/UL
MONOCYTES NFR BLD: 6.4 %
NEUTROPHILS # BLD AUTO: 2.1 K/UL
NEUTROPHILS NFR BLD: 46.7 %
PLATELET # BLD AUTO: 193 K/UL
PMV BLD AUTO: 10.2 FL
POTASSIUM SERPL-SCNC: 3.9 MMOL/L
PROT SERPL-MCNC: 8 G/DL
RBC # BLD AUTO: 3.82 M/UL
SATURATED IRON: 25 %
SODIUM SERPL-SCNC: 140 MMOL/L
TOTAL IRON BINDING CAPACITY: 289 UG/DL
TRANSFERRIN SERPL-MCNC: 195 MG/DL
WBC # BLD AUTO: 4.52 K/UL

## 2018-04-03 PROCEDURE — 83615 LACTATE (LD) (LDH) ENZYME: CPT | Mod: PO

## 2018-04-03 PROCEDURE — 83540 ASSAY OF IRON: CPT

## 2018-04-03 PROCEDURE — 3078F DIAST BP <80 MM HG: CPT | Mod: CPTII,S$GLB,, | Performed by: INTERNAL MEDICINE

## 2018-04-03 PROCEDURE — 80053 COMPREHEN METABOLIC PANEL: CPT | Mod: PO

## 2018-04-03 PROCEDURE — 85025 COMPLETE CBC W/AUTO DIFF WBC: CPT | Mod: PO

## 2018-04-03 PROCEDURE — 82728 ASSAY OF FERRITIN: CPT

## 2018-04-03 PROCEDURE — 99214 OFFICE O/P EST MOD 30 MIN: CPT | Mod: S$GLB,,, | Performed by: INTERNAL MEDICINE

## 2018-04-03 PROCEDURE — 99999 PR PBB SHADOW E&M-EST. PATIENT-LVL III: CPT | Mod: PBBFAC,,, | Performed by: INTERNAL MEDICINE

## 2018-04-03 PROCEDURE — 3074F SYST BP LT 130 MM HG: CPT | Mod: CPTII,S$GLB,, | Performed by: INTERNAL MEDICINE

## 2018-04-03 PROCEDURE — 97110 THERAPEUTIC EXERCISES: CPT | Mod: PO | Performed by: PHYSICAL THERAPIST

## 2018-04-03 PROCEDURE — 36415 COLL VENOUS BLD VENIPUNCTURE: CPT | Mod: PO

## 2018-04-03 RX ORDER — HYDROCODONE BITARTRATE AND ACETAMINOPHEN 7.5; 325 MG/1; MG/1
TABLET ORAL
Refills: 0 | COMMUNITY
Start: 2018-03-15 | End: 2018-04-16

## 2018-04-03 NOTE — PROGRESS NOTES
Hematology/Oncology Office Note    Reason for referral:  Progressive normocytic anemia    CC:    Referred by:  No ref. provider found    Diagnosis:  Progressive normocytic anemia    Treatment:  Treated with IV iron in the past    History of present illness:  51-year-old female with long-standing history of SLE with skin, joint, and cytopenias from SLE.  She underwent a bone marrow biopsy in 2/2016 which demonstrated normal trilineage hematopoiesis without significant abnormality.  She underwent EGD and colonoscopy in 12/2016 which failed to identify any source of blood loss.  She was treated with IV iron by outside oncologist with some improvement in anemia.  Hemoglobin was recently noted to be 9.6 with MCV of 91.  She is currently on prednisone/Solu-Medrol for SLE.  She has complaints of mild fatigue and malaise without shortness of breath, chest pain, dizziness, or palpitations.    I have reviewed and updated the HPI, ROS, PMHx, Social Hx, Family Hx and treatment history.    Today's visit:  Patient is without new complaints today.  She denies fever, chills, night sweats, weight loss, dizziness or palpitations.    Past Medical History:   Diagnosis Date    Anemia     with chronic disease lupus    Encounter for blood transfusion     G-6-PD deficiency     Heart murmur     MVP 1986    Hypertension     Lupus     Raynaud's syndrome          Social History:  No tobacco, alcohol, or illicit drugs      Family History: family history includes Arthritis in her mother; Cancer in her brother; Diabetes in her father; Heart disease in her father and mother; Hypertension in her father and mother.    HPI    Review of Systems   Constitutional: Positive for activity change and fatigue. Negative for appetite change, chills, fever and unexpected weight change.   HENT: Negative for congestion, ear discharge, ear pain, facial swelling, hearing loss, mouth sores, nosebleeds, postnasal drip and rhinorrhea.    Eyes: Negative.   "  Respiratory: Negative for apnea, cough, shortness of breath, wheezing and stridor.    Cardiovascular: Negative for chest pain and palpitations.   Gastrointestinal: Negative for abdominal distention, abdominal pain, anal bleeding, blood in stool, constipation, diarrhea and nausea.   Endocrine: Negative.    Genitourinary: Negative for difficulty urinating, flank pain and frequency.   Musculoskeletal: Negative for arthralgias, back pain, gait problem, joint swelling, myalgias and neck pain.   Skin: Negative for color change, pallor, rash and wound.   Allergic/Immunologic: Negative.    Neurological: Negative for dizziness, tremors, seizures, syncope, speech difficulty, weakness, light-headedness and numbness.   Hematological: Negative for adenopathy. Does not bruise/bleed easily.   Psychiatric/Behavioral: Negative for agitation, behavioral problems, confusion, decreased concentration, dysphoric mood and hallucinations. The patient is not nervous/anxious and is not hyperactive.        Objective:       Vitals:    04/03/18 0955   BP: 122/78   BP Location: Right arm   Patient Position: Sitting   BP Method: Large (Manual)   Pulse: 89   Resp: 20   Temp: 98.3 °F (36.8 °C)   TempSrc: Oral   SpO2: 99%   Weight: 83.5 kg (184 lb 1.4 oz)   Height: 5' 5.5" (1.664 m)     Physical Exam   Constitutional: She is oriented to person, place, and time. She appears well-developed and well-nourished. No distress.   HENT:   Head: Normocephalic and atraumatic.   Right Ear: External ear normal.   Left Ear: External ear normal.   Nose: Nose normal.   Mouth/Throat: Oropharynx is clear and moist.   Eyes: Conjunctivae and EOM are normal. Pupils are equal, round, and reactive to light. No scleral icterus.   Neck: Normal range of motion. Neck supple. No tracheal deviation present. No thyromegaly present.   Cardiovascular: Normal rate, regular rhythm, normal heart sounds and intact distal pulses.  Exam reveals no gallop and no friction rub.    No " murmur heard.  Pulmonary/Chest: Effort normal and breath sounds normal. No accessory muscle usage. No respiratory distress. She has no decreased breath sounds. She has no rhonchi. She has no rales. She exhibits no tenderness.   Abdominal: Soft. Bowel sounds are normal. She exhibits no distension and no mass. There is no tenderness. There is no rebound and no guarding.   Musculoskeletal: Normal range of motion. She exhibits no edema, tenderness or deformity.   Neurological: She is alert and oriented to person, place, and time. No cranial nerve deficit. Coordination normal.   Skin: Skin is warm, dry and intact. Capillary refill takes less than 2 seconds. No abrasion, no bruising, no ecchymosis and no rash noted. She is not diaphoretic. No pallor.   Psychiatric: She has a normal mood and affect. Her behavior is normal. Judgment and thought content normal.       CT of Brain 2/25/16:  History:    Altered mental status  Findings: No intracranial hemorrhage or acute focal brain parenchymal abnormality is identified.  There is mild generalized atrophy. Calvarium is intact. Visualized paranasal sinuses and mastoid air cells are clear.    Assessment:       51-year-old postmenopausal female with a history of pancytopenia related to SLE and iron deficiency anemia who presents for worsening normocytic anemia.  She underwent a bone marrow biopsy on 2/19/2016 which demonstrated trilineage hematopoiesis without significant abnormality.  In 12/2016 she underwent EGD and colonoscopy ([facility]) without source of blood loss identified.    Workup has been unremarkable thus far and I believe anemia is secondary to chronic disease/inflammation from SLE.  The leukopenia is likely autoimmune in etiology.  counts remain stable today with normal ANC of 2.0, mild normocytic anemia at 10.1, and normal platelet count 180.    CBC remains stable with normal ANC = 2.1, and stable anemia with hemoglobin of 10.7.  We will continue to monitor and  we'll have the patient follow-up in 6 months with repeat labs.    Normocytic anemia secondary to chronic disease/SLE  --Follow-up in 6 months with repeat CBC/routine labs  --Repeat bone marrow biopsy upon significant progression

## 2018-04-03 NOTE — PROGRESS NOTES
DATE OF INITIAL PHYSICAL THERAPY EVALUATION:             February 15, 2018      REFERRING PROVIDER:       Radha Drummond MD      REFERRING DIAGNOSIS:       M79.7 (ICD-10-CM) - Fibromyalgia   R29.898 (ICD-10-CM) - Arm weakness   R29.898 (ICD-10-CM) - Weakness of both hips           ORDERS:   Evaluate and treat as indicated 2 x week x 60 days    Orders will     April 15, 2018        VISIT    #9      SUBJECTIVE:  Patient completed follow-up with Cardiology regarding HPN.  Dr. Martinez has cleared her for exercise.    Patient's BP taken today by therapist:   140/92      Patient reports lumbar stiffness and mild myofascial pain in the lumbar paraspinals.    She states she has been working on the lumbar exercises daily and tolerating them well.      Patients primary complaint(s):  Lumbar myofascial pain and stiffness  Joint soreness (hips and shoulders)  Fatigue  Poor endurance  Neck pain  Headaches    History of present condition:  (provided by patient at the time of the initial evaluation)  Patient states she has been experiencing joint and muscle pain throughout her trunk and extremities.  She also complains of muscle tenderness in the shoulder girdle, hip girdle, and cervical spine, and lumbar spine muscle groups.  Patient reports persistent symptoms are impairing most of her ADL's and is preventing her from working.  She reports and increase in stiffness in the early AM, and increasing discomfort with activity.     Pain Ratin/10  Lumbar myofascial pain      Patient reports the following functional activity limitations:  ADL's impaired  Long distance walking impaired due to soreness and fatigue  Bending, stooping, squatting, lifting, and carrying impaired due to joint and muscle pain      (FUNCTIONAL ASSESSMENT TOOL)    At the time of the initial evaluation, the patient reported the following self limitations on the Optimal Instrument functional assessment tool:  (Rating Scale: #1=Able to do  without difficulty, #2=Able to do with little difficulty, #3=Able to do with moderate difficulty, #4=Able to do with much difficulty, #5=Unable to do, #9=Not applicable)    Optimal Instrument Scores reported by the patient:  Completed on February 15, 2018    1.  Lying flat    3  2.  Rolling over   3  3.  Moving - lying to sitting  3  4.  Sitting    3  5.  Squatting    3  6.  Bending/stooping   3  7.  Balancing    3  8.  Kneeling    3  9.  Standing    3  10.  Walking - short distance  2  11.  Walking - long distance  3  12.  Walking - outdoors  3  13.  Climbing stairs   3  14.  Hopping    3  15.  Jumping    3  16.  Running    3  17.  Pushing    3  18.  Pulling    3  19.  Reaching    3  20.  Grasping    3  21.  Lifting    3  22.  Carrying    3    Total Impairment Rating  49%      Past Medical History and co-morbidities:  Past Medical History:   Diagnosis Date    Anemia     with chronic disease lupus    Encounter for blood transfusion     G-6-PD deficiency     Heart murmur     MVP 1986    Hypertension     Lupus     Raynaud's syndrome      Patient Active Problem List   Diagnosis    CNS lupus    SOB (shortness of breath)    Essential hypertension    Systemic lupus erythematosus    Secondary Sjogren's syndrome    Glaucoma suspect of both eyes    Keratoconjunctivitis sicca of both eyes    Peripheral retinal degeneration of both eyes    Posterior vitreous detachment of both eyes    G6PD deficiency    Trigeminal neuralgia of right side of face    Atypical face pain    Long term (current) use of systemic steroids    Long-term current use of high risk medication other than anticoagulant       Current Outpatient Prescriptions:     azaTHIOprine (IMURAN) 50 mg Tab, Take 1 tablet (50 mg total) by mouth once daily., Disp: 90 tablet, Rfl: 3    B1/B2/NIACIN/B12/PROTEASE (B-COMPLEX WITH B-12 ORAL), Take by mouth., Disp: , Rfl:     biotin 5,000 mcg TbDL, Take by mouth., Disp: , Rfl:     CALCIUM-MAGNESIUM-ZINC  ORAL, Take by mouth once daily., Disp: , Rfl:     cholecalciferol, vitamin D3, (VITAMIN D3) 5,000 unit Tab, Take 5,000 Units by mouth once daily., Disp: , Rfl:     clobetasol (OLUX) 0.05 % Foam, Apply topically 2 (two) times daily. For scalp only.  Do not use on face., Disp: 50 g, Rfl: 3    cyclobenzaprine (FLEXERIL) 10 MG tablet, Take 10 mg by mouth 3 (three) times daily as needed for Muscle spasms., Disp: , Rfl:     cycloSPORINE (RESTASIS) 0.05 % ophthalmic emulsion, Place 0.4 mLs (1 drop total) into both eyes 2 (two) times daily., Disp: 60 vial, Rfl: 11    desoximetasone (TOPICORT) 0.25 % cream, Apply topically 2 (two) times daily as needed. Do not use on face, underarms or groin, Disp: 60 g, Rfl: 3    ferrous gluconate (FERGON) 324 MG tablet, Take 324 mg by mouth daily with breakfast., Disp: , Rfl:     fish oil-omega-3 fatty acids 300-1,000 mg capsule, Take 2 g by mouth once daily., Disp: , Rfl:     fluocinolone (DERMA-SMOOTHE) 0.01 % external oil, Apply oil to scalp twice a day, Disp: 1 Bottle, Rfl: 5    gabapentin (NEURONTIN) 300 MG capsule, Take 1 capsule (300 mg total) by mouth 3 (three) times daily., Disp: 90 capsule, Rfl: 5    hydrocodone-acetaminophen 7.5-325mg (NORCO) 7.5-325 mg per tablet, TAKE 1 TABLET EVERY 4-6 HOUR PRN PAIN, Disp: , Rfl: 0    losartan (COZAAR) 100 MG tablet, Take 1 tablet (100 mg total) by mouth once daily., Disp: 30 tablet, Rfl: 11    methylphenidate HCl (RITALIN) 10 MG tablet, Take 1 tablet (10 mg total) by mouth 2 (two) times daily., Disp: 60 tablet, Rfl: 0    multivitamin capsule, Take 1 capsule by mouth once daily., Disp: , Rfl:     predniSONE (DELTASONE) 5 MG tablet, TAKE ONE TABLET BY MOUTH ONCE DAILY, Disp: 30 tablet, Rfl: 0    spironolactone (ALDACTONE) 25 MG tablet, Take 1 tablet (25 mg total) by mouth once daily., Disp: 30 tablet, Rfl: 11    Previous physical therapy and treatments:  Patient previously treated by a chiropractor for complaints of lumbar  pain    Occupational/psychosocial/educational profile:  Not employed at this time; patient reports she is unable to tolerate work due to myofascial pain.    OBJECTIVE:    Musculoskeletal Exam:    Postural Exam  No postural deviations noted.  SI exam negative for ilial rotation.    ROM  Cervical spine ROM WNL's  Patient complaints of tightness throughout the cervical paraspinals at the end of full flexion  Lumbar spine ROM is WNL's  Patient complains of tightness at the end point into full flexion; complains of pain at the end point into full extension in the mid lumbar region  Hip ROM is WNL's bilaterally  Shoulder ROM is WNL's bilaterally     Flexibility  HS, ITB, piriformis flexibility limited bilaterally    Functional Strength Testing  5/5 strength throughout the upper quadrant bilaterally except for:  Supraspinatus 4-/5 bilaterally, Infraspinatus 4-/5 bilaterally   5/5 strength throughout the lower quadrant bilaterally      Palpation  Multiple areas of diffuse tenderness throughout the upper quadrant bilaterally; especially upper traps, levator, rhomboids  Multiple areas of diffuse tenderness throughout the lower quadrant bilaterally; especially the gluteus medius, ITB      Neuromuscular Exam    Gait  No significant gait deviations noted    Transitional Movements  Patient complains of discomfort with sit to stand, sit to supine, supine to sit      Sensation  No sensory disturbances noted throughout the LE's or UE's  Patient did not complain of paresthesias.    PROBLEM LIST - ASSESSMENT  Patient continues to complain of multiple areas of myofascial tenderness throughout the upper and lower quadrants.  Inactivity due to the patients complaints of myofascial pain is likely contributing to joint stiffness and deconditioning leading to fatigue.  Patient is benefiting from  out-patient rehab program for flexibility, deconditioning, and functional weakness.  Patient should continue with stretching exercises to address  lumbar stiffness.  Exercises should also emphasize core stabilization.    Activity Limitations, Participation Restrictions, and CO-MORBIDITIES which may impact the plan of care and potentially impede the patient's progress in therapy include:  Patient DX with lupus    The patient does not present with any learning or communication barriers which may impact the plan of care and potentially impede the patient's progress in therapy.      CLINICAL PRESENTATION:  Patient's Clinical Presentation is STABLE.       RECOMMENDED PLAN OF CARE:  Stretching exercise program for shoulder girdle and hip girdle muscle groups  Stretching exercises for the cervical and lumbar muscle groups   Postural correction exercises  Endurance training  RTC strengthening   Core strengthening     TREATMENT PROVIDED:       (one on one with therapist for therapeutic exercises    35 Mins; supervised exercises 10 mins)  -passive stretching provided by therapist for lumbar, hip girdle, and LE muscle groups   -guided core strengthening on the functional   ---modified lateral pull downs  ---cross body punch outs  -strengthening for the upper quadrant  ---chest press  ---scapular retraction  -conditioning exercises  ---upper extremity ergometer  ---lower extremity ergometer    Moist heat applied to the lumbar paraspinal muscle groups to decrease stiffness.     The patient has been previously instructed in the following exercises:  -Lumbar rotational ROM and stretching in supine  -Lumbar flexion stretches in supine  -hamstring stretching  -piriformis stretching  -IT Band stretching  -gluteus medius stretching  -stretching of the hip internal rotators      PLAN:  Continue therapy 2 x week to continue the recommended plan of care.      SHORT TERM GOALS:    1. Patient compliant with daily stretching exercises   2. Patient will report pain level of 2/10 or less  3. Patient to follow up with Cardiologist for assessment of HPN    LONG TERM  GOALS:  1. Patient will report pain level of 1/10 or less  2. Patient will report 25% or greater decrease in functional impairment on the Optimal Instrument      These services are reasonable and necessary for the conditions set forth above while under my care.

## 2018-04-10 ENCOUNTER — APPOINTMENT (OUTPATIENT)
Dept: RADIOLOGY | Facility: CLINIC | Age: 53
End: 2018-04-10
Attending: INTERNAL MEDICINE
Payer: COMMERCIAL

## 2018-04-10 ENCOUNTER — CLINICAL SUPPORT (OUTPATIENT)
Dept: REHABILITATION | Facility: HOSPITAL | Age: 53
End: 2018-04-10
Attending: PHYSICAL MEDICINE & REHABILITATION
Payer: COMMERCIAL

## 2018-04-10 DIAGNOSIS — M79.7 SCAPULOHUMERAL FIBROSITIS: Primary | ICD-10-CM

## 2018-04-10 DIAGNOSIS — Z79.52 LONG TERM (CURRENT) USE OF SYSTEMIC STEROIDS: ICD-10-CM

## 2018-04-10 DIAGNOSIS — R29.898 DEFICIENCIES OF LIMBS: ICD-10-CM

## 2018-04-10 PROCEDURE — 77080 DXA BONE DENSITY AXIAL: CPT | Mod: 26,,, | Performed by: RADIOLOGY

## 2018-04-10 PROCEDURE — 97110 THERAPEUTIC EXERCISES: CPT | Mod: PO | Performed by: PHYSICAL THERAPIST

## 2018-04-10 PROCEDURE — 77080 DXA BONE DENSITY AXIAL: CPT | Mod: TC,PO

## 2018-04-10 NOTE — PROGRESS NOTES
DATE OF INITIAL PHYSICAL THERAPY EVALUATION:             February 15, 2018      REFERRING PROVIDER:       Radha Drummond MD      REFERRING DIAGNOSIS:       M79.7 (ICD-10-CM) - Fibromyalgia   R29.898 (ICD-10-CM) - Arm weakness   R29.898 (ICD-10-CM) - Weakness of both hips           ORDERS:   Evaluate and treat as indicated 2 x week x 60 days    Orders will     April 15, 2018        VISIT    #10   Re-evaluation      SUBJECTIVE:  Patient completed follow-up with Cardiology regarding HPN.  Dr. aMrtinez has cleared her for exercise.    Patient's BP taken today by therapist:   140/90   (taken manually)    Patient complains of lumbar stiffness and myofascial pain in the lumbar paraspinals.  Pain aggravated by bending forward and sweeping,  She states she has been working on the lumbar exercises daily and tolerating them well.    She states she is not feeling very well today.      Patients primary complaint(s):  Lumbar myofascial pain and stiffness  Joint soreness (hips and shoulders)  Fatigue  Poor endurance  Neck pain  Headaches    History of present condition:  (re-evaluation)  Patient reports she continues to experience joint and muscle pain throughout her trunk and extremities with muscle tenderness in the shoulder girdle, hip girdle, and cervical spine, and lumbar spine muscle groups.  Patient reports symptoms continue to impair most of her ADL's and is preventing her from working.  She reports and increase in stiffness in the early AM, and increasing discomfort with activity.   Attempts to complete housework increases her symptoms; especially sweeping and activities that require forward bending.    Pain Ratin/10 at rest      7/10 lumbar pain with forward bending.      Patient reports the following functional activity limitations:  ADL's impaired  Long distance walking impaired due to soreness and fatigue  Bending, stooping, squatting, lifting, and carrying impaired due to joint and muscle  pain      (FUNCTIONAL ASSESSMENT TOOL)     At the time of the initial evaluation, the patient reported the following self limitations on the Optimal Instrument functional assessment tool:  (Rating Scale: #1=Able to do without difficulty, #2=Able to do with little difficulty, #3=Able to do with moderate difficulty, #4=Able to do with much difficulty, #5=Unable to do, #9=Not applicable)    Optimal Instrument Scores reported by the patient:  Completed on April 10, 2018    1.  Lying flat    2  2.  Rolling over   2  3.  Moving - lying to sitting  2  4.  Sitting    2  5.  Squatting    4  6.  Bending/stooping   4  7.  Balancing    4  8.  Kneeling    4  9.  Standing    2  10.  Walking - short distance  3  11.  Walking - long distance  3  12.  Walking - outdoors  3  13.  Climbing stairs   4  14.  Hopping    3  15.  Jumping    3  16.  Running    4  17.  Pushing    3  18.  Pulling    3  19.  Reaching    4  20.  Grasping    3  21.  Lifting    4  22.  Carrying        Total Impairment Rating  54%  Previous impairment rating 49%    Past Medical History and co-morbidities:  Past Medical History:   Diagnosis Date    Anemia     with chronic disease lupus    Encounter for blood transfusion     G-6-PD deficiency     Heart murmur     MVP 1986    Hypertension     Lupus     Raynaud's syndrome      Patient Active Problem List   Diagnosis    CNS lupus    SOB (shortness of breath)    Essential hypertension    Systemic lupus erythematosus    Secondary Sjogren's syndrome    Glaucoma suspect of both eyes    Keratoconjunctivitis sicca of both eyes    Peripheral retinal degeneration of both eyes    Posterior vitreous detachment of both eyes    G6PD deficiency    Trigeminal neuralgia of right side of face    Atypical face pain    Long term (current) use of systemic steroids    Long-term current use of high risk medication other than anticoagulant       Current Outpatient Prescriptions:     azaTHIOprine (IMURAN) 50 mg Tab,  Take 1 tablet (50 mg total) by mouth once daily., Disp: 90 tablet, Rfl: 3    B1/B2/NIACIN/B12/PROTEASE (B-COMPLEX WITH B-12 ORAL), Take by mouth., Disp: , Rfl:     biotin 5,000 mcg TbDL, Take by mouth., Disp: , Rfl:     CALCIUM-MAGNESIUM-ZINC ORAL, Take by mouth once daily., Disp: , Rfl:     cholecalciferol, vitamin D3, (VITAMIN D3) 5,000 unit Tab, Take 5,000 Units by mouth once daily., Disp: , Rfl:     clobetasol (OLUX) 0.05 % Foam, Apply topically 2 (two) times daily. For scalp only.  Do not use on face., Disp: 50 g, Rfl: 3    cyclobenzaprine (FLEXERIL) 10 MG tablet, Take 10 mg by mouth 3 (three) times daily as needed for Muscle spasms., Disp: , Rfl:     cycloSPORINE (RESTASIS) 0.05 % ophthalmic emulsion, Place 0.4 mLs (1 drop total) into both eyes 2 (two) times daily., Disp: 60 vial, Rfl: 11    desoximetasone (TOPICORT) 0.25 % cream, Apply topically 2 (two) times daily as needed. Do not use on face, underarms or groin, Disp: 60 g, Rfl: 3    ferrous gluconate (FERGON) 324 MG tablet, Take 324 mg by mouth daily with breakfast., Disp: , Rfl:     fish oil-omega-3 fatty acids 300-1,000 mg capsule, Take 2 g by mouth once daily., Disp: , Rfl:     fluocinolone (DERMA-SMOOTHE) 0.01 % external oil, Apply oil to scalp twice a day, Disp: 1 Bottle, Rfl: 5    gabapentin (NEURONTIN) 300 MG capsule, Take 1 capsule (300 mg total) by mouth 3 (three) times daily., Disp: 90 capsule, Rfl: 5    hydrocodone-acetaminophen 7.5-325mg (NORCO) 7.5-325 mg per tablet, TAKE 1 TABLET EVERY 4-6 HOUR PRN PAIN, Disp: , Rfl: 0    losartan (COZAAR) 100 MG tablet, Take 1 tablet (100 mg total) by mouth once daily., Disp: 30 tablet, Rfl: 11    methylphenidate HCl (RITALIN) 10 MG tablet, Take 1 tablet (10 mg total) by mouth 2 (two) times daily., Disp: 60 tablet, Rfl: 0    multivitamin capsule, Take 1 capsule by mouth once daily., Disp: , Rfl:     predniSONE (DELTASONE) 5 MG tablet, TAKE ONE TABLET BY MOUTH ONCE DAILY, Disp: 30 tablet,  Rfl: 0    spironolactone (ALDACTONE) 25 MG tablet, Take 1 tablet (25 mg total) by mouth once daily., Disp: 30 tablet, Rfl: 11    Previous physical therapy and treatments:  Patient previously treated by a chiropractor for complaints of lumbar pain    Occupational/psychosocial/educational profile:  Not employed at this time; patient reports she is unable to tolerate work due to myofascial pain.    OBJECTIVE:    Musculoskeletal Exam:    Postural Exam  No postural deviations noted.  SI exam negative for ilial rotation.    ROM  Cervical spine ROM WNL's  Patient complaints of stiffness and discomfort throughout the cervical paraspinals at the end of full flexion  Lumbar spine ROM is WNL's  Patient complains of tightness at the end point into full flexion  Hip ROM is WNL's bilaterally  Shoulder ROM is WNL's bilaterally     Flexibility  HS, ITB, piriformis flexibility limited bilaterally    Functional Strength Testing  5/5 strength throughout the upper quadrant bilaterally except for:  Supraspinatus 4-/5 bilaterally, Infraspinatus 4-/5 bilaterally   5/5 strength throughout the lower quadrant bilaterally      Palpation  Patient reports diffuse tenderness throughout the upper quadrant bilaterally; especially upper traps, levator, rhomboids  Patient reports diffuse tenderness throughout the lower quadrant bilaterally; especially the gluteus medius, ITB      Neuromuscular Exam    Gait  No significant gait deviations noted    Transitional Movements  Patient complains of discomfort with sit to stand, sit to supine, supine to sit;  Discomfort described as stiffness.    Sensation  No sensory disturbances noted throughout the LE's or UE's  Patient did not complain of paresthesias in the extremities.    PROBLEM LIST - ASSESSMENT   Patient reports she continues to experience  multiple areas of myofascial tenderness throughout the upper and lower quadrants.  She continues to self-limit her functional activity at home due to complaints  of myofascial pain.  Patient reports benefiting from  out-patient rehab program; however, she continues to experience limitations in her overall  functional level.  Patient should continue with stretching exercises to address lumbar stiffness and seek opportunities to increase her activity level.  A walking or aquatic program are recommended.    Activity Limitations, Participation Restrictions, and CO-MORBIDITIES which may impact the plan of care and potentially impede the patient's progress in therapy include:  Patient DX with lupus    The patient does not present with any learning or communication barriers which may impact the plan of care and potentially impede the patient's progress in therapy.      CLINICAL PRESENTATION:  Patient's Clinical Presentation is STABLE.       RECOMMENDED PLAN OF CARE:  Stretching exercises for shoulder girdle and hip girdle muscle groups  Stretching exercises for the cervical and lumbar muscle groups   Postural correction exercises  Endurance training  RTC strengthening   Core strengthening     TREATMENT PROVIDED:       (one on one with therapist for therapeutic exercises    30 Mins; supervised exercises 10 mins)  -passive stretching provided by therapist for lumbar, hip girdle, and LE muscle groups   -guided core strengthening on the functional   ---modified lateral pull downs  ---cross body punch outs  -strengthening for the upper quadrant  ---chest press  ---scapular retraction  -conditioning exercises  ---upper extremity ergometer  ---lower extremity ergometer    Moist heat applied to the lumbar paraspinal muscle groups to decrease stiffness.     The patient has been previously instructed in the following exercises:  -Lumbar rotational ROM and stretching in supine  -Lumbar flexion stretches in supine  -hamstring stretching  -piriformis stretching  -IT Band stretching  -gluteus medius stretching  -stretching of the hip internal rotators      PLAN:  Continue therapy 2 x week  to continue the recommended plan of care.  Patient states she will reschedule when able to return in 1 to 2 weeks.      SHORT TERM GOALS:    1. Patient compliant with daily stretching exercises   2. Patient will report pain level of 2/10 or less  3. Patient to follow up with Cardiologist for assessment of HPN    LONG TERM GOALS:  1. Patient will report pain level of 1/10 or less  2. Patient will report 25% or greater decrease in functional impairment on the Optimal Instrument      These services are reasonable and necessary for the conditions set forth above while under my care.

## 2018-04-11 ENCOUNTER — TELEPHONE (OUTPATIENT)
Dept: RHEUMATOLOGY | Facility: CLINIC | Age: 53
End: 2018-04-11

## 2018-04-11 ENCOUNTER — TELEPHONE (OUTPATIENT)
Dept: GYNECOLOGIC ONCOLOGY | Facility: CLINIC | Age: 53
End: 2018-04-11

## 2018-04-11 NOTE — TELEPHONE ENCOUNTER
----- Message from Evelin Mckeon sent at 4/11/2018  1:04 PM CDT -----  Contact: pt  Pt would like to speak with nurse regarding having her last visit records sent to her PCP.

## 2018-04-11 NOTE — TELEPHONE ENCOUNTER
Spoke with pt and she would like office note and labs from last visit sent to her PCP. Will fax over.

## 2018-04-11 NOTE — TELEPHONE ENCOUNTER
----- Message from Evelin Mckeon sent at 4/11/2018  1:03 PM CDT -----  Contact: pt  Pt would like to speak to nurse regarding her last visit records to be sent to her PCP.

## 2018-04-16 ENCOUNTER — OFFICE VISIT (OUTPATIENT)
Dept: CARDIOLOGY | Facility: CLINIC | Age: 53
End: 2018-04-16
Payer: COMMERCIAL

## 2018-04-16 VITALS
DIASTOLIC BLOOD PRESSURE: 80 MMHG | WEIGHT: 189 LBS | HEIGHT: 66 IN | BODY MASS INDEX: 30.37 KG/M2 | SYSTOLIC BLOOD PRESSURE: 136 MMHG | HEART RATE: 66 BPM

## 2018-04-16 DIAGNOSIS — I10 ESSENTIAL HYPERTENSION: Primary | ICD-10-CM

## 2018-04-16 DIAGNOSIS — M32.19 SYSTEMIC LUPUS ERYTHEMATOSUS WITH OTHER ORGAN INVOLVEMENT, UNSPECIFIED SLE TYPE: ICD-10-CM

## 2018-04-16 PROBLEM — R06.02 SOB (SHORTNESS OF BREATH): Status: RESOLVED | Noted: 2017-02-10 | Resolved: 2018-04-16

## 2018-04-16 PROCEDURE — 99214 OFFICE O/P EST MOD 30 MIN: CPT | Mod: S$GLB,,, | Performed by: INTERNAL MEDICINE

## 2018-04-16 PROCEDURE — 99999 PR PBB SHADOW E&M-EST. PATIENT-LVL IV: CPT | Mod: PBBFAC,,, | Performed by: INTERNAL MEDICINE

## 2018-04-16 PROCEDURE — 93000 ELECTROCARDIOGRAM COMPLETE: CPT | Mod: S$GLB,,, | Performed by: NUCLEAR MEDICINE

## 2018-04-16 PROCEDURE — 3075F SYST BP GE 130 - 139MM HG: CPT | Mod: CPTII,S$GLB,, | Performed by: INTERNAL MEDICINE

## 2018-04-16 PROCEDURE — 3079F DIAST BP 80-89 MM HG: CPT | Mod: CPTII,S$GLB,, | Performed by: INTERNAL MEDICINE

## 2018-04-16 NOTE — PROGRESS NOTES
"Subjective:    Patient ID:  Ruth Messina is a 52 y.o. female who presents for evaluation of HTN.    HPI  Pt here for f/u.  She sees Dr. Acuna.     Chart reviewed.  Echo 2017 normal LV function.  Aldactone added Feb 2018 for HTN.  Seems intolerant to most HTN med classes and/or allergies, extensive.  BP today and last couple in clinic seem controlled.  ecg today is normal.   No exercise.  Room for improvement in diet.  BP was elevated but seems better now per pt.  No anginal or chf sxs.  Has lupus.      Review of Systems   Constitution: Negative.   HENT: Negative.    Eyes: Negative.    Cardiovascular: Negative.    Respiratory: Negative.    Endocrine: Negative.    Hematologic/Lymphatic: Negative.    Skin: Negative.    Musculoskeletal: Negative.    Gastrointestinal: Negative.    Genitourinary: Negative.    Neurological: Negative.    Psychiatric/Behavioral: Negative.    Allergic/Immunologic: Negative.          /80 (BP Location: Left arm)   Pulse 66   Ht 5' 5.5" (1.664 m)   Wt 85.7 kg (189 lb)   BMI 30.97 kg/m²     Wt Readings from Last 3 Encounters:   04/16/18 85.7 kg (189 lb)   04/03/18 83.5 kg (184 lb 1.4 oz)   03/29/18 83.8 kg (184 lb 11.9 oz)     Temp Readings from Last 3 Encounters:   04/03/18 98.3 °F (36.8 °C) (Oral)   03/12/18 98.5 °F (36.9 °C)   01/10/18 97.7 °F (36.5 °C) (Tympanic)     BP Readings from Last 3 Encounters:   04/16/18 136/80   04/03/18 122/78   03/29/18 123/78     Pulse Readings from Last 3 Encounters:   04/16/18 66   04/03/18 89   03/29/18 92       Objective:    Physical Exam   Constitutional: She is oriented to person, place, and time. Vital signs are normal. She appears well-developed and well-nourished. She is active and cooperative. She does not have a sickly appearance. She does not appear ill. No distress.   HENT:   Head: Normocephalic.   Neck: Neck supple. Normal carotid pulses, no hepatojugular reflux and no JVD present. Carotid bruit is not present. No thyromegaly " present.   Cardiovascular: Normal rate, regular rhythm, S1 normal, S2 normal, normal heart sounds and normal pulses.  PMI is not displaced.  Exam reveals no gallop and no friction rub.    No murmur heard.  Pulses:       Radial pulses are 2+ on the right side, and 2+ on the left side.   Pulmonary/Chest: Effort normal and breath sounds normal. She has no wheezes. She has no rales.   Abdominal: Soft. Normal appearance and bowel sounds are normal. She exhibits no abdominal bruit. There is no tenderness.   Musculoskeletal: She exhibits no edema.   Lymphadenopathy:     She has no cervical adenopathy.   Neurological: She is alert and oriented to person, place, and time.   Skin: Skin is warm. She is not diaphoretic.   Psychiatric: She has a normal mood and affect. Her behavior is normal.   Nursing note and vitals reviewed.      I have reviewed all pertinent labs and cardiac studies.      Chemistry        Component Value Date/Time     04/03/2018 0945    K 3.9 04/03/2018 0945     04/03/2018 0945    CO2 25 04/03/2018 0945    BUN 13 04/03/2018 0945    CREATININE 1.0 04/03/2018 0945     (H) 04/03/2018 0945        Component Value Date/Time    CALCIUM 9.8 04/03/2018 0945    ALKPHOS 109 04/03/2018 0945    AST 20 04/03/2018 0945    ALT 20 04/03/2018 0945    BILITOT 0.7 04/03/2018 0945    ESTGFRAFRICA >60 04/03/2018 0945    EGFRNONAA >60 04/03/2018 0945        Lab Results   Component Value Date    WBC 4.52 04/03/2018    HGB 10.7 (L) 04/03/2018    HCT 33.9 (L) 04/03/2018    MCV 89 04/03/2018     04/03/2018     No results found for: LABA1C, HGBA1C  No results found for: CHOL  No results found for: HDL  No results found for: LDLCALC  No results found for: TRIG  No results found for: CHOLHDL        Assessment:       1. Essential hypertension    2. Systemic lupus erythematosus with other organ involvement, unspecified SLE type         Plan:             Enroll in digital HTN program.  Continue current meds.    Cardiac low salt diet info given to pt.   Exercise daily x 30 - 45 minutes  Risk factor modification, at risk for CAD with h/o lupus.   F/u with Dr. Acuna in 6 weeks.

## 2018-04-16 NOTE — PATIENT INSTRUCTIONS
Low-Salt Diet  This diet removes foods that are high in salt. It also limits the amount of salt you use when cooking. It is most often used for people with high blood pressure, edema (fluid retention), and kidney, liver, or heart disease.  Table salt contains the mineral sodium. Your body needs sodium to work normally. But too much sodium can make your health problems worse. Your healthcare provider is recommending a low-salt (also called low-sodium) diet for you. Your total daily allowance of salt is 1,500 to 2,300 milligrams (mg). It is less than 1 teaspoon of table salt. This means you can have only about 500 to 700 mg of sodium at each meal. People with certain health problems should limit salt intake to the lower end of the recommended range.    When you cook, dont add much salt. If you can cook without using salt, even better. Dont add salt to your food at the table.  When shopping, read food labels. Salt is often called sodium on the label. Choose foods that are salt-free, low salt, or very low salt. Note that foods with reduced salt may not lower your salt intake enough.    Beans, potatoes, and pasta  Ok: Dry beans, split peas, lentils, potatoes, rice, macaroni, pasta, spaghetti without added salt  Avoid: Potato chips, tortilla chips, and similar products  Breads and cereals  Ok: Low-sodium breads, rolls, cereals, and cakes; low-salt crackers, matzo crackers  Avoid: Salted crackers, pretzels, popcorn, Croatian toast, pancakes, muffins  Dairy  Ok: Milk, chocolate milk, hot chocolate mix, low-salt cheeses, and yogurt  Avoid: Processed cheese and cheese spreads; Roquefort, Camembert, and cottage cheese; buttermilk, instant breakfast drink  Desserts  Ok: Ice cream, frozen yogurt, juice bars, gelatin, cookies and pies, sugar, honey, jelly, hard candy  Avoid: Most pies, cakes and cookies prepared or processed with salt; instant pudding  Drinks  Ok: Tea, coffee, fizzy (carbonated) drinks, juices  Avoid: Flavored  coffees, electrolyte replacement drinks, sports drinks  Meats  Ok: All fresh meat, fish, poultry, low-salt tuna, eggs, egg substitute  Avoid: Smoked, pickled, brine-cured, or salted meats and fish. This includes crane, chipped beef, corned beef, hot dogs, deli meats, ham, kosher meats, salt pork, sausage, canned tuna, salted codfish, smoked salmon, herring, sardines, or anchovies.  Seasonings and spices  Ok: Most seasonings are okay. Good substitutes for salt include: fresh herb blends, hot sauce, lemon, garlic, nicholson, vinegar, dry mustard, parsley, cilantro, horseradish, tomato paste, regular margarine, mayonnaise, unsalted butter, cream cheese, vegetable oil, cream, low-salt salad dressing and gravy.  Avoid: Regular ketchup, relishes, pickles, soy sauce, teriyaki sauce, Worcestershire sauce, BBQ sauce, tartar sauce, meat tenderizer, chili sauce, regular gravy, regular salad dressing, salted butter  Soups  Ok: Low-salt soups and broths made with allowed foods  Avoid: Bouillon cubes, soups with smoked or salted meats, regular soup and broth  Vegetables  Ok: Most vegetables are okay; also low-salt tomato and vegetable juices  Avoid: Sauerkraut and other brine-soaked vegetables; pickles and other pickled vegetables; tomato juice, olives  Date Last Reviewed: 8/1/2016 © 2000-2017 [employer]. [address] [place] [address]. All rights reserved. This information is not intended as a substitute for professional medical care. Always follow your healthcare professional's instructions.        Discharge Instructions: Eating a Low-Salt Diet  Your health care provider has prescribed a low-salt diet for you. Most people with heart problems need to eat less salt, which is full of sodium. Too much sodium is linked to high blood pressure, which is linked to a greater risk of heart disease, stroke, blindness, and kidney problems.  Home care    Learn ways to cut back on salt (sodium):  · Eat less frozen,  canned, dried, packaged, and fast foods. These often contain high amounts of sodium.  · Season foods with herbs instead of salt when you cook.  · Season with flavorings such as pepper, lemon, garlic, and onion.  · Dont add salt to your food at the table.  · Sprinkle salt-free herbal blends on meats and vegetables.  Learn to read food labels carefully:  · Look for the total amount of sodium per serving.  · Look for foods labeled low sodium, reduced sodium, or no added salt.  · Beware. Salt goes by many names. Cut down on foods with these words (all forms of salt) listed as ingredients:  ¨ Salt  ¨ Sodium  ¨ Soy sauce  ¨ Baking soda  ¨ Baking powder  ¨ MSG  ¨ Monosodium  ¨ Na (the chemical symbol for sodium)  Other ideas:  · Use more fresh food. Buy more fruits and vegetables.  · Select lean meats, fish, and poultry.  · Find a cookbook with low-salt recipes. Youll find ideas for tasty meals that are healthy for your heart.  ·   When eating out, ask questions about the menu. Tell the  you're on a low-salt diet.  ¨ If you order fish, chicken, beef, or pork, ask the  to have it broiled, baked, poached, or grilled without salt, butter, or breading.  ¨ Choose plain steamed rice, boiled noodles, and baked or boiled potatoes. Top potatoes with chives and a little sour cream instead of butter.  · Avoid antacids that are high in salt. Check the label before you buy.  Follow-up  Make a follow-up appointment with a nutritionist as directed by our staff.  Date Last Reviewed: 6/20/2015  © 0319-6031 [employer]. [address] [place] [address], [place] [address]. All rights reserved. This information is not intended as a substitute for professional medical care. Always follow your healthcare professional's instructions.        Low-Salt Choices  Eating salt (sodium) can make your body retain too much water. Excess water makes your heart work harder. Canned, packaged, and frozen foods are easy to prepare, but they  are often high in sodium. Here are some ideas for low-salt foods you can easily prepare yourself.    For breakfast  · Fruit or 100% fruit juice  · Whole-wheat bread or an English muffin. Compare sodium content on labels.  · Low-fat milk or yogurt  · Unsalted eggs  · Shredded wheat  · Corn tortillas  · Unsalted steamed rice  · Regular (not instant) hot cereal, made without salt  Stay away from:  · Sausage, crane, and ham  · Flour tortillas  · Packaged muffins, pancakes, and biscuits  · Instant hot cereals  · Cottage cheese  For lunch and dinner  · Fresh fish, chicken, turkey, or meat--baked, broiled, or roasted without salt  · Dry beans, cooked without salt  · Tofu, stir-fried without salt  · Unsalted fresh fruit and vegetables, or frozen or canned fruit and vegetables with no added salt  Stay away from:  · Lunch or deli meat that is cured or smoked  · Cheese  · Tomato juice and catsup  · Canned vegetables, soups, and fish not labeled as no-salt-added or reduced sodium  · Packaged gravies and sauces  · Olives, pickles, and relish  · Bottled salad dressings  For snacks and desserts  · Yogurt  · Unsalted, air popped popcorn  · Unsalted nuts or seeds  Stay away from:  · Pies and cakes  · Packaged dessert mixes  · Pizza  · Canned and packaged puddings  · Pretzels, chips, crackers, and nuts--unless the label says unsalted  Date Last Reviewed: 6/17/2015  © 9691-3131 [employer]. [address] [place] [address]. All rights reserved. This information is not intended as a substitute for professional medical care. Always follow your healthcare professional's instructions.        Tips for Using Less Salt    Most people with heart problems need to eat less salt (sodium). Reducing the amount of salt you eat may help control your blood pressure. The higher your blood pressure, the greater your risk for heart disease, stroke, blindness, and kidney problems.  At the store  · Make low-salt choices by reading  labels carefully. Look for the total amount of sodium per serving.  · Use more fresh food. Buy more fruits and vegetables. Select lean meats, fish, and poultry.  · Use fewer frozen, canned, and packaged foods which often contain a lot of sodium.  · Use plain frozen vegetables without sauces or toppings. These products are often low- or no-sodium.  · Opt for reduced-sodium or no-salt-added versions of canned vegetables and soups.  In the kitchen  · Don't add salt to food when you're cooking. Season with flavorings such as onion, garlic, pepper, salt-free herbal blends, and lemon or lime juice.  · Use a cookbook containing low-salt recipes. It can give you ideas for tasty meals that are healthy for your heart.  · Sprinkle salt-free herbal blends on vegetables and meat.  · Drain and rinse canned foods, such as canned beans and vegetables, before cooking or eating.  Eating out  · Tell the  you're on a low-salt diet. Ask questions about the menu.  · Order fish, chicken, and meat broiled, baked, poached, or grilled without salt, butter, or breading.  · Use lemon, pepper, and salt-free herb mixes to add flavor.  · Choose plain steamed rice, boiled noodles, and baked or boiled potatoes. Top potatoes with chives and a little sour cream.     Beware! Salt goes by many other names. Limit foods with these words listed as ingredients: salt, sodium, soy sauce, baking soda, baking powder, MSG, monosodium, Na (the chemical symbol for sodium). Some antacids are also high in salt.   Date Last Reviewed: 6/19/2015  © 6965-1196 [employer]. [address] [place] [address], [place] [address]. All rights reserved. This information is not intended as a substitute for professional medical care. Always follow your healthcare professional's instructions.        Kidney Disease: Eating Less Sodium  Sodium is a mineral that the body needs in small amounts. Sodium is found in table salt. Most people eat far more salt than they need.  There are 2main reasons for this: Salt is present in high amounts in most processed foods (pre-prepared foods like breakfast cereals, cookies, and pickles) and in all restaurant foods. In other words, if you are not cooking from fresh ingredients at home, you are very likely eating more salt than you need. When sodium intake is too high, it can increase thirst and cause the body to retain fluid. To avoid these side effects, people with chronic kidney disease are often told to eat less sodium. The tips on this sheet can show you how.  People with chronic kidney disease should restrict their salt intake to less than 1,500 milligrams (mg) of sodium daily. Food labels generally report the sodium content. Table salt is sodium chloride. One level teaspoon of table salt contains 2.300 mg of sodium.    When you shop for food  Unlike canned and processed foods, fresh foods have no added salt and are better for you. When youre food shopping:  · Choose fresh foods when you can.  · Read food labels before buying packaged foods. Check the labels nutrition facts for sodium amounts and servings per package.  · Try to pick packaged foods with a sodium content of 140 mg (milligrams) or less per serving.  · Do not choose foods with over 400 mg of sodium per serving.  Season instead of salt  Try the seasonings and foods listed below to season without sodium.  · Basil: tomatoes, squash, eggplant, soups, fish  · Ramírez: soups, rice, lentils, chicken  · Dill: beets, cucumbers, green beans  · Garlic: sauces, vegetables, meats, fish  · Tami: carrots, chicken, cooked fruit, white sauces  · Lemon: asparagus, artichokes, broccoli, spinach, fish  · Mint: cold soups, salads, fruit dishes  · Oregano: eggplant, chicken, salads, sauces  · Thyme: chicken, fish, lean meats, soups, stews  Food that has salt added during cooking tastes less salty than if salt is sprinkled on it at the table. Therefore, use half the amount of salt you want to have  in your food during cooking, and sprinkle the other half on the food at the table.  Do not use seasoned salt or salt substitutes. They may contain sodium or potassium (another mineral people with kidney disease are often told to limit).  Date Last Reviewed: 2/1/2017  © 4651-0355 [employer]. [address] [place] [address], [place] [address]. All rights reserved. This information is not intended as a substitute for professional medical care. Always follow your healthcare professional's instructions.

## 2018-04-17 ENCOUNTER — PATIENT MESSAGE (OUTPATIENT)
Dept: ADMINISTRATIVE | Facility: OTHER | Age: 53
End: 2018-04-17

## 2018-04-24 ENCOUNTER — TELEPHONE (OUTPATIENT)
Dept: DERMATOLOGY | Facility: CLINIC | Age: 53
End: 2018-04-24

## 2018-04-24 NOTE — TELEPHONE ENCOUNTER
----- Message from Lizzie Lucero sent at 4/24/2018  3:46 PM CDT -----  Contact: pt  She's calling to discuss medication, please advise [phone] (home)

## 2018-04-26 DIAGNOSIS — R29.898 WEAKNESS OF BOTH HIPS: ICD-10-CM

## 2018-04-26 DIAGNOSIS — M79.7 FIBROMYALGIA: Primary | ICD-10-CM

## 2018-05-06 ENCOUNTER — PATIENT MESSAGE (OUTPATIENT)
Dept: ADMINISTRATIVE | Facility: OTHER | Age: 53
End: 2018-05-06

## 2018-05-09 ENCOUNTER — TELEPHONE (OUTPATIENT)
Dept: RHEUMATOLOGY | Facility: CLINIC | Age: 53
End: 2018-05-09

## 2018-05-09 NOTE — TELEPHONE ENCOUNTER
----- Message from Regina Flowers sent at 5/9/2018 10:32 AM CDT -----  Contact: Patient  Patient is returning a call, please call them back at [phone]. Thank you

## 2018-05-09 NOTE — TELEPHONE ENCOUNTER
Spoke with pt and she would like to go ahead and start on the Benlsyta infusions. Advised patient that we would let Dr. CHUNG know but he is out until next Wednesday and as soon as we hear back from him we will let her know. Pt verbalized understanding.

## 2018-05-09 NOTE — TELEPHONE ENCOUNTER
----- Message from Edith Moon sent at 5/9/2018  8:10 AM CDT -----  Contact: self [phone]  Would like to consult with nurse regarding medication.   Please call back at [phone].  Md Sarah

## 2018-05-10 DIAGNOSIS — M79.7 FIBROMYALGIA: Primary | ICD-10-CM

## 2018-05-13 ENCOUNTER — TELEPHONE (OUTPATIENT)
Dept: RHEUMATOLOGY | Facility: CLINIC | Age: 53
End: 2018-05-13

## 2018-05-13 DIAGNOSIS — G05.3 CNS LUPUS: ICD-10-CM

## 2018-05-13 DIAGNOSIS — M32.9 SYSTEMIC LUPUS ERYTHEMATOSUS ARTHRITIS: Primary | ICD-10-CM

## 2018-05-13 DIAGNOSIS — M32.19 OTHER SYSTEMIC LUPUS ERYTHEMATOSUS WITH OTHER ORGAN INVOLVEMENT: ICD-10-CM

## 2018-05-13 DIAGNOSIS — M32.19 CNS LUPUS: ICD-10-CM

## 2018-05-13 RX ORDER — ACETAMINOPHEN 325 MG/1
650 TABLET ORAL
Status: CANCELLED | OUTPATIENT
Start: 2018-05-13

## 2018-05-13 RX ORDER — HEPARIN 100 UNIT/ML
500 SYRINGE INTRAVENOUS
Status: CANCELLED | OUTPATIENT
Start: 2018-05-13

## 2018-05-13 RX ORDER — SODIUM CHLORIDE 0.9 % (FLUSH) 0.9 %
10 SYRINGE (ML) INJECTION
Status: CANCELLED | OUTPATIENT
Start: 2018-05-13

## 2018-05-14 ENCOUNTER — TELEPHONE (OUTPATIENT)
Dept: RHEUMATOLOGY | Facility: CLINIC | Age: 53
End: 2018-05-14

## 2018-05-14 ENCOUNTER — TELEPHONE (OUTPATIENT)
Dept: RHEUMATOLOGY | Facility: HOSPITAL | Age: 53
End: 2018-05-14

## 2018-05-14 ENCOUNTER — PATIENT MESSAGE (OUTPATIENT)
Dept: RHEUMATOLOGY | Facility: CLINIC | Age: 53
End: 2018-05-14

## 2018-05-14 NOTE — TELEPHONE ENCOUNTER
----- Message from Davonte Nielsen sent at 5/14/2018 11:05 AM CDT -----  Contact: pt   Pt is  Requesting a call back from nurse in regards to pt orders for infusion.  [phone] (jsaf)

## 2018-05-16 ENCOUNTER — PATIENT MESSAGE (OUTPATIENT)
Dept: RHEUMATOLOGY | Facility: CLINIC | Age: 53
End: 2018-05-16

## 2018-05-16 ENCOUNTER — TELEPHONE (OUTPATIENT)
Dept: RHEUMATOLOGY | Facility: CLINIC | Age: 53
End: 2018-05-16

## 2018-05-16 NOTE — TELEPHONE ENCOUNTER
----- Message from Sumanth Puentes LPN sent at 5/15/2018 11:13 AM CDT -----  Mychart message from pt below:    ----- Message -----     From: Ruth Messina     Sent: 5/14/2018  9:42 PM CDT       To: Chirag Turner MD  Subject: Non-Urgent Medical    Dr CHUNG, Congratulations on the new addition to your family!  Once you receive this message, please give me a call to ease my mind a little about what to expect.    I start the Benlysta infusion on Thursday,  May 24, 2018 so I have a few questions to ask:    1) Once I have my first infusion at what point are labs repeated to make sure my CBC has not decreased.    2) I've been reading information concerning Benlysta and the information indicated nausea and vomiting as  a side effect.  Will you prescribe phenagan  because I had an adverse reaction to Zofran?    3) What  is the treatment plan for the Benylsta infusion and can a copy of the treatment plan to include how often will the infusions be required, the dosage, and the length of time the infusions will take (for example: weeks, months, or a year)?    4) Once I start the first infusion, will I continue the Imuran and prednisone?    Thank you so much for your attention and I look forward to hearing from you soon.     Kindest regards,     Silvana Messina

## 2018-05-16 NOTE — TELEPHONE ENCOUNTER
Dear ,   Thanks . Below are the answers for your questions.      1) Once I have my first infusion at what point are labs repeated to make sure my CBC has not decreased- CBC is checked with every benlysta infusion.      2) I've been reading information concerning Benlysta and the information indicated nausea and vomiting as  a side effect.  Will you prescribe phenagan  because I had an adverse reaction to Zofran? - If you develop nausea during infusion, we will give phenergan.      3) What  is the treatment plan for the Benylsta infusion and can a copy of the treatment plan to include how often will the infusions be required, the dosage, and the length of time the infusions will take (for example: weeks, months, or a year)?- Treatment plan is every 2 weeks for 3 doses and every 4 weeks thereafter.      4) Once I start the first infusion, will I continue the Imuran and prednisone?- Yes, we will continue imuran and prednisone since it is an additional therapy to both of the medications.     Please let me know if you have any further questions or concerns.     Regards

## 2018-05-22 ENCOUNTER — CLINICAL SUPPORT (OUTPATIENT)
Dept: REHABILITATION | Facility: HOSPITAL | Age: 53
End: 2018-05-22
Attending: PHYSICAL MEDICINE & REHABILITATION
Payer: COMMERCIAL

## 2018-05-22 DIAGNOSIS — M79.7 SCAPULOHUMERAL FIBROSITIS: Primary | ICD-10-CM

## 2018-05-22 DIAGNOSIS — R29.898 DEFICIENCIES OF LIMBS: ICD-10-CM

## 2018-05-22 PROCEDURE — 97110 THERAPEUTIC EXERCISES: CPT | Mod: PO | Performed by: PHYSICAL THERAPIST

## 2018-05-22 NOTE — PROGRESS NOTES
DATE OF INITIAL PHYSICAL THERAPY EVALUATION:             February 15, 2018      REFERRING PROVIDER:       Radha Drummond MD      REFERRING DIAGNOSIS:       M79.7 (ICD-10-CM) - Fibromyalgia   R29.898 (ICD-10-CM) - Arm weakness   R29.898 (ICD-10-CM) - Weakness of both hips           ORDERS:   Updated orders received from Bhanu Benitez to continue therapy 2 x week x 60 days    Orders will     2018    VISIT    #11      SUBJECTIVE:  Patient's BP taken today by therapist:   144/102   (taken manually)  Patient reported she forgot to take her BP medication this morning.  Patient's last visit in therapy was April 10, 2018.   She is returning today to resume rehab for complaints of lumbar pain and stiffness.    Patients primary complaint(s):  Lumbar myofascial pain and stiffness  Joint soreness (hips and shoulders)  Fatigue  Poor endurance    History of present condition:  (re-evaluation on May 22, 2018)  Patient is complaining of myofascial pain throughout the lumbar paraspinals with stiffness involving the lumbar spine.    She also complains of joint and muscle pain throughout hip girdle region bilaterally.    Patient reports symptoms continue to limit most of her ADL's and is preventing her from working.  She reports and increase in stiffness in the early AM, and increasing discomfort with activity.   Attempts to complete housework increases her symptoms; especially sweeping and activities that require forward bending.    Pain Ratin/10 at rest      7/10 lumbar pain with forward bending.      Patient reports the following functional activity limitations:  ADL's impaired  Long distance walking impaired due to soreness and fatigue  Bending, stooping, squatting, lifting, and carrying impaired due to joint and muscle pain      (FUNCTIONAL ASSESSMENT TOOL)     At the time of the initial evaluation, the patient reported the following self limitations on the Optimal Instrument functional assessment  tool:  (Rating Scale: #1=Able to do without difficulty, #2=Able to do with little difficulty, #3=Able to do with moderate difficulty, #4=Able to do with much difficulty, #5=Unable to do, #9=Not applicable)    Optimal Instrument Scores reported by the patient:  Completed on May 22, 2018    1.  Lying flat    3  2.  Rolling over   3  3.  Moving - lying to sitting  3  4.  Sitting    3  5.  Squatting    4  6.  Bending/stooping   4  7.  Balancing    2  8.  Kneeling    4  9.  Standing    2  10.  Walking - short distance  3  11.  Walking - long distance  4  12.  Walking - outdoors  3  13.  Climbing stairs   4  14.  Hopping    4  15.  Jumping    4  16.  Running    4  17.  Pushing    3  18.  Pulling    3  19.  Reaching    4  20.  Grasping    3  21.  Lifting    4  22.  Carrying    4    Total Impairment Rating  60%      Past Medical History and co-morbidities:  Past Medical History:   Diagnosis Date    Anemia     with chronic disease lupus    Encounter for blood transfusion     G-6-PD deficiency     Heart murmur     MVP 1986    Hypertension     Lupus     Raynaud's syndrome     Systemic lupus erythematosus arthritis 5/13/2018     Patient Active Problem List   Diagnosis    CNS lupus    Essential hypertension    Systemic lupus erythematosus    Secondary Sjogren's syndrome    Glaucoma suspect of both eyes    Keratoconjunctivitis sicca of both eyes    Peripheral retinal degeneration of both eyes    Posterior vitreous detachment of both eyes    G6PD deficiency    Trigeminal neuralgia of right side of face    Atypical face pain    Long term (current) use of systemic steroids    Long-term current use of high risk medication other than anticoagulant    Systemic lupus erythematosus arthritis       Current Outpatient Prescriptions:     azaTHIOprine (IMURAN) 50 mg Tab, Take 1 tablet (50 mg total) by mouth once daily., Disp: 90 tablet, Rfl: 3    B1/B2/NIACIN/B12/PROTEASE (B-COMPLEX WITH B-12 ORAL), Take by mouth.,  Disp: , Rfl:     biotin 5,000 mcg TbDL, Take by mouth., Disp: , Rfl:     CALCIUM-MAGNESIUM-ZINC ORAL, Take by mouth once daily., Disp: , Rfl:     cholecalciferol, vitamin D3, (VITAMIN D3) 5,000 unit Tab, Take 5,000 Units by mouth once daily., Disp: , Rfl:     clobetasol (OLUX) 0.05 % Foam, Apply topically 2 (two) times daily. For scalp only.  Do not use on face., Disp: 50 g, Rfl: 3    cyclobenzaprine (FLEXERIL) 10 MG tablet, Take 10 mg by mouth 3 (three) times daily as needed for Muscle spasms., Disp: , Rfl:     cycloSPORINE (RESTASIS) 0.05 % ophthalmic emulsion, Place 0.4 mLs (1 drop total) into both eyes 2 (two) times daily., Disp: 60 vial, Rfl: 11    desoximetasone (TOPICORT) 0.25 % cream, Apply topically 2 (two) times daily as needed. Do not use on face, underarms or groin, Disp: 60 g, Rfl: 3    ferrous gluconate (FERGON) 324 MG tablet, Take 324 mg by mouth daily with breakfast., Disp: , Rfl:     fish oil-omega-3 fatty acids 300-1,000 mg capsule, Take 2 g by mouth once daily., Disp: , Rfl:     fluocinolone (DERMA-SMOOTHE) 0.01 % external oil, Apply oil to scalp twice a day, Disp: 1 Bottle, Rfl: 5    gabapentin (NEURONTIN) 300 MG capsule, Take 1 capsule (300 mg total) by mouth 3 (three) times daily., Disp: 90 capsule, Rfl: 5    losartan (COZAAR) 100 MG tablet, Take 1 tablet (100 mg total) by mouth once daily., Disp: 30 tablet, Rfl: 11    methylphenidate HCl (RITALIN) 10 MG tablet, Take 1 tablet (10 mg total) by mouth 2 (two) times daily., Disp: 60 tablet, Rfl: 0    multivitamin capsule, Take 1 capsule by mouth once daily., Disp: , Rfl:     predniSONE (DELTASONE) 5 MG tablet, TAKE ONE TABLET BY MOUTH ONCE DAILY, Disp: 30 tablet, Rfl: 0    spironolactone (ALDACTONE) 25 MG tablet, Take 1 tablet (25 mg total) by mouth once daily., Disp: 30 tablet, Rfl: 11    Previous physical therapy and treatments:  Patient previously treated by a chiropractor for complaints of lumbar  pain    Occupational/psychosocial/educational profile:  Not employed at this time; patient reports she is unable to tolerate work due to myofascial pain.    OBJECTIVE:    Musculoskeletal Exam:    Postural Exam  No postural deviations noted.  SI exam negative for ilial rotation.    ROM  Cervical spine ROM WNL's  Patient complaints of stiffness and discomfort throughout the cervical paraspinals at the end of full flexion  Lumbar spine ROM is WNL's except for forward flexion which is limited to 75% of full expected range.  Patient complains of tightness at the end point into full flexion throughout the lumbar paraspinal musculature.  Hip ROM is WNL's bilaterally  Shoulder ROM is WNL's bilaterally     Flexibility  HS, ITB, piriformis flexibility limited bilaterally    Functional Strength Testing  5/5 strength throughout the upper quadrant bilaterally except for:  Supraspinatus 4-/5 bilaterally, Infraspinatus 4-/5 bilaterally   5/5 strength throughout the lower quadrant bilaterally      Palpation  Patient reports diffuse tenderness throughout the upper quadrant bilaterally; especially upper traps, levator, rhomboids  Patient reports diffuse tenderness throughout the lower quadrant bilaterally; especially the gluteus medius, ITB  Patient presents with lumbar paraspinal guarding and diffuse tenderness      Neuromuscular Exam    Gait  No significant gait deviations noted    Transitional Movements  Patient complains of discomfort with sit to stand, sit to supine, supine to sit;  Discomfort described as stiffness.    Sensation  No sensory disturbances noted throughout the LE's or UE's  Patient did not complain of paresthesias in the extremities.    PROBLEM LIST - ASSESSMENT   Patient reports multiple areas of myofascial tenderness throughout the upper and lower quadrants and in the lumbar paraspinal region which limits her tolerance to functional activities.   Patient reports benefiting from recent participation in an  out-patient rehab program.  Recommend patient continue with stretching exercises to address lumbar stiffness and seek opportunities to increase her overall activity level.  A walking or aquatic program are recommended.    Activity Limitations, Participation Restrictions, and CO-MORBIDITIES which may impact the plan of care and potentially impede the patient's progress in therapy include:  Patient DX with lupus    The patient does not present with any learning or communication barriers which may impact the plan of care and potentially impede the patient's progress in therapy.      CLINICAL PRESENTATION:  Patient's Clinical Presentation is STABLE.       RECOMMENDED PLAN OF CARE:  Stretching exercises for shoulder girdle and hip girdle muscle groups  Stretching exercises for the cervical and lumbar muscle groups   Postural correction exercises  Endurance training  RTC strengthening   Core strengthening     TREATMENT PROVIDED:       (one on one with therapist for therapeutic exercises    30 Mins; supervised exercises 10 mins)  -passive stretching provided by therapist for lumbar, hip girdle, and LE muscle groups   -guided core strengthening on the functional   ---modified lateral pull downs  ---cross body punch outs  -strengthening for the upper quadrant  ---chest press  ---scapular retraction  -conditioning exercises  ---upper extremity ergometer  ---lower extremity ergometer    Moist heat applied to the lumbar paraspinal muscle groups to decrease stiffness.     The patient has been previously instructed in the following exercises:  -Lumbar rotational ROM and stretching in supine  -Lumbar flexion stretches in supine  -hamstring stretching  -piriformis stretching  -IT Band stretching  -gluteus medius stretching  -stretching of the hip internal rotators      PLAN:  Continue therapy 2 x week to continue the recommended plan of care.      SHORT TERM GOALS:    1. Patient compliant with daily stretching exercises    2. Patient will report pain level of 2/10 or less    LONG TERM GOALS:  1. Patient will report pain level of 1/10 or less  2. Patient will report 25% or greater decrease in functional impairment on the Optimal Instrument      These services are reasonable and necessary for the conditions set forth above while under my care.

## 2018-05-24 ENCOUNTER — INFUSION (OUTPATIENT)
Dept: RHEUMATOLOGY | Facility: HOSPITAL | Age: 53
End: 2018-05-24
Attending: INTERNAL MEDICINE
Payer: COMMERCIAL

## 2018-05-24 ENCOUNTER — CLINICAL SUPPORT (OUTPATIENT)
Dept: REHABILITATION | Facility: HOSPITAL | Age: 53
End: 2018-05-24
Attending: PHYSICAL MEDICINE & REHABILITATION
Payer: COMMERCIAL

## 2018-05-24 VITALS
SYSTOLIC BLOOD PRESSURE: 145 MMHG | DIASTOLIC BLOOD PRESSURE: 94 MMHG | WEIGHT: 187.38 LBS | HEART RATE: 69 BPM | RESPIRATION RATE: 20 BRPM | TEMPERATURE: 97 F | BODY MASS INDEX: 30.71 KG/M2

## 2018-05-24 DIAGNOSIS — M32.19 CNS LUPUS: ICD-10-CM

## 2018-05-24 DIAGNOSIS — M79.7 SCAPULOHUMERAL FIBROSITIS: Primary | ICD-10-CM

## 2018-05-24 DIAGNOSIS — R29.898 DEFICIENCIES OF LIMBS: ICD-10-CM

## 2018-05-24 DIAGNOSIS — G05.3 CNS LUPUS: ICD-10-CM

## 2018-05-24 DIAGNOSIS — M32.9 SYSTEMIC LUPUS ERYTHEMATOSUS ARTHRITIS: Primary | ICD-10-CM

## 2018-05-24 PROCEDURE — 63600175 PHARM REV CODE 636 W HCPCS: Mod: PO | Performed by: INTERNAL MEDICINE

## 2018-05-24 PROCEDURE — 97110 THERAPEUTIC EXERCISES: CPT | Mod: PO | Performed by: PHYSICAL THERAPIST

## 2018-05-24 PROCEDURE — 96413 CHEMO IV INFUSION 1 HR: CPT | Mod: PO

## 2018-05-24 PROCEDURE — A4216 STERILE WATER/SALINE, 10 ML: HCPCS | Mod: PO | Performed by: INTERNAL MEDICINE

## 2018-05-24 PROCEDURE — 25000003 PHARM REV CODE 250: Mod: PO | Performed by: INTERNAL MEDICINE

## 2018-05-24 PROCEDURE — 96375 TX/PRO/DX INJ NEW DRUG ADDON: CPT | Mod: PO

## 2018-05-24 RX ORDER — ACETAMINOPHEN 325 MG/1
650 TABLET ORAL
Status: CANCELLED | OUTPATIENT
Start: 2018-05-24

## 2018-05-24 RX ORDER — SODIUM CHLORIDE 0.9 % (FLUSH) 0.9 %
10 SYRINGE (ML) INJECTION
Status: DISCONTINUED | OUTPATIENT
Start: 2018-05-24 | End: 2018-05-24 | Stop reason: HOSPADM

## 2018-05-24 RX ORDER — DIPHENHYDRAMINE HYDROCHLORIDE 50 MG/ML
25 INJECTION INTRAMUSCULAR; INTRAVENOUS
Status: CANCELLED
Start: 2018-05-24

## 2018-05-24 RX ORDER — ACETAMINOPHEN 325 MG/1
650 TABLET ORAL
Status: DISCONTINUED | OUTPATIENT
Start: 2018-05-24 | End: 2018-05-24

## 2018-05-24 RX ORDER — ACETAMINOPHEN 325 MG/1
650 TABLET ORAL
Status: COMPLETED | OUTPATIENT
Start: 2018-05-24 | End: 2018-05-24

## 2018-05-24 RX ORDER — DIPHENHYDRAMINE HYDROCHLORIDE 50 MG/ML
25 INJECTION INTRAMUSCULAR; INTRAVENOUS
Status: COMPLETED | OUTPATIENT
Start: 2018-05-24 | End: 2018-05-24

## 2018-05-24 RX ORDER — HEPARIN 100 UNIT/ML
500 SYRINGE INTRAVENOUS
Status: CANCELLED | OUTPATIENT
Start: 2018-05-24

## 2018-05-24 RX ORDER — SODIUM CHLORIDE 0.9 % (FLUSH) 0.9 %
10 SYRINGE (ML) INJECTION
Status: CANCELLED | OUTPATIENT
Start: 2018-05-24

## 2018-05-24 RX ORDER — SODIUM CHLORIDE 0.9 % (FLUSH) 0.9 %
10 SYRINGE (ML) INJECTION
Status: DISCONTINUED | OUTPATIENT
Start: 2018-05-24 | End: 2018-05-24

## 2018-05-24 RX ADMIN — METHYLPREDNISOLONE SODIUM SUCCINATE 100 MG: 125 INJECTION, POWDER, FOR SOLUTION INTRAMUSCULAR; INTRAVENOUS at 10:05

## 2018-05-24 RX ADMIN — ACETAMINOPHEN 650 MG: 325 TABLET ORAL at 10:05

## 2018-05-24 RX ADMIN — BELIMUMAB 850 MG: 400 INJECTION, POWDER, LYOPHILIZED, FOR SOLUTION INTRAVENOUS at 11:05

## 2018-05-24 RX ADMIN — DIPHENHYDRAMINE HYDROCHLORIDE 25 MG: 50 INJECTION INTRAMUSCULAR; INTRAVENOUS at 10:05

## 2018-05-24 RX ADMIN — SODIUM CHLORIDE, PRESERVATIVE FREE 10 ML: 5 INJECTION INTRAVENOUS at 10:05

## 2018-05-24 NOTE — PROGRESS NOTES
DATE OF INITIAL PHYSICAL THERAPY EVALUATION:             February 15, 2018      REFERRING PROVIDER:       Radha Drummond MD      REFERRING DIAGNOSIS:       M79.7 (ICD-10-CM) - Fibromyalgia   R29.898 (ICD-10-CM) - Arm weakness   R29.898 (ICD-10-CM) - Weakness of both hips           ORDERS:   Updated orders received from Bhanu Benitez to continue therapy 2 x week x 60 days    Orders will     2018    VISIT    #12      SUBJECTIVE:    Patient states she felt fatigued the day following her last therapy visit.  She attributes this to the fibromyalgia.      Patients primary complaint(s):  Lumbar myofascial pain and stiffness  Joint soreness (hips and shoulders)  Fatigue  Poor endurance    History of present condition:  (re-evaluation on May 22, 2018)  Patient is complaining of myofascial pain throughout the lumbar paraspinals with stiffness involving the lumbar spine.    She also complains of joint and muscle pain throughout hip girdle region bilaterally.    Patient reports symptoms continue to limit most of her ADL's and is preventing her from working.  She reports and increase in stiffness in the early AM, and increasing discomfort with activity.   Attempts to complete housework increases her symptoms; especially sweeping and activities that require forward bending.    Pain Ratin/10 at rest      7/10 lumbar pain with forward bending.      Patient reports the following functional activity limitations:  ADL's impaired  Long distance walking impaired due to soreness and fatigue  Bending, stooping, squatting, lifting, and carrying impaired due to joint and muscle pain      (FUNCTIONAL ASSESSMENT TOOL)     At the time of the initial evaluation, the patient reported the following self limitations on the Optimal Instrument functional assessment tool:  (Rating Scale: #1=Able to do without difficulty, #2=Able to do with little difficulty, #3=Able to do with moderate difficulty, #4=Able to do with much  difficulty, #5=Unable to do, #9=Not applicable)    Optimal Instrument Scores reported by the patient:  Completed on May 22, 2018    1.  Lying flat    3  2.  Rolling over   3  3.  Moving - lying to sitting  3  4.  Sitting    3  5.  Squatting    4  6.  Bending/stooping   4  7.  Balancing    2  8.  Kneeling    4  9.  Standing    2  10.  Walking - short distance  3  11.  Walking - long distance  4  12.  Walking - outdoors  3  13.  Climbing stairs   4  14.  Hopping    4  15.  Jumping    4  16.  Running    4  17.  Pushing    3  18.  Pulling    3  19.  Reaching    4  20.  Grasping    3  21.  Lifting    4  22.  Carrying    4    Total Impairment Rating  60%      Past Medical History and co-morbidities:  Past Medical History:   Diagnosis Date    Anemia     with chronic disease lupus    Encounter for blood transfusion     G-6-PD deficiency     Heart murmur     MVP 1986    Hypertension     Lupus     Raynaud's syndrome     Systemic lupus erythematosus arthritis 5/13/2018     Patient Active Problem List   Diagnosis    CNS lupus    Essential hypertension    Systemic lupus erythematosus    Secondary Sjogren's syndrome    Glaucoma suspect of both eyes    Keratoconjunctivitis sicca of both eyes    Peripheral retinal degeneration of both eyes    Posterior vitreous detachment of both eyes    G6PD deficiency    Trigeminal neuralgia of right side of face    Atypical face pain    Long term (current) use of systemic steroids    Long-term current use of high risk medication other than anticoagulant    Systemic lupus erythematosus arthritis       Current Outpatient Prescriptions:     azaTHIOprine (IMURAN) 50 mg Tab, Take 1 tablet (50 mg total) by mouth once daily., Disp: 90 tablet, Rfl: 3    B1/B2/NIACIN/B12/PROTEASE (B-COMPLEX WITH B-12 ORAL), Take by mouth., Disp: , Rfl:     biotin 5,000 mcg TbDL, Take by mouth., Disp: , Rfl:     CALCIUM-MAGNESIUM-ZINC ORAL, Take by mouth once daily., Disp: , Rfl:      cholecalciferol, vitamin D3, (VITAMIN D3) 5,000 unit Tab, Take 5,000 Units by mouth once daily., Disp: , Rfl:     clobetasol (OLUX) 0.05 % Foam, Apply topically 2 (two) times daily. For scalp only.  Do not use on face., Disp: 50 g, Rfl: 3    cyclobenzaprine (FLEXERIL) 10 MG tablet, Take 10 mg by mouth 3 (three) times daily as needed for Muscle spasms., Disp: , Rfl:     cycloSPORINE (RESTASIS) 0.05 % ophthalmic emulsion, Place 0.4 mLs (1 drop total) into both eyes 2 (two) times daily., Disp: 60 vial, Rfl: 11    desoximetasone (TOPICORT) 0.25 % cream, Apply topically 2 (two) times daily as needed. Do not use on face, underarms or groin, Disp: 60 g, Rfl: 3    ferrous gluconate (FERGON) 324 MG tablet, Take 324 mg by mouth daily with breakfast., Disp: , Rfl:     fish oil-omega-3 fatty acids 300-1,000 mg capsule, Take 2 g by mouth once daily., Disp: , Rfl:     fluocinolone (DERMA-SMOOTHE) 0.01 % external oil, Apply oil to scalp twice a day, Disp: 1 Bottle, Rfl: 5    gabapentin (NEURONTIN) 300 MG capsule, Take 1 capsule (300 mg total) by mouth 3 (three) times daily., Disp: 90 capsule, Rfl: 5    losartan (COZAAR) 100 MG tablet, Take 1 tablet (100 mg total) by mouth once daily., Disp: 30 tablet, Rfl: 11    methylphenidate HCl (RITALIN) 10 MG tablet, Take 1 tablet (10 mg total) by mouth 2 (two) times daily., Disp: 60 tablet, Rfl: 0    multivitamin capsule, Take 1 capsule by mouth once daily., Disp: , Rfl:     predniSONE (DELTASONE) 5 MG tablet, TAKE ONE TABLET BY MOUTH ONCE DAILY, Disp: 30 tablet, Rfl: 0    spironolactone (ALDACTONE) 25 MG tablet, Take 1 tablet (25 mg total) by mouth once daily., Disp: 30 tablet, Rfl: 11  No current facility-administered medications for this visit.     Previous physical therapy and treatments:  Patient previously treated by a chiropractor for complaints of lumbar pain    Occupational/psychosocial/educational profile:  Not employed at this time; patient reports she is unable to  tolerate work due to myofascial pain.    OBJECTIVE:    Musculoskeletal Exam:    Postural Exam  No postural deviations noted.  SI exam negative for ilial rotation.    ROM  Cervical spine ROM WNL's  Patient complaints of stiffness and discomfort throughout the cervical paraspinals at the end of full flexion  Lumbar spine ROM is WNL's except for forward flexion which is limited to 75% of full expected range.  Patient complains of tightness at the end point into full flexion throughout the lumbar paraspinal musculature.  Hip ROM is WNL's bilaterally  Shoulder ROM is WNL's bilaterally     Flexibility  HS, ITB, piriformis flexibility limited bilaterally    Functional Strength Testing  5/5 strength throughout the upper quadrant bilaterally except for:  Supraspinatus 4-/5 bilaterally, Infraspinatus 4-/5 bilaterally   5/5 strength throughout the lower quadrant bilaterally      Palpation  Patient reports diffuse tenderness throughout the upper quadrant bilaterally; especially upper traps, levator, rhomboids  Patient reports diffuse tenderness throughout the lower quadrant bilaterally; especially the gluteus medius, ITB  Patient presents with lumbar paraspinal guarding and diffuse tenderness      Neuromuscular Exam    Gait  No significant gait deviations noted    Transitional Movements  Patient complains of discomfort with sit to stand, sit to supine, supine to sit;  Discomfort described as stiffness.    Sensation  No sensory disturbances noted throughout the LE's or UE's  Patient did not complain of paresthesias in the extremities.    PROBLEM LIST - ASSESSMENT   Patient reports multiple areas of myofascial tenderness throughout the upper and lower quadrants and in the lumbar paraspinal region which limits her tolerance to functional activities.   Patient reports benefiting from recent participation in an out-patient rehab program.  Recommend patient continue with stretching exercises to address lumbar stiffness and seek  opportunities to increase her overall activity level.  A walking or aquatic program are recommended.    Activity Limitations, Participation Restrictions, and CO-MORBIDITIES which may impact the plan of care and potentially impede the patient's progress in therapy include:  Patient DX with lupus    The patient does not present with any learning or communication barriers which may impact the plan of care and potentially impede the patient's progress in therapy.      CLINICAL PRESENTATION:  Patient's Clinical Presentation is STABLE.       RECOMMENDED PLAN OF CARE:  Stretching exercises for shoulder girdle and hip girdle muscle groups  Stretching exercises for the cervical and lumbar muscle groups   Postural correction exercises  Endurance training  RTC strengthening   Core strengthening     TREATMENT PROVIDED:       (one on one with therapist for therapeutic exercises    15 Mins)  -passive stretching provided by therapist for lumbar, hip girdle, and LE muscle groups -  Declined by patient    -guided core strengthening on the functional   ---modified lateral pull downs  ---cross body punch outs    -strengthening for the upper quadrant - declined by patient  ---chest press  ---scapular retraction    -conditioning exercises - declined by patient  ---upper extremity ergometer  ---lower extremity ergometer    Moist heat applied to the lumbar paraspinal muscle groups to decrease stiffness.     The patient has been previously instructed in the following exercises:  -Lumbar rotational ROM and stretching in supine  -Lumbar flexion stretches in supine  -hamstring stretching  -piriformis stretching  -IT Band stretching  -gluteus medius stretching  -stretching of the hip internal rotators      PLAN:  Continue therapy 2 x week to continue the recommended plan of care.      SHORT TERM GOALS:    1. Patient compliant with daily stretching exercises   2. Patient will report pain level of 2/10 or less    LONG TERM  GOALS:  1. Patient will report pain level of 1/10 or less  2. Patient will report 25% or greater decrease in functional impairment on the Optimal Instrument      These services are reasonable and necessary for the conditions set forth above while under my care.

## 2018-05-24 NOTE — PATIENT INSTRUCTIONS
Belimumab solution for injection  What is this medicine?  BELIMUMAB (be JASON ue mab) is a medicine used to treat a certain type of systemic lupus erythematosus (SLE). This medicine is used with standard therapy for SLE. It is not a cure.  How should I use this medicine?  This medicine is for infusion into a vein. It is given by a health care professional in a hospital or clinic setting.  A special MedGuide will be given to you by the pharmacist with each prescription and refill. Be sure to read this information carefully each time.  Talk to your pediatrician regarding the use of this medicine in children. Special care may be needed.  What side effects may I notice from receiving this medicine?  Side effects that you should report to your doctor or health care professional as soon as possible:  · allergic reactions like skin rash, itching or hives, swelling of the face, lips, or tongue  · depressed mood  · signs of infection - fever or chills, cough, sore throat, pain or difficulty passing urine  · suicidal thoughts or other mood changes  · unusually slow heartbeat  Side effects that usually do not require medical attention (Report these to your doctor or health care professional if they continue or are bothersome.):  · diarrhea  · headache  · muscle aches  · nausea, vomiting  · trouble sleeping  What may interact with this medicine?  Do not take this medicine with any of the following medications:  · live virus vaccines  This medicine may also interact with the following medications:  · cyclophosphamide  · ofatumumab  · rituximab  What if I miss a dose?  It is important not to miss your dose. Call your doctor or health care professional if you are unable to keep an appointment.  Where should I keep my medicine?  This drug is given in a hospital or clinic and will not be stored at home.  What should I tell my health care provider before I take this medicine?  They need to know if you have any of these  conditions:  · heart disease  · immune system problems  · infection (especially a virus infection such as chickenpox, cold sores, or herpes)  · mental illness  · suicidal thoughts, plans, or attempt; a previous suicide attempt by you or a family member  · an unusual or allergic reaction to belimumab, other medicines, foods, dyes, or preservatives  · pregnant or trying to get pregnant  · breast-feeding  What should I watch for while using this medicine?  Tell your doctor or healthcare professional if your symptoms do not start to get better or if they get worse.  Your condition will be monitored carefully while you are receiving this medicine.  NOTE:This sheet is a summary. It may not cover all possible information. If you have questions about this medicine, talk to your doctor, pharmacist, or health care provider. Copyright© 2017 [employer]

## 2018-05-24 NOTE — PROGRESS NOTES
Infusion# 1  S/S infection noted or voiced? Denied/none noted  Recent labs? yes  Recent vaccines? denied    Premeds? Tylenol 650 mg po, Benadryl 25 mg IVP over 5 min, Solumedrol 100 mg IVP over 3 min.    Benlysta 10 mg /Kg= 850 mg administered IV at a 60 minute rate per orders; see MAR & Flow Sheet for more  details. Tolerated well without adverse events

## 2018-06-05 ENCOUNTER — CLINICAL SUPPORT (OUTPATIENT)
Dept: REHABILITATION | Facility: HOSPITAL | Age: 53
End: 2018-06-05
Attending: PHYSICAL MEDICINE & REHABILITATION
Payer: COMMERCIAL

## 2018-06-05 ENCOUNTER — INFUSION (OUTPATIENT)
Dept: RHEUMATOLOGY | Facility: HOSPITAL | Age: 53
End: 2018-06-05
Attending: INTERNAL MEDICINE
Payer: COMMERCIAL

## 2018-06-05 ENCOUNTER — LAB VISIT (OUTPATIENT)
Dept: LAB | Facility: HOSPITAL | Age: 53
End: 2018-06-05
Attending: INTERNAL MEDICINE
Payer: COMMERCIAL

## 2018-06-05 VITALS
TEMPERATURE: 97 F | WEIGHT: 186.06 LBS | BODY MASS INDEX: 30.49 KG/M2 | HEART RATE: 64 BPM | RESPIRATION RATE: 18 BRPM | SYSTOLIC BLOOD PRESSURE: 143 MMHG | OXYGEN SATURATION: 98 % | DIASTOLIC BLOOD PRESSURE: 93 MMHG

## 2018-06-05 DIAGNOSIS — D63.8 ANEMIA OF CHRONIC ILLNESS: ICD-10-CM

## 2018-06-05 DIAGNOSIS — M32.19 CNS LUPUS: ICD-10-CM

## 2018-06-05 DIAGNOSIS — G05.3 CNS LUPUS: ICD-10-CM

## 2018-06-05 DIAGNOSIS — R29.898 DEFICIENCIES OF LIMBS: ICD-10-CM

## 2018-06-05 DIAGNOSIS — M79.7 SCAPULOHUMERAL FIBROSITIS: Primary | ICD-10-CM

## 2018-06-05 DIAGNOSIS — M32.19 OTHER SYSTEMIC LUPUS ERYTHEMATOSUS WITH OTHER ORGAN INVOLVEMENT: ICD-10-CM

## 2018-06-05 DIAGNOSIS — M35.00 SECONDARY SJOGREN'S SYNDROME: ICD-10-CM

## 2018-06-05 DIAGNOSIS — M32.9 SYSTEMIC LUPUS ERYTHEMATOSUS ARTHRITIS: Primary | ICD-10-CM

## 2018-06-05 LAB
ALBUMIN SERPL BCP-MCNC: 3.5 G/DL
ALP SERPL-CCNC: 100 U/L
ALT SERPL W/O P-5'-P-CCNC: 14 U/L
ANION GAP SERPL CALC-SCNC: 7 MMOL/L
AST SERPL-CCNC: 20 U/L
BASOPHILS # BLD AUTO: 0.01 K/UL
BASOPHILS NFR BLD: 0.3 %
BILIRUB SERPL-MCNC: 0.9 MG/DL
BUN SERPL-MCNC: 11 MG/DL
CALCIUM SERPL-MCNC: 9.5 MG/DL
CHLORIDE SERPL-SCNC: 106 MMOL/L
CO2 SERPL-SCNC: 27 MMOL/L
CREAT SERPL-MCNC: 0.9 MG/DL
DIFFERENTIAL METHOD: ABNORMAL
EOSINOPHIL # BLD AUTO: 0 K/UL
EOSINOPHIL NFR BLD: 1 %
ERYTHROCYTE [DISTWIDTH] IN BLOOD BY AUTOMATED COUNT: 14.7 %
EST. GFR  (AFRICAN AMERICAN): >60 ML/MIN/1.73 M^2
EST. GFR  (NON AFRICAN AMERICAN): >60 ML/MIN/1.73 M^2
GLUCOSE SERPL-MCNC: 99 MG/DL
HCT VFR BLD AUTO: 33.2 %
HGB BLD-MCNC: 10.5 G/DL
LYMPHOCYTES # BLD AUTO: 1.7 K/UL
LYMPHOCYTES NFR BLD: 43.7 %
MCH RBC QN AUTO: 27.9 PG
MCHC RBC AUTO-ENTMCNC: 31.6 G/DL
MCV RBC AUTO: 88 FL
MONOCYTES # BLD AUTO: 0.5 K/UL
MONOCYTES NFR BLD: 12.1 %
NEUTROPHILS # BLD AUTO: 1.7 K/UL
NEUTROPHILS NFR BLD: 42.9 %
PLATELET # BLD AUTO: 182 K/UL
PMV BLD AUTO: 9.7 FL
POTASSIUM SERPL-SCNC: 3.5 MMOL/L
PROT SERPL-MCNC: 7.6 G/DL
RBC # BLD AUTO: 3.76 M/UL
SODIUM SERPL-SCNC: 140 MMOL/L
WBC # BLD AUTO: 3.89 K/UL

## 2018-06-05 PROCEDURE — 80053 COMPREHEN METABOLIC PANEL: CPT | Mod: PO

## 2018-06-05 PROCEDURE — 36415 COLL VENOUS BLD VENIPUNCTURE: CPT | Mod: PO

## 2018-06-05 PROCEDURE — 25000003 PHARM REV CODE 250: Mod: PO | Performed by: INTERNAL MEDICINE

## 2018-06-05 PROCEDURE — 97110 THERAPEUTIC EXERCISES: CPT | Mod: PO | Performed by: PHYSICAL THERAPIST

## 2018-06-05 PROCEDURE — 63600175 PHARM REV CODE 636 W HCPCS: Mod: JW,JG,PO | Performed by: INTERNAL MEDICINE

## 2018-06-05 PROCEDURE — 96375 TX/PRO/DX INJ NEW DRUG ADDON: CPT | Mod: PO

## 2018-06-05 PROCEDURE — A4216 STERILE WATER/SALINE, 10 ML: HCPCS | Mod: PO | Performed by: INTERNAL MEDICINE

## 2018-06-05 PROCEDURE — 96413 CHEMO IV INFUSION 1 HR: CPT | Mod: PO

## 2018-06-05 PROCEDURE — 85025 COMPLETE CBC W/AUTO DIFF WBC: CPT | Mod: PO

## 2018-06-05 RX ORDER — SODIUM CHLORIDE 0.9 % (FLUSH) 0.9 %
10 SYRINGE (ML) INJECTION
Status: CANCELLED | OUTPATIENT
Start: 2018-06-05

## 2018-06-05 RX ORDER — ACETAMINOPHEN 325 MG/1
650 TABLET ORAL
Status: CANCELLED | OUTPATIENT
Start: 2018-06-05

## 2018-06-05 RX ORDER — DIPHENHYDRAMINE HYDROCHLORIDE 50 MG/ML
25 INJECTION INTRAMUSCULAR; INTRAVENOUS
Status: CANCELLED
Start: 2018-06-05

## 2018-06-05 RX ORDER — HEPARIN 100 UNIT/ML
500 SYRINGE INTRAVENOUS
Status: CANCELLED | OUTPATIENT
Start: 2018-06-05

## 2018-06-05 RX ORDER — DIPHENHYDRAMINE HYDROCHLORIDE 50 MG/ML
25 INJECTION INTRAMUSCULAR; INTRAVENOUS
Status: COMPLETED | OUTPATIENT
Start: 2018-06-05 | End: 2018-06-05

## 2018-06-05 RX ORDER — SODIUM CHLORIDE 0.9 % (FLUSH) 0.9 %
10 SYRINGE (ML) INJECTION
Status: DISCONTINUED | OUTPATIENT
Start: 2018-06-05 | End: 2018-06-05 | Stop reason: HOSPADM

## 2018-06-05 RX ORDER — ACETAMINOPHEN 325 MG/1
650 TABLET ORAL
Status: COMPLETED | OUTPATIENT
Start: 2018-06-05 | End: 2018-06-05

## 2018-06-05 RX ADMIN — METHYLPREDNISOLONE SODIUM SUCCINATE 100 MG: 125 INJECTION, POWDER, FOR SOLUTION INTRAMUSCULAR; INTRAVENOUS at 10:06

## 2018-06-05 RX ADMIN — DIPHENHYDRAMINE HYDROCHLORIDE 25 MG: 50 INJECTION INTRAMUSCULAR; INTRAVENOUS at 10:06

## 2018-06-05 RX ADMIN — BELIMUMAB 844 MG: 400 INJECTION, POWDER, LYOPHILIZED, FOR SOLUTION INTRAVENOUS at 10:06

## 2018-06-05 RX ADMIN — SODIUM CHLORIDE, PRESERVATIVE FREE 10 ML: 5 INJECTION INTRAVENOUS at 10:06

## 2018-06-05 RX ADMIN — ACETAMINOPHEN 650 MG: 325 TABLET ORAL at 10:06

## 2018-06-05 NOTE — PATIENT INSTRUCTIONS
Belimumab solution for injection  What is this medicine?  BELIMUMAB (be JASON ue mab) is a medicine used to treat a certain type of systemic lupus erythematosus (SLE). This medicine is used with standard therapy for SLE. It is not a cure.  How should I use this medicine?  This medicine is for infusion into a vein. It is given by a health care professional in a hospital or clinic setting.  A special MedGuide will be given to you by the pharmacist with each prescription and refill. Be sure to read this information carefully each time.  Talk to your pediatrician regarding the use of this medicine in children. Special care may be needed.  What side effects may I notice from receiving this medicine?  Side effects that you should report to your doctor or health care professional as soon as possible:  · allergic reactions like skin rash, itching or hives, swelling of the face, lips, or tongue  · depressed mood  · signs of infection - fever or chills, cough, sore throat, pain or difficulty passing urine  · suicidal thoughts or other mood changes  · unusually slow heartbeat  Side effects that usually do not require medical attention (Report these to your doctor or health care professional if they continue or are bothersome.):  · diarrhea  · headache  · muscle aches  · nausea, vomiting  · trouble sleeping  What may interact with this medicine?  Do not take this medicine with any of the following medications:  · live virus vaccines  This medicine may also interact with the following medications:  · cyclophosphamide  · ofatumumab  · rituximab  What if I miss a dose?  It is important not to miss your dose. Call your doctor or health care professional if you are unable to keep an appointment.  Where should I keep my medicine?  This drug is given in a hospital or clinic and will not be stored at home.  What should I tell my health care provider before I take this medicine?  They need to know if you have any of these  conditions:  · heart disease  · immune system problems  · infection (especially a virus infection such as chickenpox, cold sores, or herpes)  · mental illness  · suicidal thoughts, plans, or attempt; a previous suicide attempt by you or a family member  · an unusual or allergic reaction to belimumab, other medicines, foods, dyes, or preservatives  · pregnant or trying to get pregnant  · breast-feeding  What should I watch for while using this medicine?  Tell your doctor or healthcare professional if your symptoms do not start to get better or if they get worse.  Your condition will be monitored carefully while you are receiving this medicine.  NOTE:This sheet is a summary. It may not cover all possible information. If you have questions about this medicine, talk to your doctor, pharmacist, or health care provider. Copyright© 2017 [employer]      WAYS TO HELP PREVENT INFECTION         WASH YOUR HANDS OFTEN DURING THE DAY, ESPECIALLY BEFORE YOU EAT, AFTER USING THE BATHROOM, AND AFTER TOUCHING ANIMALS     STAY AWAY FROM PEOPLE WHO HAVE ILLNESSES YOU CAN CATCH; SUCH AS COLDS, FLU, CHICKEN POX     TRY TO AVOID CROWDS     STAY AWAY FROM CHILDREN WHO RECENTLY HAVE RECEIVED LIVE VIRUS VACCINES     MAINTAIN GOOD MOUTH CARE     DO NOT SQUEEZE OR SCRATCH PIMPLES     CLEAN CUTS & SCRAPES RIGHT AWAY AND DAILY UNTIL HEALED WITH WARM WATER, SOAP & AN ANTISEPTIC     AVOID CONTACT WITH LITTER BOXES, BIRD CAGES, & FISH TANKS     AVOID STANDING WATER, IE., BIRD BATHS, FLOWER POTS/VASES, OR HUMIDIFIERS     WEAR GLOVES WHEN GARDENING OR CLEANING UP AFTER OTHERS, ESPECIALLY BABIES & SMALL CHILDREN    DO NOT EAT RAW FISH, SEAFOOD, MEAT, OR EGGS

## 2018-06-05 NOTE — PROGRESS NOTES
DATE OF INITIAL PHYSICAL THERAPY EVALUATION:             February 15, 2018      REFERRING PROVIDER:       Radha Drummond MD      REFERRING DIAGNOSIS:       M79.7 (ICD-10-CM) - Fibromyalgia   R29.898 (ICD-10-CM) - Arm weakness   R29.898 (ICD-10-CM) - Weakness of both hips           ORDERS:   Updated orders received from Bhanu Benitez to continue therapy 2 x week x 60 days    Orders will     2018    VISIT    #13    SUBJECTIVE:    Patient states she continues to deal with fatigue which she feels is due to her fibromyalgia.  She reports tolerating the core exercises fair.    Patients primary complaint(s):  Lumbar myofascial pain and stiffness  Joint soreness (hips and shoulders)  Fatigue  Poor endurance    History of present condition:  (re-evaluation on May 22, 2018)  Patient is complaining of myofascial pain throughout the lumbar paraspinals with stiffness involving the lumbar spine.    She also complains of joint and muscle pain throughout hip girdle region bilaterally.    Patient reports symptoms continue to limit most of her ADL's and is preventing her from working.  She reports and increase in stiffness in the early AM, and increasing discomfort with activity.   Attempts to complete housework increases her symptoms; especially sweeping and activities that require forward bending.    Pain Ratin/10 at rest      7/10 lumbar pain with forward bending.      Patient reports the following functional activity limitations:  ADL's impaired  Long distance walking impaired due to soreness and fatigue  Bending, stooping, squatting, lifting, and carrying impaired due to joint and muscle pain      (FUNCTIONAL ASSESSMENT TOOL)     At the time of the initial evaluation, the patient reported the following self limitations on the Optimal Instrument functional assessment tool:  (Rating Scale: #1=Able to do without difficulty, #2=Able to do with little difficulty, #3=Able to do with moderate difficulty,  #4=Able to do with much difficulty, #5=Unable to do, #9=Not applicable)    Optimal Instrument Scores reported by the patient:  Completed on May 22, 2018    1.  Lying flat    3  2.  Rolling over   3  3.  Moving - lying to sitting  3  4.  Sitting    3  5.  Squatting    4  6.  Bending/stooping   4  7.  Balancing    2  8.  Kneeling    4  9.  Standing    2  10.  Walking - short distance  3  11.  Walking - long distance  4  12.  Walking - outdoors  3  13.  Climbing stairs   4  14.  Hopping    4  15.  Jumping    4  16.  Running    4  17.  Pushing    3  18.  Pulling    3  19.  Reaching    4  20.  Grasping    3  21.  Lifting    4  22.  Carrying    4    Total Impairment Rating  60%      Past Medical History and co-morbidities:  Past Medical History:   Diagnosis Date    Anemia     with chronic disease lupus    Encounter for blood transfusion     G-6-PD deficiency     Heart murmur     MVP 1986    Hypertension     Lupus     Raynaud's syndrome     Systemic lupus erythematosus arthritis 5/13/2018     Patient Active Problem List   Diagnosis    CNS lupus    Essential hypertension    Systemic lupus erythematosus    Secondary Sjogren's syndrome    Glaucoma suspect of both eyes    Keratoconjunctivitis sicca of both eyes    Peripheral retinal degeneration of both eyes    Posterior vitreous detachment of both eyes    G6PD deficiency    Trigeminal neuralgia of right side of face    Atypical face pain    Long term (current) use of systemic steroids    Long-term current use of high risk medication other than anticoagulant    Systemic lupus erythematosus arthritis       Current Outpatient Prescriptions:     azaTHIOprine (IMURAN) 50 mg Tab, Take 1 tablet (50 mg total) by mouth once daily., Disp: 90 tablet, Rfl: 3    B1/B2/NIACIN/B12/PROTEASE (B-COMPLEX WITH B-12 ORAL), Take by mouth., Disp: , Rfl:     biotin 5,000 mcg TbDL, Take by mouth., Disp: , Rfl:     CALCIUM-MAGNESIUM-ZINC ORAL, Take by mouth once daily., Disp:  , Rfl:     cholecalciferol, vitamin D3, (VITAMIN D3) 5,000 unit Tab, Take 5,000 Units by mouth once daily., Disp: , Rfl:     clobetasol (OLUX) 0.05 % Foam, Apply topically 2 (two) times daily. For scalp only.  Do not use on face., Disp: 50 g, Rfl: 3    cyclobenzaprine (FLEXERIL) 10 MG tablet, Take 10 mg by mouth 3 (three) times daily as needed for Muscle spasms., Disp: , Rfl:     cycloSPORINE (RESTASIS) 0.05 % ophthalmic emulsion, Place 0.4 mLs (1 drop total) into both eyes 2 (two) times daily., Disp: 60 vial, Rfl: 11    desoximetasone (TOPICORT) 0.25 % cream, Apply topically 2 (two) times daily as needed. Do not use on face, underarms or groin, Disp: 60 g, Rfl: 3    ferrous gluconate (FERGON) 324 MG tablet, Take 324 mg by mouth daily with breakfast., Disp: , Rfl:     fish oil-omega-3 fatty acids 300-1,000 mg capsule, Take 2 g by mouth once daily., Disp: , Rfl:     fluocinolone (DERMA-SMOOTHE) 0.01 % external oil, Apply oil to scalp twice a day, Disp: 1 Bottle, Rfl: 5    gabapentin (NEURONTIN) 300 MG capsule, Take 1 capsule (300 mg total) by mouth 3 (three) times daily., Disp: 90 capsule, Rfl: 5    losartan (COZAAR) 100 MG tablet, Take 1 tablet (100 mg total) by mouth once daily., Disp: 30 tablet, Rfl: 11    methylphenidate HCl (RITALIN) 10 MG tablet, Take 1 tablet (10 mg total) by mouth 2 (two) times daily., Disp: 60 tablet, Rfl: 0    multivitamin capsule, Take 1 capsule by mouth once daily., Disp: , Rfl:     predniSONE (DELTASONE) 5 MG tablet, TAKE ONE TABLET BY MOUTH ONCE DAILY, Disp: 30 tablet, Rfl: 0    spironolactone (ALDACTONE) 25 MG tablet, Take 1 tablet (25 mg total) by mouth once daily., Disp: 30 tablet, Rfl: 11  No current facility-administered medications for this visit.     Previous physical therapy and treatments:  Patient previously treated by a chiropractor for complaints of lumbar pain    Occupational/psychosocial/educational profile:  Not employed at this time; patient reports she is  unable to tolerate work due to myofascial pain.    OBJECTIVE:    Musculoskeletal Exam:    Postural Exam  No postural deviations noted.  SI exam negative for ilial rotation.    ROM  Cervical spine ROM WNL's  Patient complaints of stiffness and discomfort throughout the cervical paraspinals at the end of full flexion  Lumbar spine ROM is WNL's except for forward flexion which is limited to 75% of full expected range.  Patient complains of tightness at the end point into full flexion throughout the lumbar paraspinal musculature.  Hip ROM is WNL's bilaterally  Shoulder ROM is WNL's bilaterally     Flexibility  HS, ITB, piriformis flexibility limited bilaterally    Functional Strength Testing  5/5 strength throughout the upper quadrant bilaterally except for:  Supraspinatus 4-/5 bilaterally, Infraspinatus 4-/5 bilaterally   5/5 strength throughout the lower quadrant bilaterally      Palpation  Patient reports diffuse tenderness throughout the upper quadrant bilaterally; especially upper traps, levator, rhomboids  Patient reports diffuse tenderness throughout the lower quadrant bilaterally; especially the gluteus medius, ITB  Patient presents with lumbar paraspinal guarding and diffuse tenderness      Neuromuscular Exam    Gait  No significant gait deviations noted    Transitional Movements  Patient complains of discomfort with sit to stand, sit to supine, supine to sit;  Discomfort described as stiffness.    Sensation  No sensory disturbances noted throughout the LE's or UE's  Patient did not complain of paresthesias in the extremities.    PROBLEM LIST - ASSESSMENT   Patient reports multiple areas of myofascial tenderness throughout the upper and lower quadrants and in the lumbar paraspinal region which limits her tolerance to functional activities.   Patient reports benefiting from recent participation in an out-patient rehab program.  Recommend patient continue with stretching exercises to address lumbar stiffness and  seek opportunities to increase her overall activity level.  A walking or aquatic program are recommended.    Activity Limitations, Participation Restrictions, and CO-MORBIDITIES which may impact the plan of care and potentially impede the patient's progress in therapy include:  Patient DX with lupus    The patient does not present with any learning or communication barriers which may impact the plan of care and potentially impede the patient's progress in therapy.      CLINICAL PRESENTATION:  Patient's Clinical Presentation is STABLE.       RECOMMENDED PLAN OF CARE:  Stretching exercises for shoulder girdle and hip girdle muscle groups  Stretching exercises for the cervical and lumbar muscle groups   Postural correction exercises  Endurance training  RTC strengthening   Core strengthening     TREATMENT PROVIDED:       (one on one with therapist for therapeutic exercises    25 Mins)  -passive stretching provided by therapist for lumbar, hip girdle, and LE muscle groups   -guided core strengthening on the functional   ---modified lateral pull downs  ---cross body punch outs  ---reverse rows    -strengthening for the upper quadrant   ---chest press  ---scapular retraction    -conditioning exercises - declined by patient  ---upper extremity ergometer  ---lower extremity ergometer    Moist heat applied to the lumbar paraspinal muscle groups to decrease stiffness.     The patient has been previously instructed in the following exercises:  -Lumbar rotational ROM and stretching in supine  -Lumbar flexion stretches in supine  -hamstring stretching  -piriformis stretching  -IT Band stretching  -gluteus medius stretching  -stretching of the hip internal rotators      PLAN:  Continue therapy 2 x week to continue the recommended plan of care.      SHORT TERM GOALS:    1. Patient compliant with daily stretching exercises   2. Patient will report pain level of 2/10 or less    LONG TERM GOALS:  1. Patient will report pain  level of 1/10 or less  2. Patient will report 25% or greater decrease in functional impairment on the Optimal Instrument      These services are reasonable and necessary for the conditions set forth above while under my care.

## 2018-06-05 NOTE — PROGRESS NOTES
Infusion# 2  S/S infection noted or voiced? denies  Recent labs? yes  Recent vaccines? denies    Premeds? Tylenol 650 mg PO, Benadryl 25 mg IVP over 2 minutes and Solumedrol 100 mg IVP over 2 minutes    Benlysta 10 mg/kg =  844 mg administered IV at a 60 minute rate per orders; see MAR & Flow Sheet for more  details. Tolerated well without adverse events

## 2018-06-07 ENCOUNTER — CLINICAL SUPPORT (OUTPATIENT)
Dept: REHABILITATION | Facility: HOSPITAL | Age: 53
End: 2018-06-07
Attending: PHYSICAL MEDICINE & REHABILITATION
Payer: COMMERCIAL

## 2018-06-07 DIAGNOSIS — M79.7 SCAPULOHUMERAL FIBROSITIS: Primary | ICD-10-CM

## 2018-06-07 DIAGNOSIS — R29.898 DEFICIENCIES OF LIMBS: ICD-10-CM

## 2018-06-07 PROCEDURE — 97110 THERAPEUTIC EXERCISES: CPT | Mod: PO | Performed by: PHYSICAL THERAPIST

## 2018-06-07 NOTE — PROGRESS NOTES
DATE OF INITIAL PHYSICAL THERAPY EVALUATION:             February 15, 2018      REFERRING PROVIDER:       Radha Drummond MD      REFERRING DIAGNOSIS:       M79.7 (ICD-10-CM) - Fibromyalgia   R29.898 (ICD-10-CM) - Arm weakness   R29.898 (ICD-10-CM) - Weakness of both hips           ORDERS:   Updated orders received from Bhanu Benitez to continue therapy 2 x week x 60 days    Orders will     2018    VISIT    #14    SUBJECTIVE:    Patient states she is feeling overall today.  Lumbar stiffness continues to be an issue.    Patients primary complaint(s):  Lumbar myofascial pain and stiffness  Joint soreness (hips and shoulders)  Fatigue  Poor endurance    History of present condition:  (re-evaluation on May 22, 2018)  Patient is complaining of myofascial pain throughout the lumbar paraspinals with stiffness involving the lumbar spine.    She also complains of joint and muscle pain throughout hip girdle region bilaterally.    Patient reports symptoms continue to limit most of her ADL's and is preventing her from working.  She reports and increase in stiffness in the early AM, and increasing discomfort with activity.   Attempts to complete housework increases her symptoms; especially sweeping and activities that require forward bending.    Pain Ratin/10 at rest      7/10 lumbar pain with forward bending.      Patient reports the following functional activity limitations:  ADL's impaired  Long distance walking impaired due to soreness and fatigue  Bending, stooping, squatting, lifting, and carrying impaired due to joint and muscle pain      (FUNCTIONAL ASSESSMENT TOOL)     At the time of the initial evaluation, the patient reported the following self limitations on the Optimal Instrument functional assessment tool:  (Rating Scale: #1=Able to do without difficulty, #2=Able to do with little difficulty, #3=Able to do with moderate difficulty, #4=Able to do with much difficulty, #5=Unable to do, #9=Not  applicable)    Optimal Instrument Scores reported by the patient:  Completed on May 22, 2018    1.  Lying flat    3  2.  Rolling over   3  3.  Moving - lying to sitting  3  4.  Sitting    3  5.  Squatting    4  6.  Bending/stooping   4  7.  Balancing    2  8.  Kneeling    4  9.  Standing    2  10.  Walking - short distance  3  11.  Walking - long distance  4  12.  Walking - outdoors  3  13.  Climbing stairs   4  14.  Hopping    4  15.  Jumping    4  16.  Running    4  17.  Pushing    3  18.  Pulling    3  19.  Reaching    4  20.  Grasping    3  21.  Lifting    4  22.  Carrying    4    Total Impairment Rating  60%      Past Medical History and co-morbidities:  Past Medical History:   Diagnosis Date    Anemia     with chronic disease lupus    Encounter for blood transfusion     G-6-PD deficiency     Heart murmur     MVP 1986    Hypertension     Lupus     Raynaud's syndrome     Systemic lupus erythematosus arthritis 5/13/2018     Patient Active Problem List   Diagnosis    CNS lupus    Essential hypertension    Systemic lupus erythematosus    Secondary Sjogren's syndrome    Glaucoma suspect of both eyes    Keratoconjunctivitis sicca of both eyes    Peripheral retinal degeneration of both eyes    Posterior vitreous detachment of both eyes    G6PD deficiency    Trigeminal neuralgia of right side of face    Atypical face pain    Long term (current) use of systemic steroids    Long-term current use of high risk medication other than anticoagulant    Systemic lupus erythematosus arthritis       Current Outpatient Prescriptions:     azaTHIOprine (IMURAN) 50 mg Tab, Take 1 tablet (50 mg total) by mouth once daily., Disp: 90 tablet, Rfl: 3    B1/B2/NIACIN/B12/PROTEASE (B-COMPLEX WITH B-12 ORAL), Take by mouth., Disp: , Rfl:     biotin 5,000 mcg TbDL, Take by mouth., Disp: , Rfl:     CALCIUM-MAGNESIUM-ZINC ORAL, Take by mouth once daily., Disp: , Rfl:     cholecalciferol, vitamin D3, (VITAMIN D3) 5,000  unit Tab, Take 5,000 Units by mouth once daily., Disp: , Rfl:     clobetasol (OLUX) 0.05 % Foam, Apply topically 2 (two) times daily. For scalp only.  Do not use on face., Disp: 50 g, Rfl: 3    cyclobenzaprine (FLEXERIL) 10 MG tablet, Take 10 mg by mouth 3 (three) times daily as needed for Muscle spasms., Disp: , Rfl:     cycloSPORINE (RESTASIS) 0.05 % ophthalmic emulsion, Place 0.4 mLs (1 drop total) into both eyes 2 (two) times daily., Disp: 60 vial, Rfl: 11    desoximetasone (TOPICORT) 0.25 % cream, Apply topically 2 (two) times daily as needed. Do not use on face, underarms or groin, Disp: 60 g, Rfl: 3    ferrous gluconate (FERGON) 324 MG tablet, Take 324 mg by mouth daily with breakfast., Disp: , Rfl:     fish oil-omega-3 fatty acids 300-1,000 mg capsule, Take 2 g by mouth once daily., Disp: , Rfl:     fluocinolone (DERMA-SMOOTHE) 0.01 % external oil, Apply oil to scalp twice a day, Disp: 1 Bottle, Rfl: 5    gabapentin (NEURONTIN) 300 MG capsule, Take 1 capsule (300 mg total) by mouth 3 (three) times daily., Disp: 90 capsule, Rfl: 5    losartan (COZAAR) 100 MG tablet, Take 1 tablet (100 mg total) by mouth once daily., Disp: 30 tablet, Rfl: 11    methylphenidate HCl (RITALIN) 10 MG tablet, Take 1 tablet (10 mg total) by mouth 2 (two) times daily., Disp: 60 tablet, Rfl: 0    multivitamin capsule, Take 1 capsule by mouth once daily., Disp: , Rfl:     predniSONE (DELTASONE) 5 MG tablet, TAKE ONE TABLET BY MOUTH ONCE DAILY, Disp: 30 tablet, Rfl: 0    spironolactone (ALDACTONE) 25 MG tablet, Take 1 tablet (25 mg total) by mouth once daily., Disp: 30 tablet, Rfl: 11    Previous physical therapy and treatments:  Patient previously treated by a chiropractor for complaints of lumbar pain    Occupational/psychosocial/educational profile:  Not employed at this time; patient reports she is unable to tolerate work due to myofascial pain.    OBJECTIVE:    Musculoskeletal Exam:    Postural Exam  No postural  deviations noted.  SI exam negative for ilial rotation.    ROM  Cervical spine ROM WNL's  Patient complaints of stiffness and discomfort throughout the cervical paraspinals at the end of full flexion  Lumbar spine ROM is WNL's except for forward flexion which is limited to 75% of full expected range.  Patient complains of tightness at the end point into full flexion throughout the lumbar paraspinal musculature.  Hip ROM is WNL's bilaterally  Shoulder ROM is WNL's bilaterally     Flexibility  HS, ITB, piriformis flexibility limited bilaterally    Functional Strength Testing  5/5 strength throughout the upper quadrant bilaterally except for:  Supraspinatus 4-/5 bilaterally, Infraspinatus 4-/5 bilaterally   5/5 strength throughout the lower quadrant bilaterally      Palpation  Patient reports diffuse tenderness throughout the upper quadrant bilaterally; especially upper traps, levator, rhomboids  Patient reports diffuse tenderness throughout the lower quadrant bilaterally; especially the gluteus medius, ITB  Patient presents with lumbar paraspinal guarding and diffuse tenderness      Neuromuscular Exam    Gait  No significant gait deviations noted    Transitional Movements  Patient complains of discomfort with sit to stand, sit to supine, supine to sit;  Discomfort described as stiffness.    Sensation  No sensory disturbances noted throughout the LE's or UE's  Patient did not complain of paresthesias in the extremities.    PROBLEM LIST - ASSESSMENT   Patient reports multiple areas of myofascial tenderness throughout the upper and lower quadrants and in the lumbar paraspinal region which limits her tolerance to functional activities.   Patient reports benefiting from recent participation in an out-patient rehab program.  Recommend patient continue with stretching exercises to address lumbar stiffness and seek opportunities to increase her overall activity level.  A walking or aquatic program are recommended.    Activity  Limitations, Participation Restrictions, and CO-MORBIDITIES which may impact the plan of care and potentially impede the patient's progress in therapy include:  Patient DX with lupus    The patient does not present with any learning or communication barriers which may impact the plan of care and potentially impede the patient's progress in therapy.      CLINICAL PRESENTATION:  Patient's Clinical Presentation is STABLE.       RECOMMENDED PLAN OF CARE:  Stretching exercises for shoulder girdle and hip girdle muscle groups  Stretching exercises for the cervical and lumbar muscle groups   Postural correction exercises  Endurance training  RTC strengthening   Core strengthening     TREATMENT PROVIDED:       (one on one with therapist for therapeutic exercises 20 Mins; supervised exercises 15 mins)  -passive stretching provided by therapist for lumbar, hip girdle, and LE muscle groups - declined  -guided core strengthening on the functional   ---modified lateral pull downs  ---cross body punch outs  ---reverse rows    -strengthening for the upper quadrant   ---chest press  ---scapular retraction    -conditioning exercises   ---upper extremity ergometer  ---lower extremity ergometer    Moist heat applied to the lumbar paraspinal muscle groups to decrease stiffness.     The patient has been previously instructed in the following exercises:  -Lumbar rotational ROM and stretching in supine  -Lumbar flexion stretches in supine  -hamstring stretching  -piriformis stretching  -IT Band stretching  -gluteus medius stretching  -stretching of the hip internal rotators      PLAN:  Continue therapy 2 x week to continue the recommended plan of care.      SHORT TERM GOALS:    1. Patient compliant with daily stretching exercises   2. Patient will report pain level of 2/10 or less    LONG TERM GOALS:  1. Patient will report pain level of 1/10 or less  2. Patient will report 25% or greater decrease in functional impairment on the  Optimal Instrument      These services are reasonable and necessary for the conditions set forth above while under my care.

## 2018-06-20 ENCOUNTER — INFUSION (OUTPATIENT)
Dept: RHEUMATOLOGY | Facility: HOSPITAL | Age: 53
End: 2018-06-20
Attending: INTERNAL MEDICINE
Payer: COMMERCIAL

## 2018-06-20 ENCOUNTER — LAB VISIT (OUTPATIENT)
Dept: LAB | Facility: HOSPITAL | Age: 53
End: 2018-06-20
Attending: INTERNAL MEDICINE
Payer: COMMERCIAL

## 2018-06-20 VITALS
DIASTOLIC BLOOD PRESSURE: 79 MMHG | RESPIRATION RATE: 18 BRPM | HEART RATE: 83 BPM | SYSTOLIC BLOOD PRESSURE: 123 MMHG | BODY MASS INDEX: 30.35 KG/M2 | WEIGHT: 185.19 LBS | OXYGEN SATURATION: 98 % | TEMPERATURE: 98 F

## 2018-06-20 DIAGNOSIS — M32.19 CNS LUPUS: ICD-10-CM

## 2018-06-20 DIAGNOSIS — M32.9 SYSTEMIC LUPUS ERYTHEMATOSUS ARTHRITIS: Primary | ICD-10-CM

## 2018-06-20 DIAGNOSIS — G05.3 CNS LUPUS: ICD-10-CM

## 2018-06-20 DIAGNOSIS — D63.8 ANEMIA OF CHRONIC ILLNESS: ICD-10-CM

## 2018-06-20 DIAGNOSIS — M32.19 OTHER SYSTEMIC LUPUS ERYTHEMATOSUS WITH OTHER ORGAN INVOLVEMENT: ICD-10-CM

## 2018-06-20 DIAGNOSIS — M35.00 SECONDARY SJOGREN'S SYNDROME: ICD-10-CM

## 2018-06-20 LAB
ALBUMIN SERPL BCP-MCNC: 3.8 G/DL
ALP SERPL-CCNC: 105 U/L
ALT SERPL W/O P-5'-P-CCNC: 21 U/L
ANION GAP SERPL CALC-SCNC: 15 MMOL/L
AST SERPL-CCNC: 16 U/L
BASOPHILS # BLD AUTO: 0.01 K/UL
BASOPHILS NFR BLD: 0.2 %
BILIRUB SERPL-MCNC: 0.9 MG/DL
BUN SERPL-MCNC: 11 MG/DL
CALCIUM SERPL-MCNC: 9.7 MG/DL
CHLORIDE SERPL-SCNC: 106 MMOL/L
CO2 SERPL-SCNC: 22 MMOL/L
CREAT SERPL-MCNC: 1 MG/DL
DIFFERENTIAL METHOD: ABNORMAL
EOSINOPHIL # BLD AUTO: 0 K/UL
EOSINOPHIL NFR BLD: 0.8 %
ERYTHROCYTE [DISTWIDTH] IN BLOOD BY AUTOMATED COUNT: 14.9 %
EST. GFR  (AFRICAN AMERICAN): >60 ML/MIN/1.73 M^2
EST. GFR  (NON AFRICAN AMERICAN): >60 ML/MIN/1.73 M^2
GLUCOSE SERPL-MCNC: 111 MG/DL
HCT VFR BLD AUTO: 36.2 %
HGB BLD-MCNC: 11.1 G/DL
LYMPHOCYTES # BLD AUTO: 2.4 K/UL
LYMPHOCYTES NFR BLD: 48.1 %
MCH RBC QN AUTO: 27.8 PG
MCHC RBC AUTO-ENTMCNC: 30.7 G/DL
MCV RBC AUTO: 91 FL
MONOCYTES # BLD AUTO: 0.4 K/UL
MONOCYTES NFR BLD: 8.5 %
NEUTROPHILS # BLD AUTO: 2.1 K/UL
NEUTROPHILS NFR BLD: 42.4 %
PLATELET # BLD AUTO: 223 K/UL
PMV BLD AUTO: 10.1 FL
POTASSIUM SERPL-SCNC: 3.5 MMOL/L
PROT SERPL-MCNC: 7.8 G/DL
RBC # BLD AUTO: 3.99 M/UL
SODIUM SERPL-SCNC: 143 MMOL/L
WBC # BLD AUTO: 4.95 K/UL

## 2018-06-20 PROCEDURE — A4216 STERILE WATER/SALINE, 10 ML: HCPCS | Mod: PO | Performed by: INTERNAL MEDICINE

## 2018-06-20 PROCEDURE — 96413 CHEMO IV INFUSION 1 HR: CPT | Mod: PO

## 2018-06-20 PROCEDURE — 96376 TX/PRO/DX INJ SAME DRUG ADON: CPT | Mod: PO

## 2018-06-20 PROCEDURE — 80053 COMPREHEN METABOLIC PANEL: CPT | Mod: PO

## 2018-06-20 PROCEDURE — 63600175 PHARM REV CODE 636 W HCPCS: Mod: JG,PO | Performed by: INTERNAL MEDICINE

## 2018-06-20 PROCEDURE — 36415 COLL VENOUS BLD VENIPUNCTURE: CPT | Mod: PO

## 2018-06-20 PROCEDURE — 85025 COMPLETE CBC W/AUTO DIFF WBC: CPT | Mod: PO

## 2018-06-20 PROCEDURE — 25000003 PHARM REV CODE 250: Mod: PO | Performed by: INTERNAL MEDICINE

## 2018-06-20 RX ORDER — SODIUM CHLORIDE 0.9 % (FLUSH) 0.9 %
10 SYRINGE (ML) INJECTION
Status: CANCELLED | OUTPATIENT
Start: 2018-06-20

## 2018-06-20 RX ORDER — EPINEPHRINE 0.3 MG/.3ML
0.3 INJECTION SUBCUTANEOUS
COMMUNITY
Start: 2018-05-23 | End: 2018-11-26 | Stop reason: SDUPTHER

## 2018-06-20 RX ORDER — ACETAMINOPHEN 325 MG/1
650 TABLET ORAL
Status: COMPLETED | OUTPATIENT
Start: 2018-06-20 | End: 2018-06-20

## 2018-06-20 RX ORDER — ACETAMINOPHEN 325 MG/1
650 TABLET ORAL
Status: CANCELLED | OUTPATIENT
Start: 2018-06-20

## 2018-06-20 RX ORDER — SODIUM CHLORIDE 0.9 % (FLUSH) 0.9 %
10 SYRINGE (ML) INJECTION
Status: DISCONTINUED | OUTPATIENT
Start: 2018-06-20 | End: 2018-06-20 | Stop reason: HOSPADM

## 2018-06-20 RX ORDER — HEPARIN 100 UNIT/ML
500 SYRINGE INTRAVENOUS
Status: CANCELLED | OUTPATIENT
Start: 2018-06-20

## 2018-06-20 RX ORDER — DIPHENHYDRAMINE HYDROCHLORIDE 50 MG/ML
25 INJECTION INTRAMUSCULAR; INTRAVENOUS
Status: COMPLETED | OUTPATIENT
Start: 2018-06-20 | End: 2018-06-20

## 2018-06-20 RX ORDER — DIPHENHYDRAMINE HYDROCHLORIDE 50 MG/ML
25 INJECTION INTRAMUSCULAR; INTRAVENOUS
Status: CANCELLED
Start: 2018-06-20

## 2018-06-20 RX ADMIN — METHYLPREDNISOLONE SODIUM SUCCINATE 100 MG: 125 INJECTION, POWDER, FOR SOLUTION INTRAMUSCULAR; INTRAVENOUS at 09:06

## 2018-06-20 RX ADMIN — DIPHENHYDRAMINE HYDROCHLORIDE 25 MG: 50 INJECTION INTRAMUSCULAR; INTRAVENOUS at 09:06

## 2018-06-20 RX ADMIN — SODIUM CHLORIDE, PRESERVATIVE FREE 10 ML: 5 INJECTION INTRAVENOUS at 09:06

## 2018-06-20 RX ADMIN — ACETAMINOPHEN 650 MG: 325 TABLET ORAL at 09:06

## 2018-06-20 RX ADMIN — BELIMUMAB 840 MG: 400 INJECTION, POWDER, LYOPHILIZED, FOR SOLUTION INTRAVENOUS at 09:06

## 2018-06-20 NOTE — PLAN OF CARE
Problem: Patient Care Overview  Goal: Plan of Care Review  Outcome: Ongoing (interventions implemented as appropriate)  Pt. Stated that she has been having some irritation in her mouth, which is common anytime she starts a new medicine.

## 2018-06-20 NOTE — PROGRESS NOTES
Infusion# 3  S/S infection noted or voiced? None  Recent labs? 6/20/2018  Recent vaccines? denies    Premeds? Tylenol 650 mg PO, Benadryl 25 mg IVP, and Solumedrol 100 mg IVP over 2 minutes    Benlysta 10 mg/kg = 840 mg administered IV at a 60 minute rate per orders; see MAR & Flow Sheet for more  details. Tolerated well without adverse events

## 2018-06-20 NOTE — PATIENT INSTRUCTIONS
WAYS TO HELP PREVENT INFECTION         WASH YOUR HANDS OFTEN DURING THE DAY, ESPECIALLY BEFORE YOU EAT, AFTER USING THE BATHROOM, AND AFTER TOUCHING ANIMALS     STAY AWAY FROM PEOPLE WHO HAVE ILLNESSES YOU CAN CATCH; SUCH AS COLDS, FLU, CHICKEN POX     TRY TO AVOID CROWDS     STAY AWAY FROM CHILDREN WHO RECENTLY HAVE RECEIVED LIVE VIRUS VACCINES     MAINTAIN GOOD MOUTH CARE     DO NOT SQUEEZE OR SCRATCH PIMPLES     CLEAN CUTS & SCRAPES RIGHT AWAY AND DAILY UNTIL HEALED WITH WARM WATER, SOAP & AN ANTISEPTIC     AVOID CONTACT WITH LITTER BOXES, BIRD CAGES, & FISH TANKS     AVOID STANDING WATER, IE., BIRD BATHS, FLOWER POTS/VASES, OR HUMIDIFIERS     WEAR GLOVES WHEN GARDENING OR CLEANING UP AFTER OTHERS, ESPECIALLY BABIES & SMALL CHILDREN     DO NOT EAT RAW FISH, SEAFOOD, MEAT, OR EGGS

## 2018-06-28 DIAGNOSIS — M32.19 CNS LUPUS: ICD-10-CM

## 2018-06-28 DIAGNOSIS — G05.3 CNS LUPUS: ICD-10-CM

## 2018-06-28 RX ORDER — PREDNISONE 5 MG/1
TABLET ORAL
Qty: 30 TABLET | Refills: 0 | Status: SHIPPED | OUTPATIENT
Start: 2018-06-28 | End: 2018-06-29 | Stop reason: SDUPTHER

## 2018-06-29 RX ORDER — PREDNISONE 5 MG/1
5 TABLET ORAL DAILY
Qty: 30 TABLET | Refills: 0 | Status: SHIPPED | OUTPATIENT
Start: 2018-06-29 | End: 2018-10-25 | Stop reason: SDUPTHER

## 2018-07-03 ENCOUNTER — CLINICAL SUPPORT (OUTPATIENT)
Dept: REHABILITATION | Facility: HOSPITAL | Age: 53
End: 2018-07-03
Attending: PHYSICAL MEDICINE & REHABILITATION
Payer: COMMERCIAL

## 2018-07-03 DIAGNOSIS — R29.898 DEFICIENCIES OF LIMBS: ICD-10-CM

## 2018-07-03 DIAGNOSIS — M79.7 SCAPULOHUMERAL FIBROSITIS: Primary | ICD-10-CM

## 2018-07-03 PROCEDURE — 97110 THERAPEUTIC EXERCISES: CPT | Mod: PO | Performed by: PHYSICAL THERAPIST

## 2018-07-03 NOTE — PROGRESS NOTES
DATE OF INITIAL PHYSICAL THERAPY EVALUATION:             February 15, 2018      REFERRING PROVIDER:       Radha Drummond MD      REFERRING DIAGNOSIS:       M79.7 (ICD-10-CM) - Fibromyalgia   R29.898 (ICD-10-CM) - Arm weakness   R29.898 (ICD-10-CM) - Weakness of both hips           ORDERS:   Updated orders received from Bhanu Benitez to continue therapy 2 x week x 60 days    Orders will     2018    VISIT    #15    SUBJECTIVE:    Patient complains of lumbar stiffness today.  She states she feels up to working on the strengthening exercises, but would prefer of defer the passive stretching by the therapist.    Patients primary complaint(s):  Lumbar myofascial pain and stiffness  Joint soreness (hips and shoulders)  Fatigue  Poor endurance    History of present condition:  (re-evaluation on May 22, 2018)  Patient is complaining of myofascial pain throughout the lumbar paraspinals with stiffness involving the lumbar spine.    She also complains of joint and muscle pain throughout hip girdle region bilaterally.    Patient reports symptoms continue to limit most of her ADL's and is preventing her from working.  She reports and increase in stiffness in the early AM, and increasing discomfort with activity.   Attempts to complete housework increases her symptoms; especially sweeping and activities that require forward bending.    Pain Ratin/10 at rest      7/10 lumbar pain with forward bending.      Patient reports the following functional activity limitations:  ADL's impaired  Long distance walking impaired due to soreness and fatigue  Bending, stooping, squatting, lifting, and carrying impaired due to joint and muscle pain      (FUNCTIONAL ASSESSMENT TOOL)     At the time of the initial evaluation, the patient reported the following self limitations on the Optimal Instrument functional assessment tool:  (Rating Scale: #1=Able to do without difficulty, #2=Able to do with little difficulty, #3=Able  to do with moderate difficulty, #4=Able to do with much difficulty, #5=Unable to do, #9=Not applicable)    Optimal Instrument Scores reported by the patient:  Completed on May 22, 2018    1.  Lying flat    3  2.  Rolling over   3  3.  Moving - lying to sitting  3  4.  Sitting    3  5.  Squatting    4  6.  Bending/stooping   4  7.  Balancing    2  8.  Kneeling    4  9.  Standing    2  10.  Walking - short distance  3  11.  Walking - long distance  4  12.  Walking - outdoors  3  13.  Climbing stairs   4  14.  Hopping    4  15.  Jumping    4  16.  Running    4  17.  Pushing    3  18.  Pulling    3  19.  Reaching    4  20.  Grasping    3  21.  Lifting    4  22.  Carrying    4    Total Impairment Rating  60%      Past Medical History and co-morbidities:  Past Medical History:   Diagnosis Date    Anemia     with chronic disease lupus    Encounter for blood transfusion     G-6-PD deficiency     Heart murmur     MVP 1986    Hypertension     Lupus     Raynaud's syndrome     Systemic lupus erythematosus arthritis 5/13/2018     Patient Active Problem List   Diagnosis    CNS lupus    Essential hypertension    Systemic lupus erythematosus    Secondary Sjogren's syndrome    Glaucoma suspect of both eyes    Keratoconjunctivitis sicca of both eyes    Peripheral retinal degeneration of both eyes    Posterior vitreous detachment of both eyes    G6PD deficiency    Trigeminal neuralgia of right side of face    Atypical face pain    Long term (current) use of systemic steroids    Long-term current use of high risk medication other than anticoagulant    Systemic lupus erythematosus arthritis       Current Outpatient Prescriptions:     azaTHIOprine (IMURAN) 50 mg Tab, Take 1 tablet (50 mg total) by mouth once daily., Disp: 90 tablet, Rfl: 3    B1/B2/NIACIN/B12/PROTEASE (B-COMPLEX WITH B-12 ORAL), Take by mouth., Disp: , Rfl:     biotin 5,000 mcg TbDL, Take by mouth., Disp: , Rfl:     CALCIUM-MAGNESIUM-ZINC ORAL,  Take by mouth once daily., Disp: , Rfl:     cholecalciferol, vitamin D3, (VITAMIN D3) 5,000 unit Tab, Take 5,000 Units by mouth once daily., Disp: , Rfl:     clobetasol (OLUX) 0.05 % Foam, Apply topically 2 (two) times daily. For scalp only.  Do not use on face., Disp: 50 g, Rfl: 3    cyclobenzaprine (FLEXERIL) 10 MG tablet, Take 10 mg by mouth 3 (three) times daily as needed for Muscle spasms., Disp: , Rfl:     cycloSPORINE (RESTASIS) 0.05 % ophthalmic emulsion, Place 0.4 mLs (1 drop total) into both eyes 2 (two) times daily., Disp: 60 vial, Rfl: 11    desoximetasone (TOPICORT) 0.25 % cream, Apply topically 2 (two) times daily as needed. Do not use on face, underarms or groin, Disp: 60 g, Rfl: 3    EPINEPHrine (EPIPEN) 0.3 mg/0.3 mL AtIn, Inject 0.3 mLs into the muscle as needed., Disp: , Rfl:     ferrous gluconate (FERGON) 324 MG tablet, Take 324 mg by mouth daily with breakfast., Disp: , Rfl:     fish oil-omega-3 fatty acids 300-1,000 mg capsule, Take 2 g by mouth once daily., Disp: , Rfl:     fluocinolone (DERMA-SMOOTHE) 0.01 % external oil, Apply oil to scalp twice a day, Disp: 1 Bottle, Rfl: 5    gabapentin (NEURONTIN) 300 MG capsule, Take 1 capsule (300 mg total) by mouth 3 (three) times daily., Disp: 90 capsule, Rfl: 5    losartan (COZAAR) 100 MG tablet, Take 1 tablet (100 mg total) by mouth once daily., Disp: 30 tablet, Rfl: 11    methylphenidate HCl (RITALIN) 10 MG tablet, Take 1 tablet (10 mg total) by mouth 2 (two) times daily., Disp: 60 tablet, Rfl: 0    multivitamin capsule, Take 1 capsule by mouth once daily., Disp: , Rfl:     predniSONE (DELTASONE) 5 MG tablet, Take 1 tablet (5 mg total) by mouth once daily., Disp: 30 tablet, Rfl: 0    spironolactone (ALDACTONE) 25 MG tablet, Take 1 tablet (25 mg total) by mouth once daily., Disp: 30 tablet, Rfl: 11    Previous physical therapy and treatments:  Patient previously treated by a chiropractor for complaints of lumbar  pain    Occupational/psychosocial/educational profile:  Not employed at this time; patient reports she is unable to tolerate work due to myofascial pain.    OBJECTIVE:    Musculoskeletal Exam:      Postural Exam  No postural deviations noted.  SI exam negative for ilial rotation.    ROM  Cervical spine ROM WNL's  Patient complaints of stiffness and discomfort throughout the cervical paraspinals at the end of full flexion  Lumbar spine ROM is WNL's except for forward flexion which is limited to 75% of full expected range.  Patient complains of tightness at the end point into full flexion throughout the lumbar paraspinal musculature.  Hip ROM is WNL's bilaterally  Shoulder ROM is WNL's bilaterally     Flexibility  HS, ITB, piriformis flexibility limited bilaterally    Functional Strength Testing  5/5 strength throughout the upper quadrant bilaterally except for:  Supraspinatus 4-/5 bilaterally, Infraspinatus 4-/5 bilaterally   5/5 strength throughout the lower quadrant bilaterally      Palpation  Patient reports diffuse tenderness throughout the upper quadrant bilaterally; especially upper traps, levator, rhomboids  Patient reports diffuse tenderness throughout the lower quadrant bilaterally; especially the gluteus medius, ITB  Patient presents with lumbar paraspinal guarding and diffuse tenderness      Neuromuscular Exam    Gait  No significant gait deviations noted    Transitional Movements  Patient complains of discomfort with sit to stand, sit to supine, supine to sit;  Discomfort described as stiffness.    Sensation  No sensory disturbances noted throughout the LE's or UE's  Patient did not complain of paresthesias in the extremities.    PROBLEM LIST - ASSESSMENT   Patient reports less areas of myofascial tenderness throughout the upper and lower quadrants and in the lumbar paraspinal region.  She continues to have stiffness in the lumbar region.    Recommend patient continue with stretching exercises to address  lumbar stiffness and seek opportunities to increase her overall activity level.  A walking or aquatic program are recommended.    Activity Limitations, Participation Restrictions, and CO-MORBIDITIES which may impact the plan of care and potentially impede the patient's progress in therapy include:  Patient DX with lupus    The patient does not present with any learning or communication barriers which may impact the plan of care and potentially impede the patient's progress in therapy.      CLINICAL PRESENTATION:  Patient's Clinical Presentation is STABLE.       RECOMMENDED PLAN OF CARE:  Stretching exercises for shoulder girdle and hip girdle muscle groups  Stretching exercises for the cervical and lumbar muscle groups   Postural correction exercises  Endurance training  RTC strengthening   Core strengthening     TREATMENT PROVIDED:       (one on one with therapist for therapeutic exercises 15 Mins; supervised exercises 20 mins)  -passive stretching provided by therapist for lumbar, hip girdle, and LE muscle groups - declined  -guided core strengthening on the functional   ---modified lateral pull downs  ---cross body punch outs  ---reverse rows    -strengthening for the upper quadrant   ---chest press  ---scapular retraction    -conditioning exercises   ---upper extremity ergometer  ---lower extremity ergometer    Moist heat applied to the lumbar paraspinal muscle groups to decrease stiffness.     The patient has been previously instructed in the following exercises:  -Lumbar rotational ROM and stretching in supine  -Lumbar flexion stretches in supine  -hamstring stretching  -piriformis stretching  -IT Band stretching  -gluteus medius stretching  -stretching of the hip internal rotators      PLAN:  Continue therapy 2 x week to continue the recommended plan of care.      SHORT TERM GOALS:    1. Patient compliant with daily stretching exercises   2. Patient will report pain level of 2/10 or less    LONG TERM  GOALS:  1. Patient will report pain level of 1/10 or less  2. Patient will report 25% or greater decrease in functional impairment on the Optimal Instrument      These services are reasonable and necessary for the conditions set forth above while under my care.

## 2018-07-05 ENCOUNTER — CLINICAL SUPPORT (OUTPATIENT)
Dept: REHABILITATION | Facility: HOSPITAL | Age: 53
End: 2018-07-05
Attending: PHYSICAL MEDICINE & REHABILITATION
Payer: COMMERCIAL

## 2018-07-05 DIAGNOSIS — R29.898 DEFICIENCIES OF LIMBS: ICD-10-CM

## 2018-07-05 DIAGNOSIS — M79.7 SCAPULOHUMERAL FIBROSITIS: Primary | ICD-10-CM

## 2018-07-05 PROCEDURE — 97110 THERAPEUTIC EXERCISES: CPT | Mod: PO | Performed by: PHYSICAL THERAPIST

## 2018-07-05 NOTE — PROGRESS NOTES
DATE OF INITIAL PHYSICAL THERAPY EVALUATION:             February 15, 2018      REFERRING PROVIDER:       Radha Drummond MD      REFERRING DIAGNOSIS:       M79.7 (ICD-10-CM) - Fibromyalgia   R29.898 (ICD-10-CM) - Arm weakness   R29.898 (ICD-10-CM) - Weakness of both hips           ORDERS:   Updated orders received from Bhanu Benitez to continue therapy 2 x week x 60 days    Orders will     2018    VISIT    #16    SUBJECTIVE:    Patient states she is tolerating the strengthening exercises better overall.   She declines passive stretching by the therapist; but reports working on the stretching exercises daily at home.    Patients primary complaint(s):  Lumbar myofascial pain and stiffness  Joint soreness (hips and shoulders)  Fatigue  Poor endurance    History of present condition:  (re-evaluation on May 22, 2018)  Patient is complaining of myofascial pain throughout the lumbar paraspinals with stiffness involving the lumbar spine.    She also complains of joint and muscle pain throughout hip girdle region bilaterally.    Patient reports symptoms continue to limit most of her ADL's and is preventing her from working.  She reports and increase in stiffness in the early AM, and increasing discomfort with activity.   Attempts to complete housework increases her symptoms; especially sweeping and activities that require forward bending.    Pain Ratin/10 at rest      7/10 lumbar pain with forward bending.      Patient reports the following functional activity limitations:  ADL's impaired  Long distance walking impaired due to soreness and fatigue  Bending, stooping, squatting, lifting, and carrying impaired due to joint and muscle pain      (FUNCTIONAL ASSESSMENT TOOL)     At the time of the initial evaluation, the patient reported the following self limitations on the Optimal Instrument functional assessment tool:  (Rating Scale: #1=Able to do without difficulty, #2=Able to do with little  difficulty, #3=Able to do with moderate difficulty, #4=Able to do with much difficulty, #5=Unable to do, #9=Not applicable)    Optimal Instrument Scores reported by the patient:  Completed on May 22, 2018    1.  Lying flat    3  2.  Rolling over   3  3.  Moving - lying to sitting  3  4.  Sitting    3  5.  Squatting    4  6.  Bending/stooping   4  7.  Balancing    2  8.  Kneeling    4  9.  Standing    2  10.  Walking - short distance  3  11.  Walking - long distance  4  12.  Walking - outdoors  3  13.  Climbing stairs   4  14.  Hopping    4  15.  Jumping    4  16.  Running    4  17.  Pushing    3  18.  Pulling    3  19.  Reaching    4  20.  Grasping    3  21.  Lifting    4  22.  Carrying    4    Total Impairment Rating  60%      Past Medical History and co-morbidities:  Past Medical History:   Diagnosis Date    Anemia     with chronic disease lupus    Encounter for blood transfusion     G-6-PD deficiency     Heart murmur     MVP 1986    Hypertension     Lupus     Raynaud's syndrome     Systemic lupus erythematosus arthritis 5/13/2018     Patient Active Problem List   Diagnosis    CNS lupus    Essential hypertension    Systemic lupus erythematosus    Secondary Sjogren's syndrome    Glaucoma suspect of both eyes    Keratoconjunctivitis sicca of both eyes    Peripheral retinal degeneration of both eyes    Posterior vitreous detachment of both eyes    G6PD deficiency    Trigeminal neuralgia of right side of face    Atypical face pain    Long term (current) use of systemic steroids    Long-term current use of high risk medication other than anticoagulant    Systemic lupus erythematosus arthritis       Current Outpatient Prescriptions:     azaTHIOprine (IMURAN) 50 mg Tab, Take 1 tablet (50 mg total) by mouth once daily., Disp: 90 tablet, Rfl: 3    B1/B2/NIACIN/B12/PROTEASE (B-COMPLEX WITH B-12 ORAL), Take by mouth., Disp: , Rfl:     biotin 5,000 mcg TbDL, Take by mouth., Disp: , Rfl:      CALCIUM-MAGNESIUM-ZINC ORAL, Take by mouth once daily., Disp: , Rfl:     cholecalciferol, vitamin D3, (VITAMIN D3) 5,000 unit Tab, Take 5,000 Units by mouth once daily., Disp: , Rfl:     clobetasol (OLUX) 0.05 % Foam, Apply topically 2 (two) times daily. For scalp only.  Do not use on face., Disp: 50 g, Rfl: 3    cyclobenzaprine (FLEXERIL) 10 MG tablet, Take 10 mg by mouth 3 (three) times daily as needed for Muscle spasms., Disp: , Rfl:     cycloSPORINE (RESTASIS) 0.05 % ophthalmic emulsion, Place 0.4 mLs (1 drop total) into both eyes 2 (two) times daily., Disp: 60 vial, Rfl: 11    desoximetasone (TOPICORT) 0.25 % cream, Apply topically 2 (two) times daily as needed. Do not use on face, underarms or groin, Disp: 60 g, Rfl: 3    EPINEPHrine (EPIPEN) 0.3 mg/0.3 mL AtIn, Inject 0.3 mLs into the muscle as needed., Disp: , Rfl:     ferrous gluconate (FERGON) 324 MG tablet, Take 324 mg by mouth daily with breakfast., Disp: , Rfl:     fish oil-omega-3 fatty acids 300-1,000 mg capsule, Take 2 g by mouth once daily., Disp: , Rfl:     fluocinolone (DERMA-SMOOTHE) 0.01 % external oil, Apply oil to scalp twice a day, Disp: 1 Bottle, Rfl: 5    gabapentin (NEURONTIN) 300 MG capsule, Take 1 capsule (300 mg total) by mouth 3 (three) times daily., Disp: 90 capsule, Rfl: 5    losartan (COZAAR) 100 MG tablet, Take 1 tablet (100 mg total) by mouth once daily., Disp: 30 tablet, Rfl: 11    methylphenidate HCl (RITALIN) 10 MG tablet, Take 1 tablet (10 mg total) by mouth 2 (two) times daily., Disp: 60 tablet, Rfl: 0    multivitamin capsule, Take 1 capsule by mouth once daily., Disp: , Rfl:     predniSONE (DELTASONE) 5 MG tablet, Take 1 tablet (5 mg total) by mouth once daily., Disp: 30 tablet, Rfl: 0    spironolactone (ALDACTONE) 25 MG tablet, Take 1 tablet (25 mg total) by mouth once daily., Disp: 30 tablet, Rfl: 11    Previous physical therapy and treatments:  Patient previously treated by a chiropractor for complaints of  lumbar pain    Occupational/psychosocial/educational profile:  Not employed at this time; patient reports she is unable to tolerate work due to myofascial pain.    OBJECTIVE:    Musculoskeletal Exam:      Postural Exam  No postural deviations noted.  SI exam negative for ilial rotation.    ROM  Cervical spine ROM WNL's  Patient complaints of stiffness and discomfort throughout the cervical paraspinals at the end of full flexion  Lumbar spine ROM is WNL's except for forward flexion which is limited to 75% of full expected range.  Patient complains of tightness at the end point into full flexion throughout the lumbar paraspinal musculature.  Hip ROM is WNL's bilaterally  Shoulder ROM is WNL's bilaterally     Flexibility  HS, ITB, piriformis flexibility limited bilaterally    Functional Strength Testing  5/5 strength throughout the upper quadrant bilaterally except for:  Supraspinatus 4-/5 bilaterally, Infraspinatus 4-/5 bilaterally   5/5 strength throughout the lower quadrant bilaterally      Palpation  Patient reports diffuse tenderness throughout the upper quadrant bilaterally; especially upper traps, levator, rhomboids  Patient reports diffuse tenderness throughout the lower quadrant bilaterally; especially the gluteus medius, ITB  Patient presents with lumbar paraspinal guarding and diffuse tenderness      Neuromuscular Exam    Gait  No significant gait deviations noted    Transitional Movements  Patient complains of discomfort with sit to stand, sit to supine, supine to sit;  Discomfort described as stiffness.    Sensation  No sensory disturbances noted throughout the LE's or UE's  Patient did not complain of paresthesias in the extremities.    PROBLEM LIST - ASSESSMENT   Patient reports less areas of myofascial tenderness throughout the upper and lower quadrants and in the lumbar paraspinal region.  She continues to have stiffness in the lumbar region.    Recommend patient continue with stretching exercises to  address lumbar stiffness and seek opportunities to increase her overall activity level.  A walking or aquatic program are recommended.    Activity Limitations, Participation Restrictions, and CO-MORBIDITIES which may impact the plan of care and potentially impede the patient's progress in therapy include:  Patient DX with lupus    The patient does not present with any learning or communication barriers which may impact the plan of care and potentially impede the patient's progress in therapy.      CLINICAL PRESENTATION:  Patient's Clinical Presentation is STABLE.       RECOMMENDED PLAN OF CARE:  Stretching exercises for shoulder girdle and hip girdle muscle groups  Stretching exercises for the cervical and lumbar muscle groups   Postural correction exercises  Endurance training  RTC strengthening   Core strengthening     TREATMENT PROVIDED:       (one on one with therapist for therapeutic exercises 25 Mins; supervised exercises 10 mins)  -passive stretching provided by therapist for lumbar, hip girdle, and LE muscle groups - declined  -guided core strengthening on the functional   ---modified lateral pull downs  ---cross body punch outs  ---reverse rows  -strengthening for the upper quadrant   ---chest press  ---scapular retraction  -TKE  -HS curls  -conditioning exercises   ---upper extremity ergometer  ---lower extremity ergometer    Moist heat applied to the lumbar paraspinal muscle groups to decrease stiffness prior to exercise    The patient has been previously instructed in the following exercises:  -Lumbar rotational ROM and stretching in supine  -Lumbar flexion stretches in supine  -hamstring stretching  -piriformis stretching  -IT Band stretching  -gluteus medius stretching  -stretching of the hip internal rotators      PLAN:  Continue therapy 2 x week to continue the recommended plan of care.      SHORT TERM GOALS:    1. Patient compliant with daily stretching exercises   2. Patient will report pain  level of 2/10 or less    LONG TERM GOALS:  1. Patient will report pain level of 1/10 or less  2. Patient will report 25% or greater decrease in functional impairment on the Optimal Instrument      These services are reasonable and necessary for the conditions set forth above while under my care.

## 2018-07-10 ENCOUNTER — CLINICAL SUPPORT (OUTPATIENT)
Dept: REHABILITATION | Facility: HOSPITAL | Age: 53
End: 2018-07-10
Attending: PHYSICAL MEDICINE & REHABILITATION
Payer: COMMERCIAL

## 2018-07-10 DIAGNOSIS — M79.7 SCAPULOHUMERAL FIBROSITIS: Primary | ICD-10-CM

## 2018-07-10 DIAGNOSIS — R29.898 DEFICIENCIES OF LIMBS: ICD-10-CM

## 2018-07-10 PROCEDURE — 97110 THERAPEUTIC EXERCISES: CPT | Mod: PO | Performed by: PHYSICAL THERAPIST

## 2018-07-10 NOTE — PROGRESS NOTES
DATE OF INITIAL PHYSICAL THERAPY EVALUATION:             February 15, 2018      REFERRING PROVIDER:       Radha Drummond MD      REFERRING DIAGNOSIS:       M79.7 (ICD-10-CM) - Fibromyalgia   R29.898 (ICD-10-CM) - Arm weakness   R29.898 (ICD-10-CM) - Weakness of both hips           ORDERS:   Updated orders received from Bhanu Benitez to continue therapy 2 x week x 60 days    Orders will     2018    VISIT    #17      SUBJECTIVE:    Patient states she would like to continue with the core strengthening exercises.      Patients primary complaint(s):  Lumbar myofascial pain and stiffness  Joint soreness (hips and shoulders)  Fatigue  Poor endurance    History of present condition:  (re-evaluation on May 22, 2018)  Patient is complaining of myofascial pain throughout the lumbar paraspinals with stiffness involving the lumbar spine.    She also complains of joint and muscle pain throughout hip girdle region bilaterally.    Patient reports symptoms continue to limit most of her ADL's and is preventing her from working.  She reports and increase in stiffness in the early AM, and increasing discomfort with activity.   Attempts to complete housework increases her symptoms; especially sweeping and activities that require forward bending.    Pain Ratin/10 at rest      7/10 lumbar pain with forward bending.      Patient reports the following functional activity limitations:  ADL's impaired  Long distance walking impaired due to soreness and fatigue  Bending, stooping, squatting, lifting, and carrying impaired due to joint and muscle pain      (FUNCTIONAL ASSESSMENT TOOL)     At the time of the initial evaluation, the patient reported the following self limitations on the Optimal Instrument functional assessment tool:  (Rating Scale: #1=Able to do without difficulty, #2=Able to do with little difficulty, #3=Able to do with moderate difficulty, #4=Able to do with much difficulty, #5=Unable to do, #9=Not  applicable)    Optimal Instrument Scores reported by the patient:  Completed on May 22, 2018    1.  Lying flat    3  2.  Rolling over   3  3.  Moving - lying to sitting  3  4.  Sitting    3  5.  Squatting    4  6.  Bending/stooping   4  7.  Balancing    2  8.  Kneeling    4  9.  Standing    2  10.  Walking - short distance  3  11.  Walking - long distance  4  12.  Walking - outdoors  3  13.  Climbing stairs   4  14.  Hopping    4  15.  Jumping    4  16.  Running    4  17.  Pushing    3  18.  Pulling    3  19.  Reaching    4  20.  Grasping    3  21.  Lifting    4  22.  Carrying    4    Total Impairment Rating  60%      Past Medical History and co-morbidities:  Past Medical History:   Diagnosis Date    Anemia     with chronic disease lupus    Encounter for blood transfusion     G-6-PD deficiency     Heart murmur     MVP 1986    Hypertension     Lupus     Raynaud's syndrome     Systemic lupus erythematosus arthritis 5/13/2018     Patient Active Problem List   Diagnosis    CNS lupus    Essential hypertension    Systemic lupus erythematosus    Secondary Sjogren's syndrome    Glaucoma suspect of both eyes    Keratoconjunctivitis sicca of both eyes    Peripheral retinal degeneration of both eyes    Posterior vitreous detachment of both eyes    G6PD deficiency    Trigeminal neuralgia of right side of face    Atypical face pain    Long term (current) use of systemic steroids    Long-term current use of high risk medication other than anticoagulant    Systemic lupus erythematosus arthritis       Current Outpatient Prescriptions:     azaTHIOprine (IMURAN) 50 mg Tab, Take 1 tablet (50 mg total) by mouth once daily., Disp: 90 tablet, Rfl: 3    B1/B2/NIACIN/B12/PROTEASE (B-COMPLEX WITH B-12 ORAL), Take by mouth., Disp: , Rfl:     biotin 5,000 mcg TbDL, Take by mouth., Disp: , Rfl:     CALCIUM-MAGNESIUM-ZINC ORAL, Take by mouth once daily., Disp: , Rfl:     cholecalciferol, vitamin D3, (VITAMIN D3) 5,000  unit Tab, Take 5,000 Units by mouth once daily., Disp: , Rfl:     clobetasol (OLUX) 0.05 % Foam, Apply topically 2 (two) times daily. For scalp only.  Do not use on face., Disp: 50 g, Rfl: 3    cyclobenzaprine (FLEXERIL) 10 MG tablet, Take 10 mg by mouth 3 (three) times daily as needed for Muscle spasms., Disp: , Rfl:     cycloSPORINE (RESTASIS) 0.05 % ophthalmic emulsion, Place 0.4 mLs (1 drop total) into both eyes 2 (two) times daily., Disp: 60 vial, Rfl: 11    desoximetasone (TOPICORT) 0.25 % cream, Apply topically 2 (two) times daily as needed. Do not use on face, underarms or groin, Disp: 60 g, Rfl: 3    EPINEPHrine (EPIPEN) 0.3 mg/0.3 mL AtIn, Inject 0.3 mLs into the muscle as needed., Disp: , Rfl:     ferrous gluconate (FERGON) 324 MG tablet, Take 324 mg by mouth daily with breakfast., Disp: , Rfl:     fish oil-omega-3 fatty acids 300-1,000 mg capsule, Take 2 g by mouth once daily., Disp: , Rfl:     fluocinolone (DERMA-SMOOTHE) 0.01 % external oil, Apply oil to scalp twice a day, Disp: 1 Bottle, Rfl: 5    gabapentin (NEURONTIN) 300 MG capsule, Take 1 capsule (300 mg total) by mouth 3 (three) times daily., Disp: 90 capsule, Rfl: 5    losartan (COZAAR) 100 MG tablet, Take 1 tablet (100 mg total) by mouth once daily., Disp: 30 tablet, Rfl: 11    methylphenidate HCl (RITALIN) 10 MG tablet, Take 1 tablet (10 mg total) by mouth 2 (two) times daily., Disp: 60 tablet, Rfl: 0    multivitamin capsule, Take 1 capsule by mouth once daily., Disp: , Rfl:     predniSONE (DELTASONE) 5 MG tablet, Take 1 tablet (5 mg total) by mouth once daily., Disp: 30 tablet, Rfl: 0    spironolactone (ALDACTONE) 25 MG tablet, Take 1 tablet (25 mg total) by mouth once daily., Disp: 30 tablet, Rfl: 11    Previous physical therapy and treatments:  Patient previously treated by a chiropractor for complaints of lumbar pain    Occupational/psychosocial/educational profile:  Not employed at this time; patient reports she is unable to  tolerate work due to myofascial pain.    OBJECTIVE:    Musculoskeletal Exam:      Postural Exam  No postural deviations noted.  SI exam negative for ilial rotation.    ROM  Cervical spine ROM WNL's  Patient complaints of stiffness and discomfort throughout the cervical paraspinals at the end of full flexion  Lumbar spine ROM is WNL's except for forward flexion which is limited to 75% of full expected range.  Patient complains of tightness at the end point into full flexion throughout the lumbar paraspinal musculature.  Hip ROM is WNL's bilaterally  Shoulder ROM is WNL's bilaterally     Flexibility  HS, ITB, piriformis flexibility limited bilaterally    Functional Strength Testing  5/5 strength throughout the upper quadrant bilaterally except for:  Supraspinatus 4-/5 bilaterally, Infraspinatus 4-/5 bilaterally   5/5 strength throughout the lower quadrant bilaterally      Palpation  Patient reports diffuse tenderness throughout the upper quadrant bilaterally; especially upper traps, levator, rhomboids  Patient reports diffuse tenderness throughout the lower quadrant bilaterally; especially the gluteus medius, ITB  Patient presents with lumbar paraspinal guarding and diffuse tenderness      Neuromuscular Exam    Gait  No significant gait deviations noted    Transitional Movements  Patient complains of discomfort with sit to stand, sit to supine, supine to sit;  Discomfort described as stiffness.    Sensation  No sensory disturbances noted throughout the LE's or UE's  Patient did not complain of paresthesias in the extremities.    PROBLEM LIST - ASSESSMENT   Patient reports decreasing myofascial tenderness throughout the upper and lower quadrants and in the lumbar paraspinal region.  She continues to have stiffness in the lumbar region.    Recommend patient continue with stretching exercises to address lumbar stiffness and seek opportunities to increase her overall activity level.  A walking or aquatic program are  recommended.  She reports tolerating the exercises fairly well.    Activity Limitations, Participation Restrictions, and CO-MORBIDITIES which may impact the plan of care and potentially impede the patient's progress in therapy include:  Patient DX with lupus    The patient does not present with any learning or communication barriers which may impact the plan of care and potentially impede the patient's progress in therapy.      CLINICAL PRESENTATION:  Patient's Clinical Presentation is STABLE.       RECOMMENDED PLAN OF CARE:  Stretching exercises for shoulder girdle and hip girdle muscle groups  Stretching exercises for the cervical and lumbar muscle groups   Postural correction exercises  Endurance training  RTC strengthening   Core strengthening     TREATMENT PROVIDED:       (one on one with therapist for therapeutic exercises 25 Mins; supervised exercises 10 mins)  -passive stretching provided by therapist for lumbar, hip girdle, and LE muscle groups - declined by patient  -guided core strengthening on the functional   ---modified lateral pull downs  ---cross body punch outs  ---reverse rows  -strengthening for the upper quadrant   ---chest press  ---scapular retraction  -TKE  -HS curls  -conditioning exercises   ---upper extremity ergometer  ---lower extremity ergometer    Patient reported feeling fatigued after exercising.      Moist heat applied to the lumbar paraspinal muscle groups to decrease stiffness prior to exercise    The patient has been previously instructed in the following exercises:  -Lumbar rotational ROM and stretching in supine  -Lumbar flexion stretches in supine  -hamstring stretching  -piriformis stretching  -IT Band stretching  -gluteus medius stretching  -stretching of the hip internal rotators      PLAN:  Continue therapy 2 x week to continue the recommended plan of care.      SHORT TERM GOALS:    1. Patient compliant with daily stretching exercises   2. Patient will report pain  level of 2/10 or less    LONG TERM GOALS:  1. Patient will report pain level of 1/10 or less  2. Patient will report 25% or greater decrease in functional impairment on the Optimal Instrument      These services are reasonable and necessary for the conditions set forth above while under my care.

## 2018-07-12 ENCOUNTER — CLINICAL SUPPORT (OUTPATIENT)
Dept: REHABILITATION | Facility: HOSPITAL | Age: 53
End: 2018-07-12
Attending: PHYSICAL MEDICINE & REHABILITATION
Payer: COMMERCIAL

## 2018-07-12 DIAGNOSIS — M79.7 SCAPULOHUMERAL FIBROSITIS: Primary | ICD-10-CM

## 2018-07-12 DIAGNOSIS — R29.898 DEFICIENCIES OF LIMBS: ICD-10-CM

## 2018-07-12 PROCEDURE — 97110 THERAPEUTIC EXERCISES: CPT | Mod: PO | Performed by: PHYSICAL THERAPIST

## 2018-07-12 NOTE — PROGRESS NOTES
DATE OF INITIAL PHYSICAL THERAPY EVALUATION:             February 15, 2018      REFERRING PROVIDER:       Radha Drummond MD      REFERRING DIAGNOSIS:       M79.7 (ICD-10-CM) - Fibromyalgia   R29.898 (ICD-10-CM) - Arm weakness   R29.898 (ICD-10-CM) - Weakness of both hips           ORDERS:   Updated orders received from Bhanu Benitez to continue therapy 2 x week x 60 days    Orders will     2018    VISIT    #17      SUBJECTIVE:    Patient states she is experiencing less lumbar discomfort; reports stiffness in the early AM.    Patients primary complaint(s):  Lumbar myofascial pain and stiffness  Joint soreness (hips and shoulders)  Fatigue  Poor endurance    History of present condition:  (re-evaluation on May 22, 2018)  Patient is complaining of myofascial pain throughout the lumbar paraspinals with stiffness involving the lumbar spine.    She also complains of joint and muscle pain throughout hip girdle region bilaterally.    Patient reports symptoms continue to limit most of her ADL's and is preventing her from working.  She reports and increase in stiffness in the early AM, and increasing discomfort with activity.   Attempts to complete housework increases her symptoms; especially sweeping and activities that require forward bending.    Pain Ratin/10 at rest     4/10 lumbar pain with forward bending.      Patient reports the following functional activity limitations:  ADL's impaired  Long distance walking impaired due to soreness and fatigue  Bending, stooping, squatting, lifting, and carrying impaired due to joint and muscle pain      (FUNCTIONAL ASSESSMENT TOOL)     At the time of the initial evaluation, the patient reported the following self limitations on the Optimal Instrument functional assessment tool:  (Rating Scale: #1=Able to do without difficulty, #2=Able to do with little difficulty, #3=Able to do with moderate difficulty, #4=Able to do with much difficulty, #5=Unable to do,  #9=Not applicable)    Optimal Instrument Scores reported by the patient:  Completed on May 22, 2018    1.  Lying flat    3  2.  Rolling over   3  3.  Moving - lying to sitting  3  4.  Sitting    3  5.  Squatting    4  6.  Bending/stooping   4  7.  Balancing    2  8.  Kneeling    4  9.  Standing    2  10.  Walking - short distance  3  11.  Walking - long distance  4  12.  Walking - outdoors  3  13.  Climbing stairs   4  14.  Hopping    4  15.  Jumping    4  16.  Running    4  17.  Pushing    3  18.  Pulling    3  19.  Reaching    4  20.  Grasping    3  21.  Lifting    4  22.  Carrying    4    Total Impairment Rating  60%      Past Medical History and co-morbidities:  Past Medical History:   Diagnosis Date    Anemia     with chronic disease lupus    Encounter for blood transfusion     G-6-PD deficiency     Heart murmur     MVP 1986    Hypertension     Lupus     Raynaud's syndrome     Systemic lupus erythematosus arthritis 5/13/2018     Patient Active Problem List   Diagnosis    CNS lupus    Essential hypertension    Systemic lupus erythematosus    Secondary Sjogren's syndrome    Glaucoma suspect of both eyes    Keratoconjunctivitis sicca of both eyes    Peripheral retinal degeneration of both eyes    Posterior vitreous detachment of both eyes    G6PD deficiency    Trigeminal neuralgia of right side of face    Atypical face pain    Long term (current) use of systemic steroids    Long-term current use of high risk medication other than anticoagulant    Systemic lupus erythematosus arthritis       Current Outpatient Prescriptions:     azaTHIOprine (IMURAN) 50 mg Tab, Take 1 tablet (50 mg total) by mouth once daily., Disp: 90 tablet, Rfl: 3    B1/B2/NIACIN/B12/PROTEASE (B-COMPLEX WITH B-12 ORAL), Take by mouth., Disp: , Rfl:     biotin 5,000 mcg TbDL, Take by mouth., Disp: , Rfl:     CALCIUM-MAGNESIUM-ZINC ORAL, Take by mouth once daily., Disp: , Rfl:     cholecalciferol, vitamin D3, (VITAMIN  D3) 5,000 unit Tab, Take 5,000 Units by mouth once daily., Disp: , Rfl:     clobetasol (OLUX) 0.05 % Foam, Apply topically 2 (two) times daily. For scalp only.  Do not use on face., Disp: 50 g, Rfl: 3    cyclobenzaprine (FLEXERIL) 10 MG tablet, Take 10 mg by mouth 3 (three) times daily as needed for Muscle spasms., Disp: , Rfl:     cycloSPORINE (RESTASIS) 0.05 % ophthalmic emulsion, Place 0.4 mLs (1 drop total) into both eyes 2 (two) times daily., Disp: 60 vial, Rfl: 11    desoximetasone (TOPICORT) 0.25 % cream, Apply topically 2 (two) times daily as needed. Do not use on face, underarms or groin, Disp: 60 g, Rfl: 3    EPINEPHrine (EPIPEN) 0.3 mg/0.3 mL AtIn, Inject 0.3 mLs into the muscle as needed., Disp: , Rfl:     ferrous gluconate (FERGON) 324 MG tablet, Take 324 mg by mouth daily with breakfast., Disp: , Rfl:     fish oil-omega-3 fatty acids 300-1,000 mg capsule, Take 2 g by mouth once daily., Disp: , Rfl:     fluocinolone (DERMA-SMOOTHE) 0.01 % external oil, Apply oil to scalp twice a day, Disp: 1 Bottle, Rfl: 5    gabapentin (NEURONTIN) 300 MG capsule, Take 1 capsule (300 mg total) by mouth 3 (three) times daily., Disp: 90 capsule, Rfl: 5    losartan (COZAAR) 100 MG tablet, Take 1 tablet (100 mg total) by mouth once daily., Disp: 30 tablet, Rfl: 11    methylphenidate HCl (RITALIN) 10 MG tablet, Take 1 tablet (10 mg total) by mouth 2 (two) times daily., Disp: 60 tablet, Rfl: 0    multivitamin capsule, Take 1 capsule by mouth once daily., Disp: , Rfl:     predniSONE (DELTASONE) 5 MG tablet, Take 1 tablet (5 mg total) by mouth once daily., Disp: 30 tablet, Rfl: 0    spironolactone (ALDACTONE) 25 MG tablet, Take 1 tablet (25 mg total) by mouth once daily., Disp: 30 tablet, Rfl: 11    Previous physical therapy and treatments:  Patient previously treated by a chiropractor for complaints of lumbar pain    Occupational/psychosocial/educational profile:  Not employed at this time; patient reports she is  unable to tolerate work due to myofascial pain.    OBJECTIVE:    Musculoskeletal Exam:      Postural Exam  No postural deviations noted.  SI exam negative for ilial rotation.    ROM  Cervical spine ROM WNL's  Patient complaints of stiffness and discomfort throughout the cervical paraspinals at the end of full flexion  Lumbar spine ROM is WNL's except for forward flexion which is limited to 75% of full expected range.  Patient complains of tightness at the end point into full flexion throughout the lumbar paraspinal musculature.  Hip ROM is WNL's bilaterally  Shoulder ROM is WNL's bilaterally     Flexibility  HS, ITB, piriformis flexibility limited bilaterally    Functional Strength Testing  5/5 strength throughout the upper quadrant bilaterally except for:  Supraspinatus 4-/5 bilaterally, Infraspinatus 4-/5 bilaterally   5/5 strength throughout the lower quadrant bilaterally      Palpation  Patient reports diffuse tenderness throughout the upper quadrant bilaterally; especially upper traps, levator, rhomboids  Patient reports diffuse tenderness throughout the lower quadrant bilaterally; especially the gluteus medius, ITB  Patient presents with lumbar paraspinal guarding and diffuse tenderness      Neuromuscular Exam    Gait  No significant gait deviations noted    Transitional Movements  Patient complains of discomfort with sit to stand, sit to supine, supine to sit;  Discomfort described as stiffness.    Sensation  No sensory disturbances noted throughout the LE's or UE's  Patient did not complain of paresthesias in the extremities.    PROBLEM LIST - ASSESSMENT   Patient reports decreasing myofascial tenderness throughout the upper and lower quadrants and in the lumbar paraspinal region.  She continues to have stiffness in the lumbar region.    Recommend patient continue with stretching exercises to address lumbar stiffness and seek opportunities to increase her overall activity level.  A walking or aquatic program  are recommended.  She reports tolerating the exercises fairly well.    Activity Limitations, Participation Restrictions, and CO-MORBIDITIES which may impact the plan of care and potentially impede the patient's progress in therapy include:  Patient DX with lupus    The patient does not present with any learning or communication barriers which may impact the plan of care and potentially impede the patient's progress in therapy.      CLINICAL PRESENTATION:  Patient's Clinical Presentation is STABLE.       RECOMMENDED PLAN OF CARE:  Stretching exercises for shoulder girdle and hip girdle muscle groups  Stretching exercises for the cervical and lumbar muscle groups   Postural correction exercises  Endurance training  RTC strengthening   Core strengthening     TREATMENT PROVIDED:       (one on one with therapist for therapeutic exercises 20 Mins; supervised exercises 15 mins)  -passive stretching provided by therapist for lumbar, hip girdle, and LE muscle groups - declined by patient  -guided core strengthening on the functional   ---modified lateral pull downs  ---cross body punch outs  ---reverse rows  -strengthening for the upper quadrant   ---chest press  ---scapular retraction  -TKE  -HS curls  -conditioning exercises   ---upper extremity ergometer  ---lower extremity ergometer    Patient reported feeling fatigued after exercising.      Moist heat applied to the lumbar paraspinal muscle groups to decrease stiffness prior to exercise    The patient has been previously instructed in the following exercises:  -Lumbar rotational ROM and stretching in supine  -Lumbar flexion stretches in supine  -hamstring stretching  -piriformis stretching  -IT Band stretching  -gluteus medius stretching  -stretching of the hip internal rotators      PLAN:  Patient to continue with daily stretching exercises; plans to return to rehab as needed in 1 to 2 weeks.      SHORT TERM GOALS:    1. Patient compliant with daily stretching  exercises   2. Patient will report pain level of 2/10 or less    LONG TERM GOALS:  1. Patient will report pain level of 1/10 or less  2. Patient will report 25% or greater decrease in functional impairment on the Optimal Instrument      These services are reasonable and necessary for the conditions set forth above while under my care.

## 2018-07-17 ENCOUNTER — TELEPHONE (OUTPATIENT)
Dept: OBSTETRICS AND GYNECOLOGY | Facility: CLINIC | Age: 53
End: 2018-07-17

## 2018-07-17 DIAGNOSIS — Z12.39 BREAST CANCER SCREENING: Primary | ICD-10-CM

## 2018-07-17 NOTE — TELEPHONE ENCOUNTER
----- Message from Christel Alfred sent at 7/17/2018  1:28 PM CDT -----  Contact: pt  Please call pt @ [phone], pt have some questions about mammograph.

## 2018-07-19 ENCOUNTER — INFUSION (OUTPATIENT)
Dept: RHEUMATOLOGY | Facility: HOSPITAL | Age: 53
End: 2018-07-19
Attending: INTERNAL MEDICINE
Payer: COMMERCIAL

## 2018-07-19 ENCOUNTER — LAB VISIT (OUTPATIENT)
Dept: LAB | Facility: HOSPITAL | Age: 53
End: 2018-07-19
Attending: INTERNAL MEDICINE
Payer: COMMERCIAL

## 2018-07-19 VITALS
HEART RATE: 75 BPM | WEIGHT: 189.81 LBS | TEMPERATURE: 98 F | OXYGEN SATURATION: 98 % | DIASTOLIC BLOOD PRESSURE: 93 MMHG | BODY MASS INDEX: 31.11 KG/M2 | SYSTOLIC BLOOD PRESSURE: 146 MMHG | RESPIRATION RATE: 18 BRPM

## 2018-07-19 DIAGNOSIS — M32.9 SYSTEMIC LUPUS ERYTHEMATOSUS ARTHRITIS: Primary | ICD-10-CM

## 2018-07-19 DIAGNOSIS — M32.19 CNS LUPUS: ICD-10-CM

## 2018-07-19 DIAGNOSIS — G05.3 CNS LUPUS: ICD-10-CM

## 2018-07-19 DIAGNOSIS — D63.8 ANEMIA OF CHRONIC ILLNESS: ICD-10-CM

## 2018-07-19 DIAGNOSIS — M32.19 OTHER SYSTEMIC LUPUS ERYTHEMATOSUS WITH OTHER ORGAN INVOLVEMENT: ICD-10-CM

## 2018-07-19 DIAGNOSIS — M35.00 SECONDARY SJOGREN'S SYNDROME: ICD-10-CM

## 2018-07-19 LAB
ALBUMIN SERPL BCP-MCNC: 3.7 G/DL
ALP SERPL-CCNC: 100 U/L
ALT SERPL W/O P-5'-P-CCNC: 20 U/L
ANION GAP SERPL CALC-SCNC: 9 MMOL/L
AST SERPL-CCNC: 20 U/L
BASOPHILS # BLD AUTO: 0.01 K/UL
BASOPHILS NFR BLD: 0.2 %
BILIRUB SERPL-MCNC: 0.6 MG/DL
BUN SERPL-MCNC: 12 MG/DL
CALCIUM SERPL-MCNC: 9.7 MG/DL
CHLORIDE SERPL-SCNC: 107 MMOL/L
CO2 SERPL-SCNC: 27 MMOL/L
CREAT SERPL-MCNC: 0.9 MG/DL
DIFFERENTIAL METHOD: ABNORMAL
EOSINOPHIL # BLD AUTO: 0 K/UL
EOSINOPHIL NFR BLD: 0.2 %
ERYTHROCYTE [DISTWIDTH] IN BLOOD BY AUTOMATED COUNT: 14.4 %
EST. GFR  (AFRICAN AMERICAN): >60 ML/MIN/1.73 M^2
EST. GFR  (NON AFRICAN AMERICAN): >60 ML/MIN/1.73 M^2
GLUCOSE SERPL-MCNC: 124 MG/DL
HCT VFR BLD AUTO: 33.1 %
HGB BLD-MCNC: 10.6 G/DL
LYMPHOCYTES # BLD AUTO: 1.8 K/UL
LYMPHOCYTES NFR BLD: 27.5 %
MCH RBC QN AUTO: 28.5 PG
MCHC RBC AUTO-ENTMCNC: 32 G/DL
MCV RBC AUTO: 89 FL
MONOCYTES # BLD AUTO: 0.4 K/UL
MONOCYTES NFR BLD: 5.5 %
NEUTROPHILS # BLD AUTO: 4.3 K/UL
NEUTROPHILS NFR BLD: 66.6 %
PLATELET # BLD AUTO: 191 K/UL
PMV BLD AUTO: 9.7 FL
POTASSIUM SERPL-SCNC: 3.9 MMOL/L
PROT SERPL-MCNC: 7.7 G/DL
RBC # BLD AUTO: 3.72 M/UL
SODIUM SERPL-SCNC: 143 MMOL/L
WBC # BLD AUTO: 6.37 K/UL

## 2018-07-19 PROCEDURE — 80053 COMPREHEN METABOLIC PANEL: CPT | Mod: PO

## 2018-07-19 PROCEDURE — 96413 CHEMO IV INFUSION 1 HR: CPT | Mod: PO

## 2018-07-19 PROCEDURE — 85025 COMPLETE CBC W/AUTO DIFF WBC: CPT | Mod: PO

## 2018-07-19 PROCEDURE — 63600175 PHARM REV CODE 636 W HCPCS: Mod: PO | Performed by: INTERNAL MEDICINE

## 2018-07-19 PROCEDURE — 25000003 PHARM REV CODE 250: Mod: PO | Performed by: INTERNAL MEDICINE

## 2018-07-19 PROCEDURE — 36415 COLL VENOUS BLD VENIPUNCTURE: CPT | Mod: PO

## 2018-07-19 PROCEDURE — A4216 STERILE WATER/SALINE, 10 ML: HCPCS | Mod: PO | Performed by: INTERNAL MEDICINE

## 2018-07-19 PROCEDURE — 96375 TX/PRO/DX INJ NEW DRUG ADDON: CPT | Mod: PO

## 2018-07-19 RX ORDER — SODIUM CHLORIDE 0.9 % (FLUSH) 0.9 %
10 SYRINGE (ML) INJECTION
Status: CANCELLED | OUTPATIENT
Start: 2018-07-19

## 2018-07-19 RX ORDER — DIPHENHYDRAMINE HYDROCHLORIDE 50 MG/ML
25 INJECTION INTRAMUSCULAR; INTRAVENOUS
Status: CANCELLED
Start: 2018-07-19

## 2018-07-19 RX ORDER — SODIUM CHLORIDE 0.9 % (FLUSH) 0.9 %
10 SYRINGE (ML) INJECTION
Status: DISCONTINUED | OUTPATIENT
Start: 2018-07-19 | End: 2018-07-19 | Stop reason: HOSPADM

## 2018-07-19 RX ORDER — DIPHENHYDRAMINE HYDROCHLORIDE 50 MG/ML
25 INJECTION INTRAMUSCULAR; INTRAVENOUS
Status: COMPLETED | OUTPATIENT
Start: 2018-07-19 | End: 2018-07-19

## 2018-07-19 RX ORDER — HEPARIN 100 UNIT/ML
500 SYRINGE INTRAVENOUS
Status: CANCELLED | OUTPATIENT
Start: 2018-07-19

## 2018-07-19 RX ORDER — ACETAMINOPHEN 325 MG/1
650 TABLET ORAL
Status: COMPLETED | OUTPATIENT
Start: 2018-07-19 | End: 2018-07-19

## 2018-07-19 RX ORDER — ACETAMINOPHEN 325 MG/1
650 TABLET ORAL
Status: CANCELLED | OUTPATIENT
Start: 2018-07-19

## 2018-07-19 RX ADMIN — SODIUM CHLORIDE, PRESERVATIVE FREE 10 ML: 5 INJECTION INTRAVENOUS at 09:07

## 2018-07-19 RX ADMIN — ACETAMINOPHEN 650 MG: 325 TABLET ORAL at 09:07

## 2018-07-19 RX ADMIN — METHYLPREDNISOLONE SODIUM SUCCINATE 100 MG: 125 INJECTION, POWDER, FOR SOLUTION INTRAMUSCULAR; INTRAVENOUS at 09:07

## 2018-07-19 RX ADMIN — DIPHENHYDRAMINE HYDROCHLORIDE 25 MG: 50 INJECTION INTRAMUSCULAR; INTRAVENOUS at 09:07

## 2018-07-19 RX ADMIN — BELIMUMAB 861 MG: 400 INJECTION, POWDER, LYOPHILIZED, FOR SOLUTION INTRAVENOUS at 09:07

## 2018-07-19 NOTE — PROGRESS NOTES
Infusion # 4 - Benlysta 10 mg/kg q 4 weeks  Today's weight-86.1 kg    Any:  -recent illness, infection, or antibiotic use in past week- denies  -open wounds or mouth sores- denies  -invasive procedures or surgeries in past 4 weeks or in upcoming 4 weeks- denies  -vaccinations in past week- denies      Recent labs? 7/19/18  Last Rheumatology provider visit- Seen by Dr. CHUNG on 3/29/18     Premeds? 650 mg Tylenol PO, 100 mg Solumedrol IVP over 3 minutes, and 25 mg Benadryl IVP over 3 minutes     Benlysta 861 mg administered IV at a 60 minute rate per orders; see MAR and vitals for more  details. Tolerated well without adverse events, discharged and ambulatory out of clinic.

## 2018-07-26 ENCOUNTER — OFFICE VISIT (OUTPATIENT)
Dept: RHEUMATOLOGY | Facility: CLINIC | Age: 53
End: 2018-07-26
Payer: COMMERCIAL

## 2018-07-26 ENCOUNTER — OFFICE VISIT (OUTPATIENT)
Dept: CARDIOLOGY | Facility: CLINIC | Age: 53
End: 2018-07-26
Payer: COMMERCIAL

## 2018-07-26 ENCOUNTER — HOSPITAL ENCOUNTER (OUTPATIENT)
Dept: RADIOLOGY | Facility: HOSPITAL | Age: 53
Discharge: HOME | End: 2018-07-26
Attending: NURSE PRACTITIONER
Payer: COMMERCIAL

## 2018-07-26 ENCOUNTER — OFFICE VISIT (OUTPATIENT)
Dept: OBSTETRICS AND GYNECOLOGY | Facility: CLINIC | Age: 53
End: 2018-07-26
Payer: COMMERCIAL

## 2018-07-26 VITALS
HEIGHT: 66 IN | BODY MASS INDEX: 29.83 KG/M2 | SYSTOLIC BLOOD PRESSURE: 130 MMHG | SYSTOLIC BLOOD PRESSURE: 136 MMHG | DIASTOLIC BLOOD PRESSURE: 86 MMHG | WEIGHT: 185.63 LBS | BODY MASS INDEX: 30.08 KG/M2 | HEIGHT: 66 IN | DIASTOLIC BLOOD PRESSURE: 87 MMHG | HEART RATE: 110 BPM | HEART RATE: 88 BPM | WEIGHT: 187.19 LBS

## 2018-07-26 VITALS
WEIGHT: 187.81 LBS | DIASTOLIC BLOOD PRESSURE: 88 MMHG | SYSTOLIC BLOOD PRESSURE: 130 MMHG | BODY MASS INDEX: 31.29 KG/M2 | HEIGHT: 65 IN

## 2018-07-26 DIAGNOSIS — Z12.39 BREAST CANCER SCREENING: ICD-10-CM

## 2018-07-26 DIAGNOSIS — G05.3 CNS LUPUS: ICD-10-CM

## 2018-07-26 DIAGNOSIS — M32.9 SYSTEMIC LUPUS ERYTHEMATOSUS ARTHRITIS: Primary | ICD-10-CM

## 2018-07-26 DIAGNOSIS — I10 ESSENTIAL HYPERTENSION: Primary | ICD-10-CM

## 2018-07-26 DIAGNOSIS — Z01.419 ENCOUNTER FOR GYNECOLOGICAL EXAMINATION WITHOUT ABNORMAL FINDING: Primary | ICD-10-CM

## 2018-07-26 DIAGNOSIS — M35.00 SECONDARY SJOGREN'S SYNDROME: ICD-10-CM

## 2018-07-26 DIAGNOSIS — M32.19 CNS LUPUS: ICD-10-CM

## 2018-07-26 DIAGNOSIS — Z79.899 LONG-TERM CURRENT USE OF HIGH RISK MEDICATION OTHER THAN ANTICOAGULANT: ICD-10-CM

## 2018-07-26 PROCEDURE — 3079F DIAST BP 80-89 MM HG: CPT | Mod: CPTII,S$GLB,, | Performed by: INTERNAL MEDICINE

## 2018-07-26 PROCEDURE — 77065 DX MAMMO INCL CAD UNI: CPT | Mod: TC,PO,LT

## 2018-07-26 PROCEDURE — 3008F BODY MASS INDEX DOCD: CPT | Mod: CPTII,S$GLB,, | Performed by: NUCLEAR MEDICINE

## 2018-07-26 PROCEDURE — 99214 OFFICE O/P EST MOD 30 MIN: CPT | Mod: S$GLB,,, | Performed by: INTERNAL MEDICINE

## 2018-07-26 PROCEDURE — 99999 PR PBB SHADOW E&M-EST. PATIENT-LVL III: CPT | Mod: PBBFAC,,, | Performed by: NUCLEAR MEDICINE

## 2018-07-26 PROCEDURE — 77065 DX MAMMO INCL CAD UNI: CPT | Mod: 26,LT,, | Performed by: RADIOLOGY

## 2018-07-26 PROCEDURE — 88175 CYTOPATH C/V AUTO FLUID REDO: CPT

## 2018-07-26 PROCEDURE — 99999 PR PBB SHADOW E&M-EST. PATIENT-LVL III: CPT | Mod: PBBFAC,,, | Performed by: INTERNAL MEDICINE

## 2018-07-26 PROCEDURE — 3079F DIAST BP 80-89 MM HG: CPT | Mod: CPTII,S$GLB,, | Performed by: NURSE PRACTITIONER

## 2018-07-26 PROCEDURE — 3079F DIAST BP 80-89 MM HG: CPT | Mod: CPTII,S$GLB,, | Performed by: NUCLEAR MEDICINE

## 2018-07-26 PROCEDURE — 3075F SYST BP GE 130 - 139MM HG: CPT | Mod: CPTII,S$GLB,, | Performed by: INTERNAL MEDICINE

## 2018-07-26 PROCEDURE — 3008F BODY MASS INDEX DOCD: CPT | Mod: CPTII,S$GLB,, | Performed by: INTERNAL MEDICINE

## 2018-07-26 PROCEDURE — 99396 PREV VISIT EST AGE 40-64: CPT | Mod: S$GLB,,, | Performed by: NURSE PRACTITIONER

## 2018-07-26 PROCEDURE — 77061 BREAST TOMOSYNTHESIS UNI: CPT | Mod: 26,LT,, | Performed by: RADIOLOGY

## 2018-07-26 PROCEDURE — 3075F SYST BP GE 130 - 139MM HG: CPT | Mod: CPTII,S$GLB,, | Performed by: NUCLEAR MEDICINE

## 2018-07-26 PROCEDURE — 99999 PR PBB SHADOW E&M-EST. PATIENT-LVL IV: CPT | Mod: PBBFAC,,, | Performed by: NURSE PRACTITIONER

## 2018-07-26 PROCEDURE — 77061 BREAST TOMOSYNTHESIS UNI: CPT | Mod: TC,PO,LT

## 2018-07-26 PROCEDURE — 3075F SYST BP GE 130 - 139MM HG: CPT | Mod: CPTII,S$GLB,, | Performed by: NURSE PRACTITIONER

## 2018-07-26 PROCEDURE — 99214 OFFICE O/P EST MOD 30 MIN: CPT | Mod: S$GLB,,, | Performed by: NUCLEAR MEDICINE

## 2018-07-26 NOTE — PROGRESS NOTES
Subjective:   Patient ID:  Ruht Messina is a 52 y.o. female who presents for follow-up of Hypertension (followup)      HPI 1- ESSENTIAL HTN  DOING WELL. NO RECENT HOSPITALIZATIONS OR ED VISITS FOR ACS OR ADHF. OR ARRHYTHMIAS  NO ANGINA OR EQUIVALENT  NO UNUSUAL PERAZA. NO ORTHOPNEA OR PND  NO PALPITATIONS.,  NO NEAR SYNCOPE OR SYNCOPE  NO EDEMA. NO CALVE TENDERNESS  NO INTERMITTENT CLAUDICATION  NO FOCAL CNS SYMPTOMS OR SIGNS TO SUGGEST TIA OR STROKE  CARD MED GOOD COMPLIANCE, NO SIDE EFFECTS    Review of Systems   Constitution: Negative for chills, fever, weakness, night sweats, weight gain and weight loss.   HENT: Negative for nosebleeds.    Eyes: Negative for blurred vision, double vision and visual disturbance.   Cardiovascular: Negative for chest pain, dyspnea on exertion, irregular heartbeat, leg swelling, orthopnea, palpitations, paroxysmal nocturnal dyspnea and syncope.   Respiratory: Negative for cough, hemoptysis and wheezing.    Endocrine: Negative for polydipsia and polyuria.   Hematologic/Lymphatic: Does not bruise/bleed easily.   Skin: Negative for rash.   Musculoskeletal: Positive for joint pain and myalgias. Negative for joint swelling and muscle weakness.   Gastrointestinal: Negative for abdominal pain, hematemesis, jaundice and melena.   Genitourinary: Negative for dysuria, hematuria and nocturia.   Neurological: Negative for dizziness, focal weakness, headaches and sensory change.   Psychiatric/Behavioral: Negative for depression. The patient does not have insomnia and is not nervous/anxious.      Family History   Problem Relation Age of Onset    Arthritis Mother     Heart disease Mother     Hypertension Mother     Diabetes Father     Heart disease Father     Hypertension Father     Cancer Brother     Eczema Neg Hx     Lupus Neg Hx     Psoriasis Neg Hx      Past Medical History:   Diagnosis Date    Anemia     with chronic disease lupus    Encounter for blood transfusion     G-6-PD  deficiency     Heart murmur     MVP 1986    Hypertension     Lupus     Raynaud's syndrome     Systemic lupus erythematosus arthritis 5/13/2018     Current Outpatient Prescriptions on File Prior to Visit   Medication Sig Dispense Refill    azaTHIOprine (IMURAN) 50 mg Tab Take 1 tablet (50 mg total) by mouth once daily. 90 tablet 3    B1/B2/NIACIN/B12/PROTEASE (B-COMPLEX WITH B-12 ORAL) Take by mouth.      biotin 5,000 mcg TbDL Take by mouth.      CALCIUM-MAGNESIUM-ZINC ORAL Take by mouth once daily.      cholecalciferol, vitamin D3, (VITAMIN D3) 5,000 unit Tab Take 5,000 Units by mouth once daily.      clobetasol (OLUX) 0.05 % Foam Apply topically 2 (two) times daily. For scalp only.  Do not use on face. 50 g 3    cyclobenzaprine (FLEXERIL) 10 MG tablet Take 10 mg by mouth 3 (three) times daily as needed for Muscle spasms.      cycloSPORINE (RESTASIS) 0.05 % ophthalmic emulsion Place 0.4 mLs (1 drop total) into both eyes 2 (two) times daily. 60 vial 11    desoximetasone (TOPICORT) 0.25 % cream Apply topically 2 (two) times daily as needed. Do not use on face, underarms or groin 60 g 3    ferrous gluconate (FERGON) 324 MG tablet Take 324 mg by mouth daily with breakfast.      fish oil-omega-3 fatty acids 300-1,000 mg capsule Take 2 g by mouth once daily.      fluocinolone (DERMA-SMOOTHE) 0.01 % external oil Apply oil to scalp twice a day 1 Bottle 5    gabapentin (NEURONTIN) 300 MG capsule Take 1 capsule (300 mg total) by mouth 3 (three) times daily. 90 capsule 5    losartan (COZAAR) 100 MG tablet Take 1 tablet (100 mg total) by mouth once daily. 30 tablet 11    methylphenidate HCl (RITALIN) 10 MG tablet Take 1 tablet (10 mg total) by mouth 2 (two) times daily. 60 tablet 0    multivitamin capsule Take 1 capsule by mouth once daily.      predniSONE (DELTASONE) 5 MG tablet Take 1 tablet (5 mg total) by mouth once daily. 30 tablet 0    spironolactone (ALDACTONE) 25 MG tablet Take 1 tablet (25 mg  total) by mouth once daily. 30 tablet 11    EPINEPHrine (EPIPEN) 0.3 mg/0.3 mL AtIn Inject 0.3 mLs into the muscle as needed.       No current facility-administered medications on file prior to visit.      Review of patient's allergies indicates:   Allergen Reactions    Cellcept [mycophenolate mofetil] Swelling     Lips      Ace inhibitors Other (See Comments)     unkown    Aldactone [spironolactone] Blisters     Mouth soreness with doses greater than 25mg.    Amoxicillin Other (See Comments)     + allergy test    Aspirin Other (See Comments)     G-6-PD def.     Beta-blockers (beta-adrenergic blocking agts) Other (See Comments)     Cannot take due to Raynaud's syndrome    Cephalosporins Other (See Comments)     + allergy test    Clindamycin Other (See Comments)     + allergy test    Clonidine Blisters     Mouth sores and tenderness    Hydrochlorothiazide Other (See Comments)     unknown    Ibuprofen Other (See Comments)     G-6-PD def.    Lasix [furosemide] Other (See Comments)     unknown    Lisinopril Other (See Comments)     unknown    Methotrexate analogues Other (See Comments)     Pancytopenia      Norvasc [amlodipine] Other (See Comments)     Jaw pain    Nsaids (non-steroidal anti-inflammatory drug) Other (See Comments)     G-6-PD def.    Pcn [penicillins] Other (See Comments)     + allergy test    Plaquenil [hydroxychloroquine] Other (See Comments)     G-6-PD def.    Shellfish containing products Other (See Comments)     + allergy test    Zofran [ondansetron hcl (pf)] Other (See Comments)     Chest pain      Sulfa (sulfonamide antibiotics) Rash       Objective:     Physical Exam   Constitutional: She is oriented to person, place, and time. She appears well-developed. No distress.   HENT:   Head: Normocephalic.   Eyes: Conjunctivae are normal. Pupils are equal, round, and reactive to light. No scleral icterus.   Neck: Normal range of motion. Neck supple. Normal carotid pulses, no  hepatojugular reflux and no JVD present. Carotid bruit is not present. No edema present. No thyroid mass and no thyromegaly present.   Cardiovascular: Normal rate, regular rhythm, S1 normal, S2 normal, normal heart sounds and intact distal pulses.  PMI is not displaced.  Exam reveals no gallop and no friction rub.    No murmur heard.  Pulses:       Carotid pulses are 2+ on the right side, and 2+ on the left side.       Radial pulses are 2+ on the right side, and 2+ on the left side.        Femoral pulses are 2+ on the right side, and 2+ on the left side.       Popliteal pulses are 2+ on the right side, and 2+ on the left side.        Dorsalis pedis pulses are 2+ on the right side, and 2+ on the left side.        Posterior tibial pulses are 2+ on the right side, and 2+ on the left side.   Pulmonary/Chest: Effort normal and breath sounds normal. She has no wheezes. She has no rales. She exhibits no tenderness.   Abdominal: Soft. Bowel sounds are normal. She exhibits no pulsatile midline mass and no mass. There is no hepatosplenomegaly. There is no tenderness.   Musculoskeletal: Normal range of motion. She exhibits no edema or tenderness.        Cervical back: Normal.        Thoracic back: Normal.        Lumbar back: Normal.   Lymphadenopathy:     She has no cervical adenopathy.     She has no axillary adenopathy.        Right: No supraclavicular adenopathy present.        Left: No supraclavicular adenopathy present.   Neurological: She is alert and oriented to person, place, and time. She has normal strength and normal reflexes. No sensory deficit. Gait normal.   Skin: Skin is warm. No rash noted. No cyanosis. No pallor. Nails show no clubbing.   Psychiatric: She has a normal mood and affect. Her speech is normal and behavior is normal. Cognition and memory are normal.       Assessment:     1. Essential hypertension      WELL CONTROLLED BP  STABLE CV STATUS- NO ACTIVE MYOCARDIAL ISCHEMIA. NO ARRHYTHMIAS. NO ADHF  CNS  STATUS STABLE  CARD MED GOOD COMPLIANCE    Plan:     Essential hypertension    1- CONTINUE PRESENT CARD MANAGEMENT    2- RETURN IN 6 MONTHS.

## 2018-07-26 NOTE — PROGRESS NOTES
"CC: Well woman exam    Ruth Messina is a 52 y.o. female  presents for well woman exam.  LMP: No LMP recorded. Patient is postmenopausal..  No issues, problems, or complaints.      Past Medical History:   Diagnosis Date    Anemia     with chronic disease lupus    Encounter for blood transfusion     G-6-PD deficiency     Heart murmur     MVP     Hypertension     Lupus     Raynaud's syndrome     Systemic lupus erythematosus arthritis 2018     Past Surgical History:   Procedure Laterality Date    BONE MARROW BIOPSY      CARPAL TUNNEL RELEASE Bilateral     widsom tooth extraction       Social History     Social History    Marital status: Single     Spouse name: N/A    Number of children: N/A    Years of education: N/A     Occupational History    Not on file.     Social History Main Topics    Smoking status: Never Smoker    Smokeless tobacco: Never Used    Alcohol use No    Drug use: No    Sexual activity: Not Currently     Other Topics Concern    Are You Pregnant Or Think You May Be? No    Breast-Feeding No     Social History Narrative    No narrative on file     Family History   Problem Relation Age of Onset    Arthritis Mother     Heart disease Mother     Hypertension Mother     Diabetes Father     Heart disease Father     Hypertension Father     Cancer Brother     Eczema Neg Hx     Lupus Neg Hx     Psoriasis Neg Hx      OB History      Para Term  AB Living    2 2 2     2    SAB TAB Ectopic Multiple Live Births                       /88   Ht 5' 5" (1.651 m)   Wt 85.2 kg (187 lb 13.3 oz)   BMI 31.26 kg/m²       ROS:  GENERAL: Denies weight gain or weight loss. Feeling well overall.   SKIN: Denies rash or lesions.   HEAD: Denies head injury or headache.   NODES: Denies enlarged lymph nodes.   CHEST: Denies chest pain or shortness of breath.   CARDIOVASCULAR: Denies palpitations or left sided chest pain.   ABDOMEN: No abdominal pain, constipation, " diarrhea, nausea, vomiting or rectal bleeding.   URINARY: No frequency, dysuria, hematuria, or burning on urination.  REPRODUCTIVE: See HPI.   BREASTS: The patient performs breast self-examination and denies pain, lumps, or nipple discharge.   HEMATOLOGIC: No easy bruisability or excessive bleeding.   MUSCULOSKELETAL: Denies joint pain or swelling.   NEUROLOGIC: Denies syncope or weakness.   PSYCHIATRIC: Denies depression, anxiety or mood swings.    PHYSICAL EXAM:  APPEARANCE: Well nourished, well developed, in no acute distress.  AFFECT: WNL, alert and oriented x 3  SKIN: No acne or hirsutism  NECK: Neck symmetric without masses or thyromegaly  NODES: No inguinal, cervical, axillary, or femoral lymph node enlargement  CHEST: Good respiratory effect  ABDOMEN: Soft.  No tenderness or masses.  No hepatosplenomegaly.  No hernias.  BREASTS: pt declined, mmg done prior to this exam  PELVIC: Normal external genitalia without lesions.  Normal hair distribution.  Adequate perineal body, normal urethral meatus.  Vagina moist and well rugated without lesions or discharge.  Cervix pink, without lesions, discharge or tenderness.  No significant cystocele or rectocele.  Bimanual exam shows uterus to be normal size, regular, mobile and nontender.  Adnexa without masses or tenderness.    EXTREMITIES: No edema.  Physical Exam    1. Encounter for gynecological examination without abnormal finding  Liquid-based pap smear, screening    AND PLAN:    Patient was counseled today on A.C.S. Pap guidelines and recommendations for yearly pelvic exams, mammograms and monthly self breast exams; to see her PCP for other health maintenance.

## 2018-07-26 NOTE — PROGRESS NOTES
RHEUMATOLOGY CLINIC FOLLOW UP VISIT  Chief complaints:-  Follow-up for lupus.    HPI:-  Ruth Melendrez a 52 y.o. pleasant female comes in for a follow up visit with above chief complaints. She has systemic lupus erythematosus with several organ manifestations including arthritis, CNS lupus, skin rash and is on Benlysta Imuran combination therapy..  She denies any significant problems today.  Has some mild aches and pains over his joints in the morning and at night but not severe enough to consider therapies.    Review of Systems   Constitutional: Negative for chills, fever, malaise/fatigue and weight loss.   HENT: Negative for congestion, ear discharge, ear pain, hearing loss, nosebleeds and sore throat.    Eyes: Negative for blurred vision, double vision, photophobia, discharge and redness.   Respiratory: Negative for cough, hemoptysis, sputum production and shortness of breath.    Cardiovascular: Negative for chest pain, palpitations and claudication.   Gastrointestinal: Negative for abdominal pain, constipation, diarrhea, melena, nausea and vomiting.   Genitourinary: Negative for dysuria, frequency, hematuria and urgency.   Musculoskeletal: Negative for back pain, falls, joint pain, myalgias and neck pain.   Skin: Negative for itching and rash.   Neurological: Negative for dizziness, tremors, sensory change, speech change, focal weakness, seizures, loss of consciousness, weakness and headaches.   Endo/Heme/Allergies: Negative for environmental allergies. Does not bruise/bleed easily.   Psychiatric/Behavioral: Negative for depression, hallucinations and memory loss. The patient is not nervous/anxious and does not have insomnia.        Past Medical History:   Diagnosis Date    Anemia     with chronic disease lupus    Encounter for blood transfusion     G-6-PD deficiency     Heart murmur     MVP 1986    Hypertension     Lupus     Raynaud's syndrome      "Systemic lupus erythematosus arthritis 5/13/2018       Past Surgical History:   Procedure Laterality Date    BONE MARROW BIOPSY      CARPAL TUNNEL RELEASE Bilateral     widsom tooth extraction          Social History   Substance Use Topics    Smoking status: Never Smoker    Smokeless tobacco: Never Used    Alcohol use No       Family History   Problem Relation Age of Onset    Arthritis Mother     Heart disease Mother     Hypertension Mother     Diabetes Father     Heart disease Father     Hypertension Father     Cancer Brother     Eczema Neg Hx     Lupus Neg Hx     Psoriasis Neg Hx        Review of patient's allergies indicates:   Allergen Reactions    Ace inhibitors     Amoxicillin     Aspirin     Cellcept [mycophenolate mofetil]     Cephalosporins     Clindamycin     Hydrochlorothiazide     Imuran [azathioprine sodium]     Lasix [furosemide]     Methotrexate analogues     Nsaids (non-steroidal anti-inflammatory drug)     Pcn [penicillins]     Plaquenil [hydroxychloroquine]     Shellfish containing products     Sulfa (sulfonamide antibiotics)     Zofran [ondansetron hcl (pf)]            Physical examination:-    Vitals:    07/26/18 0844   BP: 136/87   Pulse: 110   Weight: 84.9 kg (187 lb 2.7 oz)   Height: 5' 5.5" (1.664 m)   PainSc: 0-No pain       Physical Exam   Constitutional: She is oriented to person, place, and time and well-developed, well-nourished, and in no distress. No distress.   HENT:   Head: Normocephalic.   Mouth/Throat: Oropharynx is clear and moist.   Eyes: Conjunctivae and EOM are normal. Pupils are equal, round, and reactive to light.   Neck: Normal range of motion. Neck supple.   Cardiovascular: Normal rate and intact distal pulses.    Pulmonary/Chest: Effort normal. No respiratory distress.   Abdominal: Soft. There is no tenderness.   Musculoskeletal:   No synovitis over small joints of hands or feet.  No effusion over large joints.   Neurological: She is alert " and oriented to person, place, and time. No cranial nerve deficit.   Skin: Skin is warm. No rash noted. No erythema.   Psychiatric: Mood and affect normal.   Nursing note and vitals reviewed.      Labs:-  Results for ADELA LEMON (MRN [id number]) as of 1/5/2017 12:40   Ref. Range 12/8/2016 09:04   SHEILA HEP-2 Titer Unknown Positive 1:640 Sp...   Anti-SSA Antibody Latest Ref Range: 0.00 - 19.99 EU 31.31 (H)   Anti-SSA Interpretation Latest Ref Range: Negative  Positive (A)   Anti-SSB Antibody Latest Ref Range: 0.00 - 19.99 EU 9.10   Anti-SSB Interpretation Latest Ref Range: Negative  Negative   ds DNA Ab Latest Ref Range: Negative 1:10  Positive 1:40 (A)   Anti Sm Antibody Latest Ref Range: 0.00 - 19.99 .08 (H)   Anti-Sm Interpretation Latest Ref Range: Negative  Positive (A)   Anti Sm/RNP Antibody Latest Ref Range: 0.00 - 19.99 .83 (H)   Anti-Sm/RNP Interpretation Latest Ref Range: Negative  Positive (A)   Complement (C-3) Latest Ref Range: 50 - 180 mg/dL 60   Complement (C-4) Latest Ref Range: 11 - 44 mg/dL 7 (L)     Old and Outside medical records :-  Reviewed old and all outside medical records available in Care Everywhere.     Medication List with Changes/Refills   Current Medications    AZATHIOPRINE (IMURAN) 50 MG TAB    Take 1 tablet (50 mg total) by mouth once daily.    B1/B2/NIACIN/B12/PROTEASE (B-COMPLEX WITH B-12 ORAL)    Take by mouth.    BIOTIN 5,000 MCG TBDL    Take by mouth.    CALCIUM-MAGNESIUM-ZINC ORAL    Take by mouth once daily.    CHOLECALCIFEROL, VITAMIN D3, (VITAMIN D3) 5,000 UNIT TAB    Take 5,000 Units by mouth once daily.    CLOBETASOL (OLUX) 0.05 % FOAM    Apply topically 2 (two) times daily. For scalp only.  Do not use on face.    CYCLOBENZAPRINE (FLEXERIL) 10 MG TABLET    Take 10 mg by mouth 3 (three) times daily as needed for Muscle spasms.    CYCLOSPORINE (RESTASIS) 0.05 % OPHTHALMIC EMULSION    Place 0.4 mLs (1 drop total) into both eyes 2 (two) times daily.     DESOXIMETASONE (TOPICORT) 0.25 % CREAM    Apply topically 2 (two) times daily as needed. Do not use on face, underarms or groin    EPINEPHRINE (EPIPEN) 0.3 MG/0.3 ML ATIN    Inject 0.3 mLs into the muscle as needed.    FERROUS GLUCONATE (FERGON) 324 MG TABLET    Take 324 mg by mouth daily with breakfast.    FISH OIL-OMEGA-3 FATTY ACIDS 300-1,000 MG CAPSULE    Take 2 g by mouth once daily.    FLUOCINOLONE (DERMA-SMOOTHE) 0.01 % EXTERNAL OIL    Apply oil to scalp twice a day    GABAPENTIN (NEURONTIN) 300 MG CAPSULE    Take 1 capsule (300 mg total) by mouth 3 (three) times daily.    LOSARTAN (COZAAR) 100 MG TABLET    Take 1 tablet (100 mg total) by mouth once daily.    METHYLPHENIDATE HCL (RITALIN) 10 MG TABLET    Take 1 tablet (10 mg total) by mouth 2 (two) times daily.    MULTIVITAMIN CAPSULE    Take 1 capsule by mouth once daily.    PREDNISONE (DELTASONE) 5 MG TABLET    Take 1 tablet (5 mg total) by mouth once daily.    SPIRONOLACTONE (ALDACTONE) 25 MG TABLET    Take 1 tablet (25 mg total) by mouth once daily.     Assessment/Plans:-  # SLE:-   Extremely complicated lupus with arthritis, extreme fatigue and periodic exacerbations of serious neurological and psychiatric complications , extreme serological activity of lupus, on Benlysta and Imuran.  Improving.  Continue medications.     # Secondary Sjogren's syndrome  Stable disease.  Continue symptomatic therapy.  Monitor.    # Long-term current use of high risk medication other than anticoagulant  Hold Imuran and benlysta  if any infection.  Check CBC and CMP.  # Follow-up in about 4 months (around 11/26/2018).    Disclaimer: This note was prepared using voice recognition system and is likely to have sound alike errors and is not proof read.  Please call me with any questions.

## 2018-07-30 ENCOUNTER — OFFICE VISIT (OUTPATIENT)
Dept: DERMATOLOGY | Facility: CLINIC | Age: 53
End: 2018-07-30
Payer: COMMERCIAL

## 2018-07-30 ENCOUNTER — OFFICE VISIT (OUTPATIENT)
Dept: NEUROLOGY | Facility: CLINIC | Age: 53
End: 2018-07-30
Payer: COMMERCIAL

## 2018-07-30 ENCOUNTER — PATIENT MESSAGE (OUTPATIENT)
Dept: OBSTETRICS AND GYNECOLOGY | Facility: CLINIC | Age: 53
End: 2018-07-30

## 2018-07-30 VITALS
HEART RATE: 80 BPM | HEIGHT: 66 IN | BODY MASS INDEX: 30.11 KG/M2 | DIASTOLIC BLOOD PRESSURE: 82 MMHG | WEIGHT: 187.38 LBS | RESPIRATION RATE: 20 BRPM | SYSTOLIC BLOOD PRESSURE: 138 MMHG

## 2018-07-30 DIAGNOSIS — G50.1 ATYPICAL FACE PAIN: Primary | ICD-10-CM

## 2018-07-30 DIAGNOSIS — M35.00 SECONDARY SJOGREN'S SYNDROME: ICD-10-CM

## 2018-07-30 DIAGNOSIS — L93.0 DISCOID LUPUS: Primary | ICD-10-CM

## 2018-07-30 DIAGNOSIS — M32.19 SYSTEMIC LUPUS ERYTHEMATOSUS WITH OTHER ORGAN INVOLVEMENT, UNSPECIFIED SLE TYPE: ICD-10-CM

## 2018-07-30 PROCEDURE — 99999 PR PBB SHADOW E&M-EST. PATIENT-LVL III: CPT | Mod: PBBFAC,,, | Performed by: PSYCHIATRY & NEUROLOGY

## 2018-07-30 PROCEDURE — 99213 OFFICE O/P EST LOW 20 MIN: CPT | Mod: 25,S$GLB,, | Performed by: DERMATOLOGY

## 2018-07-30 PROCEDURE — 3079F DIAST BP 80-89 MM HG: CPT | Mod: CPTII,S$GLB,, | Performed by: PSYCHIATRY & NEUROLOGY

## 2018-07-30 PROCEDURE — 3074F SYST BP LT 130 MM HG: CPT | Mod: CPTII,S$GLB,, | Performed by: DERMATOLOGY

## 2018-07-30 PROCEDURE — 3075F SYST BP GE 130 - 139MM HG: CPT | Mod: CPTII,S$GLB,, | Performed by: PSYCHIATRY & NEUROLOGY

## 2018-07-30 PROCEDURE — 99214 OFFICE O/P EST MOD 30 MIN: CPT | Mod: S$GLB,,, | Performed by: PSYCHIATRY & NEUROLOGY

## 2018-07-30 PROCEDURE — 3008F BODY MASS INDEX DOCD: CPT | Mod: CPTII,S$GLB,, | Performed by: PSYCHIATRY & NEUROLOGY

## 2018-07-30 PROCEDURE — 99999 PR PBB SHADOW E&M-EST. PATIENT-LVL II: CPT | Mod: PBBFAC,,, | Performed by: DERMATOLOGY

## 2018-07-30 PROCEDURE — 11900 INJECT SKIN LESIONS </W 7: CPT | Mod: S$GLB,,, | Performed by: DERMATOLOGY

## 2018-07-30 PROCEDURE — 3078F DIAST BP <80 MM HG: CPT | Mod: CPTII,S$GLB,, | Performed by: DERMATOLOGY

## 2018-07-30 RX ORDER — GABAPENTIN 300 MG/1
300 CAPSULE ORAL 3 TIMES DAILY
Qty: 90 CAPSULE | Refills: 11 | Status: SHIPPED | OUTPATIENT
Start: 2018-07-30 | End: 2021-12-03

## 2018-07-30 RX ORDER — DESOXIMETASONE 2.5 MG/G
CREAM TOPICAL
COMMUNITY
Start: 2018-07-27 | End: 2018-07-30

## 2018-07-30 RX ORDER — CLONIDINE HYDROCHLORIDE 0.1 MG/1
TABLET ORAL
COMMUNITY
Start: 2018-07-27 | End: 2019-01-23

## 2018-07-30 RX ORDER — DESOXIMETASONE 2.5 MG/G
CREAM TOPICAL
Qty: 60 G | Refills: 3 | Status: SHIPPED | OUTPATIENT
Start: 2018-07-30 | End: 2018-12-21 | Stop reason: SDUPTHER

## 2018-07-30 NOTE — PROGRESS NOTES
If this is a 52-year-old patient who has a history systemic lupus erythematosus.  The patient also has a history associated Sjogren syndrome and has had a prior history of lupus cerebritis.  She has been followed by Neurology for atypical facial pain. Her current medication for her atypical facial pain as well as her generalized myalgia complaints is gabapentin 300 mg 3 times a day.    The patient states that she continues to have her facial pain which she feels is a constant continuous sharp pain around the corner of her mouth on the right side.  She denies any triggers to the pain other than perhaps drinking cold water.  She has not noticed the pain to be triggered by brushing her teeth, talking, or tactile stimulation to the cheek or face.  The pain does not occur as paroxysm some of sudden electrical shock-like pain but is more felt as a continuous sharp discomfort.  The pain is localized to the corner of the mouth on the right side.    The patient denies any facial asymmetry.  She denies any loss of hearing or tinnitus.  She denies any blurred vision, double vision, or loss of vision.  She has not had any seizure or unexplained loss of consciousness.  The patient is being actively followed by Rheumatology for her lupus.  The patient continues to have dry mouth.      ROS:  GENERAL: No fever, chills, fatigability or weight loss.  SKIN: No rashes, itching or changes in color or texture of skin.  HEAD: No headaches or recent head trauma.  EYES: Visual acuity fine. No photophobia, ocular pain or diplopia.  EARS: Denies ear pain, discharge or vertigo.  NOSE: No loss of smell, no epistaxis or postnasal drip.  MOUTH & THROAT: No hoarseness or change in voice. No excessive gum bleeding.  NODES: Denies swollen glands.  CHEST: Denies PERAZA, cyanosis, wheezing, cough and sputum production.  CARDIOVASCULAR: Denies chest pain, PND, orthopnea or reduced exercise tolerance.  ABDOMEN: Appetite fine. No weight loss. Denies  diarrhea, abdominal pain, hematemesis or blood in stool.  URINARY: No flank pain, dysuria or hematuria.  PERIPHERAL VASCULAR: No claudication or cyanosis.  MUSCULOSKELETAL:  The patient has had generalized myalgia as well as generalized joint pain off and on.  NEUROLOGIC: No history of seizures, paralysis, alteration of gait or coordination.    The patient's past history, family history and social history were reviewed with the patient and her electronic medical record.    PE:   VITAL SIGNS:  Blood pressure 138/82, pulse 80, weight 85 kg, height 5 ft 5 in, BMI 30.71  APPEARANCE: Well nourished, well developed, in no acute distress.    HEAD: Normocephalic, atraumatic.  EYES: PERRL. EOMI.  Non-icteric sclerae.    EARS: TM's intact. Light reflex normal. No retraction or perforation.    NOSE: Mucosa pink. Airway clear.  MOUTH & THROAT: No tonsillar enlargement. No pharyngeal erythema or exudate. No stridor.  NECK: Supple. No bruits.  CHEST: Lungs clear to auscultation.  CARDIOVASCULAR: Regular rhythm without significant murmurs.  ABDOMEN: Bowel sounds normal. Not distended.   MUSCULOSKELETAL:  No bony deformity seen.  Muscle tone and muscle mass are normal in both upper and both lower extremities.  NEUROLOGIC:   Mental Status:  The patient is well oriented to person, time, place, and situation.  The patient is attentive to the environment and cooperative for the exam.  Cranial Nerves: II-XII grossly intact. Fundoscopic exam is normal.  No hemorrhage, exudate or papilledema is present. The extraocular muscles are intact in the cardinal directions of gaze.  No ptosis is present. Facial sensation is intact to cotton wisp all 3 divisions bilaterally. Masseter function is equally strong bilaterally. Facial features are symmetrical. Hearing is intact to voice and finger rub bilaterally.  Speech is normal in fluency, diction, and phrasing.  Tongue protrudes in the midline.    Gait and Station:  Romberg is negative.  Good  alternate armswing with normal gait.  No ataxia.  Motor:  No downdrift of either arm when held at shoulder level.  Manual muscle testing of proximal and distal muscles of both upper and lower extremities is normal. Muscle mass is normal.  Muscle tone is normal.  Sensory:  Intact both upper and lower extremities to pin prick, touch, and vibration.  Cerebellar:  Finger to nose done well.  Alternating movements intact.  No involuntary movements or tremor seen.  Reflexes:  Stretch reflexes are 1+ both upper and lower extremities.  Plantar stimulation is flexor bilaterally and no pathological reflexes are seen    Assessment:  1.  Systemic lupus erythematosus with associated Sjogren syndrome  2.  Atypical facial pain    Recommendations:  1.  Continue gabapentin 300 mg 3 times a day.     2.  Return to Neurology in 6 months.    Discussion:  On her last visit, the patient was complaining of lack of attention and focus, but she failed a trial of Adderall and Ritalin and indicates that she does not want to try alternative medications.  The patient continues to have her atypical facial pain syndrome, but I would avoid utilizing the usual medication for atypical facial pain (Elavil) because of the tendency of that medication to dry mucous membranes.  This was a 35 min visit with the patient with over 50% of the time spent counseling the patient regarding her diagnosis and the management of atypical facial pain.  This note is generated with speech recognition software and is subject to transcription error and sound alike phrases that may be missed by proofreading.

## 2018-07-30 NOTE — PROGRESS NOTES
Subjective:       Patient ID:  Ruth Messina is a 52 y.o. female who presents for   Chief Complaint   Patient presents with    Hair Loss     f/u some improvement    Lupus     f/u     CNS lupus (currently on imuran, followed by Dr. Grider, + SHEILA 1:640 speckled, + anti-SSA, Sm, dsDNA, Sm/RNP) and DLE c/ hair loss, last seen on 11/13/17.  She has topicort 0.25% and clobetasol foam qD.  + improvement in scalp with decreased irritation. She did not start compounded minoxidil 7%. + improvement with regrowth.     Prior tx: ILK, topicort cream, fluocinolone scalp oil (irritaiton), minoxidil 5%, fluocinonide (irritation)        Review of Systems   Constitutional: Negative for fever and chills.   Gastrointestinal: Negative for nausea and vomiting.   Skin: Negative for daily sunscreen use, activity-related sunscreen use and recent sunburn.   Hematologic/Lymphatic: Does not bruise/bleed easily.        Objective:    Physical Exam   Constitutional: She appears well-developed and well-nourished. No distress.   Neurological: She is alert and oriented to person, place, and time. She is not disoriented.   Psychiatric: She has a normal mood and affect.   Skin:   Areas Examined (abnormalities noted in diagram):   Scalp / Hair Palpated and Inspected  Head / Face Inspection Performed  Neck Inspection Performed  Chest / Axilla Inspection Performed  Abdomen Inspection Performed  Back Inspection Performed  RUE Inspected  LUE Inspection Performed  Nails and Digits Inspection Performed           Assessment / Plan:        Discoid lupus  -     triamcinolone acetonide injection 10 mg; 1 mL (10 mg total) by Other route one time.  -     desoximetasone (TOPICORT) 0.25 % cream; AAA of scalp once daily  Dispense: 60 g; Refill: 3  -     ILK #1 done today.  Will continue topicort, recommend restart OTC minoxidil.  F/u in 1 month with DELFIN Corbett for ILK. After risks, benefits and alternatives explained and side effect profile reviewed,  including hypopigmentation, telangiectasia and atrophy, the patient verbally consented to ILK injection. A total of 2 cc of kenalog 10 mg/ml was injected into the areas of hair loss of the scalp.  Pt tolerated well without side effects.  RTC in 4 weeks for repeat injection.             Follow-up in about 4 weeks (around 8/27/2018) for thelma Driscoll

## 2018-08-16 ENCOUNTER — TELEPHONE (OUTPATIENT)
Dept: RHEUMATOLOGY | Facility: CLINIC | Age: 53
End: 2018-08-16

## 2018-08-16 ENCOUNTER — LAB VISIT (OUTPATIENT)
Dept: LAB | Facility: HOSPITAL | Age: 53
End: 2018-08-16
Attending: INTERNAL MEDICINE
Payer: COMMERCIAL

## 2018-08-16 ENCOUNTER — INFUSION (OUTPATIENT)
Dept: RHEUMATOLOGY | Facility: HOSPITAL | Age: 53
End: 2018-08-16
Attending: INTERNAL MEDICINE
Payer: COMMERCIAL

## 2018-08-16 VITALS
BODY MASS INDEX: 30.67 KG/M2 | WEIGHT: 187.19 LBS | TEMPERATURE: 98 F | HEART RATE: 80 BPM | SYSTOLIC BLOOD PRESSURE: 120 MMHG | RESPIRATION RATE: 18 BRPM | DIASTOLIC BLOOD PRESSURE: 80 MMHG

## 2018-08-16 DIAGNOSIS — M32.19 OTHER SYSTEMIC LUPUS ERYTHEMATOSUS WITH OTHER ORGAN INVOLVEMENT: Primary | ICD-10-CM

## 2018-08-16 DIAGNOSIS — M32.19 CNS LUPUS: ICD-10-CM

## 2018-08-16 DIAGNOSIS — D63.8 ANEMIA OF CHRONIC ILLNESS: ICD-10-CM

## 2018-08-16 DIAGNOSIS — G05.3 CNS LUPUS: ICD-10-CM

## 2018-08-16 DIAGNOSIS — M32.19 OTHER SYSTEMIC LUPUS ERYTHEMATOSUS WITH OTHER ORGAN INVOLVEMENT: ICD-10-CM

## 2018-08-16 DIAGNOSIS — M35.00 SECONDARY SJOGREN'S SYNDROME: ICD-10-CM

## 2018-08-16 DIAGNOSIS — M32.9 SYSTEMIC LUPUS ERYTHEMATOSUS ARTHRITIS: Primary | ICD-10-CM

## 2018-08-16 DIAGNOSIS — M32.9 SYSTEMIC LUPUS ERYTHEMATOSUS ARTHRITIS: ICD-10-CM

## 2018-08-16 LAB
ALBUMIN SERPL BCP-MCNC: 3.9 G/DL
ALP SERPL-CCNC: 96 U/L
ALT SERPL W/O P-5'-P-CCNC: 16 U/L
ANION GAP SERPL CALC-SCNC: 11 MMOL/L
AST SERPL-CCNC: 17 U/L
BASOPHILS # BLD AUTO: 0.01 K/UL
BASOPHILS NFR BLD: 0.2 %
BILIRUB SERPL-MCNC: 0.7 MG/DL
BUN SERPL-MCNC: 19 MG/DL
C3 SERPL-MCNC: 114 MG/DL
C4 SERPL-MCNC: 19 MG/DL
CALCIUM SERPL-MCNC: 9.8 MG/DL
CHLORIDE SERPL-SCNC: 104 MMOL/L
CO2 SERPL-SCNC: 25 MMOL/L
CREAT SERPL-MCNC: 1.1 MG/DL
CRP SERPL-MCNC: 0.6 MG/L
DIFFERENTIAL METHOD: ABNORMAL
EOSINOPHIL # BLD AUTO: 0 K/UL
EOSINOPHIL NFR BLD: 0.2 %
ERYTHROCYTE [DISTWIDTH] IN BLOOD BY AUTOMATED COUNT: 14.1 %
ERYTHROCYTE [SEDIMENTATION RATE] IN BLOOD BY WESTERGREN METHOD: 40 MM/HR
EST. GFR  (AFRICAN AMERICAN): >60 ML/MIN/1.73 M^2
EST. GFR  (NON AFRICAN AMERICAN): 57 ML/MIN/1.73 M^2
GLUCOSE SERPL-MCNC: 102 MG/DL
HCT VFR BLD AUTO: 37.1 %
HGB BLD-MCNC: 11.7 G/DL
LYMPHOCYTES # BLD AUTO: 2.6 K/UL
LYMPHOCYTES NFR BLD: 45.5 %
MCH RBC QN AUTO: 28.2 PG
MCHC RBC AUTO-ENTMCNC: 31.5 G/DL
MCV RBC AUTO: 89 FL
MONOCYTES # BLD AUTO: 0.5 K/UL
MONOCYTES NFR BLD: 9.1 %
NEUTROPHILS # BLD AUTO: 2.5 K/UL
NEUTROPHILS NFR BLD: 45 %
PLATELET # BLD AUTO: 223 K/UL
PMV BLD AUTO: 10.1 FL
POTASSIUM SERPL-SCNC: 4 MMOL/L
PROT SERPL-MCNC: 8.1 G/DL
RBC # BLD AUTO: 4.15 M/UL
SODIUM SERPL-SCNC: 140 MMOL/L
WBC # BLD AUTO: 5.61 K/UL

## 2018-08-16 PROCEDURE — 36415 COLL VENOUS BLD VENIPUNCTURE: CPT | Mod: PO

## 2018-08-16 PROCEDURE — 86038 ANTINUCLEAR ANTIBODIES: CPT

## 2018-08-16 PROCEDURE — 96375 TX/PRO/DX INJ NEW DRUG ADDON: CPT | Mod: PO

## 2018-08-16 PROCEDURE — 63600175 PHARM REV CODE 636 W HCPCS: Mod: PO | Performed by: INTERNAL MEDICINE

## 2018-08-16 PROCEDURE — A4216 STERILE WATER/SALINE, 10 ML: HCPCS | Mod: PO | Performed by: INTERNAL MEDICINE

## 2018-08-16 PROCEDURE — 86160 COMPLEMENT ANTIGEN: CPT | Mod: 59

## 2018-08-16 PROCEDURE — 86235 NUCLEAR ANTIGEN ANTIBODY: CPT | Mod: 59

## 2018-08-16 PROCEDURE — 25000003 PHARM REV CODE 250: Mod: PO | Performed by: INTERNAL MEDICINE

## 2018-08-16 PROCEDURE — 86160 COMPLEMENT ANTIGEN: CPT

## 2018-08-16 PROCEDURE — 96413 CHEMO IV INFUSION 1 HR: CPT | Mod: PO

## 2018-08-16 PROCEDURE — 85025 COMPLETE CBC W/AUTO DIFF WBC: CPT | Mod: PO

## 2018-08-16 PROCEDURE — 80053 COMPREHEN METABOLIC PANEL: CPT | Mod: PO

## 2018-08-16 PROCEDURE — 85651 RBC SED RATE NONAUTOMATED: CPT | Mod: PO

## 2018-08-16 PROCEDURE — 86039 ANTINUCLEAR ANTIBODIES (ANA): CPT

## 2018-08-16 PROCEDURE — 86140 C-REACTIVE PROTEIN: CPT

## 2018-08-16 RX ORDER — SODIUM CHLORIDE 0.9 % (FLUSH) 0.9 %
10 SYRINGE (ML) INJECTION
Status: CANCELLED | OUTPATIENT
Start: 2018-08-16

## 2018-08-16 RX ORDER — ACETAMINOPHEN 325 MG/1
650 TABLET ORAL
Status: CANCELLED | OUTPATIENT
Start: 2018-08-16

## 2018-08-16 RX ORDER — SODIUM CHLORIDE 0.9 % (FLUSH) 0.9 %
10 SYRINGE (ML) INJECTION
Status: DISCONTINUED | OUTPATIENT
Start: 2018-08-16 | End: 2018-08-16 | Stop reason: HOSPADM

## 2018-08-16 RX ORDER — HEPARIN 100 UNIT/ML
500 SYRINGE INTRAVENOUS
Status: CANCELLED | OUTPATIENT
Start: 2018-08-16

## 2018-08-16 RX ORDER — DIPHENHYDRAMINE HYDROCHLORIDE 50 MG/ML
25 INJECTION INTRAMUSCULAR; INTRAVENOUS
Status: CANCELLED
Start: 2018-08-16

## 2018-08-16 RX ORDER — ACETAMINOPHEN 325 MG/1
650 TABLET ORAL
Status: COMPLETED | OUTPATIENT
Start: 2018-08-16 | End: 2018-08-16

## 2018-08-16 RX ORDER — DIPHENHYDRAMINE HYDROCHLORIDE 50 MG/ML
25 INJECTION INTRAMUSCULAR; INTRAVENOUS
Status: COMPLETED | OUTPATIENT
Start: 2018-08-16 | End: 2018-08-16

## 2018-08-16 RX ADMIN — BELIMUMAB 849 MG: 400 INJECTION, POWDER, LYOPHILIZED, FOR SOLUTION INTRAVENOUS at 11:08

## 2018-08-16 RX ADMIN — ACETAMINOPHEN 650 MG: 325 TABLET ORAL at 10:08

## 2018-08-16 RX ADMIN — METHYLPREDNISOLONE SODIUM SUCCINATE 100 MG: 125 INJECTION, POWDER, FOR SOLUTION INTRAMUSCULAR; INTRAVENOUS at 10:08

## 2018-08-16 RX ADMIN — DIPHENHYDRAMINE HYDROCHLORIDE 25 MG: 50 INJECTION INTRAMUSCULAR; INTRAVENOUS at 10:08

## 2018-08-16 RX ADMIN — SODIUM CHLORIDE, PRESERVATIVE FREE 10 ML: 5 INJECTION INTRAVENOUS at 10:08

## 2018-08-16 NOTE — PROGRESS NOTES
Infusion# 5  S/S infection noted or voiced? None noted/denied  Recent labs? Today with PIV start  Recent vaccines? denied    Premeds? Tylenol 650 mg po, Benadryl 25 mg IVP over 2 min, Solumedrol 100 mg IVP over 2 min.    Benlysta 10 mg/Kg= 849 mg administered IV at a 60 minute rate per orders; see MAR & Flow Sheet for more  details. Tolerated well without adverse events

## 2018-08-17 LAB
ANA SER QL IF: POSITIVE
ANA TITR SER IF: NORMAL {TITER}

## 2018-08-20 ENCOUNTER — OFFICE VISIT (OUTPATIENT)
Dept: DERMATOLOGY | Facility: CLINIC | Age: 53
End: 2018-08-20
Payer: COMMERCIAL

## 2018-08-20 ENCOUNTER — PATIENT MESSAGE (OUTPATIENT)
Dept: DERMATOLOGY | Facility: CLINIC | Age: 53
End: 2018-08-20

## 2018-08-20 DIAGNOSIS — L93.0 DISCOID LUPUS: Primary | ICD-10-CM

## 2018-08-20 PROCEDURE — 99999 PR PBB SHADOW E&M-EST. PATIENT-LVL II: CPT | Mod: PBBFAC,,, | Performed by: DERMATOLOGY

## 2018-08-20 PROCEDURE — 11900 INJECT SKIN LESIONS </W 7: CPT | Mod: S$GLB,,, | Performed by: DERMATOLOGY

## 2018-08-20 PROCEDURE — 99213 OFFICE O/P EST LOW 20 MIN: CPT | Mod: 25,S$GLB,, | Performed by: DERMATOLOGY

## 2018-08-20 RX ORDER — FLUOCINOLONE ACETONIDE 0.11 MG/ML
OIL TOPICAL
Qty: 1 BOTTLE | Refills: 5 | Status: SHIPPED | OUTPATIENT
Start: 2018-08-20 | End: 2021-12-03

## 2018-08-20 NOTE — PROGRESS NOTES
Subjective:       Patient ID:  Ruth Messina is a 52 y.o. female who presents for   Chief Complaint   Patient presents with    Hair Loss     some improvement, tx topicort, clobetasol daily      CNS lupus (currently on imuran, benlysta, prednisone 5 mg qOD, followed by Dr. Grider, + SHEILA 1:640 speckled, + anti-SSA, Sm, dsDNA, Sm/RNP) and DLE c/ hair loss, last seen on 7/30/18.  She is s/p ILK #1 and has topicort 0.25% and clobetasol foam qD.  + improvement in scalp with decreased irritation.  She wishes to retry dermasmoothe. + improvement with regrowth.      Prior tx: ILK, topicort cream, fluocinolone scalp oil (irritaiton), minoxidil 5%, fluocinonide (irritation)        Review of Systems   Constitutional: Negative for fever and chills.   Gastrointestinal: Negative for nausea and vomiting.   Skin: Negative for daily sunscreen use, activity-related sunscreen use and recent sunburn.   Hematologic/Lymphatic: Does not bruise/bleed easily.        Objective:    Physical Exam   Constitutional: She appears well-developed and well-nourished. No distress.   Neurological: She is alert and oriented to person, place, and time. She is not disoriented.   Psychiatric: She has a normal mood and affect.   Skin:   Areas Examined (abnormalities noted in diagram):   Scalp / Hair Palpated and Inspected  Head / Face Inspection Performed  Neck Inspection Performed  Chest / Axilla Inspection Performed  Abdomen Inspection Performed  Back Inspection Performed  RUE Inspected  LUE Inspection Performed  Nails and Digits Inspection Performed                  Assessment / Plan:        Discoid lupus  -     fluocinolone (DERMA-SMOOTHE) 0.01 % external oil; Apply oil to scalp once every other day  Dispense: 1 Bottle; Refill: 5  -     Continue topicort and clobetasol foam, will add above med per pt request.  ILK#2 done today. After risks, benefits and alternatives explained and side effect profile reviewed, including hypopigmentation,  telangiectasia and atrophy, the patient verbally consented to ILK injection. A total of 2 cc of kenalog 10 mg/ml was injected into the areas of alopecia of the temples. Pt tolerated well without side effects.  RTC in 4 weeks for repeat injection with DELFIN Poon.           Follow-up in about 4 weeks (around 9/17/2018).

## 2018-08-23 LAB
ANTI SM ANTIBODY: 20.29 EU
ANTI SM/RNP ANTIBODY: 210.85 EU
ANTI-SM INTERPRETATION: ABNORMAL
ANTI-SM/RNP INTERPRETATION: POSITIVE
ANTI-SSA ANTIBODY: 9.24 EU
ANTI-SSA INTERPRETATION: NEGATIVE
ANTI-SSB ANTIBODY: 2.78 EU
ANTI-SSB INTERPRETATION: NEGATIVE
DSDNA AB SER-ACNC: ABNORMAL [IU]/ML

## 2018-08-24 ENCOUNTER — PATIENT MESSAGE (OUTPATIENT)
Dept: RHEUMATOLOGY | Facility: CLINIC | Age: 53
End: 2018-08-24

## 2018-09-11 ENCOUNTER — TELEPHONE (OUTPATIENT)
Dept: RHEUMATOLOGY | Facility: CLINIC | Age: 53
End: 2018-09-11

## 2018-09-11 NOTE — TELEPHONE ENCOUNTER
Spoke with pt and she has been having a rough month. States she was having diarrhea and an upset stomach but is over that now. States the past week she has been having severe head pain. States she has a Neuro appointment this week. Does not think her body could go through her infusion this week and would like to reschedule it to later in the month if possible. Advised patient that Dr. CHUNG is out today but we will send him this message and let her know what he says tomorrow and we would send a message to infusion as well to reschedule. Pt verbalized understanding of both.

## 2018-09-11 NOTE — TELEPHONE ENCOUNTER
----- Message from Lizzie Lucero sent at 9/11/2018  8:29 AM CDT -----  Contact: pt  She's requesting a call back, didn't give details, please advise [phone] (home)

## 2018-09-11 NOTE — TELEPHONE ENCOUNTER
Ok to reschedule.Please schedule clinic appointment if patient would like to discuss her symptoms.Thanks.

## 2018-09-12 ENCOUNTER — PATIENT MESSAGE (OUTPATIENT)
Dept: RHEUMATOLOGY | Facility: CLINIC | Age: 53
End: 2018-09-12

## 2018-09-12 NOTE — TELEPHONE ENCOUNTER
Spoke with pt and advised her of below. Pt states she will keep her infusion appointment for tomorrow, 9.13.18

## 2018-09-13 ENCOUNTER — LAB VISIT (OUTPATIENT)
Dept: LAB | Facility: HOSPITAL | Age: 53
End: 2018-09-13
Attending: INTERNAL MEDICINE
Payer: COMMERCIAL

## 2018-09-13 ENCOUNTER — INFUSION (OUTPATIENT)
Dept: RHEUMATOLOGY | Facility: HOSPITAL | Age: 53
End: 2018-09-13
Attending: INTERNAL MEDICINE
Payer: COMMERCIAL

## 2018-09-13 ENCOUNTER — OFFICE VISIT (OUTPATIENT)
Dept: DERMATOLOGY | Facility: CLINIC | Age: 53
End: 2018-09-13
Payer: COMMERCIAL

## 2018-09-13 VITALS
TEMPERATURE: 97 F | HEART RATE: 87 BPM | SYSTOLIC BLOOD PRESSURE: 105 MMHG | RESPIRATION RATE: 18 BRPM | OXYGEN SATURATION: 99 % | DIASTOLIC BLOOD PRESSURE: 68 MMHG | WEIGHT: 188.25 LBS | BODY MASS INDEX: 30.85 KG/M2

## 2018-09-13 DIAGNOSIS — L93.0 DISCOID LUPUS: ICD-10-CM

## 2018-09-13 DIAGNOSIS — M32.19 OTHER SYSTEMIC LUPUS ERYTHEMATOSUS WITH OTHER ORGAN INVOLVEMENT: ICD-10-CM

## 2018-09-13 DIAGNOSIS — M32.9 SYSTEMIC LUPUS ERYTHEMATOSUS ARTHRITIS: Primary | ICD-10-CM

## 2018-09-13 DIAGNOSIS — L65.9 ALOPECIA: Primary | ICD-10-CM

## 2018-09-13 DIAGNOSIS — G05.3 CNS LUPUS: ICD-10-CM

## 2018-09-13 DIAGNOSIS — M32.19 CNS LUPUS: ICD-10-CM

## 2018-09-13 LAB
ALBUMIN SERPL BCP-MCNC: 3.8 G/DL
ALP SERPL-CCNC: 86 U/L
ALT SERPL W/O P-5'-P-CCNC: 20 U/L
ANION GAP SERPL CALC-SCNC: 8 MMOL/L
AST SERPL-CCNC: 16 U/L
BASOPHILS # BLD AUTO: 0.01 K/UL
BASOPHILS NFR BLD: 0.2 %
BILIRUB SERPL-MCNC: 0.6 MG/DL
BUN SERPL-MCNC: 9 MG/DL
CALCIUM SERPL-MCNC: 9.9 MG/DL
CHLORIDE SERPL-SCNC: 105 MMOL/L
CO2 SERPL-SCNC: 28 MMOL/L
CREAT SERPL-MCNC: 1.1 MG/DL
DIFFERENTIAL METHOD: ABNORMAL
EOSINOPHIL # BLD AUTO: 0 K/UL
EOSINOPHIL NFR BLD: 0.4 %
ERYTHROCYTE [DISTWIDTH] IN BLOOD BY AUTOMATED COUNT: 14.2 %
EST. GFR  (AFRICAN AMERICAN): >60 ML/MIN/1.73 M^2
EST. GFR  (NON AFRICAN AMERICAN): 57 ML/MIN/1.73 M^2
GLUCOSE SERPL-MCNC: 97 MG/DL
HCT VFR BLD AUTO: 33.8 %
HGB BLD-MCNC: 10.5 G/DL
LYMPHOCYTES # BLD AUTO: 2.1 K/UL
LYMPHOCYTES NFR BLD: 44.1 %
MCH RBC QN AUTO: 27.9 PG
MCHC RBC AUTO-ENTMCNC: 31.1 G/DL
MCV RBC AUTO: 90 FL
MONOCYTES # BLD AUTO: 0.6 K/UL
MONOCYTES NFR BLD: 11.7 %
NEUTROPHILS # BLD AUTO: 2.1 K/UL
NEUTROPHILS NFR BLD: 43.6 %
PLATELET # BLD AUTO: 234 K/UL
PMV BLD AUTO: 9.2 FL
POTASSIUM SERPL-SCNC: 4.1 MMOL/L
PROT SERPL-MCNC: 7.5 G/DL
RBC # BLD AUTO: 3.77 M/UL
SODIUM SERPL-SCNC: 141 MMOL/L
WBC # BLD AUTO: 4.72 K/UL

## 2018-09-13 PROCEDURE — 11900 INJECT SKIN LESIONS </W 7: CPT | Mod: S$GLB,,, | Performed by: DERMATOLOGY

## 2018-09-13 PROCEDURE — 3074F SYST BP LT 130 MM HG: CPT | Mod: CPTII,S$GLB,, | Performed by: DERMATOLOGY

## 2018-09-13 PROCEDURE — 80053 COMPREHEN METABOLIC PANEL: CPT | Mod: PO

## 2018-09-13 PROCEDURE — 36415 COLL VENOUS BLD VENIPUNCTURE: CPT | Mod: PO

## 2018-09-13 PROCEDURE — 96375 TX/PRO/DX INJ NEW DRUG ADDON: CPT | Mod: PO

## 2018-09-13 PROCEDURE — 85025 COMPLETE CBC W/AUTO DIFF WBC: CPT | Mod: PO

## 2018-09-13 PROCEDURE — 99213 OFFICE O/P EST LOW 20 MIN: CPT | Mod: 25,S$GLB,, | Performed by: DERMATOLOGY

## 2018-09-13 PROCEDURE — 63600175 PHARM REV CODE 636 W HCPCS: Mod: PO | Performed by: INTERNAL MEDICINE

## 2018-09-13 PROCEDURE — A4216 STERILE WATER/SALINE, 10 ML: HCPCS | Mod: PO | Performed by: INTERNAL MEDICINE

## 2018-09-13 PROCEDURE — 99999 PR PBB SHADOW E&M-EST. PATIENT-LVL III: CPT | Mod: PBBFAC,,, | Performed by: DERMATOLOGY

## 2018-09-13 PROCEDURE — 25000003 PHARM REV CODE 250: Mod: PO | Performed by: INTERNAL MEDICINE

## 2018-09-13 PROCEDURE — 96413 CHEMO IV INFUSION 1 HR: CPT | Mod: PO

## 2018-09-13 PROCEDURE — 3078F DIAST BP <80 MM HG: CPT | Mod: CPTII,S$GLB,, | Performed by: DERMATOLOGY

## 2018-09-13 RX ORDER — SODIUM CHLORIDE 0.9 % (FLUSH) 0.9 %
10 SYRINGE (ML) INJECTION
Status: CANCELLED | OUTPATIENT
Start: 2018-09-13

## 2018-09-13 RX ORDER — DIPHENHYDRAMINE HYDROCHLORIDE 50 MG/ML
25 INJECTION INTRAMUSCULAR; INTRAVENOUS
Status: CANCELLED
Start: 2018-09-13

## 2018-09-13 RX ORDER — DIPHENHYDRAMINE HYDROCHLORIDE 50 MG/ML
25 INJECTION INTRAMUSCULAR; INTRAVENOUS
Status: DISCONTINUED | OUTPATIENT
Start: 2018-09-13 | End: 2018-09-13

## 2018-09-13 RX ORDER — SODIUM CHLORIDE 0.9 % (FLUSH) 0.9 %
10 SYRINGE (ML) INJECTION
Status: DISCONTINUED | OUTPATIENT
Start: 2018-09-13 | End: 2018-09-13 | Stop reason: HOSPADM

## 2018-09-13 RX ORDER — HEPARIN 100 UNIT/ML
500 SYRINGE INTRAVENOUS
Status: CANCELLED | OUTPATIENT
Start: 2018-09-13

## 2018-09-13 RX ORDER — ACETAMINOPHEN 325 MG/1
650 TABLET ORAL
Status: CANCELLED | OUTPATIENT
Start: 2018-09-13

## 2018-09-13 RX ORDER — ACETAMINOPHEN 325 MG/1
650 TABLET ORAL
Status: COMPLETED | OUTPATIENT
Start: 2018-09-13 | End: 2018-09-13

## 2018-09-13 RX ORDER — DIPHENHYDRAMINE HYDROCHLORIDE 50 MG/ML
25 INJECTION INTRAMUSCULAR; INTRAVENOUS
Status: COMPLETED | OUTPATIENT
Start: 2018-09-13 | End: 2018-09-13

## 2018-09-13 RX ADMIN — ACETAMINOPHEN 650 MG: 325 TABLET ORAL at 08:09

## 2018-09-13 RX ADMIN — BELIMUMAB 854 MG: 400 INJECTION, POWDER, LYOPHILIZED, FOR SOLUTION INTRAVENOUS at 09:09

## 2018-09-13 RX ADMIN — METHYLPREDNISOLONE SODIUM SUCCINATE 100 MG: 125 INJECTION, POWDER, FOR SOLUTION INTRAMUSCULAR; INTRAVENOUS at 09:09

## 2018-09-13 RX ADMIN — SODIUM CHLORIDE, PRESERVATIVE FREE 10 ML: 5 INJECTION INTRAVENOUS at 09:09

## 2018-09-13 RX ADMIN — DIPHENHYDRAMINE HYDROCHLORIDE 25 MG: 50 INJECTION INTRAMUSCULAR; INTRAVENOUS at 09:09

## 2018-09-13 NOTE — PROGRESS NOTES
Infusion # 6 - Benlysta 10 mg/kg q 4 weeks  Today's weight-85.4 kg    Any:  -recent illness, infection, or antibiotic use in past week- denies  -open wounds or mouth sores- denies  -invasive procedures or surgeries in past 4 weeks or in upcoming 4 weeks- denies  -vaccinations in past week- denies      Recent labs? 9/13/18  Last Rheumatology provider visit- Seen by Dr. CHUNG on 7/26/18     Premeds? 650 mg Tylenol PO, 100 mg Solumedrol IVP over 3 minutes, and 25 mg Benadryl IVP over 3 minutes.      Benlsyta 854 mg administered IV at a 60 minute rate per orders; see MAR and vitals for more  details. Tolerated well without adverse events, discharged and ambulatory out of clinic.

## 2018-09-13 NOTE — PROGRESS NOTES
Subjective:       Patient ID:  Ruth Messina is a 52 y.o. female who presents for   Chief Complaint   Patient presents with    Hair Loss     CNS lupus (currently on imuran, benlysta, prednisone 5 mg qOD, followed by Dr. Grider, + SHEILA 1:640 speckled, + anti-SSA, Sm, dsDNA, Sm/RNP) and DLE c/ hair loss, last seen on 8/20/18.  She is s/p ILK #2 and alternates use of topicort 0.25%, clobetasol foam qD, and fluocinolone scalp oil.   + improvement with regrowth.      Prior tx: ILK, topicort cream, fluocinolone scalp oil (irritaiton), minoxidil 5%, fluocinonide (irritation)        Review of Systems   Constitutional: Negative for fever and chills.   Gastrointestinal: Negative for nausea.   Skin: Negative for daily sunscreen use, activity-related sunscreen use and recent sunburn.   Hematologic/Lymphatic: Does not bruise/bleed easily.        Objective:    Physical Exam   Constitutional: She appears well-developed and well-nourished. No distress.   Neurological: She is alert and oriented to person, place, and time. She is not disoriented.   Psychiatric: She has a normal mood and affect.   Skin:   Areas Examined (abnormalities noted in diagram):   Scalp / Hair Palpated and Inspected  Head / Face Inspection Performed  Neck Inspection Performed  Chest / Axilla Inspection Performed  Abdomen Inspection Performed  Back Inspection Performed  RUE Inspected  LUE Inspection Performed  Nails and Digits Inspection Performed                 Assessment / Plan:        Alopecia  Discoid lupus  -     triamcinolone acetonide injection 10 mg; 1 mL (10 mg total) by Other route one time.  -     Recommend pt streamline medications and change to once daily use of either clobetasol foam, topicort or fluocinolone scalp oil and then minoxidil qD.      ILK#3 done today. After risks, benefits and alternatives explained and side effect profile reviewed, including hypopigmentation, telangiectasia and atrophy, the patient verbally consented to ILK  injection. A total of 2 cc of kenalog 10 mg/ml was injected into the areas of alopecia of the temples. Pt tolerated well without side effects.           Follow-up in about 3 months (around 12/13/2018).

## 2018-09-17 ENCOUNTER — OFFICE VISIT (OUTPATIENT)
Dept: NEUROLOGY | Facility: CLINIC | Age: 53
End: 2018-09-17
Payer: COMMERCIAL

## 2018-09-17 VITALS
SYSTOLIC BLOOD PRESSURE: 132 MMHG | WEIGHT: 189.13 LBS | HEIGHT: 65 IN | HEART RATE: 72 BPM | DIASTOLIC BLOOD PRESSURE: 74 MMHG | BODY MASS INDEX: 31.51 KG/M2

## 2018-09-17 DIAGNOSIS — G50.0 TRIGEMINAL NEURALGIA OF RIGHT SIDE OF FACE: Primary | ICD-10-CM

## 2018-09-17 DIAGNOSIS — M32.19 OTHER SYSTEMIC LUPUS ERYTHEMATOSUS WITH OTHER ORGAN INVOLVEMENT: ICD-10-CM

## 2018-09-17 DIAGNOSIS — M54.81 BILATERAL OCCIPITAL NEURALGIA: ICD-10-CM

## 2018-09-17 DIAGNOSIS — Z98.890 S/P BILATERAL CARPAL TUNNEL RELEASE: ICD-10-CM

## 2018-09-17 DIAGNOSIS — M35.01 KERATOCONJUNCTIVITIS SICCA OF BOTH EYES: ICD-10-CM

## 2018-09-17 PROCEDURE — 99214 OFFICE O/P EST MOD 30 MIN: CPT | Mod: S$GLB,,, | Performed by: PSYCHIATRY & NEUROLOGY

## 2018-09-17 PROCEDURE — 3078F DIAST BP <80 MM HG: CPT | Mod: CPTII,S$GLB,, | Performed by: PSYCHIATRY & NEUROLOGY

## 2018-09-17 PROCEDURE — 3075F SYST BP GE 130 - 139MM HG: CPT | Mod: CPTII,S$GLB,, | Performed by: PSYCHIATRY & NEUROLOGY

## 2018-09-17 PROCEDURE — 99999 PR PBB SHADOW E&M-EST. PATIENT-LVL III: CPT | Mod: PBBFAC,,, | Performed by: PSYCHIATRY & NEUROLOGY

## 2018-09-17 PROCEDURE — 3008F BODY MASS INDEX DOCD: CPT | Mod: CPTII,S$GLB,, | Performed by: PSYCHIATRY & NEUROLOGY

## 2018-09-17 NOTE — PROGRESS NOTES
Subjective:       Patient ID: Ruth Messina is a 52 y.o. female.    Chief Complaint: Headache    HPI     BACKGROUND HISTORY     The pain started many years ago.The pain affects the RT side of the face, namely the tongue, jaw, temple, around the eye and the forehead area.The pain comes as brief attacks, each lasting for few seconds.The pain is electrical-shock like and profoundly severe 101/10 and incapacitating.She typically has 3-4 unpredictable attacks a day but not every day. No history of MS.No history of trauma.No history of craniotomy. Baclofen did not help and she could not tolerate CBZ. Brain MRI was NL in [id number]. I started her on GBP for RT TN.    She does report  weakness and numbness. I ordered NCS/EMG BUE.NCS/EMG BUE showed severe B/L CTS (LT>RT).  I referred her for orthopedic surgery eval.     INTERVAL HISTORY     PRN GBP helps with TN.    She underwent B/L CTS release with good outcome.     About 2 months ago she had an episode of severe occipital pain radiating to the top of her head. The pain was sharp with tenderness, 8/10 and fortunately did not recur.      Review of Systems   Constitutional: Positive for fatigue. Negative for appetite change.   HENT: Negative for hearing loss and tinnitus.    Eyes: Negative for photophobia and visual disturbance.   Respiratory: Negative for apnea and shortness of breath.    Cardiovascular: Negative for chest pain and palpitations.   Gastrointestinal: Negative for nausea and vomiting.   Endocrine: Negative for cold intolerance and heat intolerance.   Genitourinary: Negative for difficulty urinating and urgency.   Musculoskeletal: Positive for arthralgias and myalgias. Negative for back pain, gait problem, joint swelling, neck pain and neck stiffness.   Skin: Negative for color change and rash.   Allergic/Immunologic: Negative for environmental allergies and immunocompromised state.   Neurological: Negative for dizziness, tremors, seizures, syncope, facial  asymmetry, speech difficulty, weakness, light-headedness, numbness and headaches.   Hematological: Negative for adenopathy. Does not bruise/bleed easily.   Psychiatric/Behavioral: Negative for agitation, behavioral problems, confusion, decreased concentration, dysphoric mood, hallucinations, self-injury, sleep disturbance and suicidal ideas. The patient is not nervous/anxious and is not hyperactive.          Current Outpatient Medications:     azaTHIOprine (IMURAN) 50 mg Tab, Take 1 tablet (50 mg total) by mouth once daily., Disp: 90 tablet, Rfl: 3    B1/B2/NIACIN/B12/PROTEASE (B-COMPLEX WITH B-12 ORAL), Take by mouth., Disp: , Rfl:     biotin 5,000 mcg TbDL, Take by mouth., Disp: , Rfl:     CALCIUM-MAGNESIUM-ZINC ORAL, Take by mouth once daily., Disp: , Rfl:     cholecalciferol, vitamin D3, (VITAMIN D3) 5,000 unit Tab, Take 5,000 Units by mouth once daily., Disp: , Rfl:     clobetasol (OLUX) 0.05 % Foam, Apply topically 2 (two) times daily. For scalp only.  Do not use on face., Disp: 50 g, Rfl: 3    cloNIDine (CATAPRES) 0.1 MG tablet, , Disp: , Rfl:     cyclobenzaprine (FLEXERIL) 10 MG tablet, Take 10 mg by mouth 3 (three) times daily as needed for Muscle spasms., Disp: , Rfl:     cycloSPORINE (RESTASIS) 0.05 % ophthalmic emulsion, Place 0.4 mLs (1 drop total) into both eyes 2 (two) times daily., Disp: 60 vial, Rfl: 11    desoximetasone (TOPICORT) 0.25 % cream, AAA of scalp once daily, Disp: 60 g, Rfl: 3    EPINEPHrine (EPIPEN) 0.3 mg/0.3 mL AtIn, Inject 0.3 mLs into the muscle as needed., Disp: , Rfl:     ferrous gluconate (FERGON) 324 MG tablet, Take 324 mg by mouth daily with breakfast., Disp: , Rfl:     fish oil-omega-3 fatty acids 300-1,000 mg capsule, Take 2 g by mouth once daily., Disp: , Rfl:     fluocinolone (DERMA-SMOOTHE) 0.01 % external oil, Apply oil to scalp once every other day, Disp: 1 Bottle, Rfl: 5    gabapentin (NEURONTIN) 300 MG capsule, Take 1 capsule (300 mg total) by mouth 3  (three) times daily., Disp: 90 capsule, Rfl: 11    losartan (COZAAR) 100 MG tablet, Take 1 tablet (100 mg total) by mouth once daily., Disp: 30 tablet, Rfl: 11    multivitamin capsule, Take 1 capsule by mouth once daily., Disp: , Rfl:     predniSONE (DELTASONE) 5 MG tablet, Take 1 tablet (5 mg total) by mouth once daily., Disp: 30 tablet, Rfl: 0    spironolactone (ALDACTONE) 25 MG tablet, Take 1 tablet (25 mg total) by mouth once daily., Disp: 30 tablet, Rfl: 11  Past Medical History:   Diagnosis Date    Anemia     with chronic disease lupus    Encounter for blood transfusion     G-6-PD deficiency     Heart murmur     MVP 1986    Hypertension     Lupus     Raynaud's syndrome     Systemic lupus erythematosus arthritis 5/13/2018     Past Surgical History:   Procedure Laterality Date    BONE MARROW BIOPSY      CARPAL TUNNEL RELEASE Bilateral     RELEASE-CARPAL TUNNEL Right 5/11/2017    Performed by Abhishek Decker [facility] at [facility] [place]    RELEASE-CARPAL TUNNEL Left 3/24/2017    Performed by [facility]k Decker [facility] at [facility] [place]    widsom tooth extraction       Social History     Socioeconomic History    Marital status: Single     Spouse name: Not on file    Number of children: Not on file    Years of education: Not on file    Highest education level: Not on file   Social Needs    Financial resource strain: Not on file    Food insecurity - worry: Not on file    Food insecurity - inability: Not on file    Transportation needs - medical: Not on file    Transportation needs - non-medical: Not on file   Occupational History    Not on file   Tobacco Use    Smoking status: Never Smoker    Smokeless tobacco: Never Used   Substance and Sexual Activity    Alcohol use: No    Drug use: No    Sexual activity: Not Currently   Other Topics Concern    Are you pregnant or think you may be? No    Breast-feeding No   Social History Narrative    Not on file       Objective:     GENERAL APPEARANCE:     The patient looks  comfortable.    No signs of medical or psychiatric distress.    Normal breathing pattern.    No dysmorphic features    Normal eye contact.     GENERAL MEDICAL EXAM:    HEENT:  Head is atraumatic normocephalic. No tender temporal arteries.     Neck and Axillae: No JVD. No carotid bruits. No thyromegaly. No lymphadenopathy.    Cardiopulmonary: No cyanosis. No tachypnea. Normal respiratory effort.  Clear breath sounds. Normal heart sounds with regular rhythm and no murmurs.    Gastrointestinal:  No stomas or lesions. No hernias.  Abdomen is soft non-tender. No masses or organomegaly.    Skin, Hair and Nails: No pathognonomic skin rash. No neurofibromatosis.   No stigmata of autoimmune disease.     Limbs: No varicose veins. No edema. Symmetric pulses.     Muskoskeletal: No deformities.No spine tenderness.   No signs of longstanding neuropathy. No dislocations or fractures.        Neurologic Exam     Mental Status   Oriented to person, place, and time.   Registration: recalls 3 of 3 objects. Recall at 5 minutes: recalls 3 of 3 objects. Follows 3 step commands.   Attention: normal. Concentration: normal.   Speech: speech is normal   Level of consciousness: alert  Knowledge: good and consistent with education. Able to perform simple calculations.   Able to name object. Able to read. Able to repeat. Able to write. Normal comprehension.     Cranial Nerves     CN II   Visual fields full to confrontation.   Visual acuity: normal  Right visual field deficit: none  Left visual field deficit: none     CN III, IV, VI   Pupils are equal, round, and reactive to light.  Extraocular motions are normal.   Right pupil: Size: 2 mm. Shape: regular. Reactivity: brisk. Consensual response: intact. Accommodation: intact.   Left pupil: Size: 2 mm. Shape: regular. Reactivity: brisk. Consensual response: intact. Accommodation: intact.   CN III: no CN III palsy  CN VI: no CN VI palsy  Nystagmus: none   Diplopia: none  Ophthalmoparesis:  none  Upgaze: normal  Downgaze: normal  Conjugate gaze: present  Vestibulo-ocular reflex: present    CN V   Facial sensation intact.   Right facial sensation deficit: none  Left facial sensation deficit: none  Right corneal reflex: normal  Left corneal reflex: normal    CN VII   Right facial weakness: none  Left facial weakness: none  Right taste: normal  Left taste: normal    CN VIII   CN VIII normal.   Hearing: intact  Right Rinne: AC > BC  Left Rinne: AC > BC  Shanks: does not lateralize     CN IX, X   CN IX normal.   CN X normal.   Palate: symmetric  Right gag reflex: normal  Left gag reflex: normal    CN XI   CN XI normal.   Right sternocleidomastoid strength: normal  Left sternocleidomastoid strength: normal  Right trapezius strength: normal  Left trapezius strength: normal    CN XII   CN XII normal.   Tongue: not atrophic  Fasciculations: absent  Tongue deviation: none    Motor Exam   Muscle bulk: normal  Overall muscle tone: normal  Right arm tone: normal  Left arm tone: normal  Right arm pronator drift: absent  Left arm pronator drift: absent  Right leg tone: normal  Left leg tone: normal    Strength   Strength 5/5 throughout.   Right neck flexion: 5/5  Left neck flexion: 5/5  Right neck extension: 5/5  Left neck extension: 5/5  Right deltoid: 5/5  Left deltoid: 5/5  Right biceps: 5/5  Left biceps: 5/5  Right triceps: 5/5  Left triceps: 5/5  Right wrist flexion: 5/5  Left wrist flexion: 5/5  Right wrist extension: 5/5  Left wrist extension: 5/5  Right interossei: 5/5  Left interossei: 5/5  Right abdominals: 5/5  Left abdominals: 5/5  Right iliopsoas: 5/5  Left iliopsoas: 5/5  Right quadriceps: 5/5  Left quadriceps: 5/5  Right hamstrin/5  Left hamstrin/5  Right glutei: 5/5  Left glutei: 5/5  Right anterior tibial: 5/5  Left anterior tibial: 5/5  Right posterior tibial: 5/5  Left posterior tibial: 5/5  Right peroneal: 5/5  Left peroneal: 5/5  Right gastroc: 5/5  Left gastroc: 5/5    Sensory Exam    Light touch normal.   Right arm light touch: normal  Left arm light touch: normal  Right leg light touch: normal  Left leg light touch: normal  Vibration normal.   Right arm vibration: normal  Left arm vibration: normal  Right leg vibration: normal  Left leg vibration: normal  Proprioception normal.   Right arm proprioception: normal  Left arm proprioception: normal  Right leg proprioception: normal  Left leg proprioception: normal  Pinprick normal.   Right arm pinprick: normal  Left arm pinprick: normal  Right leg pinprick: normal  Left leg pinprick: normal  Graphesthesia: normal  Stereognosis: normal    Gait, Coordination, and Reflexes     Gait  Gait: normal    Coordination   Romberg: negative  Finger to nose coordination: normal  Heel to shin coordination: normal  Tandem walking coordination: normal    Tremor   Resting tremor: absent  Intention tremor: absent  Action tremor: absent    Reflexes   Right brachioradialis: 2+  Left brachioradialis: 2+  Right biceps: 2+  Left biceps: 2+  Right triceps: 2+  Left triceps: 2+  Right patellar: 2+  Left patellar: 2+  Right achilles: 2+  Left achilles: 2+  Right : 2+  Left : 2+  Right plantar: normal  Left plantar: normal  Right Aguilar: absent  Left Aguilar: absent  Right ankle clonus: absent  Left ankle clonus: absent  Right pendular knee jerk: absent  Left pendular knee jerk: absent      Lab Results   Component Value Date    WBC 4.72 09/13/2018    HGB 10.5 (L) 09/13/2018    HCT 33.8 (L) 09/13/2018    MCV 90 09/13/2018     09/13/2018     Sodium   Date Value Ref Range Status   09/13/2018 141 136 - 145 mmol/L Final     Potassium   Date Value Ref Range Status   09/13/2018 4.1 3.5 - 5.1 mmol/L Final     Chloride   Date Value Ref Range Status   09/13/2018 105 95 - 110 mmol/L Final     CO2   Date Value Ref Range Status   09/13/2018 28 23 - 29 mmol/L Final     Glucose   Date Value Ref Range Status   09/13/2018 97 70 - 110 mg/dL Final     BUN, Bld   Date Value Ref  Range Status   09/13/2018 9 6 - 20 mg/dL Final     Creatinine   Date Value Ref Range Status   09/13/2018 1.1 0.5 - 1.4 mg/dL Final     Calcium   Date Value Ref Range Status   09/13/2018 9.9 8.7 - 10.5 mg/dL Final     Total Protein   Date Value Ref Range Status   09/13/2018 7.5 6.0 - 8.4 g/dL Final     Albumin   Date Value Ref Range Status   09/13/2018 3.8 3.5 - 5.2 g/dL Final     Total Bilirubin   Date Value Ref Range Status   09/13/2018 0.6 0.1 - 1.0 mg/dL Final     Comment:     For infants and newborns, interpretation of results should be based  on gestational age, weight and in agreement with clinical  observations.  Premature Infant recommended reference ranges:  Up to 24 hours.............<8.0 mg/dL  Up to 48 hours............<12.0 mg/dL  3-5 days..................<15.0 mg/dL  6-29 days.................<15.0 mg/dL       Alkaline Phosphatase   Date Value Ref Range Status   09/13/2018 86 55 - 135 U/L Final     AST   Date Value Ref Range Status   09/13/2018 16 10 - 40 U/L Final     ALT   Date Value Ref Range Status   09/13/2018 20 10 - 44 U/L Final     Anion Gap   Date Value Ref Range Status   09/13/2018 8 8 - 16 mmol/L Final     eGFR if    Date Value Ref Range Status   09/13/2018 >60 >60 mL/min/1.73 m^2 Final     eGFR if non    Date Value Ref Range Status   09/13/2018 57 (A) >60 mL/min/1.73 m^2 Final     Comment:     Calculation used to obtain the estimated glomerular filtration  rate (eGFR) is the CKD-EPI equation.        Lab Results   Component Value Date    [id number] 461 02/02/2017     Lab Results   Component Value Date    TSH 0.161 (L) 02/02/2017    FREET4 0.90 02/02/2017 201/2015    Brain MRI NL    Assessment:       1. Trigeminal neuralgia of right side of face    2. Keratoconjunctivitis sicca of both eyes    3. Other systemic lupus erythematosus with other organ involvement    4. S/p bilateral carpal tunnel release    5. Bilateral occipital neuralgia        Plan:        TN    Continue GBP PRN and watch for sedation    CTS    S/P release surgery    Occipital Neuralgia    Will consider Elavil if the symptoms recur      RTC in 3 months

## 2018-09-17 NOTE — PATIENT INSTRUCTIONS
Amitriptyline tablets  What is this medicine?  AMITRIPTYLINE (a libby TRIP ti jean pierre) is used to treat depression.  How should I use this medicine?  Take this medicine by mouth with a drink of water. Follow the directions on the prescription label. You can take the tablets with or without food. Take your medicine at regular intervals. Do not take it more often than directed. Do not stop taking this medicine suddenly except upon the advice of your doctor. Stopping this medicine too quickly may cause serious side effects or your condition may worsen.  A special MedGuide will be given to you by the pharmacist with each prescription and refill. Be sure to read this information carefully each time.  Talk to your pediatrician regarding the use of this medicine in children. Special care may be needed.  What side effects may I notice from receiving this medicine?  Side effects that you should report to your doctor or health care professional as soon as possible:  · allergic reactions like skin rash, itching or hives, swelling of the face, lips, or tongue  · abnormal production of milk in females  · breast enlargement in both males and females  · breathing problems  · confusion, hallucinations  · fast, irregular heartbeat  · fever with increased sweating  · muscle stiffness, or spasms  · pain or difficulty passing urine, loss of bladder control  · seizures  · suicidal thoughts or other mood changes  · swelling of the testicles  · tingling, pain, or numbness in the feet or hands  · yellowing of the eyes or skin  Side effects that usually do not require medical attention (report to your doctor or health care professional if they continue or are bothersome):  · change in sex drive or performance  · constipation or diarrhea  · nausea, vomiting  · weight gain or loss  What may interact with this medicine?  Do not take this medicine with any of the following medications:  · arsenic trioxide  · certain medicines used to regulate  abnormal heartbeat or to treat other heart conditions  · cisapride  · droperidol  · halofantrine  · linezolid  · MAOIs like Carbex, Eldepryl, Marplan, Nardil, and Parnate  · methylene blue  · other medicines for mental depression  · phenothiazines like perphenazine, thioridazine and chlorpromazine  · pimozide  · probucol  · procarbazine  · sparfloxacin  · Annamarie's Wort  · ziprasidone  This medicine may also interact with the following medications:  · atropine and related drugs like hyoscyamine, scopolamine, tolterodine and others  · barbiturate medicines for inducing sleep or treating seizures, like phenobarbital  · cimetidine  · disulfiram  · ethchlorvynol  · thyroid hormones such as levothyroxine  What if I miss a dose?  If you miss a dose, take it as soon as you can. If it is almost time for your next dose, take only that dose. Do not take double or extra doses.  Where should I keep my medicine?  Keep out of the reach of children.  Store at room temperature between 20 and 25 degrees C (68 and 77 degrees F). Throw away any unused medicine after the expiration date.  What should I tell my health care provider before I take this medicine?  They need to know if you have any of these conditions:  · an alcohol problem  · asthma, difficulty breathing  · bipolar disorder or schizophrenia  · difficulty passing urine, prostate trouble  · glaucoma  · heart disease or previous heart attack  · liver disease  · over active thyroid  · seizures  · thoughts or plans of suicide, a previous suicide attempt, or family history of suicide attempt  · an unusual or allergic reaction to amitriptyline, other medicines, foods, dyes, or preservatives  · pregnant or trying to get pregnant  · breast-feeding  What should I watch for while using this medicine?  Tell your doctor if your symptoms do not get better or if they get worse. Visit your doctor or health care professional for regular checks on your progress. Because it may take several  weeks to see the full effects of this medicine, it is important to continue your treatment as prescribed by your doctor.  Patients and their families should watch out for new or worsening thoughts of suicide or depression. Also watch out for sudden changes in feelings such as feeling anxious, agitated, panicky, irritable, hostile, aggressive, impulsive, severely restless, overly excited and hyperactive, or not being able to sleep. If this happens, especially at the beginning of treatment or after a change in dose, call your health care professional.  You may get drowsy or dizzy. Do not drive, use machinery, or do anything that needs mental alertness until you know how this medicine affects you. Do not stand or sit up quickly, especially if you are an older patient. This reduces the risk of dizzy or fainting spells. Alcohol may interfere with the effect of this medicine. Avoid alcoholic drinks.  Do not treat yourself for coughs, colds, or allergies without asking your doctor or health care professional for advice. Some ingredients can increase possible side effects.  Your mouth may get dry. Chewing sugarless gum or sucking hard candy, and drinking plenty of water will help. Contact your doctor if the problem does not go away or is severe.  This medicine may cause dry eyes and blurred vision. If you wear contact lenses you may feel some discomfort. Lubricating drops may help. See your eye doctor if the problem does not go away or is severe.  This medicine can cause constipation. Try to have a bowel movement at least every 2 to 3 days. If you do not have a bowel movement for 3 days, call your doctor or health care professional.  This medicine can make you more sensitive to the sun. Keep out of the sun. If you cannot avoid being in the sun, wear protective clothing and use sunscreen. Do not use sun lamps or tanning beds/booths.  NOTE:This sheet is a summary. It may not cover all possible information. If you have  questions about this medicine, talk to your doctor, pharmacist, or health care provider. Copyright© 2017 [employer]

## 2018-09-19 DIAGNOSIS — L93.0 DISCOID LUPUS: ICD-10-CM

## 2018-09-19 DIAGNOSIS — L65.8 FEMALE PATTERN HAIR LOSS: ICD-10-CM

## 2018-09-20 RX ORDER — CLOBETASOL PROPIONATE 0.5 MG/G
AEROSOL, FOAM TOPICAL
Qty: 50 G | Refills: 3 | Status: SHIPPED | OUTPATIENT
Start: 2018-09-20 | End: 2018-11-28

## 2018-09-28 ENCOUNTER — TELEPHONE (OUTPATIENT)
Dept: RHEUMATOLOGY | Facility: CLINIC | Age: 53
End: 2018-09-28

## 2018-09-28 DIAGNOSIS — M35.00 SECONDARY SJOGREN'S SYNDROME: Primary | ICD-10-CM

## 2018-09-28 NOTE — TELEPHONE ENCOUNTER
----- Message from Denisse Starr sent at 9/28/2018  9:44 AM CDT -----  Contact: Pt  Please give pt a call at ..[phone] (wanw) regarding a new medications she needs to have called in.

## 2018-09-28 NOTE — TELEPHONE ENCOUNTER
Patient states that she has Sjogrens. States that her mouth is inflamed and she wants something to calm thing down. Informed her that Dr. CHUNG is out on Fridays. Patient advised to see Urgent care . Message forwarded to Dr. CHUNG

## 2018-10-01 ENCOUNTER — LAB VISIT (OUTPATIENT)
Dept: LAB | Facility: HOSPITAL | Age: 53
End: 2018-10-01
Attending: INTERNAL MEDICINE
Payer: COMMERCIAL

## 2018-10-01 DIAGNOSIS — D63.8 ANEMIA OF CHRONIC ILLNESS: ICD-10-CM

## 2018-10-01 DIAGNOSIS — M32.19 CNS LUPUS: ICD-10-CM

## 2018-10-01 DIAGNOSIS — G05.3 CNS LUPUS: ICD-10-CM

## 2018-10-01 DIAGNOSIS — M35.00 SECONDARY SJOGREN'S SYNDROME: ICD-10-CM

## 2018-10-01 DIAGNOSIS — M32.19 OTHER SYSTEMIC LUPUS ERYTHEMATOSUS WITH OTHER ORGAN INVOLVEMENT: ICD-10-CM

## 2018-10-01 DIAGNOSIS — D64.9 NORMOCYTIC ANEMIA: ICD-10-CM

## 2018-10-01 LAB
ALBUMIN SERPL BCP-MCNC: 3.8 G/DL
ALBUMIN SERPL BCP-MCNC: 3.8 G/DL
ALP SERPL-CCNC: 104 U/L
ALP SERPL-CCNC: 104 U/L
ALT SERPL W/O P-5'-P-CCNC: 24 U/L
ALT SERPL W/O P-5'-P-CCNC: 24 U/L
ANION GAP SERPL CALC-SCNC: 5 MMOL/L
ANION GAP SERPL CALC-SCNC: 5 MMOL/L
AST SERPL-CCNC: 19 U/L
AST SERPL-CCNC: 19 U/L
BASOPHILS # BLD AUTO: 0.01 K/UL
BASOPHILS # BLD AUTO: 0.01 K/UL
BASOPHILS NFR BLD: 0.2 %
BASOPHILS NFR BLD: 0.2 %
BILIRUB SERPL-MCNC: 0.6 MG/DL
BILIRUB SERPL-MCNC: 0.6 MG/DL
BUN SERPL-MCNC: 15 MG/DL
BUN SERPL-MCNC: 15 MG/DL
CALCIUM SERPL-MCNC: 10 MG/DL
CALCIUM SERPL-MCNC: 10 MG/DL
CHLORIDE SERPL-SCNC: 105 MMOL/L
CHLORIDE SERPL-SCNC: 105 MMOL/L
CO2 SERPL-SCNC: 31 MMOL/L
CO2 SERPL-SCNC: 31 MMOL/L
CREAT SERPL-MCNC: 0.9 MG/DL
CREAT SERPL-MCNC: 0.9 MG/DL
DIFFERENTIAL METHOD: ABNORMAL
DIFFERENTIAL METHOD: ABNORMAL
EOSINOPHIL # BLD AUTO: 0 K/UL
EOSINOPHIL # BLD AUTO: 0 K/UL
EOSINOPHIL NFR BLD: 0.2 %
EOSINOPHIL NFR BLD: 0.2 %
ERYTHROCYTE [DISTWIDTH] IN BLOOD BY AUTOMATED COUNT: 14.5 %
ERYTHROCYTE [DISTWIDTH] IN BLOOD BY AUTOMATED COUNT: 14.5 %
EST. GFR  (AFRICAN AMERICAN): >60 ML/MIN/1.73 M^2
EST. GFR  (AFRICAN AMERICAN): >60 ML/MIN/1.73 M^2
EST. GFR  (NON AFRICAN AMERICAN): >60 ML/MIN/1.73 M^2
EST. GFR  (NON AFRICAN AMERICAN): >60 ML/MIN/1.73 M^2
FERRITIN SERPL-MCNC: 721 NG/ML
GLUCOSE SERPL-MCNC: 101 MG/DL
GLUCOSE SERPL-MCNC: 101 MG/DL
HCT VFR BLD AUTO: 34.9 %
HCT VFR BLD AUTO: 34.9 %
HGB BLD-MCNC: 11 G/DL
HGB BLD-MCNC: 11 G/DL
IRON SERPL-MCNC: 74 UG/DL
LDH SERPL L TO P-CCNC: 149 U/L
LYMPHOCYTES # BLD AUTO: 1.8 K/UL
LYMPHOCYTES # BLD AUTO: 1.8 K/UL
LYMPHOCYTES NFR BLD: 33.8 %
LYMPHOCYTES NFR BLD: 33.8 %
MCH RBC QN AUTO: 28.6 PG
MCH RBC QN AUTO: 28.6 PG
MCHC RBC AUTO-ENTMCNC: 31.5 G/DL
MCHC RBC AUTO-ENTMCNC: 31.5 G/DL
MCV RBC AUTO: 91 FL
MCV RBC AUTO: 91 FL
MONOCYTES # BLD AUTO: 0.4 K/UL
MONOCYTES # BLD AUTO: 0.4 K/UL
MONOCYTES NFR BLD: 7.2 %
MONOCYTES NFR BLD: 7.2 %
NEUTROPHILS # BLD AUTO: 3.1 K/UL
NEUTROPHILS # BLD AUTO: 3.1 K/UL
NEUTROPHILS NFR BLD: 58.6 %
NEUTROPHILS NFR BLD: 58.6 %
PLATELET # BLD AUTO: 234 K/UL
PLATELET # BLD AUTO: 234 K/UL
PMV BLD AUTO: 9.8 FL
PMV BLD AUTO: 9.8 FL
POTASSIUM SERPL-SCNC: 3.9 MMOL/L
POTASSIUM SERPL-SCNC: 3.9 MMOL/L
PROT SERPL-MCNC: 7.8 G/DL
PROT SERPL-MCNC: 7.8 G/DL
RBC # BLD AUTO: 3.85 M/UL
RBC # BLD AUTO: 3.85 M/UL
RETICS/RBC NFR AUTO: 2.3 %
SATURATED IRON: 23 %
SODIUM SERPL-SCNC: 141 MMOL/L
SODIUM SERPL-SCNC: 141 MMOL/L
TOTAL IRON BINDING CAPACITY: 318 UG/DL
TRANSFERRIN SERPL-MCNC: 215 MG/DL
VIT B12 SERPL-MCNC: 510 PG/ML
WBC # BLD AUTO: 5.26 K/UL
WBC # BLD AUTO: 5.26 K/UL

## 2018-10-01 PROCEDURE — 83615 LACTATE (LD) (LDH) ENZYME: CPT | Mod: PO

## 2018-10-01 PROCEDURE — 80053 COMPREHEN METABOLIC PANEL: CPT | Mod: PO

## 2018-10-01 PROCEDURE — 83540 ASSAY OF IRON: CPT

## 2018-10-01 PROCEDURE — 85045 AUTOMATED RETICULOCYTE COUNT: CPT

## 2018-10-01 PROCEDURE — 85025 COMPLETE CBC W/AUTO DIFF WBC: CPT | Mod: PO

## 2018-10-01 PROCEDURE — 82607 VITAMIN B-12: CPT

## 2018-10-01 PROCEDURE — 36415 COLL VENOUS BLD VENIPUNCTURE: CPT | Mod: PO

## 2018-10-01 PROCEDURE — 82728 ASSAY OF FERRITIN: CPT

## 2018-10-01 NOTE — TELEPHONE ENCOUNTER
Patient informed of Dr. CHUNG has called in medication. Also informed insurance may not cover and it is an expensive medication. Patient verbalized understanding.

## 2018-10-01 NOTE — TELEPHONE ENCOUNTER
Spoke with patient this morning. She did go to urgent care and was give prednisone 30 mg for 5 days. Also given Lidocaine-Benadryl-Mayloxx packet but that has not help. Patient states her past Rheumatologist use to give her Neutrasal. This use to take care of the mouth inflammation.   Would like some called in.

## 2018-10-01 NOTE — TELEPHONE ENCOUNTER
Neutrasal is another alternative salivary substitute like biotene. Her insurance might not cover the medication since it is very expensive. I have sent a prescription to her pharmacy. Thanks.

## 2018-10-02 RX ORDER — LOSARTAN POTASSIUM 100 MG/1
100 TABLET ORAL DAILY
Qty: 90 TABLET | Refills: 3 | Status: SHIPPED | OUTPATIENT
Start: 2018-10-02 | End: 2019-04-15

## 2018-10-03 ENCOUNTER — TELEPHONE (OUTPATIENT)
Dept: RHEUMATOLOGY | Facility: CLINIC | Age: 53
End: 2018-10-03

## 2018-10-03 NOTE — TELEPHONE ENCOUNTER
Pt returned call. Advised Pt to take Biotene. Pt stated she has been taking Biotene for years. Tried to schedule Pt for an appointment. Pt stated she has a  F/U in November and she'll just keep that.

## 2018-10-03 NOTE — TELEPHONE ENCOUNTER
----- Message from Chirag Turner MD sent at 10/2/2018  3:57 PM CDT -----  That's what I replied yesterday. It is an expensive form of artificial saliva. Please advice patient to take biotene . If she has already tried, schedule a follow up in clinic. Thanks.   ----- Message -----  From: Sumanth Puentes LPN  Sent: 10/2/2018   1:55 PM  To: Chirag Turner MD    Completed PA for Neutrasal via covermymeds and drug is not covered by plan. Please Advise

## 2018-10-10 ENCOUNTER — TELEPHONE (OUTPATIENT)
Dept: INTERNAL MEDICINE | Facility: CLINIC | Age: 53
End: 2018-10-10

## 2018-10-10 NOTE — TELEPHONE ENCOUNTER
S/w pt. Rescheduled appt that was scheduled for tomorrow morning. Pt now has an appt scheduled for 11/8/18 at 8:20am. Pt verbalized understanding.

## 2018-10-10 NOTE — TELEPHONE ENCOUNTER
----- Message from Raza Morales sent at 10/10/2018  3:53 PM CDT -----  Contact: pt  She is returning a missed call, please advise [phone] (home)

## 2018-10-10 NOTE — TELEPHONE ENCOUNTER
----- Message from Mignon Ny sent at 10/10/2018 11:28 AM CDT -----  Contact: Pt  Pt called and stated she was returning a call to the nurse. She can be reached at [phone].    Thanks,  Tf

## 2018-10-10 NOTE — TELEPHONE ENCOUNTER
Attempt to contact pt regarding her appt with Dr. Nagel on 10/11/18 but no answer.  Unable to leave VM to notified pt her appt need to be reschedule.  nubia

## 2018-10-11 ENCOUNTER — TELEPHONE (OUTPATIENT)
Dept: HEMATOLOGY/ONCOLOGY | Facility: CLINIC | Age: 53
End: 2018-10-11

## 2018-10-11 ENCOUNTER — OFFICE VISIT (OUTPATIENT)
Dept: HEMATOLOGY/ONCOLOGY | Facility: CLINIC | Age: 53
End: 2018-10-11
Payer: COMMERCIAL

## 2018-10-11 ENCOUNTER — INFUSION (OUTPATIENT)
Dept: RHEUMATOLOGY | Facility: HOSPITAL | Age: 53
End: 2018-10-11
Attending: INTERNAL MEDICINE
Payer: COMMERCIAL

## 2018-10-11 VITALS
TEMPERATURE: 98 F | WEIGHT: 190.69 LBS | OXYGEN SATURATION: 96 % | DIASTOLIC BLOOD PRESSURE: 88 MMHG | HEART RATE: 125 BPM | SYSTOLIC BLOOD PRESSURE: 124 MMHG | HEIGHT: 65 IN | RESPIRATION RATE: 18 BRPM | BODY MASS INDEX: 31.77 KG/M2

## 2018-10-11 VITALS
HEART RATE: 87 BPM | DIASTOLIC BLOOD PRESSURE: 79 MMHG | SYSTOLIC BLOOD PRESSURE: 117 MMHG | BODY MASS INDEX: 31.99 KG/M2 | WEIGHT: 192.25 LBS | TEMPERATURE: 98 F | RESPIRATION RATE: 18 BRPM | OXYGEN SATURATION: 97 %

## 2018-10-11 DIAGNOSIS — M32.19 CNS LUPUS: ICD-10-CM

## 2018-10-11 DIAGNOSIS — G05.3 CNS LUPUS: ICD-10-CM

## 2018-10-11 DIAGNOSIS — D64.9 NORMOCYTIC ANEMIA: Primary | ICD-10-CM

## 2018-10-11 DIAGNOSIS — M32.9 SYSTEMIC LUPUS ERYTHEMATOSUS ARTHRITIS: Primary | ICD-10-CM

## 2018-10-11 PROCEDURE — 3008F BODY MASS INDEX DOCD: CPT | Mod: CPTII,S$GLB,, | Performed by: INTERNAL MEDICINE

## 2018-10-11 PROCEDURE — 25000003 PHARM REV CODE 250: Mod: PO | Performed by: INTERNAL MEDICINE

## 2018-10-11 PROCEDURE — A4216 STERILE WATER/SALINE, 10 ML: HCPCS | Mod: PO | Performed by: INTERNAL MEDICINE

## 2018-10-11 PROCEDURE — 3074F SYST BP LT 130 MM HG: CPT | Mod: CPTII,S$GLB,, | Performed by: INTERNAL MEDICINE

## 2018-10-11 PROCEDURE — 99999 PR PBB SHADOW E&M-EST. PATIENT-LVL III: CPT | Mod: PBBFAC,,, | Performed by: INTERNAL MEDICINE

## 2018-10-11 PROCEDURE — 99214 OFFICE O/P EST MOD 30 MIN: CPT | Mod: S$GLB,,, | Performed by: INTERNAL MEDICINE

## 2018-10-11 PROCEDURE — 96413 CHEMO IV INFUSION 1 HR: CPT | Mod: PO

## 2018-10-11 PROCEDURE — 63600175 PHARM REV CODE 636 W HCPCS: Mod: JG,PO | Performed by: INTERNAL MEDICINE

## 2018-10-11 PROCEDURE — 3079F DIAST BP 80-89 MM HG: CPT | Mod: CPTII,S$GLB,, | Performed by: INTERNAL MEDICINE

## 2018-10-11 PROCEDURE — 96375 TX/PRO/DX INJ NEW DRUG ADDON: CPT | Mod: PO

## 2018-10-11 RX ORDER — ACETAMINOPHEN 325 MG/1
650 TABLET ORAL
Status: COMPLETED | OUTPATIENT
Start: 2018-10-11 | End: 2018-10-11

## 2018-10-11 RX ORDER — ACETAMINOPHEN 325 MG/1
650 TABLET ORAL
Status: CANCELLED | OUTPATIENT
Start: 2018-10-11

## 2018-10-11 RX ORDER — HEPARIN 100 UNIT/ML
500 SYRINGE INTRAVENOUS
Status: CANCELLED | OUTPATIENT
Start: 2018-10-11

## 2018-10-11 RX ORDER — DIPHENHYDRAMINE HYDROCHLORIDE 50 MG/ML
25 INJECTION INTRAMUSCULAR; INTRAVENOUS
Status: COMPLETED | OUTPATIENT
Start: 2018-10-11 | End: 2018-10-11

## 2018-10-11 RX ORDER — SODIUM CHLORIDE 0.9 % (FLUSH) 0.9 %
10 SYRINGE (ML) INJECTION
Status: CANCELLED | OUTPATIENT
Start: 2018-10-11

## 2018-10-11 RX ORDER — SODIUM CHLORIDE 0.9 % (FLUSH) 0.9 %
10 SYRINGE (ML) INJECTION
Status: DISCONTINUED | OUTPATIENT
Start: 2018-10-11 | End: 2018-10-11 | Stop reason: HOSPADM

## 2018-10-11 RX ORDER — DIPHENHYDRAMINE HYDROCHLORIDE 50 MG/ML
25 INJECTION INTRAMUSCULAR; INTRAVENOUS
Status: CANCELLED
Start: 2018-10-11

## 2018-10-11 RX ADMIN — ACETAMINOPHEN 650 MG: 325 TABLET ORAL at 10:10

## 2018-10-11 RX ADMIN — METHYLPREDNISOLONE SODIUM SUCCINATE 100 MG: 125 INJECTION, POWDER, FOR SOLUTION INTRAMUSCULAR; INTRAVENOUS at 10:10

## 2018-10-11 RX ADMIN — DIPHENHYDRAMINE HYDROCHLORIDE 25 MG: 50 INJECTION INTRAMUSCULAR; INTRAVENOUS at 10:10

## 2018-10-11 RX ADMIN — SODIUM CHLORIDE, PRESERVATIVE FREE 10 ML: 5 INJECTION INTRAVENOUS at 10:10

## 2018-10-11 RX ADMIN — BELIMUMAB 872 MG: 400 INJECTION, POWDER, LYOPHILIZED, FOR SOLUTION INTRAVENOUS at 10:10

## 2018-10-11 NOTE — TELEPHONE ENCOUNTER
----- Message from Sudha Watkins sent at 10/11/2018  9:08 AM CDT -----  Contact: pt  Pt stated she's running 5 minutes behind and is currently on her way.

## 2018-10-11 NOTE — PROGRESS NOTES
Infusion# 7  S/S infection noted or voiced? denies  Recent labs? yes  Recent vaccines? Denies, refuses flu    Premeds? Tylenol 650 mg PO, Benadryl 25 mg IVP over 2 minutes and Solumedrol 100 mg IVP over 2 minutes    Benlysta 10 mg/kg = 872 mg administered IV at a 60 minute rate per orders; see MAR & Flow Sheet for more  details. Tolerated well without adverse events

## 2018-10-11 NOTE — PATIENT INSTRUCTIONS
Belimumab solution for injection  What is this medicine?  BELIMUMAB (be JASON ue mab) is a medicine used to treat a certain type of systemic lupus erythematosus (SLE). This medicine is used with standard therapy for SLE. It is not a cure.  How should I use this medicine?  This medicine is for infusion into a vein. It is given by a health care professional in a hospital or clinic setting.  A special MedGuide will be given to you by the pharmacist with each prescription and refill. Be sure to read this information carefully each time.  Talk to your pediatrician regarding the use of this medicine in children. Special care may be needed.  What side effects may I notice from receiving this medicine?  Side effects that you should report to your doctor or health care professional as soon as possible:  · allergic reactions like skin rash, itching or hives, swelling of the face, lips, or tongue  · depressed mood  · signs of infection - fever or chills, cough, sore throat, pain or difficulty passing urine  · suicidal thoughts or other mood changes  · unusually slow heartbeat  Side effects that usually do not require medical attention (Report these to your doctor or health care professional if they continue or are bothersome.):  · diarrhea  · headache  · muscle aches  · nausea, vomiting  · trouble sleeping  What may interact with this medicine?  Do not take this medicine with any of the following medications:  · live virus vaccines  This medicine may also interact with the following medications:  · cyclophosphamide  · ofatumumab  · rituximab  What if I miss a dose?  It is important not to miss your dose. Call your doctor or health care professional if you are unable to keep an appointment.  Where should I keep my medicine?  This drug is given in a hospital or clinic and will not be stored at home.  What should I tell my health care provider before I take this medicine?  They need to know if you have any of these  conditions:  · heart disease  · immune system problems  · infection (especially a virus infection such as chickenpox, cold sores, or herpes)  · mental illness  · suicidal thoughts, plans, or attempt; a previous suicide attempt by you or a family member  · an unusual or allergic reaction to belimumab, other medicines, foods, dyes, or preservatives  · pregnant or trying to get pregnant  · breast-feeding  What should I watch for while using this medicine?  Tell your doctor or healthcare professional if your symptoms do not start to get better or if they get worse.  Your condition will be monitored carefully while you are receiving this medicine.  NOTE:This sheet is a summary. It may not cover all possible information. If you have questions about this medicine, talk to your doctor, pharmacist, or health care provider. Copyright© 2017 [employer]    WAYS TO HELP PREVENT INFECTION         WASH YOUR HANDS OFTEN DURING THE DAY, ESPECIALLY BEFORE YOU EAT, AFTER USING THE BATHROOM, AND AFTER TOUCHING ANIMALS     STAY AWAY FROM PEOPLE WHO HAVE ILLNESSES YOU CAN CATCH; SUCH AS COLDS, FLU, CHICKEN POX     TRY TO AVOID CROWDS     STAY AWAY FROM CHILDREN WHO RECENTLY HAVE RECEIVED LIVE VIRUS VACCINES     MAINTAIN GOOD MOUTH CARE     DO NOT SQUEEZE OR SCRATCH PIMPLES     CLEAN CUTS & SCRAPES RIGHT AWAY AND DAILY UNTIL HEALED WITH WARM WATER, SOAP & AN ANTISEPTIC     AVOID CONTACT WITH LITTER BOXES, BIRD CAGES, & FISH TANKS     AVOID STANDING WATER, IE., BIRD BATHS, FLOWER POTS/VASES, OR HUMIDIFIERS     WEAR GLOVES WHEN GARDENING OR CLEANING UP AFTER OTHERS, ESPECIALLY BABIES & SMALL CHILDREN     DO NOT EAT RAW FISH, SEAFOOD, MEAT, OR EGGS

## 2018-10-11 NOTE — PROGRESS NOTES
Hematology/Oncology Office Note    Reason for referral:  Progressive normocytic anemia    CC:    Referred by:  No ref. provider found    Diagnosis:  Progressive normocytic anemia    Treatment:  Treated with IV iron in the past    History of present illness:  51-year-old female with long-standing history of SLE with skin, joint, and cytopenias from SLE.  She underwent a bone marrow biopsy in 2/2016 which demonstrated normal trilineage hematopoiesis without significant abnormality.  She underwent EGD and colonoscopy in 12/2016 which failed to identify any source of blood loss.  She was treated with IV iron by outside oncologist with some improvement in anemia.  Hemoglobin was recently noted to be 9.6 with MCV of 91.  She is currently on prednisone/Solu-Medrol for SLE.  She has complaints of mild fatigue and malaise without shortness of breath, chest pain, dizziness, or palpitations.    I have reviewed and updated the HPI, ROS, PMHx, Social Hx, Family Hx and treatment history.    Today's visit:  Patient is without new complaints today.  She continues to report significant fatigue, stiffness, and joint pain.  She remains on active treatment for SLE.    Past Medical History:   Diagnosis Date    Anemia     with chronic disease lupus    Encounter for blood transfusion     G-6-PD deficiency     Heart murmur     MVP 1986    Hypertension     Lupus     Raynaud's syndrome     Systemic lupus erythematosus arthritis 5/13/2018         Social History:  No tobacco, alcohol, or illicit drugs      Family History: family history includes Arthritis in her mother; Cancer in her brother; Diabetes in her father; Heart disease in her father and mother; Hypertension in her father and mother.    HPI    Review of Systems   Constitutional: Positive for activity change and fatigue. Negative for chills, diaphoresis and fever.   HENT: Negative for congestion, dental problem, drooling, ear discharge, ear pain, facial swelling, hearing loss,  "mouth sores and nosebleeds.    Eyes: Negative.    Respiratory: Negative for apnea, cough, shortness of breath, wheezing and stridor.    Cardiovascular: Negative for chest pain and palpitations.   Gastrointestinal: Negative for abdominal pain, anal bleeding, blood in stool, constipation, diarrhea, nausea, rectal pain and vomiting.   Endocrine: Negative.    Genitourinary: Negative for difficulty urinating, dyspareunia, dysuria, enuresis, flank pain and frequency.   Musculoskeletal: Negative for gait problem, joint swelling, myalgias, neck pain and neck stiffness.   Skin: Negative for color change, pallor and rash.   Allergic/Immunologic: Negative.    Neurological: Negative for dizziness, seizures, facial asymmetry, speech difficulty, weakness, light-headedness, numbness and headaches.   Hematological: Negative for adenopathy. Does not bruise/bleed easily.   Psychiatric/Behavioral: Negative for agitation, behavioral problems, confusion, decreased concentration and dysphoric mood.       Objective:       Vitals:    10/11/18 0923   BP: 124/88   Pulse: (!) 125   Resp: 18   Temp: 98.1 °F (36.7 °C)   TempSrc: Oral   SpO2: 96%   Weight: 86.5 kg (190 lb 11.2 oz)   Height: 5' 5" (1.651 m)     Physical Exam   Constitutional: She is oriented to person, place, and time. She appears well-developed and well-nourished. No distress.   HENT:   Head: Normocephalic and atraumatic.   Right Ear: External ear normal.   Left Ear: External ear normal.   Nose: Nose normal.   Mouth/Throat: Oropharynx is clear and moist.   Eyes: Conjunctivae and EOM are normal. Pupils are equal, round, and reactive to light. No scleral icterus.   Neck: Normal range of motion. Neck supple. No tracheal deviation present. No thyromegaly present.   Cardiovascular: Normal rate, regular rhythm, normal heart sounds and intact distal pulses. Exam reveals no gallop and no friction rub.   No murmur heard.  Pulmonary/Chest: Effort normal and breath sounds normal. No " accessory muscle usage. No respiratory distress. She has no decreased breath sounds. She has no rhonchi. She has no rales. She exhibits no tenderness.   Abdominal: Soft. Bowel sounds are normal. She exhibits no distension and no mass. There is no tenderness. There is no rebound and no guarding.   Musculoskeletal: Normal range of motion. She exhibits no edema, tenderness or deformity.   Neurological: She is alert and oriented to person, place, and time. No cranial nerve deficit. Coordination normal.   Skin: Skin is warm, dry and intact. Capillary refill takes less than 2 seconds. No ecchymosis and no rash noted. No cyanosis. Nails show no clubbing.   Psychiatric: She has a normal mood and affect. Her behavior is normal. Judgment and thought content normal.       CT of Brain 2/25/16:  History:    Altered mental status  Findings: No intracranial hemorrhage or acute focal brain parenchymal abnormality is identified.  There is mild generalized atrophy. Calvarium is intact. Visualized paranasal sinuses and mastoid air cells are clear.    Assessment:       51-year-old postmenopausal female with a history of pancytopenia related to SLE and iron deficiency anemia who presents for worsening normocytic anemia.  She underwent a bone marrow biopsy on 2/19/2016 which demonstrated trilineage hematopoiesis without significant abnormality.  In 12/2016 she underwent EGD and colonoscopy ([facility]) without source of blood loss identified.    Workup has been unremarkable thus far and I believe anemia is secondary to chronic disease/inflammation from SLE.    10/01/2018 labs demonstrate normal WBC/ANC with stable anemia (hemoglobin of 11.0).  In addition iron studies and B12 were within normal limits.  We will continue to monitor and have the patient follow up in 6 months with repeat labs including iron studies.      Normocytic anemia secondary to chronic disease/SLE  --follow-up in 6 months with repeat CBC/iron studies

## 2018-10-25 DIAGNOSIS — G05.3 CNS LUPUS: ICD-10-CM

## 2018-10-25 DIAGNOSIS — M32.19 CNS LUPUS: ICD-10-CM

## 2018-10-25 RX ORDER — PREDNISONE 5 MG/1
TABLET ORAL
Qty: 30 TABLET | Refills: 0 | Status: SHIPPED | OUTPATIENT
Start: 2018-10-25 | End: 2018-11-26 | Stop reason: SDUPTHER

## 2018-11-14 ENCOUNTER — OFFICE VISIT (OUTPATIENT)
Dept: INTERNAL MEDICINE | Facility: CLINIC | Age: 53
End: 2018-11-14
Payer: COMMERCIAL

## 2018-11-14 ENCOUNTER — OFFICE VISIT (OUTPATIENT)
Dept: RHEUMATOLOGY | Facility: CLINIC | Age: 53
End: 2018-11-14
Payer: COMMERCIAL

## 2018-11-14 ENCOUNTER — LAB VISIT (OUTPATIENT)
Dept: LAB | Facility: HOSPITAL | Age: 53
End: 2018-11-14
Attending: INTERNAL MEDICINE
Payer: COMMERCIAL

## 2018-11-14 VITALS
OXYGEN SATURATION: 98 % | TEMPERATURE: 98 F | BODY MASS INDEX: 31.22 KG/M2 | WEIGHT: 194.25 LBS | HEIGHT: 66 IN | HEART RATE: 105 BPM | SYSTOLIC BLOOD PRESSURE: 120 MMHG | DIASTOLIC BLOOD PRESSURE: 78 MMHG

## 2018-11-14 VITALS
BODY MASS INDEX: 32.29 KG/M2 | WEIGHT: 193.81 LBS | HEART RATE: 119 BPM | SYSTOLIC BLOOD PRESSURE: 142 MMHG | HEIGHT: 65 IN | DIASTOLIC BLOOD PRESSURE: 84 MMHG

## 2018-11-14 DIAGNOSIS — M62.838 MUSCLE SPASM: ICD-10-CM

## 2018-11-14 DIAGNOSIS — Z23 NEED FOR TETANUS, DIPHTHERIA, AND ACELLULAR PERTUSSIS (TDAP) VACCINE: Primary | ICD-10-CM

## 2018-11-14 DIAGNOSIS — M32.19 OTHER SYSTEMIC LUPUS ERYTHEMATOSUS WITH OTHER ORGAN INVOLVEMENT: ICD-10-CM

## 2018-11-14 DIAGNOSIS — M35.00 SECONDARY SJOGREN'S SYNDROME: ICD-10-CM

## 2018-11-14 DIAGNOSIS — Z79.899 LONG-TERM CURRENT USE OF HIGH RISK MEDICATION OTHER THAN ANTICOAGULANT: ICD-10-CM

## 2018-11-14 DIAGNOSIS — M32.19 OTHER SYSTEMIC LUPUS ERYTHEMATOSUS WITH OTHER ORGAN INVOLVEMENT: Primary | ICD-10-CM

## 2018-11-14 DIAGNOSIS — M32.9 SYSTEMIC LUPUS ERYTHEMATOSUS ARTHRITIS: Primary | ICD-10-CM

## 2018-11-14 DIAGNOSIS — G05.3 CNS LUPUS: ICD-10-CM

## 2018-11-14 DIAGNOSIS — F32.A DEPRESSION, UNSPECIFIED DEPRESSION TYPE: ICD-10-CM

## 2018-11-14 DIAGNOSIS — M32.9 SYSTEMIC LUPUS ERYTHEMATOSUS ARTHRITIS: ICD-10-CM

## 2018-11-14 DIAGNOSIS — M32.19 CNS LUPUS: ICD-10-CM

## 2018-11-14 PROCEDURE — 3008F BODY MASS INDEX DOCD: CPT | Mod: CPTII,S$GLB,, | Performed by: FAMILY MEDICINE

## 2018-11-14 PROCEDURE — 3078F DIAST BP <80 MM HG: CPT | Mod: CPTII,S$GLB,, | Performed by: FAMILY MEDICINE

## 2018-11-14 PROCEDURE — 99999 PR PBB SHADOW E&M-EST. PATIENT-LVL III: CPT | Mod: PBBFAC,,, | Performed by: FAMILY MEDICINE

## 2018-11-14 PROCEDURE — 3008F BODY MASS INDEX DOCD: CPT | Mod: CPTII,S$GLB,, | Performed by: INTERNAL MEDICINE

## 2018-11-14 PROCEDURE — 99215 OFFICE O/P EST HI 40 MIN: CPT | Mod: S$GLB,,, | Performed by: INTERNAL MEDICINE

## 2018-11-14 PROCEDURE — 3074F SYST BP LT 130 MM HG: CPT | Mod: CPTII,S$GLB,, | Performed by: INTERNAL MEDICINE

## 2018-11-14 PROCEDURE — 3074F SYST BP LT 130 MM HG: CPT | Mod: CPTII,S$GLB,, | Performed by: FAMILY MEDICINE

## 2018-11-14 PROCEDURE — 3079F DIAST BP 80-89 MM HG: CPT | Mod: CPTII,S$GLB,, | Performed by: INTERNAL MEDICINE

## 2018-11-14 PROCEDURE — 99999 PR PBB SHADOW E&M-EST. PATIENT-LVL III: CPT | Mod: PBBFAC,,, | Performed by: INTERNAL MEDICINE

## 2018-11-14 PROCEDURE — 99204 OFFICE O/P NEW MOD 45 MIN: CPT | Mod: S$GLB,,, | Performed by: FAMILY MEDICINE

## 2018-11-14 RX ORDER — FLUOXETINE HYDROCHLORIDE 20 MG/1
1 CAPSULE ORAL DAILY
COMMUNITY
Start: 2018-10-25 | End: 2018-11-14 | Stop reason: SDUPTHER

## 2018-11-14 RX ORDER — FLUOXETINE HYDROCHLORIDE 20 MG/1
20 CAPSULE ORAL DAILY
Qty: 30 CAPSULE | Refills: 5 | Status: SHIPPED | OUTPATIENT
Start: 2018-11-14 | End: 2020-11-25

## 2018-11-14 RX ORDER — CYCLOBENZAPRINE HCL 10 MG
10 TABLET ORAL 3 TIMES DAILY PRN
Qty: 30 TABLET | Refills: 3 | Status: SHIPPED | OUTPATIENT
Start: 2018-11-14 | End: 2021-12-03

## 2018-11-14 RX ORDER — FLUOCINOLONE ACETONIDE 0.11 MG/ML
OIL TOPICAL
COMMUNITY
Start: 2018-10-25 | End: 2021-12-03

## 2018-11-14 NOTE — PROGRESS NOTES
Subjective:       Patient ID: Ruth Messina is a 53 y.o. female.    Chief Complaint: Establish Care    54 yo female here to establish care. She was previously seen by Dr. Delacruz for many years but she has moved to an [facility]. She goes to Dr. 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 Sees Dr. Montanez for normocytic anemia.     Eye exam: next due 12/3/18 with Dr. Salinas  Pap smear/MM at [facility]  Cscope: [facility] 2016 with 10 year f/u recommended  Flu: declines   Tdap: >10 years    Sleep: often interrupted by trips to the bathroom and nerve pain  Diet: avoids processed foods  Exercise: no dedicated exercise      does not have any pertinent problems on file.  Past Medical History:   Diagnosis Date    Anemia     with chronic disease lupus    Encounter for blood transfusion     G-6-PD deficiency     Heart murmur     MVP     Hypertension     Lupus     Raynaud's syndrome     Systemic lupus erythematosus arthritis 2018     Past Surgical History:   Procedure Laterality Date    BONE MARROW BIOPSY      CARPAL TUNNEL RELEASE Bilateral     RELEASE-CARPAL TUNNEL Right 2017    [facility]    RELEASE-CARPAL TUNNEL Left 3/24/2017    [facility] at [facility] OR    widsom tooth extraction       Family History   Problem Relation Age of Onset    Arthritis Mother     Heart disease Mother     Hypertension Mother     Diabetes Father     Heart disease Father     Hypertension Father     Cancer Brother     Eczema Neg Hx     Lupus Neg Hx     Psoriasis Neg Hx      Social History     Socioeconomic History    Marital status: Single     Spouse name: Not on file    Number of children: Not on file    Years of education: Not on file    Highest education level: Not on file   Social Needs    Financial resource strain: Not on file     Food insecurity - worry: Not on file    Food insecurity - inability: Not on file    Transportation needs - medical: Not on file    Transportation needs - non-medical: Not on file   Occupational History    Not on file   Tobacco Use    Smoking status: Never Smoker    Smokeless tobacco: Never Used   Substance and Sexual Activity    Alcohol use: No    Drug use: No    Sexual activity: Not Currently   Other Topics Concern    Are you pregnant or think you may be? No    Breast-feeding No   Social History Narrative    Not on file     Review of Systems   Constitutional: Negative for activity change, appetite change, fatigue and fever.   HENT: Negative.    Endocrine: Negative for polydipsia, polyphagia and polyuria.   Genitourinary: Negative for difficulty urinating, dysuria and enuresis.        Nocturia 2-3 times/night   Neurological: Negative for dizziness, syncope, light-headedness and numbness.        Nerve pain to face and scalp   Psychiatric/Behavioral: Positive for sleep disturbance. Negative for dysphoric mood. The patient is not nervous/anxious.        Objective:     Vitals:    11/14/18 0941   BP: 120/78   Pulse: 105   Temp: 98.3 °F (36.8 °C)        Physical Exam   Constitutional: She is oriented to person, place, and time. She appears well-developed and well-nourished.   HENT:   Head: Normocephalic and atraumatic.   Eyes: EOM are normal. Pupils are equal, round, and reactive to light.   Neck: Normal range of motion. Neck supple.   Cardiovascular: Normal rate and regular rhythm.   Pulmonary/Chest: Effort normal and breath sounds normal. She has no wheezes. She has no rales.   Abdominal: Soft. Bowel sounds are normal. There is no tenderness.   Musculoskeletal: Normal range of motion. She exhibits no deformity.   Neurological: She is alert and oriented to person, place, and time.   Skin: Skin is warm and dry. No pallor.   Psychiatric: She has a normal mood and affect. Her behavior is normal.   Nursing note  and vitals reviewed.      Assessment:       1. Need for tetanus, diphtheria, and acellular pertussis (Tdap) vaccine    2. Depression, unspecified depression type    3. Muscle spasm        Plan:           Problem List Items Addressed This Visit     None      Visit Diagnoses     Need for tetanus, diphtheria, and acellular pertussis (Tdap) vaccine    -  Primary    Relevant Orders    Tdap Vaccine    Depression, unspecified depression type        Relevant Medications    FLUoxetine (PROZAC) 20 MG capsule    Muscle spasm        Relevant Medications    cyclobenzaprine (FLEXERIL) 10 MG tablet

## 2018-11-14 NOTE — PATIENT INSTRUCTIONS
Mediterranean Food Guide   People who live in the area around the [place] have a lower risk of heart disease. Researchers have recently shown that following a Mediterranean diet decreases heart disease by 30% in people whom are at-risk.   This lifestyle is built upon daily exercise along with a lot of fruit, vegetables, plant-based proteins, whole grains, fish and smaller amounts of poultry and red meat. Fatty fish (salmon), olive oil, and nuts make this diet higher in fat than the classic heart healthy diet. These fats are mostly unsaturated, and when consumed in place of saturated fat, are good for the heart.   Physical Activity- The Mediterranean Diet pyramid is built upon daily physical activity and exercise. Aim for at least 150 minutes of moderate to vigorous exercise every week. Moderate-to-vigorous exercises include walking at a brisk pace, biking, or swimming, among other activities that elevate your heart rate. Always choose activities that you enjoy and that are safe, in order to be active throughout your life.   Achieve and Maintain a Healthy Weight - The high fat content of the Mediterranean is healthy for your heart, but may lead to increased energy intake and weight gain if you dont pay attention to how much you are eating. If you are trying to lose weight, choose the smaller number of servings from each food group, and try to make your serving sizes match those listed. 2   Food Groups and Servings per day  Serving Sizes    Non-starchy Vegetables   4-8 servings per day  One serving is ½ cup of cooked vegetables or 1 cup raw vegetables   Non-starchy vegetables include artichoke, asparagus, beets, broccoli, [place]ard sprouts, cauliflower, cabbage, celery, carrots, tomatoes, eggplant, cucumber, onion, green and wax beans, zucchini, turnips, peppers, salad greens and mushrooms. (Potatoes, peas, and corn are starchy vegetables.)    Fruit   2-4 servings per day  One serving is a small fresh  fruit or ½ cup juice or ¼ cup dried fruit   Fresh fruits are preferred because of the fiber and other nutrients they contain. Fruits canned in light syrup or their own juice, and frozen fruit with little or no added sugar are also good choices. Use only small amounts of fruit juice (6 oz per day or less), since even unsweetened juices can contain as much sugar as regular soda.    Legumes and Nuts   1-3 servings per day  Legumes: One serving is ½ cup cooked kidney, black, garbanzo, pierce, soy (edamame), navy beans, split peas, or lentils, or ¼ cup fat free refried beans or baked beans   Nuts and Seeds: One serving is 2 Tbsp. sunflower or sesame seeds, 1 Tbsp. peanut butter,   7-8 walnuts or pecans, 20 peanuts, or 12-15 almonds   Aim for 1-2 servings of nuts or seeds and 1-2 servings of legumes per day. Legumes are high in fiber, protein, and minerals. Nuts are high in unsaturated fat, and may increase HDL without increasing LDL cholesterol levels.    Low-fat Dairy Products   1-3 servings per day  One serving is 1 cup of skim milk, non-fat yogurt, or 1oz of low-fat (part-skim) cheese   Calcium-fortified soy milk, soy yogurt, and soy cheese can take the place of dairy products. If servings of dairy or fortified soy are less than 2 per day, we advise a calcium and vitamin D supplement.    Fish or shellfish   2-3 times a week  One serving is 3 ounces (about the size of a deck of cards)   Cook fish by baking, sautéing, broiling, roasting, grilling, or poaching. Choose fatty fishes like salmon, herring, sardines, or mackerel often. The fat in fish is high in omega-3 fats, so it has healthy effects on triglycerides and blood cells.    Poultry, if desired   1-3 times a week  One serving is 3 ounces (about the size of a deck of cards)   Bake, sauté, stir perez, roast, or grill the poultry you eat, and eat it without the skin.    Whole Grains and Starchy Vegetables   4-6 servings per day  One serving is about 1 ounce of any of  these:   1 slice whole wheat bread ½ cup potatoes, sweet potatoes, corn, or peas   ½ large whole grain bun 1 small whole grain roll   6-inch whole wheat ever 6 whole grain crackers   ½ cup cooked whole grain cereal (oatmeal, cracked wheat, quinoa)   ½ cup cooked whole wheat pasta, brown rice, or barley   Whole grains are high in fiber and have less effect on blood sugar and triglyceride levels than refined, processed grains like white bread and pasta. Whole grains also keep the stomach full longer, making it easier to control hunger.

## 2018-11-14 NOTE — PATIENT INSTRUCTIONS
1. Hold off on benlysta  2. CBC, CMP, ESR, CRP, C3, C4 DS DNA, UA U P/C ratio today  3. 3 month follow up.   4. Can get same labs again a week before the next visit if she could.

## 2018-11-14 NOTE — PROGRESS NOTES
RHEUMATOLOGY CLINIC FOLLOW UP VISIT  Chief complaints:-  Follow-up for lupus.    HPI:-  Ruth Melendrez a 53 y.o. pleasant female comes in for a follow up visit with above chief complaints. She has systemic lupus erythematosus with several organ manifestations including arthritis, CNS lupus, skin rash and is on Benlysta / Imuran combination therapy..  She denies any significant problems today.  Had headaches last week radiating from the frontal region into the back and was diagnosed as occipital neuralgia by her neurologist and given gabapentin which failed to improve the pain. She denies any hallucinations or seizures.   Review of Systems   Constitutional: Negative for chills, fever, malaise/fatigue and weight loss.   HENT: Negative for congestion, ear discharge, ear pain, hearing loss, nosebleeds and sore throat.    Eyes: Negative for blurred vision, double vision, photophobia, discharge and redness.   Respiratory: Negative for cough, hemoptysis, sputum production and shortness of breath.    Cardiovascular: Negative for chest pain, palpitations and claudication.   Gastrointestinal: Negative for abdominal pain, constipation, diarrhea, melena, nausea and vomiting.   Genitourinary: Negative for dysuria, frequency, hematuria and urgency.   Musculoskeletal: Negative for back pain, falls, joint pain, myalgias and neck pain.   Skin: Negative for itching and rash.   Neurological: Negative for dizziness, tremors, sensory change, speech change, focal weakness, seizures, loss of consciousness, weakness and headaches.   Endo/Heme/Allergies: Negative for environmental allergies. Does not bruise/bleed easily.   Psychiatric/Behavioral: Negative for depression, hallucinations and memory loss. The patient is not nervous/anxious and does not have insomnia.        Past Medical History:   Diagnosis Date    Anemia     with chronic disease lupus    Encounter for blood transfusion  "    G-6-PD deficiency     Heart murmur     MVP 1986    Hypertension     Lupus     Raynaud's syndrome     Systemic lupus erythematosus arthritis 5/13/2018       Past Surgical History:   Procedure Laterality Date    BONE MARROW BIOPSY      CARPAL TUNNEL RELEASE Bilateral     RELEASE-CARPAL TUNNEL Right 5/11/2017    Performed [facility] at [facility] OR    RELEASE-CARPAL TUNNEL Left 3/24/2017    Performed [facility] at [facility] OR    widsom tooth extraction          Social History     Tobacco Use    Smoking status: Never Smoker    Smokeless tobacco: Never Used   Substance Use Topics    Alcohol use: No    Drug use: No       Family History   Problem Relation Age of Onset    Arthritis Mother     Heart disease Mother     Hypertension Mother     Diabetes Father     Heart disease Father     Hypertension Father     Cancer Brother     Eczema Neg Hx     Lupus Neg Hx     Psoriasis Neg Hx        Review of patient's allergies indicates:   Allergen Reactions    Ace inhibitors     Amoxicillin     Aspirin     Cellcept [mycophenolate mofetil]     Cephalosporins     Clindamycin     Hydrochlorothiazide     Imuran [azathioprine sodium]     Lasix [furosemide]     Methotrexate analogues     Nsaids (non-steroidal anti-inflammatory drug)     Pcn [penicillins]     Plaquenil [hydroxychloroquine]     Shellfish containing products     Sulfa (sulfonamide antibiotics)     Zofran [ondansetron hcl (pf)]            Physical examination:-    Vitals:    11/14/18 0843   BP: (!) 142/84   Pulse: (!) 119   Weight: 87.9 kg (193 lb 12.6 oz)   Height: 5' 5" (1.651 m)   PainSc: 0-No pain       Physical Exam   Constitutional: She is oriented to person, place, and time and well-developed, well-nourished, and in no distress. No distress.   HENT:   Head: Normocephalic.   Mouth/Throat: Oropharynx is clear and moist.   Eyes: Conjunctivae and EOM are normal. Pupils are equal, round, and reactive to light.   Neck: " Normal range of motion. Neck supple.   Cardiovascular: Normal rate and intact distal pulses.   Pulmonary/Chest: Effort normal. No respiratory distress.   Abdominal: Soft. There is no tenderness.   Musculoskeletal:   No synovitis over small joints of hands or feet.  No effusion over large joints.   Neurological: She is alert and oriented to person, place, and time. No cranial nerve deficit.   Skin: Skin is warm. No rash noted. No erythema.   Psychiatric: Mood and affect normal.   Nursing note and vitals reviewed.      Labs:-  Results for ADELA LEMON (MRN [id number]) as of 1/5/2017 12:40   Ref. Range 12/8/2016 09:04   SHEILA HEP-2 Titer Unknown Positive 1:640 Sp...   Anti-SSA Antibody Latest Ref Range: 0.00 - 19.99 EU 31.31 (H)   Anti-SSA Interpretation Latest Ref Range: Negative  Positive (A)   Anti-SSB Antibody Latest Ref Range: 0.00 - 19.99 EU 9.10   Anti-SSB Interpretation Latest Ref Range: Negative  Negative   ds DNA Ab Latest Ref Range: Negative 1:10  Positive 1:40 (A)   Anti Sm Antibody Latest Ref Range: 0.00 - 19.99 .08 (H)   Anti-Sm Interpretation Latest Ref Range: Negative  Positive (A)   Anti Sm/RNP Antibody Latest Ref Range: 0.00 - 19.99 .83 (H)   Anti-Sm/RNP Interpretation Latest Ref Range: Negative  Positive (A)   Complement (C-3) Latest Ref Range: 50 - 180 mg/dL 60   Complement (C-4) Latest Ref Range: 11 - 44 mg/dL 7 (L)     Old and Outside medical records :-  Reviewed old and all outside medical records available in Care Everywhere.        Medication List           Accurate as of 11/14/18 12:21 PM. If you have any questions, ask your nurse or doctor.               CONTINUE taking these medications    azaTHIOprine 50 mg Tab  Commonly known as:  IMURAN  Take 1 tablet (50 mg total) by mouth once daily.     B-COMPLEX WITH B-12 ORAL     biotin 5,000 mcg Tbdl     * CALCIUM-MAGNESIUM-ZINC ORAL     * CALCIUM-MAGNESIUM-ZINC ORAL     clobetasol 0.05 % Foam  Commonly known as:  OLUX  APPLY   TOPICALLY  TWICE DAILY FOR  SCALP  ONLY  DO  NOT  USE  ON  FACE     cloNIDine 0.1 MG tablet  Commonly known as:  CATAPRES     cyclobenzaprine 10 MG tablet  Commonly known as:  FLEXERIL  Take 1 tablet (10 mg total) by mouth 3 (three) times daily as needed for Muscle spasms.     cycloSPORINE 0.05 % ophthalmic emulsion  Commonly known as:  RESTASIS  Place 0.4 mLs (1 drop total) into both eyes 2 (two) times daily.     desoximetasone 0.25 % cream  Commonly known as:  TOPICORT  AAA of scalp once daily     EPINEPHrine 0.3 mg/0.3 mL Atin  Commonly known as:  EPIPEN     ferrous gluconate 324 MG tablet  Commonly known as:  FERGON     fish oil-omega-3 fatty acids 300-1,000 mg capsule     fluocinolone 0.01 % external oil  Commonly known as:  DERMA-SMOOTHE  Apply oil to scalp once every other day     fluocinolone and shower cap 0.01 % Oil     FLUoxetine 20 MG capsule  Commonly known as:  PROZAC  Take 1 capsule (20 mg total) by mouth once daily.     gabapentin 300 MG capsule  Commonly known as:  NEURONTIN  Take 1 capsule (300 mg total) by mouth 3 (three) times daily.     losartan 100 MG tablet  Commonly known as:  COZAAR  Take 1 tablet (100 mg total) by mouth once daily.     multivitamin capsule     predniSONE 5 MG tablet  Commonly known as:  DELTASONE  TAKE 1 TABLET BY MOUTH ONCE DAILY     saliva substitute comb no.10 Pwpk  Daily as needed     spironolactone 25 MG tablet  Commonly known as:  ALDACTONE  Take 1 tablet (25 mg total) by mouth once daily.     VITAMIN D3 5,000 unit Tab  Generic drug:  cholecalciferol (vitamin D3)         * This list has 2 medication(s) that are the same as other medications prescribed for you. Read the directions carefully, and ask your doctor or other care provider to review them with you.               Where to Get Your Medications      These medications were sent to [facility]  [place] [facility] [address] [facility]  [address] [facility] [address]    Phone:   [phone]   · cyclobenzaprine 10 MG tablet  · FLUoxetine 20 MG capsule       Assessment/Plans:-  # SLE:-   Extremely complicated lupus with arthritis, extreme fatigue and periodic exacerbations of serious neurological and psychiatric complications , extreme serological activity of lupus, on Benlysta and Imuran.  She would like to hold off on benlysta since it is causing fatigue . Check activity markers today. Ok to hold off benlysta and continue imuran for now.     # Secondary Sjogren's syndrome  Stable disease.  Continue symptomatic therapy.  Monitor.    # Long-term current use of high risk medication other than anticoagulant  Hold Imuran  if any infection.  Check CBC and CMP.  # Follow-up in about 3 months (around 2/14/2019).    Disclaimer: This note was prepared using voice recognition system and is likely to have sound alike errors and is not proof read.  Please call me with any questions.

## 2018-11-26 ENCOUNTER — OFFICE VISIT (OUTPATIENT)
Dept: INTERNAL MEDICINE | Facility: CLINIC | Age: 53
End: 2018-11-26
Payer: COMMERCIAL

## 2018-11-26 ENCOUNTER — LAB VISIT (OUTPATIENT)
Dept: LAB | Facility: HOSPITAL | Age: 53
End: 2018-11-26
Attending: FAMILY MEDICINE
Payer: COMMERCIAL

## 2018-11-26 VITALS
TEMPERATURE: 98 F | WEIGHT: 196.44 LBS | HEART RATE: 86 BPM | BODY MASS INDEX: 31.57 KG/M2 | OXYGEN SATURATION: 98 % | HEIGHT: 66 IN | SYSTOLIC BLOOD PRESSURE: 150 MMHG | DIASTOLIC BLOOD PRESSURE: 96 MMHG

## 2018-11-26 DIAGNOSIS — T78.1XXA ALLERGIC REACTION TO FOOD, INITIAL ENCOUNTER: Primary | ICD-10-CM

## 2018-11-26 DIAGNOSIS — G05.3 CNS LUPUS: ICD-10-CM

## 2018-11-26 DIAGNOSIS — M32.19 CNS LUPUS: ICD-10-CM

## 2018-11-26 DIAGNOSIS — L93.0 DISCOID LUPUS: ICD-10-CM

## 2018-11-26 DIAGNOSIS — T78.1XXA ALLERGIC REACTION TO FOOD, INITIAL ENCOUNTER: ICD-10-CM

## 2018-11-26 DIAGNOSIS — L65.8 FEMALE PATTERN HAIR LOSS: ICD-10-CM

## 2018-11-26 DIAGNOSIS — T78.40XA ALLERGIC REACTION, INITIAL ENCOUNTER: ICD-10-CM

## 2018-11-26 PROCEDURE — 3077F SYST BP >= 140 MM HG: CPT | Mod: CPTII,S$GLB,, | Performed by: FAMILY MEDICINE

## 2018-11-26 PROCEDURE — 99999 PR PBB SHADOW E&M-EST. PATIENT-LVL IV: CPT | Mod: PBBFAC,,, | Performed by: FAMILY MEDICINE

## 2018-11-26 PROCEDURE — 3008F BODY MASS INDEX DOCD: CPT | Mod: CPTII,S$GLB,, | Performed by: FAMILY MEDICINE

## 2018-11-26 PROCEDURE — 3080F DIAST BP >= 90 MM HG: CPT | Mod: CPTII,S$GLB,, | Performed by: FAMILY MEDICINE

## 2018-11-26 PROCEDURE — 86003 ALLG SPEC IGE CRUDE XTRC EA: CPT

## 2018-11-26 PROCEDURE — 99214 OFFICE O/P EST MOD 30 MIN: CPT | Mod: S$GLB,,, | Performed by: FAMILY MEDICINE

## 2018-11-26 PROCEDURE — 36415 COLL VENOUS BLD VENIPUNCTURE: CPT | Mod: PO

## 2018-11-26 RX ORDER — EPINEPHRINE 0.3 MG/.3ML
1 INJECTION SUBCUTANEOUS
Qty: 2 EACH | Refills: 3 | Status: SHIPPED | OUTPATIENT
Start: 2018-11-26 | End: 2021-12-03

## 2018-11-26 RX ORDER — EPINEPHRINE 0.3 MG/.3ML
0.3 INJECTION SUBCUTANEOUS
Status: DISCONTINUED | OUTPATIENT
Start: 2018-11-26 | End: 2018-11-26

## 2018-11-26 NOTE — PATIENT INSTRUCTIONS
Food Allergy  The best way to deal with food allergies is to avoid the foods you are allergic to. Understand and be aware of the foods that you have reacted to. Also be cautious of foods or dishes that may have flavorings or small amounts of foods that you are allergic to.  Symptoms of food allergy may begin within minutes, but can start 2 hours after eating or later. Common symptoms can include:  · Nausea  · Vomiting  · Diarrhea or stomach cramps  · Iitchy rash (hives)  · Swelling of the eyes, lips, face or tongue  · Wheezing  · Difficulty breathing or swallowing  · Throat tightness  · Dizziness or fainting  This kind of allergic reaction, called anaphylaxis, can be life-threatening. In mild and moderate cases the symptoms usually begin improving within 6 to 24 hours. People with certain health problems, such as asthma and eczema, may be more likely to have food allergies. Foods that people are most commonly allergic to are milk or dairy products, eggs, peanuts, tree nuts, soy, shellfish, and wheat. Remember that any food can cause a reaction. Treatment for a severe allergic reaction can include epinephrine. If you have a severe food allergy, or have had severe allergic reactions even if you don't know the cause, you should carry this medicine with you for self-injection. It is available by prescription. It is also available in a lower dose form for children from your healthcare provider.  Home care  The following guidelines will help you care for yourself at home:  · If your symptoms were moderate to severe, they may fluctuate for the next 24 hours. It may be best to rest at home during that time.  · Avoid tobacco and alcohol because they can make symptoms worse. They can also interact with the medicines you are taking to treat the allergic reaction.  · If you know what foods caused your reaction today, avoid them in the future. The next and each reaction after this may make your body more sensitive to these  "foods. This can cause a worse reaction later. Tell your family members, friends, and doctors about your food allergy, especially in an emergency situation since they need to know how to give you epinephrine if you are unable to. This can be life-saving.  · Learn how to read food labels so you can check for the substance that you reacted to. If a food does not have a label, it is best to avoid it. When in restaurants, ask about ingredients and tell the staff, "If I eat a dish containing (food you are allergic to), I could have a severe allergic reaction."  · If your reaction was severe, get a medical alert bracelet or necklace that notes your allergy.  · If epinephrine is prescribed, carry it with you at all times. Learn how to use the device. If you begin to feel the symptoms of another reaction, use the epinephrine to inject yourself right away, and call 911. Dont wait until symptoms become severe.  · Oral allergy medicines (diphenhydramine) are antihistamines that can help with the reaction. You can buy them at any pharmacy or supermarket. They come in liquids, pills, or capsules. Unless your doctor gave you a prescription antihistamine, you can use these medicines to ease itching. Allergy medicines can make you sleepy, so be careful, especially when driving or working. For this reason, you may want to use lower doses during the day and save the higher doses for bedtime. Don't use diphenhydramine if you have glaucoma or if you are a man with trouble urinating because of an enlarged prostate.  · If allergy medicines with diphenhydramine make you too sleepy, talk with your healthcare provider. He or she can recommend an over-the counter antihistamine that won't make you sleepy. These may not work as well, though.  Follow-up care  Follow up with your healthcare provider if your symptoms don't get better over the next 2 to 3 days. If you don't know what caused this reaction, your provider may order skin tests and " blood tests, or an elimination diet. You can find an allergy specialist in your area by contacting:  · American Academy of Allergy, Asthma & Immunology, www.aaaai.org  · American College of Allergy, Asthma & Immunology, www.acaai.org  When to seek medical advice  Call your [facility] provider right away if any of these occur:  · Your symptoms get worse  · New or worse swelling in the face, eyelids, lips, mouth, throat, or tongue  · Mild trouble swallowing, breathing, or wheezing  · Fever of 100.4°F (38.0°C) or higher, or as directed by your health care provider  · Severe abdominal pain  · Persistent vomiting (unable to keep liquids down) or constant diarrhea  · Blood or mucus in the stool  Call 911  If any of these occur, give yourself injectable epinephrine and call 911:  · Significant trouble breathing, talking, or swallowing  · Any change in level of alertness or unconsciousness, including dizziness, weakness, or fainting  · Cool, moist skin  · Fast, weak hearbeat  · Severe wheezing  · Hives  · Severe swelling of the face, tongue, or lips  · Drooling  · Vomiting that happens soon after eating a food you think you are allergic to  · Explosive diarrhea  Date Last Reviewed: 1/11/2016  © 4647-7520 [employer]. [address] [place] [address], [place] [address]. All rights reserved. This information is not intended as a substitute for professional medical care. Always follow your healthcare professional's instructions.    Mediterranean Food Guide   People who live in the area around the [place] have a lower risk of heart disease. Researchers have recently shown that following a Mediterranean diet decreases heart disease by 30% in people whom are at-risk.   This lifestyle is built upon daily exercise along with a lot of fruit, vegetables, plant-based proteins, whole grains, fish and smaller amounts of poultry and red meat. Fatty fish (salmon), olive oil, and nuts make this diet higher in fat than the  classic heart healthy diet. These fats are mostly unsaturated, and when consumed in place of saturated fat, are good for the heart. The pyramid above and the chart on the next page describe the types of food and serving sizes in this heart healthy meal plan.   Physical Activity- The Mediterranean Diet pyramid is built upon daily physical activity and exercise. Aim for at least 150 minutes of moderate to vigorous exercise every week. Moderate-to-vigorous exercises include walking at a brisk pace, biking, or swimming, among other activities that elevate your heart rate. Always choose activities that you enjoy and that are safe, in order to be active throughout your life.   Achieve and Maintain a Healthy Weight - The high fat content of the Mediterranean is healthy for your heart, but may lead to increased energy intake and weight gain if you dont pay attention to how much you are eating. If you are trying to lose weight, choose the smaller number of servings from each food group, and try to make your serving sizes match those listed. 2   Food Groups and Servings per day  Serving Sizes    Non-starchy Vegetables   4-8 servings per day  One serving is ½ cup of cooked vegetables or 1 cup raw vegetables   Non-starchy vegetables include artichoke, asparagus, beets, broccoli, [place] sprouts, cauliflower, cabbage, celery, carrots, tomatoes, eggplant, cucumber, onion, green and wax beans, zucchini, turnips, peppers, salad greens and mushrooms. (Potatoes, peas, and corn are starchy vegetables.)    Fruit   2-4 servings per day  One serving is a small fresh fruit or ½ cup juice or ¼ cup dried fruit   Fresh fruits are preferred because of the fiber and other nutrients they contain. Fruits canned in light syrup or their own juice, and frozen fruit with little or no added sugar are also good choices. Use only small amounts of fruit juice (6 oz per day or less), since even unsweetened juices can contain as much sugar as regular  soda.    Legumes and Nuts   1-3 servings per day  Legumes: One serving is ½ cup cooked kidney, black, garbanzo, pierce, soy (edamame), navy beans, split peas, or lentils, or ¼ cup fat free refried beans or baked beans   Nuts and Seeds: One serving is 2 Tbsp. sunflower or sesame seeds, 1 Tbsp. peanut butter,   7-8 walnuts or pecans, 20 peanuts, or 12-15 almonds   Aim for 1-2 servings of nuts or seeds and 1-2 servings of legumes per day. Legumes are high in fiber, protein, and minerals. Nuts are high in unsaturated fat, and may increase HDL without increasing LDL cholesterol levels.    Low-fat Dairy Products   1-3 servings per day  One serving is 1 cup of skim milk, non-fat yogurt, or 1oz of low-fat (part-skim) cheese   Calcium-fortified soy milk, soy yogurt, and soy cheese can take the place of dairy products. If servings of dairy or fortified soy are less than 2 per day, we advise a calcium and vitamin D supplement.    Fish or shellfish   2-3 times a week  One serving is 3 ounces (about the size of a deck of cards)   Cook fish by baking, sautéing, broiling, roasting, grilling, or poaching. Choose fatty fishes like salmon, herring, sardines, or mackerel often. The fat in fish is high in omega-3 fats, so it has healthy effects on triglycerides and blood cells.    Poultry, if desired   1-3 times a week  One serving is 3 ounces (about the size of a deck of cards)   Bake, sauté, stir perez, roast, or grill the poultry you eat, and eat it without the skin.    Whole Grains and Starchy Vegetables   4-6 servings per day  One serving is about 1 ounce of any of these:   1 slice whole wheat bread ½ cup potatoes, sweet potatoes, corn, or peas   ½ large whole grain bun 1 small whole grain roll   6-inch whole wheat ever 6 whole grain crackers   ½ cup cooked whole grain cereal (oatmeal, cracked wheat, quinoa)   ½ cup cooked whole wheat pasta, brown rice, or barley   Whole grains are high in fiber and have less effect on blood sugar  and triglyceride levels than refined, processed grains like white bread and pasta. Whole grains also keep the stomach full longer, making it easier to control hunger.

## 2018-11-26 NOTE — PROGRESS NOTES
Subjective:       Patient ID: Ruth Messina is a 53 y.o. female.    Chief Complaint: Allergic Reaction (Nuts)    54 yo female here with c/o facial swelling around her eyes that occurred after eating pecans over the Thanksgiving holiday. This occurred over the past weekend. She took benadryl and zyrtec. She has known allergy to raw tomatoes and shellfish (dx by Dr. Nicholas) but has no known nut allergy. Her son is severely allergic to peanuts. She has seen Dr. Swanson in the past for bothersome allergic rhinitis. Could not complete allergy testing due to severe symptoms with stopping zyrtec.       does not have any pertinent problems on file.  Past Medical History:   Diagnosis Date    Anemia     with chronic disease lupus    Encounter for blood transfusion     G-6-PD deficiency     Heart murmur     MVP 1986    Hypertension     Lupus     Raynaud's syndrome     Systemic lupus erythematosus arthritis 5/13/2018     Past Surgical History:   Procedure Laterality Date    BONE MARROW BIOPSY      CARPAL TUNNEL RELEASE Bilateral     RELEASE-CARPAL TUNNEL Right 5/11/2017    [facility]    RELEASE-CARPAL TUNNEL Left 3/24/2017    [facility] OR    widsom tooth extraction       Family History   Problem Relation Age of Onset    Arthritis Mother     Heart disease Mother     Hypertension Mother     Diabetes Father     Heart disease Father     Hypertension Father     Cancer Brother     Eczema Neg Hx     Lupus Neg Hx     Psoriasis Neg Hx      Social History     Socioeconomic History    Marital status: Single     Spouse name: Not on file    Number of children: Not on file    Years of education: Not on file    Highest education level: Not on file   Social Needs    Financial resource strain: Not on file    Food insecurity - worry: Not on file    Food insecurity - inability: Not on file    Transportation needs - medical: Not on file    Transportation  needs - non-medical: Not on file   Occupational History    Not on file   Tobacco Use    Smoking status: Never Smoker    Smokeless tobacco: Never Used   Substance and Sexual Activity    Alcohol use: No    Drug use: No    Sexual activity: Not Currently   Other Topics Concern    Are you pregnant or think you may be? No    Breast-feeding No   Social History Narrative    Not on file     Review of Systems   Constitutional: Negative for activity change and unexpected weight change.   HENT: Negative for hearing loss, rhinorrhea and trouble swallowing.    Eyes: Negative for discharge and visual disturbance.   Respiratory: Negative for chest tightness and wheezing.    Cardiovascular: Negative for chest pain and palpitations.   Gastrointestinal: Negative for blood in stool, constipation, diarrhea and vomiting.   Endocrine: Negative for polydipsia and polyuria.   Genitourinary: Negative for difficulty urinating, dysuria, hematuria and menstrual problem.   Musculoskeletal: Positive for arthralgias and neck pain. Negative for joint swelling.   Neurological: Positive for headaches. Negative for weakness.   Psychiatric/Behavioral: Negative for confusion and dysphoric mood.       Objective:     Vitals:    11/26/18 1033   BP: (!) 150/96   Pulse: 86   Temp: 98.1 °F (36.7 °C)        Physical Exam   Constitutional: She is oriented to person, place, and time. She appears well-developed and well-nourished.   HENT:   Head: Normocephalic and atraumatic.   Right Ear: External ear normal.   Left Ear: External ear normal.   Nose: Nose normal.   Eyes: Conjunctivae and EOM are normal. Pupils are equal, round, and reactive to light. No scleral icterus.   Mild periorbital edema; no erythema, no urticaria   Pulmonary/Chest: Effort normal and breath sounds normal. No respiratory distress.   Abdominal: Soft. Bowel sounds are normal. There is no tenderness.   Neurological: She is alert and oriented to person, place, and time.   Normal gait.  No speech abnormality   Skin: Skin is warm and dry. No rash noted.   Psychiatric: She has a normal mood and affect. Her behavior is normal. Judgment and thought content normal.   Nursing note and vitals reviewed.      Assessment:       1. Allergic reaction to food, initial encounter    2. Allergic reaction, initial encounter        Plan:           Problem List Items Addressed This Visit     None      Visit Diagnoses     Allergic reaction to food, initial encounter    -  Primary    Relevant Medications    EPINEPHrine (EPIPEN) 0.3 mg/0.3 mL AtIn    Other Relevant Orders    RAST ALLERGEN FOR PECAN (FOOD)    Ambulatory referral to Allergy    Allergic reaction, initial encounter          Advised to avoid tree nuts until eval with allergy/immunology. Epipen prescribed and instructions given.

## 2018-11-27 RX ORDER — PREDNISONE 5 MG/1
TABLET ORAL
Qty: 30 TABLET | Refills: 0 | Status: SHIPPED | OUTPATIENT
Start: 2018-11-27 | End: 2018-12-21 | Stop reason: SDUPTHER

## 2018-11-28 RX ORDER — CLOBETASOL PROPIONATE 0.5 MG/G
AEROSOL, FOAM TOPICAL
Qty: 50 G | Refills: 3 | Status: SHIPPED | OUTPATIENT
Start: 2018-11-28 | End: 2019-02-06

## 2018-11-29 LAB
DEPRECATED PECAN/HICK NUT IGE RAST QL: NORMAL
PECAN/HICK NUT IGE QN: <0.35 KU/L

## 2018-12-07 ENCOUNTER — OFFICE VISIT (OUTPATIENT)
Dept: OPHTHALMOLOGY | Facility: CLINIC | Age: 53
End: 2018-12-07
Payer: COMMERCIAL

## 2018-12-07 DIAGNOSIS — H40.003 GLAUCOMA SUSPECT OF BOTH EYES: ICD-10-CM

## 2018-12-07 DIAGNOSIS — M35.01 KERATOCONJUNCTIVITIS SICCA OF BOTH EYES: ICD-10-CM

## 2018-12-07 DIAGNOSIS — M32.9 SYSTEMIC LUPUS ERYTHEMATOSUS ARTHRITIS: Primary | ICD-10-CM

## 2018-12-07 PROCEDURE — 92014 COMPRE OPH EXAM EST PT 1/>: CPT | Mod: S$GLB,,, | Performed by: OPHTHALMOLOGY

## 2018-12-07 PROCEDURE — 99999 PR PBB SHADOW E&M-EST. PATIENT-LVL II: CPT | Mod: PBBFAC,,, | Performed by: OPHTHALMOLOGY

## 2018-12-07 RX ORDER — CYCLOSPORINE 0.5 MG/ML
1 EMULSION OPHTHALMIC 2 TIMES DAILY
Qty: 60 VIAL | Refills: 11 | Status: SHIPPED | OUTPATIENT
Start: 2018-12-07 | End: 2019-08-16 | Stop reason: HOSPADM

## 2018-12-07 NOTE — PROGRESS NOTES
===============================  12/07/2018   Ruth Messina,   53 y.o. female   Last visit [facility]: :12/6/2017   Last visit eye dept. Visit date not found  [facility]:  Corrected distance visual acuity was 20/25 -1 in the right eye and 20/20 in the left eye.  Tonometry     Tonometry (Applanation, 9:15 AM)       Right Left    Pressure 15 15              Wearing Rx     Wearing Rx       Sphere Cylinder Axis    Right -3.25 +2.00 105    Left -4.00 +1.25 090    Age:  1yrs    Type:  SVL              Manifest Refraction     Manifest Refraction       Sphere Cylinder [place]    Right -3.50 +2.00 110 20/25    Left -4.00 +1.25 060 20/20              Chief Complaint   Patient presents with    Posterior Vitreous Detachment     1 year PVD check. Pt states her grandson broke her glasses so she's wearing an older pair now, will need to get a prescription. Want to get a bifocal prescription in case she decide to add the bifocal, currently wearing SVL. Using Restasis for dryness. After starting a new medication she's not having any more trouble with night driving. seeing clearly both eyes.               ________________  12/7/2018  Problem List Items Addressed This Visit        Eye/Vision problems    Keratoconjunctivitis sicca of both eyes       Other    Glaucoma suspect of both eyes    Overview     -- asymmetric c:d OD>OS                 Systemic lupus erythematosus arthritis - Primary          .Lupus - cns  sjogrens  C:d asymmetry  Peripheral pig retinopathy  No DM, previous pvd  Disc collaterals, presumptive previous vaso occlusion OS, previously  Followed by Dr. Helton   Doing well today  rtc 1 year    On restasis, ok refill, but trial xiidra     ===========================

## 2018-12-21 DIAGNOSIS — L93.0 DISCOID LUPUS: ICD-10-CM

## 2018-12-21 DIAGNOSIS — I10 ESSENTIAL HYPERTENSION: ICD-10-CM

## 2018-12-21 DIAGNOSIS — M32.19 CNS LUPUS: ICD-10-CM

## 2018-12-21 DIAGNOSIS — G05.3 CNS LUPUS: ICD-10-CM

## 2018-12-21 RX ORDER — PREDNISONE 5 MG/1
TABLET ORAL
Qty: 30 TABLET | Refills: 0 | Status: SHIPPED | OUTPATIENT
Start: 2018-12-21 | End: 2019-03-09 | Stop reason: SDUPTHER

## 2018-12-21 RX ORDER — SPIRONOLACTONE 25 MG/1
TABLET ORAL
Qty: 60 TABLET | Refills: 6 | Status: SHIPPED | OUTPATIENT
Start: 2018-12-21 | End: 2020-01-21

## 2018-12-24 RX ORDER — CYCLOSPORINE 0.5 MG/ML
EMULSION OPHTHALMIC
Qty: 60 EACH | Refills: 11 | Status: SHIPPED | OUTPATIENT
Start: 2018-12-24 | End: 2019-08-14 | Stop reason: SDUPTHER

## 2018-12-27 RX ORDER — DESOXIMETASONE 2.5 MG/G
CREAM TOPICAL
Qty: 60 G | Refills: 3 | Status: SHIPPED | OUTPATIENT
Start: 2018-12-27 | End: 2019-01-23 | Stop reason: SDUPTHER

## 2019-01-02 ENCOUNTER — OFFICE VISIT (OUTPATIENT)
Dept: ALLERGY | Facility: CLINIC | Age: 54
End: 2019-01-02
Payer: COMMERCIAL

## 2019-01-02 VITALS
HEART RATE: 76 BPM | SYSTOLIC BLOOD PRESSURE: 122 MMHG | RESPIRATION RATE: 15 BRPM | DIASTOLIC BLOOD PRESSURE: 88 MMHG | WEIGHT: 188.69 LBS | HEIGHT: 66 IN | TEMPERATURE: 97 F | BODY MASS INDEX: 30.33 KG/M2

## 2019-01-02 DIAGNOSIS — G05.3 CNS LUPUS: ICD-10-CM

## 2019-01-02 DIAGNOSIS — M32.9 SYSTEMIC LUPUS ERYTHEMATOSUS ARTHRITIS: ICD-10-CM

## 2019-01-02 DIAGNOSIS — M32.19 CNS LUPUS: ICD-10-CM

## 2019-01-02 DIAGNOSIS — K90.49 FOOD INTOLERANCE: Primary | ICD-10-CM

## 2019-01-02 DIAGNOSIS — R60.9 SWELLING: ICD-10-CM

## 2019-01-02 PROCEDURE — 3008F PR BODY MASS INDEX (BMI) DOCUMENTED: ICD-10-PCS | Mod: CPTII,S$GLB,, | Performed by: ALLERGY & IMMUNOLOGY

## 2019-01-02 PROCEDURE — 3008F BODY MASS INDEX DOCD: CPT | Mod: CPTII,S$GLB,, | Performed by: ALLERGY & IMMUNOLOGY

## 2019-01-02 PROCEDURE — 95004 PR ALLERGY SKIN TESTS,ALLERGENS: ICD-10-PCS | Mod: S$GLB,,, | Performed by: ALLERGY & IMMUNOLOGY

## 2019-01-02 PROCEDURE — 3074F SYST BP LT 130 MM HG: CPT | Mod: CPTII,S$GLB,, | Performed by: ALLERGY & IMMUNOLOGY

## 2019-01-02 PROCEDURE — 99214 PR OFFICE/OUTPT VISIT, EST, LEVL IV, 30-39 MIN: ICD-10-PCS | Mod: 25,S$GLB,, | Performed by: ALLERGY & IMMUNOLOGY

## 2019-01-02 PROCEDURE — 3079F PR MOST RECENT DIASTOLIC BLOOD PRESSURE 80-89 MM HG: ICD-10-PCS | Mod: CPTII,S$GLB,, | Performed by: ALLERGY & IMMUNOLOGY

## 2019-01-02 PROCEDURE — 99999 PR PBB SHADOW E&M-EST. PATIENT-LVL III: ICD-10-PCS | Mod: PBBFAC,,, | Performed by: ALLERGY & IMMUNOLOGY

## 2019-01-02 PROCEDURE — 3074F PR MOST RECENT SYSTOLIC BLOOD PRESSURE < 130 MM HG: ICD-10-PCS | Mod: CPTII,S$GLB,, | Performed by: ALLERGY & IMMUNOLOGY

## 2019-01-02 PROCEDURE — 95004 PERQ TESTS W/ALRGNC XTRCS: CPT | Mod: S$GLB,,, | Performed by: ALLERGY & IMMUNOLOGY

## 2019-01-02 PROCEDURE — 99214 OFFICE O/P EST MOD 30 MIN: CPT | Mod: 25,S$GLB,, | Performed by: ALLERGY & IMMUNOLOGY

## 2019-01-02 PROCEDURE — 3079F DIAST BP 80-89 MM HG: CPT | Mod: CPTII,S$GLB,, | Performed by: ALLERGY & IMMUNOLOGY

## 2019-01-02 PROCEDURE — 99999 PR PBB SHADOW E&M-EST. PATIENT-LVL III: CPT | Mod: PBBFAC,,, | Performed by: ALLERGY & IMMUNOLOGY

## 2019-01-02 NOTE — PROGRESS NOTES
Chief complaint, allergies    This note was dictated using voice recognition software and may contain errors.    History:     She had a 9:00 a.m. Appointment on Wednesday, January 2, 2019.    She had an appointment with me in March of 2018. I reviewed and updated information in my note from her March 2018 appointment.  Significant addition see if any are as noted below.  Information in her medical record regarding her past medical history family history and social history was reviewed and updated today.  Significant additions if any are as noted below.      She had multiple concerns.  She is concerned about the status of being allergic.  She is concerned about being allergic to foods.  She is concerned that she may be allergic to airborne allergens.  She is concerned regarding her history of being told that she was allergic to penicillin.      She stated in the past she took amoxicillin and tolerated.      She stated that her concerns exist due to the results of allergy testing that was previously performed.  She stated in the past she had seen Dr. Nicholas for evaluation and that he had supervised the performance of allergy testing.On the basis of her allergy skin testing performed in the past she was told she was allergic to tomato.  She stated in the past she had eaten tomato and had tolerated that.  She stated she has been continuing to eat cooked tomato products and tolerates them.    I reviewed with her the results of the laboratory tests which I had ordered last year after her appointment with me.      Review of systems:  At the time of her appointment this morning she is feeling well in general. She  Stated upon awakening upon several occasions she has been aware of some swelling about her eyes.  She is also experienced some respiratory symptoms such as posterior rhinorrhea.    Exam:     In general she is in no distress.  She is alert oriented well-developed in good mood and attentive  Gait steady  Skin no  rash noted  Head no swelling noted  Eyes scleral white conjunctiva pink  Ears not inflamed tympanic membranes not inflamed  Mouth no swelling or inflammation of the lips tongue or in the throat noted  Lungs clear to auscultation  Neck no masses or thyromegaly noted  Heart regular rhythm no murmurs heard  Lymph nodes no significant cervical or epitrochlear lymphadenopathy noted  Extremities no swelling or inflammation of the hands legs noted    Allergy testing:    She is concerned about possible food allergy and is interested in pursuing allergy testing for this concern.  At her request, diagnostic immediate hypersensitivity prick skin tests were performed on the volar forearms.  These tests were personally evaluated in interpreted by myself 15 min after they were performed.      Fifty-nine foods were tested.  Histamine and saline were tested.      The histamine prick skin test was strongly positive and the saline prick skin test was negative.  All foods tested were negative.  I reviewed the results of the tests with her.      Impression:     1. Food intolerance  2. Systemic lupus erythematosus  3. Multiple other health concerns as noted in her medical record     Assessment and plan:     Her appointment was 30 min in duration spent entirely in face-to-face contact.  More than 50% of the visit was spent in counseling and coordination of care.  An additional 15 min were required for the performance an evaluation of her immediate hypersensitivity skin tests.      We discussed the problem of food intolerance.  We discussed allergic intolerance and nonallergic intolerance.  I reviewed with her the treatment of choice for food intolerance is avoidance.      She wishes to pursue evaluation of her history of allergy to airborne allergens and to penicillin.  I recommended that return appointments be scheduled so that these concerns could be addressed.  An appointment has been scheduled at 8:00 a.m. On Friday January 4 and  also at 8:00 a.m. On Monday January 7, 2019.    She had discontinued the use of Zyrtec prior to her appointment today.  I recommended that she continue to avoid taking oral antihistamines at this time.

## 2019-01-04 ENCOUNTER — OFFICE VISIT (OUTPATIENT)
Dept: ALLERGY | Facility: CLINIC | Age: 54
End: 2019-01-04
Payer: COMMERCIAL

## 2019-01-04 VITALS
BODY MASS INDEX: 30.71 KG/M2 | RESPIRATION RATE: 15 BRPM | SYSTOLIC BLOOD PRESSURE: 130 MMHG | HEART RATE: 74 BPM | TEMPERATURE: 98 F | DIASTOLIC BLOOD PRESSURE: 80 MMHG | WEIGHT: 190.25 LBS

## 2019-01-04 DIAGNOSIS — M32.9 SYSTEMIC LUPUS ERYTHEMATOSUS ARTHRITIS: ICD-10-CM

## 2019-01-04 DIAGNOSIS — G05.3 CNS LUPUS: ICD-10-CM

## 2019-01-04 DIAGNOSIS — M32.19 CNS LUPUS: ICD-10-CM

## 2019-01-04 DIAGNOSIS — J31.0 CHRONIC RHINITIS: Primary | ICD-10-CM

## 2019-01-04 PROCEDURE — 95004 PERQ TESTS W/ALRGNC XTRCS: CPT | Mod: S$GLB,,, | Performed by: ALLERGY & IMMUNOLOGY

## 2019-01-04 PROCEDURE — 99212 PR OFFICE/OUTPT VISIT, EST, LEVL II, 10-19 MIN: ICD-10-PCS | Mod: 25,S$GLB,, | Performed by: ALLERGY & IMMUNOLOGY

## 2019-01-04 PROCEDURE — 3008F BODY MASS INDEX DOCD: CPT | Mod: CPTII,S$GLB,, | Performed by: ALLERGY & IMMUNOLOGY

## 2019-01-04 PROCEDURE — 99999 PR PBB SHADOW E&M-EST. PATIENT-LVL III: CPT | Mod: PBBFAC,,, | Performed by: ALLERGY & IMMUNOLOGY

## 2019-01-04 PROCEDURE — 3008F PR BODY MASS INDEX (BMI) DOCUMENTED: ICD-10-PCS | Mod: CPTII,S$GLB,, | Performed by: ALLERGY & IMMUNOLOGY

## 2019-01-04 PROCEDURE — 99212 OFFICE O/P EST SF 10 MIN: CPT | Mod: 25,S$GLB,, | Performed by: ALLERGY & IMMUNOLOGY

## 2019-01-04 PROCEDURE — 95004 PR ALLERGY SKIN TESTS,ALLERGENS: ICD-10-PCS | Mod: S$GLB,,, | Performed by: ALLERGY & IMMUNOLOGY

## 2019-01-04 PROCEDURE — 3075F SYST BP GE 130 - 139MM HG: CPT | Mod: CPTII,S$GLB,, | Performed by: ALLERGY & IMMUNOLOGY

## 2019-01-04 PROCEDURE — 3079F PR MOST RECENT DIASTOLIC BLOOD PRESSURE 80-89 MM HG: ICD-10-PCS | Mod: CPTII,S$GLB,, | Performed by: ALLERGY & IMMUNOLOGY

## 2019-01-04 PROCEDURE — 3075F PR MOST RECENT SYSTOLIC BLOOD PRESS GE 130-139MM HG: ICD-10-PCS | Mod: CPTII,S$GLB,, | Performed by: ALLERGY & IMMUNOLOGY

## 2019-01-04 PROCEDURE — 99999 PR PBB SHADOW E&M-EST. PATIENT-LVL III: ICD-10-PCS | Mod: PBBFAC,,, | Performed by: ALLERGY & IMMUNOLOGY

## 2019-01-04 PROCEDURE — 3079F DIAST BP 80-89 MM HG: CPT | Mod: CPTII,S$GLB,, | Performed by: ALLERGY & IMMUNOLOGY

## 2019-01-04 NOTE — PROGRESS NOTES
Chief complaint: Allergies    This note was dictated using voice recognition software and may contain errors.    History:     She had an 8:00 a.m. Appointment on Friday January 4, 2019.    Information in her medical record regarding her past medical history family history and social history was reviewed and updated today.  Significant addition see if any are as noted below.  She had an appointment with me on January 2, 2019.  Information in that note was reviewed and updated today.  She had an appointment with me March 12, 2018.  Information in that note was reviewed and updated today.  Significant addition see if any are as noted below.      In the past an allergy evaluation was performed.  She is concerned as to whether not her allergic status has changed is relates to airborne allergens.  She wishes to pursue evaluation of this concern.  She stop taking oral antihistamines in anticipation of her evaluation.      Review of systems:  At the time of her appointment today she is feeling well in general in had no additional new symptoms dimension.      Exam:     In general she is in no distress.  She is alert oriented well groomed attentive and in good mood  Gait steady  Skin no rash noted  Nose patent no polyp seen   Ears not inflamed  Mouth no swelling or inflammation of the lips tongue or in the throat noted  Eyes scleral white conjunctiva pink  Lymph nodes no significant epitrochlear cervical lymphadenopathy noted   Neck no masses or thyromegaly noted  Heart no murmurs heard regular rhythm  Lungs clear to auscultation  Extremities no swelling or inflammation of the hands legs noted    Allergy testing:    She elected to have diagnostic immediate hypersensitivity skin test performed.  Prick skin tests were performed on the volar forearms.  Thirty-six airborne allergens were tested.  Histamine and saline were tested.  These tests were personally evaluated in interpreted by myself 15 min after they were performed.   The histamine control was positive.  The saline control was negative.  All allergens tested were negative.  I reviewed the results of the tests with her.      Impression:    1. Chronic rhinitis  2. Systemic lupus erythematosus  3. Other health concerns as noted in her medical record     Assessment plan:     In view of the fact that the prick skin tests for the allergens tested today were negative, I told her these observations would suggest that she is not highly allergic to these allergens at this time in her life.      Should she have additional questions or concerns she was instructed to call.

## 2019-01-07 ENCOUNTER — OFFICE VISIT (OUTPATIENT)
Dept: ALLERGY | Facility: CLINIC | Age: 54
End: 2019-01-07
Payer: COMMERCIAL

## 2019-01-07 VITALS
DIASTOLIC BLOOD PRESSURE: 82 MMHG | BODY MASS INDEX: 30.67 KG/M2 | TEMPERATURE: 97 F | HEART RATE: 74 BPM | WEIGHT: 190.06 LBS | SYSTOLIC BLOOD PRESSURE: 120 MMHG

## 2019-01-07 DIAGNOSIS — Z88.9 DRUG ALLERGY: Primary | ICD-10-CM

## 2019-01-07 DIAGNOSIS — M32.9 SYSTEMIC LUPUS ERYTHEMATOSUS ARTHRITIS: ICD-10-CM

## 2019-01-07 PROCEDURE — 99212 OFFICE O/P EST SF 10 MIN: CPT | Mod: 25,S$GLB,, | Performed by: ALLERGY & IMMUNOLOGY

## 2019-01-07 PROCEDURE — 99212 PR OFFICE/OUTPT VISIT, EST, LEVL II, 10-19 MIN: ICD-10-PCS | Mod: 25,S$GLB,, | Performed by: ALLERGY & IMMUNOLOGY

## 2019-01-07 PROCEDURE — 95018 ALL TSTG PERQ&IQ DRUGS/BIOL: CPT | Mod: S$GLB,,, | Performed by: ALLERGY & IMMUNOLOGY

## 2019-01-07 PROCEDURE — 3008F BODY MASS INDEX DOCD: CPT | Mod: CPTII,S$GLB,, | Performed by: ALLERGY & IMMUNOLOGY

## 2019-01-07 PROCEDURE — 95018 PR PERQ&IC ALLG TEST DRUGS/BIOL: ICD-10-PCS | Mod: S$GLB,,, | Performed by: ALLERGY & IMMUNOLOGY

## 2019-01-07 PROCEDURE — 3079F PR MOST RECENT DIASTOLIC BLOOD PRESSURE 80-89 MM HG: ICD-10-PCS | Mod: CPTII,S$GLB,, | Performed by: ALLERGY & IMMUNOLOGY

## 2019-01-07 PROCEDURE — 3079F DIAST BP 80-89 MM HG: CPT | Mod: CPTII,S$GLB,, | Performed by: ALLERGY & IMMUNOLOGY

## 2019-01-07 PROCEDURE — 3074F SYST BP LT 130 MM HG: CPT | Mod: 59,CPTII,S$GLB, | Performed by: ALLERGY & IMMUNOLOGY

## 2019-01-07 PROCEDURE — 99999 PR PBB SHADOW E&M-EST. PATIENT-LVL III: ICD-10-PCS | Mod: PBBFAC,,, | Performed by: ALLERGY & IMMUNOLOGY

## 2019-01-07 PROCEDURE — 99999 PR PBB SHADOW E&M-EST. PATIENT-LVL III: CPT | Mod: PBBFAC,,, | Performed by: ALLERGY & IMMUNOLOGY

## 2019-01-07 PROCEDURE — 3008F PR BODY MASS INDEX (BMI) DOCUMENTED: ICD-10-PCS | Mod: CPTII,S$GLB,, | Performed by: ALLERGY & IMMUNOLOGY

## 2019-01-07 PROCEDURE — 3074F PR MOST RECENT SYSTOLIC BLOOD PRESSURE < 130 MM HG: ICD-10-PCS | Mod: 59,CPTII,S$GLB, | Performed by: ALLERGY & IMMUNOLOGY

## 2019-01-07 NOTE — PROGRESS NOTES
Chief complaint:  Allergy evaluation    This note was dictated using voice recognition software and may contain errors.    History:     She had an 8:00 a.m. Appointment on Monday January 7, 2019.  Information in her medical record regarding her past medical history family history and social history was reviewed and updated today.  Significant addition see if any are as noted below.      She wishes to pursue evaluation of possible penicillin allergy.  In the past when an allergy evaluation was performed she was told that she exhibited positive tests to suggest she was allergic to penicillin.      In speaking with her, she stated in the past she had taken penicillin and amoxicillin and had tolerated the medications.  From her viewpoint she had never experienced an allergic reaction occurring in association with the use of penicillin or amoxicillin or cephalosporins.    She stopped taking antihistamines in anticipation of her appointment today.    Review of systems:  At the time of her appointment this morning she is feeling well in general in had no additional new symptoms dimension    Exam:     In general she is in no distress.  She is alert oriented well-developed well groomed attentive and in good mood  Gait steady  Head no swelling noted  Skin no rash noted  Eyes scleral white conjunctiva pink  Nose patent no polyp seen   Ears not inflamed  Mouth no inflammation or swelling of the lips tongue or in the throat noted  Neck no thyromegaly or masses noted  Lymph nodes no significant cervical or epitrochlear lymphadenopathy noted  Lungs clear to auscultation  Heart regular rhythm no murmurs heard  Extremities no swelling or inflammation of the hands legs noted    Allergy testing:     She elected to have diagnostic immediate hypersensitivity skin test performed for possible penicillin allergy.  Skin tests were performed on the right volar forearm by myself.      Initially, prick skin tests were performed for histamine,  saline, Pre Pen, and penicillin G 10,000 units/ml. These tests were personally evaluated in interpreted by myself 15 min after they were performed.  The histamine prick skin test was strongly positive.  The saline prick skin test was negative.  The prick skin tests for Pre Pen and penicillin G 10,000 units/mL were each negative.    Subsequently, intradermal skin tests were performed by myself on the right volar forearm.  In view of the strongly positive reactive prick skin test for histamine, an intradermal histamine skin test was not performed.  An intradermal skin test for histamine was performed.  An intradermal skin test for pre pen was performed.  An intradermal skin test for penicillin G 10,000  Units/ml was performed.  These tests were personally evaluated in interpreted by myself 15 min after they were performed.  The intradermal skin test for saline was negative.  The intradermal skin test for pre pen was negative.  The intradermal skin test for penicillin G 10,000 units/mL was negative.    I reviewed the results of the tests with her.      Impression:    1. Drug allergy  2. Medication intolerance  3. Negative immediate hypersensitivity prick and intradermal skin tests for Pre Pen and penicillin G 10,000 units/ml  4. Systemic lupus erythematosus  5. Other health concerns as noted in her medical record     Assessment and plan:     We had a lengthy discussion regarding her history.  In the past she had taken penicillin and amoxicillin and a tolerated these medicines.  She had never experienced an allergic reaction in association with the use of these antibiotics.  In the past when she had received cephalosporin antibiotic she stated that she had tolerated them.      Historically she has no history of allergy occurring in association with the use of the  Antibiotics noted above.  Therefore, I told her I would not consider herself to be allergic to the medicines.  The fact that the immediate hypersensitivity  skin tests performed today were negative and that laboratory tests performed last year were negative would suggest she is not allergic to these medications.      I told her I will remove from her medical record and list of drug allergies, amoxicillin, penicillin, and cephalosporins.    In the future, if these medications are prescribed, I suggested she keep a written record of any medication that she takes, the dose of the medicine, the duration of treatment and why the medication was prescribed.  She may share this information regarding her observations with physicians and healthcare providers in the future.      I told her the tests performed today would not predict whether not medication intolerance or medication allergy could develop in the future.    She was given the office phone number.  Should she have additional questions or concerns she was instructed to call.

## 2019-01-14 ENCOUNTER — TELEPHONE (OUTPATIENT)
Dept: OTOLARYNGOLOGY | Facility: CLINIC | Age: 54
End: 2019-01-14

## 2019-01-14 NOTE — TELEPHONE ENCOUNTER
Called pt to inform of [place] address, no answer left message with address and call back number for any questions.

## 2019-01-23 ENCOUNTER — OFFICE VISIT (OUTPATIENT)
Dept: OTOLARYNGOLOGY | Facility: CLINIC | Age: 54
End: 2019-01-23
Payer: COMMERCIAL

## 2019-01-23 VITALS
DIASTOLIC BLOOD PRESSURE: 88 MMHG | SYSTOLIC BLOOD PRESSURE: 132 MMHG | TEMPERATURE: 99 F | BODY MASS INDEX: 30.14 KG/M2 | HEART RATE: 100 BPM | WEIGHT: 186.75 LBS

## 2019-01-23 DIAGNOSIS — R09.81 NASAL CONGESTION: ICD-10-CM

## 2019-01-23 DIAGNOSIS — L93.0 DISCOID LUPUS: ICD-10-CM

## 2019-01-23 DIAGNOSIS — J30.89 NON-SEASONAL ALLERGIC RHINITIS, UNSPECIFIED TRIGGER: Primary | ICD-10-CM

## 2019-01-23 PROCEDURE — 3075F PR MOST RECENT SYSTOLIC BLOOD PRESS GE 130-139MM HG: ICD-10-PCS | Mod: CPTII,S$GLB,, | Performed by: ORTHOPAEDIC SURGERY

## 2019-01-23 PROCEDURE — 3008F PR BODY MASS INDEX (BMI) DOCUMENTED: ICD-10-PCS | Mod: CPTII,S$GLB,, | Performed by: ORTHOPAEDIC SURGERY

## 2019-01-23 PROCEDURE — 3079F PR MOST RECENT DIASTOLIC BLOOD PRESSURE 80-89 MM HG: ICD-10-PCS | Mod: CPTII,S$GLB,, | Performed by: ORTHOPAEDIC SURGERY

## 2019-01-23 PROCEDURE — 99213 PR OFFICE/OUTPT VISIT, EST, LEVL III, 20-29 MIN: ICD-10-PCS | Mod: S$GLB,,, | Performed by: ORTHOPAEDIC SURGERY

## 2019-01-23 PROCEDURE — 99213 OFFICE O/P EST LOW 20 MIN: CPT | Mod: S$GLB,,, | Performed by: ORTHOPAEDIC SURGERY

## 2019-01-23 PROCEDURE — 99999 PR PBB SHADOW E&M-EST. PATIENT-LVL IV: ICD-10-PCS | Mod: PBBFAC,,, | Performed by: ORTHOPAEDIC SURGERY

## 2019-01-23 PROCEDURE — 3079F DIAST BP 80-89 MM HG: CPT | Mod: CPTII,S$GLB,, | Performed by: ORTHOPAEDIC SURGERY

## 2019-01-23 PROCEDURE — 3008F BODY MASS INDEX DOCD: CPT | Mod: CPTII,S$GLB,, | Performed by: ORTHOPAEDIC SURGERY

## 2019-01-23 PROCEDURE — 99999 PR PBB SHADOW E&M-EST. PATIENT-LVL IV: CPT | Mod: PBBFAC,,, | Performed by: ORTHOPAEDIC SURGERY

## 2019-01-23 PROCEDURE — 3075F SYST BP GE 130 - 139MM HG: CPT | Mod: CPTII,S$GLB,, | Performed by: ORTHOPAEDIC SURGERY

## 2019-01-23 NOTE — PATIENT INSTRUCTIONS
I would recommend the patient use OTC Flonase Sensimist nasal spray.  This formulation is an alcohol and fragrance free version of a nasal steroid spray, and is often very helpful for those patients that cannot tolerate other nasal steroid sprays.  It also delivers the medication in the form of a mist rather than a spray, and has a decreased incidence of epistaxis.

## 2019-01-23 NOTE — PROGRESS NOTES
Subjective:       Patient ID: Ruth Messina is a 53 y.o. female.    Chief Complaint: Other (greenish and yellow phlem )    Patient is a very pleasant 53 year old female here to see me today in followup for evaluation of her sinuses.  She says that her symptoms first started around [place] when she noted some periorbital edema following eating pecans.  She has since seen her PCP as well as Dr. Lyles with allergy and her testing has been negative.  She has had thick green to yellow nasal drainage and sputum production, and it had improved but she did note some drainage today.  She has resumed her zyrtec (had held when she was doing skin testing).  She was on nasal sprays in the past, but she had to stop due to local nasal irritation.  She has noted pain in her right oral cavity and face.  She was previously on Neurontin.  She follows with Neurology for trigeminal neuralgia on the right.      Review of Systems   Constitutional: Positive for fatigue. Negative for chills, fever and unexpected weight change.   HENT: Negative for congestion, dental problem, ear discharge, ear pain, facial swelling, hearing loss, nosebleeds, postnasal drip, rhinorrhea, sinus pressure, sneezing, sore throat, tinnitus, trouble swallowing and voice change.    Eyes: Positive for itching. Negative for redness and visual disturbance.   Respiratory: Positive for cough. Negative for choking, shortness of breath and wheezing.    Cardiovascular: Negative for chest pain and palpitations.   Gastrointestinal: Negative for abdominal pain.        No reflux.   Musculoskeletal: Negative for gait problem.   Skin: Negative for rash.   Neurological: Positive for headaches. Negative for dizziness and light-headedness.       Objective:      Physical Exam   Constitutional: She is oriented to person, place, and time. She appears well-developed and well-nourished. No distress.   HENT:   Head: Normocephalic and atraumatic.   Right Ear: Tympanic membrane,  external ear and ear canal normal.   Left Ear: Tympanic membrane, external ear and ear canal normal.   Nose: Mucosal edema and rhinorrhea present. No nasal deformity or septal deviation. No epistaxis. Right sinus exhibits maxillary sinus tenderness. Right sinus exhibits no frontal sinus tenderness. Left sinus exhibits no maxillary sinus tenderness and no frontal sinus tenderness.   Mouth/Throat: Uvula is midline, oropharynx is clear and moist and mucous membranes are normal. Mucous membranes are not pale and not dry. No dental caries. No oropharyngeal exudate or posterior oropharyngeal erythema.   Eyes: Conjunctivae, EOM and lids are normal. Pupils are equal, round, and reactive to light. Right eye exhibits no chemosis. Left eye exhibits no chemosis. Right conjunctiva is not injected. Left conjunctiva is not injected. No scleral icterus. Right eye exhibits normal extraocular motion and no nystagmus. Left eye exhibits normal extraocular motion and no nystagmus.   Neck: Trachea normal and phonation normal. No tracheal tenderness present. No tracheal deviation present. No thyroid mass and no thyromegaly present.   Cardiovascular: Intact distal pulses.   Pulmonary/Chest: Effort normal. No stridor. No respiratory distress.   Abdominal: She exhibits no distension.   Lymphadenopathy:        Head (right side): No submental, no submandibular, no preauricular, no posterior auricular and no occipital adenopathy present.        Head (left side): No submental, no submandibular, no preauricular, no posterior auricular and no occipital adenopathy present.     She has no cervical adenopathy.   Neurological: She is alert and oriented to person, place, and time. No cranial nerve deficit.   Skin: Skin is warm and dry. No rash noted. No erythema.   Psychiatric: She has a normal mood and affect. Her behavior is normal.       Assessment:       1. Non-seasonal allergic rhinitis, unspecified trigger    2. Nasal congestion        Plan:        1.  AR:  Continue with zyrtec daily.  I would recommend the patient use OTC Flonase Sensimist nasal spray.  This formulation is an alcohol and fragrance free version of a nasal steroid spray, and is often very helpful for those patients that cannot tolerate other nasal steroid sprays.  It also delivers the medication in the form of a mist rather than a spray, and has a decreased incidence of epistaxis.  I would also recommend she start using nasal saline daily to help moisturize her nose, discussed that it can be used many times daily.

## 2019-01-24 RX ORDER — DESOXIMETASONE 2.5 MG/G
CREAM TOPICAL
Qty: 60 G | Refills: 3 | Status: SHIPPED | OUTPATIENT
Start: 2019-01-24 | End: 2021-12-03

## 2019-01-25 ENCOUNTER — TELEPHONE (OUTPATIENT)
Dept: INTERNAL MEDICINE | Facility: CLINIC | Age: 54
End: 2019-01-25

## 2019-01-25 NOTE — TELEPHONE ENCOUNTER
----- Message from Adelfo Guido sent at 1/25/2019  9:32 AM CST -----  Contact: Pt   States she's calling rg having some questions for the Dr and can be reached at [phone]//[email or web address]

## 2019-01-25 NOTE — TELEPHONE ENCOUNTER
Patient requested Dr. Nagel sign form discharging her student loan debt as she now has disability status. Scheduled appt for patient to come in to discuss paperwork as per below:    Future Appointments   Date Time Provider Department Center   1/28/2019  8:20 AM Angela Nagel MD [facility]   1/29/2019 10:00 AM Katerina Nelson MD ON NEURO  Medical C   1/30/2019  8:00 AM SPECIMEN, [facility] SPECLAB [facility]   1/30/2019  8:10 AM LABORATORY, [facility] LAB [facility]   1/31/2019  8:30 AM Ishaan Acuna MD HG CARDIO [facility]   2/6/2019  9:15 AM Chirag Turner MD HG RHEUM [facility]   2/6/2019 10:45 AM Andreia Demarco MD HG DERM [facility]   12/10/2019  8:30 AM [facility]     Patient verbalized understanding, thanked me, and ended call.

## 2019-01-30 ENCOUNTER — LAB VISIT (OUTPATIENT)
Dept: LAB | Facility: HOSPITAL | Age: 54
End: 2019-01-30
Attending: INTERNAL MEDICINE
Payer: COMMERCIAL

## 2019-01-30 DIAGNOSIS — M32.19 OTHER SYSTEMIC LUPUS ERYTHEMATOSUS WITH OTHER ORGAN INVOLVEMENT: ICD-10-CM

## 2019-01-30 DIAGNOSIS — M32.9 SYSTEMIC LUPUS ERYTHEMATOSUS ARTHRITIS: ICD-10-CM

## 2019-01-30 LAB
ALBUMIN SERPL BCP-MCNC: 3.9 G/DL
ALP SERPL-CCNC: 109 U/L
ALT SERPL W/O P-5'-P-CCNC: 22 U/L
ANION GAP SERPL CALC-SCNC: 10 MMOL/L
AST SERPL-CCNC: 21 U/L
BASOPHILS # BLD AUTO: 0.01 K/UL
BASOPHILS NFR BLD: 0.2 %
BILIRUB SERPL-MCNC: 0.7 MG/DL
BUN SERPL-MCNC: 16 MG/DL
C3 SERPL-MCNC: 113 MG/DL
C4 SERPL-MCNC: 17 MG/DL
CALCIUM SERPL-MCNC: 9.4 MG/DL
CHLORIDE SERPL-SCNC: 102 MMOL/L
CO2 SERPL-SCNC: 26 MMOL/L
CREAT SERPL-MCNC: 1 MG/DL
CRP SERPL-MCNC: 1.9 MG/L
DIFFERENTIAL METHOD: ABNORMAL
EOSINOPHIL # BLD AUTO: 0 K/UL
EOSINOPHIL NFR BLD: 0.2 %
ERYTHROCYTE [DISTWIDTH] IN BLOOD BY AUTOMATED COUNT: 13.9 %
ERYTHROCYTE [SEDIMENTATION RATE] IN BLOOD BY WESTERGREN METHOD: 44 MM/HR
EST. GFR  (AFRICAN AMERICAN): >60 ML/MIN/1.73 M^2
EST. GFR  (NON AFRICAN AMERICAN): >60 ML/MIN/1.73 M^2
GLUCOSE SERPL-MCNC: 91 MG/DL
HCT VFR BLD AUTO: 38.7 %
HGB BLD-MCNC: 11.6 G/DL
IMM GRANULOCYTES # BLD AUTO: 0.02 K/UL
IMM GRANULOCYTES NFR BLD AUTO: 0.4 %
LYMPHOCYTES # BLD AUTO: 1.4 K/UL
LYMPHOCYTES NFR BLD: 30.7 %
MCH RBC QN AUTO: 26.7 PG
MCHC RBC AUTO-ENTMCNC: 30 G/DL
MCV RBC AUTO: 89 FL
MONOCYTES # BLD AUTO: 0.5 K/UL
MONOCYTES NFR BLD: 10.9 %
NEUTROPHILS # BLD AUTO: 2.7 K/UL
NEUTROPHILS NFR BLD: 58 %
NRBC BLD-RTO: 0 /100 WBC
PLATELET # BLD AUTO: 241 K/UL
PMV BLD AUTO: 10.5 FL
POTASSIUM SERPL-SCNC: 4.1 MMOL/L
PROT SERPL-MCNC: 7.9 G/DL
RBC # BLD AUTO: 4.34 M/UL
SODIUM SERPL-SCNC: 138 MMOL/L
WBC # BLD AUTO: 4.59 K/UL

## 2019-01-30 PROCEDURE — 86160 COMPLEMENT ANTIGEN: CPT

## 2019-01-30 PROCEDURE — 85025 COMPLETE CBC W/AUTO DIFF WBC: CPT

## 2019-01-30 PROCEDURE — 86225 DNA ANTIBODY NATIVE: CPT

## 2019-01-30 PROCEDURE — 86140 C-REACTIVE PROTEIN: CPT

## 2019-01-30 PROCEDURE — 80053 COMPREHEN METABOLIC PANEL: CPT

## 2019-01-30 PROCEDURE — 85652 RBC SED RATE AUTOMATED: CPT

## 2019-01-30 PROCEDURE — 86160 COMPLEMENT ANTIGEN: CPT | Mod: 59

## 2019-01-30 PROCEDURE — 36415 COLL VENOUS BLD VENIPUNCTURE: CPT

## 2019-01-31 LAB — DSDNA AB SER-ACNC: NORMAL [IU]/ML

## 2019-02-06 ENCOUNTER — OFFICE VISIT (OUTPATIENT)
Dept: RHEUMATOLOGY | Facility: CLINIC | Age: 54
End: 2019-02-06
Payer: COMMERCIAL

## 2019-02-06 ENCOUNTER — OFFICE VISIT (OUTPATIENT)
Dept: DERMATOLOGY | Facility: CLINIC | Age: 54
End: 2019-02-06
Payer: COMMERCIAL

## 2019-02-06 VITALS
DIASTOLIC BLOOD PRESSURE: 88 MMHG | HEART RATE: 100 BPM | BODY MASS INDEX: 29.65 KG/M2 | WEIGHT: 184.5 LBS | SYSTOLIC BLOOD PRESSURE: 131 MMHG | HEIGHT: 66 IN

## 2019-02-06 DIAGNOSIS — Z79.899 LONG-TERM CURRENT USE OF HIGH RISK MEDICATION OTHER THAN ANTICOAGULANT: ICD-10-CM

## 2019-02-06 DIAGNOSIS — M35.00 SECONDARY SJOGREN'S SYNDROME: ICD-10-CM

## 2019-02-06 DIAGNOSIS — L65.9 ALOPECIA: ICD-10-CM

## 2019-02-06 DIAGNOSIS — L93.0 DISCOID LUPUS: Primary | ICD-10-CM

## 2019-02-06 DIAGNOSIS — G05.3 CNS LUPUS: ICD-10-CM

## 2019-02-06 DIAGNOSIS — M32.9 SYSTEMIC LUPUS ERYTHEMATOSUS ARTHRITIS: Primary | ICD-10-CM

## 2019-02-06 DIAGNOSIS — Z79.52 LONG TERM (CURRENT) USE OF SYSTEMIC STEROIDS: ICD-10-CM

## 2019-02-06 DIAGNOSIS — M32.19 CNS LUPUS: ICD-10-CM

## 2019-02-06 PROCEDURE — 3008F BODY MASS INDEX DOCD: CPT | Mod: CPTII,S$GLB,, | Performed by: INTERNAL MEDICINE

## 2019-02-06 PROCEDURE — 99213 OFFICE O/P EST LOW 20 MIN: CPT | Mod: S$GLB,,, | Performed by: DERMATOLOGY

## 2019-02-06 PROCEDURE — 3079F PR MOST RECENT DIASTOLIC BLOOD PRESSURE 80-89 MM HG: ICD-10-PCS | Mod: CPTII,S$GLB,, | Performed by: INTERNAL MEDICINE

## 2019-02-06 PROCEDURE — 3079F DIAST BP 80-89 MM HG: CPT | Mod: CPTII,S$GLB,, | Performed by: INTERNAL MEDICINE

## 2019-02-06 PROCEDURE — 99999 PR PBB SHADOW E&M-EST. PATIENT-LVL III: CPT | Mod: PBBFAC,,, | Performed by: DERMATOLOGY

## 2019-02-06 PROCEDURE — 3078F DIAST BP <80 MM HG: CPT | Mod: CPTII,S$GLB,, | Performed by: DERMATOLOGY

## 2019-02-06 PROCEDURE — 99213 PR OFFICE/OUTPT VISIT, EST, LEVL III, 20-29 MIN: ICD-10-PCS | Mod: S$GLB,,, | Performed by: DERMATOLOGY

## 2019-02-06 PROCEDURE — 3078F PR MOST RECENT DIASTOLIC BLOOD PRESSURE < 80 MM HG: ICD-10-PCS | Mod: CPTII,S$GLB,, | Performed by: DERMATOLOGY

## 2019-02-06 PROCEDURE — 99214 OFFICE O/P EST MOD 30 MIN: CPT | Mod: S$GLB,,, | Performed by: INTERNAL MEDICINE

## 2019-02-06 PROCEDURE — 99999 PR PBB SHADOW E&M-EST. PATIENT-LVL III: ICD-10-PCS | Mod: PBBFAC,,, | Performed by: INTERNAL MEDICINE

## 2019-02-06 PROCEDURE — 99214 PR OFFICE/OUTPT VISIT, EST, LEVL IV, 30-39 MIN: ICD-10-PCS | Mod: S$GLB,,, | Performed by: INTERNAL MEDICINE

## 2019-02-06 PROCEDURE — 3008F PR BODY MASS INDEX (BMI) DOCUMENTED: ICD-10-PCS | Mod: CPTII,S$GLB,, | Performed by: INTERNAL MEDICINE

## 2019-02-06 PROCEDURE — 3075F PR MOST RECENT SYSTOLIC BLOOD PRESS GE 130-139MM HG: ICD-10-PCS | Mod: CPTII,S$GLB,, | Performed by: INTERNAL MEDICINE

## 2019-02-06 PROCEDURE — 3075F SYST BP GE 130 - 139MM HG: CPT | Mod: CPTII,S$GLB,, | Performed by: INTERNAL MEDICINE

## 2019-02-06 PROCEDURE — 3074F PR MOST RECENT SYSTOLIC BLOOD PRESSURE < 130 MM HG: ICD-10-PCS | Mod: CPTII,S$GLB,, | Performed by: DERMATOLOGY

## 2019-02-06 PROCEDURE — 99999 PR PBB SHADOW E&M-EST. PATIENT-LVL III: CPT | Mod: PBBFAC,,, | Performed by: INTERNAL MEDICINE

## 2019-02-06 PROCEDURE — 99999 PR PBB SHADOW E&M-EST. PATIENT-LVL III: ICD-10-PCS | Mod: PBBFAC,,, | Performed by: DERMATOLOGY

## 2019-02-06 PROCEDURE — 3074F SYST BP LT 130 MM HG: CPT | Mod: CPTII,S$GLB,, | Performed by: DERMATOLOGY

## 2019-02-06 ASSESSMENT — SYSTEMIC LUPUS ERYTHEMATOSUS DISEASE ACTIVITY INDEX (SLEDAI): TOTAL_SCORE: 0

## 2019-02-06 NOTE — PROGRESS NOTES
Subjective:       Patient ID:  Ruth Messina is a 53 y.o. 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 Grider, + SHEILA 1:640 speckled, + anti-SSA, Sm, dsDNA, Sm/RNP) and DLE c/ hair loss, last seen on 9/13/18.  She was dc'd off of benlysta due to headaches. She is s/p ILK #3 and uses topicort 0.25% cream prn.  She discontinued clobetasol foam qD and fluocinolone scalp oil.   + irritation of the scalp which she relates to her shampoo. + improvement with regrowth of the vertex scalp.  + continued alopecia of the temporal scalp.      Prior tx: ILK, topicort cream, fluocinolone scalp oil (irritaiton), minoxidil 5%, fluocinonide (irritation), biotin 1000 mcg         Review of Systems   Constitutional: Negative for fever and chills.   Gastrointestinal: Negative for nausea and vomiting.   Skin: Positive for itching. Negative for daily sunscreen use, activity-related sunscreen use and recent sunburn.   Hematologic/Lymphatic: Does not bruise/bleed easily.        Objective:    Physical Exam   Constitutional: She appears well-developed and well-nourished. No distress.   Neurological: She is alert and oriented to person, place, and time. She is not disoriented.   Psychiatric: She has a normal mood and affect.   Skin:   Areas Examined (abnormalities noted in diagram):   Scalp / Hair Palpated and Inspected  Head / Face Inspection Performed  Neck Inspection Performed  Back Inspection Performed  RUE Inspected  LUE Inspection Performed  Nails and Digits Inspection Performed                 Assessment / Plan:        Discoid lupus  Alopecia  Improved of the vertex scalp, stable of the temporal scalp.  Continue topicort prn and biotin.  Discussed referral for consideration of hair transplant, pt not interested.            Follow-up in about 6 months (around 8/6/2019).

## 2019-02-06 NOTE — PROGRESS NOTES
RHEUMATOLOGY CLINIC FOLLOW UP VISIT  Chief complaints:-  Follow-up for lupus.    HPI:-  Ruth Melendrez a 53 y.o. pleasant female comes in for a follow up visit with above chief complaints. She has systemic lupus erythematosus with several organ manifestations including arthritis, CNS lupus, skin rash and is on Imuran .  She denies any significant problems today.  Had headaches which resolved after stopping Benlysta infusions.     Review of Systems   Constitutional: Positive for malaise/fatigue. Negative for chills, fever and weight loss.   HENT: Negative for congestion, ear discharge, ear pain, hearing loss, nosebleeds and sore throat.    Eyes: Negative for blurred vision, double vision, photophobia, discharge and redness.   Respiratory: Negative for cough, hemoptysis, sputum production and shortness of breath.    Cardiovascular: Negative for chest pain, palpitations and claudication.   Gastrointestinal: Negative for abdominal pain, constipation, diarrhea, melena, nausea and vomiting.   Genitourinary: Negative for dysuria, frequency, hematuria and urgency.   Musculoskeletal: Negative for back pain, falls, joint pain, myalgias and neck pain.   Skin: Negative for itching and rash.   Neurological: Negative for dizziness, tremors, sensory change, speech change, focal weakness, seizures, loss of consciousness, weakness and headaches.   Endo/Heme/Allergies: Negative for environmental allergies. Does not bruise/bleed easily.   Psychiatric/Behavioral: Negative for depression, hallucinations and memory loss. The patient is not nervous/anxious and does not have insomnia.        Past Medical History:   Diagnosis Date    Anemia     with chronic disease lupus    Encounter for blood transfusion     G-6-PD deficiency     Heart murmur     MVP 1986    Hypertension     Lupus     Raynaud's syndrome     Systemic lupus erythematosus arthritis 5/13/2018       Past Surgical  "History:   Procedure Laterality Date    BONE MARROW BIOPSY      CARPAL TUNNEL RELEASE Bilateral     RELEASE-CARPAL TUNNEL Right 5/11/2017    [facility]    RELEASE-CARPAL TUNNEL Left 3/24/2017    [facility] OR    widsom tooth extraction          Social History     Tobacco Use    Smoking status: Never Smoker    Smokeless tobacco: Never Used   Substance Use Topics    Alcohol use: No    Drug use: No       Family History   Problem Relation Age of Onset    Arthritis Mother     Heart disease Mother     Hypertension Mother     Diabetes Father     Heart disease Father     Hypertension Father     Cancer Brother     Eczema Neg Hx     Lupus Neg Hx     Psoriasis Neg Hx        Review of patient's allergies indicates:   Allergen Reactions    Ace inhibitors     Amoxicillin     Aspirin     Cellcept [mycophenolate mofetil]     Cephalosporins     Clindamycin     Hydrochlorothiazide     Imuran [azathioprine sodium]     Lasix [furosemide]     Methotrexate analogues     Nsaids (non-steroidal anti-inflammatory drug)     Pcn [penicillins]     Plaquenil [hydroxychloroquine]     Shellfish containing products     Sulfa (sulfonamide antibiotics)     Zofran [ondansetron hcl (pf)]            Physical examination:-    Vitals:    02/06/19 0940   BP: 131/88   Pulse: 100   Weight: 83.7 kg (184 lb 8.4 oz)   Height: 5' 6" (1.676 m)   PainSc: 0-No pain       Physical Exam   Constitutional: She is oriented to person, place, and time and well-developed, well-nourished, and in no distress. No distress.   HENT:   Head: Normocephalic.   Mouth/Throat: Oropharynx is clear and moist.   Eyes: Conjunctivae and EOM are normal. Pupils are equal, round, and reactive to light.   Neck: Normal range of motion. Neck supple.   Cardiovascular: Normal rate and intact distal pulses.   Pulmonary/Chest: Effort normal. No respiratory distress.   Abdominal: Soft. There is no " tenderness.   Musculoskeletal:   No synovitis over small joints of hands or feet.  No effusion over large joints.   Neurological: She is alert and oriented to person, place, and time. No cranial nerve deficit.   Skin: Skin is warm. No rash noted. No erythema.   Psychiatric: Mood and affect normal.   Nursing note and vitals reviewed.      Labs:-  Results for ADELA LEMON (MRN [id number]) as of 1/5/2017 12:40   Ref. Range 12/8/2016 09:04   SHEILA HEP-2 Titer Unknown Positive 1:640 Sp...   Anti-SSA Antibody Latest Ref Range: 0.00 - 19.99 EU 31.31 (H)   Anti-SSA Interpretation Latest Ref Range: Negative  Positive (A)   Anti-SSB Antibody Latest Ref Range: 0.00 - 19.99 EU 9.10   Anti-SSB Interpretation Latest Ref Range: Negative  Negative   ds DNA Ab Latest Ref Range: Negative 1:10  Positive 1:40 (A)   Anti Sm Antibody Latest Ref Range: 0.00 - 19.99 .08 (H)   Anti-Sm Interpretation Latest Ref Range: Negative  Positive (A)   Anti Sm/RNP Antibody Latest Ref Range: 0.00 - 19.99 .83 (H)   Anti-Sm/RNP Interpretation Latest Ref Range: Negative  Positive (A)   Complement (C-3) Latest Ref Range: 50 - 180 mg/dL 60   Complement (C-4) Latest Ref Range: 11 - 44 mg/dL 7 (L)     Old and Outside medical records :-  Reviewed old and all outside medical records available in Care Everywhere.     Medication List with Changes/Refills   Current Medications    AZATHIOPRINE (IMURAN) 50 MG TAB    Take 1 tablet (50 mg total) by mouth once daily.    B1/B2/NIACIN/B12/PROTEASE (B-COMPLEX WITH B-12 ORAL)    Take by mouth.    BIOTIN 5,000 MCG TBDL    Take by mouth.    CALCIUM-MAGNESIUM-ZINC ORAL    Take 1 tablet by mouth once daily.    CHOLECALCIFEROL, VITAMIN D3, (VITAMIN D3) 5,000 UNIT TAB    Take 5,000 Units by mouth once daily.    CYCLOBENZAPRINE (FLEXERIL) 10 MG TABLET    Take 1 tablet (10 mg total) by mouth 3 (three) times daily as needed for Muscle spasms.    CYCLOSPORINE (RESTASIS) 0.05 % OPHTHALMIC EMULSION    Place 0.4 mLs (1 drop  total) into both eyes 2 (two) times daily.    DESOXIMETASONE (TOPICORT) 0.25 % CREAM    APPLY TO AFFECTED AREA OF THE SCALP ONCE DAILY    EPINEPHRINE (EPIPEN) 0.3 MG/0.3 ML ATIN    Inject 0.3 mLs (0.3 mg total) into the muscle as needed.    FERROUS GLUCONATE (FERGON) 324 MG TABLET    Take 324 mg by mouth daily with breakfast.    FISH OIL-OMEGA-3 FATTY ACIDS 300-1,000 MG CAPSULE    Take 2 g by mouth once daily.    FLUOCINOLONE (DERMA-SMOOTHE) 0.01 % EXTERNAL OIL    Apply oil to scalp once every other day    FLUOCINOLONE AND SHOWER CAP 0.01 % OIL        FLUOXETINE (PROZAC) 20 MG CAPSULE    Take 1 capsule (20 mg total) by mouth once daily.    GABAPENTIN (NEURONTIN) 300 MG CAPSULE    Take 1 capsule (300 mg total) by mouth 3 (three) times daily.    LIFITEGRAST (XIIDRA) 5 % DPET    Apply 1 drop to eye 2 (two) times daily.    LOSARTAN (COZAAR) 100 MG TABLET    Take 1 tablet (100 mg total) by mouth once daily.    MULTIVITAMIN CAPSULE    Take 1 capsule by mouth once daily.    PREDNISONE (DELTASONE) 5 MG TABLET    TAKE 1 TABLET BY MOUTH ONCE DAILY    RESTASIS 0.05 % OPHTHALMIC EMULSION    INSTILL ONE DROP INTO EACH EYE TWICE DAILY    SALIVA SUBSTITUTE COMB NO.10 PWPK    Daily as needed    SPIRONOLACTONE (ALDACTONE) 25 MG TABLET    TAKE 2 TABLETS BY MOUTH ONCE DAILY     Assessment/Plans:-  # SLE:-   Extremely complicated lupus with arthritis, extreme fatigue and periodic exacerbations of serious neurological and psychiatric complications , extreme serological activity of lupus, on Imuran.  Recent serological markers showed well controlled lupus with normal complements, negative DS DNA and normal inflammatory markers.  Other than chronic fatigue she is doing really well in terms of lupus.  Continue Imuran and consider CNS stimulants if the fatigue fails to improve.     # Secondary Sjogren's syndrome  Stable disease.  Continue symptomatic therapy.  Monitor.    # Long-term current use of high risk medication other than  anticoagulant  Hold Imuran  if any infection.  Check CBC and CMP.  # Follow-up in about 3 months (around 5/6/2019).    Disclaimer: This note was prepared using voice recognition system and is likely to have sound alike errors and is not proof read.  Please call me with any questions.

## 2019-02-20 ENCOUNTER — TELEPHONE (OUTPATIENT)
Dept: HEMATOLOGY/ONCOLOGY | Facility: CLINIC | Age: 54
End: 2019-02-20

## 2019-03-09 DIAGNOSIS — M32.19 CNS LUPUS: ICD-10-CM

## 2019-03-09 DIAGNOSIS — G05.3 CNS LUPUS: ICD-10-CM

## 2019-03-09 RX ORDER — PREDNISONE 5 MG/1
TABLET ORAL
Qty: 30 TABLET | Refills: 0 | Status: SHIPPED | OUTPATIENT
Start: 2019-03-09 | End: 2019-05-14 | Stop reason: SDUPTHER

## 2019-04-02 ENCOUNTER — OFFICE VISIT (OUTPATIENT)
Dept: INTERNAL MEDICINE | Facility: CLINIC | Age: 54
End: 2019-04-02
Payer: COMMERCIAL

## 2019-04-02 ENCOUNTER — HOSPITAL ENCOUNTER (OUTPATIENT)
Dept: RADIOLOGY | Facility: HOSPITAL | Age: 54
Discharge: HOME | End: 2019-04-02
Attending: ORTHOPAEDIC SURGERY
Payer: COMMERCIAL

## 2019-04-02 ENCOUNTER — OFFICE VISIT (OUTPATIENT)
Dept: OTOLARYNGOLOGY | Facility: CLINIC | Age: 54
End: 2019-04-02
Payer: COMMERCIAL

## 2019-04-02 VITALS
TEMPERATURE: 97 F | BODY MASS INDEX: 28.67 KG/M2 | SYSTOLIC BLOOD PRESSURE: 136 MMHG | DIASTOLIC BLOOD PRESSURE: 74 MMHG | WEIGHT: 178.38 LBS | HEIGHT: 66 IN | HEART RATE: 76 BPM

## 2019-04-02 VITALS
OXYGEN SATURATION: 98 % | HEART RATE: 96 BPM | DIASTOLIC BLOOD PRESSURE: 74 MMHG | WEIGHT: 178.38 LBS | TEMPERATURE: 97 F | BODY MASS INDEX: 28.67 KG/M2 | SYSTOLIC BLOOD PRESSURE: 136 MMHG | HEIGHT: 66 IN

## 2019-04-02 DIAGNOSIS — K11.1 SALIVARY GLAND ENLARGEMENT: Primary | ICD-10-CM

## 2019-04-02 DIAGNOSIS — K11.1 ENLARGEMENT OF SUBMANDIBULAR GLAND: ICD-10-CM

## 2019-04-02 DIAGNOSIS — R68.2 DRY MOUTH: ICD-10-CM

## 2019-04-02 DIAGNOSIS — K11.1 ENLARGEMENT OF SUBMANDIBULAR GLAND: Primary | ICD-10-CM

## 2019-04-02 DIAGNOSIS — M35.01 SJOGREN'S SYNDROME WITH KERATOCONJUNCTIVITIS SICCA: ICD-10-CM

## 2019-04-02 PROCEDURE — 3075F SYST BP GE 130 - 139MM HG: CPT | Mod: CPTII,S$GLB,, | Performed by: FAMILY MEDICINE

## 2019-04-02 PROCEDURE — 3075F PR MOST RECENT SYSTOLIC BLOOD PRESS GE 130-139MM HG: ICD-10-PCS | Mod: CPTII,S$GLB,, | Performed by: FAMILY MEDICINE

## 2019-04-02 PROCEDURE — 3008F BODY MASS INDEX DOCD: CPT | Mod: CPTII,S$GLB,, | Performed by: ORTHOPAEDIC SURGERY

## 2019-04-02 PROCEDURE — 99999 PR PBB SHADOW E&M-EST. PATIENT-LVL IV: CPT | Mod: PBBFAC,,, | Performed by: ORTHOPAEDIC SURGERY

## 2019-04-02 PROCEDURE — 3078F PR MOST RECENT DIASTOLIC BLOOD PRESSURE < 80 MM HG: ICD-10-PCS | Mod: CPTII,S$GLB,, | Performed by: ORTHOPAEDIC SURGERY

## 2019-04-02 PROCEDURE — 3078F DIAST BP <80 MM HG: CPT | Mod: CPTII,S$GLB,, | Performed by: ORTHOPAEDIC SURGERY

## 2019-04-02 PROCEDURE — 3008F PR BODY MASS INDEX (BMI) DOCUMENTED: ICD-10-PCS | Mod: CPTII,S$GLB,, | Performed by: FAMILY MEDICINE

## 2019-04-02 PROCEDURE — 3008F BODY MASS INDEX DOCD: CPT | Mod: CPTII,S$GLB,, | Performed by: FAMILY MEDICINE

## 2019-04-02 PROCEDURE — 99213 OFFICE O/P EST LOW 20 MIN: CPT | Mod: S$GLB,,, | Performed by: FAMILY MEDICINE

## 2019-04-02 PROCEDURE — 76536 US SOFT TISSUE HEAD NECK THYROID: ICD-10-PCS | Mod: 26,,, | Performed by: RADIOLOGY

## 2019-04-02 PROCEDURE — 99214 OFFICE O/P EST MOD 30 MIN: CPT | Mod: S$GLB,,, | Performed by: ORTHOPAEDIC SURGERY

## 2019-04-02 PROCEDURE — 99214 PR OFFICE/OUTPT VISIT, EST, LEVL IV, 30-39 MIN: ICD-10-PCS | Mod: S$GLB,,, | Performed by: ORTHOPAEDIC SURGERY

## 2019-04-02 PROCEDURE — 3075F PR MOST RECENT SYSTOLIC BLOOD PRESS GE 130-139MM HG: ICD-10-PCS | Mod: CPTII,S$GLB,, | Performed by: ORTHOPAEDIC SURGERY

## 2019-04-02 PROCEDURE — 3008F PR BODY MASS INDEX (BMI) DOCUMENTED: ICD-10-PCS | Mod: CPTII,S$GLB,, | Performed by: ORTHOPAEDIC SURGERY

## 2019-04-02 PROCEDURE — 99999 PR PBB SHADOW E&M-EST. PATIENT-LVL IV: CPT | Mod: PBBFAC,,, | Performed by: FAMILY MEDICINE

## 2019-04-02 PROCEDURE — 3078F DIAST BP <80 MM HG: CPT | Mod: CPTII,S$GLB,, | Performed by: FAMILY MEDICINE

## 2019-04-02 PROCEDURE — 76536 US EXAM OF HEAD AND NECK: CPT | Mod: TC

## 2019-04-02 PROCEDURE — 99213 PR OFFICE/OUTPT VISIT, EST, LEVL III, 20-29 MIN: ICD-10-PCS | Mod: S$GLB,,, | Performed by: FAMILY MEDICINE

## 2019-04-02 PROCEDURE — 3078F PR MOST RECENT DIASTOLIC BLOOD PRESSURE < 80 MM HG: ICD-10-PCS | Mod: CPTII,S$GLB,, | Performed by: FAMILY MEDICINE

## 2019-04-02 PROCEDURE — 76536 US EXAM OF HEAD AND NECK: CPT | Mod: 26,,, | Performed by: RADIOLOGY

## 2019-04-02 PROCEDURE — 99999 PR PBB SHADOW E&M-EST. PATIENT-LVL IV: ICD-10-PCS | Mod: PBBFAC,,, | Performed by: FAMILY MEDICINE

## 2019-04-02 PROCEDURE — 99999 PR PBB SHADOW E&M-EST. PATIENT-LVL IV: ICD-10-PCS | Mod: PBBFAC,,, | Performed by: ORTHOPAEDIC SURGERY

## 2019-04-02 PROCEDURE — 3075F SYST BP GE 130 - 139MM HG: CPT | Mod: CPTII,S$GLB,, | Performed by: ORTHOPAEDIC SURGERY

## 2019-04-02 NOTE — PROGRESS NOTES
Subjective:       Patient ID: Ruth Messina is a 53 y.o. female.    Chief Complaint: Oral Pain    54 yo female here complaining of swelling inside of her mouth that is sometimes painful. No fever, chills. Longstanding problem. +dry mouth due to rheum dx. She is feeling exasperated with the problem. No fever, chills, redness.   Uses biotin and lemon drops.     does not have any pertinent problems on file.  Past Medical History:   Diagnosis Date    Anemia     with chronic disease lupus    Encounter for blood transfusion     G-6-PD deficiency     Heart murmur     MVP 1986    Hypertension     Lupus     Raynaud's syndrome     Systemic lupus erythematosus arthritis 5/13/2018     Past Surgical History:   Procedure Laterality Date    BONE MARROW BIOPSY      CARPAL TUNNEL RELEASE Bilateral     RELEASE-CARPAL TUNNEL Right 5/11/2017    [facility]    RELEASE-CARPAL TUNNEL Left 3/24/2017    [facility] at [facility] OR    widsom tooth extraction       Family History   Problem Relation Age of Onset    Arthritis Mother     Heart disease Mother     Hypertension Mother     Diabetes Father     Heart disease Father     Hypertension Father     Cancer Brother     Eczema Neg Hx     Lupus Neg Hx     Psoriasis Neg Hx      Social History     Socioeconomic History    Marital status: Single     Spouse name: Not on file    Number of children: Not on file    Years of education: Not on file    Highest education level: Not on file   Occupational History    Not on file   Social Needs    Financial resource strain: Not on file    Food insecurity:     Worry: Not on file     Inability: Not on file    Transportation needs:     Medical: Not on file     Non-medical: Not on file   Tobacco Use    Smoking status: Never Smoker    Smokeless tobacco: Never Used   Substance and Sexual Activity    Alcohol use: No    Drug use: No    Sexual activity: Not Currently   Lifestyle     Physical activity:     Days per week: Not on file     Minutes per session: Not on file    Stress: Not on file   Relationships    Social connections:     Talks on phone: Not on file     Gets together: Not on file     Attends Gnosticist service: Not on file     Active member of club or organization: Not on file     Attends meetings of clubs or organizations: Not on file     Relationship status: Not on file   Other Topics Concern    Are you pregnant or think you may be? No    Breast-feeding No   Social History Narrative    Not on file     Review of Systems   Constitutional: Negative for activity change, appetite change, fatigue and fever.   HENT: Negative for trouble swallowing.         Dry mouth, painful salivary glands   All other systems reviewed and are negative.      Objective:     Vitals:    04/02/19 1113   BP: 136/74   Pulse: 96   Temp: 97 °F (36.1 °C)        Physical Exam   Constitutional: She is oriented to person, place, and time. She appears well-developed and well-nourished.   HENT:   Head: Normocephalic and atraumatic.   Right Ear: External ear normal.   Left Ear: External ear normal.   Nose: Nose normal.   Mild tenderness and enlargement of submandibular salivary glands; parotid glands normal in size. Mildly tender; Oral mucosa normal. No purulent drainage from ducts.    Eyes: Pupils are equal, round, and reactive to light. Conjunctivae and EOM are normal. No scleral icterus.   Neurological: She is alert and oriented to person, place, and time.   Normal gait. No speech abnormality   Psychiatric: She has a normal mood and affect. Her behavior is normal. Judgment and thought content normal.   Nursing note and vitals reviewed.      Assessment:       1. Salivary gland enlargement        Plan:           Problem List Items Addressed This Visit     None      Visit Diagnoses     Salivary gland enlargement    -  Primary    Relevant Orders    Ambulatory Referral to ENT      Has tried conservative treatments  without improvement; specialist consult recommended. D/w patient who agrees with referral.

## 2019-04-02 NOTE — PATIENT INSTRUCTIONS
Salivary Gland Swelling, Uncertain Cause  Salivary glands make saliva in response to food in your mouth. Saliva is mostly water. It also has minerals and proteins that help break down food and keep the mouth and teeth healthy. There are three pairs of salivary glands:  · Parotid glands (in front of the ear)  · Submandibular glands (below the jaw)  · Sublingual glands (below the tongue)  Each gland has a duct (channel) that carries saliva from the gland into the mouth.   Swelling of the salivary glands can sometimes occur. Causes can include:  · Viral infection (such as childhood mumps)  · Bacterial infections  · Sjögren's syndrome  · Diabetes  · Malnutrition  · Sarcoidosis  · Blockage of the salivary duct (from stones or tumors)  Certain medicines can affect salivary flow. This can lead to swelling of the gland. Be sure to tell your healthcare provider about all of the medicines you take.  Tests are being done to determine the cause of the swelling. These may include blood tests, X-ray, ultrasound, CT scan, or injection of dye into the duct to look for blockage. Treatment depends on the exact cause of the swelling.  Home care  · If the area is painful, you can take over-the-counter medicines, such as acetaminophen or ibuprofen, unless you were prescribed another medicine. Wetting a cloth with warm water and putting it over the affected gland for 10-15 minutes at a time can also help ease pain.  · To help prevent blockages and infections:  ¨ Drink 6-8 glasses of fluid per day (such as water, tea, and clear soup) to keep well hydrated.  ¨ If you smoke, ask your healthcare provider for help to quit. Smoking makes salivary gland stones more likely.  ¨ Maintain good dental hygiene. Brush and floss your teeth daily. See your dentist for regular cleanings.  Follow-up care  Follow up with your healthcare provider or as advised. See your healthcare provider for further exams and testing. If you have been referred to a  "specialist, make an appointment promptly.  When to seek medical advice  Call your healthcare provider if any of the following occur:  · Increasing pain or swelling in the gland  · Inability to open mouth or pain when opening mouth  · Fever of 100.4°F (38ºC) or higher, or as directed by your healthcare provider  · Redness over the gland  · Pus draining into the mouth  · Trouble breathing or swallowing  · Any new symptoms  Date Last Reviewed: 5/4/2015 © 2000-2017 [employer]. [address] [place] [address]. All rights reserved. This information is not intended as a substitute for professional medical care. Always follow your healthcare professional's instructions.        Salivary Gland Infection  Salivary glands make saliva. Saliva is mostly water. It also has minerals and proteins that help break down food and keep the mouth and teeth healthy. There are three pairs of salivary glands:  Parotid glands (in front of the ear)  Submandibular glands (below the jaw)  Sublingual glands (below the tongue)  A salivary gland can become infected by bacteria (germs). Things that make this more likely include dehydration and taking medicines that affect saliva flow. Infection is also more likely when the duct (channel) that carries saliva from the gland to the mouth is blocked. It may be blocked by a salivary gland "stone." This is a collection of minerals that forms in the salivary gland.  Signs of infection include fever, severe pain in the gland, and redness and swelling over the gland. It may hurt to open the mouth. Symptoms may be worse when the flow of saliva is stimulated, such as by the smell of food.   Antibiotics are used to treat the infection. Drainage of the infection with a simple surgical procedure is sometimes needed. It a salivary gland stone is present, a procedure may be done to remove it.  Home Care:  Take antibiotics as directed until they are finished. Do this even if you start to " feel better after only a few days.  Unless another medicine was prescribed, take over-the-counter medicines, such as acetaminophen or ibuprofen, to help relieve pain.  Moist heat can also help relieve swelling and pain. Wet a cloth with warm water and put it over the affected gland for 10-15 minutes several times a day.  Gently massage the gland a few times a day.   Suck on lemon or other tart hard candies to encourage flow of saliva.  To help prevent blockages and infections:  Drink 6-8 glasses of fluid per day (such as water, tea, and clear soup) to keep well hydrated.  If you smoke, ask your healthcare provider for help to quit. Smoking makes salivary gland stones more likely.  Maintain good dental hygiene. Brush and floss your teeth daily. See your dentist for regular cleanings.  Follow-up care  Follow up with your healthcare provider or as advised.   When to seek medical advice  Call your healthcare provider if any of the following occur:  Fever over 100.4°F (38ºC) after two days of taking antibiotics  Symptoms get worse or don't improve within a few days  Trouble breathing or swallowing  Date Last Reviewed: 5/4/2015  © 3003-0130 [employer]. [address] [place] [address], [place] [address]. All rights reserved. This information is not intended as a substitute for professional medical care. Always follow your healthcare professional's instructions.

## 2019-04-02 NOTE — PROGRESS NOTES
"Subjective:       Patient ID: Ruth Messina is a 53 y.o. female.    Chief Complaint: Adenopathy    Patient is a very pleasant 53 year old female here to see me today for evaluation of "swollen salivary glands."  She says that she has had swelling in her oral cavity, she is pointing to the gingivobuccal sulcus.  She says that the swelling does not involve her gums.  She has known dry mouth, and is using Biotene sprays and lemon juice.  She also says that her right submandibular gland is enlarged at times, and was last swollen last week.  She does not note a foul taste in her mouth.  She is on Flonase and Zyrtec daily for allergies.  She has a diagnosis of Sjogren's and Lupus.    Review of Systems   Constitutional: Positive for fatigue. Negative for chills, fever and unexpected weight change.   HENT: Negative for congestion, dental problem, ear discharge, ear pain, facial swelling, hearing loss, nosebleeds, postnasal drip, rhinorrhea, sinus pressure, sneezing, sore throat, tinnitus, trouble swallowing and voice change.    Eyes: Positive for itching. Negative for redness and visual disturbance.   Respiratory: Positive for cough. Negative for choking, shortness of breath and wheezing.    Cardiovascular: Negative for chest pain and palpitations.   Gastrointestinal: Negative for abdominal pain.        No reflux.   Musculoskeletal: Negative for gait problem.   Skin: Negative for rash.   Neurological: Positive for headaches. Negative for dizziness and light-headedness.       Objective:      Physical Exam   Constitutional: She is oriented to person, place, and time. She appears well-developed and well-nourished. No distress.   HENT:   Head: Normocephalic and atraumatic.   Right Ear: Tympanic membrane, external ear and ear canal normal.   Left Ear: Tympanic membrane, external ear and ear canal normal.   Nose: Mucosal edema and rhinorrhea present. No nasal deformity or septal deviation. No epistaxis. Right sinus exhibits no " maxillary sinus tenderness and no frontal sinus tenderness. Left sinus exhibits no maxillary sinus tenderness and no frontal sinus tenderness.   Mouth/Throat: Uvula is midline, oropharynx is clear and moist and mucous membranes are normal. Mucous membranes are not pale and not dry. No dental caries. No oropharyngeal exudate or posterior oropharyngeal erythema.   Eyes: Pupils are equal, round, and reactive to light. Conjunctivae, EOM and lids are normal. Right eye exhibits no chemosis. Left eye exhibits no chemosis. Right conjunctiva is not injected. Left conjunctiva is not injected. No scleral icterus. Right eye exhibits normal extraocular motion and no nystagmus. Left eye exhibits normal extraocular motion and no nystagmus.   Neck: Trachea normal and phonation normal. No tracheal tenderness present. No tracheal deviation present. No thyroid mass and no thyromegaly present.   Right submandibular gland prominent, but not enlarged, symmetric   Cardiovascular: Intact distal pulses.   Pulmonary/Chest: Effort normal. No stridor. No respiratory distress.   Abdominal: She exhibits no distension.   Lymphadenopathy:        Head (right side): No submental, no submandibular, no preauricular, no posterior auricular and no occipital adenopathy present.        Head (left side): No submental, no submandibular, no preauricular, no posterior auricular and no occipital adenopathy present.     She has no cervical adenopathy.   Neurological: She is alert and oriented to person, place, and time. No cranial nerve deficit.   Skin: Skin is warm and dry. No rash noted. No erythema.   Psychiatric: She has a normal mood and affect. Her behavior is normal.       US NECK:    FINDINGS:  The right submandibular gland measures slightly larger than the left at 3.9 x 1.5 cm with slightly heterogeneous parenchymal echotexture.  Cannot exclude sialoadenitis.  Correlate clinically.  No definite ductal dilatation demonstrated.  No focal lesions  visualized.  The left submandibular gland measures 3.6 x 1.2 cm.              Assessment:       1. Enlargement of submandibular gland    2. Sjogren's syndrome with keratoconjunctivitis sicca    3. Dry mouth        Plan:       1.  Enlargement of right submandibular gland:  Recent ultrasound consistent with possible sialoadenitis, though no significant infection appreciated on physical examination.  Regardless, with her history of Sjogren's and lupus, I would recommend starting a course of oral Augmentin.  We also discussed other conservative measures to help increase her oral hydration including increasing fluids, Biotene mouthwash, as well as sialagogues.  She is a candidate for Evoxac to help increase salivary flow, but she is not interested in adding another medication to her regimen at this time. She does understand that as an option in the future if she is interested.  2.  Sjogren's syndrome, dry mouth:  As above.

## 2019-04-03 ENCOUNTER — TELEPHONE (OUTPATIENT)
Dept: OTOLARYNGOLOGY | Facility: CLINIC | Age: 54
End: 2019-04-03

## 2019-04-03 RX ORDER — AMOXICILLIN AND CLAVULANATE POTASSIUM 875; 125 MG/1; MG/1
1 TABLET, FILM COATED ORAL 2 TIMES DAILY
Qty: 20 TABLET | Refills: 0 | Status: SHIPPED | OUTPATIENT
Start: 2019-04-03 | End: 2019-04-13

## 2019-04-03 NOTE — TELEPHONE ENCOUNTER
----- Message from Tayler Salomon MD sent at 4/3/2019  8:26 AM CDT -----  Please let the patient know that her ultrasound showed overall normal submandibular glands on both sides, but did show a possible infection on the right.  I did send in a course of oral antibiotics, there at her pharmacy.  She can follow up as needed if her symptoms do not improve.

## 2019-04-08 ENCOUNTER — LAB VISIT (OUTPATIENT)
Dept: LAB | Facility: HOSPITAL | Age: 54
End: 2019-04-08
Payer: COMMERCIAL

## 2019-04-08 DIAGNOSIS — D64.9 NORMOCYTIC ANEMIA: ICD-10-CM

## 2019-04-08 LAB
ALBUMIN SERPL BCP-MCNC: 3.9 G/DL (ref 3.5–5.2)
ALP SERPL-CCNC: 109 U/L (ref 55–135)
ALT SERPL W/O P-5'-P-CCNC: 25 U/L (ref 10–44)
ANION GAP SERPL CALC-SCNC: 11 MMOL/L (ref 8–16)
AST SERPL-CCNC: 23 U/L (ref 10–40)
BASOPHILS # BLD AUTO: 0.01 K/UL (ref 0–0.2)
BASOPHILS NFR BLD: 0.2 % (ref 0–1.9)
BILIRUB SERPL-MCNC: 0.5 MG/DL (ref 0.1–1)
BUN SERPL-MCNC: 13 MG/DL (ref 6–20)
CALCIUM SERPL-MCNC: 9.9 MG/DL (ref 8.7–10.5)
CHLORIDE SERPL-SCNC: 106 MMOL/L (ref 95–110)
CO2 SERPL-SCNC: 24 MMOL/L (ref 23–29)
CREAT SERPL-MCNC: 1.1 MG/DL (ref 0.5–1.4)
CRP SERPL-MCNC: 1.3 MG/L (ref 0–8.2)
DIFFERENTIAL METHOD: ABNORMAL
EOSINOPHIL # BLD AUTO: 0 K/UL (ref 0–0.5)
EOSINOPHIL NFR BLD: 0.2 % (ref 0–8)
ERYTHROCYTE [DISTWIDTH] IN BLOOD BY AUTOMATED COUNT: 13.8 % (ref 11.5–14.5)
EST. GFR  (AFRICAN AMERICAN): >60 ML/MIN/1.73 M^2
EST. GFR  (NON AFRICAN AMERICAN): 57 ML/MIN/1.73 M^2
FERRITIN SERPL-MCNC: 730 NG/ML (ref 20–300)
FOLATE SERPL-MCNC: 17.6 NG/ML (ref 4–24)
GLUCOSE SERPL-MCNC: 104 MG/DL (ref 70–110)
HCT VFR BLD AUTO: 35.1 % (ref 37–48.5)
HGB BLD-MCNC: 10.8 G/DL (ref 12–16)
IMM GRANULOCYTES # BLD AUTO: 0.02 K/UL (ref 0–0.04)
IMM GRANULOCYTES NFR BLD AUTO: 0.3 % (ref 0–0.5)
IRON SERPL-MCNC: 58 UG/DL (ref 30–160)
LDH SERPL L TO P-CCNC: 141 U/L (ref 110–260)
LYMPHOCYTES # BLD AUTO: 1.4 K/UL (ref 1–4.8)
LYMPHOCYTES NFR BLD: 22.8 % (ref 18–48)
MCH RBC QN AUTO: 27.1 PG (ref 27–31)
MCHC RBC AUTO-ENTMCNC: 30.8 G/DL (ref 32–36)
MCV RBC AUTO: 88 FL (ref 82–98)
MONOCYTES # BLD AUTO: 0.4 K/UL (ref 0.3–1)
MONOCYTES NFR BLD: 5.6 % (ref 4–15)
NEUTROPHILS # BLD AUTO: 4.5 K/UL (ref 1.8–7.7)
NEUTROPHILS NFR BLD: 71.2 % (ref 38–73)
NRBC BLD-RTO: 0 /100 WBC
PLATELET # BLD AUTO: 188 K/UL (ref 150–350)
PMV BLD AUTO: 10.2 FL (ref 9.2–12.9)
POTASSIUM SERPL-SCNC: 3.9 MMOL/L (ref 3.5–5.1)
PROT SERPL-MCNC: 8.2 G/DL (ref 6–8.4)
RBC # BLD AUTO: 3.98 M/UL (ref 4–5.4)
RETICS/RBC NFR AUTO: 1.2 % (ref 0.5–2.5)
SATURATED IRON: 20 % (ref 20–50)
SODIUM SERPL-SCNC: 141 MMOL/L (ref 136–145)
TOTAL IRON BINDING CAPACITY: 293 UG/DL (ref 250–450)
TRANSFERRIN SERPL-MCNC: 198 MG/DL (ref 200–375)
VIT B12 SERPL-MCNC: 431 PG/ML (ref 210–950)
WBC # BLD AUTO: 6.28 K/UL (ref 3.9–12.7)

## 2019-04-08 PROCEDURE — 80053 COMPREHEN METABOLIC PANEL: CPT

## 2019-04-08 PROCEDURE — 36415 COLL VENOUS BLD VENIPUNCTURE: CPT

## 2019-04-08 PROCEDURE — 85045 AUTOMATED RETICULOCYTE COUNT: CPT

## 2019-04-08 PROCEDURE — 82728 ASSAY OF FERRITIN: CPT

## 2019-04-08 PROCEDURE — 82607 VITAMIN B-12: CPT

## 2019-04-08 PROCEDURE — 85025 COMPLETE CBC W/AUTO DIFF WBC: CPT

## 2019-04-08 PROCEDURE — 83615 LACTATE (LD) (LDH) ENZYME: CPT

## 2019-04-08 PROCEDURE — 86140 C-REACTIVE PROTEIN: CPT

## 2019-04-08 PROCEDURE — 82746 ASSAY OF FOLIC ACID SERUM: CPT

## 2019-04-08 PROCEDURE — 83540 ASSAY OF IRON: CPT

## 2019-04-15 ENCOUNTER — OFFICE VISIT (OUTPATIENT)
Dept: HEMATOLOGY/ONCOLOGY | Facility: CLINIC | Age: 54
End: 2019-04-15
Payer: COMMERCIAL

## 2019-04-15 ENCOUNTER — CLINICAL SUPPORT (OUTPATIENT)
Dept: CARDIOLOGY | Facility: CLINIC | Age: 54
End: 2019-04-15
Payer: COMMERCIAL

## 2019-04-15 ENCOUNTER — TELEPHONE (OUTPATIENT)
Dept: HEMATOLOGY/ONCOLOGY | Facility: CLINIC | Age: 54
End: 2019-04-15

## 2019-04-15 ENCOUNTER — OFFICE VISIT (OUTPATIENT)
Dept: CARDIOLOGY | Facility: CLINIC | Age: 54
End: 2019-04-15
Payer: COMMERCIAL

## 2019-04-15 ENCOUNTER — TELEPHONE (OUTPATIENT)
Dept: CARDIOLOGY | Facility: CLINIC | Age: 54
End: 2019-04-15

## 2019-04-15 VITALS
HEART RATE: 100 BPM | DIASTOLIC BLOOD PRESSURE: 80 MMHG | HEIGHT: 66 IN | RESPIRATION RATE: 18 BRPM | WEIGHT: 179.44 LBS | BODY MASS INDEX: 28.84 KG/M2 | TEMPERATURE: 98 F | SYSTOLIC BLOOD PRESSURE: 120 MMHG | OXYGEN SATURATION: 97 %

## 2019-04-15 VITALS
HEIGHT: 66 IN | SYSTOLIC BLOOD PRESSURE: 120 MMHG | WEIGHT: 180 LBS | BODY MASS INDEX: 28.93 KG/M2 | DIASTOLIC BLOOD PRESSURE: 80 MMHG | HEART RATE: 100 BPM

## 2019-04-15 DIAGNOSIS — D64.9 NORMOCYTIC ANEMIA: Primary | ICD-10-CM

## 2019-04-15 DIAGNOSIS — I10 BENIGN ESSENTIAL HTN: Primary | ICD-10-CM

## 2019-04-15 DIAGNOSIS — I10 ESSENTIAL HYPERTENSION: Primary | ICD-10-CM

## 2019-04-15 DIAGNOSIS — D63.8 ANEMIA OF CHRONIC DISEASE: ICD-10-CM

## 2019-04-15 DIAGNOSIS — I10 BENIGN ESSENTIAL HTN: ICD-10-CM

## 2019-04-15 PROCEDURE — 3074F PR MOST RECENT SYSTOLIC BLOOD PRESSURE < 130 MM HG: ICD-10-PCS | Mod: CPTII,S$GLB,, | Performed by: INTERNAL MEDICINE

## 2019-04-15 PROCEDURE — 3079F PR MOST RECENT DIASTOLIC BLOOD PRESSURE 80-89 MM HG: ICD-10-PCS | Mod: CPTII,S$GLB,, | Performed by: INTERNAL MEDICINE

## 2019-04-15 PROCEDURE — 3074F PR MOST RECENT SYSTOLIC BLOOD PRESSURE < 130 MM HG: ICD-10-PCS | Mod: CPTII,S$GLB,, | Performed by: NUCLEAR MEDICINE

## 2019-04-15 PROCEDURE — 3008F BODY MASS INDEX DOCD: CPT | Mod: CPTII,S$GLB,, | Performed by: NUCLEAR MEDICINE

## 2019-04-15 PROCEDURE — 3079F PR MOST RECENT DIASTOLIC BLOOD PRESSURE 80-89 MM HG: ICD-10-PCS | Mod: CPTII,S$GLB,, | Performed by: NUCLEAR MEDICINE

## 2019-04-15 PROCEDURE — 3074F SYST BP LT 130 MM HG: CPT | Mod: CPTII,S$GLB,, | Performed by: INTERNAL MEDICINE

## 2019-04-15 PROCEDURE — 99999 PR PBB SHADOW E&M-EST. PATIENT-LVL III: ICD-10-PCS | Mod: PBBFAC,,, | Performed by: INTERNAL MEDICINE

## 2019-04-15 PROCEDURE — 99214 OFFICE O/P EST MOD 30 MIN: CPT | Mod: S$GLB,,, | Performed by: INTERNAL MEDICINE

## 2019-04-15 PROCEDURE — 99999 PR PBB SHADOW E&M-EST. PATIENT-LVL III: CPT | Mod: PBBFAC,,, | Performed by: INTERNAL MEDICINE

## 2019-04-15 PROCEDURE — 3079F DIAST BP 80-89 MM HG: CPT | Mod: CPTII,S$GLB,, | Performed by: INTERNAL MEDICINE

## 2019-04-15 PROCEDURE — 3008F BODY MASS INDEX DOCD: CPT | Mod: CPTII,S$GLB,, | Performed by: INTERNAL MEDICINE

## 2019-04-15 PROCEDURE — 99214 PR OFFICE/OUTPT VISIT, EST, LEVL IV, 30-39 MIN: ICD-10-PCS | Mod: S$GLB,,, | Performed by: NUCLEAR MEDICINE

## 2019-04-15 PROCEDURE — 3074F SYST BP LT 130 MM HG: CPT | Mod: CPTII,S$GLB,, | Performed by: NUCLEAR MEDICINE

## 2019-04-15 PROCEDURE — 93000 ELECTROCARDIOGRAM COMPLETE: CPT | Mod: S$GLB,,, | Performed by: NUCLEAR MEDICINE

## 2019-04-15 PROCEDURE — 3008F PR BODY MASS INDEX (BMI) DOCUMENTED: ICD-10-PCS | Mod: CPTII,S$GLB,, | Performed by: INTERNAL MEDICINE

## 2019-04-15 PROCEDURE — 99999 PR PBB SHADOW E&M-EST. PATIENT-LVL III: CPT | Mod: PBBFAC,,, | Performed by: NUCLEAR MEDICINE

## 2019-04-15 PROCEDURE — 99214 PR OFFICE/OUTPT VISIT, EST, LEVL IV, 30-39 MIN: ICD-10-PCS | Mod: S$GLB,,, | Performed by: INTERNAL MEDICINE

## 2019-04-15 PROCEDURE — 99214 OFFICE O/P EST MOD 30 MIN: CPT | Mod: S$GLB,,, | Performed by: NUCLEAR MEDICINE

## 2019-04-15 PROCEDURE — 3079F DIAST BP 80-89 MM HG: CPT | Mod: CPTII,S$GLB,, | Performed by: NUCLEAR MEDICINE

## 2019-04-15 PROCEDURE — 99999 PR PBB SHADOW E&M-EST. PATIENT-LVL III: ICD-10-PCS | Mod: PBBFAC,,, | Performed by: NUCLEAR MEDICINE

## 2019-04-15 PROCEDURE — 3008F PR BODY MASS INDEX (BMI) DOCUMENTED: ICD-10-PCS | Mod: CPTII,S$GLB,, | Performed by: NUCLEAR MEDICINE

## 2019-04-15 PROCEDURE — 93000 EKG 12-LEAD: ICD-10-PCS | Mod: S$GLB,,, | Performed by: NUCLEAR MEDICINE

## 2019-04-15 RX ORDER — LOSARTAN POTASSIUM 100 MG/1
50 TABLET ORAL DAILY
Qty: 90 TABLET | Refills: 3
Start: 2019-04-15 | End: 2019-11-22 | Stop reason: SDUPTHER

## 2019-04-15 NOTE — TELEPHONE ENCOUNTER
----- Message from Mayra Brothers sent at 4/15/2019  2:08 PM CDT -----  Contact: pt  Pt request a call back regarding today's visit , pt will like to review notes ... [phone] (home)

## 2019-04-15 NOTE — PROGRESS NOTES
Hematology/Oncology Office Note    Reason for referral:  Progressive normocytic anemia    CC:    Referred by:  No ref. provider found    Diagnosis:  Progressive normocytic anemia    Treatment:  Treated with IV iron in the past    History of present illness:  51-year-old female with long-standing history of SLE with skin, joint, and cytopenias from SLE.  She underwent a bone marrow biopsy in 2/2016 which demonstrated normal trilineage hematopoiesis without significant abnormality.  She underwent EGD and colonoscopy in 12/2016 which failed to identify any source of blood loss.  She was treated with IV iron by outside oncologist with some improvement in anemia.  Hemoglobin was recently noted to be 9.6 with MCV of 91.  She is currently on prednisone/Solu-Medrol for SLE.  She has complaints of mild fatigue and malaise without shortness of breath, chest pain, dizziness, or palpitations.    I have reviewed and updated the HPI, ROS, PMHx, Social Hx, Family Hx and treatment history.    Today's visit:  53-year-old female followed in the Hematology/Oncology Clinic for anemia related to chronic inflammatory disease/SLE.    Past Medical History:   Diagnosis Date    Anemia     with chronic disease lupus    Encounter for blood transfusion     G-6-PD deficiency     Heart murmur     MVP 1986    Hypertension     Lupus     Raynaud's syndrome     Systemic lupus erythematosus arthritis 5/13/2018         Social History:  No tobacco, alcohol, or illicit drugs      Family History: family history includes Arthritis in her mother; Cancer in her brother; Diabetes in her father; Heart disease in her father and mother; Hypertension in her father and mother.    HPI    Review of Systems   Constitutional: Positive for activity change and fatigue. Negative for chills, diaphoresis and fever.   HENT: Negative for congestion, dental problem, drooling, ear discharge, ear pain, facial swelling, hearing loss, mouth sores and nosebleeds.    Eyes:  "Negative.    Respiratory: Negative for apnea, cough, shortness of breath, wheezing and stridor.    Cardiovascular: Negative for chest pain and palpitations.   Gastrointestinal: Negative for abdominal pain, anal bleeding, blood in stool, constipation, diarrhea, nausea, rectal pain and vomiting.   Endocrine: Negative.    Genitourinary: Negative for difficulty urinating, dyspareunia, dysuria, enuresis, flank pain and frequency.   Musculoskeletal: Negative for gait problem, joint swelling, myalgias, neck pain and neck stiffness.   Skin: Negative for color change, pallor and rash.   Allergic/Immunologic: Negative.    Neurological: Negative for dizziness, seizures, facial asymmetry, speech difficulty, weakness, light-headedness, numbness and headaches.   Hematological: Negative for adenopathy. Does not bruise/bleed easily.   Psychiatric/Behavioral: Negative for agitation, behavioral problems, confusion, decreased concentration and dysphoric mood.       Objective:       Vitals:    04/15/19 0924   BP: 120/80   Pulse: 100   Resp: 18   Temp: 97.8 °F (36.6 °C)   TempSrc: Oral   SpO2: 97%   Weight: 81.4 kg (179 lb 7.3 oz)   Height: 5' 6" (1.676 m)     Physical Exam   Constitutional: She is oriented to person, place, and time. She appears well-developed and well-nourished. No distress.   HENT:   Head: Normocephalic and atraumatic.   Right Ear: External ear normal.   Left Ear: External ear normal.   Nose: Nose normal.   Mouth/Throat: Oropharynx is clear and moist.   Eyes: Pupils are equal, round, and reactive to light. Conjunctivae and EOM are normal. No scleral icterus.   Neck: Normal range of motion. Neck supple. No tracheal deviation present. No thyromegaly present.   Cardiovascular: Normal rate, regular rhythm, normal heart sounds and intact distal pulses. Exam reveals no gallop and no friction rub.   No murmur heard.  Pulmonary/Chest: Effort normal and breath sounds normal. No accessory muscle usage. No respiratory distress. " She has no decreased breath sounds. She has no rhonchi. She has no rales. She exhibits no tenderness.   Abdominal: Soft. Bowel sounds are normal. She exhibits no distension and no mass. There is no tenderness. There is no rebound and no guarding.   Musculoskeletal: Normal range of motion. She exhibits no edema, tenderness or deformity.   Neurological: She is alert and oriented to person, place, and time. No cranial nerve deficit. Coordination normal.   Skin: Skin is warm, dry and intact. Capillary refill takes less than 2 seconds. No ecchymosis and no rash noted. No cyanosis. Nails show no clubbing.   Psychiatric: She has a normal mood and affect. Her behavior is normal. Judgment and thought content normal.       CT of Brain 2/25/16:  History:    Altered mental status  Findings: No intracranial hemorrhage or acute focal brain parenchymal abnormality is identified.  There is mild generalized atrophy. Calvarium is intact. Visualized paranasal sinuses and mastoid air cells are clear.    Assessment:       51-year-old postmenopausal female with a history of pancytopenia related to SLE and iron deficiency anemia who presents for worsening normocytic anemia.  She underwent a bone marrow biopsy on 2/19/2016 which demonstrated trilineage hematopoiesis without significant abnormality.  In 12/2016 she underwent EGD and colonoscopy ([facility]) without source of blood loss identified.    Workup has been unremarkable thus far and I believe anemia is secondary to chronic disease/inflammation from SLE.    04/08/2019 labs demonstrate stable anemia with hemoglobin 10.8 and normal WBC/differential/platelet count.  She remains on active treatment for SLE.  We will continue to follow up with repeat labs in 6 months        Normocytic anemia secondary to chronic disease/SLE  --follow-up in 6 months with repeat CBC/iron studies    Possible history of G6PD deficiency:  --monitor for hemolysis and treat accordingly

## 2019-04-15 NOTE — TELEPHONE ENCOUNTER
Informed patient that she does not have chronic kidney disease. Informed patient it was an diction error.

## 2019-04-15 NOTE — PROGRESS NOTES
Subjective:   Patient ID:  Ruth Messina is a 53 y.o. female who presents for follow-up of Hypertension      HPI DOING WELL. NO RECENT HOSPITALIZATIONS OR ED VISITS FOR UNCONTROLLED HTN,  ACS,  ADHF OR CVA  ECG TODAY- SR, 100 BMP. NORMAL  NO ANGINA OR EQUIVALENT. NO PLEURITIC CP  NO UNUSUAL PERAZA. NO ORTHOPNEA OR PND  NO PALPITATIONS. NO NEAR SYNCOPE  NO FOCAL CNS SYMPTOMS OR SIGNS TO SUGGEST TIA OR STROKE  NO ABDOMINAL DISCOMFORT. NO BACK PAIN  NO EDEMA. NO CALVE TENDERNESS  NO INTERMITTENT CLAUDICATION  CARD MED GOOD COMPLIANCE  RECENT BMP WAS REVIEWED AND DISCUSSED    Review of Systems   Constitution: Negative for chills, fever, night sweats, weight gain and weight loss.   HENT: Negative for nosebleeds.    Eyes: Negative for blurred vision, double vision and visual disturbance.   Cardiovascular: Negative for chest pain, dyspnea on exertion, irregular heartbeat, leg swelling, orthopnea, palpitations, paroxysmal nocturnal dyspnea and syncope.   Respiratory: Negative for cough, hemoptysis and wheezing.    Endocrine: Negative for polydipsia and polyuria.   Hematologic/Lymphatic: Does not bruise/bleed easily.   Skin: Negative for rash.   Musculoskeletal: Negative for joint pain, joint swelling, muscle weakness and myalgias.   Gastrointestinal: Negative for abdominal pain, hematemesis, jaundice and melena.   Genitourinary: Negative for dysuria, hematuria and nocturia.   Neurological: Negative for dizziness, focal weakness, headaches, sensory change and weakness.   Psychiatric/Behavioral: Negative for depression. The patient does not have insomnia and is not nervous/anxious.      Family History   Problem Relation Age of Onset    Arthritis Mother     Heart disease Mother     Hypertension Mother     Diabetes Father     Heart disease Father     Hypertension Father     Cancer Brother     Eczema Neg Hx     Lupus Neg Hx     Psoriasis Neg Hx      Past Medical History:   Diagnosis Date    Anemia     with chronic  disease lupus    Encounter for blood transfusion     G-6-PD deficiency     Heart murmur     MVP 1986    Hypertension     Lupus     Raynaud's syndrome     Systemic lupus erythematosus arthritis 5/13/2018     Social History     Socioeconomic History    Marital status: Single     Spouse name: Not on file    Number of children: Not on file    Years of education: Not on file    Highest education level: Not on file   Occupational History    Not on file   Social Needs    Financial resource strain: Not on file    Food insecurity:     Worry: Not on file     Inability: Not on file    Transportation needs:     Medical: Not on file     Non-medical: Not on file   Tobacco Use    Smoking status: Never Smoker    Smokeless tobacco: Never Used   Substance and Sexual Activity    Alcohol use: No    Drug use: No    Sexual activity: Not Currently   Lifestyle    Physical activity:     Days per week: Not on file     Minutes per session: Not on file    Stress: Not on file   Relationships    Social connections:     Talks on phone: Not on file     Gets together: Not on file     Attends [employer]ical service: Not on file     Active member of club or organization: Not on file     Attends meetings of clubs or organizations: Not on file     Relationship status: Not on file   Other Topics Concern    Are you pregnant or think you may be? No    Breast-feeding No   Social History Narrative    Not on file     Current Outpatient Medications on File Prior to Visit   Medication Sig Dispense Refill    azaTHIOprine (IMURAN) 50 mg Tab Take 1 tablet (50 mg total) by mouth once daily. 90 tablet 3    B1/B2/NIACIN/B12/PROTEASE (B-COMPLEX WITH B-12 ORAL) Take by mouth.      biotin 5,000 mcg TbDL Take by mouth.      CALCIUM-MAGNESIUM-ZINC ORAL Take 1 tablet by mouth once daily.      cholecalciferol, vitamin D3, (VITAMIN D3) 5,000 unit Tab Take 5,000 Units by mouth once daily.      cyclobenzaprine (FLEXERIL) 10 MG tablet Take 1 tablet  (10 mg total) by mouth 3 (three) times daily as needed for Muscle spasms. 30 tablet 3    cycloSPORINE (RESTASIS) 0.05 % ophthalmic emulsion Place 0.4 mLs (1 drop total) into both eyes 2 (two) times daily. 60 vial 11    desoximetasone (TOPICORT) 0.25 % cream APPLY TO AFFECTED AREA OF THE SCALP ONCE DAILY 60 g 3    EPINEPHrine (EPIPEN) 0.3 mg/0.3 mL AtIn Inject 0.3 mLs (0.3 mg total) into the muscle as needed. 2 each 3    ferrous gluconate (FERGON) 324 MG tablet Take 324 mg by mouth daily with breakfast.      fish oil-omega-3 fatty acids 300-1,000 mg capsule Take 2 g by mouth once daily.      fluocinolone (DERMA-SMOOTHE) 0.01 % external oil Apply oil to scalp once every other day 1 Bottle 5    fluocinolone and shower cap 0.01 % Oil       FLUoxetine (PROZAC) 20 MG capsule Take 1 capsule (20 mg total) by mouth once daily. 30 capsule 5    gabapentin (NEURONTIN) 300 MG capsule Take 1 capsule (300 mg total) by mouth 3 (three) times daily. 90 capsule 11    lifitegrast (XIIDRA) 5 % Dpet Apply 1 drop to eye 2 (two) times daily. 62 each 11    multivitamin capsule Take 1 capsule by mouth once daily.      predniSONE (DELTASONE) 5 MG tablet TAKE 1 TABLET BY MOUTH ONCE DAILY 30 tablet 0    RESTASIS 0.05 % ophthalmic emulsion INSTILL ONE DROP INTO EACH EYE TWICE DAILY 60 each 11    spironolactone (ALDACTONE) 25 MG tablet TAKE 2 TABLETS BY MOUTH ONCE DAILY 60 tablet 6    [DISCONTINUED] losartan (COZAAR) 100 MG tablet Take 1 tablet (100 mg total) by mouth once daily. 90 tablet 3    [DISCONTINUED] saliva substitute comb no.10 PwPk Daily as needed 30 packet 2     No current facility-administered medications on file prior to visit.      Review of patient's allergies indicates:   Allergen Reactions    Cellcept [mycophenolate mofetil] Swelling     Lips      Ace inhibitors Other (See Comments)     unkown    Aldactone [spironolactone] Blisters     Mouth soreness with doses greater than 25mg.    Aspirin Other (See  Comments)     G-6-PD def.     Azathioprine sodium     Beta-blockers (beta-adrenergic blocking agts) Other (See Comments)     Cannot take due to Raynaud's syndrome    Clindamycin Other (See Comments)     + allergy test    Clonidine Blisters     Mouth sores and tenderness    Hydrochlorothiazide Other (See Comments)     unknown    Ibuprofen Other (See Comments)     G-6-PD def.    Lasix [furosemide] Other (See Comments)     unknown    Lisinopril Other (See Comments)     unknown    Methatropic     Methotrexate analogues Other (See Comments)     Pancytopenia      Norvasc [amlodipine] Other (See Comments)     Jaw pain    Nsaids (non-steroidal anti-inflammatory drug) Other (See Comments)     G-6-PD def.    Plaquenil [hydroxychloroquine] Other (See Comments)     G-6-PD def.    Zofran [ondansetron hcl (pf)] Other (See Comments)     Chest pain      Sulfa (sulfonamide antibiotics) Rash       Objective:     Physical Exam   Constitutional: She is oriented to person, place, and time. She appears well-developed. No distress.   HENT:   Head: Normocephalic.   Eyes: Pupils are equal, round, and reactive to light. Conjunctivae are normal. No scleral icterus.   Neck: Normal range of motion. Neck supple. Normal carotid pulses, no hepatojugular reflux and no JVD present. Carotid bruit is not present. No edema present. No thyroid mass and no thyromegaly present.   Cardiovascular: Normal rate, regular rhythm, S1 normal, S2 normal, normal heart sounds and intact distal pulses. PMI is not displaced. Exam reveals no gallop and no friction rub.   No murmur heard.  Pulses:       Carotid pulses are 2+ on the right side, and 2+ on the left side.       Radial pulses are 2+ on the right side, and 2+ on the left side.        Femoral pulses are 2+ on the right side, and 2+ on the left side.       Popliteal pulses are 2+ on the right side, and 2+ on the left side.        Dorsalis pedis pulses are 2+ on the right side, and 2+ on the left  side.        Posterior tibial pulses are 2+ on the right side, and 2+ on the left side.   Pulmonary/Chest: Effort normal and breath sounds normal. She has no wheezes. She has no rales. She exhibits no tenderness.   Abdominal: Soft. Bowel sounds are normal. She exhibits no pulsatile midline mass and no mass. There is no hepatosplenomegaly. There is no tenderness.   Musculoskeletal: Normal range of motion. She exhibits no edema or tenderness.        Cervical back: Normal.        Thoracic back: Normal.        Lumbar back: Normal.   Lymphadenopathy:     She has no cervical adenopathy.     She has no axillary adenopathy.        Right: No supraclavicular adenopathy present.        Left: No supraclavicular adenopathy present.   Neurological: She is alert and oriented to person, place, and time. She has normal strength and normal reflexes. No sensory deficit. Gait normal.   Skin: Skin is warm. No rash noted. No cyanosis. No pallor. Nails show no clubbing.   Psychiatric: She has a normal mood and affect. Her speech is normal and behavior is normal. Cognition and memory are normal.       Assessment:     1. Essential hypertension        Plan:     Essential hypertension  WELL CONTROLLED BP  STABLE CV STATUS- NO ACTIVE MYOCARDIAL ISCHEMIA  NO ARRHYTHMIAS  NO ADHF  RENAL FUNCTION AND ELECTROLYTES OK  NO ABNORMAL BLEEDING- ON ASA  CNS STATUS STABLE  -     losartan (COZAAR) 100 MG tablet; Take 0.5 tablets (50 mg total) by mouth once daily.  Dispense: 90 tablet; Refill: 3      OMT [facility]BS  RETURN IN 6 MONTHS.

## 2019-04-15 NOTE — TELEPHONE ENCOUNTER
----- Message from Namrata Mckeon sent at 4/15/2019  8:57 AM CDT -----  Contact: self  Pt is at cardiologist appt and will be will be late for appt with Dr. Montanez. Pt is not sure how much longer she will be, but did not want to reschedule. Please call pt back at [phone].    Thanks,   Namrata Mckeon

## 2019-04-15 NOTE — TELEPHONE ENCOUNTER
----- Message from Claire Holden sent at 4/15/2019  8:14 AM CDT -----  Contact: Patient  Patient wanted to someone know that she is in the parking lot, Please call her at [phone].    Thanks  Td

## 2019-05-01 ENCOUNTER — LAB VISIT (OUTPATIENT)
Dept: LAB | Facility: HOSPITAL | Age: 54
End: 2019-05-01
Payer: COMMERCIAL

## 2019-05-01 DIAGNOSIS — M32.9 SYSTEMIC LUPUS ERYTHEMATOSUS ARTHRITIS: ICD-10-CM

## 2019-05-01 DIAGNOSIS — M32.19 OTHER SYSTEMIC LUPUS ERYTHEMATOSUS WITH OTHER ORGAN INVOLVEMENT: ICD-10-CM

## 2019-05-01 LAB
ALBUMIN SERPL BCP-MCNC: 3.6 G/DL (ref 3.5–5.2)
ALP SERPL-CCNC: 104 U/L (ref 55–135)
ALT SERPL W/O P-5'-P-CCNC: 18 U/L (ref 10–44)
ANION GAP SERPL CALC-SCNC: 10 MMOL/L (ref 8–16)
AST SERPL-CCNC: 22 U/L (ref 10–40)
BASOPHILS # BLD AUTO: 0.01 K/UL (ref 0–0.2)
BASOPHILS NFR BLD: 0.2 % (ref 0–1.9)
BILIRUB SERPL-MCNC: 0.9 MG/DL (ref 0.1–1)
BUN SERPL-MCNC: 8 MG/DL (ref 6–20)
C3 SERPL-MCNC: 110 MG/DL (ref 50–180)
C4 SERPL-MCNC: 15 MG/DL (ref 11–44)
CALCIUM SERPL-MCNC: 9.5 MG/DL (ref 8.7–10.5)
CHLORIDE SERPL-SCNC: 109 MMOL/L (ref 95–110)
CO2 SERPL-SCNC: 22 MMOL/L (ref 23–29)
CREAT SERPL-MCNC: 0.9 MG/DL (ref 0.5–1.4)
CRP SERPL-MCNC: 4.6 MG/L (ref 0–8.2)
DIFFERENTIAL METHOD: ABNORMAL
EOSINOPHIL # BLD AUTO: 0 K/UL (ref 0–0.5)
EOSINOPHIL NFR BLD: 0.7 % (ref 0–8)
ERYTHROCYTE [DISTWIDTH] IN BLOOD BY AUTOMATED COUNT: 14.3 % (ref 11.5–14.5)
ERYTHROCYTE [SEDIMENTATION RATE] IN BLOOD BY WESTERGREN METHOD: 83 MM/HR (ref 0–36)
EST. GFR  (AFRICAN AMERICAN): >60 ML/MIN/1.73 M^2
EST. GFR  (NON AFRICAN AMERICAN): >60 ML/MIN/1.73 M^2
GLUCOSE SERPL-MCNC: 117 MG/DL (ref 70–110)
HCT VFR BLD AUTO: 33.8 % (ref 37–48.5)
HGB BLD-MCNC: 10.2 G/DL (ref 12–16)
IMM GRANULOCYTES # BLD AUTO: 0.01 K/UL (ref 0–0.04)
IMM GRANULOCYTES NFR BLD AUTO: 0.2 % (ref 0–0.5)
LYMPHOCYTES # BLD AUTO: 1.7 K/UL (ref 1–4.8)
LYMPHOCYTES NFR BLD: 38.1 % (ref 18–48)
MCH RBC QN AUTO: 26.6 PG (ref 27–31)
MCHC RBC AUTO-ENTMCNC: 30.2 G/DL (ref 32–36)
MCV RBC AUTO: 88 FL (ref 82–98)
MONOCYTES # BLD AUTO: 0.3 K/UL (ref 0.3–1)
MONOCYTES NFR BLD: 7.5 % (ref 4–15)
NEUTROPHILS # BLD AUTO: 2.3 K/UL (ref 1.8–7.7)
NEUTROPHILS NFR BLD: 53.5 % (ref 38–73)
NRBC BLD-RTO: 0 /100 WBC
PLATELET # BLD AUTO: 200 K/UL (ref 150–350)
PMV BLD AUTO: 9.8 FL (ref 9.2–12.9)
POTASSIUM SERPL-SCNC: 3.4 MMOL/L (ref 3.5–5.1)
PROT SERPL-MCNC: 7.5 G/DL (ref 6–8.4)
RBC # BLD AUTO: 3.83 M/UL (ref 4–5.4)
SODIUM SERPL-SCNC: 141 MMOL/L (ref 136–145)
WBC # BLD AUTO: 4.38 K/UL (ref 3.9–12.7)

## 2019-05-01 PROCEDURE — 85025 COMPLETE CBC W/AUTO DIFF WBC: CPT

## 2019-05-01 PROCEDURE — 85652 RBC SED RATE AUTOMATED: CPT

## 2019-05-01 PROCEDURE — 86140 C-REACTIVE PROTEIN: CPT

## 2019-05-01 PROCEDURE — 80053 COMPREHEN METABOLIC PANEL: CPT

## 2019-05-01 PROCEDURE — 86225 DNA ANTIBODY NATIVE: CPT

## 2019-05-01 PROCEDURE — 86160 COMPLEMENT ANTIGEN: CPT | Mod: 59

## 2019-05-01 PROCEDURE — 36415 COLL VENOUS BLD VENIPUNCTURE: CPT

## 2019-05-01 PROCEDURE — 86160 COMPLEMENT ANTIGEN: CPT

## 2019-05-02 LAB — DSDNA AB SER-ACNC: NORMAL [IU]/ML

## 2019-05-08 ENCOUNTER — OFFICE VISIT (OUTPATIENT)
Dept: RHEUMATOLOGY | Facility: CLINIC | Age: 54
End: 2019-05-08
Payer: COMMERCIAL

## 2019-05-08 VITALS
WEIGHT: 181 LBS | DIASTOLIC BLOOD PRESSURE: 100 MMHG | SYSTOLIC BLOOD PRESSURE: 151 MMHG | BODY MASS INDEX: 29.09 KG/M2 | HEIGHT: 66 IN | HEART RATE: 98 BPM

## 2019-05-08 DIAGNOSIS — Z79.899 LONG-TERM CURRENT USE OF HIGH RISK MEDICATION OTHER THAN ANTICOAGULANT: ICD-10-CM

## 2019-05-08 DIAGNOSIS — G05.3 CNS LUPUS: ICD-10-CM

## 2019-05-08 DIAGNOSIS — M32.9 SYSTEMIC LUPUS ERYTHEMATOSUS ARTHRITIS: Primary | ICD-10-CM

## 2019-05-08 DIAGNOSIS — M32.19 CNS LUPUS: ICD-10-CM

## 2019-05-08 DIAGNOSIS — M35.00 SECONDARY SJOGREN'S SYNDROME: ICD-10-CM

## 2019-05-08 DIAGNOSIS — F51.01 PRIMARY INSOMNIA: ICD-10-CM

## 2019-05-08 PROCEDURE — 3077F PR MOST RECENT SYSTOLIC BLOOD PRESSURE >= 140 MM HG: ICD-10-PCS | Mod: CPTII,S$GLB,, | Performed by: INTERNAL MEDICINE

## 2019-05-08 PROCEDURE — 3077F SYST BP >= 140 MM HG: CPT | Mod: CPTII,S$GLB,, | Performed by: INTERNAL MEDICINE

## 2019-05-08 PROCEDURE — 3008F PR BODY MASS INDEX (BMI) DOCUMENTED: ICD-10-PCS | Mod: CPTII,S$GLB,, | Performed by: INTERNAL MEDICINE

## 2019-05-08 PROCEDURE — 99214 OFFICE O/P EST MOD 30 MIN: CPT | Mod: S$GLB,,, | Performed by: INTERNAL MEDICINE

## 2019-05-08 PROCEDURE — 3080F PR MOST RECENT DIASTOLIC BLOOD PRESSURE >= 90 MM HG: ICD-10-PCS | Mod: CPTII,S$GLB,, | Performed by: INTERNAL MEDICINE

## 2019-05-08 PROCEDURE — 99999 PR PBB SHADOW E&M-EST. PATIENT-LVL III: ICD-10-PCS | Mod: PBBFAC,,, | Performed by: INTERNAL MEDICINE

## 2019-05-08 PROCEDURE — 3008F BODY MASS INDEX DOCD: CPT | Mod: CPTII,S$GLB,, | Performed by: INTERNAL MEDICINE

## 2019-05-08 PROCEDURE — 99214 PR OFFICE/OUTPT VISIT, EST, LEVL IV, 30-39 MIN: ICD-10-PCS | Mod: S$GLB,,, | Performed by: INTERNAL MEDICINE

## 2019-05-08 PROCEDURE — 3080F DIAST BP >= 90 MM HG: CPT | Mod: CPTII,S$GLB,, | Performed by: INTERNAL MEDICINE

## 2019-05-08 PROCEDURE — 99999 PR PBB SHADOW E&M-EST. PATIENT-LVL III: CPT | Mod: PBBFAC,,, | Performed by: INTERNAL MEDICINE

## 2019-05-08 RX ORDER — SODIUM CHLORIDE 0.9 % (FLUSH) 0.9 %
10 SYRINGE (ML) INJECTION
Status: CANCELLED | OUTPATIENT
Start: 2019-05-08

## 2019-05-08 RX ORDER — HEPARIN 100 UNIT/ML
500 SYRINGE INTRAVENOUS
Status: CANCELLED | OUTPATIENT
Start: 2019-05-08

## 2019-05-08 NOTE — PROGRESS NOTES
RHEUMATOLOGY CLINIC FOLLOW UP VISIT  Chief complaints:-  Follow-up for lupus.    HPI:-  Ruth Melendrez a 53 y.o. pleasant female comes in for a follow up visit with above chief complaints. She has systemic lupus erythematosus with several organ manifestations including arthritis, CNS lupus, skin rash and is on Imuran .  She denies any significant problems today.  Had headaches which resolved after stopping Benlysta infusions.     Review of Systems   Constitutional: Positive for malaise/fatigue. Negative for chills, fever and weight loss.   HENT: Negative for congestion, ear discharge, ear pain, hearing loss, nosebleeds and sore throat.    Eyes: Negative for blurred vision, double vision, photophobia, discharge and redness.   Respiratory: Negative for cough, hemoptysis, sputum production and shortness of breath.    Cardiovascular: Negative for chest pain, palpitations and claudication.   Gastrointestinal: Negative for abdominal pain, constipation, diarrhea, melena, nausea and vomiting.   Genitourinary: Negative for dysuria, frequency, hematuria and urgency.   Musculoskeletal: Negative for back pain, falls, joint pain, myalgias and neck pain.   Skin: Negative for itching and rash.   Neurological: Negative for dizziness, tremors, sensory change, speech change, focal weakness, seizures, loss of consciousness, weakness and headaches.   Endo/Heme/Allergies: Negative for environmental allergies. Does not bruise/bleed easily.   Psychiatric/Behavioral: Negative for depression, hallucinations and memory loss. The patient is not nervous/anxious and does not have insomnia.        Past Medical History:   Diagnosis Date    Anemia     with chronic disease lupus    Encounter for blood transfusion     G-6-PD deficiency     Heart murmur     MVP 1986    Hypertension     Lupus     Raynaud's syndrome     Systemic lupus erythematosus arthritis 5/13/2018       Past Surgical  History:   Procedure Laterality Date    BONE MARROW BIOPSY      CARPAL TUNNEL RELEASE Bilateral     RELEASE-CARPAL TUNNEL Right 5/11/2017    [facility]    RELEASE-CARPAL TUNNEL Left 3/24/2017    [facility]    widsom tooth extraction          Social History     Tobacco Use    Smoking status: Never Smoker    Smokeless tobacco: Never Used   Substance Use Topics    Alcohol use: No    Drug use: No       Family History   Problem Relation Age of Onset    Arthritis Mother     Heart disease Mother     Hypertension Mother     Diabetes Father     Heart disease Father     Hypertension Father     Cancer Brother     Eczema Neg Hx     Lupus Neg Hx     Psoriasis Neg Hx        Review of patient's allergies indicates:   Allergen Reactions    Ace inhibitors     Amoxicillin     Aspirin     Cellcept [mycophenolate mofetil]     Cephalosporins     Clindamycin     Hydrochlorothiazide     Imuran [azathioprine sodium]     Lasix [furosemide]     Methotrexate analogues     Nsaids (non-steroidal anti-inflammatory drug)     Pcn [penicillins]     Plaquenil [hydroxychloroquine]     Shellfish containing products     Sulfa (sulfonamide antibiotics)     Zofran [ondansetron hcl (pf)]            Physical examination:-    There were no vitals filed for this visit.    Physical Exam   Constitutional: She is oriented to person, place, and time and well-developed, well-nourished, and in no distress. No distress.   HENT:   Head: Normocephalic.   Mouth/Throat: Oropharynx is clear and moist.   Eyes: Pupils are equal, round, and reactive to light. Conjunctivae and EOM are normal.   Neck: Normal range of motion. Neck supple.   Cardiovascular: Normal rate and intact distal pulses.   Pulmonary/Chest: Effort normal. No respiratory distress.   Abdominal: Soft. There is no tenderness.   Musculoskeletal:   No synovitis over small joints of hands or feet.  No effusion  over large joints.   Neurological: She is alert and oriented to person, place, and time. No cranial nerve deficit.   Skin: Skin is warm. No rash noted. No erythema.   Psychiatric: Mood and affect normal.   Nursing note and vitals reviewed.      Labs:-  Results for ADELA LEMON (MRN [id number]) as of 1/5/2017 12:40   Ref. Range 12/8/2016 09:04   SHEILA HEP-2 Titer Unknown Positive 1:640 Sp...   Anti-SSA Antibody Latest Ref Range: 0.00 - 19.99 EU 31.31 (H)   Anti-SSA Interpretation Latest Ref Range: Negative  Positive (A)   Anti-SSB Antibody Latest Ref Range: 0.00 - 19.99 EU 9.10   Anti-SSB Interpretation Latest Ref Range: Negative  Negative   ds DNA Ab Latest Ref Range: Negative 1:10  Positive 1:40 (A)   Anti Sm Antibody Latest Ref Range: 0.00 - 19.99 .08 (H)   Anti-Sm Interpretation Latest Ref Range: Negative  Positive (A)   Anti Sm/RNP Antibody Latest Ref Range: 0.00 - 19.99 .83 (H)   Anti-Sm/RNP Interpretation Latest Ref Range: Negative  Positive (A)   Complement (C-3) Latest Ref Range: 50 - 180 mg/dL 60   Complement (C-4) Latest Ref Range: 11 - 44 mg/dL 7 (L)     Old and Outside medical records :-  Reviewed old and all outside medical records available in Care Everywhere.     Medication List with Changes/Refills   Current Medications    AZATHIOPRINE (IMURAN) 50 MG TAB    Take 1 tablet (50 mg total) by mouth once daily.    B1/B2/NIACIN/B12/PROTEASE (B-COMPLEX WITH B-12 ORAL)    Take by mouth.    BIOTIN 5,000 MCG TBDL    Take by mouth.    CALCIUM-MAGNESIUM-ZINC ORAL    Take 1 tablet by mouth once daily.    CHOLECALCIFEROL, VITAMIN D3, (VITAMIN D3) 5,000 UNIT TAB    Take 5,000 Units by mouth once daily.    CYCLOBENZAPRINE (FLEXERIL) 10 MG TABLET    Take 1 tablet (10 mg total) by mouth 3 (three) times daily as needed for Muscle spasms.    CYCLOSPORINE (RESTASIS) 0.05 % OPHTHALMIC EMULSION    Place 0.4 mLs (1 drop total) into both eyes 2 (two) times daily.    DESOXIMETASONE (TOPICORT) 0.25 % CREAM    APPLY  TO AFFECTED AREA OF THE SCALP ONCE DAILY    EPINEPHRINE (EPIPEN) 0.3 MG/0.3 ML ATIN    Inject 0.3 mLs (0.3 mg total) into the muscle as needed.    FERROUS GLUCONATE (FERGON) 324 MG TABLET    Take 324 mg by mouth daily with breakfast.    FISH OIL-OMEGA-3 FATTY ACIDS 300-1,000 MG CAPSULE    Take 2 g by mouth once daily.    FLUOCINOLONE (DERMA-SMOOTHE) 0.01 % EXTERNAL OIL    Apply oil to scalp once every other day    FLUOCINOLONE AND SHOWER CAP 0.01 % OIL        FLUOXETINE (PROZAC) 20 MG CAPSULE    Take 1 capsule (20 mg total) by mouth once daily.    GABAPENTIN (NEURONTIN) 300 MG CAPSULE    Take 1 capsule (300 mg total) by mouth 3 (three) times daily.    LIFITEGRAST (XIIDRA) 5 % DPET    Apply 1 drop to eye 2 (two) times daily.    LOSARTAN (COZAAR) 100 MG TABLET    Take 0.5 tablets (50 mg total) by mouth once daily.    MULTIVITAMIN CAPSULE    Take 1 capsule by mouth once daily.    PREDNISONE (DELTASONE) 5 MG TABLET    TAKE 1 TABLET BY MOUTH ONCE DAILY    RESTASIS 0.05 % OPHTHALMIC EMULSION    INSTILL ONE DROP INTO EACH EYE TWICE DAILY    SPIRONOLACTONE (ALDACTONE) 25 MG TABLET    TAKE 2 TABLETS BY MOUTH ONCE DAILY     Assessment/Plans:-  # SLE:-   Extremely complicated lupus with arthritis, extreme fatigue and periodic exacerbations of serious neurological and psychiatric complications , extreme serological activity of lupus, on Imuran.  Recent serological markers showed well controlled lupus with normal complements, negative DS DNA and normal inflammatory markers.  Other than chronic fatigue she is doing really well in terms of lupus.  Continue Imuran and consider CNS stimulants if the fatigue fails to improve. Will give one dose of  mg solumedrol and monitor for any change in fatigue.     # Secondary Sjogren's syndrome  Stable disease.  Continue symptomatic therapy.  Monitor.    # Long-term current use of high risk medication other than anticoagulant  Hold Imuran  if any infection.  Check CBC and CMP.  # No  follow-ups on file.    Disclaimer: This note was prepared using voice recognition system and is likely to have sound alike errors and is not proof read.  Please call me with any questions.

## 2019-05-09 ENCOUNTER — TELEPHONE (OUTPATIENT)
Dept: RHEUMATOLOGY | Facility: HOSPITAL | Age: 54
End: 2019-05-09

## 2019-05-09 NOTE — TELEPHONE ENCOUNTER
"Pt cancelled solumedrol infusion for today due to "car trouble"    "I will call back and reschedule when I know my car is fixed". Instructed pt we can see her later today, tomorrow, or any time next week.   "

## 2019-05-14 DIAGNOSIS — M32.19 OTHER SYSTEMIC LUPUS ERYTHEMATOSUS WITH OTHER ORGAN INVOLVEMENT: ICD-10-CM

## 2019-05-14 DIAGNOSIS — G05.3 CNS LUPUS: ICD-10-CM

## 2019-05-14 DIAGNOSIS — M32.19 CNS LUPUS: ICD-10-CM

## 2019-05-14 DIAGNOSIS — M35.00 SECONDARY SJOGREN'S SYNDROME: ICD-10-CM

## 2019-05-14 RX ORDER — AZATHIOPRINE 50 MG/1
TABLET ORAL
Qty: 30 TABLET | Refills: 11 | Status: SHIPPED | OUTPATIENT
Start: 2019-05-14 | End: 2020-05-18

## 2019-05-14 RX ORDER — PREDNISONE 5 MG/1
TABLET ORAL
Qty: 30 TABLET | Refills: 0 | Status: SHIPPED | OUTPATIENT
Start: 2019-05-14 | End: 2019-07-21 | Stop reason: SDUPTHER

## 2019-05-21 ENCOUNTER — OFFICE VISIT (OUTPATIENT)
Dept: DERMATOLOGY | Facility: CLINIC | Age: 54
End: 2019-05-21
Payer: COMMERCIAL

## 2019-05-21 ENCOUNTER — DOCUMENTATION ONLY (OUTPATIENT)
Dept: RHEUMATOLOGY | Facility: CLINIC | Age: 54
End: 2019-05-21

## 2019-05-21 ENCOUNTER — HOSPITAL ENCOUNTER (OUTPATIENT)
Dept: RADIOLOGY | Facility: HOSPITAL | Age: 54
Discharge: HOME | End: 2019-05-21
Attending: STUDENT IN AN ORGANIZED HEALTH CARE EDUCATION/TRAINING PROGRAM
Payer: COMMERCIAL

## 2019-05-21 ENCOUNTER — INFUSION (OUTPATIENT)
Dept: RHEUMATOLOGY | Facility: HOSPITAL | Age: 54
End: 2019-05-21
Attending: INTERNAL MEDICINE
Payer: COMMERCIAL

## 2019-05-21 VITALS
SYSTOLIC BLOOD PRESSURE: 140 MMHG | DIASTOLIC BLOOD PRESSURE: 86 MMHG | HEART RATE: 101 BPM | WEIGHT: 177.69 LBS | TEMPERATURE: 97 F | BODY MASS INDEX: 28.68 KG/M2

## 2019-05-21 DIAGNOSIS — M32.9 SYSTEMIC LUPUS ERYTHEMATOSUS ARTHRITIS: Primary | ICD-10-CM

## 2019-05-21 DIAGNOSIS — R22.9 SUBCUTANEOUS NODULE: Primary | ICD-10-CM

## 2019-05-21 DIAGNOSIS — M32.19 CNS LUPUS: ICD-10-CM

## 2019-05-21 DIAGNOSIS — G05.3 CNS LUPUS: ICD-10-CM

## 2019-05-21 DIAGNOSIS — R22.9 SUBCUTANEOUS NODULE: ICD-10-CM

## 2019-05-21 PROCEDURE — 3078F PR MOST RECENT DIASTOLIC BLOOD PRESSURE < 80 MM HG: ICD-10-PCS | Mod: CPTII,S$GLB,, | Performed by: STUDENT IN AN ORGANIZED HEALTH CARE EDUCATION/TRAINING PROGRAM

## 2019-05-21 PROCEDURE — A4216 STERILE WATER/SALINE, 10 ML: HCPCS | Performed by: INTERNAL MEDICINE

## 2019-05-21 PROCEDURE — 99999 PR PBB SHADOW E&M-EST. PATIENT-LVL II: ICD-10-PCS | Mod: PBBFAC,,, | Performed by: STUDENT IN AN ORGANIZED HEALTH CARE EDUCATION/TRAINING PROGRAM

## 2019-05-21 PROCEDURE — 3077F PR MOST RECENT SYSTOLIC BLOOD PRESSURE >= 140 MM HG: ICD-10-PCS | Mod: CPTII,S$GLB,, | Performed by: STUDENT IN AN ORGANIZED HEALTH CARE EDUCATION/TRAINING PROGRAM

## 2019-05-21 PROCEDURE — 99213 OFFICE O/P EST LOW 20 MIN: CPT | Mod: S$GLB,,, | Performed by: STUDENT IN AN ORGANIZED HEALTH CARE EDUCATION/TRAINING PROGRAM

## 2019-05-21 PROCEDURE — 99999 PR PBB SHADOW E&M-EST. PATIENT-LVL II: CPT | Mod: PBBFAC,,, | Performed by: STUDENT IN AN ORGANIZED HEALTH CARE EDUCATION/TRAINING PROGRAM

## 2019-05-21 PROCEDURE — 3077F SYST BP >= 140 MM HG: CPT | Mod: CPTII,S$GLB,, | Performed by: STUDENT IN AN ORGANIZED HEALTH CARE EDUCATION/TRAINING PROGRAM

## 2019-05-21 PROCEDURE — 76881 US EXTREMITY NON VASCULAR COMPLETE RIGHT: ICD-10-PCS | Mod: 26,RT,, | Performed by: RADIOLOGY

## 2019-05-21 PROCEDURE — 76881 US COMPL JOINT R-T W/IMG: CPT | Mod: 26,RT,, | Performed by: RADIOLOGY

## 2019-05-21 PROCEDURE — 3078F DIAST BP <80 MM HG: CPT | Mod: CPTII,S$GLB,, | Performed by: STUDENT IN AN ORGANIZED HEALTH CARE EDUCATION/TRAINING PROGRAM

## 2019-05-21 PROCEDURE — 25000003 PHARM REV CODE 250: Performed by: INTERNAL MEDICINE

## 2019-05-21 PROCEDURE — 96365 THER/PROPH/DIAG IV INF INIT: CPT

## 2019-05-21 PROCEDURE — 99213 PR OFFICE/OUTPT VISIT, EST, LEVL III, 20-29 MIN: ICD-10-PCS | Mod: S$GLB,,, | Performed by: STUDENT IN AN ORGANIZED HEALTH CARE EDUCATION/TRAINING PROGRAM

## 2019-05-21 PROCEDURE — 76881 US COMPL JOINT R-T W/IMG: CPT | Mod: TC,RT

## 2019-05-21 PROCEDURE — 63600175 PHARM REV CODE 636 W HCPCS: Performed by: INTERNAL MEDICINE

## 2019-05-21 RX ORDER — HEPARIN 100 UNIT/ML
500 SYRINGE INTRAVENOUS
Status: CANCELLED | OUTPATIENT
Start: 2019-05-21

## 2019-05-21 RX ORDER — SODIUM CHLORIDE 0.9 % (FLUSH) 0.9 %
10 SYRINGE (ML) INJECTION
Status: CANCELLED | OUTPATIENT
Start: 2019-05-21

## 2019-05-21 RX ORDER — SODIUM CHLORIDE 0.9 % (FLUSH) 0.9 %
10 SYRINGE (ML) INJECTION
Status: DISCONTINUED | OUTPATIENT
Start: 2019-05-21 | End: 2019-05-21 | Stop reason: HOSPADM

## 2019-05-21 RX ADMIN — Medication 10 ML: at 08:05

## 2019-05-21 RX ADMIN — SODIUM CHLORIDE: 9 INJECTION, SOLUTION INTRAVENOUS at 08:05

## 2019-05-21 NOTE — NURSING
Pulse 1 of 1  Solumedrol 500 mg administered IV over 20 min. See MAR and vitals for further details.  Pt tolerated well without adverse events.   Discharged ambulatory from clinic.

## 2019-05-21 NOTE — LETTER
May 21, 2019      Angela Nagel MD  [address]  [place] [address]           [facility]  [address]  [place] [address]  Phone: [phone]  Fax: [phone]          Patient: Ruth Messina   MR Number: [id number]   YOB: 1965   Date of Visit: 5/21/2019       Dear Dr. Angela Nagel:    Thank you for referring Ruth Messina to me for evaluation. Attached you will find relevant portions of my assessment and plan of care.    If you have questions, please do not hesitate to call me. I look forward to following Ruth Messina along with you.    Sincerely,    Berto Boogie MD    Enclosure  CC:  No Recipients    If you would like to receive this communication electronically, please contact [email or web address] or [phone] to request more information on [employer] Link access.    For providers and/or their staff who would like to refer a patient to [facility], please contact us through our one-stop-shop provider referral line, [facility], at [phone].    If you feel you have received this communication in error or would no longer like to receive these types of communications, please e-mail [email or web address]

## 2019-05-21 NOTE — PROGRESS NOTES
Subjective:       Patient ID:  Ruth Messina is a 53 y.o. female who presents for   Chief Complaint   Patient presents with    Skin Discoloration     to right leg x 8 months, swollen and painful, tx topicort cream     HPI: Pt here for follow-up. She has a history of CNS lupus (currently on imuran, prednisone 5 mg qOD, She was dc'd off of benlysta due to headaches. Followed by Dr. Grider, + SHEILA 1:640 speckled, + anti-SSA, Sm, dsDNA, Sm/RNP) and DLE c/ hair loss, last seen on 2/6/19 for hair loss with treatment with ILK injections. She reports that for the past 8 weeks, she has been having a slowly progressively growing painful lesion on the right lower leg with purplish color change. She states that it started off soft and has since become firm and hard, painful to palpation. Denies any trauma to the area or other similar lesions. Has tried topicort to the area with no improvement. No other concerning rash or skin lesions.       Skin Discoloration         Review of Systems   Skin: Negative for rash.        Objective:    Physical Exam   Constitutional: She appears well-developed and well-nourished. No distress.   Neurological: She is alert and oriented to person, place, and time. She is not disoriented.   Psychiatric: She has a normal mood and affect.   Skin:   Areas Examined (abnormalities noted in diagram):   Head / Face Inspection Performed  Neck Inspection Performed  RUE Inspected  LUE Inspection Performed  RLE Inspected  LLE Inspection Performed              Diagram Legend     Erythematous scaling macule/papule c/w actinic keratosis       Vascular papule c/w angioma      Pigmented verrucoid papule/plaque c/w seborrheic keratosis      Yellow umbilicated papule c/w sebaceous hyperplasia      Irregularly shaped tan macule c/w lentigo     1-2 mm smooth white papules consistent with Milia      Movable subcutaneous cyst with punctum c/w epidermal inclusion cyst      Subcutaneous movable cyst c/w pilar cyst       Firm pink to brown papule c/w dermatofibroma      Pedunculated fleshy papule(s) c/w skin tag(s)      Evenly pigmented macule c/w junctional nevus     Mildly variegated pigmented, slightly irregular-bordered macule c/w mildly atypical nevus      Flesh colored to evenly pigmented papule c/w intradermal nevus       Pink pearly papule/plaque c/w basal cell carcinoma      Erythematous hyperkeratotic cursted plaque c/w SCC      Surgical scar with no sign of skin cancer recurrence      Open and closed comedones      Inflammatory papules and pustules      Verrucoid papule consistent consistent with wart     Erythematous eczematous patches and plaques     Dystrophic onycholytic nail with subungual debris c/w onychomycosis     Umbilicated papule    Erythematous-base heme-crusted tan verrucoid plaque consistent with inflamed seborrheic keratosis     Erythematous Silvery Scaling Plaque c/w Psoriasis     See annotation      Assessment / Plan:        Subcutaneous nodule - Unknown etiology; concerning for possible connective tissue disease vs morphea vs vascular vs cystic in nature. Discussed treatment options including biopsy vs imaging. Will proceed with imaging to better define nature of area involved and further management pending biopsy results.   -     US Extremity Non Vascular Complete Right; Future; Expected date: 05/21/2019       Follow up in about 1 month (around 6/18/2019).

## 2019-05-21 NOTE — PROGRESS NOTES
Patient brought in a Settlement Competency Statement to be completed by Dr. Turner. Dr. Turner filled out the form, a copy was made to be sent to scanning and the original was given to the patient.

## 2019-05-29 ENCOUNTER — OFFICE VISIT (OUTPATIENT)
Dept: DERMATOLOGY | Facility: CLINIC | Age: 54
End: 2019-05-29
Payer: COMMERCIAL

## 2019-05-29 DIAGNOSIS — L30.9 DERMATITIS: Primary | ICD-10-CM

## 2019-05-29 PROCEDURE — 88342 IMHCHEM/IMCYTCHM 1ST ANTB: CPT | Performed by: PATHOLOGY

## 2019-05-29 PROCEDURE — 88313 TISSUE SPECIMEN TO PATHOLOGY, DERMATOLOGY: ICD-10-PCS | Mod: 26,,, | Performed by: PATHOLOGY

## 2019-05-29 PROCEDURE — 11104 PUNCH BX SKIN SINGLE LESION: CPT | Mod: S$GLB,,, | Performed by: STUDENT IN AN ORGANIZED HEALTH CARE EDUCATION/TRAINING PROGRAM

## 2019-05-29 PROCEDURE — 88313 SPECIAL STAINS GROUP 2: CPT | Mod: 26,,, | Performed by: PATHOLOGY

## 2019-05-29 PROCEDURE — 88305 TISSUE EXAM BY PATHOLOGIST: CPT | Performed by: PATHOLOGY

## 2019-05-29 PROCEDURE — 88342 TISSUE SPECIMEN TO PATHOLOGY, DERMATOLOGY: ICD-10-PCS | Mod: 26,,, | Performed by: PATHOLOGY

## 2019-05-29 PROCEDURE — 88305 TISSUE SPECIMEN TO PATHOLOGY, DERMATOLOGY: ICD-10-PCS | Mod: 26,,, | Performed by: PATHOLOGY

## 2019-05-29 PROCEDURE — 88342 IMHCHEM/IMCYTCHM 1ST ANTB: CPT | Mod: 26,,, | Performed by: PATHOLOGY

## 2019-05-29 PROCEDURE — 99999 PR PBB SHADOW E&M-EST. PATIENT-LVL II: ICD-10-PCS | Mod: PBBFAC,,, | Performed by: STUDENT IN AN ORGANIZED HEALTH CARE EDUCATION/TRAINING PROGRAM

## 2019-05-29 PROCEDURE — 99999 PR PBB SHADOW E&M-EST. PATIENT-LVL II: CPT | Mod: PBBFAC,,, | Performed by: STUDENT IN AN ORGANIZED HEALTH CARE EDUCATION/TRAINING PROGRAM

## 2019-05-29 PROCEDURE — 99213 OFFICE O/P EST LOW 20 MIN: CPT | Mod: 25,S$GLB,, | Performed by: STUDENT IN AN ORGANIZED HEALTH CARE EDUCATION/TRAINING PROGRAM

## 2019-05-29 PROCEDURE — 99213 PR OFFICE/OUTPT VISIT, EST, LEVL III, 20-29 MIN: ICD-10-PCS | Mod: 25,S$GLB,, | Performed by: STUDENT IN AN ORGANIZED HEALTH CARE EDUCATION/TRAINING PROGRAM

## 2019-05-29 PROCEDURE — 88305 TISSUE EXAM BY PATHOLOGIST: CPT | Mod: 26,,, | Performed by: PATHOLOGY

## 2019-05-29 PROCEDURE — 11104 PR PUNCH BIOPSY, SKIN, SINGLE LESION: ICD-10-PCS | Mod: S$GLB,,, | Performed by: STUDENT IN AN ORGANIZED HEALTH CARE EDUCATION/TRAINING PROGRAM

## 2019-05-29 NOTE — PROGRESS NOTES
Subjective:       Patient ID:  Ruth Messina is a 53 y.o. female who presents for   Chief Complaint   Patient presents with    Skin Discoloration     to right leg, pt wants to get biopsy done     HPI: Pt here for follow-up. She has a history of CNS lupus (currently on imuran, prednisone 5 mg qOD, She was dc'd off of benlysta due to headaches. Followed by Dr. Grider, + SHEILA 1:640 speckled, + anti-SSA, Sm, dsDNA, Sm/RNP) and DLE c/ hair loss, last seen on 5/21/19 where she had a very firm, painful plaque on the lower leg. U/S done non specific, here for biopsy of the lesion. No new issues or lesions since last visit.           Skin Discoloration         Review of Systems   Skin: Negative for itching, rash and dry skin.        Objective:    Physical Exam   Constitutional: She appears well-developed and well-nourished. No distress.   Neurological: She is alert and oriented to person, place, and time. She is not disoriented.   Psychiatric: She has a normal mood and affect.   Skin:   Areas Examined (abnormalities noted in diagram):   Head / Face Inspection Performed  Neck Inspection Performed  RUE Inspected  LUE Inspection Performed  RLE Inspected  LLE Inspection Performed              Diagram Legend     Erythematous scaling macule/papule c/w actinic keratosis       Vascular papule c/w angioma      Pigmented verrucoid papule/plaque c/w seborrheic keratosis      Yellow umbilicated papule c/w sebaceous hyperplasia      Irregularly shaped tan macule c/w lentigo     1-2 mm smooth white papules consistent with Milia      Movable subcutaneous cyst with punctum c/w epidermal inclusion cyst      Subcutaneous movable cyst c/w pilar cyst      Firm pink to brown papule c/w dermatofibroma      Pedunculated fleshy papule(s) c/w skin tag(s)      Evenly pigmented macule c/w junctional nevus     Mildly variegated pigmented, slightly irregular-bordered macule c/w mildly atypical nevus      Flesh colored to evenly pigmented papule  c/w intradermal nevus       Pink pearly papule/plaque c/w basal cell carcinoma      Erythematous hyperkeratotic cursted plaque c/w SCC      Surgical scar with no sign of skin cancer recurrence      Open and closed comedones      Inflammatory papules and pustules      Verrucoid papule consistent consistent with wart     Erythematous eczematous patches and plaques     Dystrophic onycholytic nail with subungual debris c/w onychomycosis     Umbilicated papule    Erythematous-base heme-crusted tan verrucoid plaque consistent with inflamed seborrheic keratosis     Erythematous Silvery Scaling Plaque c/w Psoriasis     See annotation      Assessment / Plan:      Pathology Orders:     Normal Orders This Visit    Tissue Specimen To Pathology, Dermatology     Questions:    Directional Terms:  Other(comment)    Clinical Information:  Pt with a history of SLE, now with painful, firm violaceous plaques on the lower legs. Lupus vs morphea vs other    Specific Site:  right lower leg        Dermatitis - concerning for possible lupus profundus vs morphea vs other. Will obtain biopsy for further workup.   -     Tissue Specimen To Pathology, Dermatology    Punch biopsy procedure note:  Punch biopsy performed after verbal consent obtained. Area marked and prepped with alcohol. Approximately 1cc of 1% lidocaine with epinephrine injected. 4 mm disposable punch used to remove lesion. Hemostasis obtained and biopsy site closed with 1 - 2 Prolene sutures. Wound care instructions reviewed with patient and handout given.    Further management pending biopsy results.          Follow up in about 3 months (around 8/29/2019).

## 2019-06-05 ENCOUNTER — TELEPHONE (OUTPATIENT)
Dept: DERMATOLOGY | Facility: CLINIC | Age: 54
End: 2019-06-05

## 2019-06-05 NOTE — TELEPHONE ENCOUNTER
----- Message from Edith Moon sent at 6/5/2019  3:16 PM CDT -----  Contact: self [phone]  .Type:  Patient Returning Call    Who Called:Ruth Messina  Who Left Message for Patient:pt does not know  Does the patient know what this is regarding?:no  Would the patient rather a call back or a response via [facility]? Call back  Best Call Back Number:[phone]  Additional Information:

## 2019-06-10 ENCOUNTER — TELEPHONE (OUTPATIENT)
Dept: RHEUMATOLOGY | Facility: CLINIC | Age: 54
End: 2019-06-10

## 2019-06-10 NOTE — TELEPHONE ENCOUNTER
----- Message from Alvarado Ordonez sent at 6/10/2019 10:02 AM CDT -----  Contact: [phone]  Pt would like a call back regarding a question for . Please call back at [phone]. Thx Kj

## 2019-06-10 NOTE — TELEPHONE ENCOUNTER
Patient called wondering if fluids are done in infusion. Patient has been drinking 60-80ml a day and states that her lips feel dry. I informed her that she would have to go elsewhere to get just fluids administered to her via IV. Patient verbalized understanding.

## 2019-06-12 ENCOUNTER — CLINICAL SUPPORT (OUTPATIENT)
Dept: DERMATOLOGY | Facility: CLINIC | Age: 54
End: 2019-06-12
Payer: COMMERCIAL

## 2019-06-12 DIAGNOSIS — Z48.02 VISIT FOR SUTURE REMOVAL: Primary | ICD-10-CM

## 2019-06-12 PROCEDURE — 99024 PR POST-OP FOLLOW-UP VISIT: ICD-10-PCS | Mod: S$GLB,,, | Performed by: STUDENT IN AN ORGANIZED HEALTH CARE EDUCATION/TRAINING PROGRAM

## 2019-06-12 PROCEDURE — 99024 POSTOP FOLLOW-UP VISIT: CPT | Mod: S$GLB,,, | Performed by: STUDENT IN AN ORGANIZED HEALTH CARE EDUCATION/TRAINING PROGRAM

## 2019-06-27 ENCOUNTER — TELEPHONE (OUTPATIENT)
Dept: RHEUMATOLOGY | Facility: CLINIC | Age: 54
End: 2019-06-27

## 2019-06-27 NOTE — TELEPHONE ENCOUNTER
----- Message from Courtney Arnold sent at 6/27/2019  2:57 PM CDT -----  Contact: Stone Vickers  Requesting a call back regarding patient and application for the co-pay plan. She states that the patient has not sent back her application and forms and if it's not received by Tuesday, 07/02/19 she will be discontinued from the program. She states that she just needs her signature. Please call back at [phone]

## 2019-06-27 NOTE — TELEPHONE ENCOUNTER
Spoke with [employer] representative, Jen, she states she will fax over pt application to get a signature. Tried to call pt and she did not answer.

## 2019-07-03 ENCOUNTER — TELEPHONE (OUTPATIENT)
Dept: DERMATOLOGY | Facility: CLINIC | Age: 54
End: 2019-07-03

## 2019-07-03 NOTE — TELEPHONE ENCOUNTER
----- Message from Christel Alfred sent at 7/3/2019  7:44 AM CDT -----  Contact: pt  Please call pt @ [phone] regarding appt missed today.

## 2019-07-10 ENCOUNTER — OFFICE VISIT (OUTPATIENT)
Dept: DERMATOLOGY | Facility: CLINIC | Age: 54
End: 2019-07-10
Payer: COMMERCIAL

## 2019-07-10 DIAGNOSIS — M79.3 LIPODERMATOSCLEROSIS: Primary | ICD-10-CM

## 2019-07-10 PROCEDURE — 99999 PR PBB SHADOW E&M-EST. PATIENT-LVL II: ICD-10-PCS | Mod: PBBFAC,,, | Performed by: STUDENT IN AN ORGANIZED HEALTH CARE EDUCATION/TRAINING PROGRAM

## 2019-07-10 PROCEDURE — 99213 OFFICE O/P EST LOW 20 MIN: CPT | Mod: S$GLB,,, | Performed by: STUDENT IN AN ORGANIZED HEALTH CARE EDUCATION/TRAINING PROGRAM

## 2019-07-10 PROCEDURE — 99213 PR OFFICE/OUTPT VISIT, EST, LEVL III, 20-29 MIN: ICD-10-PCS | Mod: S$GLB,,, | Performed by: STUDENT IN AN ORGANIZED HEALTH CARE EDUCATION/TRAINING PROGRAM

## 2019-07-10 PROCEDURE — 99999 PR PBB SHADOW E&M-EST. PATIENT-LVL II: CPT | Mod: PBBFAC,,, | Performed by: STUDENT IN AN ORGANIZED HEALTH CARE EDUCATION/TRAINING PROGRAM

## 2019-07-10 NOTE — PROGRESS NOTES
Subjective:       Patient ID:  Ruth Messina is a 53 y.o. female who presents for   Chief Complaint   Patient presents with    Skin Discoloration     f/u biopsy results to skin discoloration to legs x 1 yr tx none      History of Present Illness: The patient presents for follow-up of skin lesion on the lower leg. She was last seen on 5/29/19 where a biopsy was done of a firm lesion on the right lower leg, consistent with lipodermatosclerosis. Here for discussion of diagnosis. Patient denies any new lesions, spreading of existing lesion, or other concerning symptoms.     Of note, has a history of CNS lupus (currently on imuran, prednisone 5 mg qOD, She was dc'd off of benlysta due to headaches. Followed by Dr. Grider, + SHEILA 1:640 speckled, + anti-SSA, Sm, dsDNA, Sm/RNP) and DLE c/ hair loss      Review of Systems   Skin: Negative for itching, rash and dry skin.        Objective:    Physical Exam   Constitutional: She appears well-developed and well-nourished. No distress.   Neurological: She is alert and oriented to person, place, and time. She is not disoriented.   Psychiatric: She has a normal mood and affect.   Skin:   Areas Examined (abnormalities noted in diagram):   Head / Face Inspection Performed  Neck Inspection Performed  RUE Inspected  LUE Inspection Performed  RLE Inspected  LLE Inspection Performed              Diagram Legend     Erythematous scaling macule/papule c/w actinic keratosis       Vascular papule c/w angioma      Pigmented verrucoid papule/plaque c/w seborrheic keratosis      Yellow umbilicated papule c/w sebaceous hyperplasia      Irregularly shaped tan macule c/w lentigo     1-2 mm smooth white papules consistent with Milia      Movable subcutaneous cyst with punctum c/w epidermal inclusion cyst      Subcutaneous movable cyst c/w pilar cyst      Firm pink to brown papule c/w dermatofibroma      Pedunculated fleshy papule(s) c/w skin tag(s)      Evenly pigmented macule c/w junctional  nevus     Mildly variegated pigmented, slightly irregular-bordered macule c/w mildly atypical nevus      Flesh colored to evenly pigmented papule c/w intradermal nevus       Pink pearly papule/plaque c/w basal cell carcinoma      Erythematous hyperkeratotic cursted plaque c/w SCC      Surgical scar with no sign of skin cancer recurrence      Open and closed comedones      Inflammatory papules and pustules      Verrucoid papule consistent consistent with wart     Erythematous eczematous patches and plaques     Dystrophic onycholytic nail with subungual debris c/w onychomycosis     Umbilicated papule    Erythematous-base heme-crusted tan verrucoid plaque consistent with inflamed seborrheic keratosis     Erythematous Silvery Scaling Plaque c/w Psoriasis     See annotation      Assessment / Plan:        Lipodermatosclerosis - Discussed diagnosis, prognosis, and treatment options, including compression. Lesion is not progressive and no further lesions have developed. Will monitor and watch closely.          Follow up in about 3 months (around 10/10/2019).

## 2019-07-21 DIAGNOSIS — M32.19 CNS LUPUS: ICD-10-CM

## 2019-07-21 DIAGNOSIS — G05.3 CNS LUPUS: ICD-10-CM

## 2019-07-22 RX ORDER — PREDNISONE 5 MG/1
TABLET ORAL
Qty: 30 TABLET | Refills: 0 | Status: SHIPPED | OUTPATIENT
Start: 2019-07-22 | End: 2019-07-23 | Stop reason: SDUPTHER

## 2019-07-23 ENCOUNTER — TELEPHONE (OUTPATIENT)
Dept: DERMATOLOGY | Facility: CLINIC | Age: 54
End: 2019-07-23

## 2019-07-23 DIAGNOSIS — M32.19 CNS LUPUS: ICD-10-CM

## 2019-07-23 DIAGNOSIS — G05.3 CNS LUPUS: ICD-10-CM

## 2019-07-23 RX ORDER — PREDNISONE 5 MG/1
5 TABLET ORAL DAILY
Qty: 30 TABLET | Refills: 0 | Status: SHIPPED | OUTPATIENT
Start: 2019-07-23 | End: 2020-03-29

## 2019-07-23 NOTE — TELEPHONE ENCOUNTER
Called and spoke to patient regarding her concerns about biopsy results, diagnosis, and potential for any further workup. Patient concerned regarding protein deficiency leading to symptoms. Discussed concerns and likely cause for symptoms.

## 2019-07-23 NOTE — TELEPHONE ENCOUNTER
----- Message from Courtney Arnold sent at 7/23/2019  9:26 AM CDT -----  Contact: self  Patient requesting a call back to ask a question concerning her last visit. Please call back at [phone].

## 2019-07-23 NOTE — TELEPHONE ENCOUNTER
Returned call to pt and she states she has some additional questions for Dr Boogie that she forgot to ask her at the last office visit.  Pt has some concerns about the dx of lipodermatosclerosis.  Pt would like a clear explanation of some enzymes that may be a coralization with it.  Please advise

## 2019-07-31 ENCOUNTER — LAB VISIT (OUTPATIENT)
Dept: LAB | Facility: HOSPITAL | Age: 54
End: 2019-07-31
Attending: INTERNAL MEDICINE
Payer: COMMERCIAL

## 2019-07-31 DIAGNOSIS — M32.19 OTHER SYSTEMIC LUPUS ERYTHEMATOSUS WITH OTHER ORGAN INVOLVEMENT: ICD-10-CM

## 2019-07-31 DIAGNOSIS — D63.8 ANEMIA OF CHRONIC DISEASE: ICD-10-CM

## 2019-07-31 DIAGNOSIS — D64.9 NORMOCYTIC ANEMIA: ICD-10-CM

## 2019-07-31 LAB
ALBUMIN SERPL BCP-MCNC: 3.8 G/DL (ref 3.5–5.2)
ALP SERPL-CCNC: 115 U/L (ref 55–135)
ALT SERPL W/O P-5'-P-CCNC: 25 U/L (ref 10–44)
ANION GAP SERPL CALC-SCNC: 9 MMOL/L (ref 8–16)
AST SERPL-CCNC: 26 U/L (ref 10–40)
BASOPHILS # BLD AUTO: 0.01 K/UL (ref 0–0.2)
BASOPHILS NFR BLD: 0.3 % (ref 0–1.9)
BILIRUB SERPL-MCNC: 0.7 MG/DL (ref 0.1–1)
BUN SERPL-MCNC: 15 MG/DL (ref 6–20)
C3 SERPL-MCNC: 123 MG/DL (ref 50–180)
C4 SERPL-MCNC: 15 MG/DL (ref 11–44)
CALCIUM SERPL-MCNC: 10 MG/DL (ref 8.7–10.5)
CHLORIDE SERPL-SCNC: 107 MMOL/L (ref 95–110)
CO2 SERPL-SCNC: 26 MMOL/L (ref 23–29)
CREAT SERPL-MCNC: 1.1 MG/DL (ref 0.5–1.4)
CRP SERPL-MCNC: 1.6 MG/L (ref 0–8.2)
DIFFERENTIAL METHOD: ABNORMAL
EOSINOPHIL # BLD AUTO: 0 K/UL (ref 0–0.5)
EOSINOPHIL NFR BLD: 0.3 % (ref 0–8)
ERYTHROCYTE [DISTWIDTH] IN BLOOD BY AUTOMATED COUNT: 13.8 % (ref 11.5–14.5)
ERYTHROCYTE [SEDIMENTATION RATE] IN BLOOD BY WESTERGREN METHOD: 52 MM/HR (ref 0–36)
EST. GFR  (AFRICAN AMERICAN): >60 ML/MIN/1.73 M^2
EST. GFR  (NON AFRICAN AMERICAN): 57 ML/MIN/1.73 M^2
GLUCOSE SERPL-MCNC: 100 MG/DL (ref 70–110)
HCT VFR BLD AUTO: 34.9 % (ref 37–48.5)
HGB BLD-MCNC: 10.9 G/DL (ref 12–16)
IMM GRANULOCYTES # BLD AUTO: 0.01 K/UL (ref 0–0.04)
IMM GRANULOCYTES NFR BLD AUTO: 0.3 % (ref 0–0.5)
LYMPHOCYTES # BLD AUTO: 1.2 K/UL (ref 1–4.8)
LYMPHOCYTES NFR BLD: 34.6 % (ref 18–48)
MCH RBC QN AUTO: 27.9 PG (ref 27–31)
MCHC RBC AUTO-ENTMCNC: 31.2 G/DL (ref 32–36)
MCV RBC AUTO: 90 FL (ref 82–98)
MONOCYTES # BLD AUTO: 0.2 K/UL (ref 0.3–1)
MONOCYTES NFR BLD: 6.3 % (ref 4–15)
NEUTROPHILS # BLD AUTO: 2.1 K/UL (ref 1.8–7.7)
NEUTROPHILS NFR BLD: 58.5 % (ref 38–73)
NRBC BLD-RTO: 0 /100 WBC
PLATELET # BLD AUTO: 203 K/UL (ref 150–350)
PMV BLD AUTO: 9.8 FL (ref 9.2–12.9)
POTASSIUM SERPL-SCNC: 4.1 MMOL/L (ref 3.5–5.1)
PROT SERPL-MCNC: 8.1 G/DL (ref 6–8.4)
RBC # BLD AUTO: 3.9 M/UL (ref 4–5.4)
SODIUM SERPL-SCNC: 142 MMOL/L (ref 136–145)
WBC # BLD AUTO: 3.5 K/UL (ref 3.9–12.7)

## 2019-07-31 PROCEDURE — 86160 COMPLEMENT ANTIGEN: CPT | Mod: 59

## 2019-07-31 PROCEDURE — 85025 COMPLETE CBC W/AUTO DIFF WBC: CPT

## 2019-07-31 PROCEDURE — 86039 ANTINUCLEAR ANTIBODIES (ANA): CPT

## 2019-07-31 PROCEDURE — 80053 COMPREHEN METABOLIC PANEL: CPT

## 2019-07-31 PROCEDURE — 86140 C-REACTIVE PROTEIN: CPT

## 2019-07-31 PROCEDURE — 85652 RBC SED RATE AUTOMATED: CPT

## 2019-07-31 PROCEDURE — 86235 NUCLEAR ANTIGEN ANTIBODY: CPT | Mod: 59

## 2019-07-31 PROCEDURE — 86038 ANTINUCLEAR ANTIBODIES: CPT

## 2019-07-31 PROCEDURE — 36415 COLL VENOUS BLD VENIPUNCTURE: CPT

## 2019-07-31 PROCEDURE — 86160 COMPLEMENT ANTIGEN: CPT

## 2019-08-01 LAB
ANA SER QL IF: POSITIVE
ANA TITR SER IF: NORMAL {TITER}

## 2019-08-05 LAB
ANTI SM ANTIBODY: 14.05 EU (ref 0–19.99)
ANTI SM/RNP ANTIBODY: 172.78 EU (ref 0–19.99)
ANTI-SM INTERPRETATION: NEGATIVE
ANTI-SM/RNP INTERPRETATION: POSITIVE
ANTI-SSA ANTIBODY: 5.91 EU (ref 0–19.99)
ANTI-SSA INTERPRETATION: NEGATIVE
ANTI-SSB ANTIBODY: 5.54 EU (ref 0–19.99)
ANTI-SSB INTERPRETATION: NEGATIVE
DSDNA AB SER-ACNC: ABNORMAL [IU]/ML

## 2019-08-07 ENCOUNTER — PATIENT MESSAGE (OUTPATIENT)
Dept: OBSTETRICS AND GYNECOLOGY | Facility: CLINIC | Age: 54
End: 2019-08-07

## 2019-08-07 ENCOUNTER — PATIENT MESSAGE (OUTPATIENT)
Dept: DERMATOLOGY | Facility: CLINIC | Age: 54
End: 2019-08-07

## 2019-08-07 ENCOUNTER — PATIENT MESSAGE (OUTPATIENT)
Dept: RHEUMATOLOGY | Facility: CLINIC | Age: 54
End: 2019-08-07

## 2019-08-07 DIAGNOSIS — Z12.31 ENCOUNTER FOR SCREENING MAMMOGRAM FOR BREAST CANCER: Primary | ICD-10-CM

## 2019-08-12 ENCOUNTER — PATIENT MESSAGE (OUTPATIENT)
Dept: INTERNAL MEDICINE | Facility: CLINIC | Age: 54
End: 2019-08-12

## 2019-08-14 ENCOUNTER — PATIENT MESSAGE (OUTPATIENT)
Dept: OBSTETRICS AND GYNECOLOGY | Facility: CLINIC | Age: 54
End: 2019-08-14

## 2019-08-14 ENCOUNTER — TELEPHONE (OUTPATIENT)
Dept: INTERNAL MEDICINE | Facility: CLINIC | Age: 54
End: 2019-08-14

## 2019-08-14 ENCOUNTER — HOSPITAL ENCOUNTER (OUTPATIENT)
Dept: RADIOLOGY | Facility: HOSPITAL | Age: 54
Discharge: HOME | End: 2019-08-14
Attending: NURSE PRACTITIONER
Payer: COMMERCIAL

## 2019-08-14 ENCOUNTER — OFFICE VISIT (OUTPATIENT)
Dept: RHEUMATOLOGY | Facility: CLINIC | Age: 54
End: 2019-08-14
Payer: COMMERCIAL

## 2019-08-14 ENCOUNTER — OFFICE VISIT (OUTPATIENT)
Dept: INTERNAL MEDICINE | Facility: CLINIC | Age: 54
End: 2019-08-14
Payer: COMMERCIAL

## 2019-08-14 ENCOUNTER — TELEPHONE (OUTPATIENT)
Dept: OBSTETRICS AND GYNECOLOGY | Facility: CLINIC | Age: 54
End: 2019-08-14

## 2019-08-14 ENCOUNTER — OFFICE VISIT (OUTPATIENT)
Dept: OBSTETRICS AND GYNECOLOGY | Facility: CLINIC | Age: 54
End: 2019-08-14
Payer: COMMERCIAL

## 2019-08-14 VITALS
SYSTOLIC BLOOD PRESSURE: 122 MMHG | HEIGHT: 66 IN | WEIGHT: 181 LBS | BODY MASS INDEX: 29.09 KG/M2 | DIASTOLIC BLOOD PRESSURE: 80 MMHG

## 2019-08-14 VITALS
HEART RATE: 101 BPM | HEIGHT: 66 IN | OXYGEN SATURATION: 98 % | WEIGHT: 181.44 LBS | BODY MASS INDEX: 29.16 KG/M2 | DIASTOLIC BLOOD PRESSURE: 84 MMHG | SYSTOLIC BLOOD PRESSURE: 126 MMHG | TEMPERATURE: 99 F

## 2019-08-14 VITALS
HEIGHT: 66 IN | WEIGHT: 181.88 LBS | HEART RATE: 88 BPM | BODY MASS INDEX: 29.23 KG/M2 | DIASTOLIC BLOOD PRESSURE: 86 MMHG | SYSTOLIC BLOOD PRESSURE: 133 MMHG

## 2019-08-14 DIAGNOSIS — Z01.419 ENCOUNTER FOR GYNECOLOGICAL EXAMINATION WITHOUT ABNORMAL FINDING: Primary | ICD-10-CM

## 2019-08-14 DIAGNOSIS — M32.9 SYSTEMIC LUPUS ERYTHEMATOSUS ARTHRITIS: Primary | ICD-10-CM

## 2019-08-14 DIAGNOSIS — D84.9 IMMUNOSUPPRESSED STATUS: ICD-10-CM

## 2019-08-14 DIAGNOSIS — M32.19 CNS LUPUS: ICD-10-CM

## 2019-08-14 DIAGNOSIS — G05.3 CNS LUPUS: ICD-10-CM

## 2019-08-14 DIAGNOSIS — Z12.39 BREAST CANCER SCREENING: Primary | ICD-10-CM

## 2019-08-14 DIAGNOSIS — M35.00 SECONDARY SJOGREN'S SYNDROME: ICD-10-CM

## 2019-08-14 DIAGNOSIS — Z79.899 LONG-TERM CURRENT USE OF HIGH RISK MEDICATION OTHER THAN ANTICOAGULANT: ICD-10-CM

## 2019-08-14 DIAGNOSIS — Z12.31 ENCOUNTER FOR SCREENING MAMMOGRAM FOR BREAST CANCER: ICD-10-CM
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
## 2019-08-14 PROCEDURE — 99999 PR PBB SHADOW E&M-EST. PATIENT-LVL III: ICD-10-PCS | Mod: PBBFAC,,, | Performed by: FAMILY MEDICINE

## 2019-08-14 PROCEDURE — 99396 PR PREVENTIVE VISIT,EST,40-64: ICD-10-PCS | Mod: S$GLB,,, | Performed by: NURSE PRACTITIONER

## 2019-08-14 PROCEDURE — 3075F PR MOST RECENT SYSTOLIC BLOOD PRESS GE 130-139MM HG: ICD-10-PCS | Mod: CPTII,S$GLB,, | Performed by: INTERNAL MEDICINE

## 2019-08-14 PROCEDURE — 3079F DIAST BP 80-89 MM HG: CPT | Mod: CPTII,S$GLB,, | Performed by: NURSE PRACTITIONER

## 2019-08-14 PROCEDURE — 88175 CYTOPATH C/V AUTO FLUID REDO: CPT

## 2019-08-14 PROCEDURE — 99214 PR OFFICE/OUTPT VISIT, EST, LEVL IV, 30-39 MIN: ICD-10-PCS | Mod: S$GLB,,, | Performed by: FAMILY MEDICINE

## 2019-08-14 PROCEDURE — 99396 PREV VISIT EST AGE 40-64: CPT | Mod: S$GLB,,, | Performed by: NURSE PRACTITIONER

## 2019-08-14 PROCEDURE — 99214 OFFICE O/P EST MOD 30 MIN: CPT | Mod: S$GLB,,, | Performed by: FAMILY MEDICINE

## 2019-08-14 PROCEDURE — 3079F DIAST BP 80-89 MM HG: CPT | Mod: CPTII,S$GLB,, | Performed by: FAMILY MEDICINE

## 2019-08-14 PROCEDURE — 3008F BODY MASS INDEX DOCD: CPT | Mod: CPTII,S$GLB,, | Performed by: FAMILY MEDICINE

## 2019-08-14 PROCEDURE — 3008F PR BODY MASS INDEX (BMI) DOCUMENTED: ICD-10-PCS | Mod: CPTII,S$GLB,, | Performed by: FAMILY MEDICINE

## 2019-08-14 PROCEDURE — 3075F SYST BP GE 130 - 139MM HG: CPT | Mod: CPTII,S$GLB,, | Performed by: INTERNAL MEDICINE

## 2019-08-14 PROCEDURE — 99215 PR OFFICE/OUTPT VISIT, EST, LEVL V, 40-54 MIN: ICD-10-PCS | Mod: S$GLB,,, | Performed by: INTERNAL MEDICINE

## 2019-08-14 PROCEDURE — 99215 OFFICE O/P EST HI 40 MIN: CPT | Mod: S$GLB,,, | Performed by: INTERNAL MEDICINE

## 2019-08-14 PROCEDURE — 99999 PR PBB SHADOW E&M-EST. PATIENT-LVL IV: CPT | Mod: PBBFAC,,, | Performed by: NURSE PRACTITIONER

## 2019-08-14 PROCEDURE — 99999 PR PBB SHADOW E&M-EST. PATIENT-LVL IV: ICD-10-PCS | Mod: PBBFAC,,, | Performed by: NURSE PRACTITIONER

## 2019-08-14 PROCEDURE — 3008F BODY MASS INDEX DOCD: CPT | Mod: CPTII,S$GLB,, | Performed by: INTERNAL MEDICINE

## 2019-08-14 PROCEDURE — 99999 PR PBB SHADOW E&M-EST. PATIENT-LVL III: CPT | Mod: PBBFAC,,, | Performed by: FAMILY MEDICINE

## 2019-08-14 PROCEDURE — 3079F DIAST BP 80-89 MM HG: CPT | Mod: CPTII,S$GLB,, | Performed by: INTERNAL MEDICINE

## 2019-08-14 PROCEDURE — 3074F PR MOST RECENT SYSTOLIC BLOOD PRESSURE < 130 MM HG: ICD-10-PCS | Mod: CPTII,S$GLB,, | Performed by: FAMILY MEDICINE

## 2019-08-14 PROCEDURE — 99999 PR PBB SHADOW E&M-EST. PATIENT-LVL III: CPT | Mod: PBBFAC,,, | Performed by: INTERNAL MEDICINE

## 2019-08-14 PROCEDURE — 99999 PR PBB SHADOW E&M-EST. PATIENT-LVL III: ICD-10-PCS | Mod: PBBFAC,,, | Performed by: INTERNAL MEDICINE

## 2019-08-14 PROCEDURE — 3074F SYST BP LT 130 MM HG: CPT | Mod: CPTII,S$GLB,, | Performed by: NURSE PRACTITIONER

## 2019-08-14 PROCEDURE — 3074F SYST BP LT 130 MM HG: CPT | Mod: CPTII,S$GLB,, | Performed by: FAMILY MEDICINE

## 2019-08-14 PROCEDURE — 3008F PR BODY MASS INDEX (BMI) DOCUMENTED: ICD-10-PCS | Mod: CPTII,S$GLB,, | Performed by: INTERNAL MEDICINE

## 2019-08-14 PROCEDURE — 3074F PR MOST RECENT SYSTOLIC BLOOD PRESSURE < 130 MM HG: ICD-10-PCS | Mod: CPTII,S$GLB,, | Performed by: NURSE PRACTITIONER

## 2019-08-14 PROCEDURE — 3079F PR MOST RECENT DIASTOLIC BLOOD PRESSURE 80-89 MM HG: ICD-10-PCS | Mod: CPTII,S$GLB,, | Performed by: FAMILY MEDICINE

## 2019-08-14 PROCEDURE — 3079F PR MOST RECENT DIASTOLIC BLOOD PRESSURE 80-89 MM HG: ICD-10-PCS | Mod: CPTII,S$GLB,, | Performed by: INTERNAL MEDICINE

## 2019-08-14 PROCEDURE — 3079F PR MOST RECENT DIASTOLIC BLOOD PRESSURE 80-89 MM HG: ICD-10-PCS | Mod: CPTII,S$GLB,, | Performed by: NURSE PRACTITIONER

## 2019-08-14 NOTE — PROGRESS NOTES
CC: Well woman exam    Ruth Messina is a 53 y.o. female  presents for well woman exam.  LMP: No LMP recorded. Patient is postmenopausal..  No issues, problems, or complaints.    MMG today.     Past Medical History:   Diagnosis Date    Anemia     with chronic disease lupus    Encounter for blood transfusion     G-6-PD deficiency     Heart murmur     MVP 1986    Hypertension     Lupus     Raynaud's syndrome     Systemic lupus erythematosus arthritis 2018     Past Surgical History:   Procedure Laterality Date    BONE MARROW BIOPSY      CARPAL TUNNEL RELEASE Bilateral     RELEASE-CARPAL TUNNEL Right 2017    [facility]    RELEASE-CARPAL TUNNEL Left 3/24/2017    [facility]    widsom tooth extraction       Social History     Socioeconomic History    Marital status: Single     Spouse name: Not on file    Number of children: Not on file    Years of education: Not on file    Highest education level: Not on file   Occupational History    Not on file   Social Needs    Financial resource strain: Not on file    Food insecurity:     Worry: Not on file     Inability: Not on file    Transportation needs:     Medical: Not on file     Non-medical: Not on file   Tobacco Use    Smoking status: Never Smoker    Smokeless tobacco: Never Used   Substance and Sexual Activity    Alcohol use: No    Drug use: No    Sexual activity: Not Currently   Lifestyle    Physical activity:     Days per week: Not on file     Minutes per session: Not on file    Stress: Not on file   Relationships    Social connections:     Talks on phone: Not on file     Gets together: Not on file     Attends Jain service: Not on file     Active member of club or organization: Not on file     Attends meetings of clubs or organizations: Not on file     Relationship status: Not on file   Other Topics Concern    Are you pregnant or think you may be? No     "Breast-feeding No   Social History Narrative    Not on file     Family History   Problem Relation Age of Onset    Arthritis Mother     Heart disease Mother     Hypertension Mother     Diabetes Father     Heart disease Father     Hypertension Father     Cancer Brother     Eczema Neg Hx     Lupus Neg Hx     Psoriasis Neg Hx      OB History        2    Para   2    Term   2            AB        Living   2       SAB        TAB        Ectopic        Multiple        Live Births                     /80   Ht 5' 6" (1.676 m)   Wt 82.1 kg (181 lb)   BMI 29.21 kg/m²       ROS:  GENERAL: Denies weight gain or weight loss. Feeling well overall.   SKIN: Denies rash or lesions.   HEAD: Denies head injury or headache.   NODES: Denies enlarged lymph nodes.   CHEST: Denies chest pain or shortness of breath.   CARDIOVASCULAR: Denies palpitations or left sided chest pain.   ABDOMEN: No abdominal pain, constipation, diarrhea, nausea, vomiting or rectal bleeding.   URINARY: No frequency, dysuria, hematuria, or burning on urination.  REPRODUCTIVE: See HPI.   BREASTS: The patient performs breast self-examination and denies pain, lumps, or nipple discharge.   HEMATOLOGIC: No easy bruisability or excessive bleeding.   MUSCULOSKELETAL: Denies joint pain or swelling.   NEUROLOGIC: Denies syncope or weakness.   PSYCHIATRIC: Denies depression, anxiety or mood swings.    PHYSICAL EXAM:  APPEARANCE: Well nourished, well developed, in no acute distress.  AFFECT: WNL, alert and oriented x 3  SKIN: No acne or hirsutism  NECK: Neck symmetric without masses or thyromegaly  NODES: No inguinal, cervical, axillary, or femoral lymph node enlargement  CHEST: Good respiratory effect  ABDOMEN: Soft.  No tenderness or masses.  No hepatosplenomegaly.  No hernias.  BREASTS: Symmetrical, no skin changes or visible lesions.  No palpable masses, nipple discharge bilaterally.  PELVIC: Normal external genitalia without lesions.  " Normal hair distribution.  Adequate perineal body, normal urethral meatus.  Vagina moist and well rugated without lesions or discharge.  Cervix pink, without lesions, discharge or tenderness.  No significant cystocele or rectocele.  Bimanual exam shows uterus to be normal size, regular, mobile and nontender.  Adnexa without masses or tenderness.    EXTREMITIES: No edema.  Physical Exam    1. Encounter for gynecological examination without abnormal finding  Liquid-based pap smear, screening    AND PLAN:    Patient was counseled today on A.C.S. Pap guidelines and recommendations for yearly pelvic exams, mammograms and monthly self breast exams; to see her PCP for other health maintenance.

## 2019-08-14 NOTE — TELEPHONE ENCOUNTER
----- Message from Guicho Ibarra sent at 8/14/2019 11:48 AM CDT -----  Contact: pt   Pt called in regards to speaking with Sultana Do, NP in regards to having a mammogram done. Pt can be reached at [phone] (fxyj) .

## 2019-08-14 NOTE — TELEPHONE ENCOUNTER
Spoke to patient and scheduled her appointment for 08/22/19 at 11:15am to get her mammogram at the [place] location. Patient verbalized understanding.
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
## 2019-08-14 NOTE — PROGRESS NOTES
RHEUMATOLOGY CLINIC FOLLOW UP VISIT  Chief complaints:-  To follow up for lupus.    HPI:-  Ruth Melendrez a 53 y.o. pleasant female comes in for a follow up visit.  She reports doing well other than mild aches and pains since last visit.  No new headaches or seizures or psychosis.  No joint pain.  No morning stiffness.  No new rash.    Review of Systems   Constitutional: Negative for chills and fever.   HENT: Negative for congestion and sore throat.    Eyes: Negative for blurred vision and redness.   Respiratory: Negative for cough and shortness of breath.    Cardiovascular: Negative for chest pain and leg swelling.   Gastrointestinal: Negative for abdominal pain.   Genitourinary: Negative for dysuria.   Musculoskeletal: Negative for back pain, falls, joint pain, myalgias and neck pain.   Skin: Negative for rash.   Neurological: Negative for headaches.   Endo/Heme/Allergies: Does not bruise/bleed easily.   Psychiatric/Behavioral: Negative for memory loss. The patient does not have insomnia.        Past Medical History:   Diagnosis Date    Anemia     with chronic disease lupus    Encounter for blood transfusion     G-6-PD deficiency     Heart murmur     MVP 1986    Hypertension     Lupus     Raynaud's syndrome     Systemic lupus erythematosus arthritis 5/13/2018       Past Surgical History:   Procedure Laterality Date    BONE MARROW BIOPSY      CARPAL TUNNEL RELEASE Bilateral     RELEASE-CARPAL TUNNEL Right 5/11/2017    [facility]    RELEASE-CARPAL TUNNEL Left 3/24/2017    [facility]    widsom tooth extraction          Social History     Tobacco Use    Smoking status: Never Smoker    Smokeless tobacco: Never Used   Substance Use Topics    Alcohol use: No    Drug use: No       Family History   Problem Relation Age of Onset    Arthritis Mother     Heart disease Mother      "Hypertension Mother     Diabetes Father     Heart disease Father     Hypertension Father     Cancer Brother     Eczema Neg Hx     Lupus Neg Hx     Psoriasis Neg Hx        Review of patient's allergies indicates:   Allergen Reactions    Cellcept [mycophenolate mofetil] Swelling     Lips      Ace inhibitors Other (See Comments)     unkown    Aldactone [spironolactone] Blisters     Mouth soreness with doses greater than 25mg.    Aspirin Other (See Comments)     G-6-PD def.     Azathioprine sodium     Beta-blockers (beta-adrenergic blocking agts) Other (See Comments)     Cannot take due to Raynaud's syndrome    Clindamycin Other (See Comments)     + allergy test    Clonidine Blisters     Mouth sores and tenderness    Hydrochlorothiazide Other (See Comments)     unknown    Ibuprofen Other (See Comments)     G-6-PD def.    Lasix [furosemide] Other (See Comments)     unknown    Lisinopril Other (See Comments)     unknown    Methatropic     Methotrexate analogues Other (See Comments)     Pancytopenia      Norvasc [amlodipine] Other (See Comments)     Jaw pain    Nsaids (non-steroidal anti-inflammatory drug) Other (See Comments)     G-6-PD def.    Plaquenil [hydroxychloroquine] Other (See Comments)     G-6-PD def.    Zofran [ondansetron hcl (pf)] Other (See Comments)     Chest pain      Sulfa (sulfonamide antibiotics) Rash       Vitals:    08/14/19 1041   BP: 133/86   Pulse: 88   Weight: 82.5 kg (181 lb 14.1 oz)   Height: 5' 6" (1.676 m)   PainSc:   4       Physical Exam   Constitutional: She is oriented to person, place, and time and well-developed, well-nourished, and in no distress. No distress.   HENT:   Head: Normocephalic.   Mouth/Throat: Oropharynx is clear and moist.   Eyes: Pupils are equal, round, and reactive to light. Conjunctivae and EOM are normal.   Neck: Normal range of motion. Neck supple.   Cardiovascular: Normal rate and intact distal pulses.   Pulmonary/Chest: Effort normal. No " respiratory distress.   Abdominal: Soft. There is no tenderness.   Musculoskeletal:   No synovitis over small joints of hands or feet.  No effusion over large joints.   Neurological: She is alert and oriented to person, place, and time. No cranial nerve deficit.   Skin: Skin is warm. No rash noted. No erythema.   Psychiatric: Mood and affect normal.   Nursing note and vitals reviewed.      Medication List with Changes/Refills   Current Medications    AZATHIOPRINE (IMURAN) 50 MG TAB    TAKE 1 TABLET BY MOUTH ONCE DAILY    B1/B2/NIACIN/B12/PROTEASE (B-COMPLEX WITH B-12 ORAL)    Take by mouth.    BIOTIN 5,000 MCG TBDL    Take by mouth.    CALCIUM-MAGNESIUM-ZINC ORAL    Take 1 tablet by mouth once daily.    CHOLECALCIFEROL, VITAMIN D3, (VITAMIN D3) 5,000 UNIT TAB    Take 5,000 Units by mouth once daily.    CYCLOBENZAPRINE (FLEXERIL) 10 MG TABLET    Take 1 tablet (10 mg total) by mouth 3 (three) times daily as needed for Muscle spasms.    CYCLOSPORINE (RESTASIS) 0.05 % OPHTHALMIC EMULSION    Place 0.4 mLs (1 drop total) into both eyes 2 (two) times daily.    DESOXIMETASONE (TOPICORT) 0.25 % CREAM    APPLY TO AFFECTED AREA OF THE SCALP ONCE DAILY    EPINEPHRINE (EPIPEN) 0.3 MG/0.3 ML ATIN    Inject 0.3 mLs (0.3 mg total) into the muscle as needed.    FERROUS GLUCONATE (FERGON) 324 MG TABLET    Take 324 mg by mouth daily with breakfast.    FISH OIL-OMEGA-3 FATTY ACIDS 300-1,000 MG CAPSULE    Take 2 g by mouth once daily.    FLUOCINOLONE (DERMA-SMOOTHE) 0.01 % EXTERNAL OIL    Apply oil to scalp once every other day    FLUOCINOLONE AND SHOWER CAP 0.01 % OIL        FLUOXETINE (PROZAC) 20 MG CAPSULE    Take 1 capsule (20 mg total) by mouth once daily.    GABAPENTIN (NEURONTIN) 300 MG CAPSULE    Take 1 capsule (300 mg total) by mouth 3 (three) times daily.    LIFITEGRAST (XIIDRA) 5 % DPET    Apply 1 drop to eye 2 (two) times daily.    LOSARTAN (COZAAR) 100 MG TABLET    Take 0.5 tablets (50 mg total) by mouth once daily.     MULTIVITAMIN CAPSULE    Take 1 capsule by mouth once daily.    PREDNISONE (DELTASONE) 5 MG TABLET    Take 1 tablet (5 mg total) by mouth once daily.    SPIRONOLACTONE (ALDACTONE) 25 MG TABLET    TAKE 2 TABLETS BY MOUTH ONCE DAILY   Discontinued Medications    RESTASIS 0.05 % OPHTHALMIC EMULSION    INSTILL ONE DROP INTO EACH EYE TWICE DAILY       Assessment/Plans:-  1. Systemic lupus erythematosus arthritis    2. CNS lupus    3. Secondary Sjogren's syndrome    4. Long-term current use of high risk medication other than anticoagulant    5. Immunosuppressed status      Problem List Items Addressed This Visit        Immunology/Multi System    CNS lupus    Current Assessment & Plan     Severely progressive systemic lupus with history of neuropsychiatric involvement without any recent headaches.  On Imuran once daily.  Twice daily Imuran caused adverse effects which resolved after reducing the dose to once daily.  Continue Imuran.         Secondary Sjogren's syndrome    Current Assessment & Plan     Stable.  Continue symptomatic         Systemic lupus erythematosus arthritis - Primary    Current Assessment & Plan     No active arthritis today.  But serologically she is getting worse with DS DNA titers going up along with inflammatory markers.  She had adverse effects to higher dose of Imuran.  Could not tolerate Benlysta.  Reports adverse effects to CellCept and methotrexate in the past with external rheumatologist but unclear.  I advised her to try rituximab.  She would like to think more about at.  Given information about the medication.         Immunosuppressed status    Current Assessment & Plan     Compromised immune system secondary to autoimmune disease and use of immunosuppressive drugs. Monitor carefully for infections. Advised to get immediate medical care if any infection. Also advised strict adherence to age appropriate vaccinations and cancer screenings with PCP.             Other    Long-term current use of  high risk medication other than anticoagulant    Current Assessment & Plan     Hold Imuran if any infection.  CBC and CMP shows normal results.             # Follow up in about 8 weeks (around 10/9/2019).      Disclaimer: This note was prepared using voice recognition system and is likely to have sound alike errors and is not proof read.  Please call me with any questions.

## 2019-08-14 NOTE — ASSESSMENT & PLAN NOTE
No active arthritis today.  But serologically she is getting worse with DS DNA titers going up along with inflammatory markers.  She had adverse effects to higher dose of Imuran.  Could not tolerate Benlysta.  Reports adverse effects to CellCept and methotrexate in the past with external rheumatologist but unclear.  I advised her to try rituximab.  She would like to think more about at.  Given information about the medication.
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
## 2019-08-14 NOTE — TELEPHONE ENCOUNTER
Spoke with patient and she was complaining about all the appointments she had scheduled today and was not able to be seen by ob gyn after waiting hours she missed Mammo appointment and all . She not able to make appointment with Dr. Nagel today due to being so frustrated about this morning situation . Offered to reschedule patient on regular schedule and not her Palliative schedule as patient did from her my chart . Patient started talked about how [facility] was stressing her out and she might just switch doctors etc. Pt decided not to reschedule

## 2019-08-14 NOTE — PATIENT INSTRUCTIONS
Rituximab injection  What is this medicine?  RITUXIMAB (ri TUX i mab) is a monoclonal antibody. It is used commonly to treat non-Hodgkin lymphoma and other conditions. It is also used to treat rheumatoid arthritis (RA). In RA, this medicine slows the inflammatory process and help reduce joint pain and swelling. This medicine is often used with other cancer or arthritis medications.  How should I use this medicine?  This medicine is for infusion into a vein. It is administered in a hospital or clinic by a specially trained health care professional.  A special MedGuide will be given to you by the pharmacist with each prescription and refill. Be sure to read this information carefully each time.  Talk to your pediatrician regarding the use of this medicine in children. This medicine is not approved for use in children.  What side effects may I notice from receiving this medicine?  Side effects that you should report to your doctor or health care professional as soon as possible:  · allergic reactions like skin rash, itching or hives, swelling of the face, lips, or tongue  · low blood counts - this medicine may decrease the number of white blood cells, red blood cells and platelets. You may be at increased risk for infections and bleeding.  · signs of infection - fever or chills, cough, sore throat, pain or difficulty passing urine  · signs of decreased platelets or bleeding - bruising, pinpoint red spots on the skin, black, tarry stools, blood in the urine  · signs of decreased red blood cells - unusually weak or tired, fainting spells, lightheadedness  · breathing problems  · confused, not responsive  · chest pain  · fast, irregular heartbeat  · feeling faint or lightheaded, falls  · mouth sores  · redness, blistering, peeling or loosening of the skin, including inside the mouth  · stomach pain  · swelling of the ankles, feet, or hands  · trouble passing urine or change in the amount of urine  Side effects that  usually do not require medical attention (report to your doctor or other health care professional if they continue or are bothersome):  · anxiety  · headache  · loss of appetite  · muscle aches  · nausea  · night sweats  What may interact with this medicine?  · cisplatin  · medicines for blood pressure  · some other medicines for arthritis  · vaccines  What if I miss a dose?  It is important not to miss a dose. Call your doctor or health care professional if you are unable to keep an appointment.  Where should I keep my medicine?  This drug is given in a hospital or clinic and will not be stored at home.  What should I tell my health care provider before I take this medicine?  They need to know if you have any of these conditions:  · blood disorders  · heart disease  · history of hepatitis B  · infection (especially a virus infection such as chickenpox, cold sores, or herpes)  · irregular heartbeat  · kidney disease  · lung or breathing disease, like asthma  · lupus  · an unusual or allergic reaction to rituximab, mouse proteins, other medicines, foods, dyes, or preservatives  · pregnant or trying to get pregnant  · breast-feeding  What should I watch for while using this medicine?  Report any side effects that you notice during your treatment right away, such as changes in your breathing, fever, chills, dizziness or lightheadedness. These effects are more common with the first dose.  Visit your prescriber or health care professional for checks on your progress. You will need to have regular blood work. Report any other side effects. The side effects of this medicine can continue after you finish your treatment. Continue your course of treatment even though you feel ill unless your doctor tells you to stop.  Call your doctor or health care professional for advice if you get a fever, chills or sore throat, or other symptoms of a cold or flu. Do not treat yourself. This drug decreases your body's ability to fight  infections. Try to avoid being around people who are sick.  This medicine may increase your risk to bruise or bleed. Call your doctor or health care professional if you notice any unusual bleeding.  Be careful brushing and flossing your teeth or using a toothpick because you may get an infection or bleed more easily. If you have any dental work done, tell your dentist you are receiving this medicine.  Avoid taking products that contain aspirin, acetaminophen, ibuprofen, naproxen, or ketoprofen unless instructed by your doctor. These medicines may hide a fever.  Do not become pregnant while taking this medicine. Women should inform their doctor if they wish to become pregnant or think they might be pregnant. There is a potential for serious side effects to an unborn child. Talk to your health care professional or pharmacist for more information. Do not breast-feed an infant while taking this medicine.  NOTE:This sheet is a summary. It may not cover all possible information. If you have questions about this medicine, talk to your doctor, pharmacist, or health care provider. Copyright© 2017 [employer]

## 2019-08-14 NOTE — PATIENT INSTRUCTIONS
The Range of Pap Test Results  When your Pap test is sent to the lab, the lab studies your cell samples and reports any abnormal cell changes. Your health care provider can discuss these changes with you. In some cases, an abnormal Pap test is due to an infection. More serious cell changes range from dysplasia to cancer. Talk to your health care provider about your Pap test.    Normal results  Cervical cells, even normal ones, are always changing. As they mature, normal squamous cells move from deeper layers within the cervix. Over time, these cells flatten and cover the surface of the cervix. Within the cervical canal, the cells are different. These glandular cells are taller and not as flat as the cells on the surface of the cervix. When a Pap test sample shows healthy cells of both types, the results are negative. Keep having Pap tests as often as directed.  Abnormal results  A positive Pap test result means some cells in the sample showed abnormal changes. These results are grouped by the type of cell change and the location, or extent, of the changes. Depending on the results, you may need further testing.  · Inflammation: Noncancerous changes are present. They may be due to normal cell repair. Or, they may be caused by an infection, such as HPV or yeast. Further testing may be needed. (Also called reactive cellular changes.)  · Atypical squamous cells: Test results are unclear. Cells on the surface of the cervix show changes, but their significance is not yet known. Testing for HPV and other sexually transmitted infections (STIs) may be needed. Treatment may be required. (Reported as ASC-US or ASC-H.)  · Atypical glandular cells: Cells lining the cervical canal show abnormal changes. Further testing is likely. You may also have treatment to destroy or remove problem cells. (Reported as AGC.)  · Mild dysplasia: Cells show distinct changes. More testing or HPV typing may be done. You may also have treatment to  destroy or remove problem cells. (Reported as low-grade HUGH or RENEA 1.)  · Moderate to severe dysplasia: Cells show precancerous changes. Or, noninvasive cancer (carcinoma in situ) may be present. Treatment to destroy or remove problem cells is likely. (Reported as high-grade HUGH or RENEA 2 or RENEA 3.)  · Cancer: Different types of cancer may be detected by your Pap test. More tests to assess the cancer's extent are likely. The type of treatment will depend on the test results and other factors, such as age and health history. (Reported as squamous cell carcinoma, endocervical adenocarcinoma in situ, or adenocarcinoma.)  Date Last Reviewed: 5/12/2015  © 7144-5039 [employer]. [address] [place] [address], [place] [address]. All rights reserved. This information is not intended as a substitute for professional medical care. Always follow your healthcare professional's instructions.        Breast Health: Breast Self-Awareness  What is breast self-awareness?  Breast self-awareness is knowing how your breasts normally look and feel. Your breasts change as you go through different stages of your life. So its important to learn what is normal for your breasts. Breast self-awareness helps you notice any changes in your breasts right away. Report any changes to your healthcare provider.  Why is breast self-awareness important?  Many experts now say that women should focus on breast self-awareness instead of doing a breast self-examination (BSE). These experts include the American Cancer Society, the U.S. Preventive Services Task Force, and the American Congress of Obstetricians and Gynecologists. Some experts even advise not teaching women to do a BSE. Thats because research hasnt shown a clear benefit to doing BSEs.  Breast self-awareness is different than a BSE. Breast self-awareness isnt about following a certain method and schedule. Its about knowing what's normal for your breasts. That way you can notice even  small changes right away. If you see any changes, report them to your healthcare provider.  Changes to look for  Call your healthcare provider if you find any changes in your breasts that concern you. These changes may include:  · A lump  · Nipple discharge other than breast milk, especially a bloody discharge  · Swelling  · A change in size or shape  · Skin irritation, such as redness, thickening, or dimpling of the skin  · Swollen lymph nodes in the armpit  · Nipple problems, such as pain or redness  If you find a lump  Contact your provider if you find lumpiness in one breast, feel something different in the tissue, or feel a definite lump. Sometimes lumpiness may be due to menstrual changes. But there may be reason for concern.  Your provider may want to see you right away if you have:  · Nipple discharge that is bloody  · Skin changes on your breast, such as dimpling or puckering  Its normal to be upset if you find a lump. But its important to contact your provider right away. Remember that most breast lumps are benign. This means they are not cancer.  Date Last Reviewed: 8/10/2015  © 2865-2835 [employer]. [address] [place] [address], [place] [address]. All rights reserved. This information is not intended as a substitute for professional medical care. Always follow your healthcare professional's instructions.

## 2019-08-14 NOTE — ASSESSMENT & PLAN NOTE
Severely progressive systemic lupus with history of neuropsychiatric involvement without any recent headaches.  On Imuran once daily.  Twice daily Imuran caused adverse effects which resolved after reducing the dose to once daily.  Continue Imuran.

## 2019-08-14 NOTE — TELEPHONE ENCOUNTER
----- Message from Bea Messina sent at 8/14/2019 11:55 AM CDT -----  Contact: pt  The pt request a return call, no additional info given and can be reached at [phone]///[email or web address]

## 2019-08-16 ENCOUNTER — OFFICE VISIT (OUTPATIENT)
Dept: OPHTHALMOLOGY | Facility: CLINIC | Age: 54
End: 2019-08-16
Payer: COMMERCIAL

## 2019-08-16 DIAGNOSIS — M35.00 SECONDARY SJOGREN'S SYNDROME: ICD-10-CM

## 2019-08-16 DIAGNOSIS — H35.40 PERIPHERAL RETINAL DEGENERATION OF BOTH EYES: ICD-10-CM

## 2019-08-16 DIAGNOSIS — M32.19 CNS LUPUS: Primary | ICD-10-CM

## 2019-08-16 DIAGNOSIS — H40.003 GLAUCOMA SUSPECT OF BOTH EYES: ICD-10-CM

## 2019-08-16 DIAGNOSIS — G05.3 CNS LUPUS: Primary | ICD-10-CM

## 2019-08-16 PROCEDURE — 92014 PR EYE EXAM, EST PATIENT,COMPREHESV: ICD-10-PCS | Mod: S$GLB,,, | Performed by: OPHTHALMOLOGY

## 2019-08-16 PROCEDURE — 92014 COMPRE OPH EXAM EST PT 1/>: CPT | Mod: S$GLB,,, | Performed by: OPHTHALMOLOGY

## 2019-08-16 RX ORDER — PREDNISOLONE ACETATE 10 MG/ML
1 SUSPENSION/ DROPS OPHTHALMIC 4 TIMES DAILY
Qty: 5 ML | Refills: 0 | Status: SHIPPED | OUTPATIENT
Start: 2019-08-16 | End: 2019-08-23
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
## 2019-08-23 ENCOUNTER — PATIENT MESSAGE (OUTPATIENT)
Dept: OBSTETRICS AND GYNECOLOGY | Facility: CLINIC | Age: 54
End: 2019-08-23

## 2019-09-06 ENCOUNTER — TELEPHONE (OUTPATIENT)
Dept: DERMATOLOGY | Facility: CLINIC | Age: 54
End: 2019-09-06

## 2019-09-06 NOTE — TELEPHONE ENCOUNTER
Pt has already spoke with September but I did apologize to the patient as well and advised her that I know everyone's time is valuable.

## 2019-09-06 NOTE — TELEPHONE ENCOUNTER
----- Message from Aiden Sheffield sent at 9/6/2019 11:35 AM CDT -----  Contact: pt  Pt is returning the staff call to  Ms September   Pt call back [phone]      Thanks

## 2019-10-02 ENCOUNTER — LAB VISIT (OUTPATIENT)
Dept: LAB | Facility: HOSPITAL | Age: 54
End: 2019-10-02
Attending: FAMILY MEDICINE
Payer: COMMERCIAL

## 2019-10-02 DIAGNOSIS — M32.19 OTHER SYSTEMIC LUPUS ERYTHEMATOSUS WITH OTHER ORGAN INVOLVEMENT: ICD-10-CM

## 2019-10-02 LAB
BILIRUB UR QL STRIP: NEGATIVE
CLARITY UR: CLEAR
COLOR UR: YELLOW
CREAT UR-MCNC: 75 MG/DL (ref 15–325)
GLUCOSE UR QL STRIP: NEGATIVE
HGB UR QL STRIP: NEGATIVE
KETONES UR QL STRIP: NEGATIVE
LEUKOCYTE ESTERASE UR QL STRIP: NEGATIVE
NITRITE UR QL STRIP: NEGATIVE
PH UR STRIP: 6 [PH] (ref 5–8)
PROT UR QL STRIP: NEGATIVE
PROT UR-MCNC: 7 MG/DL (ref 0–15)
PROT/CREAT UR: 0.09 MG/G{CREAT} (ref 0–0.2)
SP GR UR STRIP: 1.01 (ref 1–1.03)
URN SPEC COLLECT METH UR: NORMAL

## 2019-10-02 PROCEDURE — 81003 URINALYSIS AUTO W/O SCOPE: CPT

## 2019-10-02 PROCEDURE — 84156 ASSAY OF PROTEIN URINE: CPT

## 2019-10-08 ENCOUNTER — PATIENT MESSAGE (OUTPATIENT)
Dept: RHEUMATOLOGY | Facility: CLINIC | Age: 54
End: 2019-10-08

## 2019-10-08 ENCOUNTER — TELEPHONE (OUTPATIENT)
Dept: RADIOLOGY | Facility: HOSPITAL | Age: 54
End: 2019-10-08

## 2019-10-09 ENCOUNTER — PATIENT MESSAGE (OUTPATIENT)
Dept: HEMATOLOGY/ONCOLOGY | Facility: CLINIC | Age: 54
End: 2019-10-09

## 2019-10-09 ENCOUNTER — HOSPITAL ENCOUNTER (OUTPATIENT)
Dept: RADIOLOGY | Facility: HOSPITAL | Age: 54
Discharge: HOME | End: 2019-10-09
Attending: SPECIALIST
Payer: COMMERCIAL

## 2019-10-09 DIAGNOSIS — M54.2 CERVICALGIA: ICD-10-CM

## 2019-10-09 PROCEDURE — 72141 MRI CERVICAL SPINE WITHOUT CONTRAST: ICD-10-PCS | Mod: 26,,, | Performed by: RADIOLOGY

## 2019-10-09 PROCEDURE — 72141 MRI NECK SPINE W/O DYE: CPT | Mod: 26,,, | Performed by: RADIOLOGY

## 2019-10-09 PROCEDURE — 72141 MRI NECK SPINE W/O DYE: CPT | Mod: TC

## 2019-10-28 ENCOUNTER — TELEPHONE (OUTPATIENT)
Dept: CARDIOLOGY | Facility: CLINIC | Age: 54
End: 2019-10-28

## 2019-10-28 NOTE — TELEPHONE ENCOUNTER
Returned call. Requested change of location to [facility].  Appt rescheduled to 01/09/2020 at 1100, [facility].

## 2019-10-28 NOTE — TELEPHONE ENCOUNTER
----- Message from Stephanie Al LPN sent at 10/25/2019  2:24 PM CDT -----  Contact: pt      ----- Message -----  From: Mike Avila  Sent: 10/25/2019   1:29 PM CDT  To: [facility] Qui[facility] Staff    Pt would like cb in reference to her appt time keeps changing and no one is calling her. [facility] return call.         [phone]

## 2019-10-31 ENCOUNTER — OFFICE VISIT (OUTPATIENT)
Dept: RHEUMATOLOGY | Facility: CLINIC | Age: 54
End: 2019-10-31
Payer: COMMERCIAL

## 2019-10-31 ENCOUNTER — HOSPITAL ENCOUNTER (OUTPATIENT)
Dept: RADIOLOGY | Facility: HOSPITAL | Age: 54
Discharge: HOME | End: 2019-10-31
Attending: NURSE PRACTITIONER
Payer: COMMERCIAL

## 2019-10-31 VITALS — HEIGHT: 66 IN | BODY MASS INDEX: 29.16 KG/M2 | WEIGHT: 181.44 LBS

## 2019-10-31 VITALS
WEIGHT: 191.38 LBS | SYSTOLIC BLOOD PRESSURE: 120 MMHG | DIASTOLIC BLOOD PRESSURE: 79 MMHG | BODY MASS INDEX: 30.76 KG/M2 | HEART RATE: 101 BPM | HEIGHT: 66 IN

## 2019-10-31 DIAGNOSIS — R76.8 POSITIVE SM/RNP ANTIBODY: ICD-10-CM

## 2019-10-31 DIAGNOSIS — G05.3 CNS LUPUS: Primary | ICD-10-CM

## 2019-10-31 DIAGNOSIS — D84.9 IMMUNOSUPPRESSED STATUS: ICD-10-CM

## 2019-10-31 DIAGNOSIS — Z79.899 LONG-TERM CURRENT USE OF HIGH RISK MEDICATION OTHER THAN ANTICOAGULANT: ICD-10-CM

## 2019-10-31 DIAGNOSIS — M32.19 CNS LUPUS: Primary | ICD-10-CM

## 2019-10-31 DIAGNOSIS — M32.9 SYSTEMIC LUPUS ERYTHEMATOSUS ARTHRITIS: ICD-10-CM

## 2019-10-31 DIAGNOSIS — M35.00 SECONDARY SJOGREN'S SYNDROME: ICD-10-CM

## 2019-10-31 PROCEDURE — 3008F BODY MASS INDEX DOCD: CPT | Mod: CPTII,S$GLB,, | Performed by: INTERNAL MEDICINE

## 2019-10-31 PROCEDURE — 99215 OFFICE O/P EST HI 40 MIN: CPT | Mod: S$GLB,,, | Performed by: INTERNAL MEDICINE

## 2019-10-31 PROCEDURE — 3078F DIAST BP <80 MM HG: CPT | Mod: CPTII,S$GLB,, | Performed by: INTERNAL MEDICINE

## 2019-10-31 PROCEDURE — 3074F SYST BP LT 130 MM HG: CPT | Mod: CPTII,S$GLB,, | Performed by: INTERNAL MEDICINE

## 2019-10-31 PROCEDURE — 77067 SCR MAMMO BI INCL CAD: CPT | Mod: TC

## 2019-10-31 PROCEDURE — 77063 MAMMO DIGITAL SCREENING BILAT WITH TOMOSYNTHESIS_CAD: ICD-10-PCS | Mod: 26,,, | Performed by: RADIOLOGY

## 2019-10-31 PROCEDURE — 3078F PR MOST RECENT DIASTOLIC BLOOD PRESSURE < 80 MM HG: ICD-10-PCS | Mod: CPTII,S$GLB,, | Performed by: INTERNAL MEDICINE

## 2019-10-31 PROCEDURE — 3008F PR BODY MASS INDEX (BMI) DOCUMENTED: ICD-10-PCS | Mod: CPTII,S$GLB,, | Performed by: INTERNAL MEDICINE

## 2019-10-31 PROCEDURE — 77067 MAMMO DIGITAL SCREENING BILAT WITH TOMOSYNTHESIS_CAD: ICD-10-PCS | Mod: 26,,, | Performed by: RADIOLOGY

## 2019-10-31 PROCEDURE — 99215 PR OFFICE/OUTPT VISIT, EST, LEVL V, 40-54 MIN: ICD-10-PCS | Mod: S$GLB,,, | Performed by: INTERNAL MEDICINE

## 2019-10-31 PROCEDURE — 99999 PR PBB SHADOW E&M-EST. PATIENT-LVL III: CPT | Mod: PBBFAC,,, | Performed by: INTERNAL MEDICINE

## 2019-10-31 PROCEDURE — 99999 PR PBB SHADOW E&M-EST. PATIENT-LVL III: ICD-10-PCS | Mod: PBBFAC,,, | Performed by: INTERNAL MEDICINE

## 2019-10-31 PROCEDURE — 77067 SCR MAMMO BI INCL CAD: CPT | Mod: 26,,, | Performed by: RADIOLOGY

## 2019-10-31 PROCEDURE — 3074F PR MOST RECENT SYSTOLIC BLOOD PRESSURE < 130 MM HG: ICD-10-PCS | Mod: CPTII,S$GLB,, | Performed by: INTERNAL MEDICINE

## 2019-10-31 PROCEDURE — 77063 BREAST TOMOSYNTHESIS BI: CPT | Mod: 26,,, | Performed by: RADIOLOGY

## 2019-10-31 RX ORDER — TIZANIDINE 4 MG/1
TABLET ORAL
COMMUNITY
Start: 2019-08-20 | End: 2019-10-31

## 2019-10-31 RX ORDER — ORPHENADRINE CITRATE 100 MG/1
TABLET, EXTENDED RELEASE ORAL
COMMUNITY
Start: 2019-08-20 | End: 2019-10-31

## 2019-10-31 RX ORDER — TRAMADOL HYDROCHLORIDE 50 MG/1
50 TABLET ORAL
COMMUNITY
Start: 2019-08-20 | End: 2020-11-25

## 2019-10-31 NOTE — ASSESSMENT & PLAN NOTE
CT chest for interstitial lung disease and echo for pulmonary artery hypertension before next visit.

## 2019-10-31 NOTE — ASSESSMENT & PLAN NOTE
No synovitis on examination today.  Activity and safety markers normal.  Continue Imuran.  Had multiple adverse effects from Plaquenil, Benlysta and CellCept.

## 2019-10-31 NOTE — ASSESSMENT & PLAN NOTE
Neuropsychiatric lupus doing well on Imuran.  Once daily Imuran since she had adverse effects with b.i.d. dosing.  Continue Imuran.  Normal serologies and activity markers.

## 2019-10-31 NOTE — PROGRESS NOTES
RHEUMATOLOGY CLINIC FOLLOW UP VISIT  Chief complaints:-  To follow up for lupus.    HPI:-  Ruth Melendrez a 53 y.o. pleasant female comes in for a follow up visit.  She reports doing well other than mild aches and pains since last visit.  No new headaches or seizures or psychosis.  No joint pain.  No morning stiffness.  No new rash.    Review of Systems   Constitutional: Negative for chills and fever.   HENT: Negative for congestion and sore throat.    Eyes: Negative for blurred vision and redness.   Respiratory: Negative for cough and shortness of breath.    Cardiovascular: Negative for chest pain and leg swelling.   Gastrointestinal: Negative for abdominal pain.   Genitourinary: Negative for dysuria.   Musculoskeletal: Negative for back pain, falls, joint pain, myalgias and neck pain.   Skin: Negative for rash.   Neurological: Negative for headaches.   Endo/Heme/Allergies: Does not bruise/bleed easily.   Psychiatric/Behavioral: Negative for memory loss. The patient does not have insomnia.        Past Medical History:   Diagnosis Date    Anemia     with chronic disease lupus    Encounter for blood transfusion     G-6-PD deficiency     Heart murmur     MVP 1986    Hypertension     Lupus     Raynaud's syndrome     Systemic lupus erythematosus arthritis 5/13/2018       Past Surgical History:   Procedure Laterality Date    BONE MARROW BIOPSY      CARPAL TUNNEL RELEASE Bilateral     widsom tooth extraction          Social History     Tobacco Use    Smoking status: Never Smoker    Smokeless tobacco: Never Used   Substance Use Topics    Alcohol use: No    Drug use: No       Family History   Problem Relation Age of Onset    Arthritis Mother     Heart disease Mother     Hypertension Mother     Diabetes Father     Heart disease Father     Hypertension Father     Cancer Brother     Eczema Neg Hx     Lupus Neg Hx     Psoriasis Neg Hx        Review  "of patient's allergies indicates:   Allergen Reactions    Cellcept [mycophenolate mofetil] Swelling     Lips      Ace inhibitors Other (See Comments)     unkown    Aldactone [spironolactone] Blisters     Mouth soreness with doses greater than 25mg.    Aspirin Other (See Comments)     G-6-PD def.     Azathioprine sodium     Beta-blockers (beta-adrenergic blocking agts) Other (See Comments)     Cannot take due to Raynaud's syndrome    Clindamycin Other (See Comments)     + allergy test    Clonidine Blisters     Mouth sores and tenderness    Hydrochlorothiazide Other (See Comments)     unknown    Ibuprofen Other (See Comments)     G-6-PD def.    Lasix [furosemide] Other (See Comments)     unknown    Lisinopril Other (See Comments)     unknown    Methatropic     Methotrexate analogues Other (See Comments)     Pancytopenia      Norvasc [amlodipine] Other (See Comments)     Jaw pain    Nsaids (non-steroidal anti-inflammatory drug) Other (See Comments)     G-6-PD def.    Plaquenil [hydroxychloroquine] Other (See Comments)     G-6-PD def.    Zofran [ondansetron hcl (pf)] Other (See Comments)     Chest pain      Sulfa (sulfonamide antibiotics) Rash       Vitals:    10/31/19 1057   BP: 120/79   Pulse: 101   Weight: 86.8 kg (191 lb 5.8 oz)   Height: 5' 6" (1.676 m)   PainSc: 0-No pain       Physical Exam   Constitutional: She is oriented to person, place, and time and well-developed, well-nourished, and in no distress. No distress.   HENT:   Head: Normocephalic.   Mouth/Throat: Oropharynx is clear and moist.   Eyes: Pupils are equal, round, and reactive to light. Conjunctivae and EOM are normal.   Neck: Normal range of motion. Neck supple.   Cardiovascular: Normal rate and intact distal pulses.   Pulmonary/Chest: Effort normal. No respiratory distress.   Abdominal: Soft. There is no tenderness.   Musculoskeletal:   No synovitis over small joints of hands or feet.  No effusion over large joints. "   Neurological: She is alert and oriented to person, place, and time. No cranial nerve deficit.   Skin: Skin is warm. No rash noted. No erythema.   Psychiatric: Mood and affect normal.   Nursing note and vitals reviewed.      Medication List with Changes/Refills   Current Medications    AZATHIOPRINE (IMURAN) 50 MG TAB    TAKE 1 TABLET BY MOUTH ONCE DAILY    B1/B2/NIACIN/B12/PROTEASE (B-COMPLEX WITH B-12 ORAL)    Take by mouth.    BIOTIN 5,000 MCG TBDL    Take by mouth.    CHOLECALCIFEROL, VITAMIN D3, (VITAMIN D3) 5,000 UNIT TAB    Take 5,000 Units by mouth once daily.    CYCLOBENZAPRINE (FLEXERIL) 10 MG TABLET    Take 1 tablet (10 mg total) by mouth 3 (three) times daily as needed for Muscle spasms.    DESOXIMETASONE (TOPICORT) 0.25 % CREAM    APPLY TO AFFECTED AREA OF THE SCALP ONCE DAILY    EPINEPHRINE (EPIPEN) 0.3 MG/0.3 ML ATIN    Inject 0.3 mLs (0.3 mg total) into the muscle as needed.    FERROUS GLUCONATE (FERGON) 324 MG TABLET    Take 324 mg by mouth daily with breakfast.    FISH OIL-OMEGA-3 FATTY ACIDS 300-1,000 MG CAPSULE    Take 2 g by mouth once daily.    FLUOCINOLONE (DERMA-SMOOTHE) 0.01 % EXTERNAL OIL    Apply oil to scalp once every other day    FLUOCINOLONE AND SHOWER CAP 0.01 % OIL        FLUOXETINE (PROZAC) 20 MG CAPSULE    Take 1 capsule (20 mg total) by mouth once daily.    GABAPENTIN (NEURONTIN) 300 MG CAPSULE    Take 1 capsule (300 mg total) by mouth 3 (three) times daily.    LIFITEGRAST (XIIDRA) 5 % DPET    Apply 1 drop to eye 2 (two) times daily.    LOSARTAN (COZAAR) 100 MG TABLET    Take 0.5 tablets (50 mg total) by mouth once daily.    MULTIVITAMIN CAPSULE    Take 1 capsule by mouth once daily.    PREDNISONE (DELTASONE) 5 MG TABLET    Take 1 tablet (5 mg total) by mouth once daily.    SPIRONOLACTONE (ALDACTONE) 25 MG TABLET    TAKE 2 TABLETS BY MOUTH ONCE DAILY    TRAMADOL (ULTRAM) 50 MG TABLET        UNABLE TO FIND    every evening. Naltrexone - Low Dose Med Name:  On Empty Stomach    Discontinued Medications    CALCIUM-MAGNESIUM-ZINC ORAL    Take 1 tablet by mouth once daily.    CALCIUM-MAGNESIUM-ZINC ORAL    Take by mouth.    ORPHENADRINE (NORFLEX) 100 MG TABLET        TIZANIDINE (ZANAFLEX) 4 MG TABLET           Assessment/Plans:-  1. CNS lupus    2. Systemic lupus erythematosus arthritis    3. Secondary Sjogren's syndrome    4. Long-term current use of high risk medication other than anticoagulant    5. Immunosuppressed status    6. Positive sm/RNP antibody      Problem List Items Addressed This Visit        Immunology/Multi System    CNS lupus - Primary    Current Assessment & Plan     Neuropsychiatric lupus doing well on Imuran.  Once daily Imuran since she had adverse effects with b.i.d. dosing.  Continue Imuran.  Normal serologies and activity markers.         Secondary Sjogren's syndrome    Current Assessment & Plan     Stable sicca syndrome on Imuran.  Continue.         Relevant Orders    CK    Aldolase    Lipid panel    Systemic lupus erythematosus arthritis    Current Assessment & Plan     No synovitis on examination today.  Activity and safety markers normal.  Continue Imuran.  Had multiple adverse effects from Plaquenil, Benlysta and CellCept.         Relevant Orders    CT Chest Without Contrast    2D echo with color flow doppler    CK    Aldolase    Lipid panel    Immunosuppressed status    Current Assessment & Plan     Compromised immune system secondary to autoimmune disease and use of immunosuppressive drugs. Monitor carefully for infections. Advised to get immediate medical care if any infection. Also advised strict adherence to age appropriate vaccinations and cancer screenings with PCP.          Positive sm/RNP antibody    Current Assessment & Plan     CT chest for interstitial lung disease and echo for pulmonary artery hypertension before next visit.         Relevant Orders    CT Chest Without Contrast    2D echo with color flow doppler    CK    Aldolase    Lipid panel        Other    Long-term current use of high risk medication other than anticoagulant    Current Assessment & Plan     Hold Imuran if any infection.  Safety labs every visit.             # Follow up in about 4 months (around 2/29/2020).      Disclaimer: This note was prepared using voice recognition system and is likely to have sound alike errors and is not proof read.  Please call me with any questions.

## 2019-11-07 ENCOUNTER — OFFICE VISIT (OUTPATIENT)
Dept: HEMATOLOGY/ONCOLOGY | Facility: CLINIC | Age: 54
End: 2019-11-07
Payer: COMMERCIAL

## 2019-11-07 VITALS
RESPIRATION RATE: 18 BRPM | WEIGHT: 190.69 LBS | HEIGHT: 66 IN | BODY MASS INDEX: 30.65 KG/M2 | HEART RATE: 103 BPM | DIASTOLIC BLOOD PRESSURE: 92 MMHG | TEMPERATURE: 97 F | OXYGEN SATURATION: 98 % | SYSTOLIC BLOOD PRESSURE: 134 MMHG

## 2019-11-07 DIAGNOSIS — D75.A G6PD DEFICIENCY: ICD-10-CM

## 2019-11-07 DIAGNOSIS — M32.19 OTHER SYSTEMIC LUPUS ERYTHEMATOSUS WITH OTHER ORGAN INVOLVEMENT: ICD-10-CM

## 2019-11-07 DIAGNOSIS — D63.8 ANEMIA OF CHRONIC ILLNESS: Primary | ICD-10-CM

## 2019-11-07 PROCEDURE — 99999 PR PBB SHADOW E&M-EST. PATIENT-LVL V: ICD-10-PCS | Mod: PBBFAC,,, | Performed by: INTERNAL MEDICINE

## 2019-11-07 PROCEDURE — 99213 PR OFFICE/OUTPT VISIT, EST, LEVL III, 20-29 MIN: ICD-10-PCS | Mod: S$GLB,,, | Performed by: INTERNAL MEDICINE

## 2019-11-07 PROCEDURE — 3080F DIAST BP >= 90 MM HG: CPT | Mod: CPTII,S$GLB,, | Performed by: INTERNAL MEDICINE

## 2019-11-07 PROCEDURE — 3008F PR BODY MASS INDEX (BMI) DOCUMENTED: ICD-10-PCS | Mod: CPTII,S$GLB,, | Performed by: INTERNAL MEDICINE

## 2019-11-07 PROCEDURE — 99999 PR PBB SHADOW E&M-EST. PATIENT-LVL V: CPT | Mod: PBBFAC,,, | Performed by: INTERNAL MEDICINE

## 2019-11-07 PROCEDURE — 3080F PR MOST RECENT DIASTOLIC BLOOD PRESSURE >= 90 MM HG: ICD-10-PCS | Mod: CPTII,S$GLB,, | Performed by: INTERNAL MEDICINE

## 2019-11-07 PROCEDURE — 3008F BODY MASS INDEX DOCD: CPT | Mod: CPTII,S$GLB,, | Performed by: INTERNAL MEDICINE

## 2019-11-07 PROCEDURE — 99213 OFFICE O/P EST LOW 20 MIN: CPT | Mod: S$GLB,,, | Performed by: INTERNAL MEDICINE

## 2019-11-07 PROCEDURE — 3075F PR MOST RECENT SYSTOLIC BLOOD PRESS GE 130-139MM HG: ICD-10-PCS | Mod: CPTII,S$GLB,, | Performed by: INTERNAL MEDICINE

## 2019-11-07 PROCEDURE — 3075F SYST BP GE 130 - 139MM HG: CPT | Mod: CPTII,S$GLB,, | Performed by: INTERNAL MEDICINE

## 2019-11-07 NOTE — PROGRESS NOTES
Subjective:      DATE OF VISIT: 11/7/2019   ?   ?   Patient ID:?Ruth Messina is a 54 y.o. female.?? MR#: [id number]   ?   PRIMARY PROVIDER: Dr. Montanez, transferring to Dr. Contreras  ?   CHIEF COMPLAINT:  Anemia?????   ?     HPI    I had the pleasure meeting for the 1st time Ms. Messina, a 54-year-old woman who has been followed by my colleague Dr. Montanez for anemia in setting of SLE with skin and join manifestations.     I reviewed her medical record which is notable for outside workup for anemia including:  - bone marrow biopsy in February 2016 with normal trilineage hematopoiesis without significant abnormality  - EGD/colonoscopy in December 2016 without any evidence of bleeding.      She is noted to have a history of G6PD deficiency in monitoring for hemolysis was recommended.      She has been in stable condition and notes being treated by Rheumatology with low-dose prednisone 5 mg daily and Imuran which she is tolerating well. No notable fatigue, fevers/chills, night sweats or unintentional weight loss.    Review of Systems    ?   A comprehensive 14-point review of systems was reviewed with patient and was negative other than as specified above.   ?     Objective:      Physical Exam      ?   There were no vitals filed for this visit.   ?   ECOG:?0   General appearance: Generally well appearing, in no acute distress.   Head, eyes, ears, nose, and throat: Oropharynx clear with moist mucous membranes.   Cardiovascular: Regular rate and rhythm, S1, S2, no audible murmurs.   Respiratory: Lungs clear to auscultation bilaterally.   Abdomen: nontender, nondistended.   Extremities: Warm, without edema.   Neurologic: Alert and oriented. Grossly normal strength, coordination, and gait.   Skin: No rashes, ecchymoses or petechial lesion.   Psychiatric: normal mood and affect, conversant and appropriate    ?   Laboratory:  ?   No visits with results within 1 Day(s) from this visit.   Latest known visit with results is:    Lab Visit on 10/02/2019   Component Date Value Ref Range Status    ds DNA Ab 10/02/2019 Negative 1:10  Negative 1:10 Final    SHEILA Screen 10/02/2019 Positive* Negative <1:160 Final    Complement (C-3) 10/02/2019 143  50 - 180 mg/dL Final    Complement (C-4) 10/02/2019 23  11 - 44 mg/dL Final    WBC 10/02/2019 6.32  3.90 - 12.70 K/uL Final    RBC 10/02/2019 4.01  4.00 - 5.40 M/uL Final    Hemoglobin 10/02/2019 11.3* 12.0 - 16.0 g/dL Final    Hematocrit 10/02/2019 35.9* 37.0 - 48.5 % Final    Mean Corpuscular Volume 10/02/2019 90  82 - 98 fL Final    Mean Corpuscular Hemoglobin 10/02/2019 28.2  27.0 - 31.0 pg Final    Mean Corpuscular Hemoglobin Conc 10/02/2019 31.5* 32.0 - 36.0 g/dL Final    RDW 10/02/2019 14.1  11.5 - 14.5 % Final    Platelets 10/02/2019 271  150 - 350 K/uL Final    MPV 10/02/2019 10.2  9.2 - 12.9 fL Final    Immature Granulocytes 10/02/2019 0.5  0.0 - 0.5 % Final    Gran # (ANC) 10/02/2019 4.3  1.8 - 7.7 K/uL Final    Immature Grans (Abs) 10/02/2019 0.03  0.00 - 0.04 K/uL Final    Lymph # 10/02/2019 1.7  1.0 - 4.8 K/uL Final    Mono # 10/02/2019 0.3  0.3 - 1.0 K/uL Final    Eos # 10/02/2019 0.0  0.0 - 0.5 K/uL Final    Baso # 10/02/2019 0.01  0.00 - 0.20 K/uL Final    nRBC 10/02/2019 0  0 /100 WBC Final    Gran% 10/02/2019 67.5  38.0 - 73.0 % Final    Lymph% 10/02/2019 27.4  18.0 - 48.0 % Final    Mono% 10/02/2019 4.9  4.0 - 15.0 % Final    Eosinophil% 10/02/2019 0.0  0.0 - 8.0 % Final    Basophil% 10/02/2019 0.2  0.0 - 1.9 % Final    Differential Method 10/02/2019 Automated   Final    Sodium 10/02/2019 142  136 - 145 mmol/L Final    Potassium 10/02/2019 3.9  3.5 - 5.1 mmol/L Final    Chloride 10/02/2019 108  95 - 110 mmol/L Final    CO2 10/02/2019 24  23 - 29 mmol/L Final    Glucose 10/02/2019 109  70 - 110 mg/dL Final    BUN, Bld 10/02/2019 14  6 - 20 mg/dL Final    Creatinine 10/02/2019 0.9  0.5 - 1.4 mg/dL Final    Calcium 10/02/2019 10.0  8.7 - 10.5 mg/dL  Final    Total Protein 10/02/2019 8.1  6.0 - 8.4 g/dL Final    Albumin 10/02/2019 3.6  3.5 - 5.2 g/dL Final    Total Bilirubin 10/02/2019 0.5  0.1 - 1.0 mg/dL Final    Alkaline Phosphatase 10/02/2019 103  55 - 135 U/L Final    AST 10/02/2019 19  10 - 40 U/L Final    ALT 10/02/2019 20  10 - 44 U/L Final    Anion Gap 10/02/2019 10  8 - 16 mmol/L Final    eGFR if African American 10/02/2019 >60  >60 mL/min/1.73 m^2 Final    eGFR if non African American 10/02/2019 >60  >60 mL/min/1.73 m^2 Final    Sed Rate 10/02/2019 84* 0 - 36 mm/Hr Final    CRP 10/02/2019 0.7  0.0 - 8.2 mg/L Final    Ferritin 10/02/2019 570* 20.0 - 300.0 ng/mL Final    Iron 10/02/2019 69  30 - 160 ug/dL Final    Transferrin 10/02/2019 213  200 - 375 mg/dL Final    TIBC 10/02/2019 315  250 - 450 ug/dL Final    Saturated Iron 10/02/2019 22  20 - 50 % Final    LD 10/02/2019 174  110 - 260 U/L Final    Retic 10/02/2019 1.5  0.5 - 2.5 % Final    CRP 10/02/2019 0.7  0.0 - 8.2 mg/L Final    SHEILA HEP-2 Titer 10/02/2019 Positive 1:320 Speckled    Final    Anti Sm Antibody 10/02/2019 11.34  0.00 - 19.99 EU Final    Anti-Sm Interpretation 10/02/2019 Negative  Negative Final    Anti-SSA Antibody 10/02/2019 5.51  0.00 - 19.99 EU Final    Anti-SSA Interpretation 10/02/2019 Negative  Negative Final    Anti-SSB Antibody 10/02/2019 4.56  0.00 - 19.99 EU Final    Anti-SSB Interpretation 10/02/2019 Negative  Negative Final    ds DNA Ab 10/02/2019 Negative 1:10  Negative 1:10 Final    Anti Sm/RNP Antibody 10/02/2019 177.16* 0.00 - 19.99 EU Final    Anti-Sm/RNP Interpretation 10/02/2019 Positive* Negative Final      ?   ?   Assessment/Plan:       1. Anemia of chronic illness    2. G6PD deficiency    3. Other systemic lupus erythematosus with other organ involvement          Plan:     # anemia:  Stable mild anemia over at least several years with hemoglobin around 11 and no other cytopenias.  No evidence of iron deficiency and iron indices with  elevated ferritin inflammatory markers in the setting of no lupus is most consistent with anemia of chronic inflammation.  As this has been a stable mild anemia in the absence of other cytopenias I recommended continued follow-up with her rheumatologist and PCP and can return to Hematology as needed should there be worsening cytopenias or other concern.  She is in agreement with this plan.  I recommended she stay up-to-date on age-appropriate cancer screening including mammography, Pap smear and colonoscopy.    Note:  She has no history of thrombotic event  Follow-Up:   Prn    I have spent 15 minutes in this office visit in the direct face-to-face communication with the patient, with greater than 50% of the time spend in the counseling and coordination of care.   ?

## 2019-11-07 NOTE — LETTER
November 7, 2019      Angela Nagel MD  [address]  [place] [address]           [facility]  [address]  [place] [address]  Phone: [phone]  Fax: [phone]          Patient: Ruth Messina   MR Number: [id number]   YOB: 1965   Date of Visit: 11/7/2019       Dear Dr. Angela Nagel:    Thank you for referring Ruth Messina to me for evaluation. Attached you will find relevant portions of my assessment and plan of care.    If you have questions, please do not hesitate to call me. I look forward to following Ruth Messina along with you.    Sincerely,    Layla Contreras MD    Enclosure  CC:  No Recipients    If you would like to receive this communication electronically, please contact [email or web address] or [phone] to request more information on Sensitive Object Link access.    For providers and/or their staff who would like to refer a patient to [facility], please contact us through our one-stop-shop provider referral line, [facility], at [phone].    If you feel you have received this communication in error or would no longer like to receive these types of communications, please e-mail [email or web address]

## 2019-11-11 ENCOUNTER — CLINICAL SUPPORT (OUTPATIENT)
Dept: CARDIOLOGY | Facility: CLINIC | Age: 54
End: 2019-11-11
Attending: INTERNAL MEDICINE
Payer: COMMERCIAL

## 2019-11-11 ENCOUNTER — HOSPITAL ENCOUNTER (OUTPATIENT)
Dept: RADIOLOGY | Facility: HOSPITAL | Age: 54
Discharge: HOME | End: 2019-11-11
Attending: INTERNAL MEDICINE
Payer: COMMERCIAL

## 2019-11-11 DIAGNOSIS — M32.9 SYSTEMIC LUPUS ERYTHEMATOSUS ARTHRITIS: ICD-10-CM

## 2019-11-11 DIAGNOSIS — R76.8 POSITIVE SM/RNP ANTIBODY: ICD-10-CM

## 2019-11-11 PROCEDURE — 93306 2D ECHO WITH COLOR FLOW DOPPLER: ICD-10-PCS | Mod: S$GLB,,, | Performed by: INTERNAL MEDICINE

## 2019-11-11 PROCEDURE — 93306 TTE W/DOPPLER COMPLETE: CPT | Mod: S$GLB,,, | Performed by: INTERNAL MEDICINE

## 2019-11-11 PROCEDURE — 71250 CT THORAX DX C-: CPT | Mod: TC

## 2019-11-11 PROCEDURE — 71250 CT CHEST WITHOUT CONTRAST: ICD-10-PCS | Mod: 26,,, | Performed by: RADIOLOGY

## 2019-11-11 PROCEDURE — 71250 CT THORAX DX C-: CPT | Mod: 26,,, | Performed by: RADIOLOGY

## 2019-11-22 RX ORDER — LOSARTAN POTASSIUM 100 MG/1
TABLET ORAL
Qty: 90 TABLET | Refills: 3 | Status: SHIPPED | OUTPATIENT
Start: 2019-11-22 | End: 2020-10-23

## 2019-12-03 ENCOUNTER — LAB VISIT (OUTPATIENT)
Dept: LAB | Facility: HOSPITAL | Age: 54
End: 2019-12-03
Attending: INTERNAL MEDICINE
Payer: COMMERCIAL

## 2019-12-03 ENCOUNTER — HOSPITAL ENCOUNTER (OUTPATIENT)
Dept: RADIOLOGY | Facility: HOSPITAL | Age: 54
Discharge: HOME | End: 2019-12-03
Attending: FAMILY MEDICINE
Payer: COMMERCIAL

## 2019-12-03 ENCOUNTER — OFFICE VISIT (OUTPATIENT)
Dept: INTERNAL MEDICINE | Facility: CLINIC | Age: 54
End: 2019-12-03
Payer: COMMERCIAL

## 2019-12-03 VITALS
WEIGHT: 193.13 LBS | SYSTOLIC BLOOD PRESSURE: 130 MMHG | HEIGHT: 66 IN | OXYGEN SATURATION: 96 % | HEART RATE: 101 BPM | BODY MASS INDEX: 31.04 KG/M2 | DIASTOLIC BLOOD PRESSURE: 72 MMHG | TEMPERATURE: 98 F

## 2019-12-03 DIAGNOSIS — Z13.220 SCREENING, LIPID: ICD-10-CM

## 2019-12-03 DIAGNOSIS — Z86.39 H/O: HYPOTHYROIDISM: Primary | ICD-10-CM

## 2019-12-03 DIAGNOSIS — R60.9 SWELLING: ICD-10-CM

## 2019-12-03 DIAGNOSIS — R76.8 POSITIVE SM/RNP ANTIBODY: ICD-10-CM

## 2019-12-03 DIAGNOSIS — M32.9 SYSTEMIC LUPUS ERYTHEMATOSUS ARTHRITIS: ICD-10-CM

## 2019-12-03 DIAGNOSIS — Z86.39 H/O: HYPOTHYROIDISM: ICD-10-CM

## 2019-12-03 DIAGNOSIS — M35.00 SECONDARY SJOGREN'S SYNDROME: ICD-10-CM

## 2019-12-03 LAB
CHOLEST SERPL-MCNC: 258 MG/DL (ref 120–199)
CHOLEST SERPL-MCNC: 258 MG/DL (ref 120–199)
CHOLEST/HDLC SERPL: 6.3 {RATIO} (ref 2–5)
CHOLEST/HDLC SERPL: 6.3 {RATIO} (ref 2–5)
HDLC SERPL-MCNC: 41 MG/DL (ref 40–75)
HDLC SERPL-MCNC: 41 MG/DL (ref 40–75)
HDLC SERPL: 15.9 % (ref 20–50)
HDLC SERPL: 15.9 % (ref 20–50)
LDLC SERPL CALC-MCNC: 184.8 MG/DL (ref 63–159)
LDLC SERPL CALC-MCNC: 184.8 MG/DL (ref 63–159)
NONHDLC SERPL-MCNC: 217 MG/DL
NONHDLC SERPL-MCNC: 217 MG/DL
T3 SERPL-MCNC: 81 NG/DL (ref 60–180)
T4 FREE SERPL-MCNC: 1.07 NG/DL (ref 0.71–1.51)
THYROPEROXIDASE IGG SERPL-ACNC: <6 IU/ML
TRIGL SERPL-MCNC: 161 MG/DL (ref 30–150)
TRIGL SERPL-MCNC: 161 MG/DL (ref 30–150)
TSH SERPL DL<=0.005 MIU/L-ACNC: 1.19 UIU/ML (ref 0.4–4)

## 2019-12-03 PROCEDURE — 84480 ASSAY TRIIODOTHYRONINE (T3): CPT

## 2019-12-03 PROCEDURE — 36415 COLL VENOUS BLD VENIPUNCTURE: CPT

## 2019-12-03 PROCEDURE — 99214 PR OFFICE/OUTPT VISIT, EST, LEVL IV, 30-39 MIN: ICD-10-PCS | Mod: S$GLB,,, | Performed by: FAMILY MEDICINE

## 2019-12-03 PROCEDURE — 3008F BODY MASS INDEX DOCD: CPT | Mod: CPTII,S$GLB,, | Performed by: FAMILY MEDICINE

## 2019-12-03 PROCEDURE — 3008F PR BODY MASS INDEX (BMI) DOCUMENTED: ICD-10-PCS | Mod: CPTII,S$GLB,, | Performed by: FAMILY MEDICINE

## 2019-12-03 PROCEDURE — 3075F SYST BP GE 130 - 139MM HG: CPT | Mod: CPTII,S$GLB,, | Performed by: FAMILY MEDICINE

## 2019-12-03 PROCEDURE — 86376 MICROSOMAL ANTIBODY EACH: CPT

## 2019-12-03 PROCEDURE — 99999 PR PBB SHADOW E&M-EST. PATIENT-LVL III: CPT | Mod: PBBFAC,,, | Performed by: FAMILY MEDICINE

## 2019-12-03 PROCEDURE — 76536 US EXAM OF HEAD AND NECK: CPT | Mod: 26,,, | Performed by: RADIOLOGY

## 2019-12-03 PROCEDURE — 80061 LIPID PANEL: CPT

## 2019-12-03 PROCEDURE — 99999 PR PBB SHADOW E&M-EST. PATIENT-LVL III: ICD-10-PCS | Mod: PBBFAC,,, | Performed by: FAMILY MEDICINE

## 2019-12-03 PROCEDURE — 3078F PR MOST RECENT DIASTOLIC BLOOD PRESSURE < 80 MM HG: ICD-10-PCS | Mod: CPTII,S$GLB,, | Performed by: FAMILY MEDICINE

## 2019-12-03 PROCEDURE — 3078F DIAST BP <80 MM HG: CPT | Mod: CPTII,S$GLB,, | Performed by: FAMILY MEDICINE

## 2019-12-03 PROCEDURE — 99214 OFFICE O/P EST MOD 30 MIN: CPT | Mod: S$GLB,,, | Performed by: FAMILY MEDICINE

## 2019-12-03 PROCEDURE — 84443 ASSAY THYROID STIM HORMONE: CPT

## 2019-12-03 PROCEDURE — 76536 PR US, HEAD/NECK/THYROID TISSUE: ICD-10-PCS | Mod: 26,,, | Performed by: RADIOLOGY

## 2019-12-03 PROCEDURE — 76999 ECHO EXAMINATION PROCEDURE: CPT | Mod: TC

## 2019-12-03 PROCEDURE — 3075F PR MOST RECENT SYSTOLIC BLOOD PRESS GE 130-139MM HG: ICD-10-PCS | Mod: CPTII,S$GLB,, | Performed by: FAMILY MEDICINE

## 2019-12-03 PROCEDURE — 84439 ASSAY OF FREE THYROXINE: CPT

## 2019-12-03 NOTE — PROGRESS NOTES
Subjective:       Patient ID: Ruth Messina is a 54 y.o. female.    Chief Complaint: Follow-up    55 yo female here for check up. She has h/o lupus managed by Dr. CHUNG and doing well. She is concerned today about her thyroid function; states she has had high and low TSH levels in the past. Also with concern about soft mass she noticed to the right of her neck above her clavicle.     does not have any pertinent problems on file.  Past Medical History:   Diagnosis Date    Anemia     with chronic disease lupus    Encounter for blood transfusion     G-6-PD deficiency     Heart murmur     MVP 1986    Hypertension     Lupus     Raynaud's syndrome     Systemic lupus erythematosus arthritis 5/13/2018     Past Surgical History:   Procedure Laterality Date    BONE MARROW BIOPSY      CARPAL TUNNEL RELEASE Bilateral     widsom tooth extraction       Family History   Problem Relation Age of Onset    Arthritis Mother     Heart disease Mother     Hypertension Mother     Diabetes Father     Heart disease Father     Hypertension Father     Cancer Brother     Eczema Neg Hx     Lupus Neg Hx     Psoriasis Neg Hx      Social History     Socioeconomic History    Marital status: Single     Spouse name: Not on file    Number of children: Not on file    Years of education: Not on file    Highest education level: Not on file   Occupational History    Not on file   Social Needs    Financial resource strain: Somewhat hard    Food insecurity:     Worry: Not on file     Inability: Not on file    Transportation needs:     Medical: Yes     Non-medical: Patient refused   Tobacco Use    Smoking status: Never Smoker    Smokeless tobacco: Never Used   Substance and Sexual Activity    Alcohol use: No    Drug use: No    Sexual activity: Not Currently   Lifestyle    Physical activity:     Days per week: 0 days     Minutes per session: Not on file    Stress: Not at all   Relationships    Social connections:     Talks on  phone: Three times a week     Gets together: Patient refused     Attends Confucianist service: Not on file     Active member of club or organization: Yes     Attends meetings of clubs or organizations: 1 to 4 times per year     Relationship status: Never    Other Topics Concern    Are you pregnant or think you may be? No    Breast-feeding No   Social History Narrative    Not on file     Review of Systems   Constitutional: Negative for fatigue and unexpected weight change.   HENT: Negative for hearing loss and sore throat.    Eyes: Negative for pain and visual disturbance.   Respiratory: Negative for cough and shortness of breath.    Cardiovascular: Negative for chest pain and palpitations.   Gastrointestinal: Negative for abdominal pain, constipation and diarrhea.   Genitourinary: Negative for difficulty urinating, dysuria and vaginal discharge.   Musculoskeletal: Negative for arthralgias and myalgias.   Skin: Negative for rash and wound.   Neurological: Negative for light-headedness and headaches.   Hematological: Negative.        Objective:     Vitals:    12/03/19 0845   BP: 130/72   Pulse: 101   Temp: 98.4 °F (36.9 °C)        Physical Exam   Constitutional: She is oriented to person, place, and time. She appears well-developed and well-nourished.   HENT:   Head: Normocephalic and atraumatic.   3x3 cm cyst like mass, soft borders, non-matted to right supraclavicular area   Eyes: Pupils are equal, round, and reactive to light. EOM are normal.   Neck: Normal range of motion. Neck supple.   Cardiovascular: Normal rate and regular rhythm.   Pulmonary/Chest: Effort normal and breath sounds normal. She has no wheezes.   Abdominal: Soft. Bowel sounds are normal. She exhibits no mass.   Musculoskeletal: Normal range of motion. She exhibits no deformity.   Neurological: She is alert and oriented to person, place, and time.   Skin: Skin is warm and dry.   Psychiatric: She has a normal mood and affect. Her behavior is  normal.   Nursing note and vitals reviewed.      Assessment:       1. H/O: hypothyroidism    2. Screening, lipid    3. Swelling        Plan:           Problem List Items Addressed This Visit     None      Visit Diagnoses     H/O: hypothyroidism    -  Primary    Relevant Orders    T3    T4, free    Thyroid peroxidase antibody    TSH    Screening, lipid        Relevant Orders    Lipid panel    Swelling        Relevant Orders    US Soft Tissue Misc      Refuses immunizations

## 2019-12-03 NOTE — PATIENT INSTRUCTIONS
Mediterranean Food Guide   People who live in the area around the [place] have a lower risk of heart disease. Researchers have recently shown that following a Mediterranean diet decreases heart disease by 30% in people whom are at-risk.   This lifestyle is built upon daily exercise along with a lot of fruit, vegetables, plant-based proteins, whole grains, fish and smaller amounts of poultry and red meat. Fatty fish (salmon), olive oil, and nuts make this diet higher in fat than the classic heart healthy diet. These fats are mostly unsaturated, and when consumed in place of saturated fat, are good for the heart.   Physical Activity- The Mediterranean Diet pyramid is built upon daily physical activity and exercise. Aim for at least 150 minutes of moderate to vigorous exercise every week. Moderate-to-vigorous exercises include walking at a brisk pace, biking, or swimming, among other activities that elevate your heart rate. Always choose activities that you enjoy and that are safe, in order to be active throughout your life.   Achieve and Maintain a Healthy Weight - The high fat content of the Mediterranean is healthy for your heart, but may lead to increased energy intake and weight gain if you dont pay attention to how much you are eating. If you are trying to lose weight, choose the smaller number of servings from each food group, and try to make your serving sizes match those listed. 2   Food Groups and Servings per day  Serving Sizes    Non-starchy Vegetables   4-8 servings per day  One serving is ½ cup of cooked vegetables or 1 cup raw vegetables   Non-starchy vegetables include artichoke, asparagus, beets, broccoli, [place] sprouts, cauliflower, cabbage, celery, carrots, tomatoes, eggplant, cucumber, onion, green and wax beans, zucchini, turnips, peppers, salad greens and mushrooms. (Potatoes, peas, and corn are starchy vegetables.)    Fruit   2-4 servings per day  One serving is a small fresh  fruit or ½ cup juice or ¼ cup dried fruit   Fresh fruits are preferred because of the fiber and other nutrients they contain. Fruits canned in light syrup or their own juice, and frozen fruit with little or no added sugar are also good choices. Use only small amounts of fruit juice (6 oz per day or less), since even unsweetened juices can contain as much sugar as regular soda.    Legumes and Nuts   1-3 servings per day  Legumes: One serving is ½ cup cooked kidney, black, garbanzo, pierce, soy (edamame), navy beans, split peas, or lentils, or ¼ cup fat free refried beans or baked beans   Nuts and Seeds: One serving is 2 Tbsp. sunflower or sesame seeds, 1 Tbsp. peanut butter,   7-8 walnuts or pecans, 20 peanuts, or 12-15 almonds   Aim for 1-2 servings of nuts or seeds and 1-2 servings of legumes per day. Legumes are high in fiber, protein, and minerals. Nuts are high in unsaturated fat, and may increase HDL without increasing LDL cholesterol levels.    Low-fat Dairy Products   1-3 servings per day  One serving is 1 cup of skim milk, non-fat yogurt, or 1oz of low-fat (part-skim) cheese   Calcium-fortified soy milk, soy yogurt, and soy cheese can take the place of dairy products. If servings of dairy or fortified soy are less than 2 per day, we advise a calcium and vitamin D supplement.    Fish or shellfish   2-3 times a week  One serving is 3 ounces (about the size of a deck of cards)   Cook fish by baking, sautéing, broiling, roasting, grilling, or poaching. Choose fatty fishes like salmon, herring, sardines, or mackerel often. The fat in fish is high in omega-3 fats, so it has healthy effects on triglycerides and blood cells.    Poultry, if desired   1-3 times a week  One serving is 3 ounces (about the size of a deck of cards)   Bake, sauté, stir perez, roast, or grill the poultry you eat, and eat it without the skin.    Whole Grains and Starchy Vegetables   4-6 servings per day  One serving is about 1 ounce of any of  these:   1 slice whole wheat bread ½ cup potatoes, sweet potatoes, corn, or peas   ½ large whole grain bun 1 small whole grain roll   6-inch whole wheat ever 6 whole grain crackers   ½ cup cooked whole grain cereal (oatmeal, cracked wheat, quinoa)   ½ cup cooked whole wheat pasta, brown rice, or barley   Whole grains are high in fiber and have less effect on blood sugar and triglyceride levels than refined, processed grains like white bread and pasta. Whole grains also keep the stomach full longer, making it easier to control hunger.

## 2019-12-10 ENCOUNTER — OFFICE VISIT (OUTPATIENT)
Dept: OPHTHALMOLOGY | Facility: CLINIC | Age: 54
End: 2019-12-10
Payer: COMMERCIAL

## 2019-12-10 DIAGNOSIS — M35.00 SECONDARY SJOGREN'S SYNDROME: ICD-10-CM

## 2019-12-10 DIAGNOSIS — H40.003 GLAUCOMA SUSPECT OF BOTH EYES: Primary | ICD-10-CM

## 2019-12-10 DIAGNOSIS — G05.3 CNS LUPUS: ICD-10-CM

## 2019-12-10 DIAGNOSIS — H35.40 PERIPHERAL RETINAL DEGENERATION OF BOTH EYES: ICD-10-CM

## 2019-12-10 DIAGNOSIS — M35.01 KERATOCONJUNCTIVITIS SICCA OF BOTH EYES: ICD-10-CM

## 2019-12-10 DIAGNOSIS — M32.19 CNS LUPUS: ICD-10-CM

## 2019-12-10 PROCEDURE — 92014 COMPRE OPH EXAM EST PT 1/>: CPT | Mod: S$GLB,,, | Performed by: OPHTHALMOLOGY

## 2019-12-10 PROCEDURE — 99999 PR PBB SHADOW E&M-EST. PATIENT-LVL II: CPT | Mod: PBBFAC,,, | Performed by: OPHTHALMOLOGY

## 2019-12-10 PROCEDURE — 92014 PR EYE EXAM, EST PATIENT,COMPREHESV: ICD-10-PCS | Mod: S$GLB,,, | Performed by: OPHTHALMOLOGY

## 2019-12-10 PROCEDURE — 92133 POSTERIOR SEGMENT OCT OPTIC NERVE(OCULAR COHERENCE TOMOGRAPHY) - OU - BOTH EYES: ICD-10-PCS | Mod: S$GLB,,, | Performed by: OPHTHALMOLOGY

## 2019-12-10 PROCEDURE — 99999 PR PBB SHADOW E&M-EST. PATIENT-LVL II: ICD-10-PCS | Mod: PBBFAC,,, | Performed by: OPHTHALMOLOGY

## 2019-12-10 PROCEDURE — 92133 CPTRZD OPH DX IMG PST SGM ON: CPT | Mod: S$GLB,,, | Performed by: OPHTHALMOLOGY

## 2019-12-10 NOTE — PROGRESS NOTES
===============================  Ruth Messina,   54 y.o. female   Last visit [facility]: :8/16/2019   Last visit eye dept. 8/16/2019  [facility]:  Corrected distance visual acuity was 20/25 in the right eye and 20/30 in the left eye.  Tonometry     Tonometry (Applanation, 8:51 AM)       Right Left    Pressure 20 20               Not recorded         Not recorded         Not recorded        Chief Complaint   Patient presents with    CNS LUPUS     4 MONTHS CHECK UP    Peripheral Vascular Disease          ________________  12/10/2019  HPI     CNS LUPUS      Additional comments: 4 MONTHS CHECK UP              Comments     FORMER COAG SUSP OU  SECONDARY SJOGREN'S SYNDROME  KERATOCONJUNCTIVITIS SICCA OU  PVD OU  Lupus NOT ON PLAQUENIL  PF AT'S          Last edited by Candace Mercado on 12/10/2019  8:41 AM. (History)      Problem List Items Addressed This Visit        Eye/Vision problems    Secondary Sjogren's syndrome    Keratoconjunctivitis sicca of both eyes    Peripheral retinal degeneration of both eyes       Other    CNS lupus    Glaucoma suspect of both eyes - Primary        Overview     -- asymmetric c:d OD>OS     All diagnostics normal.  Physiologic.                       .follow  rtc 1 year       ===========================

## 2019-12-12 DIAGNOSIS — Z13.220 SCREENING, LIPID: Primary | ICD-10-CM

## 2020-01-09 ENCOUNTER — PATIENT MESSAGE (OUTPATIENT)
Dept: CARDIOLOGY | Facility: CLINIC | Age: 55
End: 2020-01-09

## 2020-01-09 NOTE — TELEPHONE ENCOUNTER
Returned call. Spoke with patient. Appt rescheduled to 01/14/2020 at 0900, [facility].    Patient repeated appt date, time, location back correctly.

## 2020-01-09 NOTE — TELEPHONE ENCOUNTER
----- Message from Marissa Scott sent at 1/9/2020 10:05 AM CST -----  Contact: pt  States she has an appt today and she had to cancel it, due to an illness. She would like the nurse to give her a call to reschedule. Please call pt [phone]. Thank you

## 2020-01-14 ENCOUNTER — OFFICE VISIT (OUTPATIENT)
Dept: CARDIOLOGY | Facility: CLINIC | Age: 55
End: 2020-01-14
Payer: COMMERCIAL

## 2020-01-14 ENCOUNTER — CLINICAL SUPPORT (OUTPATIENT)
Dept: CARDIOLOGY | Facility: CLINIC | Age: 55
End: 2020-01-14
Payer: COMMERCIAL

## 2020-01-14 VITALS
HEIGHT: 66 IN | OXYGEN SATURATION: 94 % | WEIGHT: 194.69 LBS | HEART RATE: 92 BPM | BODY MASS INDEX: 31.29 KG/M2 | SYSTOLIC BLOOD PRESSURE: 132 MMHG | DIASTOLIC BLOOD PRESSURE: 88 MMHG

## 2020-01-14 DIAGNOSIS — D63.8 ANEMIA OF CHRONIC ILLNESS: ICD-10-CM

## 2020-01-14 DIAGNOSIS — I10 ESSENTIAL HYPERTENSION: ICD-10-CM

## 2020-01-14 DIAGNOSIS — M35.00 SECONDARY SJOGREN'S SYNDROME: ICD-10-CM

## 2020-01-14 DIAGNOSIS — M32.19 OTHER SYSTEMIC LUPUS ERYTHEMATOSUS WITH OTHER ORGAN INVOLVEMENT: ICD-10-CM

## 2020-01-14 DIAGNOSIS — I10 ESSENTIAL HYPERTENSION: Primary | ICD-10-CM

## 2020-01-14 PROCEDURE — 3008F BODY MASS INDEX DOCD: CPT | Mod: CPTII,S$GLB,, | Performed by: PHYSICIAN ASSISTANT

## 2020-01-14 PROCEDURE — 93000 ELECTROCARDIOGRAM COMPLETE: CPT | Mod: S$GLB,,, | Performed by: INTERNAL MEDICINE

## 2020-01-14 PROCEDURE — 3008F PR BODY MASS INDEX (BMI) DOCUMENTED: ICD-10-PCS | Mod: CPTII,S$GLB,, | Performed by: PHYSICIAN ASSISTANT

## 2020-01-14 PROCEDURE — 99214 PR OFFICE/OUTPT VISIT, EST, LEVL IV, 30-39 MIN: ICD-10-PCS | Mod: S$GLB,,, | Performed by: PHYSICIAN ASSISTANT

## 2020-01-14 PROCEDURE — 99214 OFFICE O/P EST MOD 30 MIN: CPT | Mod: S$GLB,,, | Performed by: PHYSICIAN ASSISTANT

## 2020-01-14 PROCEDURE — 3079F PR MOST RECENT DIASTOLIC BLOOD PRESSURE 80-89 MM HG: ICD-10-PCS | Mod: CPTII,S$GLB,, | Performed by: PHYSICIAN ASSISTANT

## 2020-01-14 PROCEDURE — 93000 EKG 12-LEAD: ICD-10-PCS | Mod: S$GLB,,, | Performed by: INTERNAL MEDICINE

## 2020-01-14 PROCEDURE — 3079F DIAST BP 80-89 MM HG: CPT | Mod: CPTII,S$GLB,, | Performed by: PHYSICIAN ASSISTANT

## 2020-01-14 PROCEDURE — 3075F PR MOST RECENT SYSTOLIC BLOOD PRESS GE 130-139MM HG: ICD-10-PCS | Mod: CPTII,S$GLB,, | Performed by: PHYSICIAN ASSISTANT

## 2020-01-14 PROCEDURE — 99999 PR PBB SHADOW E&M-EST. PATIENT-LVL IV: CPT | Mod: PBBFAC,,, | Performed by: PHYSICIAN ASSISTANT

## 2020-01-14 PROCEDURE — 3075F SYST BP GE 130 - 139MM HG: CPT | Mod: CPTII,S$GLB,, | Performed by: PHYSICIAN ASSISTANT

## 2020-01-14 PROCEDURE — 99999 PR PBB SHADOW E&M-EST. PATIENT-LVL IV: ICD-10-PCS | Mod: PBBFAC,,, | Performed by: PHYSICIAN ASSISTANT

## 2020-01-14 NOTE — PROGRESS NOTES
Subjective:    Patient ID:  Ruth Messina is a 54 y.o. female who presents for follow-up of HTN      HPI   Ms. Messina is a 54 year old female patient whose current medical conditions include lupus, Sjogren's, anemia, and HTN who presents today for her six month follow-up. She returns today and states is doing well. No real cardiac complaints. She denies any chest pain, SOB, or other anginal signs or symptoms. No lightheadedness, dizziness, palpitations, near syncope, or syncope. No s/s of CHF. BP stable and controlled today in office. Patient reports compliance with her medications. Tries to walk 2x weekly. Recent labs reviewed. Lipids elevated. PCP plans to recheck in 3 mos. Patient is trying to be more mindful of her diet and take fish oil on consistent basis. EKG today reviewed, shows NSR, NSR. 2D echo 11/19 showed normal EF.    Followed routinely by rheumatology.         Review of Systems   Constitution: Negative for chills, decreased appetite, fever and malaise/fatigue.   HENT: Negative for congestion, hoarse voice and sore throat.    Eyes: Negative for blurred vision and discharge.   Cardiovascular: Negative for chest pain, claudication, cyanosis, dyspnea on exertion, irregular heartbeat, leg swelling, near-syncope, orthopnea, palpitations and paroxysmal nocturnal dyspnea.   Respiratory: Negative for cough, hemoptysis, shortness of breath, snoring, sputum production and wheezing.    Endocrine: Negative for cold intolerance and heat intolerance.   Hematologic/Lymphatic: Negative for bleeding problem. Does not bruise/bleed easily.   Skin: Negative for rash.   Musculoskeletal: Negative for arthritis, back pain, joint pain, joint swelling, muscle cramps, muscle weakness and myalgias.   Gastrointestinal: Negative for abdominal pain, constipation, diarrhea, heartburn, melena and nausea.   Genitourinary: Negative for hematuria.   Neurological: Negative for dizziness, focal weakness, headaches, light-headedness,  "loss of balance, numbness, paresthesias, seizures and weakness.   Psychiatric/Behavioral: Negative for memory loss. The patient does not have insomnia.    Allergic/Immunologic: Negative for hives.     /88 (BP Location: Left arm, Patient Position: Sitting)   Pulse 92   Ht 5' 6" (1.676 m)   Wt 88.3 kg (194 lb 10.7 oz)   SpO2 (!) 94%   BMI 31.42 kg/m²   Objective:    Physical Exam   Constitutional: She is oriented to person, place, and time. She appears well-developed and well-nourished. No distress.   HENT:   Head: Normocephalic and atraumatic.   Eyes: Pupils are equal, round, and reactive to light. Right eye exhibits no discharge. Left eye exhibits no discharge.   Neck: Neck supple. No JVD present.   Cardiovascular: Normal rate, regular rhythm, S1 normal, S2 normal and normal heart sounds.   No murmur heard.  Pulmonary/Chest: Effort normal and breath sounds normal. No respiratory distress. She has no wheezes. She has no rales.   Abdominal: Soft. She exhibits no distension.   Musculoskeletal: She exhibits no edema.   Neurological: She is alert and oriented to person, place, and time.   Skin: Skin is warm and dry. She is not diaphoretic. No erythema.   Psychiatric: She has a normal mood and affect. Her behavior is normal. Thought content normal.   Nursing note and vitals reviewed.      Chemistry        Component Value Date/Time     10/02/2019 1135    K 3.9 10/02/2019 1135     10/02/2019 1135    CO2 24 10/02/2019 1135    BUN 14 10/02/2019 1135    CREATININE 0.9 10/02/2019 1135     10/02/2019 1135        Component Value Date/Time    CALCIUM 10.0 10/02/2019 1135    ALKPHOS 103 10/02/2019 1135    AST 19 10/02/2019 1135    ALT 20 10/02/2019 1135    BILITOT 0.5 10/02/2019 1135    ESTGFRAFRICA >60 10/02/2019 1135    EGFRNONAA >60 10/02/2019 1135        Lab Results   Component Value Date    CHOL 258 (H) 12/03/2019    CHOL 258 (H) 12/03/2019     Lab Results   Component Value Date    HDL 41 " 12/03/2019    HDL 41 12/03/2019     Lab Results   Component Value Date    LDLCALC 184.8 (H) 12/03/2019    LDLCALC 184.8 (H) 12/03/2019     Lab Results   Component Value Date    TRIG 161 (H) 12/03/2019    TRIG 161 (H) 12/03/2019     Lab Results   Component Value Date    CHOLHDL 15.9 (L) 12/03/2019    CHOLHDL 15.9 (L) 12/03/2019       2D Echo CONCLUSIONS     1 - Concentric hypertrophy.     2 - No wall motion abnormalities.     3 - Normal left ventricular systolic function (EF 60-65%).     4 - Normal left ventricular diastolic function.     5 - Normal right ventricular systolic function .     6 - Trivial mitral regurgitation.     Assessment:       1. Essential hypertension    2. Other systemic lupus erythematosus with other organ involvement    3. Secondary Sjogren's syndrome    4. Anemia of chronic illness      Patient presents for f/u. Doing clinically well. BP controlled. No angina/equivalent. Lipids elevated, discussed risk factor modification/caridac diet. Continue same meds/mgmt. PCP to recheck lipids in 3 months.  Plan:   -Continue current medical management and risk factor modification  -Cardiac, low salt diet  -Increase activity level  -PCP gets labs  -Follow-up with Dr. Acuna in 6 months

## 2020-01-21 ENCOUNTER — TELEPHONE (OUTPATIENT)
Dept: CARDIOLOGY | Facility: CLINIC | Age: 55
End: 2020-01-21

## 2020-01-21 DIAGNOSIS — I10 ESSENTIAL HYPERTENSION: ICD-10-CM

## 2020-01-21 RX ORDER — SPIRONOLACTONE 25 MG/1
TABLET ORAL
Qty: 60 TABLET | Refills: 3 | Status: SHIPPED | OUTPATIENT
Start: 2020-01-21 | End: 2020-06-30 | Stop reason: ALTCHOICE

## 2020-01-21 NOTE — TELEPHONE ENCOUNTER
Miss Miranda would like to know if she can be put back on amlodipine 5 mg instead of spironolactone?

## 2020-01-21 NOTE — TELEPHONE ENCOUNTER
----- Message from Mike Avila sent at 1/21/2020  3:56 PM CST -----  Contact: pt   Pt would like to ask some questions about changing medications.           [phone]

## 2020-01-23 NOTE — TELEPHONE ENCOUNTER
I spoke to Miss Miranda and let her know that I have sent a message to  Lucille regarding being changed from spironolactone to amlodipine

## 2020-01-24 RX ORDER — AMLODIPINE BESYLATE 5 MG/1
5 TABLET ORAL DAILY
Qty: 30 TABLET | Refills: 11 | Status: SHIPPED | OUTPATIENT
Start: 2020-01-24 | End: 2021-02-01

## 2020-01-29 DIAGNOSIS — R06.02 SOB (SHORTNESS OF BREATH): Primary | ICD-10-CM

## 2020-01-30 ENCOUNTER — OFFICE VISIT (OUTPATIENT)
Dept: PULMONOLOGY | Facility: CLINIC | Age: 55
End: 2020-01-30
Payer: COMMERCIAL

## 2020-01-30 ENCOUNTER — CLINICAL SUPPORT (OUTPATIENT)
Dept: PULMONOLOGY | Facility: CLINIC | Age: 55
End: 2020-01-30
Payer: COMMERCIAL

## 2020-01-30 ENCOUNTER — HOSPITAL ENCOUNTER (OUTPATIENT)
Dept: RADIOLOGY | Facility: HOSPITAL | Age: 55
Discharge: HOME | End: 2020-01-30
Attending: INTERNAL MEDICINE
Payer: COMMERCIAL

## 2020-01-30 VITALS
HEART RATE: 90 BPM | DIASTOLIC BLOOD PRESSURE: 88 MMHG | RESPIRATION RATE: 18 BRPM | BODY MASS INDEX: 31.11 KG/M2 | OXYGEN SATURATION: 97 % | WEIGHT: 193.56 LBS | HEIGHT: 66 IN | SYSTOLIC BLOOD PRESSURE: 138 MMHG

## 2020-01-30 DIAGNOSIS — M32.19 SYSTEMIC LUPUS ERYTHEMATOSUS WITH OTHER ORGAN INVOLVEMENT, UNSPECIFIED SLE TYPE: ICD-10-CM

## 2020-01-30 DIAGNOSIS — M35.9 INTERSTITIAL LUNG DISEASE DUE TO CONNECTIVE TISSUE DISEASE: ICD-10-CM

## 2020-01-30 DIAGNOSIS — R06.02 SOB (SHORTNESS OF BREATH): ICD-10-CM

## 2020-01-30 DIAGNOSIS — J84.9 INTERSTITIAL LUNG DISEASE: ICD-10-CM

## 2020-01-30 DIAGNOSIS — M35.9 INTERSTITIAL LUNG DISEASE DUE TO CONNECTIVE TISSUE DISEASE: Primary | ICD-10-CM

## 2020-01-30 DIAGNOSIS — J84.89 INTERSTITIAL LUNG DISEASE DUE TO CONNECTIVE TISSUE DISEASE: Primary | ICD-10-CM

## 2020-01-30 DIAGNOSIS — J84.89 INTERSTITIAL LUNG DISEASE DUE TO CONNECTIVE TISSUE DISEASE: ICD-10-CM

## 2020-01-30 PROCEDURE — 94726 PULM FUNCT TST PLETHYSMOGRAP: ICD-10-PCS | Mod: S$GLB,,, | Performed by: INTERNAL MEDICINE

## 2020-01-30 PROCEDURE — 94010 BREATHING CAPACITY TEST: ICD-10-PCS | Mod: S$GLB,,, | Performed by: INTERNAL MEDICINE

## 2020-01-30 PROCEDURE — 71046 X-RAY EXAM CHEST 2 VIEWS: CPT | Mod: TC

## 2020-01-30 PROCEDURE — 94726 PLETHYSMOGRAPHY LUNG VOLUMES: CPT | Mod: S$GLB,,, | Performed by: INTERNAL MEDICINE

## 2020-01-30 PROCEDURE — 94729 DIFFUSING CAPACITY: CPT | Mod: S$GLB,,, | Performed by: INTERNAL MEDICINE

## 2020-01-30 PROCEDURE — 99999 PR PBB SHADOW E&M-EST. PATIENT-LVL IV: CPT | Mod: PBBFAC,,, | Performed by: INTERNAL MEDICINE

## 2020-01-30 PROCEDURE — 94010 BREATHING CAPACITY TEST: CPT | Mod: S$GLB,,, | Performed by: INTERNAL MEDICINE

## 2020-01-30 PROCEDURE — 3008F BODY MASS INDEX DOCD: CPT | Mod: CPTII,S$GLB,, | Performed by: INTERNAL MEDICINE

## 2020-01-30 PROCEDURE — 99999 PR PBB SHADOW E&M-EST. PATIENT-LVL IV: ICD-10-PCS | Mod: PBBFAC,,, | Performed by: INTERNAL MEDICINE

## 2020-01-30 PROCEDURE — 3079F PR MOST RECENT DIASTOLIC BLOOD PRESSURE 80-89 MM HG: ICD-10-PCS | Mod: CPTII,S$GLB,, | Performed by: INTERNAL MEDICINE

## 2020-01-30 PROCEDURE — 94729 PR C02/MEMBANE DIFFUSE CAPACITY: ICD-10-PCS | Mod: S$GLB,,, | Performed by: INTERNAL MEDICINE

## 2020-01-30 PROCEDURE — 71046 XR CHEST PA AND LATERAL: ICD-10-PCS | Mod: 26,,, | Performed by: RADIOLOGY

## 2020-01-30 PROCEDURE — 71046 X-RAY EXAM CHEST 2 VIEWS: CPT | Mod: 26,,, | Performed by: RADIOLOGY

## 2020-01-30 PROCEDURE — 99205 PR OFFICE/OUTPT VISIT, NEW, LEVL V, 60-74 MIN: ICD-10-PCS | Mod: S$GLB,,, | Performed by: INTERNAL MEDICINE

## 2020-01-30 PROCEDURE — 99205 OFFICE O/P NEW HI 60 MIN: CPT | Mod: S$GLB,,, | Performed by: INTERNAL MEDICINE

## 2020-01-30 PROCEDURE — 3075F SYST BP GE 130 - 139MM HG: CPT | Mod: CPTII,S$GLB,, | Performed by: INTERNAL MEDICINE

## 2020-01-30 PROCEDURE — 3075F PR MOST RECENT SYSTOLIC BLOOD PRESS GE 130-139MM HG: ICD-10-PCS | Mod: CPTII,S$GLB,, | Performed by: INTERNAL MEDICINE

## 2020-01-30 PROCEDURE — 3079F DIAST BP 80-89 MM HG: CPT | Mod: CPTII,S$GLB,, | Performed by: INTERNAL MEDICINE

## 2020-01-30 PROCEDURE — 3008F PR BODY MASS INDEX (BMI) DOCUMENTED: ICD-10-PCS | Mod: CPTII,S$GLB,, | Performed by: INTERNAL MEDICINE

## 2020-01-30 NOTE — PROGRESS NOTES
Subjective:       Patient ID: Ruth Messina is a 54 y.o. female.    Chief Complaint: She       Interstitial Lung Disease    HPI     54 year old female patient whose current medical conditions include lupus, Sjogren's, anemia, and hypertension referred to pulmonary for evaluation of interstitial lung disease    Dyspnea  Patient complains of shortness of breath. Symptoms occur after more than one flight stairs, with more than one block walking. Symptoms began 4 months - 6 months ago, unchanged since. Associated symptoms include  dyspnea on exertion and shortness of breath. She denies chest pain, located left chest. She does not have had recent travel. Weight has been stable. Symptoms are exacerbated by strenuous activity. Symptoms are alleviated by nothing.     Connective Tissue Disease:  Raynaud's - no longer symptomatic - only during cold weather  systemic lupus erythematosis started about 15 years ago  Symptoms have improved.  Problems with cell cept  Unable to take plaquenil  Solumedrol infusions in the past  Present therapy prednisone and imuran  On this combination for 3- 4 years    Lung Nodule  She presents for evaluation and treatment of abnormal CT scan. Prior history indicates that the nodule spontaneously resolved. The patient had an abnormal imaging study but reports experiencing no current or past pulmonary symptoms. The patient denies other associated symptoms. She has never smoked. The patient has a known exposure to tuberculosis and a positive PPD. The patient does not have a history of cancer.  Not treated for TB. In the past a lung biopsy was scheduled then cancelled at [facility]       Past Medical History:   Diagnosis Date    Anemia     with chronic disease lupus    Encounter for blood transfusion     G-6-PD deficiency     Heart murmur     MVP 1986    Hypertension     Lupus     Raynaud's syndrome     Systemic lupus erythematosus arthritis 5/13/2018      Past Surgical History:   Procedure Laterality Date    BONE MARROW BIOPSY      CARPAL TUNNEL RELEASE Bilateral     widsom tooth extraction       Social History     Socioeconomic History    Marital status: Single     Spouse name: Not on file    Number of children: Not on file    Years of education: Not on file    Highest education level: Not on file   Occupational History    Not on file   Social Needs    Financial resource strain: Somewhat hard    Food insecurity:     Worry: Not on file     Inability: Not on file    Transportation needs:     Medical: Yes     Non-medical: Patient refused   Tobacco Use    Smoking status: Never Smoker    Smokeless tobacco: Never Used   Substance and Sexual Activity    Alcohol use: No    Drug use: No    Sexual activity: Not Currently   Lifestyle    Physical activity:     Days per week: 0 days     Minutes per session: Not on file    Stress: Not at all   Relationships    Social connections:     Talks on phone: Three times a week     Gets together: Patient refused     Attends Christian service: Not on file     Active member of club or organization: Yes     Attends meetings of clubs or organizations: 1 to 4 times per year     Relationship status: Never    Other Topics Concern    Are you pregnant or think you may be? No    Breast-feeding No   Social History Narrative    Not on file     Review of Systems   Constitutional: Negative for fever and fatigue.   HENT: Negative for postnasal drip and rhinorrhea.    Eyes: Negative for redness and itching.   Respiratory: Positive for shortness of breath. Negative for cough, wheezing, dyspnea on extertion and Paroxysmal Nocturnal Dyspnea.    Cardiovascular: Negative for chest pain.   Genitourinary: Negative for difficulty urinating and hematuria.   Endocrine: Negative for polyphagia, cold intolerance and heat intolerance.    Musculoskeletal: Positive for arthralgias.   Skin: Negative for rash.   Gastrointestinal: Negative  "for nausea, vomiting, abdominal pain and abdominal distention.   Neurological: Negative for dizziness and headaches.   Hematological: Negative for adenopathy. Does not bruise/bleed easily and no excessive bruising.   Psychiatric/Behavioral: The patient is not nervous/anxious.        Objective:      /88   Pulse 90   Resp 18   Ht 5' 5.5" (1.664 m)   Wt 87.8 kg (193 lb 9 oz)   SpO2 97%   BMI 31.72 kg/m²   Physical Exam   Constitutional: She is oriented to person, place, and time. She appears well-developed and well-nourished.   HENT:   Head: Normocephalic and atraumatic.   Eyes: Pupils are equal, round, and reactive to light. Conjunctivae are normal.   Neck: Neck supple. No JVD present. No tracheal deviation present. No thyromegaly present.   Cardiovascular: Normal rate, regular rhythm and normal heart sounds.   Pulmonary/Chest: Effort normal. No respiratory distress. She has no wheezes. She has rales in the right lower field and the left lower field. She exhibits no tenderness.   Abdominal: Soft. Bowel sounds are normal.   Musculoskeletal: Normal range of motion. She exhibits no edema.   Lymphadenopathy:     She has no cervical adenopathy.   Neurological: She is alert and oriented to person, place, and time.   Skin: Skin is warm and dry.   Nursing note and vitals reviewed.    Personal Diagnostic Review  CT of chest performed on 2019 without contrast revealed early interstitial lung disease.    .  Radiology Result      Name:   :  Patient MRN:   Ruth Messina 1965 [id number]   Account Number: Room & Bed Accession Number:   [id number]   [id number]   Authorizing Physician: Patient Class: Diagnosis:   Chirag Turner OP- Outpatient Diagnostic Testing Systemic lupus erythematosus arthritis [M32.9 (ICD-10-CM)]  Positive sm/RNP antibody [R76.8 (ICD-10-CM)]   Procedure:  Exam Date: Reason for Exam:   CT Chest Without Contrast 2019 ild             RESULTS:     EXAMINATION:  CT CHEST WITHOUT " CONTRAST     CLINICAL HISTORY:  ild; Systemic lupus erythematosus, unspecified     TECHNIQUE:  Low dose axial images, sagittal and coronal reformations were obtained   from the thoracic inlet to the lung bases. Contrast was not administered.     COMPARISON:  Chest CT from 02/09/2017.     FINDINGS:  Limited imaging through the upper abdomen demonstrates no significant   abnormality.  Review of bone windows reveals the osseous structures to be   intact.     Heart size is normal.  No pericardial effusion.  Trace fluid in the   pericardial recesses.  Trachea and mainstem bronchi remain patent.  Mildly   heterogeneous appearance of the thyroid gland is noted.  Small scattered   shotty axillary and mediastinal nodes.  These are unchanged.  No bulky   adenopathy.     No pulmonary nodule or mass.  Mild respiratory artifact.  Scarring at the   lung bases most pronounced at the medial right lower lobe.  No significant   bronchial wall thickening or bronchiectasis.  There is mild scarring at   the lung apices.  Scattered subpleural reticulations are seen.  Minimal   ground-glass densities predominantly in the lower lung zones.     No pneumothorax or pleural effusion.     Impression:     Findings as described above most compatible with mild chronic interstitial   changes in this patient with reported history of SLE.        Electronically signed by:     Ghassan Lozada MD  Date:                                    11/11/2019  Time:                                   09:40                          X-Ray Chest PA And Lateral  Narrative: EXAMINATION:  XR CHEST PA AND LATERAL    CLINICAL HISTORY:  SOB; Shortness of breath    TECHNIQUE:  PA and lateral views of the chest were performed.    COMPARISON:  02/09/2017    FINDINGS:  Cardiac silhouette and mediastinal contours are normal.  Lungs are clear.  Osseous structures are intact.  Impression: No acute cardiopulmonary process.    Electronically signed by: Praful Smith  MD  Date:    01/30/2020  Time:    14:02    CT chest without jpkdxbdd12/29/2016  [facility]  Result Narrative   EXAM: CT Thorax w/o IV Contrast, 11/29/2016 9:33 AM    COMPARISON: Outside CT 9/16/2016, biopsy CT 11/11/2016    HISTORY: R91.8 EXAM: CT Thorax w/o IV Contrast, 11/29/2016 9:33 AM    COMPARISON: Outside CT    HISTORY: R91.8 previous masslike consolidation within the left upper lobe with improvement when patient reported for CT-guided biopsy which was therefore deferred.    TECHNIQUE: Helically acquired axial CT images were obtained from the thoracic inlet through the upper abdomen without IV contrast.    Automated exposure control was used for dose reduction.    FINDINGS:  Masslike consolidation which was seen on the outside CT 9/16/2016 has nearly completely resolved, and has further decreased since the CT performed 11/11/2016 at which time patient's biopsy was deferred. There is a small amount of residual opacity in the   left upper lobe measuring 7 x 8 mm, but is less focal and nodular than on the most recent CT.    There is scarring at the lung bases, stable from prior. No new consolidation, nodule, or mass. Central airways are patent.    Heart size is normal. No pericardial effusion. No pathologic appearing mediastinal lymph nodes. Danielle are not well assessed without IV contrast.    Single stone seen in the gallbladder. 4 mm left adrenal adenoma is present.    No acute or suspicious bone abnormality.    IMPRESSION:  Further decrease in left upper lobe consolidation/nodule compared to 11/11/2016 biopsy CT and marked decreased compared to CT dated 9/16/2016. Residual opacity in the left upper lobe is somewhat linear and likely partially related to scarring. Findings   are compatible with resolving infectious process. No new abnormality.    TECHNIQUE:        Automated exposure control was used for dose reduction.    FINDINGS:        IMPRESSION:  1.       Transcribed By:  EVERARDO  Transcribed Date/Time:  29-NOV-2016 11:26                                           Electronically Signed By:  Michelle Aguilar MD                                         Proxied for:  Michelle Aguilar MD                                             Signed Date/Time:  29-NOV-2016 11:31  Performing Room # 1       Office Spirometry Results:     No flowsheet data found.  Pulmonary Studies Review 1/30/2020   SpO2 97   Height 65.500   Weight 3097.02   BMI (Calculated) 31.7   Predicted Distance 353.71   Predicted Distance Meters (Calculated) 493.36         Assessment:            Interstitial lung disease due to connective tissue disease  -     Complete PFT with bronchodilator; Future; Expected date: 01/30/2020  -     CT Chest Without Contrast; Future; Expected date: 01/30/2020  -     Complete PFT without bronchodilator; Future; Expected date: 01/30/2020    Systemic lupus erythematosus with other organ involvement, unspecified SLE type  -     Complete PFT with bronchodilator; Future; Expected date: 01/30/2020    SOB (shortness of breath)  -     Complete PFT with bronchodilator; Future; Expected date: 01/30/2020  -     Complete PFT without bronchodilator; Future; Expected date: 01/30/2020    Interstitial lung disease  -     CT Chest Without Contrast; Future; Expected date: 01/30/2020          Outpatient Encounter Medications as of 1/30/2020   Medication Sig Dispense Refill    amLODIPine (NORVASC) 5 MG tablet Take 1 tablet (5 mg total) by mouth once daily. 30 tablet 11    azaTHIOprine (IMURAN) 50 mg Tab TAKE 1 TABLET BY MOUTH ONCE DAILY 30 tablet 11    B1/B2/NIACIN/B12/PROTEASE (B-COMPLEX WITH B-12 ORAL) Take by mouth.      biotin 5,000 mcg TbDL Take by mouth.      cholecalciferol, vitamin D3, (VITAMIN D3) 5,000 unit Tab Take 5,000 Units by mouth once daily.      cyclobenzaprine (FLEXERIL) 10 MG tablet Take 1 tablet (10 mg total) by mouth 3 (three) times daily as needed for  Muscle spasms. 30 tablet 3    desoximetasone (TOPICORT) 0.25 % cream APPLY TO AFFECTED AREA OF THE SCALP ONCE DAILY 60 g 3    EPINEPHrine (EPIPEN) 0.3 mg/0.3 mL AtIn Inject 0.3 mLs (0.3 mg total) into the muscle as needed. 2 each 3    ferrous gluconate (FERGON) 324 MG tablet Take 324 mg by mouth daily with breakfast.      fish oil-omega-3 fatty acids 300-1,000 mg capsule Take 2 g by mouth once daily.      fluocinolone and shower cap 0.01 % Oil       FLUoxetine (PROZAC) 20 MG capsule Take 1 capsule (20 mg total) by mouth once daily. 30 capsule 5    gabapentin (NEURONTIN) 300 MG capsule Take 1 capsule (300 mg total) by mouth 3 (three) times daily. 90 capsule 11    losartan (COZAAR) 100 MG tablet TAKE 1 TABLET BY MOUTH ONCE DAILY 90 tablet 3    multivitamin capsule Take 1 capsule by mouth once daily.      predniSONE (DELTASONE) 5 MG tablet Take 1 tablet (5 mg total) by mouth once daily. 30 tablet 0    spironolactone (ALDACTONE) 25 MG tablet TAKE 2 TABLETS BY MOUTH ONCE DAILY 60 tablet 3    traMADol (ULTRAM) 50 mg tablet Take 50 mg by mouth.       UNABLE TO FIND every evening. Naltrexone - Low Dose Med Name:  On Empty Stomach      fluocinolone (DERMA-SMOOTHE) 0.01 % external oil Apply oil to scalp once every other day 1 Bottle 5     No facility-administered encounter medications on file as of 1/30/2020.      Plan:       Requested Prescriptions      No prescriptions requested or ordered in this encounter     Problem List Items Addressed This Visit     Systemic lupus erythematosus    Relevant Orders    Complete PFT with bronchodilator      Other Visit Diagnoses     Interstitial lung disease due to connective tissue disease    -  Primary    Relevant Orders    Complete PFT with bronchodilator    CT Chest Without Contrast    Complete PFT without bronchodilator    SOB (shortness of breath)        Relevant Orders    Complete PFT with bronchodilator    Complete PFT without bronchodilator    Interstitial lung  disease        Relevant Orders    CT Chest Without Contrast             Follow up in about 6 months (around 7/30/2020) for PFT on return, Review CT/PET day of visit.    MEDICAL DECISION MAKING: Moderate to high complexity.  Overall, the multiple problems listed are of moderate to high severity that may impact quality of life and activities of daily living. Side effects of medications, treatment plan as well as options and alternatives reviewed and discussed with patient. There was counseling of patient concerning these issues.    Total time spent in face to face counseling and coordination of care - 60  minutes over 50% of time was used in discussion of prognosis, risks, benefits of treatment, instructions and compliance with regimen . Discussion with other physicians or health care providers (DME, NP, pharmacy, respiratory therapy) occurred.

## 2020-01-30 NOTE — LETTER
January 30, 2020      Angela Nagel MD  [address]  [place] [address]           [facility]  [address]  [place] [address]  Phone: [phone]  Fax: [phone]          Patient: Ruth Messina   MR Number: [id number]   YOB: 1965   Date of Visit: 1/30/2020       Dear Dr. Angela Nagel:    Thank you for referring Ruth Messina to me for evaluation. Attached you will find relevant portions of my assessment and plan of care.    If you have questions, please do not hesitate to call me. I look forward to following Ruth Messina along with you.    Sincerely,    Herbie Ely MD    Enclosure  CC:  No Recipients    If you would like to receive this communication electronically, please contact [email or web address] or [phone] to request more information on [employer] Link access.    For providers and/or their staff who would like to refer a patient to [facility], please contact us through our one-stop-shop provider referral line, [facility], at [phone].    If you feel you have received this communication in error or would no longer like to receive these types of communications, please e-mail [email or web address]

## 2020-01-31 LAB
BRPFT: ABNORMAL
DLCO ADJ PRE: 19.05 ML/(MIN*MMHG) (ref 18.44–29.9)
DLCO SINGLE BREATH LLN: 18.44
DLCO SINGLE BREATH PRE REF: 78.8 %
DLCO SINGLE BREATH REF: 24.17
DLCOC SBVA LLN: 3.25
DLCOC SBVA PRE REF: 71.6 %
DLCOC SBVA REF: 4.68
DLCOC SINGLE BREATH LLN: 18.44
DLCOC SINGLE BREATH PRE REF: 78.8 %
DLCOC SINGLE BREATH REF: 24.17
DLCOVA LLN: 3.25
DLCOVA PRE REF: 71.6 %
DLCOVA PRE: 3.35 ML/(MIN*MMHG*L) (ref 3.25–6.11)
DLCOVA REF: 4.68
DLVAADJ PRE: 3.35 ML/(MIN*MMHG*L) (ref 3.25–6.11)
ERV LLN: 0.87
ERV PRE REF: 118.4 %
ERV REF: 0.87
FEF 25 75 LLN: 1.01
FEF 25 75 PRE REF: 26.5 %
FEF 25 75 REF: 2.22
FEV1 FVC LLN: 69
FEV1 FVC PRE REF: 81 %
FEV1 FVC REF: 80
FEV1 LLN: 1.74
FEV1 PRE REF: 46.4 %
FEV1 REF: 2.34
FRCPLETH LLN: 1.95
FRCPLETH PREREF: 152.8 %
FRCPLETH REF: 2.77
FVC LLN: 2.21
FVC PRE REF: 57 %
FVC REF: 2.94
IVC PRE: 3.89 L (ref 2.21–3.68)
IVC SINGLE BREATH LLN: 2.21
IVC SINGLE BREATH PRE REF: 132 %
IVC SINGLE BREATH REF: 2.94
PEF LLN: 3.99
PEF PRE REF: 72.8 %
PEF REF: 6.08
PRE DLCO: 19.05 ML/(MIN*MMHG) (ref 18.44–29.9)
PRE ERV: 1.03 L (ref 0.87–0.87)
PRE FEF 25 75: 0.59 L/S (ref 1.01–3.43)
PRE FET 100: 7.02 SEC
PRE FEV1 FVC: 64.86 % (ref 68.76–91.3)
PRE FEV1: 1.09 L (ref 1.74–2.95)
PRE FRC PL: 4.24 L
PRE FVC: 1.68 L (ref 2.21–3.68)
PRE PEF: 4.42 L/S (ref 3.99–8.17)
PRE RV: 3.17 L (ref 1.32–2.48)
PRE TLC: 7.13 L (ref 4.18–6.15)
RAW LLN: 3.06
RAW PRE REF: 55 %
RAW PRE: 1.68 CMH2O*S/L (ref 3.06–3.06)
RAW REF: 3.06
RV LLN: 1.32
RV PRE REF: 166.7 %
RV REF: 1.9
RVTLC LLN: 28
RVTLC PRE REF: 117 %
RVTLC PRE: 44.45 % (ref 28.41–47.59)
RVTLC REF: 38
TLC LLN: 4.18
TLC PRE REF: 138 %
TLC REF: 5.17
VA PRE: 5.69 L (ref 5.02–5.02)
VA SINGLE BREATH LLN: 5.02
VA SINGLE BREATH PRE REF: 113.4 %
VA SINGLE BREATH REF: 5.02
VC LLN: 2.21
VC PRE REF: 134.5 %
VC PRE: 3.96 L (ref 2.21–3.68)
VC REF: 2.94
VTGRAWPRE: 3.69 L

## 2020-02-12 ENCOUNTER — OFFICE VISIT (OUTPATIENT)
Dept: OTOLARYNGOLOGY | Facility: CLINIC | Age: 55
End: 2020-02-12
Payer: COMMERCIAL

## 2020-02-12 VITALS — HEIGHT: 66 IN | BODY MASS INDEX: 29.94 KG/M2 | WEIGHT: 186.31 LBS

## 2020-02-12 DIAGNOSIS — J30.89 NON-SEASONAL ALLERGIC RHINITIS, UNSPECIFIED TRIGGER: ICD-10-CM

## 2020-02-12 DIAGNOSIS — M35.01 SJOGREN'S SYNDROME WITH KERATOCONJUNCTIVITIS SICCA: ICD-10-CM

## 2020-02-12 DIAGNOSIS — R09.A2 GLOBUS SENSATION: Primary | ICD-10-CM

## 2020-02-12 DIAGNOSIS — R68.2 DRY MOUTH: ICD-10-CM

## 2020-02-12 PROCEDURE — 3008F PR BODY MASS INDEX (BMI) DOCUMENTED: ICD-10-PCS | Mod: CPTII,S$GLB,, | Performed by: PHYSICIAN ASSISTANT

## 2020-02-12 PROCEDURE — 99213 OFFICE O/P EST LOW 20 MIN: CPT | Mod: S$GLB,,, | Performed by: PHYSICIAN ASSISTANT

## 2020-02-12 PROCEDURE — 99213 PR OFFICE/OUTPT VISIT, EST, LEVL III, 20-29 MIN: ICD-10-PCS | Mod: S$GLB,,, | Performed by: PHYSICIAN ASSISTANT

## 2020-02-12 PROCEDURE — 99999 PR PBB SHADOW E&M-EST. PATIENT-LVL IV: CPT | Mod: PBBFAC,,, | Performed by: PHYSICIAN ASSISTANT

## 2020-02-12 PROCEDURE — 99999 PR PBB SHADOW E&M-EST. PATIENT-LVL IV: ICD-10-PCS | Mod: PBBFAC,,, | Performed by: PHYSICIAN ASSISTANT

## 2020-02-12 PROCEDURE — 3008F BODY MASS INDEX DOCD: CPT | Mod: CPTII,S$GLB,, | Performed by: PHYSICIAN ASSISTANT

## 2020-02-12 NOTE — LETTER
February 12, 2020      Angela Nagel MD  [address] [facility] [address]           [facility]  [address]  [place] [address]  Phone: [phone]  Fax: [phone]          Patient: Ruth Messina   MR Number: [id number]   YOB: 1965   Date of Visit: 2/12/2020       Dear Dr. Angela Nagel:    Thank you for referring Ruth Messina to me for evaluation. Attached you will find relevant portions of my assessment and plan of care.    If you have questions, please do not hesitate to call me. I look forward to following Ruth Messina along with you.    Sincerely,    María Messina PA-C    Enclosure  CC:  No Recipients    If you would like to receive this communication electronically, please contact [email or web address] or [phone] to request more information on [employer] Link access.    For providers and/or their staff who would like to refer a patient to [facility], please contact us through our one-stop-shop provider referral line, [facility] , at [phone].    If you feel you have received this communication in error or would no longer like to receive these types of communications, please e-mail [email or web address]

## 2020-02-12 NOTE — PROGRESS NOTES
Subjective:       Patient ID: Ruth Messina is a 54 y.o. female.    Chief Complaint: Other (pt states that she felt as though there was something in her throat that causes her to cough that comes and goes for the last 2 weeks or so.)    Patient is a very pleasant 54 year old female here to see me today for evaluation of scratchy throat and dry cough.  She has a diagnosis of Sjogren's and Lupus.  She has known dry mouth and uses Biotene sprays and lemon juice.  She tries to drink 60-80 oz of water daily.  She has had few recent episodes of globus sensation that then triggers a cough.  She says it lasts few minutes and then subsides.  Happens once or twice a month.  Denies dysphagia.  Denies throat pain but notes generalized oral sensitivity and says her mouth sometimes feels swollen.  She points to the gingivobuccal sulcus as area of swelling.  She says that the swelling does not involve her gums.   She was last here with Dr. Salomon in 4/2019 w/right submandibular gland swelling.  No recent drainage or foul taste in her mouth.  She is on Zyrtec as needed for allergies. No longer using Flonase as it caused nasal irritation.  She has had occasional heartburn (worse with butter).  Does not smoke.  No recent fever.      Review of Systems   Constitutional: Positive for fatigue. Negative for fever and unexpected weight change.   HENT: Positive for postnasal drip and voice change (hoarse earlier this month). Negative for congestion, dental problem, drooling (suffers with dry mouth), ear discharge, ear pain, facial swelling, hearing loss, nosebleeds, rhinorrhea, sinus pressure, sneezing, sore throat (scratchy), tinnitus and trouble swallowing.    Eyes: Positive for itching. Negative for redness and visual disturbance.   Respiratory: Positive for cough. Negative for choking and shortness of breath.    Cardiovascular: Negative for chest pain.   Gastrointestinal:        Heartburn   Musculoskeletal: Negative for gait  problem.   Skin: Negative for rash.   Neurological: Positive for headaches. Negative for dizziness and light-headedness.       Objective:      Physical Exam   Constitutional: She is oriented to person, place, and time. She appears well-developed and well-nourished. She is cooperative. No distress.   HENT:   Head: Normocephalic and atraumatic.   Right Ear: Tympanic membrane, external ear and ear canal normal. No middle ear effusion.   Left Ear: Tympanic membrane, external ear and ear canal normal.  No middle ear effusion.   Nose: Mucosal edema and rhinorrhea present. No nasal deformity or septal deviation. No epistaxis. Right sinus exhibits no maxillary sinus tenderness and no frontal sinus tenderness. Left sinus exhibits no maxillary sinus tenderness and no frontal sinus tenderness.   Mouth/Throat: Uvula is midline and oropharynx is clear and moist. Mucous membranes are not pale and dry. No trismus in the jaw. No uvula swelling or dental caries. No oropharyngeal exudate, posterior oropharyngeal edema or posterior oropharyngeal erythema.   Eyes: Pupils are equal, round, and reactive to light. Conjunctivae, EOM and lids are normal. Right eye exhibits no chemosis. Left eye exhibits no chemosis. Right conjunctiva is not injected. Left conjunctiva is not injected. No scleral icterus. Right eye exhibits normal extraocular motion and no nystagmus. Left eye exhibits normal extraocular motion and no nystagmus.   Neck: Trachea normal and phonation normal. No tracheal tenderness present. No tracheal deviation present. No thyroid mass and no thyromegaly present.   Cardiovascular: Intact distal pulses.   Pulmonary/Chest: Effort normal. No stridor. No respiratory distress.   Abdominal: She exhibits no distension.   Lymphadenopathy:        Head (right side): No submental, no submandibular, no preauricular and no posterior auricular adenopathy present.        Head (left side): No submental, no submandibular, no preauricular and no  posterior auricular adenopathy present.     She has no cervical adenopathy.   Neurological: She is alert and oriented to person, place, and time. No cranial nerve deficit.   Skin: Skin is warm and dry. No rash noted. No erythema.   Psychiatric: She has a normal mood and affect. Her behavior is normal.       Assessment:       1. Globus sensation    2. Sjogren's syndrome with keratoconjunctivitis sicca    3. Dry mouth    4. Non-seasonal allergic rhinitis, unspecified trigger        Plan:           She has history of Sjogren's and lupus.  We also discussed other conservative measures to help increase her oral hydration including increasing fluids, Biotene mouthwash, as well as sialagogues.  She is a candidate for Evoxac to help increase salivary flow, but she is not interested in adding another medication to her regimen at this time. She does understand that as an option in the future if she is interested.  I would recommend she RTC with Dr. Salomon in 2 weeks for scope examination given her recurrent globus sensation.  We discussed that her dry mouth is likely exacerbating this sensation.  I would recommend she resume Flonase.  I would recommend the patient use OTC Flonase Sensimist nasal spray.  This formulation is an alcohol and fragrance free version of a nasal steroid spray, and is often very helpful for those patients that cannot tolerate other nasal steroid sprays.  It also delivers the medication in the form of a mist rather than a spray, and has a decreased incidence of epistaxis.

## 2020-02-15 ENCOUNTER — PATIENT MESSAGE (OUTPATIENT)
Dept: ADMINISTRATIVE | Facility: OTHER | Age: 55
End: 2020-02-15

## 2020-02-17 DIAGNOSIS — Z13.79 GENETIC SCREENING: ICD-10-CM

## 2020-02-19 ENCOUNTER — TELEPHONE (OUTPATIENT)
Dept: RHEUMATOLOGY | Facility: CLINIC | Age: 55
End: 2020-02-19

## 2020-02-19 NOTE — TELEPHONE ENCOUNTER
No there is no extra blood test other than the ones we do to check her white count , liver and kidney functions to monitor for that. There has not been any clinical suspicion for reactivation of CMV/EBV based on her symptoms. If still in doubt, she could consult with any infectious disease specialists in town . Thanks.

## 2020-02-19 NOTE — TELEPHONE ENCOUNTER
----- Message from Sofie Hu sent at 2/19/2020  9:48 AM CST -----  Contact: [phone]  Would like to speak with the nurse, Patient Declined to leave a message, please call back at [phone], Thanks sj

## 2020-02-19 NOTE — TELEPHONE ENCOUNTER
Spoke with pt and she states years ago she had CMV and EBV and she was reading that it could be reactivated if you are taking immunosuppressive drugs. Would like to know if this is possible and if the lab test could be added to her lab appointment next week to verify. Please Advise.

## 2020-02-21 ENCOUNTER — TELEPHONE (OUTPATIENT)
Dept: PALLIATIVE MEDICINE | Facility: CLINIC | Age: 55
End: 2020-02-21

## 2020-02-21 ENCOUNTER — PATIENT MESSAGE (OUTPATIENT)
Dept: RHEUMATOLOGY | Facility: CLINIC | Age: 55
End: 2020-02-21

## 2020-02-21 DIAGNOSIS — B34.9 VIRAL INFECTION: Primary | ICD-10-CM

## 2020-02-21 NOTE — TELEPHONE ENCOUNTER
----- Message from Raza Villatoro sent at 2/21/2020 10:20 AM CST -----  Contact: PT   Type:  Needs Medical Advice    Who Called:  Pt   Symptoms (please be specific): personal matter  How long has patient had these symptoms:    Pharmacy name and phone #:  n/a  Would the patient rather a call back or a response via [facility]? n/a  Best Call Back Number: [phone] (home)   Additional Information: The patient is requesting to speak with the nurse concerning a personal matter,please advise.

## 2020-02-21 NOTE — TELEPHONE ENCOUNTER
She wants to be retested for EBV AND CMV she was diagnosed with this last year sometime and she read about possibly being positive for them again . Advise

## 2020-02-25 ENCOUNTER — TELEPHONE (OUTPATIENT)
Dept: CARDIOLOGY | Facility: CLINIC | Age: 55
End: 2020-02-25

## 2020-02-25 NOTE — TELEPHONE ENCOUNTER
Ms Miranda stated that she would call back regarding scheduling a 3-4 month follow up at [facility]

## 2020-02-26 ENCOUNTER — LAB VISIT (OUTPATIENT)
Dept: LAB | Facility: HOSPITAL | Age: 55
End: 2020-02-26
Attending: INTERNAL MEDICINE
Payer: COMMERCIAL

## 2020-02-26 ENCOUNTER — LAB VISIT (OUTPATIENT)
Dept: LAB | Facility: HOSPITAL | Age: 55
End: 2020-02-26
Payer: COMMERCIAL

## 2020-02-26 DIAGNOSIS — M32.19 OTHER SYSTEMIC LUPUS ERYTHEMATOSUS WITH OTHER ORGAN INVOLVEMENT: ICD-10-CM

## 2020-02-26 DIAGNOSIS — B34.9 VIRAL INFECTION: ICD-10-CM

## 2020-02-26 LAB
BILIRUB UR QL STRIP: NEGATIVE
CLARITY UR: CLEAR
COLOR UR: YELLOW
GLUCOSE UR QL STRIP: NEGATIVE
HGB UR QL STRIP: NEGATIVE
KETONES UR QL STRIP: NEGATIVE
LEUKOCYTE ESTERASE UR QL STRIP: NEGATIVE
NITRITE UR QL STRIP: NEGATIVE
PH UR STRIP: 6 [PH] (ref 5–8)
PROT UR QL STRIP: NEGATIVE
SP GR UR STRIP: <=1.005 (ref 1–1.03)
URN SPEC COLLECT METH UR: NORMAL

## 2020-02-26 PROCEDURE — 86645 CMV ANTIBODY IGM: CPT

## 2020-02-26 PROCEDURE — 86664 EPSTEIN-BARR NUCLEAR ANTIGEN: CPT

## 2020-02-26 PROCEDURE — 81003 URINALYSIS AUTO W/O SCOPE: CPT

## 2020-02-27 ENCOUNTER — PATIENT OUTREACH (OUTPATIENT)
Dept: ADMINISTRATIVE | Facility: OTHER | Age: 55
End: 2020-02-27

## 2020-02-27 NOTE — PROGRESS NOTES
Chart reviewed.   Requested updates from Care Everywhere.  Immunizations not inn links.   HM updated.

## 2020-02-28 LAB
CMV IGM SERPL IA-ACNC: <8 AU/ML
EBV EA IGG SER-ACNC: 62.4 U/ML
EBV NA IGG SER-ACNC: >600 U/ML
EBV VCA IGG SER-ACNC: 188 U/ML
EBV VCA IGM SER-ACNC: <10 U/ML

## 2020-03-03 ENCOUNTER — TELEPHONE (OUTPATIENT)
Dept: PULMONOLOGY | Facility: CLINIC | Age: 55
End: 2020-03-03

## 2020-03-03 DIAGNOSIS — M32.19 SYSTEMIC LUPUS ERYTHEMATOSUS WITH OTHER ORGAN INVOLVEMENT, UNSPECIFIED SLE TYPE: Primary | ICD-10-CM

## 2020-03-03 DIAGNOSIS — Z20.828 EBV EXPOSURE: ICD-10-CM

## 2020-03-03 NOTE — TELEPHONE ENCOUNTER
----- Message from Evelin Mckeon sent at 3/3/2020 12:22 PM CST -----  Contact: pt  Pt would like to speak with nurse.  Pt would give no further information.

## 2020-03-05 NOTE — TELEPHONE ENCOUNTER
Return call:  Patient has a history of CMV and EBV in the past.  EBV antibody detected which may   indicate current or previous exposure.  Presently patient has high titers of antibodies to EBV I suspect this may be more related to a false-positive from her lupus.  However I be believe it is reasonable to have her be seen by a infectious disease doctor will ask for to be evaluated by Dr. Dumont

## 2020-03-07 ENCOUNTER — HOSPITAL ENCOUNTER (EMERGENCY)
Facility: HOSPITAL | Age: 55
Discharge: HOME | End: 2020-03-07
Attending: INTERNAL MEDICINE
Payer: COMMERCIAL

## 2020-03-07 VITALS
BODY MASS INDEX: 30.71 KG/M2 | SYSTOLIC BLOOD PRESSURE: 180 MMHG | WEIGHT: 187.38 LBS | TEMPERATURE: 98 F | HEART RATE: 100 BPM | OXYGEN SATURATION: 99 % | RESPIRATION RATE: 18 BRPM | DIASTOLIC BLOOD PRESSURE: 96 MMHG

## 2020-03-07 DIAGNOSIS — B34.9 VIRAL SYNDROME: Primary | ICD-10-CM

## 2020-03-07 PROCEDURE — 99283 EMERGENCY DEPT VISIT LOW MDM: CPT

## 2020-03-07 RX ORDER — ACYCLOVIR 800 MG/1
800 TABLET ORAL 4 TIMES DAILY
Qty: 50 TABLET | Refills: 0 | Status: SHIPPED | OUTPATIENT
Start: 2020-03-07 | End: 2020-11-25

## 2020-03-07 NOTE — ED PROVIDER NOTES
"Encounter Date: 3/7/2020       History     Chief Complaint   Patient presents with    General Illness     c/o sore throat with a "feeling of swelling to her salivary glands" and neck for a couple weeks; now getting worse over the last couple days.     The history is provided by the patient.   Neck Pain    This is a recurrent problem. The current episode started several weeks ago. The problem occurs daily. The problem has been unchanged. The pain is associated with nothing. The pain is present in the right side. The pain is at a severity of 4/10. Pertinent negatives include no photophobia, no visual change, no chest pain, no syncope, no numbness, no weight loss, no headaches, no bowel incontinence, no bladder incontinence, no leg pain, no paresis, no tingling and no weakness. She has tried nothing for the symptoms.     Review of patient's allergies indicates:   Allergen Reactions    Cellcept [mycophenolate mofetil] Swelling     Lips      Ace inhibitors Other (See Comments)     unkown    Aldactone [spironolactone] Blisters     Mouth soreness with doses greater than 25mg.    Aspirin Other (See Comments)     G-6-PD def.     Azathioprine sodium     Beta-blockers (beta-adrenergic blocking agts) Other (See Comments)     Cannot take due to Raynaud's syndrome    Clindamycin Other (See Comments)     + allergy test    Clonidine Blisters     Mouth sores and tenderness    Hydrochlorothiazide Other (See Comments)     unknown    Ibuprofen Other (See Comments)     G-6-PD def.    Lasix [furosemide] Other (See Comments)     unknown    Lisinopril Other (See Comments)     unknown    Methatropic     Methotrexate analogues Other (See Comments)     Pancytopenia      Norvasc [amlodipine] Other (See Comments)     Jaw pain    Nsaids (non-steroidal anti-inflammatory drug) Other (See Comments)     G-6-PD def.    Plaquenil [hydroxychloroquine] Other (See Comments)     G-6-PD def.    Zofran [ondansetron hcl (pf)] Other (See " Comments)     Chest pain      Sulfa (sulfonamide antibiotics) Rash     Past Medical History:   Diagnosis Date    Anemia     with chronic disease lupus    Encounter for blood transfusion     G-6-PD deficiency     Heart murmur     MVP 1986    Hypertension     Lupus     Raynaud's syndrome     Systemic lupus erythematosus arthritis 5/13/2018     Past Surgical History:   Procedure Laterality Date    BONE MARROW BIOPSY      CARPAL TUNNEL RELEASE Bilateral     widsom tooth extraction       Family History   Problem Relation Age of Onset    Arthritis Mother     Heart disease Mother     Hypertension Mother     Diabetes Father     Heart disease Father     Hypertension Father     Cancer Brother     Eczema Neg Hx     Lupus Neg Hx     Psoriasis Neg Hx      Social History     Tobacco Use    Smoking status: Never Smoker    Smokeless tobacco: Never Used   Substance Use Topics    Alcohol use: No    Drug use: No     Review of Systems   Constitutional: Negative for chills, fever and weight loss.   HENT: Negative for sore throat.         Right neck swelling   Eyes: Negative for photophobia.   Respiratory: Negative for cough and shortness of breath.    Cardiovascular: Negative for chest pain and syncope.   Gastrointestinal: Negative for abdominal pain, bowel incontinence, diarrhea and nausea.   Endocrine: Negative for polydipsia and polyphagia.   Genitourinary: Negative for bladder incontinence and dysuria.   Musculoskeletal: Positive for neck pain. Negative for arthralgias, back pain and myalgias.   Skin: Negative for rash.   Neurological: Negative for tingling, weakness, numbness and headaches.   Hematological: Does not bruise/bleed easily.   Psychiatric/Behavioral: The patient is not nervous/anxious.    All other systems reviewed and are negative.      Physical Exam     Initial Vitals [03/07/20 0758]   BP Pulse Resp Temp SpO2   (!) 180/96 100 18 98.1 °F (36.7 °C) 99 %      MAP       --         Physical  Exam    Nursing note and vitals reviewed.  Constitutional: Vital signs are normal. She appears well-developed and well-nourished. No distress.   HENT:   Head: Normocephalic and atraumatic.   Right Ear: External ear normal.   Left Ear: External ear normal.   Nose: Nose normal.   Mouth/Throat: Oropharynx is clear and moist.   Eyes: Conjunctivae, EOM and lids are normal. Pupils are equal, round, and reactive to light.   Neck: Normal range of motion and full passive range of motion without pain. Neck supple.   Cardiovascular: Normal rate, regular rhythm, S1 normal, S2 normal, normal heart sounds, intact distal pulses and normal pulses.   Pulmonary/Chest: Breath sounds normal. No respiratory distress. She has no wheezes. She has no rales.   Abdominal: Soft. Normal appearance and bowel sounds are normal. She exhibits no distension. There is no tenderness.   Musculoskeletal: Normal range of motion.   Lymphadenopathy:     She has no cervical adenopathy.   Neurological: She is alert and oriented to person, place, and time. She has normal strength. No cranial nerve deficit or sensory deficit. Coordination and gait normal.   Skin: Skin is warm, dry and intact.   Psychiatric: She has a normal mood and affect. Her speech is normal and behavior is normal. Judgment and thought content normal. Cognition and memory are normal.         ED Course   Procedures  Labs Reviewed - No data to display       Imaging Results    None                                          Clinical Impression:       ICD-10-CM ICD-9-CM   1. Viral syndrome B34.9 079.99         Disposition:   Disposition: Discharged  Condition: Stable     ED Disposition Condition    Discharge Stable        ED Prescriptions     Medication Sig Dispense Start Date End Date Auth. Provider    acyclovir (ZOVIRAX) 800 MG Tab Take 1 tablet (800 mg total) by mouth 4 (four) times daily. for 10 days 50 tablet 3/7/2020 3/17/2020 DELFIN Linn        Follow-up Information      Follow up With Specialties Details Why Contact Info    Gio Dumont MD Infectious Diseases Go in 2 days  [address] [facility] [id number] [phone]                                       DELFIN Linn  03/07/20 0868

## 2020-03-07 NOTE — ED NOTES
Pt examined by Dominic LENZ  without RN, educated on prescriptions, given discharge instructions and discharged to [facility]. See provider notes for exam.

## 2020-03-13 ENCOUNTER — PATIENT OUTREACH (OUTPATIENT)
Dept: ADMINISTRATIVE | Facility: OTHER | Age: 55
End: 2020-03-13

## 2020-03-29 DIAGNOSIS — G05.3 CNS LUPUS: ICD-10-CM

## 2020-03-29 DIAGNOSIS — M32.19 CNS LUPUS: ICD-10-CM

## 2020-03-29 RX ORDER — PREDNISONE 5 MG/1
TABLET ORAL
Qty: 30 TABLET | Refills: 0 | Status: SHIPPED | OUTPATIENT
Start: 2020-03-29 | End: 2020-04-29

## 2020-04-29 DIAGNOSIS — M32.19 CNS LUPUS: ICD-10-CM

## 2020-04-29 DIAGNOSIS — G05.3 CNS LUPUS: ICD-10-CM

## 2020-04-29 RX ORDER — PREDNISONE 5 MG/1
TABLET ORAL
Qty: 30 TABLET | Refills: 0 | Status: SHIPPED | OUTPATIENT
Start: 2020-04-29 | End: 2020-07-01 | Stop reason: SDUPTHER

## 2020-05-15 DIAGNOSIS — G05.3 CNS LUPUS: ICD-10-CM

## 2020-05-15 DIAGNOSIS — M35.00 SECONDARY SJOGREN'S SYNDROME: ICD-10-CM

## 2020-05-15 DIAGNOSIS — M32.19 OTHER SYSTEMIC LUPUS ERYTHEMATOSUS WITH OTHER ORGAN INVOLVEMENT: ICD-10-CM

## 2020-05-15 DIAGNOSIS — M32.19 CNS LUPUS: ICD-10-CM

## 2020-05-18 RX ORDER — AZATHIOPRINE 50 MG/1
TABLET ORAL
Qty: 30 TABLET | Refills: 0 | Status: SHIPPED | OUTPATIENT
Start: 2020-05-18 | End: 2020-07-01 | Stop reason: SDUPTHER

## 2020-06-27 DIAGNOSIS — M32.19 CNS LUPUS: ICD-10-CM

## 2020-06-27 DIAGNOSIS — M35.00 SECONDARY SJOGREN'S SYNDROME: ICD-10-CM

## 2020-06-27 DIAGNOSIS — M32.19 OTHER SYSTEMIC LUPUS ERYTHEMATOSUS WITH OTHER ORGAN INVOLVEMENT: ICD-10-CM

## 2020-06-27 DIAGNOSIS — G05.3 CNS LUPUS: ICD-10-CM

## 2020-06-29 RX ORDER — PREDNISONE 5 MG/1
5 TABLET ORAL DAILY
Qty: 30 TABLET | Refills: 0 | OUTPATIENT
Start: 2020-06-29

## 2020-06-29 RX ORDER — AZATHIOPRINE 50 MG/1
50 TABLET ORAL DAILY
Qty: 30 TABLET | Refills: 0 | OUTPATIENT
Start: 2020-06-29

## 2020-06-30 ENCOUNTER — PATIENT MESSAGE (OUTPATIENT)
Dept: RHEUMATOLOGY | Facility: CLINIC | Age: 55
End: 2020-06-30

## 2020-06-30 ENCOUNTER — TELEPHONE (OUTPATIENT)
Dept: RHEUMATOLOGY | Facility: CLINIC | Age: 55
End: 2020-06-30

## 2020-06-30 ENCOUNTER — OFFICE VISIT (OUTPATIENT)
Dept: CARDIOLOGY | Facility: CLINIC | Age: 55
End: 2020-06-30
Payer: COMMERCIAL

## 2020-06-30 DIAGNOSIS — I10 ESSENTIAL HYPERTENSION: Primary | ICD-10-CM

## 2020-06-30 DIAGNOSIS — G05.3 CNS LUPUS: ICD-10-CM

## 2020-06-30 DIAGNOSIS — M35.00 SECONDARY SJOGREN'S SYNDROME: ICD-10-CM

## 2020-06-30 DIAGNOSIS — Z79.52 LONG TERM (CURRENT) USE OF SYSTEMIC STEROIDS: ICD-10-CM

## 2020-06-30 DIAGNOSIS — F51.01 PRIMARY INSOMNIA: ICD-10-CM

## 2020-06-30 DIAGNOSIS — M32.19 OTHER SYSTEMIC LUPUS ERYTHEMATOSUS WITH OTHER ORGAN INVOLVEMENT: ICD-10-CM

## 2020-06-30 DIAGNOSIS — M32.19 CNS LUPUS: ICD-10-CM

## 2020-06-30 PROCEDURE — 99214 OFFICE O/P EST MOD 30 MIN: CPT | Mod: 95,,, | Performed by: PHYSICIAN ASSISTANT

## 2020-06-30 PROCEDURE — 99214 PR OFFICE/OUTPT VISIT, EST, LEVL IV, 30-39 MIN: ICD-10-PCS | Mod: 95,,, | Performed by: PHYSICIAN ASSISTANT

## 2020-06-30 RX ORDER — AZATHIOPRINE 50 MG/1
TABLET ORAL
Qty: 30 TABLET | Refills: 0 | OUTPATIENT
Start: 2020-06-30

## 2020-06-30 RX ORDER — PREDNISONE 5 MG/1
TABLET ORAL
Qty: 30 TABLET | Refills: 0 | OUTPATIENT
Start: 2020-06-30

## 2020-06-30 NOTE — TELEPHONE ENCOUNTER
----- Message from Courtney Arnold sent at 6/30/2020  9:56 AM CDT -----  Contact: pt  Pt requesting a call back regarding scheduling virtual visit. She would like to have nurse schedule. Please call pt back at [phone]

## 2020-06-30 NOTE — TELEPHONE ENCOUNTER
Spoke with pt and scheduled virtual visit with Dr. CHUNG for 7.1.20 at 2.15 pm. Pt verbalized understanding

## 2020-06-30 NOTE — PROGRESS NOTES
Subjective:    Patient ID:  Ruth Messina is a 54 y.o. female who presents for follow-up of HTN.      Visit type: audiovisual     Face to Face time with patient:12 minutes   20 minutes of total time spent on the encounter, which includes face to face time and non-face to face time preparing to see the patient (eg, review of tests), Obtaining and/or reviewing separately obtained history, Documenting clinical information in the electronic or other health record, Independently interpreting results (not separately reported) and communicating results to the patient/family/caregiver, or Care coordination (not separately reported).      Each patient to whom he or she provides medical services by telemedicine is:  (1) informed of the relationship between the physician and patient and the respective role of any other health care provider with respect to management of the patient; and (2) notified that he or she may decline to receive medical services by telemedicine and may withdraw from such care at any time.      HPI  Ms. Messina is a 54 year old female patient whose current medical conditions include lupus, Sjogren's, anemia, and HTN who presents today for virtual follow-up. Patient previously seen by me and had her BP medications adjusted. States she is doing well overall. Does complain of daytime sleepiness, does not feel refreshed after sleeping. Does not think she snores, but has never had any JAROCHO workup. No chest pain, heaviness, or tightness, or anginal equivalent.  No s/s suggestive of CHF. BP has been stable at home. Patient is compliant with her medications. Trying to be me more mindful of her diet.     Last /87, monitors periodically at home    Reviewed labs from 2/20, all stable. Needs repeat lipid---now taking fish oil on consistent basis    Review of Systems   Constitution: Negative.   HENT: Negative.    Eyes: Negative.    Cardiovascular: Negative.    Respiratory: Negative.    Endocrine: Negative.     Hematologic/Lymphatic: Negative.    Skin: Negative.    Musculoskeletal: Negative.    Gastrointestinal: Negative.    Genitourinary: Negative.    Neurological: Positive for excessive daytime sleepiness.   Psychiatric/Behavioral: Negative.    Allergic/Immunologic: Negative.         Objective:    Physical Exam   Constitutional: She is oriented to person, place, and time. She appears well-developed and well-nourished. No distress.   HENT:   Head: Normocephalic and atraumatic.   Neurological: She is alert and oriented to person, place, and time.   Psychiatric: She has a normal mood and affect. Her behavior is normal.         Assessment:       1. Essential hypertension    2. Secondary Sjogren's syndrome    3. Long term (current) use of systemic steroids    4. Primary insomnia      Doing clinically well. Stable CV wise. No complaints. BP controlled on amlodipine. Discussed labs from 2/20. Needs repeat lipid panel, if still elevated needs to start statin therapy. Echo from 11/19 re-discussed.   Plan:   -Continue current medical management and risk factor modification  -Cardiac, low salt diet  -Increase activity level  -Lipid, CMP in next 3 mos at the [facility] 6 months, sooner should issues arise

## 2020-07-01 ENCOUNTER — PATIENT OUTREACH (OUTPATIENT)
Dept: ADMINISTRATIVE | Facility: OTHER | Age: 55
End: 2020-07-01

## 2020-07-01 ENCOUNTER — OFFICE VISIT (OUTPATIENT)
Dept: RHEUMATOLOGY | Facility: CLINIC | Age: 55
End: 2020-07-01
Payer: COMMERCIAL

## 2020-07-01 ENCOUNTER — TELEPHONE (OUTPATIENT)
Dept: RHEUMATOLOGY | Facility: CLINIC | Age: 55
End: 2020-07-01

## 2020-07-01 DIAGNOSIS — Z79.899 LONG-TERM CURRENT USE OF HIGH RISK MEDICATION OTHER THAN ANTICOAGULANT: ICD-10-CM

## 2020-07-01 DIAGNOSIS — M32.19 CNS LUPUS: Primary | ICD-10-CM

## 2020-07-01 DIAGNOSIS — D84.9 IMMUNOSUPPRESSED STATUS: ICD-10-CM

## 2020-07-01 DIAGNOSIS — M32.19 OTHER SYSTEMIC LUPUS ERYTHEMATOSUS WITH OTHER ORGAN INVOLVEMENT: ICD-10-CM

## 2020-07-01 DIAGNOSIS — G05.3 CNS LUPUS: Primary | ICD-10-CM

## 2020-07-01 DIAGNOSIS — M32.9 SYSTEMIC LUPUS ERYTHEMATOSUS ARTHRITIS: ICD-10-CM

## 2020-07-01 DIAGNOSIS — M35.00 SECONDARY SJOGREN'S SYNDROME: ICD-10-CM

## 2020-07-01 PROCEDURE — 99214 OFFICE O/P EST MOD 30 MIN: CPT | Mod: 95,,, | Performed by: INTERNAL MEDICINE

## 2020-07-01 PROCEDURE — 99214 PR OFFICE/OUTPT VISIT, EST, LEVL IV, 30-39 MIN: ICD-10-PCS | Mod: 95,,, | Performed by: INTERNAL MEDICINE

## 2020-07-01 RX ORDER — AZATHIOPRINE 50 MG/1
50 TABLET ORAL DAILY
Qty: 30 TABLET | Refills: 0 | Status: SHIPPED | OUTPATIENT
Start: 2020-07-01 | End: 2020-07-27

## 2020-07-01 RX ORDER — PREDNISONE 5 MG/1
5 TABLET ORAL DAILY
Qty: 30 TABLET | Refills: 0 | Status: SHIPPED | OUTPATIENT
Start: 2020-07-01 | End: 2020-07-27

## 2020-07-01 NOTE — TELEPHONE ENCOUNTER
Scheduled labs for 7.4.20, 6 mth follow up for 1.7.2021 at 9.15 and labs for 1.2.2021. Pt verbalized understanding.

## 2020-07-01 NOTE — TELEPHONE ENCOUNTER
Left message for pt to call clinic back to schedule labs for Saturday and 6 mth follow up with labs.

## 2020-07-01 NOTE — PROGRESS NOTES
RHEUMATOLOGY FOLLOW UP - TELE VISIT     The patient location is: [place]  The chief complaint leading to consultation is:  FOLLOW-UP FOR LUPUS  Visit type: Virtual visit with synchronous audio and video  Total time spent with patient: 10 minutes  Each patient to whom he or she provides medical services by telemedicine is:  (1) informed of the relationship between the physician and patient and the respective role of any other health care provider with respect to management of the patient; and (2) notified that he or she may decline to receive medical services by telemedicine and may withdraw from such care at any time.    HPI:-  Ruth Melendrez a 54 y.o. pleasant female seen today through My chart video visit for follow up.  She reports doing well today.  No flare-up of her lupus symptoms since last visit.  No headache, joint swelling, joint pain, prolonged morning stiffness, photosensitive malar rash, sicca syndrome.  Tolerating Imuran without any significant problem.  No hospitalization for seizures.  No change in memory.  No change in cognitive function.    Review of Systems   Constitutional: Negative for chills and fever.   HENT: Negative for congestion and sore throat.    Eyes: Negative for blurred vision and redness.   Respiratory: Negative for cough and shortness of breath.    Cardiovascular: Negative for chest pain and leg swelling.   Gastrointestinal: Negative for abdominal pain.   Genitourinary: Negative for dysuria.   Musculoskeletal: Negative for back pain, falls, joint pain, myalgias and neck pain.   Skin: Negative for rash.   Neurological: Negative for headaches.   Endo/Heme/Allergies: Does not bruise/bleed easily.   Psychiatric/Behavioral: Negative for memory loss. The patient does not have insomnia.        Past Medical History:   Diagnosis Date    Anemia     with chronic disease lupus    Encounter for blood transfusion     G-6-PD deficiency     Heart murmur     MVP 1986    Hypertension     Lupus      Raynaud's syndrome     Systemic lupus erythematosus arthritis 5/13/2018       Past Surgical History:   Procedure Laterality Date    BONE MARROW BIOPSY      CARPAL TUNNEL RELEASE Bilateral     widsom tooth extraction          Social History     Tobacco Use    Smoking status: Never Smoker    Smokeless tobacco: Never Used   Substance Use Topics    Alcohol use: No    Drug use: No       Family History   Problem Relation Age of Onset    Arthritis Mother     Heart disease Mother     Hypertension Mother     Diabetes Father     Heart disease Father     Hypertension Father     Cancer Brother     Eczema Neg Hx     Lupus Neg Hx     Psoriasis Neg Hx        Review of patient's allergies indicates:   Allergen Reactions    Cellcept [mycophenolate mofetil] Swelling     Lips      Ace inhibitors Other (See Comments)     unkown    Aldactone [spironolactone] Blisters     Mouth soreness with doses greater than 25mg.    Aspirin Other (See Comments)     G-6-PD def.     Azathioprine sodium     Beta-blockers (beta-adrenergic blocking agts) Other (See Comments)     Cannot take due to Raynaud's syndrome    Clindamycin Other (See Comments)     + allergy test    Clonidine Blisters     Mouth sores and tenderness    Hydrochlorothiazide Other (See Comments)     unknown    Ibuprofen Other (See Comments)     G-6-PD def.    Lasix [furosemide] Other (See Comments)     unknown    Lisinopril Other (See Comments)     unknown    Methatropic     Methotrexate analogues Other (See Comments)     Pancytopenia      Norvasc [amlodipine] Other (See Comments)     Jaw pain    Nsaids (non-steroidal anti-inflammatory drug) Other (See Comments)     G-6-PD def.    Plaquenil [hydroxychloroquine] Other (See Comments)     G-6-PD def.    Zofran [ondansetron hcl (pf)] Other (See Comments)     Chest pain      Sulfa (sulfonamide antibiotics) Rash       Physical exam:-    GEN: awake, alert, non-diaphoretic, no psychomotor agitation, no  acute distress    HEENT :Head: atraumatic, normocephalic, no rashes noted, no lesions noted;    Eyes: NO redness, discharge, swelling, or lesions    Nose: NO redness, swelling, discharge, deformity, or impetigo/crusting    Skin: no lesions, wounds, erythema, or cyanosis noted on face or hands    Cardiopulmonary: no increased respiratory effort, speaking in clear sentences    MSK: normal ROM in the neck, Upper extremities, Lower extremities  Good ROM of hands, fist formation 100% and , no obvious synovitis    Good ambulation in front of the camera    Neuro: cranial nerves grossly normal, speech normal rate and rhythm, orientation arrived to appointment on time with no prompting, moved both upper extremities equally    Pysch:  appearance, behavior, and attitude- well groomed, pleasant, cooperative    Medication List with Changes/Refills   Current Medications    ACYCLOVIR (ZOVIRAX) 800 MG TAB    Take 1 tablet (800 mg total) by mouth 4 (four) times daily. for 10 days    AMLODIPINE (NORVASC) 5 MG TABLET    Take 1 tablet (5 mg total) by mouth once daily.    AZATHIOPRINE (IMURAN) 50 MG TAB    Take 1 tablet by mouth once daily    B1/B2/NIACIN/B12/PROTEASE (B-COMPLEX WITH B-12 ORAL)    Take by mouth.    BIOTIN 5,000 MCG TBDL    Take by mouth.    CHOLECALCIFEROL, VITAMIN D3, (VITAMIN D3) 5,000 UNIT TAB    Take 5,000 Units by mouth once daily.    CYCLOBENZAPRINE (FLEXERIL) 10 MG TABLET    Take 1 tablet (10 mg total) by mouth 3 (three) times daily as needed for Muscle spasms.    DESOXIMETASONE (TOPICORT) 0.25 % CREAM    APPLY TO AFFECTED AREA OF THE SCALP ONCE DAILY    EPINEPHRINE (EPIPEN) 0.3 MG/0.3 ML ATIN    Inject 0.3 mLs (0.3 mg total) into the muscle as needed.    FERROUS GLUCONATE (FERGON) 324 MG TABLET    Take 324 mg by mouth daily with breakfast.    FISH OIL-OMEGA-3 FATTY ACIDS 300-1,000 MG CAPSULE    Take 2 g by mouth once daily.    FLUOCINOLONE (DERMA-SMOOTHE) 0.01 % EXTERNAL OIL    Apply oil to scalp once every  other day    FLUOCINOLONE AND SHOWER CAP 0.01 % OIL        FLUOXETINE (PROZAC) 20 MG CAPSULE    Take 1 capsule (20 mg total) by mouth once daily.    GABAPENTIN (NEURONTIN) 300 MG CAPSULE    Take 1 capsule (300 mg total) by mouth 3 (three) times daily.    LOSARTAN (COZAAR) 100 MG TABLET    TAKE 1 TABLET BY MOUTH ONCE DAILY    MULTIVITAMIN CAPSULE    Take 1 capsule by mouth once daily.    PREDNISONE (DELTASONE) 5 MG TABLET    Take 1 tablet by mouth once daily    TRAMADOL (ULTRAM) 50 MG TABLET    Take 50 mg by mouth.     UNABLE TO FIND    every evening. Naltrexone - Low Dose Med Name:  On Empty Stomach       Assessment/Plans:-  1. CNS lupus    2. Systemic lupus erythematosus arthritis    3. Secondary Sjogren's syndrome    4. Long-term current use of high risk medication other than anticoagulant    5. Immunosuppressed status    6. Other systemic lupus erythematosus with other organ involvement      Problem List Items Addressed This Visit     CNS lupus - Primary    Current Assessment & Plan     Well controlled on Imuran and low-dose prednisone.  Continue the same.         Relevant Medications    azaTHIOprine (IMURAN) 50 mg Tab    predniSONE (DELTASONE) 5 MG tablet    Systemic lupus erythematosus    Relevant Medications    azaTHIOprine (IMURAN) 50 mg Tab    Secondary Sjogren's syndrome    Current Assessment & Plan     Stable sicca symptoms on Imuran.  Continue the same.         Relevant Medications    azaTHIOprine (IMURAN) 50 mg Tab    Long-term current use of high risk medication other than anticoagulant    Current Assessment & Plan     Hold Imuran if any infection.  Importance of safety labs every 12 weeks reinforced.         Systemic lupus erythematosus arthritis    Current Assessment & Plan     No synovitis or effusion.  Continue Imuran and low-dose prednisone.         Immunosuppressed status    Current Assessment & Plan     Compromised immune system secondary to autoimmune disease and use of immunosuppressive drugs.  Monitor carefully for infections. Advised to get immediate medical care if any infection. Also advised strict adherence to age appropriate vaccinations and cancer screenings with PCP.                Follow up in about 6 months (around 1/1/2021).    Disclaimer: This note was prepared using voice recognition system and is likely to have sound alike errors and is not proof read.  Please call me with any questions.

## 2020-07-01 NOTE — PROGRESS NOTES
Requested updates within Care Everywhere.  Patient's chart was reviewed for overdue MUKUL topics.  Immunizations reconciled.

## 2020-07-01 NOTE — TELEPHONE ENCOUNTER
----- Message from Chirag Turner MD sent at 7/1/2020  2:21 PM CDT -----  Check CBC, CMP, ESR, CRP, C3, C4 this Saturday

## 2020-07-04 ENCOUNTER — LAB VISIT (OUTPATIENT)
Dept: LAB | Facility: HOSPITAL | Age: 55
End: 2020-07-04
Attending: INTERNAL MEDICINE
Payer: COMMERCIAL

## 2020-07-04 DIAGNOSIS — M32.19 OTHER SYSTEMIC LUPUS ERYTHEMATOSUS WITH OTHER ORGAN INVOLVEMENT: ICD-10-CM

## 2020-07-04 LAB
ALBUMIN SERPL BCP-MCNC: 4.1 G/DL (ref 3.5–5.2)
ALP SERPL-CCNC: 103 U/L (ref 55–135)
ALT SERPL W/O P-5'-P-CCNC: 33 U/L (ref 10–44)
ANION GAP SERPL CALC-SCNC: 11 MMOL/L (ref 8–16)
ANISOCYTOSIS BLD QL SMEAR: SLIGHT
AST SERPL-CCNC: 43 U/L (ref 10–40)
BASOPHILS # BLD AUTO: 0.01 K/UL (ref 0–0.2)
BASOPHILS NFR BLD: 0.3 % (ref 0–1.9)
BILIRUB SERPL-MCNC: 1.1 MG/DL (ref 0.1–1)
BUN SERPL-MCNC: 7 MG/DL (ref 6–20)
C3 SERPL-MCNC: 99 MG/DL (ref 50–180)
C4 SERPL-MCNC: 12 MG/DL (ref 11–44)
CALCIUM SERPL-MCNC: 9.7 MG/DL (ref 8.7–10.5)
CHLORIDE SERPL-SCNC: 106 MMOL/L (ref 95–110)
CO2 SERPL-SCNC: 26 MMOL/L (ref 23–29)
CREAT SERPL-MCNC: 0.8 MG/DL (ref 0.5–1.4)
CRP SERPL-MCNC: 0.8 MG/L (ref 0–8.2)
DACRYOCYTES BLD QL SMEAR: ABNORMAL
DIFFERENTIAL METHOD: ABNORMAL
EOSINOPHIL # BLD AUTO: 0 K/UL (ref 0–0.5)
EOSINOPHIL NFR BLD: 0.3 % (ref 0–8)
ERYTHROCYTE [DISTWIDTH] IN BLOOD BY AUTOMATED COUNT: 14.4 % (ref 11.5–14.5)
ERYTHROCYTE [SEDIMENTATION RATE] IN BLOOD BY WESTERGREN METHOD: 59 MM/HR (ref 0–36)
EST. GFR  (AFRICAN AMERICAN): >60 ML/MIN/1.73 M^2
EST. GFR  (NON AFRICAN AMERICAN): >60 ML/MIN/1.73 M^2
GLUCOSE SERPL-MCNC: 109 MG/DL (ref 70–110)
HCT VFR BLD AUTO: 36.5 % (ref 37–48.5)
HGB BLD-MCNC: 11.3 G/DL (ref 12–16)
IMM GRANULOCYTES # BLD AUTO: 0.01 K/UL (ref 0–0.04)
IMM GRANULOCYTES NFR BLD AUTO: 0.3 % (ref 0–0.5)
LYMPHOCYTES # BLD AUTO: 1.7 K/UL (ref 1–4.8)
LYMPHOCYTES NFR BLD: 57.1 % (ref 18–48)
MCH RBC QN AUTO: 27 PG (ref 27–31)
MCHC RBC AUTO-ENTMCNC: 31 G/DL (ref 32–36)
MCV RBC AUTO: 87 FL (ref 82–98)
MONOCYTES # BLD AUTO: 0.3 K/UL (ref 0.3–1)
MONOCYTES NFR BLD: 9.6 % (ref 4–15)
NEUTROPHILS # BLD AUTO: 1 K/UL (ref 1.8–7.7)
NEUTROPHILS NFR BLD: 32.7 % (ref 38–73)
NRBC BLD-RTO: 0 /100 WBC
OVALOCYTES BLD QL SMEAR: ABNORMAL
PLATELET # BLD AUTO: 200 K/UL (ref 150–350)
PLATELET BLD QL SMEAR: ABNORMAL
PMV BLD AUTO: 9.9 FL (ref 9.2–12.9)
POIKILOCYTOSIS BLD QL SMEAR: SLIGHT
POTASSIUM SERPL-SCNC: 3.7 MMOL/L (ref 3.5–5.1)
PROT SERPL-MCNC: 8.4 G/DL (ref 6–8.4)
RBC # BLD AUTO: 4.19 M/UL (ref 4–5.4)
SODIUM SERPL-SCNC: 143 MMOL/L (ref 136–145)
WBC # BLD AUTO: 3.01 K/UL (ref 3.9–12.7)

## 2020-07-04 PROCEDURE — 85025 COMPLETE CBC W/AUTO DIFF WBC: CPT

## 2020-07-04 PROCEDURE — 85652 RBC SED RATE AUTOMATED: CPT

## 2020-07-04 PROCEDURE — 86160 COMPLEMENT ANTIGEN: CPT | Mod: 59

## 2020-07-04 PROCEDURE — 36415 COLL VENOUS BLD VENIPUNCTURE: CPT

## 2020-07-04 PROCEDURE — 86140 C-REACTIVE PROTEIN: CPT

## 2020-07-04 PROCEDURE — 86160 COMPLEMENT ANTIGEN: CPT

## 2020-07-04 PROCEDURE — 80053 COMPREHEN METABOLIC PANEL: CPT

## 2020-07-19 ENCOUNTER — LAB VISIT (OUTPATIENT)
Dept: PRIMARY CARE CLINIC | Facility: CLINIC | Age: 55
End: 2020-07-19
Payer: COMMERCIAL

## 2020-07-19 DIAGNOSIS — Z00.6 RESEARCH SUBJECT: Primary | ICD-10-CM

## 2020-07-19 PROCEDURE — U0003 INFECTIOUS AGENT DETECTION BY NUCLEIC ACID (DNA OR RNA); SEVERE ACUTE RESPIRATORY SYNDROME CORONAVIRUS 2 (SARS-COV-2) (CORONAVIRUS DISEASE [COVID-19]), AMPLIFIED PROBE TECHNIQUE, MAKING USE OF HIGH THROUGHPUT TECHNOLOGIES AS DESCRIBED BY CMS-2020-01-R: HCPCS

## 2020-07-19 PROCEDURE — 86769 SARS-COV-2 COVID-19 ANTIBODY: CPT

## 2020-07-19 NOTE — RESEARCH
Date of Consent: 7/19/2020    Sponsor: [facility]    Study Title/IRB Number: Observational study of Sars-CoV2 Immunoglobulin G (IgG) seroprevalence among the [facility] population over time 2020.163  Principle Investigator: Tayler Leon, PhD    Did the patient need translation services? No   name: N/A    Prior to the Informed Consent (IC) being signed, or any study protocol required data collection, testing, procedure, or intervention being performed, the following was done and/or discussed:   Patient was given a paper copy of the IC for review    Patient was given study FAQ   Purpose of the study and qualifications to participate    Study design and tests or procedures done at this visit   Confidentiality and HIPAA Authorization for Release of Medical Records for the research trial/ subject's rights/research related injury   Risk, Benefits, Alternative Treatments, Compensation and Costs   Participation in the research trial is voluntary and patient may withdraw at anytime   Contact information for study related questions    Patient verbalizes understanding of the above: Yes  Contact information for PI and IRB given to patient: Yes  Patient able to adequately summarize: the purpose of the study, the risks associated with the study, and all procedures, testing, and follow-ups associated with the study: Yes    The consent was discussed verbally with the patient and all questions were answered satisfactorily. Patient gave verbal consent for the Seroprevalence research study with an IRB approval date of 06/23/2020.      The Consent, Consent Witness and name of Clinical Research Coordinator consenting was captured and documented in [employer]Cap.    All Inclusion and Exclusion Criteria reviewed, subject meets all Inclusion criteria and does not meet any Exclusion Criteria at this time.     Patient Eligibility was confirmed.    Patient responded to survey questions.    The following biospecimen  collection procedures were collected:    -Nasopharyngeal Swab Collection  -Blood collection

## 2020-07-20 LAB — SARS-COV-2 IGG SERPLBLD QL IA.RAPID: NEGATIVE

## 2020-07-22 LAB — SARS-COV-2 RNA RESP QL NAA+PROBE: NOT DETECTED

## 2020-07-25 DIAGNOSIS — M32.19 OTHER SYSTEMIC LUPUS ERYTHEMATOSUS WITH OTHER ORGAN INVOLVEMENT: ICD-10-CM

## 2020-07-25 DIAGNOSIS — G05.3 CNS LUPUS: ICD-10-CM

## 2020-07-25 DIAGNOSIS — M35.00 SECONDARY SJOGREN'S SYNDROME: ICD-10-CM

## 2020-07-25 DIAGNOSIS — M32.19 CNS LUPUS: ICD-10-CM

## 2020-07-25 RX ORDER — PREDNISONE 5 MG/1
5 TABLET ORAL DAILY
Qty: 30 TABLET | Refills: 0 | Status: CANCELLED | OUTPATIENT
Start: 2020-07-25

## 2020-07-25 RX ORDER — AZATHIOPRINE 50 MG/1
50 TABLET ORAL DAILY
Qty: 30 TABLET | Refills: 0 | Status: CANCELLED | OUTPATIENT
Start: 2020-07-25

## 2020-07-27 ENCOUNTER — TELEPHONE (OUTPATIENT)
Dept: PULMONOLOGY | Facility: CLINIC | Age: 55
End: 2020-07-27

## 2020-08-04 ENCOUNTER — TELEPHONE (OUTPATIENT)
Dept: FAMILY MEDICINE | Facility: CLINIC | Age: 55
End: 2020-08-04

## 2020-08-04 NOTE — TELEPHONE ENCOUNTER
----- Message from Parul Calvo sent at 8/4/2020  2:41 PM CDT -----  Contact: [phone]  Patient would like to speak to the nurse to her missed duane. Please call and advise

## 2020-08-10 ENCOUNTER — PATIENT MESSAGE (OUTPATIENT)
Dept: PULMONOLOGY | Facility: CLINIC | Age: 55
End: 2020-08-10

## 2020-08-24 ENCOUNTER — OFFICE VISIT (OUTPATIENT)
Dept: FAMILY MEDICINE | Facility: CLINIC | Age: 55
End: 2020-08-24
Payer: COMMERCIAL

## 2020-08-24 DIAGNOSIS — L03.012 PARONYCHIA OF LEFT THUMB: Primary | ICD-10-CM

## 2020-08-24 DIAGNOSIS — M32.0 DRUG-INDUCED SYSTEMIC LUPUS ERYTHEMATOSUS, UNSPECIFIED ORGAN INVOLVEMENT STATUS: ICD-10-CM

## 2020-08-24 DIAGNOSIS — M32.19 CNS LUPUS: ICD-10-CM

## 2020-08-24 DIAGNOSIS — M35.00 SECONDARY SJOGREN'S SYNDROME: ICD-10-CM

## 2020-08-24 DIAGNOSIS — G05.3 CNS LUPUS: ICD-10-CM

## 2020-08-24 DIAGNOSIS — M32.19 OTHER SYSTEMIC LUPUS ERYTHEMATOSUS WITH OTHER ORGAN INVOLVEMENT: ICD-10-CM

## 2020-08-24 PROCEDURE — 99213 PR OFFICE/OUTPT VISIT, EST, LEVL III, 20-29 MIN: ICD-10-PCS | Mod: 95,,, | Performed by: INTERNAL MEDICINE

## 2020-08-24 PROCEDURE — 99213 OFFICE O/P EST LOW 20 MIN: CPT | Mod: 95,,, | Performed by: INTERNAL MEDICINE

## 2020-08-24 RX ORDER — AZATHIOPRINE 50 MG/1
TABLET ORAL
Qty: 30 TABLET | Refills: 2 | Status: SHIPPED | OUTPATIENT
Start: 2020-08-24 | End: 2020-11-25

## 2020-08-24 RX ORDER — CEPHALEXIN 500 MG/1
500 CAPSULE ORAL 3 TIMES DAILY
Qty: 15 CAPSULE | Refills: 0 | Status: SHIPPED | OUTPATIENT
Start: 2020-08-24 | End: 2020-11-25

## 2020-08-24 NOTE — PROGRESS NOTES
Primary Care Telemedicine Note  The patient location is:  Patient Home   The chief complaint leading to consultation is: painful finger.    Visit type: Virtual visit with synchronous audio only and video  Each patient to whom he or she provides medical services by telemedicine is:  (1) informed of the relationship between the physician and patient and the respective role of any other health care provider with respect to management of the patient; and (2) notified that he or she may decline to receive medical services by telemedicine and may withdraw from such care at any time.      HPI:  Pulled a hangnail from thumb 2 days ago, and now swollen and sore.  No f/c/n/v.  Also in past year, gets abd pain postprandial, relief with BM.        Problem List Items Addressed This Visit     Systemic lupus erythematosus      Other Visit Diagnoses     Paronychia of left thumb    -  Primary    Relevant Medications    cephALEXin (KEFLEX) 500 MG capsule          The patient's Health Maintenance was reviewed and the following appears to be due at this time:  Health Maintenance Due   Topic Date Due    TETANUS VACCINE  11/02/1981    Pneumococcal Vaccine (Medium Risk) (1 of 1 - PPSV23) 11/02/1982    Shingles Vaccine (1 of 2) 11/02/2013       [unfilled]  Outpatient Medications Prior to Visit   Medication Sig Dispense Refill    acyclovir (ZOVIRAX) 800 MG Tab Take 1 tablet (800 mg total) by mouth 4 (four) times daily. for 10 days 50 tablet 0    amLODIPine (NORVASC) 5 MG tablet Take 1 tablet (5 mg total) by mouth once daily. 30 tablet 11    azaTHIOprine (IMURAN) 50 mg Tab Take 1 tablet by mouth once daily 30 tablet 2    B1/B2/NIACIN/B12/PROTEASE (B-COMPLEX WITH B-12 ORAL) Take by mouth.      biotin 5,000 mcg TbDL Take by mouth.      cholecalciferol, vitamin D3, (VITAMIN D3) 5,000 unit Tab Take 5,000 Units by mouth once daily.      cyclobenzaprine (FLEXERIL) 10 MG tablet Take 1 tablet (10 mg total) by mouth 3 (three) times daily  as needed for Muscle spasms. 30 tablet 3    desoximetasone (TOPICORT) 0.25 % cream APPLY TO AFFECTED AREA OF THE SCALP ONCE DAILY 60 g 3    EPINEPHrine (EPIPEN) 0.3 mg/0.3 mL AtIn Inject 0.3 mLs (0.3 mg total) into the muscle as needed. 2 each 3    ferrous gluconate (FERGON) 324 MG tablet Take 324 mg by mouth daily with breakfast.      fish oil-omega-3 fatty acids 300-1,000 mg capsule Take 2 g by mouth once daily.      fluocinolone (DERMA-SMOOTHE) 0.01 % external oil Apply oil to scalp once every other day 1 Bottle 5    fluocinolone and shower cap 0.01 % Oil       FLUoxetine (PROZAC) 20 MG capsule Take 1 capsule (20 mg total) by mouth once daily. 30 capsule 5    gabapentin (NEURONTIN) 300 MG capsule Take 1 capsule (300 mg total) by mouth 3 (three) times daily. 90 capsule 11    losartan (COZAAR) 100 MG tablet TAKE 1 TABLET BY MOUTH ONCE DAILY 90 tablet 3    multivitamin capsule Take 1 capsule by mouth once daily.      predniSONE (DELTASONE) 5 MG tablet Take 1 tablet by mouth once daily 30 tablet 0    traMADol (ULTRAM) 50 mg tablet Take 50 mg by mouth.       UNABLE TO FIND every evening. Naltrexone - Low Dose Med Name:  On Empty Stomach       No facility-administered medications prior to visit.         Physical Exam   No flowsheet data found.  No flowsheet data found.      Constitutional: The patient appears well-developed and well-nourished. No distress.   Psychiatric: The mood appears not anxious and the affect is not angry, not blunt, not labile and not inappropriate. Speech is not rapid and/or pressured, not delayed, not tangential and not slurred. The patient is not agitated, not aggressive, is not hyperactive, not slowed, not withdrawn, not actively hallucinating and not combative. Thought content is not paranoid and not delusional. Cognition and memory are not impaired. The patient does not express impulsivity or inappropriate judgment and does not exhibit a depressed mood. The patient expresses no  homicidal and no suicidal ideation and has no suicidal plans and no homicidal plans. The patient is communicative and exhibits a normal recent memory and normal remote memory. The patient is attentive.     Encounter Diagnoses   Name Primary?    Paronychia of left thumb Yes    Drug-induced systemic lupus erythematosus, unspecified organ involvement status        PLAN:    I am having Ruth Messina start on cephALEXin. I am also having her maintain her fish oil-omega-3 fatty acids, ferrous gluconate, multivitamin, cholecalciferol (vitamin D3), biotin, B1/B2/NIACIN/B12/PROTEASE (B-COMPLEX WITH B-12 ORAL), gabapentin, fluocinolone, fluocinolone and shower cap, FLUoxetine, cyclobenzaprine, EPINEPHrine, desoximetasone, traMADoL, UNABLE TO FIND, losartan, amLODIPine, acyclovir, predniSONE, and azaTHIOprine.    Diagnoses and all orders for this visit:    Paronychia of left thumb-  -     cephALEXin (KEFLEX) 500 MG capsule; Take 1 capsule (500 mg total) by mouth 3 (three) times daily.    Drug-induced systemic lupus erythematosus, unspecified organ involvement status    RTC for eval of postprandial pain.        Medications Ordered This Encounter   Medications    cephALEXin (KEFLEX) 500 MG capsule     Sig: Take 1 capsule (500 mg total) by mouth 3 (three) times daily.     Dispense:  15 capsule     Refill:  0     Medications Ordered This Encounter   Medications    cephALEXin (KEFLEX) 500 MG capsule     Sig: Take 1 capsule (500 mg total) by mouth 3 (three) times daily.     Dispense:  15 capsule     Refill:  0     No orders of the defined types were placed in this encounter.

## 2020-10-02 ENCOUNTER — OFFICE VISIT (OUTPATIENT)
Dept: PULMONOLOGY | Facility: CLINIC | Age: 55
End: 2020-10-02
Payer: COMMERCIAL

## 2020-10-02 ENCOUNTER — TELEPHONE (OUTPATIENT)
Dept: PULMONOLOGY | Facility: CLINIC | Age: 55
End: 2020-10-02

## 2020-10-02 DIAGNOSIS — R06.09 DYSPNEA ON EXERTION: ICD-10-CM

## 2020-10-02 DIAGNOSIS — G47.33 OSA (OBSTRUCTIVE SLEEP APNEA): Primary | ICD-10-CM

## 2020-10-02 PROCEDURE — 99214 OFFICE O/P EST MOD 30 MIN: CPT | Mod: 95,,, | Performed by: INTERNAL MEDICINE

## 2020-10-02 PROCEDURE — 99214 PR OFFICE/OUTPT VISIT, EST, LEVL IV, 30-39 MIN: ICD-10-PCS | Mod: 95,,, | Performed by: INTERNAL MEDICINE

## 2020-10-02 RX ORDER — ALBUTEROL SULFATE 90 UG/1
2 AEROSOL, METERED RESPIRATORY (INHALATION) EVERY 4 HOURS PRN
Qty: 18 G | Refills: 11 | Status: SHIPPED | OUTPATIENT
Start: 2020-10-02 | End: 2021-12-03

## 2020-10-02 NOTE — PATIENT INSTRUCTIONS
[facility] to schedule sleep study at  [phone] . Usually take 1- 2 days to get insurance company approval.  You will need to schedule at follow up clinic appointment 7 days after the sleep study to review the results.

## 2020-10-02 NOTE — PROGRESS NOTES
Subjective:     Patient ID: Ruth Messina is a 54 y.o. female.    Chief Complaint:      HPI     Sleep Apnea  She presents for a sleep evaluation. She complains of tossing and turning, decreased concentration, excessive daytime sleepiness, falling asleep while reading, watching television, sinus problems, feels sleepy during the day, take naps during the day.  Symptoms began years years ago, gradually worsening since that time.  She goes to sleep at 8:00 weekdays and  weekends. She awakens 6:00- 7:00 weekdays and  weekends. She falls asleep in 15- 30 minutes.  Collar size na. She denies knees buckling with laughing, completely or partially paralyzed while falling asleep or waking up. Previous evaluation and treatment has included none.    EPWORTH 12      Dyspnea  Patient complains of shortness of breath. Symptoms occur after more than one flight stairs, with more than one block walking. Symptoms began 1 year ago, unchanged since. Associated symptoms include  drainage from nose, dry cough, dyspnea on exertion and post nasal drip. She denies chest pain, located left chest. She does not have had recent travel. Weight has been stable. Symptoms are exacerbated by strenuous activity. Symptoms are alleviated by rest.     Past Medical History:   Diagnosis Date    Anemia     with chronic disease lupus    Encounter for blood transfusion     G-6-PD deficiency     Glaucoma suspect of both eyes     Heart murmur     MVP 1986    Hypertension     Lupus     Raynaud's syndrome     Systemic lupus erythematosus arthritis 5/13/2018     Past Surgical History:   Procedure Laterality Date    BONE MARROW BIOPSY      CARPAL TUNNEL RELEASE Bilateral     widsom tooth extraction       Review of patient's allergies indicates:   Allergen Reactions    Cellcept [mycophenolate mofetil] Swelling     Lips      Ace inhibitors Other (See Comments)     unkown    Aldactone [spironolactone] Blisters     Mouth soreness with doses greater  than 25mg.    Aspirin Other (See Comments)     G-6-PD def.     Azathioprine sodium     Beta-blockers (beta-adrenergic blocking agts) Other (See Comments)     Cannot take due to Raynaud's syndrome    Clindamycin Other (See Comments)     + allergy test    Clonidine Blisters     Mouth sores and tenderness    Hydrochlorothiazide Other (See Comments)     unknown    Ibuprofen Other (See Comments)     G-6-PD def.    Lasix [furosemide] Other (See Comments)     unknown    Lisinopril Other (See Comments)     unknown    Methatropic     Methotrexate analogues Other (See Comments)     Pancytopenia      Norvasc [amlodipine] Other (See Comments)     Jaw pain    Nsaids (non-steroidal anti-inflammatory drug) Other (See Comments)     G-6-PD def.    Plaquenil [hydroxychloroquine] Other (See Comments)     G-6-PD def.    Zofran [ondansetron hcl (pf)] Other (See Comments)     Chest pain      Sulfa (sulfonamide antibiotics) Rash     Current Outpatient Medications on File Prior to Visit   Medication Sig Dispense Refill    acyclovir (ZOVIRAX) 800 MG Tab Take 1 tablet (800 mg total) by mouth 4 (four) times daily. for 10 days 50 tablet 0    amLODIPine (NORVASC) 5 MG tablet Take 1 tablet (5 mg total) by mouth once daily. 30 tablet 11    azaTHIOprine (IMURAN) 50 mg Tab Take 1 tablet by mouth once daily 30 tablet 2    B1/B2/NIACIN/B12/PROTEASE (B-COMPLEX WITH B-12 ORAL) Take by mouth.      biotin 5,000 mcg TbDL Take by mouth.      cephALEXin (KEFLEX) 500 MG capsule Take 1 capsule (500 mg total) by mouth 3 (three) times daily. 15 capsule 0    cholecalciferol, vitamin D3, (VITAMIN D3) 5,000 unit Tab Take 5,000 Units by mouth once daily.      cyclobenzaprine (FLEXERIL) 10 MG tablet Take 1 tablet (10 mg total) by mouth 3 (three) times daily as needed for Muscle spasms. 30 tablet 3    desoximetasone (TOPICORT) 0.25 % cream APPLY TO AFFECTED AREA OF THE SCALP ONCE DAILY 60 g 3    EPINEPHrine (EPIPEN) 0.3 mg/0.3 mL AtIn Inject  0.3 mLs (0.3 mg total) into the muscle as needed. 2 each 3    ferrous gluconate (FERGON) 324 MG tablet Take 324 mg by mouth daily with breakfast.      fish oil-omega-3 fatty acids 300-1,000 mg capsule Take 2 g by mouth once daily.      fluocinolone (DERMA-SMOOTHE) 0.01 % external oil Apply oil to scalp once every other day 1 Bottle 5    fluocinolone and shower cap 0.01 % Oil       FLUoxetine (PROZAC) 20 MG capsule Take 1 capsule (20 mg total) by mouth once daily. 30 capsule 5    gabapentin (NEURONTIN) 300 MG capsule Take 1 capsule (300 mg total) by mouth 3 (three) times daily. 90 capsule 11    losartan (COZAAR) 100 MG tablet TAKE 1 TABLET BY MOUTH ONCE DAILY 90 tablet 3    multivitamin capsule Take 1 capsule by mouth once daily.      predniSONE (DELTASONE) 5 MG tablet Take 1 tablet by mouth once daily 30 tablet 0    traMADol (ULTRAM) 50 mg tablet Take 50 mg by mouth.       UNABLE TO FIND every evening. Naltrexone - Low Dose Med Name:  On Empty Stomach       No current facility-administered medications on file prior to visit.      Social History     Socioeconomic History    Marital status: Single     Spouse name: Not on file    Number of children: Not on file    Years of education: Not on file    Highest education level: Not on file   Occupational History    Not on file   Social Needs    Financial resource strain: Somewhat hard    Food insecurity     Worry: Not on file     Inability: Not on file    Transportation needs     Medical: Yes     Non-medical: Patient refused   Tobacco Use    Smoking status: Never Smoker    Smokeless tobacco: Never Used   Substance and Sexual Activity    Alcohol use: No    Drug use: No    Sexual activity: Not Currently   Lifestyle    Physical activity     Days per week: 0 days     Minutes per session: Not on file    Stress: Not at all   Relationships    Social connections     Talks on phone: Three times a week     Gets together: Patient refused     Attends  Sabianist service: Not on file     Active member of club or organization: Yes     Attends meetings of clubs or organizations: 1 to 4 times per year     Relationship status: Never    Other Topics Concern    Are you pregnant or think you may be? No    Breast-feeding No   Social History Narrative    Not on file     Family History   Problem Relation Age of Onset    Arthritis Mother     Heart disease Mother     Hypertension Mother     Diabetes Father     Heart disease Father     Hypertension Father     Cancer Brother     Eczema Neg Hx     Lupus Neg Hx     Psoriasis Neg Hx        Review of Systems   Constitutional: Positive for fatigue.   HENT: Negative.    Respiratory: Positive for apnea.    Cardiovascular: Negative.    Genitourinary: Negative.    Endocrine: endocrine negative   Musculoskeletal: Negative.    Skin: Negative.    Gastrointestinal: Negative.    Neurological: Negative.    Psychiatric/Behavioral: Positive for sleep disturbance.       Objective:      There were no vitals taken for this visit.  Physical Exam  Vitals signs and nursing note reviewed.   Constitutional:       Appearance: She is well-developed.   HENT:      Head: Normocephalic and atraumatic.      Nose: Congestion present.      Mouth/Throat:      Pharynx: No oropharyngeal exudate.   Eyes:      Conjunctiva/sclera: Conjunctivae normal.      Pupils: Pupils are equal, round, and reactive to light.   Neck:      Musculoskeletal: Neck supple.      Thyroid: No thyromegaly.      Vascular: No JVD.      Trachea: No tracheal deviation.   Cardiovascular:      Rate and Rhythm: Normal rate and regular rhythm.      Heart sounds: Normal heart sounds.   Pulmonary:      Effort: Pulmonary effort is normal. No respiratory distress.      Breath sounds: Examination of the right-lower field reveals wheezing. Examination of the left-lower field reveals wheezing. Decreased breath sounds and wheezing present. No rhonchi or rales.   Chest:      Chest wall:  No tenderness.   Abdominal:      General: Bowel sounds are normal.      Palpations: Abdomen is soft.   Musculoskeletal: Normal range of motion.   Lymphadenopathy:      Cervical: No cervical adenopathy.   Skin:     General: Skin is warm and dry.   Neurological:      Mental Status: She is alert and oriented to person, place, and time.       Personal Diagnostic Review  Chest x-ray: WNL    X-Ray Chest PA And Lateral  Narrative: EXAMINATION:  XR CHEST PA AND LATERAL    CLINICAL HISTORY:  SOB; Shortness of breath    TECHNIQUE:  PA and lateral views of the chest were performed.    COMPARISON:  02/09/2017    FINDINGS:  Cardiac silhouette and mediastinal contours are normal.  Lungs are clear.  Osseous structures are intact.  Impression: No acute cardiopulmonary process.    Electronically signed by: Praful Smith MD  Date:    01/30/2020  Time:    14:02      Office Spirometry Results:     No flowsheet data found.  Pulmonary Studies Review 3/7/2020   SpO2 99   Height -   Weight 2998.26   BMI (Calculated) -   Predicted Distance 551.52   Predicted Distance Meters (Calculated) -         Assessment:            JAROCHO (obstructive sleep apnea)  -     Home Sleep Studies; Future; Expected date: 10/02/2020    Dyspnea on exertion  -     Cancel: Spirometry without Bronchodilator; Future; Expected date: 10/02/2020  -     albuterol (PROVENTIL/VENTOLIN HFA) 90 mcg/actuation inhaler; Inhale 2 puffs into the lungs every 4 (four) hours as needed for Wheezing or Shortness of Breath.  Dispense: 18 g; Refill: 11  -     X-Ray Chest PA And Lateral; Future; Expected date: 10/02/2020          Outpatient Encounter Medications as of 10/2/2020   Medication Sig Dispense Refill    acyclovir (ZOVIRAX) 800 MG Tab Take 1 tablet (800 mg total) by mouth 4 (four) times daily. for 10 days 50 tablet 0    albuterol (PROVENTIL/VENTOLIN HFA) 90 mcg/actuation inhaler Inhale 2 puffs into the lungs every 4 (four) hours as needed for Wheezing or Shortness of Breath.  18 g 11    amLODIPine (NORVASC) 5 MG tablet Take 1 tablet (5 mg total) by mouth once daily. 30 tablet 11    azaTHIOprine (IMURAN) 50 mg Tab Take 1 tablet by mouth once daily 30 tablet 2    B1/B2/NIACIN/B12/PROTEASE (B-COMPLEX WITH B-12 ORAL) Take by mouth.      biotin 5,000 mcg TbDL Take by mouth.      cephALEXin (KEFLEX) 500 MG capsule Take 1 capsule (500 mg total) by mouth 3 (three) times daily. 15 capsule 0    cholecalciferol, vitamin D3, (VITAMIN D3) 5,000 unit Tab Take 5,000 Units by mouth once daily.      cyclobenzaprine (FLEXERIL) 10 MG tablet Take 1 tablet (10 mg total) by mouth 3 (three) times daily as needed for Muscle spasms. 30 tablet 3    desoximetasone (TOPICORT) 0.25 % cream APPLY TO AFFECTED AREA OF THE SCALP ONCE DAILY 60 g 3    EPINEPHrine (EPIPEN) 0.3 mg/0.3 mL AtIn Inject 0.3 mLs (0.3 mg total) into the muscle as needed. 2 each 3    ferrous gluconate (FERGON) 324 MG tablet Take 324 mg by mouth daily with breakfast.      fish oil-omega-3 fatty acids 300-1,000 mg capsule Take 2 g by mouth once daily.      fluocinolone (DERMA-SMOOTHE) 0.01 % external oil Apply oil to scalp once every other day 1 Bottle 5    fluocinolone and shower cap 0.01 % Oil       FLUoxetine (PROZAC) 20 MG capsule Take 1 capsule (20 mg total) by mouth once daily. 30 capsule 5    gabapentin (NEURONTIN) 300 MG capsule Take 1 capsule (300 mg total) by mouth 3 (three) times daily. 90 capsule 11    losartan (COZAAR) 100 MG tablet TAKE 1 TABLET BY MOUTH ONCE DAILY 90 tablet 3    multivitamin capsule Take 1 capsule by mouth once daily.      predniSONE (DELTASONE) 5 MG tablet Take 1 tablet by mouth once daily 30 tablet 0    traMADol (ULTRAM) 50 mg tablet Take 50 mg by mouth.       UNABLE TO FIND every evening. Naltrexone - Low Dose Med Name:  On Empty Stomach       No facility-administered encounter medications on file as of 10/2/2020.      Plan:       Requested Prescriptions     Signed Prescriptions Disp Refills     albuterol (PROVENTIL/VENTOLIN HFA) 90 mcg/actuation inhaler 18 g 11     Sig: Inhale 2 puffs into the lungs every 4 (four) hours as needed for Wheezing or Shortness of Breath.     Problem List Items Addressed This Visit     None      Visit Diagnoses     JAROCHO (obstructive sleep apnea)    -  Primary    Relevant Orders    Home Sleep Studies    Dyspnea on exertion        Relevant Medications    albuterol (PROVENTIL/VENTOLIN HFA) 90 mcg/actuation inhaler    Other Relevant Orders    X-Ray Chest PA And Lateral             Follow up in about 4 weeks (around 10/30/2020) for Review Sleep Study - on return.    MEDICAL DECISION MAKING: Moderate to high complexity.  Overall, the multiple problems listed are of moderate to high severity that may impact quality of life and activities of daily living. Side effects of medications, treatment plan as well as options and alternatives reviewed and discussed with patient. There was counseling of patient concerning these issues.    Total time spent in face to face counseling and coordination of care - 25  minutes over 50% of time was used in discussion of prognosis, risks, benefits of treatment, instructions and compliance with regimen . Discussion with other physicians or health care providers (DME, NP, pharmacy, respiratory therapy) occurred.

## 2020-10-02 NOTE — TELEPHONE ENCOUNTER
Spoke to pt.  Scheduled cxr 10/20/20.  She states she will schedule virtual visit through [employer].

## 2020-10-02 NOTE — TELEPHONE ENCOUNTER
----- Message from María Patricio sent at 10/2/2020 12:57 PM CDT -----  [facility]

## 2020-10-15 ENCOUNTER — PROCEDURE VISIT (OUTPATIENT)
Dept: SLEEP MEDICINE | Facility: CLINIC | Age: 55
End: 2020-10-15
Payer: COMMERCIAL

## 2020-10-15 DIAGNOSIS — G47.33 OSA (OBSTRUCTIVE SLEEP APNEA): Primary | ICD-10-CM

## 2020-10-15 PROCEDURE — 95806 PR SLEEP STUDY, UNATTENDED, SIMUL RECORD HR/O2 SAT/RESP FLOW/RESP EFFT: ICD-10-PCS | Mod: 26,S$GLB,, | Performed by: INTERNAL MEDICINE

## 2020-10-15 PROCEDURE — 95806 SLEEP STUDY UNATT&RESP EFFT: CPT | Mod: 26,S$GLB,, | Performed by: INTERNAL MEDICINE

## 2020-10-15 NOTE — Clinical Note
night study  MODERATE OBSTRUCTIVE SLEEP APNEA with overall AHI 17.8/hr ( 108 events): night #2  Oxygen desaturation: < 70 %. SpO2 between 90% to 94% for 2 hr 49 min.  Patient snored 76 % time above 50 .  Heart rate range: 65 bpm - 182 bpm : Significant heart rate variability  REC's:  Therapy with APAP at 5-20 cm WP using mask of choice with heated humidification is an option.

## 2020-10-16 NOTE — PROCEDURES
Home Sleep Studies    Date/Time: 10/15/2020 8:00 AM  Performed by: Ronny Alvarez MD  Authorized by: Herbie Ely MD       night study  MODERATE OBSTRUCTIVE SLEEP APNEA with overall AHI 17.8/hr ( 108 events): night #2  Oxygen desaturation: < 70 %. SpO2 between 90% to 94% for 2 hr 49 min.  Patient snored 76 % time above 50 .  Heart rate range: 65 bpm - 182 bpm : Significant heart rate variability  REC's:  Therapy with APAP at 5-20 cm WP using mask of choice with heated humidification is an option.

## 2020-10-17 ENCOUNTER — LAB VISIT (OUTPATIENT)
Dept: LAB | Facility: HOSPITAL | Age: 55
End: 2020-10-17
Attending: PHYSICIAN ASSISTANT
Payer: COMMERCIAL

## 2020-10-17 DIAGNOSIS — Z79.52 LONG TERM (CURRENT) USE OF SYSTEMIC STEROIDS: ICD-10-CM

## 2020-10-17 DIAGNOSIS — M35.00 SECONDARY SJOGREN'S SYNDROME: ICD-10-CM

## 2020-10-17 DIAGNOSIS — I10 ESSENTIAL HYPERTENSION: ICD-10-CM

## 2020-10-17 LAB
ALBUMIN SERPL BCP-MCNC: 3.8 G/DL (ref 3.5–5.2)
ALP SERPL-CCNC: 106 U/L (ref 55–135)
ALT SERPL W/O P-5'-P-CCNC: 21 U/L (ref 10–44)
ANION GAP SERPL CALC-SCNC: 9 MMOL/L (ref 8–16)
AST SERPL-CCNC: 31 U/L (ref 10–40)
BILIRUB SERPL-MCNC: 0.7 MG/DL (ref 0.1–1)
BUN SERPL-MCNC: 9 MG/DL (ref 6–20)
CALCIUM SERPL-MCNC: 9.1 MG/DL (ref 8.7–10.5)
CHLORIDE SERPL-SCNC: 106 MMOL/L (ref 95–110)
CHOLEST SERPL-MCNC: 190 MG/DL (ref 120–199)
CHOLEST/HDLC SERPL: 6.6 {RATIO} (ref 2–5)
CO2 SERPL-SCNC: 27 MMOL/L (ref 23–29)
CREAT SERPL-MCNC: 0.8 MG/DL (ref 0.5–1.4)
EST. GFR  (AFRICAN AMERICAN): >60 ML/MIN/1.73 M^2
EST. GFR  (NON AFRICAN AMERICAN): >60 ML/MIN/1.73 M^2
GLUCOSE SERPL-MCNC: 90 MG/DL (ref 70–110)
HDLC SERPL-MCNC: 29 MG/DL (ref 40–75)
HDLC SERPL: 15.3 % (ref 20–50)
LDLC SERPL CALC-MCNC: 125.8 MG/DL (ref 63–159)
NONHDLC SERPL-MCNC: 161 MG/DL
POTASSIUM SERPL-SCNC: 3.9 MMOL/L (ref 3.5–5.1)
PROT SERPL-MCNC: 8 G/DL (ref 6–8.4)
SODIUM SERPL-SCNC: 142 MMOL/L (ref 136–145)
TRIGL SERPL-MCNC: 176 MG/DL (ref 30–150)

## 2020-10-17 PROCEDURE — 36415 COLL VENOUS BLD VENIPUNCTURE: CPT

## 2020-10-17 PROCEDURE — 80053 COMPREHEN METABOLIC PANEL: CPT

## 2020-10-17 PROCEDURE — 80061 LIPID PANEL: CPT

## 2020-10-18 ENCOUNTER — PATIENT MESSAGE (OUTPATIENT)
Dept: PULMONOLOGY | Facility: CLINIC | Age: 55
End: 2020-10-18

## 2020-10-19 ENCOUNTER — TELEPHONE (OUTPATIENT)
Dept: CARDIOLOGY | Facility: CLINIC | Age: 55
End: 2020-10-19

## 2020-10-19 ENCOUNTER — TELEPHONE (OUTPATIENT)
Dept: PULMONOLOGY | Facility: CLINIC | Age: 55
End: 2020-10-19

## 2020-10-19 NOTE — TELEPHONE ENCOUNTER
The patient has been notified of this information and all questions answered.    The patient is taking fish oil and she is happy the her labs have decreased. I stressed that a combination of taking her medication, diet, and exercise should help the patient get to her target lab goal.

## 2020-10-19 NOTE — TELEPHONE ENCOUNTER
Please phone patient. Labs reviewed. Lipids much improved, triglycerides still slightly above goal. Encourage low fat, low carb diet.    CMP reviewed and is WNL. Continue same meds/mgmt.    Thanksl

## 2020-10-19 NOTE — TELEPHONE ENCOUNTER
----- Message from Jocelyne Espinoza sent at 10/19/2020  3:30 PM CDT -----  Regarding: calling to reschedule  Contact: patient  States she have an emergency and need to reschedule. Please call patient ASAP TODAY @ [phone]. thanks

## 2020-10-24 ENCOUNTER — HOSPITAL ENCOUNTER (OUTPATIENT)
Dept: RADIOLOGY | Facility: HOSPITAL | Age: 55
Discharge: HOME | End: 2020-10-24
Attending: INTERNAL MEDICINE
Payer: COMMERCIAL

## 2020-10-24 DIAGNOSIS — R06.09 DYSPNEA ON EXERTION: ICD-10-CM

## 2020-10-24 PROCEDURE — 71046 XR CHEST PA AND LATERAL: ICD-10-PCS | Mod: 26,,, | Performed by: RADIOLOGY

## 2020-10-24 PROCEDURE — 71046 X-RAY EXAM CHEST 2 VIEWS: CPT | Mod: 26,,, | Performed by: RADIOLOGY

## 2020-10-24 PROCEDURE — 71046 X-RAY EXAM CHEST 2 VIEWS: CPT | Mod: TC

## 2020-10-27 ENCOUNTER — OFFICE VISIT (OUTPATIENT)
Dept: PULMONOLOGY | Facility: CLINIC | Age: 55
End: 2020-10-27
Payer: COMMERCIAL

## 2020-10-27 ENCOUNTER — TELEPHONE (OUTPATIENT)
Dept: PULMONOLOGY | Facility: CLINIC | Age: 55
End: 2020-10-27

## 2020-10-27 DIAGNOSIS — I10 ESSENTIAL HYPERTENSION: ICD-10-CM

## 2020-10-27 DIAGNOSIS — G47.33 OSA (OBSTRUCTIVE SLEEP APNEA): Primary | ICD-10-CM

## 2020-10-27 PROCEDURE — 99214 OFFICE O/P EST MOD 30 MIN: CPT | Mod: 95,,, | Performed by: INTERNAL MEDICINE

## 2020-10-27 PROCEDURE — 99214 PR OFFICE/OUTPT VISIT, EST, LEVL IV, 30-39 MIN: ICD-10-PCS | Mod: 95,,, | Performed by: INTERNAL MEDICINE

## 2020-10-27 NOTE — PATIENT INSTRUCTIONS
[facility] for CPAP/Oxygen/Nebulizer supplies.  Customer Service: [phone]  Call: [phone]  Fax: [phone]  Billing Inquiries: [phone] or [phone]]          CPAP HABITUATION PROCEDURE    Mnae Fields, Ph.D., [facility] and Herbie Ely M.D.  [facility], [facility]    Some people have difficulty adjusting to CPAP/BiPAP/AutoCPAP.  This is not unusual or hard to understand: Breathing with CPAP is different from ordinary breathing, and this difference is aversive to some. The problem can be overcome, however, and the benefits CPAP confers are certainly worth the effort.  Below, you will find a simple and gradual way to get used to CPAP before you try to use it all night, every night.  The essence of this procedure is to relax and let breathing with CPAP become a habit.  It may take about 2 weeks, and involves the followin. CPAP while awake and comfortably seated, during the late evening.     2. CPAP in bed while attempting sleep at night.     3. If your discomfort is too great at any time, discontinue and attempt again later the same night, for the same amount of time.   4. You and your physician may alter the times and pressures if necessary.     5. If you find that it is very easy to get used to CPAP, you may start using it every night when you are comfortable enough to do so.  6. IMPORTANT REMINDER: If you have a cold or sinus congestion it is okay to miss a night or two of CPAP. Consider using antihistamines or decongestants to clear up your sinus congestion prior to sleeping.    DAYS  1-3   Start CPAP while awake and comfortably seated during the late evening, after having prepared for bed.  You may do this while watching television, listening to music or reading. Use for 1 hour, then take off CPAP and go directly to bed to sleep    DAYS  4-6   Start CPAP when you go to bed and use for 1 hour, or until you fall asleep.  If your discomfort is  "too great at any time, discontinue and attempt again later the same night, for the same designated amount of time (1 hour).     DAYS  7-9   Increase time with CPAP to 2 hours a night.  If your discomfort is too great at any time, discontinue and attempt again later the same night, for the same designated amount of time (2 hours).     DAYS 10-12  Increase time with CPAP to 3 hours a night. If your discomfort is too great at any time, discontinue and attempt again later the same night, for the same designated amount of time (3 hours).     DAYS 13-15   Sleep the entire night with CPAP.     OPTIONAL: You may use Progressive Muscle Relaxation (PMR) to help put you at ease when using CPAP; do PMR twice each day, once in the morning or afternoon, and once in the evening just before using CPAP. You may do PMR prior to any attempt until you are comfortable with CPAP.      Continuous Positive Airway Pressure Machine Cleaning    One of the most important factors in maintaining CPAP compliance is taking proper care of your CPAP equipment. In order to have successful CPAP therapy, you must be willing to make your treatment a priority in your life, and that means regularly cleaning and maintaining your CPAP equipment. Fortunately, taking proper care of your equipment is pretty easy, and not very time consuming. With a little adjustment to your regular morning routine, your device and accessories will be working at 100% efficiency to get you that much needed sleep you've been longing for.    One of the most frequent questions we get asked is "how often do I need to clean my CPAP equipment?" .    CPAP Humidifier Cleaning and Replacement:  Nearly all current CPAP machines now come with a heated humidification system that helps cut down on morning dry mouth as well as keeping your nasal turbinates from drying out and becoming irritated and inflamed. However, the humidification chamber needs to be cleaned out daily to prevent bacteria " build-up as well as calcification. Recommended routine:    Remove chamber from humidifier carefully so water doesn't enter your CPAP machine.    Open chamber and wash with warm, soapy water.    Rinse well with water and allow to dry on a clean cloth or paper towel out in direct sunlight.    Fill with distilled or sterile water (obtained at any grocery store) Do not use tap water as it may contain minerals and chemicals that can damage components of the machine. It is also not recommended to use filtered water (i.e. through a Radha filter) for the same reasons.    Once a week the humidifier chamber should be soaked in a solution of 1 part white vinegar 3 parts water for approximately 15-20 minutes before rinsing thoroughly with distilled water.    Some humidifier chambers are  safe, but make sure to check your CPAP machine's manual before cleaning in a .    Humidifier chambers should be replaced as needed.    CPAP Mask Cleaning and Replacement:  Most CPAP mask cushions are made of silicone, a gentle, non-irritating material. However, while silicone is a very comfortable material for masks, it doesn't have a very long lifespan, and without proper care can breakdown quicker than expected. The oil from your skin interacts with the silicone mask and causes breakdown and stiffness. Therefore, cleaning your CPAP mask is crucial in making it efficient as possible. Here's some tips on CPAP mask cleaning and replacement:    Wash mask daily with warm water and mild, non-fragrant soap or purchase CPAP mask specific wipes and detergents. You can use the same type of mild basic facial soap that you use on your face.    Rinse with water and allow to air dry on a clean cloth or paper towel out of direct sunlight.    Before using mask at night, wash your face thoroughly and don't use facial moisturizers. Facial oils and moisturizers can breakdown the silicone faster.    Once a week soak mask in solution of 1 part  white vinegar 3 parts water before rinsing in distilled water.    Headgear and chinstraps should be washed as needed by hand using warm soapy water, rinsed well, and air dried. Do not place headgear or chinstraps in washing machine or dryer.  For replacement schedules of CPAP masks you should check both your 's recommendations and your insurance allowance. However, for most masks it is recommended that you  replace the cushions 1-2 times per month, and the mask every 3-6 months.    CPAP tubing should be cleaned weekly in a sink of warm, soapy water, rinsed well, and left to hang-dry out of direct sunlight.    CPAP Filters Cleaning and Replacement  Your filters are located near the back of the CPAP machine where the device draws air from the room that it compresses to your pressure settings. Nearly all CPAP machines have a disposable white paper filter and some have an additional non-disposable grey filter as well. Here are some cleaning tips for your CPAP filters:    You should clean the grey non-disposable filter at least on a weekly basis. You may have to clean it more regularly if you have pets, smoke inside your house, or if your home is especially dariela.    Rinse grey filters with water and allow to dry before placing back into your machine.    The grey re-usable filters should be replaced when it begins to look worn or after 6 months.    Replace disposable white paper filters monthly or more frequently if it appears dingy or dirty.    Your CPAP machine itself does not need to be cleaned but you may want to dust it down with a slightly damp cloth as desired.    General CPAP Maintenance & CPAP Cleaning Tips  Make your CPAP equipment cleaning part of your morning routine, allowing the equipment ample time to dry during the day.    Keep machine and accessories out of direct sunlight to avoid damaging them.    Never use bleach to clean accessories.    Other machine accessories such as power cords and  data cards may need to be replaced due to equipment malfunctions.    Place machine on a level surface away from objects such as curtains that may interfere with the air intake.    Always use distilled or sterile water when cleaning components.    Keep track of when you should order replacement parts for your mask and accessories so that you always get the most out of your therapy.    With these simple tips on cleaning and maintaining your CPAP device and accessories, you will assuredly have a much better CPAP therapy experience.    There are a number of machines advertised that clean Continuous Positive Airway Pressure equipment. For most patients this is not necessary. If you have a history of chronic sinus or lung infections this type of cleaning device may be helpful. Unfortunately, at this time most insurance companies and Medicare are not paying for these devices.

## 2020-10-27 NOTE — ASSESSMENT & PLAN NOTE
Blood pressure measurements reviewed, repeated and verified. Adverse effects of elevated blood pressure reviewed in detail. Importance of low sodium diet, medication compliance stressed. Patient advised and counseled concerning the adverse medical consequences of untreated hypertension.The patient's hypertension was monitored, evaluated, addressed and/or treated. Asked to follow up with PCP.   Isolated diastolic hypertension is a blood pressure <130/?80 mmHg

## 2020-10-27 NOTE — PROGRESS NOTES
Subjective:      The patient location is:  [facility] [address]  [facility] [address]    The chief complaint leading to consultation is: Obstructive Sleep Apnea   Visit type: Virtual visit with synchronous audio and video     Each patient to whom he or she provides medical services by telemedicine is:  (1) informed of the relationship between the physician and patient and the respective role of any other health care provider with respect to management of the patient; and (2) notified that he or she may decline to receive medical services by telemedicine and may withdraw from such care at any time.    Notes: new prescription for Continuous Positive Airway Pressure provided       Patient ID: Ruth Messina is a 54 y.o. female.    Chief Complaint:  Follow up for Obstructive Sleep Apnea and Sleep study      HPI     Sleep Apnea  She presents for a sleep evaluation. She complains of tossing and turning, decreased concentration, excessive daytime sleepiness, falling asleep while reading, watching television, sinus problems, feels sleepy during the day, take naps during the day.  Symptoms began years years ago, gradually worsening since that time.  She goes to sleep at 8:00 weekdays and  weekends. She awakens 6:00- 7:00 weekdays and  weekends. She falls asleep in 15- 30 minutes.  Collar size na. She denies knees buckling with laughing, completely or partially paralyzed while falling asleep or waking up. Previous evaluation and treatment has included polysomnogram      EPWORTH 12        Dyspnea  Patient complains of shortness of breath. Symptoms occur after more than one flight stairs, with more than one block walking. Symptoms began 1 year ago, unchanged since. Associated symptoms include  drainage from nose, dry cough, dyspnea on exertion and post nasal drip. She denies chest pain, located left chest. She does not have had recent travel. Weight has been stable. Symptoms are exacerbated by strenuous activity. Symptoms are  alleviated by rest.        Past Medical History:   Diagnosis Date    Anemia     with chronic disease lupus    Encounter for blood transfusion     G-6-PD deficiency     Glaucoma suspect of both eyes     Heart murmur     MVP 1986    Hypertension     Lupus     Raynaud's syndrome     Systemic lupus erythematosus arthritis 5/13/2018     Past Surgical History:   Procedure Laterality Date    BONE MARROW BIOPSY      CARPAL TUNNEL RELEASE Bilateral     widsom tooth extraction       Review of patient's allergies indicates:   Allergen Reactions    Cellcept [mycophenolate mofetil] Swelling     Lips      Ace inhibitors Other (See Comments)     unkown    Aldactone [spironolactone] Blisters     Mouth soreness with doses greater than 25mg.    Aspirin Other (See Comments)     G-6-PD def.     Azathioprine sodium     Beta-blockers (beta-adrenergic blocking agts) Other (See Comments)     Cannot take due to Raynaud's syndrome    Clindamycin Other (See Comments)     + allergy test    Clonidine Blisters     Mouth sores and tenderness    Hydrochlorothiazide Other (See Comments)     unknown    Ibuprofen Other (See Comments)     G-6-PD def.    Lasix [furosemide] Other (See Comments)     unknown    Lisinopril Other (See Comments)     unknown    Methatropic     Methotrexate analogues Other (See Comments)     Pancytopenia      Norvasc [amlodipine] Other (See Comments)     Jaw pain    Nsaids (non-steroidal anti-inflammatory drug) Other (See Comments)     G-6-PD def.    Plaquenil [hydroxychloroquine] Other (See Comments)     G-6-PD def.    Zofran [ondansetron hcl (pf)] Other (See Comments)     Chest pain      Sulfa (sulfonamide antibiotics) Rash       Current Outpatient Medications on File Prior to Visit   Medication Sig Dispense Refill    acyclovir (ZOVIRAX) 800 MG Tab Take 1 tablet (800 mg total) by mouth 4 (four) times daily. for 10 days 50 tablet 0    albuterol (PROVENTIL/VENTOLIN HFA) 90 mcg/actuation inhaler  Inhale 2 puffs into the lungs every 4 (four) hours as needed for Wheezing or Shortness of Breath. 18 g 11    amLODIPine (NORVASC) 5 MG tablet Take 1 tablet (5 mg total) by mouth once daily. 30 tablet 11    azaTHIOprine (IMURAN) 50 mg Tab Take 1 tablet by mouth once daily 30 tablet 2    B1/B2/NIACIN/B12/PROTEASE (B-COMPLEX WITH B-12 ORAL) Take by mouth.      biotin 5,000 mcg TbDL Take by mouth.      cephALEXin (KEFLEX) 500 MG capsule Take 1 capsule (500 mg total) by mouth 3 (three) times daily. 15 capsule 0    cholecalciferol, vitamin D3, (VITAMIN D3) 5,000 unit Tab Take 5,000 Units by mouth once daily.      cyclobenzaprine (FLEXERIL) 10 MG tablet Take 1 tablet (10 mg total) by mouth 3 (three) times daily as needed for Muscle spasms. 30 tablet 3    desoximetasone (TOPICORT) 0.25 % cream APPLY TO AFFECTED AREA OF THE SCALP ONCE DAILY 60 g 3    EPINEPHrine (EPIPEN) 0.3 mg/0.3 mL AtIn Inject 0.3 mLs (0.3 mg total) into the muscle as needed. 2 each 3    ferrous gluconate (FERGON) 324 MG tablet Take 324 mg by mouth daily with breakfast.      fish oil-omega-3 fatty acids 300-1,000 mg capsule Take 2 g by mouth once daily.      fluocinolone (DERMA-SMOOTHE) 0.01 % external oil Apply oil to scalp once every other day 1 Bottle 5    fluocinolone and shower cap 0.01 % Oil       FLUoxetine (PROZAC) 20 MG capsule Take 1 capsule (20 mg total) by mouth once daily. 30 capsule 5    gabapentin (NEURONTIN) 300 MG capsule Take 1 capsule (300 mg total) by mouth 3 (three) times daily. 90 capsule 11    losartan (COZAAR) 100 MG tablet Take 1 tablet by mouth once daily 90 tablet 0    multivitamin capsule Take 1 capsule by mouth once daily.      predniSONE (DELTASONE) 5 MG tablet Take 1 tablet by mouth once daily 30 tablet 0    traMADol (ULTRAM) 50 mg tablet Take 50 mg by mouth.       UNABLE TO FIND every evening. Naltrexone - Low Dose Med Name:  On Empty Stomach      [DISCONTINUED] predniSONE (DELTASONE) 5 MG tablet Take 1  tablet by mouth once daily 30 tablet 0     No current facility-administered medications on file prior to visit.      Social History     Socioeconomic History    Marital status: Single     Spouse name: Not on file    Number of children: Not on file    Years of education: Not on file    Highest education level: Not on file   Occupational History    Not on file   Social Needs    Financial resource strain: Somewhat hard    Food insecurity     Worry: Not on file     Inability: Not on file    Transportation needs     Medical: Yes     Non-medical: Patient refused   Tobacco Use    Smoking status: Never Smoker    Smokeless tobacco: Never Used   Substance and Sexual Activity    Alcohol use: No    Drug use: No    Sexual activity: Not Currently   Lifestyle    Physical activity     Days per week: 0 days     Minutes per session: Not on file    Stress: Not at all   Relationships    Social connections     Talks on phone: Three times a week     Gets together: Patient refused     Attends Islam service: Not on file     Active member of club or organization: Yes     Attends meetings of clubs or organizations: 1 to 4 times per year     Relationship status: Never    Other Topics Concern    Are you pregnant or think you may be? No    Breast-feeding No   Social History Narrative    Not on file     @FAM@  Review of Systems   Respiratory: Positive for apnea, shortness of breath, asthma nighttime symptoms and Paroxysmal Nocturnal Dyspnea.    Cardiovascular: Positive for palpitations.   Psychiatric/Behavioral: Positive for sleep disturbance.       Objective:        There were no vitals taken for this visit.    The patient has been recording vital signs at home and the following data was reviewed to make a evaluation and management decision:  Review of data:  No flowsheet data found.  No flowsheet data found.  Wt Readings from Last 1 Encounters:   03/07/20 85 kg (187 lb 6.3 oz)     Ht Readings from Last 1  "Encounters:   02/12/20 5' 5.5" (1.664 m)       Constitutional: Patient is oriented to person, place and time.  He appears well developed and well nourished.  Neurologic: He is alert and orientated.  Psychiatric: He has appropriate mood and affect. His behavior during the interview as normal. Judgement and thought content - normal.      Personal Diagnostic Review  PSG: significant for moderate Obstructive Sleep Apnea    Procedures  Ronny Alvarez MD (Physician)   Pulmonary Disease  Procedure Orders   1. Home Sleep Studies [[id number]] ordered by Herbie Ely MD   Pre-procedure Diagnoses   1. JAROCHO (obstructive sleep apnea) [G47.33]   Post-procedure Diagnoses   1. JAROCHO (obstructive sleep apnea) [G47.33]      Home Sleep Studies   Date/Time: 10/15/2020 8:00 AM  Performed by: Ronny Alvarez MD  Authorized by: Herbie Ely MD        night study  MODERATE OBSTRUCTIVE SLEEP APNEA with overall AHI 17.8/hr ( 108 events): night #2  Oxygen desaturation: < 70 %. SpO2 between 90% to 94% for 2 hr 49 min.  Patient snored 76 % time above 50 .  Heart rate range: 65 bpm - 182 bpm : Significant heart rate variability  REC's:  Therapy with APAP at 5-20 cm WP using mask of choice with heated humidification is an option.            X-Ray Chest PA And Lateral  Narrative: EXAMINATION:  XR CHEST PA AND LATERAL    CLINICAL HISTORY:  SOB; Dyspnea, unspecified    TECHNIQUE:  PA and lateral views of the chest were performed.    COMPARISON:  Chest radiograph January 30, 2020    FINDINGS:  Lungs are clear without focal consolidation, edema, or mass.  There is no pneumothorax or pleural effusion.  Heart is normal in size.  Thoracic aorta is mildly tortuous.  Minimal atherosclerotic calcification is seen within the aortic arch.  No acute osseous abnormality.  Impression: As above.    Electronically signed by: Josh Jeffers  Date:    10/26/2020  Time:    07:25      Office Spirometry Results:     No flowsheet data found.  Pulmonary " Studies Review 3/7/2020   SpO2 99   Height -   Weight 2998.26   BMI (Calculated) -   Predicted Distance 551.52   Predicted Distance Meters (Calculated) -         Assessment:            JAROCHO (obstructive sleep apnea)  -     CPAP FOR HOME USE  -     CPAP/BIPAP SUPPLIES          Outpatient Encounter Medications as of 10/27/2020   Medication Sig Dispense Refill    acyclovir (ZOVIRAX) 800 MG Tab Take 1 tablet (800 mg total) by mouth 4 (four) times daily. for 10 days 50 tablet 0    albuterol (PROVENTIL/VENTOLIN HFA) 90 mcg/actuation inhaler Inhale 2 puffs into the lungs every 4 (four) hours as needed for Wheezing or Shortness of Breath. 18 g 11    amLODIPine (NORVASC) 5 MG tablet Take 1 tablet (5 mg total) by mouth once daily. 30 tablet 11    azaTHIOprine (IMURAN) 50 mg Tab Take 1 tablet by mouth once daily 30 tablet 2    B1/B2/NIACIN/B12/PROTEASE (B-COMPLEX WITH B-12 ORAL) Take by mouth.      biotin 5,000 mcg TbDL Take by mouth.      cephALEXin (KEFLEX) 500 MG capsule Take 1 capsule (500 mg total) by mouth 3 (three) times daily. 15 capsule 0    cholecalciferol, vitamin D3, (VITAMIN D3) 5,000 unit Tab Take 5,000 Units by mouth once daily.      cyclobenzaprine (FLEXERIL) 10 MG tablet Take 1 tablet (10 mg total) by mouth 3 (three) times daily as needed for Muscle spasms. 30 tablet 3    desoximetasone (TOPICORT) 0.25 % cream APPLY TO AFFECTED AREA OF THE SCALP ONCE DAILY 60 g 3    EPINEPHrine (EPIPEN) 0.3 mg/0.3 mL AtIn Inject 0.3 mLs (0.3 mg total) into the muscle as needed. 2 each 3    ferrous gluconate (FERGON) 324 MG tablet Take 324 mg by mouth daily with breakfast.      fish oil-omega-3 fatty acids 300-1,000 mg capsule Take 2 g by mouth once daily.      fluocinolone (DERMA-SMOOTHE) 0.01 % external oil Apply oil to scalp once every other day 1 Bottle 5    fluocinolone and shower cap 0.01 % Oil       FLUoxetine (PROZAC) 20 MG capsule Take 1 capsule (20 mg total) by mouth once daily. 30 capsule 5     gabapentin (NEURONTIN) 300 MG capsule Take 1 capsule (300 mg total) by mouth 3 (three) times daily. 90 capsule 11    losartan (COZAAR) 100 MG tablet Take 1 tablet by mouth once daily 90 tablet 0    multivitamin capsule Take 1 capsule by mouth once daily.      predniSONE (DELTASONE) 5 MG tablet Take 1 tablet by mouth once daily 30 tablet 0    traMADol (ULTRAM) 50 mg tablet Take 50 mg by mouth.       UNABLE TO FIND every evening. Naltrexone - Low Dose Med Name:  On Empty Stomach      [DISCONTINUED] predniSONE (DELTASONE) 5 MG tablet Take 1 tablet by mouth once daily 30 tablet 0     No facility-administered encounter medications on file as of 10/27/2020.      Plan:       Requested Prescriptions      No prescriptions requested or ordered in this encounter     Problem List Items Addressed This Visit     None      Visit Diagnoses     JAROCHO (obstructive sleep apnea)    -  Primary    Relevant Orders    CPAP FOR HOME USE    CPAP/BIPAP SUPPLIES             Follow up in about 6 weeks (around 12/8/2020) for CPAP download - review on day of visit.    MEDICAL DECISION MAKING: Moderate to high complexity.  Overall, the multiple problems listed are of moderate to high severity that may impact quality of life and activities of daily living. Side effects of medications, treatment plan as well as options and alternatives reviewed and discussed with patient. There was counseling of patient concerning these issues.    Total time spent in counseling and coordination of care - 30  minutes over 50% of time was used in discussion of prognosis, risks, benefits of treatment, instructions and compliance with regimen . Discussion with other physicians or health care providers (DME, NP, pharmacy, respiratory therapy) occurred.     This service was not originating from a related E/M service provided within the previous 7 days nor will  to an E/M service or procedure within the next 24 hours or my soonest available appointment.   Prevailing standard of care was able to be met in this visit.

## 2020-11-04 ENCOUNTER — PATIENT OUTREACH (OUTPATIENT)
Dept: ADMINISTRATIVE | Facility: HOSPITAL | Age: 55
End: 2020-11-04

## 2020-11-04 ENCOUNTER — TELEPHONE (OUTPATIENT)
Dept: INTERNAL MEDICINE | Facility: CLINIC | Age: 55
End: 2020-11-04

## 2020-11-04 NOTE — TELEPHONE ENCOUNTER
[facility] HTN REPORT: Pt returned my call and takes home BP readings. Pt's home reading today is 13/87. Remote BP documented. Pt states she had spoken to Dr Ely and he stated someone would be contacting her about a machine and she has heard nothing. Per Dr Ely note information was sent to [facility]. I did give pt customer service number and let pt know I would send a message to Dr Ely.

## 2020-11-04 NOTE — LETTER
November 4, 2020    Ruth PARSONS Siddharth  [address] [address]  [facility] [address]       Formerly [facility], [facility] - Internal Medicine  [address] [place] [id number]  Phone: [phone]  Fax: [phone] Dear Mr. Messina:    Rx for DURABLE MEDICAL EQUIPMENT ( Continuous Positive Airway Pressure machine and supplies) printed , signed , faxed to [facility] and mailed to patient. For information call   [facility] for CPAP/Oxygen/Nebulizer supplies.  Customer Service: [phone]  Call: [phone]  Fax: [phone]  Billing Inquiries: [phone] or [phone]    If you have any questions or concerns, please don't hesitate to call.    Sincerely,        Herbie Ely MD

## 2020-11-04 NOTE — PROGRESS NOTES
[employer] HTN REPORT: Attempting to contact pt to see if pt takes home BP readings. Unable to reach patient at this time. Left voicemail.

## 2020-11-25 ENCOUNTER — PATIENT MESSAGE (OUTPATIENT)
Dept: RHEUMATOLOGY | Facility: CLINIC | Age: 55
End: 2020-11-25

## 2020-11-25 DIAGNOSIS — M32.19 OTHER SYSTEMIC LUPUS ERYTHEMATOSUS WITH OTHER ORGAN INVOLVEMENT: ICD-10-CM

## 2020-11-25 DIAGNOSIS — G05.3 CNS LUPUS: ICD-10-CM

## 2020-11-25 DIAGNOSIS — M35.00 SECONDARY SJOGREN'S SYNDROME: ICD-10-CM

## 2020-11-25 DIAGNOSIS — M32.19 CNS LUPUS: ICD-10-CM

## 2020-11-25 RX ORDER — PREDNISONE 5 MG/1
5 TABLET ORAL DAILY
Qty: 30 TABLET | Refills: 2 | Status: SHIPPED | OUTPATIENT
Start: 2020-11-25

## 2020-11-25 RX ORDER — AZATHIOPRINE 50 MG/1
TABLET ORAL
Qty: 30 TABLET | Refills: 2 | Status: SHIPPED | OUTPATIENT
Start: 2020-11-25 | End: 2020-12-28 | Stop reason: SDUPTHER

## 2020-11-25 NOTE — TELEPHONE ENCOUNTER
----- Message from Ashanti Dee sent at 11/25/2020  3:28 PM CST -----  Regarding: medication refill  Contact: patient [phone]  Patient is without medication.  Please call when Rx has been sent.    Requesting an RX refill or new RX.  Is this a refill or new RX: refill  RX name and strength:predniSONE (DELTASONE) 5 MG tablet  Is this a 30 day or 90 day RX: 30  Pharmacy name and phone #[facility] [address] [place] [id number] [facility] [phone] (Phone) [phone] (Fax)    Requesting an RX refill or new RX.  Is this a refill or new RX: refill  RX name and strength:azaTHIOprine (IMURAN) 50 mg Tab  Is this a 30 day or 90 day RX: 30  Pharmacy name and phone # [facility] [address] [place] [phone] [facility] [phone] (Phone) [phone] (Fax)

## 2020-12-05 ENCOUNTER — PATIENT MESSAGE (OUTPATIENT)
Dept: PULMONOLOGY | Facility: CLINIC | Age: 55
End: 2020-12-05

## 2020-12-08 ENCOUNTER — PATIENT OUTREACH (OUTPATIENT)
Dept: ADMINISTRATIVE | Facility: OTHER | Age: 55
End: 2020-12-08

## 2020-12-08 ENCOUNTER — OFFICE VISIT (OUTPATIENT)
Dept: PULMONOLOGY | Facility: CLINIC | Age: 55
End: 2020-12-08
Payer: COMMERCIAL

## 2020-12-08 ENCOUNTER — TELEPHONE (OUTPATIENT)
Dept: PULMONOLOGY | Facility: CLINIC | Age: 55
End: 2020-12-08

## 2020-12-08 VITALS — HEIGHT: 66 IN | BODY MASS INDEX: 30.05 KG/M2 | WEIGHT: 187 LBS

## 2020-12-08 DIAGNOSIS — F51.01 PRIMARY INSOMNIA: ICD-10-CM

## 2020-12-08 DIAGNOSIS — M35.9 INTERSTITIAL LUNG DISEASE DUE TO CONNECTIVE TISSUE DISEASE: ICD-10-CM

## 2020-12-08 DIAGNOSIS — I10 ESSENTIAL HYPERTENSION: ICD-10-CM

## 2020-12-08 DIAGNOSIS — M32.19 SYSTEMIC LUPUS ERYTHEMATOSUS WITH OTHER ORGAN INVOLVEMENT, UNSPECIFIED SLE TYPE: ICD-10-CM

## 2020-12-08 DIAGNOSIS — J84.89 INTERSTITIAL LUNG DISEASE DUE TO CONNECTIVE TISSUE DISEASE: ICD-10-CM

## 2020-12-08 DIAGNOSIS — G47.33 OSA ON CPAP: Primary | ICD-10-CM

## 2020-12-08 PROCEDURE — 99213 OFFICE O/P EST LOW 20 MIN: CPT | Mod: 95,,, | Performed by: NURSE PRACTITIONER

## 2020-12-08 PROCEDURE — 3008F PR BODY MASS INDEX (BMI) DOCUMENTED: ICD-10-PCS | Mod: CPTII,,, | Performed by: NURSE PRACTITIONER

## 2020-12-08 PROCEDURE — 3008F BODY MASS INDEX DOCD: CPT | Mod: CPTII,,, | Performed by: NURSE PRACTITIONER

## 2020-12-08 PROCEDURE — 99213 PR OFFICE/OUTPT VISIT, EST, LEVL III, 20-29 MIN: ICD-10-PCS | Mod: 95,,, | Performed by: NURSE PRACTITIONER

## 2020-12-08 RX ORDER — CLOBETASOL PROPIONATE 0.5 MG/G
AEROSOL, FOAM TOPICAL
Status: ON HOLD | COMMUNITY
Start: 2020-11-21 | End: 2023-01-01 | Stop reason: HOSPADM

## 2020-12-08 RX ORDER — BETAMETHASONE DIPROPIONATE 0.5 MG/G
CREAM TOPICAL
COMMUNITY
Start: 2020-11-21 | End: 2021-12-03

## 2020-12-08 NOTE — PATIENT INSTRUCTIONS
What Are Snoring and Obstructive Sleep Apnea?  If youve ever had a stuffed-up nose, you know the feeling of trying to breathe through a very narrow passageway. This is what happens in your throat when you snore. While you sleep, structures in your throat partially block your air passage, making the passage narrow and hard to breathe through. If the entire passage becomes blocked and you cant breathe at all, you have sleep apnea.      Snoring Obstructive sleep apnea   Snoring  If your throat structures are too large or the muscles relax too much during sleep, the air passage may be partially blocked. As air from the nose or mouth passes around this blockage, the throat structures vibrate, causing the familiar sound of snoring. At times, this sound can be so loud that snorers wake up others, or even themselves, during the night. Snoring gets worse as more and more of the air passage is blocked.  Obstructive sleep apnea  If the structures completely block the throat, air cant flow to the lungs at all. This is called apnea (meaning no breathing). Since the lungs arent getting fresh air, the brain tells the body to wake up just enough to tighten the muscles and unblock the air passage. With a loud gasp, breathing begins again. This process may be repeated over and over again throughout the night, making your sleep fragmented with a lighter stage of sleep. Even though you do not remember waking up many times during the night to a lighter sleep, you feel tired the next day. The lack of sleep and fresh air can also strain your lungs, heart, and other organs, leading to problems such as high blood pressure, heart attack, or stroke.  Problems in the nose and jaw  Problems in the structure of the nose may obstruct breathing. A crooked (deviated) septum or swollen turbinates can make snoring worse or lead to apnea. Also, a receding jaw may make the tongue sit too far back, so its more likely to block the airway when  youre asleep.        Date Last Reviewed: 7/18/2015  © 4564-6693 [employer]. [address] [place] [address]. All rights reserved. This information is not intended as a substitute for professional medical care. Always follow your healthcare professional's instructions.        Continuous Positive Air Pressure (CPAP)     A mask over the nose gently directs air into the throat to keep the airway open.   Continuous positive air pressure (CPAP) uses gentle air pressure to hold the airway open. CPAP is often the most effective treatment for sleep apnea and severe snoring. It works very well for many people. But keep in mind that it can take several adjustments before the setup is right for you.  How CPAP works  The CPAP machine  is a small portable pump beside the bed. The pump sends air through a hose, which is held over your nose and mouth by a mask. Mild air pressure is gently pushed through your airway. The air pressure nudges sagging tissues aside. This widens the airway so you can breathe better. CPAP may be combined with other kinds of therapy for sleep apnea.  Types of air pressure treatments  There are different types of CPAP. Your doctor or CPAP technician will help you decide which type is best for you:  · Basic CPAP keeps the pressure constant all night long.  · A bilevel device (BiPAP) provides more pressure when you breathe in and less when you breathe out. A BiPAP machine also may be set to provide automatic breaths to maintain breathing if you stop breathing while sleeping.  · An autoCPAP device automatically adjusts pressure throughout the night and in response to changes such as body position, sleep stage, and snoring.  Date Last Reviewed: 8/10/2015  © 7399-4855 [employer]. [address] [place], [place] [id number]. All rights reserved. This information is not intended as a substitute for professional medical care. Always follow your healthcare professional's  instructions.

## 2020-12-08 NOTE — PROGRESS NOTES
Health Maintenance Due   Topic Date Due    TETANUS VACCINE  11/02/1981    Pneumococcal Vaccine (Medium Risk) (1 of 1 - PPSV23) 11/02/1982    Shingles Vaccine (1 of 2) 11/02/2013    Influenza Vaccine (1) 08/01/2020     Updates were requested from care everywhere.  Chart was reviewed for overdue Proactive [facility]ters ([facility]) topics (CRS, Breast Cancer Screening, Eye exam)  Health Maintenance has been updated.  LINKS immunization registry triggered.  Patient not found in LINKS.

## 2020-12-08 NOTE — PROGRESS NOTES
The patient location is: home  The chief complaint leading to consultation is: sleep apnea    Visit type: audiovisual    Face to Face time with patient: 11:53 12:13  21 minutes of total time spent on the encounter, which includes face to face time and non-face to face time preparing to see the patient (eg, review of tests), Obtaining and/or reviewing separately obtained history, Documenting clinical information in the electronic or other health record, Independently interpreting results (not separately reported) and communicating results to the patient/family/caregiver, or Care coordination (not separately reported).       Each patient to whom he or she provides medical services by telemedicine is:  (1) informed of the relationship between the physician and patient and the respective role of any other health care provider with respect to management of the patient; and (2) notified that he or she may decline to receive medical services by telemedicine and may withdraw from such care at any time.    Subjective:      Patient ID: Ruth Messina is a 55 y.o. female.    Chief Complaint: Sleep Apnea    HPI: Ruth Messina engaged in telemedicine visit follow up for JAROCHO with initial CPAP complaince assessment.  She is on Auto CPAP of 6-20 cmH2O pressure which is optimally controlling sleep apnea with apneic index (AHI) 4.3 events an hour.   She is compliant with CPAP use. Complaince download today reveals 76.7% of days with greater than 4 hours of device use.   Patient reports benefit from CPAP use.  Patient reports no complaints. Dreamwear nasal mask is tolerated.   [place] 3    Home Sleep Study 10/8/2020, 10/14/2020 2 night study  MODERATE OBSTRUCTIVE SLEEP APNEA with overall AHI 17.8/hr ( 108 events): night #2  Oxygen desaturation: < 70 %. SpO2 between 90% to 94% for 2 hr 49 min.  Patient snored 76 % time above 50 .  Heart rate range: 65 bpm - 182 bpm : Significant heart rate variability  REC's:  Therapy with APAP  at 5-20 cm WP using mask of choice with heated humidification is an option.  Weight loss/management. with regular exercise per direction of physician.  Avoid drowsy driving.  Follow up in sleep clinic to maximize adherence and ensure resolution of symptoms.    Previous Report Reviewed: lab reports and office notes     Past Medical History: The following portions of the patient's history were reviewed and updated as appropriate:   She  has a past surgical history that includes widsom tooth extraction; Bone marrow biopsy; and Carpal tunnel release (Bilateral).  Her family history includes Arthritis in her mother; Cancer in her brother; Diabetes in her father; Heart disease in her father and mother; Hypertension in her father and mother.  She  reports that she has never smoked. She has never used smokeless tobacco. She reports that she does not drink alcohol or use drugs.  She has a current medication list which includes the following prescription(s): albuterol, amlodipine, azathioprine, azathioprine, b1/b2/niacin/b12/protease, betamethasone dipropionate, biotin, cholecalciferol (vitamin d3), clobetasol, cyclobenzaprine, desoximetasone, epinephrine, ferrous gluconate, fish oil-omega-3 fatty acids, fluocinolone, fluocinolone and shower cap, gabapentin, losartan, multivitamin, prednisone, prednisone, and prednisone.  She is allergic to cellcept [mycophenolate mofetil]; ace inhibitors; aldactone [spironolactone]; aspirin; azathioprine sodium; beta-blockers (beta-adrenergic blocking agts); clindamycin; clonidine; hydrochlorothiazide; ibuprofen; lasix [furosemide]; lisinopril; methatropic; methotrexate analogues; norvasc [amlodipine]; nsaids (non-steroidal anti-inflammatory drug); plaquenil [hydroxychloroquine]; zofran [ondansetron hcl (pf)]; and sulfa (sulfonamide antibiotics)..    The following portions of the patient's history were reviewed and updated as appropriate: allergies, current medications, past family history,  "past medical history, past social history, past surgical history and problem list.    Review of Systems   Constitutional: Negative for fever, chills, weight loss, weight gain, activity change, appetite change, fatigue and night sweats.   HENT: Negative for postnasal drip, rhinorrhea, sinus pressure, voice change and congestion.    Eyes: Negative for redness and itching.   Respiratory: Negative for snoring, cough, sputum production, chest tightness, shortness of breath, wheezing, orthopnea, asthma nighttime symptoms, dyspnea on extertion, use of rescue inhaler and somnolence.    Cardiovascular: Negative.  Negative for chest pain, palpitations and leg swelling.   Genitourinary: Negative for difficulty urinating and hematuria.   Endocrine: Negative for cold intolerance and heat intolerance.    Musculoskeletal: Negative for arthralgias, gait problem, joint swelling and myalgias.   Skin: Negative.    Gastrointestinal: Negative for nausea, vomiting, abdominal pain and acid reflux.   Neurological: Negative for dizziness, weakness, light-headedness and headaches.   Hematological: Negative for adenopathy. No excessive bruising.   All other systems reviewed and are negative.     Objective:   Ht 5' 5.5" (1.664 m)   Wt 84.8 kg (187 lb)   BMI 30.65 kg/m²   Physical Exam  Vitals signs and nursing note reviewed.   Constitutional:       General: She is awake.      Appearance: Normal appearance. She is well-developed and well-groomed. She is obese.   HENT:      Head: Normocephalic.   Eyes:      Conjunctiva/sclera: Conjunctivae normal.   Pulmonary:      Effort: Pulmonary effort is normal.   Neurological:      Mental Status: She is alert.   Psychiatric:         Mood and Affect: Mood normal.         Behavior: Behavior normal. Behavior is cooperative.         Thought Content: Thought content normal.         Judgment: Judgment normal.         Personal Diagnostic Review  CPAP download  APAP 6-20cm  Compliance Summary  11/3/2020 - " 12/2/2020 (30 days)  Days with Device Usage 25 days  Days without Device Usage 5 days  Percent Days with Device Usage 83.3%  Cumulative Usage 4 days 16 hrs. 12 mins. 58 secs.  Maximum Usage (1 Day) 6 hrs. 2 mins. 34 secs.  Average Usage (All Days) 3 hrs. 44 mins. 25 secs.  Average Usage (Days Used) 4 hrs. 29 mins. 19 secs.  Minimum Usage (1 Day) 50 secs.  Percent of Days with Usage >= 4 Hours 76.7%  Percent of Days with Usage < 4 Hours 23.3%  Date Range  Total Blower Time 4 days 19 hrs. 5 mins. 41 secs.  Average AHI 4.3  Auto-CPAP Summary  Auto-CPAP Mean Pressure 6.6 cmH2O  Auto-CPAP Peak Average Pressure 8.6 cmH2O  Average Device Pressure <= 90% of Time 7.9 cmH2O  Average Time in Large Leak Per Day 12 mins. 10 secs.    Assessment:     1. JAROCHO on CPAP    2. Interstitial lung disease due to connective tissue disease    3. Primary insomnia    4. Systemic lupus erythematosus with other organ involvement, unspecified SLE type    5. Essential hypertension        No orders of the defined types were placed in this encounter.    Plan:     Problem List Items Addressed This Visit     Systemic lupus erythematosus    Primary insomnia    JAROCHO on CPAP - Primary     Benefits and compliant with Auto CPAP 6-20 cm  AHI 4.4  Dreamwear nasal mask  HME: [facility]          Essential hypertension      Other Visit Diagnoses     Interstitial lung disease due to connective tissue disease            Plan  Benefits and compliant with Auto CPAP 6-20 cm  Continue auto CPAP 6-20 cm   Still feeling slightly tired despite CPAP about 4 hrs nightly, increase use of CPAP to 7-8 hours/night.   Keep follow up planned with Dr. Ely ILD/JAROCHO      ([facility]) - [facility]  Reviewed therapeutic goals for positive airway pressure therapy Auto CPAP  Ideal is usage 100% of nights for 6 - 8 hours per night. Minimum usage is 70% of night for at least 4 hours per night used.     Follow up in about 6 months (around 6/8/2021) for CPAP 6 mo compliance download. ILD/ with   [facility].

## 2020-12-26 ENCOUNTER — LAB VISIT (OUTPATIENT)
Dept: LAB | Facility: HOSPITAL | Age: 55
End: 2020-12-26
Attending: INTERNAL MEDICINE
Payer: COMMERCIAL

## 2020-12-26 DIAGNOSIS — M32.19 OTHER SYSTEMIC LUPUS ERYTHEMATOSUS WITH OTHER ORGAN INVOLVEMENT: ICD-10-CM

## 2020-12-26 LAB
ALBUMIN SERPL BCP-MCNC: 3.6 G/DL (ref 3.5–5.2)
ALP SERPL-CCNC: 97 U/L (ref 55–135)
ALT SERPL W/O P-5'-P-CCNC: 18 U/L (ref 10–44)
ANION GAP SERPL CALC-SCNC: 8 MMOL/L (ref 8–16)
AST SERPL-CCNC: 22 U/L (ref 10–40)
BASOPHILS # BLD AUTO: 0.01 K/UL (ref 0–0.2)
BASOPHILS NFR BLD: 0.3 % (ref 0–1.9)
BILIRUB SERPL-MCNC: 0.8 MG/DL (ref 0.1–1)
BUN SERPL-MCNC: 9 MG/DL (ref 6–20)
C3 SERPL-MCNC: 49 MG/DL (ref 50–180)
C4 SERPL-MCNC: 5 MG/DL (ref 11–44)
CALCIUM SERPL-MCNC: 8.5 MG/DL (ref 8.7–10.5)
CHLORIDE SERPL-SCNC: 108 MMOL/L (ref 95–110)
CO2 SERPL-SCNC: 26 MMOL/L (ref 23–29)
CREAT SERPL-MCNC: 0.9 MG/DL (ref 0.5–1.4)
CRP SERPL-MCNC: 1.4 MG/L (ref 0–8.2)
DIFFERENTIAL METHOD: ABNORMAL
EOSINOPHIL # BLD AUTO: 0 K/UL (ref 0–0.5)
EOSINOPHIL NFR BLD: 0.3 % (ref 0–8)
ERYTHROCYTE [DISTWIDTH] IN BLOOD BY AUTOMATED COUNT: 15.4 % (ref 11.5–14.5)
ERYTHROCYTE [SEDIMENTATION RATE] IN BLOOD BY WESTERGREN METHOD: 95 MM/HR (ref 0–36)
EST. GFR  (AFRICAN AMERICAN): >60 ML/MIN/1.73 M^2
EST. GFR  (NON AFRICAN AMERICAN): >60 ML/MIN/1.73 M^2
GLUCOSE SERPL-MCNC: 92 MG/DL (ref 70–110)
HCT VFR BLD AUTO: 32.2 % (ref 37–48.5)
HGB BLD-MCNC: 9.9 G/DL (ref 12–16)
IMM GRANULOCYTES # BLD AUTO: 0.01 K/UL (ref 0–0.04)
IMM GRANULOCYTES NFR BLD AUTO: 0.3 % (ref 0–0.5)
LYMPHOCYTES # BLD AUTO: 1.8 K/UL (ref 1–4.8)
LYMPHOCYTES NFR BLD: 50 % (ref 18–48)
MCH RBC QN AUTO: 26.7 PG (ref 27–31)
MCHC RBC AUTO-ENTMCNC: 30.7 G/DL (ref 32–36)
MCV RBC AUTO: 87 FL (ref 82–98)
MONOCYTES # BLD AUTO: 0.3 K/UL (ref 0.3–1)
MONOCYTES NFR BLD: 7.2 % (ref 4–15)
NEUTROPHILS # BLD AUTO: 1.5 K/UL (ref 1.8–7.7)
NEUTROPHILS NFR BLD: 41.9 % (ref 38–73)
NRBC BLD-RTO: 0 /100 WBC
PLATELET # BLD AUTO: 185 K/UL (ref 150–350)
PLATELET BLD QL SMEAR: ABNORMAL
PMV BLD AUTO: 10.7 FL (ref 9.2–12.9)
POTASSIUM SERPL-SCNC: 3.5 MMOL/L (ref 3.5–5.1)
PROT SERPL-MCNC: 7.9 G/DL (ref 6–8.4)
RBC # BLD AUTO: 3.71 M/UL (ref 4–5.4)
SODIUM SERPL-SCNC: 142 MMOL/L (ref 136–145)
WBC # BLD AUTO: 3.62 K/UL (ref 3.9–12.7)

## 2020-12-26 PROCEDURE — 80053 COMPREHEN METABOLIC PANEL: CPT

## 2020-12-26 PROCEDURE — 86225 DNA ANTIBODY NATIVE: CPT | Mod: 59

## 2020-12-26 PROCEDURE — 86039 ANTINUCLEAR ANTIBODIES (ANA): CPT

## 2020-12-26 PROCEDURE — 86038 ANTINUCLEAR ANTIBODIES: CPT

## 2020-12-26 PROCEDURE — 85652 RBC SED RATE AUTOMATED: CPT

## 2020-12-26 PROCEDURE — 86160 COMPLEMENT ANTIGEN: CPT | Mod: 59

## 2020-12-26 PROCEDURE — 86140 C-REACTIVE PROTEIN: CPT

## 2020-12-26 PROCEDURE — 85025 COMPLETE CBC W/AUTO DIFF WBC: CPT

## 2020-12-26 PROCEDURE — 86160 COMPLEMENT ANTIGEN: CPT

## 2020-12-26 PROCEDURE — 36415 COLL VENOUS BLD VENIPUNCTURE: CPT

## 2020-12-26 PROCEDURE — 86235 NUCLEAR ANTIGEN ANTIBODY: CPT | Mod: 59

## 2020-12-27 ENCOUNTER — PATIENT MESSAGE (OUTPATIENT)
Dept: RHEUMATOLOGY | Facility: CLINIC | Age: 55
End: 2020-12-27

## 2020-12-27 DIAGNOSIS — M32.19 OTHER SYSTEMIC LUPUS ERYTHEMATOSUS WITH OTHER ORGAN INVOLVEMENT: ICD-10-CM

## 2020-12-27 DIAGNOSIS — M35.00 SECONDARY SJOGREN'S SYNDROME: ICD-10-CM

## 2020-12-27 DIAGNOSIS — G05.3 CNS LUPUS: ICD-10-CM

## 2020-12-27 DIAGNOSIS — D50.9 IRON DEFICIENCY ANEMIA, UNSPECIFIED IRON DEFICIENCY ANEMIA TYPE: ICD-10-CM

## 2020-12-27 DIAGNOSIS — M32.19 CNS LUPUS: ICD-10-CM

## 2020-12-27 DIAGNOSIS — Z51.81 MEDICATION MONITORING ENCOUNTER: ICD-10-CM

## 2020-12-27 DIAGNOSIS — D59.10 AUTOIMMUNE HEMOLYTIC ANEMIA: Primary | ICD-10-CM

## 2020-12-28 ENCOUNTER — LAB VISIT (OUTPATIENT)
Dept: LAB | Facility: HOSPITAL | Age: 55
End: 2020-12-28
Attending: PHYSICIAN ASSISTANT
Payer: COMMERCIAL

## 2020-12-28 ENCOUNTER — INFUSION (OUTPATIENT)
Dept: INFUSION THERAPY | Facility: HOSPITAL | Age: 55
End: 2020-12-28
Attending: INTERNAL MEDICINE
Payer: COMMERCIAL

## 2020-12-28 VITALS
TEMPERATURE: 99 F | OXYGEN SATURATION: 96 % | SYSTOLIC BLOOD PRESSURE: 137 MMHG | RESPIRATION RATE: 16 BRPM | DIASTOLIC BLOOD PRESSURE: 90 MMHG | HEART RATE: 90 BPM

## 2020-12-28 DIAGNOSIS — D59.10 AUTOIMMUNE HEMOLYTIC ANEMIA: ICD-10-CM

## 2020-12-28 DIAGNOSIS — D50.9 IRON DEFICIENCY ANEMIA, UNSPECIFIED IRON DEFICIENCY ANEMIA TYPE: ICD-10-CM

## 2020-12-28 DIAGNOSIS — M32.9 SYSTEMIC LUPUS ERYTHEMATOSUS ARTHRITIS: Primary | ICD-10-CM

## 2020-12-28 DIAGNOSIS — G05.3 CNS LUPUS: ICD-10-CM

## 2020-12-28 DIAGNOSIS — Z51.81 MEDICATION MONITORING ENCOUNTER: ICD-10-CM

## 2020-12-28 DIAGNOSIS — I10 ESSENTIAL HYPERTENSION: Primary | ICD-10-CM

## 2020-12-28 DIAGNOSIS — M32.19 CNS LUPUS: ICD-10-CM

## 2020-12-28 LAB
FERRITIN SERPL-MCNC: 848 NG/ML (ref 20–300)
HAPTOGLOB SERPL-MCNC: 241 MG/DL (ref 30–250)
IRON SERPL-MCNC: 46 UG/DL (ref 30–160)
LDH SERPL L TO P-CCNC: 189 U/L (ref 110–260)
RETICS/RBC NFR AUTO: 0.9 % (ref 0.5–2.5)
SATURATED IRON: 19 % (ref 20–50)
TOTAL IRON BINDING CAPACITY: 240 UG/DL (ref 250–450)
TRANSFERRIN SERPL-MCNC: 162 MG/DL (ref 200–375)

## 2020-12-28 PROCEDURE — 63600175 PHARM REV CODE 636 W HCPCS: Performed by: PHYSICIAN ASSISTANT

## 2020-12-28 PROCEDURE — 82542 COL CHROMOTOGRAPHY QUAL/QUAN: CPT

## 2020-12-28 PROCEDURE — 36415 COLL VENOUS BLD VENIPUNCTURE: CPT

## 2020-12-28 PROCEDURE — A4216 STERILE WATER/SALINE, 10 ML: HCPCS | Performed by: PHYSICIAN ASSISTANT

## 2020-12-28 PROCEDURE — 85045 AUTOMATED RETICULOCYTE COUNT: CPT

## 2020-12-28 PROCEDURE — 96374 THER/PROPH/DIAG INJ IV PUSH: CPT

## 2020-12-28 PROCEDURE — 83010 ASSAY OF HAPTOGLOBIN QUANT: CPT

## 2020-12-28 PROCEDURE — 25000003 PHARM REV CODE 250: Performed by: PHYSICIAN ASSISTANT

## 2020-12-28 PROCEDURE — 82728 ASSAY OF FERRITIN: CPT

## 2020-12-28 PROCEDURE — 83615 LACTATE (LD) (LDH) ENZYME: CPT

## 2020-12-28 PROCEDURE — 83540 ASSAY OF IRON: CPT

## 2020-12-28 RX ORDER — SODIUM CHLORIDE 0.9 % (FLUSH) 0.9 %
10 SYRINGE (ML) INJECTION
Status: DISCONTINUED | OUTPATIENT
Start: 2020-12-28 | End: 2020-12-28 | Stop reason: HOSPADM

## 2020-12-28 RX ORDER — METHYLPREDNISOLONE SOD SUCC 125 MG
100 VIAL (EA) INJECTION DAILY
Status: CANCELLED
Start: 2020-12-28

## 2020-12-28 RX ORDER — SODIUM CHLORIDE 0.9 % (FLUSH) 0.9 %
10 SYRINGE (ML) INJECTION
Status: CANCELLED | OUTPATIENT
Start: 2020-12-28

## 2020-12-28 RX ORDER — HEPARIN 100 UNIT/ML
500 SYRINGE INTRAVENOUS
Status: CANCELLED | OUTPATIENT
Start: 2020-12-28

## 2020-12-28 RX ORDER — METHYLPREDNISOLONE SOD SUCC 125 MG
100 VIAL (EA) INJECTION DAILY
Status: DISCONTINUED | OUTPATIENT
Start: 2020-12-28 | End: 2020-12-28 | Stop reason: HOSPADM

## 2020-12-28 RX ADMIN — METHYLPREDNISOLONE SODIUM SUCCINATE 100 MG: 125 INJECTION, POWDER, FOR SOLUTION INTRAMUSCULAR; INTRAVENOUS at 02:12

## 2020-12-28 RX ADMIN — Medication 10 ML: at 02:12

## 2020-12-28 NOTE — NURSING
Pulse 1 of 3  Solumedrol 100 mg administered IVP over 2 min via 24 gauge administration set to left a.c.. Pressure dressing applied. See MAR and vitals for further details.  Pt tolerated well without adverse events.   Discharged ambulatory from clinic.

## 2020-12-28 NOTE — PROGRESS NOTES
Lab results suggest extremely active lupus. Is she on medications to control her lupus? Does she have any symptoms of active lupus?  Thanks.

## 2020-12-29 ENCOUNTER — OFFICE VISIT (OUTPATIENT)
Dept: CARDIOLOGY | Facility: CLINIC | Age: 55
End: 2020-12-29
Payer: COMMERCIAL

## 2020-12-29 ENCOUNTER — TELEPHONE (OUTPATIENT)
Dept: RHEUMATOLOGY | Facility: CLINIC | Age: 55
End: 2020-12-29

## 2020-12-29 ENCOUNTER — INFUSION (OUTPATIENT)
Dept: INFUSION THERAPY | Facility: HOSPITAL | Age: 55
End: 2020-12-29
Attending: INTERNAL MEDICINE
Payer: COMMERCIAL

## 2020-12-29 VITALS
SYSTOLIC BLOOD PRESSURE: 136 MMHG | HEART RATE: 82 BPM | DIASTOLIC BLOOD PRESSURE: 86 MMHG | RESPIRATION RATE: 18 BRPM | OXYGEN SATURATION: 96 % | TEMPERATURE: 98 F

## 2020-12-29 DIAGNOSIS — G47.33 OSA ON CPAP: ICD-10-CM

## 2020-12-29 DIAGNOSIS — M32.19 CNS LUPUS: ICD-10-CM

## 2020-12-29 DIAGNOSIS — I10 ESSENTIAL HYPERTENSION: Primary | ICD-10-CM

## 2020-12-29 DIAGNOSIS — M35.00 SECONDARY SJOGREN'S SYNDROME: ICD-10-CM

## 2020-12-29 DIAGNOSIS — Z79.899 LONG-TERM CURRENT USE OF HIGH RISK MEDICATION OTHER THAN ANTICOAGULANT: ICD-10-CM

## 2020-12-29 DIAGNOSIS — M32.9 SYSTEMIC LUPUS ERYTHEMATOSUS ARTHRITIS: Primary | ICD-10-CM

## 2020-12-29 DIAGNOSIS — D63.8 ANEMIA OF CHRONIC ILLNESS: ICD-10-CM

## 2020-12-29 DIAGNOSIS — Z79.52 LONG TERM (CURRENT) USE OF SYSTEMIC STEROIDS: ICD-10-CM

## 2020-12-29 DIAGNOSIS — G05.3 CNS LUPUS: ICD-10-CM

## 2020-12-29 LAB
ANA PATTERN 1: NORMAL
ANA SER QL IF: POSITIVE
ANA TITR SER IF: NORMAL {TITER}

## 2020-12-29 PROCEDURE — 96374 THER/PROPH/DIAG INJ IV PUSH: CPT

## 2020-12-29 PROCEDURE — 99214 OFFICE O/P EST MOD 30 MIN: CPT | Mod: 95,,, | Performed by: PHYSICIAN ASSISTANT

## 2020-12-29 PROCEDURE — 3074F PR MOST RECENT SYSTOLIC BLOOD PRESSURE < 130 MM HG: ICD-10-PCS | Mod: CPTII,,, | Performed by: PHYSICIAN ASSISTANT

## 2020-12-29 PROCEDURE — 3078F DIAST BP <80 MM HG: CPT | Mod: CPTII,,, | Performed by: PHYSICIAN ASSISTANT

## 2020-12-29 PROCEDURE — 3074F SYST BP LT 130 MM HG: CPT | Mod: CPTII,,, | Performed by: PHYSICIAN ASSISTANT

## 2020-12-29 PROCEDURE — 63600175 PHARM REV CODE 636 W HCPCS: Performed by: PHYSICIAN ASSISTANT

## 2020-12-29 PROCEDURE — 3078F PR MOST RECENT DIASTOLIC BLOOD PRESSURE < 80 MM HG: ICD-10-PCS | Mod: CPTII,,, | Performed by: PHYSICIAN ASSISTANT

## 2020-12-29 PROCEDURE — 99214 PR OFFICE/OUTPT VISIT, EST, LEVL IV, 30-39 MIN: ICD-10-PCS | Mod: 95,,, | Performed by: PHYSICIAN ASSISTANT

## 2020-12-29 RX ORDER — METHYLPREDNISOLONE SOD SUCC 125 MG
100 VIAL (EA) INJECTION DAILY
Status: DISCONTINUED | OUTPATIENT
Start: 2020-12-29 | End: 2020-12-29 | Stop reason: HOSPADM

## 2020-12-29 RX ORDER — METHYLPREDNISOLONE SOD SUCC 125 MG
100 VIAL (EA) INJECTION DAILY
Status: CANCELLED
Start: 2020-12-29

## 2020-12-29 RX ORDER — HEPARIN 100 UNIT/ML
500 SYRINGE INTRAVENOUS
Status: CANCELLED | OUTPATIENT
Start: 2020-12-29

## 2020-12-29 RX ORDER — SODIUM CHLORIDE 0.9 % (FLUSH) 0.9 %
10 SYRINGE (ML) INJECTION
Status: CANCELLED | OUTPATIENT
Start: 2020-12-29

## 2020-12-29 RX ORDER — SODIUM CHLORIDE 0.9 % (FLUSH) 0.9 %
10 SYRINGE (ML) INJECTION
Status: DISCONTINUED | OUTPATIENT
Start: 2020-12-29 | End: 2020-12-29 | Stop reason: HOSPADM

## 2020-12-29 RX ADMIN — METHYLPREDNISOLONE SODIUM SUCCINATE 100 MG: 125 INJECTION, POWDER, FOR SOLUTION INTRAMUSCULAR; INTRAVENOUS at 10:12

## 2020-12-29 NOTE — TELEPHONE ENCOUNTER
Called pt this morning   Had 1 dose of solumedrol some improvement     Was on fergon but ran out  And did not buy any  Iron stores low  No hemolysis from lupus    Called pt to get back on fergon Q day to improved blood counts  Imuran upped to 75 mg (she was intolerant of 100 mg in the past)  Get her #2 and 3 iv solumedrol     Follow up w/ T next week as scheduled

## 2020-12-29 NOTE — NURSING
Pulse 2 of 3  Solumedrol 100 mg administered IVP over 2 min via 24 gauge administration set to left a.c.. Pressure dressing applied. See MAR and vitals for further details.  Pt tolerated well without adverse events.

## 2020-12-29 NOTE — PROGRESS NOTES
Subjective:    Patient ID:  Ruth Messina is a 55 y.o. female who presents for follow-up of HTN    Visit type: audiovisual     Face to Face time with patient:12 minutes   20 minutes of total time spent on the encounter, which includes face to face time and non-face to face time preparing to see the patient (eg, review of tests), Obtaining and/or reviewing separately obtained history, Documenting clinical information in the electronic or other health record, Independently interpreting results (not separately reported) and communicating results to the patient/family/caregiver, or Care coordination (not separately reported).      Each patient to whom he or she provides medical services by telemedicine is:  (1) informed of the relationship between the physician and patient and the respective role of any other health care provider with respect to management of the patient; and (2) notified that he or she may decline to receive medical services by telemedicine and may withdraw from such care at any time.    HPI   Ms. Messina is a 55 year old female patient whose current medical conditions include lupus, Sjogren's, anemia, and HTN who presents today for virtual follow-up. She returns today and states she is doing overall. Reports she had an episode of chest tightness yesterday that occurred with her infusion, has since resolved.  Unsure if she has experienced similar symptoms int he past. Does not have any exertional symptoms. No SOB/PERAZA. No lightheadedness, dizziness, palpitations, near syncope, or syncope. No s/s suggestive of CHF. BP generally well-controlled. Patient reports compliance with her medications. She has seen pulmonary and been diagnosed with JAROCHO, now using CPAP routinely.    Recent labs reviewed, CMP stable. Lipids improved. Discussed heart healthy diet.    BP today: 137/80    Review of Systems   Constitution: Negative.   HENT: Negative.    Eyes: Negative.    Cardiovascular: Positive for chest pain  (atypical).   Respiratory: Negative.    Hematologic/Lymphatic: Negative.    Skin: Positive for rash (discoid lupus).   Musculoskeletal: Negative for arthritis.   Gastrointestinal: Negative.    Genitourinary: Negative.    Neurological: Negative.    Psychiatric/Behavioral: Negative.    Allergic/Immunologic: Negative.        Objective:    Physical Exam   Constitutional: She is oriented to person, place, and time. She appears well-developed and well-nourished. No distress.   HENT:   Head: Normocephalic and atraumatic.   Eyes: Pupils are equal, round, and reactive to light. Right eye exhibits no discharge. Left eye exhibits no discharge.   Cardiovascular: S1 normal, S2 normal and intact distal pulses.   Pulmonary/Chest: Effort normal.   Neurological: She is alert and oriented to person, place, and time.   Skin: She is not diaphoretic.   Psychiatric: She has a normal mood and affect. Her behavior is normal. Thought content normal.   Nursing note and vitals reviewed.    CONCLUSIONS     1 - Concentric hypertrophy.     2 - No wall motion abnormalities.     3 - Normal left ventricular systolic function (EF 60-65%).     4 - Normal left ventricular diastolic function.     5 - Normal right ventricular systolic function .     6 - Trivial mitral regurgitation.    Assessment:       1. Essential hypertension    2. Secondary Sjogren's syndrome    3. JAROCHO on CPAP    4. Long-term current use of high risk medication other than anticoagulant    5. Long term (current) use of systemic steroids    6. Anemia of chronic illness      Presents for f/u. Overall doing well from CV standpoint. She had some atypical chest tightness yesterday thinks it was provoked by her infusion. No recurrence. No exertional symptoms. She was instructed to notify office if chest pain recurs. ED if any concerning symptoms. BP controlled. Continue same meds/mgmt.   Plan:   -Schedule EKG at next office visit, will see if we can arrange on a Saturday at [facility] per  patient request  -Continue current medical management and risk factor modification  -Cardiac, low salt diet  -Increase activity level as tolerated  -RTC 6 months with lipid, cmp, sooner if issues should arise

## 2020-12-30 ENCOUNTER — INFUSION (OUTPATIENT)
Dept: INFUSION THERAPY | Facility: HOSPITAL | Age: 55
End: 2020-12-30
Attending: INTERNAL MEDICINE
Payer: COMMERCIAL

## 2020-12-30 VITALS — DIASTOLIC BLOOD PRESSURE: 84 MMHG | HEART RATE: 95 BPM | RESPIRATION RATE: 18 BRPM | SYSTOLIC BLOOD PRESSURE: 133 MMHG

## 2020-12-30 DIAGNOSIS — M32.9 SYSTEMIC LUPUS ERYTHEMATOSUS ARTHRITIS: Primary | ICD-10-CM

## 2020-12-30 DIAGNOSIS — G05.3 CNS LUPUS: ICD-10-CM

## 2020-12-30 DIAGNOSIS — M32.19 CNS LUPUS: ICD-10-CM

## 2020-12-30 PROCEDURE — 63600175 PHARM REV CODE 636 W HCPCS: Performed by: PHYSICIAN ASSISTANT

## 2020-12-30 PROCEDURE — 96374 THER/PROPH/DIAG INJ IV PUSH: CPT

## 2020-12-30 RX ORDER — SODIUM CHLORIDE 0.9 % (FLUSH) 0.9 %
10 SYRINGE (ML) INJECTION
Status: CANCELLED | OUTPATIENT
Start: 2020-12-30

## 2020-12-30 RX ORDER — SODIUM CHLORIDE 0.9 % (FLUSH) 0.9 %
10 SYRINGE (ML) INJECTION
Status: DISCONTINUED | OUTPATIENT
Start: 2020-12-30 | End: 2020-12-30 | Stop reason: HOSPADM

## 2020-12-30 RX ORDER — HEPARIN 100 UNIT/ML
500 SYRINGE INTRAVENOUS
Status: CANCELLED | OUTPATIENT
Start: 2020-12-30

## 2020-12-30 RX ORDER — METHYLPREDNISOLONE SOD SUCC 125 MG
100 VIAL (EA) INJECTION DAILY
Status: DISCONTINUED | OUTPATIENT
Start: 2020-12-30 | End: 2020-12-30 | Stop reason: HOSPADM

## 2020-12-30 RX ORDER — METHYLPREDNISOLONE SOD SUCC 125 MG
100 VIAL (EA) INJECTION DAILY
Status: CANCELLED
Start: 2020-12-30

## 2020-12-30 RX ADMIN — METHYLPREDNISOLONE SODIUM SUCCINATE 100 MG: 125 INJECTION, POWDER, FOR SOLUTION INTRAMUSCULAR; INTRAVENOUS at 10:12

## 2020-12-30 NOTE — NURSING
Pulse 3 of 3  Solumedrol 100 mg administered IVP over 2 min via 24 gauge administration set to right a.c. Pressure dressing applied. See MAR and vitals for further details.  Pt tolerated well without adverse events.   Discharged ambulatory from clinic.

## 2020-12-31 ENCOUNTER — PATIENT MESSAGE (OUTPATIENT)
Dept: RHEUMATOLOGY | Facility: CLINIC | Age: 55
End: 2020-12-31

## 2020-12-31 ENCOUNTER — PATIENT MESSAGE (OUTPATIENT)
Dept: HEMATOLOGY/ONCOLOGY | Facility: CLINIC | Age: 55
End: 2020-12-31

## 2020-12-31 LAB
ANTI SM ANTIBODY: 0.82 RATIO (ref 0–0.99)
ANTI SM/RNP ANTIBODY: 6.72 RATIO (ref 0–0.99)
ANTI-SM INTERPRETATION: NEGATIVE
ANTI-SM/RNP INTERPRETATION: POSITIVE
ANTI-SSA ANTIBODY: 0.74 RATIO (ref 0–0.99)
ANTI-SSA INTERPRETATION: NEGATIVE
ANTI-SSB ANTIBODY: 0.12 RATIO (ref 0–0.99)
ANTI-SSB INTERPRETATION: NEGATIVE
DNA TITER: NORMAL
DSDNA AB SER-ACNC: POSITIVE [IU]/ML

## 2021-01-04 LAB — PROMETHEUS THIOPURINE METABOLITES: NORMAL

## 2021-01-05 ENCOUNTER — TELEPHONE (OUTPATIENT)
Dept: RHEUMATOLOGY | Facility: CLINIC | Age: 56
End: 2021-01-05

## 2021-01-05 ENCOUNTER — PATIENT MESSAGE (OUTPATIENT)
Dept: RHEUMATOLOGY | Facility: CLINIC | Age: 56
End: 2021-01-05

## 2021-01-27 DIAGNOSIS — Z12.31 OTHER SCREENING MAMMOGRAM: ICD-10-CM

## 2021-11-08 ENCOUNTER — TELEPHONE (OUTPATIENT)
Dept: OTOLARYNGOLOGY | Facility: CLINIC | Age: 56
End: 2021-11-08
Payer: MEDICARE

## 2021-11-09 ENCOUNTER — TELEPHONE (OUTPATIENT)
Dept: FAMILY MEDICINE | Facility: CLINIC | Age: 56
End: 2021-11-09
Payer: MEDICARE

## 2021-11-15 ENCOUNTER — OFFICE VISIT (OUTPATIENT)
Dept: FAMILY MEDICINE | Facility: CLINIC | Age: 56
End: 2021-11-15
Payer: MEDICARE

## 2021-11-15 DIAGNOSIS — R53.83 FATIGUE, UNSPECIFIED TYPE: Primary | ICD-10-CM

## 2021-11-15 DIAGNOSIS — M32.9 SYSTEMIC LUPUS ERYTHEMATOSUS ARTHRITIS: ICD-10-CM

## 2021-11-15 DIAGNOSIS — D63.8 ANEMIA OF CHRONIC ILLNESS: ICD-10-CM

## 2021-11-15 DIAGNOSIS — D84.9 IMMUNOSUPPRESSED STATUS: ICD-10-CM

## 2021-11-15 DIAGNOSIS — E16.2 LOW BLOOD SUGAR: ICD-10-CM

## 2021-11-15 DIAGNOSIS — D75.A G6PD DEFICIENCY: ICD-10-CM

## 2021-11-15 PROCEDURE — 4010F ACE/ARB THERAPY RXD/TAKEN: CPT | Mod: CPTII,95,, | Performed by: FAMILY MEDICINE

## 2021-11-15 PROCEDURE — 99214 PR OFFICE/OUTPT VISIT, EST, LEVL IV, 30-39 MIN: ICD-10-PCS | Mod: 95,,, | Performed by: FAMILY MEDICINE

## 2021-11-15 PROCEDURE — 4010F PR ACE/ARB THEARPY RXD/TAKEN: ICD-10-PCS | Mod: CPTII,95,, | Performed by: FAMILY MEDICINE

## 2021-11-15 PROCEDURE — 99214 OFFICE O/P EST MOD 30 MIN: CPT | Mod: 95,,, | Performed by: FAMILY MEDICINE

## 2021-11-16 ENCOUNTER — PATIENT OUTREACH (OUTPATIENT)
Dept: ADMINISTRATIVE | Facility: OTHER | Age: 56
End: 2021-11-16
Payer: MEDICARE

## 2021-11-16 ENCOUNTER — PATIENT MESSAGE (OUTPATIENT)
Dept: FAMILY MEDICINE | Facility: CLINIC | Age: 56
End: 2021-11-16
Payer: MEDICARE

## 2021-11-17 ENCOUNTER — LAB VISIT (OUTPATIENT)
Dept: LAB | Facility: HOSPITAL | Age: 56
End: 2021-11-17
Attending: FAMILY MEDICINE
Payer: MEDICARE

## 2021-11-17 ENCOUNTER — OFFICE VISIT (OUTPATIENT)
Dept: OTOLARYNGOLOGY | Facility: CLINIC | Age: 56
End: 2021-11-17
Payer: MEDICARE

## 2021-11-17 VITALS
SYSTOLIC BLOOD PRESSURE: 123 MMHG | TEMPERATURE: 98 F | HEART RATE: 103 BPM | BODY MASS INDEX: 18.1 KG/M2 | DIASTOLIC BLOOD PRESSURE: 89 MMHG | HEIGHT: 66 IN | WEIGHT: 112.63 LBS

## 2021-11-17 DIAGNOSIS — R53.83 FATIGUE, UNSPECIFIED TYPE: ICD-10-CM

## 2021-11-17 DIAGNOSIS — M32.9 SYSTEMIC LUPUS ERYTHEMATOSUS ARTHRITIS: ICD-10-CM

## 2021-11-17 DIAGNOSIS — E04.1 THYROID NODULE: Primary | ICD-10-CM

## 2021-11-17 DIAGNOSIS — E16.2 LOW BLOOD SUGAR: ICD-10-CM

## 2021-11-17 LAB
ALBUMIN SERPL BCP-MCNC: 3.9 G/DL (ref 3.5–5.2)
ALP SERPL-CCNC: 71 U/L (ref 55–135)
ALT SERPL W/O P-5'-P-CCNC: 24 U/L (ref 10–44)
ANION GAP SERPL CALC-SCNC: 10 MMOL/L (ref 8–16)
AST SERPL-CCNC: 45 U/L (ref 10–40)
BILIRUB SERPL-MCNC: 1 MG/DL (ref 0.1–1)
BUN SERPL-MCNC: 10 MG/DL (ref 6–20)
CALCIUM SERPL-MCNC: 8.9 MG/DL (ref 8.7–10.5)
CHLORIDE SERPL-SCNC: 107 MMOL/L (ref 95–110)
CO2 SERPL-SCNC: 26 MMOL/L (ref 23–29)
CREAT SERPL-MCNC: 0.9 MG/DL (ref 0.5–1.4)
EST. GFR  (AFRICAN AMERICAN): >60 ML/MIN/1.73 M^2
EST. GFR  (NON AFRICAN AMERICAN): >60 ML/MIN/1.73 M^2
GLUCOSE SERPL-MCNC: 135 MG/DL (ref 70–110)
POTASSIUM SERPL-SCNC: 3.5 MMOL/L (ref 3.5–5.1)
PROT SERPL-MCNC: 7.6 G/DL (ref 6–8.4)
SODIUM SERPL-SCNC: 143 MMOL/L (ref 136–145)

## 2021-11-17 PROCEDURE — 85025 COMPLETE CBC W/AUTO DIFF WBC: CPT | Performed by: FAMILY MEDICINE

## 2021-11-17 PROCEDURE — 4010F PR ACE/ARB THEARPY RXD/TAKEN: ICD-10-PCS | Mod: CPTII,S$GLB,, | Performed by: ORTHOPAEDIC SURGERY

## 2021-11-17 PROCEDURE — 80053 COMPREHEN METABOLIC PANEL: CPT | Performed by: FAMILY MEDICINE

## 2021-11-17 PROCEDURE — 36415 COLL VENOUS BLD VENIPUNCTURE: CPT | Performed by: FAMILY MEDICINE

## 2021-11-17 PROCEDURE — 99214 PR OFFICE/OUTPT VISIT, EST, LEVL IV, 30-39 MIN: ICD-10-PCS | Mod: S$GLB,,, | Performed by: ORTHOPAEDIC SURGERY

## 2021-11-17 PROCEDURE — 99214 OFFICE O/P EST MOD 30 MIN: CPT | Mod: S$GLB,,, | Performed by: ORTHOPAEDIC SURGERY

## 2021-11-17 PROCEDURE — 99999 PR PBB SHADOW E&M-EST. PATIENT-LVL V: ICD-10-PCS | Mod: PBBFAC,,, | Performed by: ORTHOPAEDIC SURGERY

## 2021-11-17 PROCEDURE — 99999 PR PBB SHADOW E&M-EST. PATIENT-LVL V: CPT | Mod: PBBFAC,,, | Performed by: ORTHOPAEDIC SURGERY

## 2021-11-17 PROCEDURE — 4010F ACE/ARB THERAPY RXD/TAKEN: CPT | Mod: CPTII,S$GLB,, | Performed by: ORTHOPAEDIC SURGERY

## 2021-11-18 ENCOUNTER — PATIENT MESSAGE (OUTPATIENT)
Dept: FAMILY MEDICINE | Facility: CLINIC | Age: 56
End: 2021-11-18
Payer: MEDICARE

## 2021-11-18 LAB
ANISOCYTOSIS BLD QL SMEAR: SLIGHT
BASOPHILS # BLD AUTO: 0.01 K/UL (ref 0–0.2)
BASOPHILS NFR BLD: 0.3 % (ref 0–1.9)
BURR CELLS BLD QL SMEAR: ABNORMAL
DIFFERENTIAL METHOD: ABNORMAL
EOSINOPHIL # BLD AUTO: 0 K/UL (ref 0–0.5)
EOSINOPHIL NFR BLD: 0 % (ref 0–8)
ERYTHROCYTE [DISTWIDTH] IN BLOOD BY AUTOMATED COUNT: 14.6 % (ref 11.5–14.5)
HCT VFR BLD AUTO: 31.2 % (ref 37–48.5)
HGB BLD-MCNC: 9.4 G/DL (ref 12–16)
IMM GRANULOCYTES # BLD AUTO: 0.01 K/UL (ref 0–0.04)
IMM GRANULOCYTES NFR BLD AUTO: 0.3 % (ref 0–0.5)
LYMPHOCYTES # BLD AUTO: 0.9 K/UL (ref 1–4.8)
LYMPHOCYTES NFR BLD: 28.8 % (ref 18–48)
MCH RBC QN AUTO: 28.7 PG (ref 27–31)
MCHC RBC AUTO-ENTMCNC: 30.1 G/DL (ref 32–36)
MCV RBC AUTO: 95 FL (ref 82–98)
MONOCYTES # BLD AUTO: 0.2 K/UL (ref 0.3–1)
MONOCYTES NFR BLD: 5.3 % (ref 4–15)
NEUTROPHILS # BLD AUTO: 2.1 K/UL (ref 1.8–7.7)
NEUTROPHILS NFR BLD: 65.3 % (ref 38–73)
NRBC BLD-RTO: 0 /100 WBC
OVALOCYTES BLD QL SMEAR: ABNORMAL
PLATELET # BLD AUTO: 112 K/UL (ref 150–450)
PLATELET BLD QL SMEAR: ABNORMAL
PMV BLD AUTO: 12.1 FL (ref 9.2–12.9)
POIKILOCYTOSIS BLD QL SMEAR: ABNORMAL
POLYCHROMASIA BLD QL SMEAR: ABNORMAL
RBC # BLD AUTO: 3.28 M/UL (ref 4–5.4)
WBC # BLD AUTO: 3.23 K/UL (ref 3.9–12.7)

## 2021-11-23 ENCOUNTER — PATIENT MESSAGE (OUTPATIENT)
Dept: FAMILY MEDICINE | Facility: CLINIC | Age: 56
End: 2021-11-23
Payer: MEDICARE

## 2021-12-02 ENCOUNTER — TELEPHONE (OUTPATIENT)
Dept: RADIOLOGY | Facility: HOSPITAL | Age: 56
End: 2021-12-02
Payer: MEDICARE

## 2021-12-03 ENCOUNTER — HOSPITAL ENCOUNTER (OUTPATIENT)
Dept: RADIOLOGY | Facility: HOSPITAL | Age: 56
Discharge: HOME | End: 2021-12-03
Attending: ORTHOPAEDIC SURGERY
Payer: MEDICARE

## 2021-12-03 ENCOUNTER — OFFICE VISIT (OUTPATIENT)
Dept: OPHTHALMOLOGY | Facility: CLINIC | Age: 56
End: 2021-12-03
Payer: MEDICARE

## 2021-12-03 DIAGNOSIS — H43.813 PVD (POSTERIOR VITREOUS DETACHMENT), BILATERAL: ICD-10-CM

## 2021-12-03 DIAGNOSIS — M35.01 KERATOCONJUNCTIVITIS SICCA OF BOTH EYES: ICD-10-CM

## 2021-12-03 DIAGNOSIS — G05.3 CNS LUPUS: Primary | ICD-10-CM

## 2021-12-03 DIAGNOSIS — E04.1 THYROID NODULE: ICD-10-CM

## 2021-12-03 DIAGNOSIS — M32.19 CNS LUPUS: Primary | ICD-10-CM

## 2021-12-03 DIAGNOSIS — M35.00 SECONDARY SJOGREN'S SYNDROME: ICD-10-CM

## 2021-12-03 DIAGNOSIS — H40.003 GLAUCOMA SUSPECT OF BOTH EYES: ICD-10-CM

## 2021-12-03 DIAGNOSIS — H35.40 PERIPHERAL RETINAL DEGENERATION OF BOTH EYES: ICD-10-CM

## 2021-12-03 PROCEDURE — 99999 PR PBB SHADOW E&M-EST. PATIENT-LVL III: ICD-10-PCS | Mod: PBBFAC,,, | Performed by: OPHTHALMOLOGY

## 2021-12-03 PROCEDURE — 76536 US SOFT TISSUE HEAD NECK THYROID: ICD-10-PCS | Mod: 26,,, | Performed by: RADIOLOGY

## 2021-12-03 PROCEDURE — 76536 US EXAM OF HEAD AND NECK: CPT | Mod: TC

## 2021-12-03 PROCEDURE — 4010F PR ACE/ARB THEARPY RXD/TAKEN: ICD-10-PCS | Mod: CPTII,S$GLB,, | Performed by: OPHTHALMOLOGY

## 2021-12-03 PROCEDURE — 99999 PR PBB SHADOW E&M-EST. PATIENT-LVL III: CPT | Mod: PBBFAC,,, | Performed by: OPHTHALMOLOGY

## 2021-12-03 PROCEDURE — 92134 CPTRZ OPH DX IMG PST SGM RTA: CPT | Mod: S$GLB,,, | Performed by: OPHTHALMOLOGY

## 2021-12-03 PROCEDURE — 99213 OFFICE O/P EST LOW 20 MIN: CPT | Mod: S$GLB,,, | Performed by: OPHTHALMOLOGY

## 2021-12-03 PROCEDURE — 76536 US EXAM OF HEAD AND NECK: CPT | Mod: 26,,, | Performed by: RADIOLOGY

## 2021-12-03 PROCEDURE — 4010F ACE/ARB THERAPY RXD/TAKEN: CPT | Mod: CPTII,S$GLB,, | Performed by: OPHTHALMOLOGY

## 2021-12-03 PROCEDURE — 92134 POSTERIOR SEGMENT OCT RETINA (OCULAR COHERENCE TOMOGRAPHY)-BOTH EYES: ICD-10-PCS | Mod: S$GLB,,, | Performed by: OPHTHALMOLOGY

## 2021-12-03 PROCEDURE — 99213 PR OFFICE/OUTPT VISIT, EST, LEVL III, 20-29 MIN: ICD-10-PCS | Mod: S$GLB,,, | Performed by: OPHTHALMOLOGY

## 2021-12-06 ENCOUNTER — TELEPHONE (OUTPATIENT)
Dept: OTOLARYNGOLOGY | Facility: CLINIC | Age: 56
End: 2021-12-06
Payer: MEDICARE

## 2021-12-07 DIAGNOSIS — E04.1 THYROID NODULE: Primary | ICD-10-CM

## 2021-12-09 ENCOUNTER — PATIENT OUTREACH (OUTPATIENT)
Dept: ADMINISTRATIVE | Facility: HOSPITAL | Age: 56
End: 2021-12-09
Payer: MEDICARE

## 2021-12-28 ENCOUNTER — TELEPHONE (OUTPATIENT)
Dept: CARDIOLOGY | Facility: CLINIC | Age: 56
End: 2021-12-28
Payer: MEDICARE

## 2022-01-01 ENCOUNTER — HOSPITAL ENCOUNTER (EMERGENCY)
Facility: HOSPITAL | Age: 57
Discharge: HOME | End: 2022-12-16
Attending: EMERGENCY MEDICINE
Payer: MEDICARE

## 2022-01-01 VITALS
WEIGHT: 123 LBS | SYSTOLIC BLOOD PRESSURE: 136 MMHG | DIASTOLIC BLOOD PRESSURE: 87 MMHG | HEART RATE: 119 BPM | OXYGEN SATURATION: 99 % | BODY MASS INDEX: 20.15 KG/M2 | RESPIRATION RATE: 18 BRPM | TEMPERATURE: 99 F

## 2022-01-01 DIAGNOSIS — K56.41 FECAL IMPACTION: ICD-10-CM

## 2022-01-01 DIAGNOSIS — R11.10 VOMITING: ICD-10-CM

## 2022-01-01 DIAGNOSIS — K59.00 CONSTIPATION, UNSPECIFIED CONSTIPATION TYPE: Primary | ICD-10-CM

## 2022-01-01 LAB
ALBUMIN SERPL BCP-MCNC: 3.4 G/DL (ref 3.5–5.2)
ALP SERPL-CCNC: 60 U/L (ref 55–135)
ALT SERPL W/O P-5'-P-CCNC: 52 U/L (ref 10–44)
ANION GAP SERPL CALC-SCNC: 13 MMOL/L (ref 8–16)
AST SERPL-CCNC: 43 U/L (ref 10–40)
BACTERIA #/AREA URNS HPF: NORMAL /HPF
BASOPHILS # BLD AUTO: 0.01 K/UL (ref 0–0.2)
BASOPHILS NFR BLD: 0.1 % (ref 0–1.9)
BILIRUB SERPL-MCNC: 0.7 MG/DL (ref 0.1–1)
BILIRUB UR QL STRIP: NEGATIVE
BUN SERPL-MCNC: 19 MG/DL (ref 6–20)
CALCIUM SERPL-MCNC: 8.6 MG/DL (ref 8.7–10.5)
CHLORIDE SERPL-SCNC: 104 MMOL/L (ref 95–110)
CLARITY UR: CLEAR
CO2 SERPL-SCNC: 20 MMOL/L (ref 23–29)
COLOR UR: YELLOW
CREAT SERPL-MCNC: 0.7 MG/DL (ref 0.5–1.4)
DIFFERENTIAL METHOD: ABNORMAL
EOSINOPHIL # BLD AUTO: 0 K/UL (ref 0–0.5)
EOSINOPHIL NFR BLD: 0 % (ref 0–8)
ERYTHROCYTE [DISTWIDTH] IN BLOOD BY AUTOMATED COUNT: 13.1 % (ref 11.5–14.5)
EST. GFR  (NO RACE VARIABLE): >60 ML/MIN/1.73 M^2
GLUCOSE SERPL-MCNC: 126 MG/DL (ref 70–110)
GLUCOSE UR QL STRIP: NEGATIVE
HCT VFR BLD AUTO: 32.4 % (ref 37–48.5)
HGB BLD-MCNC: 10.3 G/DL (ref 12–16)
HGB UR QL STRIP: NEGATIVE
HYALINE CASTS #/AREA URNS LPF: 0 /LPF
IMM GRANULOCYTES # BLD AUTO: 0.03 K/UL (ref 0–0.04)
IMM GRANULOCYTES NFR BLD AUTO: 0.4 % (ref 0–0.5)
KETONES UR QL STRIP: ABNORMAL
LEUKOCYTE ESTERASE UR QL STRIP: NEGATIVE
LIPASE SERPL-CCNC: 39 U/L (ref 4–60)
LYMPHOCYTES # BLD AUTO: 0.7 K/UL (ref 1–4.8)
LYMPHOCYTES NFR BLD: 10 % (ref 18–48)
MCH RBC QN AUTO: 29.3 PG (ref 27–31)
MCHC RBC AUTO-ENTMCNC: 31.8 G/DL (ref 32–36)
MCV RBC AUTO: 92 FL (ref 82–98)
MICROSCOPIC COMMENT: NORMAL
MONOCYTES # BLD AUTO: 0.3 K/UL (ref 0.3–1)
MONOCYTES NFR BLD: 4.7 % (ref 4–15)
NEUTROPHILS # BLD AUTO: 6 K/UL (ref 1.8–7.7)
NEUTROPHILS NFR BLD: 84.8 % (ref 38–73)
NITRITE UR QL STRIP: NEGATIVE
NRBC BLD-RTO: 0 /100 WBC
PH UR STRIP: 7 [PH] (ref 5–8)
PLATELET # BLD AUTO: 99 K/UL (ref 150–450)
PMV BLD AUTO: 10.7 FL (ref 9.2–12.9)
POTASSIUM SERPL-SCNC: 4.5 MMOL/L (ref 3.5–5.1)
PROT SERPL-MCNC: 7.3 G/DL (ref 6–8.4)
PROT UR QL STRIP: ABNORMAL
RBC # BLD AUTO: 3.51 M/UL (ref 4–5.4)
RBC #/AREA URNS HPF: 2 /HPF (ref 0–4)
SODIUM SERPL-SCNC: 137 MMOL/L (ref 136–145)
SP GR UR STRIP: >1.03 (ref 1–1.03)
URN SPEC COLLECT METH UR: ABNORMAL
UROBILINOGEN UR STRIP-ACNC: NEGATIVE EU/DL
WBC # BLD AUTO: 7.09 K/UL (ref 3.9–12.7)
WBC #/AREA URNS HPF: 4 /HPF (ref 0–5)

## 2022-01-01 PROCEDURE — 25000003 PHARM REV CODE 250: Performed by: EMERGENCY MEDICINE

## 2022-01-01 PROCEDURE — 96361 HYDRATE IV INFUSION ADD-ON: CPT

## 2022-01-01 PROCEDURE — 93010 EKG 12-LEAD: ICD-10-PCS | Mod: ,,, | Performed by: INTERNAL MEDICINE

## 2022-01-01 PROCEDURE — 80053 COMPREHEN METABOLIC PANEL: CPT | Performed by: EMERGENCY MEDICINE

## 2022-01-01 PROCEDURE — 85025 COMPLETE CBC W/AUTO DIFF WBC: CPT | Performed by: EMERGENCY MEDICINE

## 2022-01-01 PROCEDURE — 93005 ELECTROCARDIOGRAM TRACING: CPT

## 2022-01-01 PROCEDURE — 93010 ELECTROCARDIOGRAM REPORT: CPT | Mod: ,,, | Performed by: INTERNAL MEDICINE

## 2022-01-01 PROCEDURE — 99285 EMERGENCY DEPT VISIT HI MDM: CPT | Mod: 25

## 2022-01-01 PROCEDURE — 83690 ASSAY OF LIPASE: CPT | Performed by: EMERGENCY MEDICINE

## 2022-01-01 PROCEDURE — 63600175 PHARM REV CODE 636 W HCPCS: Performed by: EMERGENCY MEDICINE

## 2022-01-01 PROCEDURE — 96365 THER/PROPH/DIAG IV INF INIT: CPT

## 2022-01-01 PROCEDURE — 25500020 PHARM REV CODE 255: Performed by: EMERGENCY MEDICINE

## 2022-01-01 PROCEDURE — 81000 URINALYSIS NONAUTO W/SCOPE: CPT | Performed by: EMERGENCY MEDICINE

## 2022-01-01 RX ORDER — DIPHENOXYLATE HYDROCHLORIDE AND ATROPINE SULFATE 2.5; .025 MG/1; MG/1
1 TABLET ORAL
Status: COMPLETED | OUTPATIENT
Start: 2022-01-01 | End: 2022-01-01

## 2022-01-01 RX ADMIN — DIPHENOXYLATE HYDROCHLORIDE AND ATROPINE SULFATE 1 TABLET: 2.5; .025 TABLET ORAL at 07:12

## 2022-01-01 RX ADMIN — Medication 1 ENEMA: at 09:12

## 2022-01-01 RX ADMIN — SODIUM CHLORIDE 1000 ML: 9 INJECTION, SOLUTION INTRAVENOUS at 02:12

## 2022-01-01 RX ADMIN — IOHEXOL 100 ML: 350 INJECTION, SOLUTION INTRAVENOUS at 06:12

## 2022-01-01 RX ADMIN — PROMETHAZINE HYDROCHLORIDE 12.5 MG: 25 INJECTION INTRAMUSCULAR; INTRAVENOUS at 02:12

## 2022-01-07 ENCOUNTER — HOSPITAL ENCOUNTER (OUTPATIENT)
Dept: CARDIOLOGY | Facility: HOSPITAL | Age: 57
Discharge: HOME | End: 2022-01-07
Attending: INTERNAL MEDICINE
Payer: MEDICARE

## 2022-01-07 ENCOUNTER — OFFICE VISIT (OUTPATIENT)
Dept: CARDIOLOGY | Facility: CLINIC | Age: 57
End: 2022-01-07
Payer: MEDICARE

## 2022-01-07 VITALS
OXYGEN SATURATION: 99 % | SYSTOLIC BLOOD PRESSURE: 110 MMHG | DIASTOLIC BLOOD PRESSURE: 66 MMHG | WEIGHT: 107.56 LBS | HEART RATE: 120 BPM | BODY MASS INDEX: 17.62 KG/M2

## 2022-01-07 DIAGNOSIS — I10 ESSENTIAL HYPERTENSION: ICD-10-CM

## 2022-01-07 DIAGNOSIS — M32.9 SYSTEMIC LUPUS ERYTHEMATOSUS ARTHRITIS: ICD-10-CM

## 2022-01-07 DIAGNOSIS — I10 ESSENTIAL HYPERTENSION: Primary | ICD-10-CM

## 2022-01-07 DIAGNOSIS — R00.0 SINUS TACHYCARDIA: ICD-10-CM

## 2022-01-07 DIAGNOSIS — M32.0 DRUG-INDUCED SYSTEMIC LUPUS ERYTHEMATOSUS, UNSPECIFIED ORGAN INVOLVEMENT STATUS: ICD-10-CM

## 2022-01-07 PROCEDURE — 3078F DIAST BP <80 MM HG: CPT | Mod: CPTII,S$GLB,, | Performed by: INTERNAL MEDICINE

## 2022-01-07 PROCEDURE — 3074F PR MOST RECENT SYSTOLIC BLOOD PRESSURE < 130 MM HG: ICD-10-PCS | Mod: CPTII,S$GLB,, | Performed by: INTERNAL MEDICINE

## 2022-01-07 PROCEDURE — 99214 OFFICE O/P EST MOD 30 MIN: CPT | Mod: S$GLB,,, | Performed by: INTERNAL MEDICINE

## 2022-01-07 PROCEDURE — 3008F PR BODY MASS INDEX (BMI) DOCUMENTED: ICD-10-PCS | Mod: CPTII,S$GLB,, | Performed by: INTERNAL MEDICINE

## 2022-01-07 PROCEDURE — 3078F PR MOST RECENT DIASTOLIC BLOOD PRESSURE < 80 MM HG: ICD-10-PCS | Mod: CPTII,S$GLB,, | Performed by: INTERNAL MEDICINE

## 2022-01-07 PROCEDURE — 93005 ELECTROCARDIOGRAM TRACING: CPT

## 2022-01-07 PROCEDURE — 99214 PR OFFICE/OUTPT VISIT, EST, LEVL IV, 30-39 MIN: ICD-10-PCS | Mod: S$GLB,,, | Performed by: INTERNAL MEDICINE

## 2022-01-07 PROCEDURE — 3008F BODY MASS INDEX DOCD: CPT | Mod: CPTII,S$GLB,, | Performed by: INTERNAL MEDICINE

## 2022-01-07 PROCEDURE — 1159F MED LIST DOCD IN RCRD: CPT | Mod: CPTII,S$GLB,, | Performed by: INTERNAL MEDICINE

## 2022-01-07 PROCEDURE — 1159F PR MEDICATION LIST DOCUMENTED IN MEDICAL RECORD: ICD-10-PCS | Mod: CPTII,S$GLB,, | Performed by: INTERNAL MEDICINE

## 2022-01-07 PROCEDURE — 3074F SYST BP LT 130 MM HG: CPT | Mod: CPTII,S$GLB,, | Performed by: INTERNAL MEDICINE

## 2022-01-07 PROCEDURE — 1160F RVW MEDS BY RX/DR IN RCRD: CPT | Mod: CPTII,S$GLB,, | Performed by: INTERNAL MEDICINE

## 2022-01-07 PROCEDURE — 99999 PR PBB SHADOW E&M-EST. PATIENT-LVL III: CPT | Mod: PBBFAC,,, | Performed by: INTERNAL MEDICINE

## 2022-01-07 PROCEDURE — 93010 EKG 12-LEAD: ICD-10-PCS | Mod: ,,, | Performed by: INTERNAL MEDICINE

## 2022-01-07 PROCEDURE — 1160F PR REVIEW ALL MEDS BY PRESCRIBER/CLIN PHARMACIST DOCUMENTED: ICD-10-PCS | Mod: CPTII,S$GLB,, | Performed by: INTERNAL MEDICINE

## 2022-01-07 PROCEDURE — 93010 ELECTROCARDIOGRAM REPORT: CPT | Mod: ,,, | Performed by: INTERNAL MEDICINE

## 2022-01-07 PROCEDURE — 99999 PR PBB SHADOW E&M-EST. PATIENT-LVL III: ICD-10-PCS | Mod: PBBFAC,,, | Performed by: INTERNAL MEDICINE

## 2022-01-07 RX ORDER — AMLODIPINE BESYLATE 5 MG/1
5 TABLET ORAL DAILY
Qty: 30 TABLET | Refills: 5 | Status: ON HOLD | OUTPATIENT
Start: 2022-01-07 | End: 2023-01-01 | Stop reason: HOSPADM

## 2022-01-07 NOTE — PROGRESS NOTES
Subjective:   Patient ID:  Ruth Messina is a 56 y.o. female who presents for follow up of No chief complaint on file.      55 yo female, 1 yr f/u  F/u at cardiology service before  PMH lupus, Sjogren's, anemia, and HTN. No h/o DM MI cancer and CVA   Not sleep well due to frequent urination. Does drink a lot of water  Ekg sinus tachy  Echo in 2020 showed normal function and valvular structures  Some palpitation. Some chest pain, no faint orthopnea PND   Thin and decent appetite  ekg showed sinus tachy      Past Medical History:   Diagnosis Date    Anemia     with chronic disease lupus    Encounter for blood transfusion     G-6-PD deficiency     Glaucoma suspect of both eyes     Heart murmur     MVP 1986    Hypertension     Lupus     Raynaud's syndrome     Systemic lupus erythematosus arthritis 5/13/2018       Past Surgical History:   Procedure Laterality Date    BONE MARROW BIOPSY      CARPAL TUNNEL RELEASE Bilateral     widsom tooth extraction         Social History     Tobacco Use    Smoking status: Never Smoker    Smokeless tobacco: Never Used   Substance Use Topics    Alcohol use: No    Drug use: No       Family History   Problem Relation Age of Onset    Arthritis Mother     Heart disease Mother     Hypertension Mother     Diabetes Father     Heart disease Father     Hypertension Father     Cancer Brother     Eczema Neg Hx     Lupus Neg Hx     Psoriasis Neg Hx          Review of Systems   Constitutional: Negative.   HENT: Negative.    Eyes: Negative.    Cardiovascular: Positive for palpitations. Chest pain: atypical.   Respiratory: Negative.    Hematologic/Lymphatic: Negative.    Skin: Positive for rash (discoid lupus).   Musculoskeletal: Negative for arthritis.   Gastrointestinal: Negative.    Genitourinary: Negative.    Neurological: Negative.    Psychiatric/Behavioral: Negative.    Allergic/Immunologic: Negative.        Objective:   Physical Exam  Vitals and nursing note reviewed.    Constitutional:       General: She is not in acute distress.     Appearance: She is well-developed. She is not diaphoretic.   HENT:      Head: Normocephalic and atraumatic.   Eyes:      General:         Right eye: No discharge.         Left eye: No discharge.      Pupils: Pupils are equal, round, and reactive to light.   Cardiovascular:      Rate and Rhythm: Regular rhythm. Tachycardia present.      Pulses: Intact distal pulses.      Heart sounds: S1 normal and S2 normal.   Pulmonary:      Effort: Pulmonary effort is normal.   Neurological:      Mental Status: She is alert and oriented to person, place, and time.   Psychiatric:         Behavior: Behavior normal.         Thought Content: Thought content normal.         Lab Results   Component Value Date    CHOL 190 10/17/2020    CHOL 258 (H) 12/03/2019    CHOL 258 (H) 12/03/2019     Lab Results   Component Value Date    HDL 29 (L) 10/17/2020    HDL 41 12/03/2019    HDL 41 12/03/2019     Lab Results   Component Value Date    LDLCALC 125.8 10/17/2020    LDLCALC 184.8 (H) 12/03/2019    LDLCALC 184.8 (H) 12/03/2019     Lab Results   Component Value Date    TRIG 176 (H) 10/17/2020    TRIG 161 (H) 12/03/2019    TRIG 161 (H) 12/03/2019     Lab Results   Component Value Date    CHOLHDL 15.3 (L) 10/17/2020    CHOLHDL 15.9 (L) 12/03/2019    CHOLHDL 15.9 (L) 12/03/2019       Chemistry        Component Value Date/Time     11/17/2021 1239    K 3.5 11/17/2021 1239     11/17/2021 1239    CO2 26 11/17/2021 1239    BUN 10 11/17/2021 1239    CREATININE 0.9 11/17/2021 1239     (H) 11/17/2021 1239        Component Value Date/Time    CALCIUM 8.9 11/17/2021 1239    ALKPHOS 71 11/17/2021 1239    AST 45 (H) 11/17/2021 1239    ALT 24 11/17/2021 1239    BILITOT 1.0 11/17/2021 1239    ESTGFRAFRICA >60.0 11/17/2021 1239    EGFRNONAA >60.0 11/17/2021 1239          No results found for: LABA1C, HGBA1C  Lab Results   Component Value Date    TSH 1.188 12/03/2019     Lab Results    Component Value Date    INR 1.0 02/09/2017     Lab Results   Component Value Date    WBC 3.23 (L) 11/17/2021    HGB 9.4 (L) 11/17/2021    HCT 31.2 (L) 11/17/2021    MCV 95 11/17/2021     (L) 11/17/2021     BMP  Sodium   Date Value Ref Range Status   11/17/2021 143 136 - 145 mmol/L Final     Potassium   Date Value Ref Range Status   11/17/2021 3.5 3.5 - 5.1 mmol/L Final     Chloride   Date Value Ref Range Status   11/17/2021 107 95 - 110 mmol/L Final     CO2   Date Value Ref Range Status   11/17/2021 26 23 - 29 mmol/L Final     BUN   Date Value Ref Range Status   11/17/2021 10 6 - 20 mg/dL Final     Creatinine   Date Value Ref Range Status   11/17/2021 0.9 0.5 - 1.4 mg/dL Final     Calcium   Date Value Ref Range Status   11/17/2021 8.9 8.7 - 10.5 mg/dL Final     Anion Gap   Date Value Ref Range Status   11/17/2021 10 8 - 16 mmol/L Final     eGFR if    Date Value Ref Range Status   11/17/2021 >60.0 >60 mL/min/1.73 m^2 Final     eGFR if non    Date Value Ref Range Status   11/17/2021 >60.0 >60 mL/min/1.73 m^2 Final     Comment:     Calculation used to obtain the estimated glomerular filtration  rate (eGFR) is the CKD-EPI equation.        BNP  @LABRCNTIP(BNP,BNPTRIAGEBLO)@  @LABRCNTIP(troponini)@  CrCl cannot be calculated (Patient's most recent lab result is older than the maximum 7 days allowed.).  No results found in the last 24 hours.  No results found in the last 24 hours.  No results found in the last 24 hours.    Assessment:      1. Essential hypertension    2. Sinus tachycardia    3. Drug-induced systemic lupus erythematosus, unspecified organ involvement status    4. Systemic lupus erythematosus arthritis      Advise pt to hold drinking water 4 hours before the bed time and pt will consider it  Advise to change to cardizem for HTN and tachy and pt refused    Plan:   Continue amlodipione 5 mg daily for HTN  Repeat echo     Counseled DASH  Check Lipid profile in 6  months  Recommend heart-healthy diet, weight control and regular exercise.  Saqib. Risk modification.   I have reviewed all pertinent labs and cardiac studies independently. Plans and recommendations have been formulated under my direct supervision. All questions answered and patient voiced understanding.   If symptoms persist go to the ED  RTC in 12 months

## 2022-01-10 DIAGNOSIS — I10 ESSENTIAL HYPERTENSION: Primary | ICD-10-CM

## 2022-02-03 ENCOUNTER — TELEPHONE (OUTPATIENT)
Dept: CARDIOLOGY | Facility: HOSPITAL | Age: 57
End: 2022-02-03
Payer: MEDICARE

## 2022-02-03 NOTE — TELEPHONE ENCOUNTER
"A called was placed to assist patient scheduling echo as discussed with Dr. Arriola in clinic on 01/07/2022 . Patient stated " Dr. Arriola is no longer my doctor "   Pt did not wish to share who is her cardiologist.  "

## 2022-02-23 DIAGNOSIS — D84.9 IMMUNOSUPPRESSED STATUS: ICD-10-CM

## 2022-03-30 NOTE — H&P
Subjective:     Patient is a 51 y.o. female presented with a history of paresthesias tingling and discomfort in her hands much worse in the left than the right. She had electrical studies in the past which were indicative of carpal tunnel syndrome however the patient has never addressed these findings. The patient is right-hand dominant. Her left sided symptomatology, however, is worse than on the right side. She is being scheduled for outpatient surgery for the management of this condition.     Patient Active Problem List    Diagnosis Date Noted    Carpal tunnel syndrome, right 03/03/2017    Abnormal ECG 02/27/2017    Essential hypertension 02/27/2017    Chronic disease anemia 02/27/2017    Carpal tunnel syndrome of left wrist 02/21/2017    Pre-operative clearance 02/21/2017    Chest pain 02/10/2017    Tachycardia 02/10/2017    SOB (shortness of breath) 02/10/2017    CNS lupus 12/08/2016     Past Medical History:   Diagnosis Date    Encounter for blood transfusion     G-6-PD deficiency     Heart murmur     MVP 1986    Hypertension     Lupus     Raynaud's syndrome       Past Surgical History:   Procedure Laterality Date    BONE MARROW BIOPSY      widsom tooth extraction        No prescriptions prior to admission.     Review of patient's allergies indicates:   Allergen Reactions    Cellcept [mycophenolate mofetil] Swelling     Lips      Ace inhibitors Other (See Comments)     unkown    Amoxicillin Other (See Comments)     + allergy test    Aspirin Other (See Comments)     G-6-PD def.     Beta-blockers (beta-adrenergic blocking agts) Other (See Comments)     Cannot take due to Raynaud's syndrome    Cephalosporins Other (See Comments)     + allergy test    Clindamycin Other (See Comments)     + allergy test    Hydrochlorothiazide Other (See Comments)     unknown    Ibuprofen Other (See Comments)     G-6-PD def.    Lasix [furosemide] Other (See Comments)     unknown    Lisinopril Other (See  "Comments)     unknown    Methotrexate analogues Other (See Comments)     Pancytopenia      Nsaids (non-steroidal anti-inflammatory drug) Other (See Comments)     G-6-PD def.    Pcn [penicillins] Other (See Comments)     + allergy test    Plaquenil [hydroxychloroquine] Other (See Comments)     G-6-PD def.    Shellfish containing products Other (See Comments)     + allergy test    Zofran [ondansetron hcl (pf)] Other (See Comments)     Chest pain      Sulfa (sulfonamide antibiotics) Rash      Social History   Substance Use Topics    Smoking status: Never Smoker    Smokeless tobacco: Never Used    Alcohol use No      Family History   Problem Relation Age of Onset    Arthritis Mother     Heart disease Mother     Hypertension Mother     Diabetes Father     Heart disease Father     Hypertension Father     Cancer Brother       Review of Systems  A comprehensive review of systems was negative except for: Neurological: positive for paresthesia    Objective:     No data found.      Ht 5' 5.5" (1.664 m)  Wt 75.8 kg (167 lb)  BMI 27.37 kg/m2    General Appearance:    Alert, cooperative, no distress, appears stated age   Head:    Normocephalic, without obvious abnormality, atraumatic   Eyes:    PERRL, conjunctiva/corneas clear, EOM's intact, fundi     benign, both eyes   Ears:    Normal TM's and external ear canals, both ears   Nose:   Nares normal, septum midline, mucosa normal, no drainage    or sinus tenderness   Throat:   Lips, mucosa, and tongue normal; teeth and gums normal   Neck:   Supple, symmetrical, trachea midline, no adenopathy;     thyroid:  no enlargement/tenderness/nodules; no carotid    bruit or JVD   Back:     Symmetric, no curvature, ROM normal, no CVA tenderness   Lungs:     Clear to auscultation bilaterally, respirations unlabored   Chest Wall:    No tenderness or deformity    Heart:    Regular rate and rhythm, S1 and S2 normal, no murmur, rub   or gallop                   Extremities:   " Extremities normal, atraumatic, no cyanosis or edema   Pulses:   2+ and symmetric all extremities   Skin:   Skin color, texture, turgor normal, no rashes or lesions   Lymph nodes:   Cervical, supraclavicular, and axillary nodes normal   Neurologic:   CNII-XII intact, normal strength, sensation and reflexes     throughout    Ortho exam:  Right Hand/Wrist Exam      Inspection   Deformity: Wrist - deformity      Tenderness   The patient is tender to palpation of the kaminski area.     Range of Motion   The patient has normal right hand/wrist ROM.     Tests   Phalens Sign: positive  Tinels Sign (Medial Nerve): positive  Finkelstein: negative  Cubital Tunnel Compression Test: negative        Other      Neuorologic Exam    Median Distribution: normal  Ulnar Distribution: normal  Radial Distribution: normal        Left Hand/Wrist Exam      Inspection   Deformity: Wrist - absent      Tenderness   The patient is tender to palpation of the kaminski area.      Range of Motion   The patient has normal left hand/wrist ROM.     Tests   Phalens Sign: negative  Tinels Sign (Medial Nerve): positive  Finkelstein: negative  Cubital Tunnel Compression Test: negative        Other      Sensory Exam  Median Distribution: normal  Ulnar Distribution: normal  Radial Distribution: normal       Imaging Review  none    Assessment:   Carpal tunnel syndrome, left  Carpal tunnel syndrome, right    Plan:     The various methods of treatment have been discussed with the patient and family.   After consideration of risks, benefits and other options for treatment, the patient has consented to surgical interventions for a left carpal tunnel release on 3/24/17.   She understands the material risks of surgery, what is involved, and wishes to proceed.   Manual Repair Warning Statement: We plan on removing the manually selected variable below in favor of our much easier automatic structured text blocks found in the previous tab. We decided to do this to help make the flow better and give you the full power of structured data. Manual selection is never going to be ideal in our platform and I would encourage you to avoid using manual selection from this point on, especially since I will be sunsetting this feature. It is important that you do one of two things with the customized text below. First, you can save all of the text in a word file so you can have it for future reference. Second, transfer the text to the appropriate area in the Library tab. Lastly, if there is a flap or graft type which we do not have you need to let us know right away so I can add it in before the variable is hidden. No need to panic, we plan to give you roughly 6 months to make the change.

## 2022-08-26 NOTE — PROGRESS NOTES
Pt returned my call and takes home BP readings. Pt's home reading today is 13/87. Remote BP documented. Pt states she had spoken to Dr Ely and he stated someone would be contacting her about a machine and she has heard nothing. Per Dr Ely note information was sent to [facility]. I did give pt customer service number and let pt know I would send a message to Dr Ely.     No change

## 2022-12-15 NOTE — ED PROVIDER NOTES
SCRIBE #1 NOTE: I, Ana M Barron, am scribing for, and in the presence of, Bobby Powell MD. I have scribed the HPI, ROS, PE.     SCRIBE #2 NOTE: I, Bradly Saldivar, am scribing for, and in the presence of,  Wes Donahue MD.   History      Chief Complaint   Patient presents with    Vomiting     Nausea, vomiting, diarrhea x1 day. Hx of lupus. Bed bound       Review of patient's allergies indicates:   Allergen Reactions    Cellcept [mycophenolate mofetil] Swelling     Lips      Ace inhibitors Other (See Comments)     unkown    Aldactone [spironolactone] Blisters     Mouth soreness with doses greater than 25mg.    Aspirin Other (See Comments)     G-6-PD def.     Azathioprine sodium     Beta-blockers (beta-adrenergic blocking agts) Other (See Comments)     Cannot take due to Raynaud's syndrome    Clindamycin Other (See Comments)     + allergy test    Clonidine Blisters     Mouth sores and tenderness    Hydrochlorothiazide Other (See Comments)     unknown    Ibuprofen Other (See Comments)     G-6-PD def.    Lasix [furosemide] Other (See Comments)     unknown    Lisinopril Other (See Comments)     unknown    Methatropic     Methotrexate analogues Other (See Comments)     Pancytopenia      Norvasc [amlodipine] Other (See Comments)     Jaw pain    Nsaids (non-steroidal anti-inflammatory drug) Other (See Comments)     G-6-PD def.    Plaquenil [hydroxychloroquine] Other (See Comments)     G-6-PD def.    Zofran [ondansetron hcl (pf)] Other (See Comments)     Chest pain      Sulfa (sulfonamide antibiotics) Rash        HPI   HPI    12/15/2022, 2:33 PM   History obtained from the patient      History of Present Illness: Ruth Messina is a 57 y.o. female patient who presents to the Emergency Department for vomiting which onset gradually a few days ago. The Pt has not been on antibiotic recently. Symptoms are constant and moderate in severity. No mitigating or exacerbating factors reported. Associated sxs include n/d and abdominal pain.  Patient denies any fever, chill, sore throat, congestion, cough, and all other sxs at this time. No prior Tx noted. Pt currently lives at a care facility. No further complaints or concerns at this time.         Arrival mode: AASI    PCP: Sushma Hammer MD       Past Medical History:  Past Medical History:   Diagnosis Date    Anemia     with chronic disease lupus    Encounter for blood transfusion     G-6-PD deficiency     Glaucoma suspect of both eyes     Heart murmur     MVP 1986    Hypertension     Lupus     Raynaud's syndrome     Systemic lupus erythematosus arthritis 5/13/2018       Past Surgical History:  Past Surgical History:   Procedure Laterality Date    BONE MARROW BIOPSY      CARPAL TUNNEL RELEASE Bilateral     HERNIA REPAIR      widsom tooth extraction           Family History:  Family History   Problem Relation Age of Onset    Arthritis Mother     Heart disease Mother     Hypertension Mother     Diabetes Father     Heart disease Father     Hypertension Father     Cancer Brother     Eczema Neg Hx     Lupus Neg Hx     Psoriasis Neg Hx        Social History:  Social History     Tobacco Use    Smoking status: Never    Smokeless tobacco: Never   Substance and Sexual Activity    Alcohol use: No    Drug use: No    Sexual activity: Not Currently       ROS   Review of Systems   Constitutional:  Negative for chills and fever.   HENT:  Negative for congestion and sore throat.    Respiratory:  Negative for cough and shortness of breath.    Cardiovascular:  Negative for chest pain.   Gastrointestinal:  Positive for abdominal pain, diarrhea, nausea and vomiting.   Genitourinary:  Negative for dysuria.   Musculoskeletal:  Negative for back pain.   Skin:  Negative for rash.   Neurological:  Negative for weakness.   Hematological:  Does not bruise/bleed easily.   All other systems reviewed and are negative.    Physical Exam      Initial Vitals [12/15/22 1326]   BP Pulse Resp Temp SpO2   (!) 167/104 (!) 115 20 98.7 °F  (37.1 °C) 98 %      MAP       --          Physical Exam  Nursing Notes and Vital Signs Reviewed.  Constitutional: Patient is in no acute distress. Well-developed and well-nourished.  Head: Atraumatic. Normocephalic.  Eyes: PERRL. EOM intact. Conjunctivae are not pale. No scleral icterus.  ENT: Mucous membranes are moist. Oropharynx is clear and symmetric.    Neck: Supple. Full ROM. No lymphadenopathy.  Cardiovascular: Regular rate. Regular rhythm. No murmurs, rubs, or gallops. Distal pulses are 2+ and symmetric.  Pulmonary/Chest: No respiratory distress. Clear to auscultation bilaterally. No wheezing or rales.  Abdominal: Soft and non-distended.  There is no tenderness.  No rebound, guarding, or rigidity. Good bowel sounds.  Genitourinary: No CVA tenderness  Musculoskeletal: Moves all extremities. No obvious deformities. No edema. No calf tenderness.  Skin: Warm and dry.  Neurological:  Alert, awake, and appropriate.  Normal speech.  No acute focal neurological deficits are appreciated.  Psychiatric: Normal affect. Good eye contact. Appropriate in content.    ED Course    Procedures  ED Vital Signs:  Vitals:    12/15/22 1326 12/15/22 1410 12/15/22 1412 12/15/22 1600   BP: (!) 167/104 (!) 159/99  (!) 167/104   Pulse: (!) 115 (!) 114  109   Resp: 20 (!) 21  (!) 21   Temp: 98.7 °F (37.1 °C)      TempSrc: Oral      SpO2: 98% 100%  98%   Weight:   55.8 kg (123 lb 0.3 oz)     12/15/22 1800 12/15/22 2300 12/16/22 0000   BP: (!) 162/98 137/85 136/87   Pulse: 107 110 (!) 119   Resp: 18 18 18   Temp:      TempSrc:      SpO2: 98% 100% 99%   Weight:          Abnormal Lab Results:  Labs Reviewed   CBC W/ AUTO DIFFERENTIAL - Abnormal; Notable for the following components:       Result Value    RBC 3.51 (*)     Hemoglobin 10.3 (*)     Hematocrit 32.4 (*)     MCHC 31.8 (*)     Platelets 99 (*)     Lymph # 0.7 (*)     Gran % 84.8 (*)     Lymph % 10.0 (*)     All other components within normal limits   COMPREHENSIVE METABOLIC PANEL -  Abnormal; Notable for the following components:    CO2 20 (*)     Glucose 126 (*)     Calcium 8.6 (*)     Albumin 3.4 (*)     AST 43 (*)     ALT 52 (*)     All other components within normal limits   URINALYSIS, REFLEX TO URINE CULTURE - Abnormal; Notable for the following components:    Specific Gravity, UA >1.030 (*)     Protein, UA 1+ (*)     Ketones, UA 1+ (*)     All other components within normal limits    Narrative:     Specimen Source->Urine   LIPASE   URINALYSIS MICROSCOPIC    Narrative:     Specimen Source->Urine        All Lab Results:  Results for orders placed or performed during the hospital encounter of 12/15/22   CBC W/ AUTO DIFFERENTIAL   Result Value Ref Range    WBC 7.09 3.90 - 12.70 K/uL    RBC 3.51 (L) 4.00 - 5.40 M/uL    Hemoglobin 10.3 (L) 12.0 - 16.0 g/dL    Hematocrit 32.4 (L) 37.0 - 48.5 %    MCV 92 82 - 98 fL    MCH 29.3 27.0 - 31.0 pg    MCHC 31.8 (L) 32.0 - 36.0 g/dL    RDW 13.1 11.5 - 14.5 %    Platelets 99 (L) 150 - 450 K/uL    MPV 10.7 9.2 - 12.9 fL    Immature Granulocytes 0.4 0.0 - 0.5 %    Gran # (ANC) 6.0 1.8 - 7.7 K/uL    Immature Grans (Abs) 0.03 0.00 - 0.04 K/uL    Lymph # 0.7 (L) 1.0 - 4.8 K/uL    Mono # 0.3 0.3 - 1.0 K/uL    Eos # 0.0 0.0 - 0.5 K/uL    Baso # 0.01 0.00 - 0.20 K/uL    nRBC 0 0 /100 WBC    Gran % 84.8 (H) 38.0 - 73.0 %    Lymph % 10.0 (L) 18.0 - 48.0 %    Mono % 4.7 4.0 - 15.0 %    Eosinophil % 0.0 0.0 - 8.0 %    Basophil % 0.1 0.0 - 1.9 %    Differential Method Automated    Comp. Metabolic Panel   Result Value Ref Range    Sodium 137 136 - 145 mmol/L    Potassium 4.5 3.5 - 5.1 mmol/L    Chloride 104 95 - 110 mmol/L    CO2 20 (L) 23 - 29 mmol/L    Glucose 126 (H) 70 - 110 mg/dL    BUN 19 6 - 20 mg/dL    Creatinine 0.7 0.5 - 1.4 mg/dL    Calcium 8.6 (L) 8.7 - 10.5 mg/dL    Total Protein 7.3 6.0 - 8.4 g/dL    Albumin 3.4 (L) 3.5 - 5.2 g/dL    Total Bilirubin 0.7 0.1 - 1.0 mg/dL    Alkaline Phosphatase 60 55 - 135 U/L    AST 43 (H) 10 - 40 U/L    ALT 52 (H) 10 -  44 U/L    Anion Gap 13 8 - 16 mmol/L    eGFR >60 >60 mL/min/1.73 m^2   Lipase   Result Value Ref Range    Lipase 39 4 - 60 U/L   Urinalysis, Reflex to Urine Culture Urine, Clean Catch    Specimen: Urine   Result Value Ref Range    Specimen UA Urine, Clean Catch     Color, UA Yellow Yellow, Straw, April    Appearance, UA Clear Clear    pH, UA 7.0 5.0 - 8.0    Specific Gravity, UA >1.030 (A) 1.005 - 1.030    Protein, UA 1+ (A) Negative    Glucose, UA Negative Negative    Ketones, UA 1+ (A) Negative    Bilirubin (UA) Negative Negative    Occult Blood UA Negative Negative    Nitrite, UA Negative Negative    Urobilinogen, UA Negative <2.0 EU/dL    Leukocytes, UA Negative Negative   Urinalysis Microscopic   Result Value Ref Range    RBC, UA 2 0 - 4 /hpf    WBC, UA 4 0 - 5 /hpf    Bacteria None None-Occ /hpf    Hyaline Casts, UA 0 0-1/lpf /lpf    Microscopic Comment SEE COMMENT         Imaging Results:  Imaging Results              CT Abdomen Pelvis With Contrast (Final result)  Result time 12/15/22 19:08:41      Final result by Aleshia Butler MD (12/15/22 19:08:41)                   Impression:      Rectal fecal impaction suspected with mild presacral unorganized fluid and other nonacute details above    All CT scans at this facility use dose modulation, iterative reconstruction, and/or weight based dosing when appropriate to reduce radiation dose to as low as reasonable achievable.      Electronically signed by: Aleshia Butler  Date:    12/15/2022  Time:    19:08               Narrative:    EXAMINATION:  CT ABDOMEN PELVIS WITH CONTRAST    CLINICAL HISTORY:  Possible partial small-bowel obstruction;    TECHNIQUE:  Imaging with intravenous contrast enhancement, 2D reformations provided    Automated exposure control technique utilized iterative technique    COMPARISON:  None    FINDINGS:  Imaging degraded by artifact related to malpositioning    Liver and spleen normal size.  Gallbladder present with small gallstone  likely.    Pancreas unremarkable    Kidneys without hydronephrosis or worrisome finding    Rectum is stool distended 8 cm diameter with adjacent presacral fluid small volume unorganized.  Proximal colon is stool filled.  No distended small bowel.  No pericecal inflammatory change.    Urinary bladder moderately distended.  Right adnexal 33 mm circumscribed low-attenuation or cystic lesion with peripheral rim calcification    Shoddy retroperitoneal lymph nodes                                       X-Ray Abdomen Flat And Erect (Final result)  Result time 12/15/22 14:16:13      Final result by Gio Payne MD (12/15/22 14:16:13)                   Impression:      No acute findings.      Electronically signed by: Gio Payne MD  Date:    12/15/2022  Time:    14:16               Narrative:    EXAMINATION:  XR ABDOMEN FLAT AND ERECT    CLINICAL HISTORY:  Abdominal Pain;    TECHNIQUE:  Routine exam.    COMPARISON:  None    FINDINGS:  Negative for bowel obstruction.Large amount of colonic stool    No suspicious calcifications.    No acute osseous findings.  No advanced degenerative changes.                                          The EKG was ordered, reviewed, and independently interpreted by the ED provider.  Interpretation time: 13:59  Rate: 117 BPM  Rhythm: sinus tachycardia  Interpretation: Minimal voltage criteria for LVH, may be normal variant ([place] product). Nonspecific T wave abnormality. No STEMI.    The Emergency Provider reviewed the vital signs and test results, which are outlined above.    ED Discussion   7:39 PM: On the rectal exam Dr. Powell collected 2 cups of stool. It was a good color and she tolerated the procedure well.     8:05 PM: Dr. Powell transfers care of patient to Dr. Donahue pending lab results.     10:30 PM: Reassessed pt at this time. Discussed with pt all pertinent ED information and results. Discussed pt dx and plan of tx. Gave pt all f/u and return to the ED instructions. All questions  and concerns were addressed at this time. Pt expresses understanding of information and instructions, and is comfortable with plan to discharge. Pt is stable for discharge.    I discussed with patient and/or family/caretaker that evaluation in the ED does not suggest any emergent or life threatening medical conditions requiring immediate intervention beyond what was provided in the ED, and I believe patient is safe for discharge.  Regardless, an unremarkable evaluation in the ED does not preclude the development or presence of a serious of life threatening condition. As such, patient was instructed to return immediately for any worsening or change in current symptoms.      ED Medication(s):  Medications   sodium chloride 0.9% bolus 1,000 mL 1,000 mL (0 mLs Intravenous Stopped 12/15/22 1900)   promethazine (PHENERGAN) 12.5 mg in dextrose 5 % 50 mL IVPB (0 mg Intravenous Stopped 12/15/22 1438)   diphenoxylate-atropine 2.5-0.025 mg per tablet 1 tablet (1 tablet Oral Given 12/15/22 1919)   iohexoL (OMNIPAQUE 350) injection 100 mL (100 mLs Intravenous Given 12/15/22 1856)   sodium phosphates 19-7 gram/118 mL enema 1 enema (1 enema Rectal Given 12/15/22 2119)        Follow-up Information       Sushma Hammer MD In 4 days.    Specialty: Internal Medicine  Contact information:  [address]  [facility] [address]  [phone]               [facility] - Emergency Dept..    Specialty: Emergency Medicine  Why: As needed, If symptoms worsen  Contact information:  [address] [facility] [address] [phone]                             Medical Decision Making    Medical Decision Making:   Clinical Tests:   Lab Tests: Ordered and Reviewed  Radiological Study: Ordered and Reviewed  Medical Tests: Ordered and Reviewed         Scribe Attestation:   Scribe #1: I performed the above scribed service and the documentation accurately describes the services I performed. I attest to the accuracy of  the note.    Attending:   Physician Attestation Statement for Scribe #1: I, Bobby Powell MD, personally performed the services described in this documentation, as scribed by Ana M Barron, in my presence, and it is both accurate and complete.       Scribe Attestation:   Scribe #2: I performed the above scribed service and the documentation accurately describes the services I performed. I attest to the accuracy of the note.    Attending Attestation:           Physician Attestation for Scribe:    Physician Attestation Statement for Scribe #2: I, Wes Donahue MD, reviewed documentation, as scribed by Bradly Saldivar in my presence, and it is both accurate and complete. I also acknowledge and confirm the content of the note done by Scribe #1.        Clinical Impression       ICD-10-CM ICD-9-CM   1. Constipation, unspecified constipation type  K59.00 564.00   2. Vomiting  R11.10 787.03   3. Fecal impaction  K56.41 560.32       Disposition:   Disposition: Discharged  Condition: Stable      Wes Donahue MD  12/16/22 0545

## 2022-12-16 NOTE — ED NOTES
Pt awake/alert, reports improvement in nausea, states she's still having diarrhea, VSS, NAD. Will continue to monitor.

## 2023-01-01 ENCOUNTER — HOSPITAL ENCOUNTER (EMERGENCY)
Facility: HOSPITAL | Age: 58
Discharge: HOME | End: 2023-05-23
Attending: EMERGENCY MEDICINE
Payer: MEDICARE

## 2023-01-01 ENCOUNTER — OFFICE VISIT (OUTPATIENT)
Dept: CARDIOLOGY | Facility: CLINIC | Age: 58
End: 2023-01-01
Payer: MEDICARE

## 2023-01-01 ENCOUNTER — PATIENT MESSAGE (OUTPATIENT)
Dept: OPHTHALMOLOGY | Facility: CLINIC | Age: 58
End: 2023-01-01

## 2023-01-01 ENCOUNTER — TELEPHONE (OUTPATIENT)
Dept: NEUROLOGY | Facility: CLINIC | Age: 58
End: 2023-01-01
Payer: MEDICARE

## 2023-01-01 ENCOUNTER — HOSPITAL ENCOUNTER (EMERGENCY)
Facility: HOSPITAL | Age: 58
Discharge: ANOTHER HOSPITAL | End: 2023-08-02
Attending: EMERGENCY MEDICINE
Payer: MEDICARE

## 2023-01-01 ENCOUNTER — HOSPITAL ENCOUNTER (OUTPATIENT)
Facility: HOSPITAL | Age: 58
Discharge: HOME | End: 2023-05-20
Attending: EMERGENCY MEDICINE | Admitting: INTERNAL MEDICINE
Payer: MEDICARE

## 2023-01-01 ENCOUNTER — TELEPHONE (OUTPATIENT)
Dept: CARDIOLOGY | Facility: HOSPITAL | Age: 58
End: 2023-01-01
Payer: MEDICARE

## 2023-01-01 ENCOUNTER — OFFICE VISIT (OUTPATIENT)
Dept: OPHTHALMOLOGY | Facility: CLINIC | Age: 58
End: 2023-01-01
Payer: MEDICARE

## 2023-01-01 ENCOUNTER — HOSPITAL ENCOUNTER (EMERGENCY)
Facility: HOSPITAL | Age: 58
End: 2023-08-02
Attending: STUDENT IN AN ORGANIZED HEALTH CARE EDUCATION/TRAINING PROGRAM
Payer: MEDICARE

## 2023-01-01 VITALS
HEART RATE: 80 BPM | BODY MASS INDEX: 21.03 KG/M2 | OXYGEN SATURATION: 98 % | DIASTOLIC BLOOD PRESSURE: 70 MMHG | WEIGHT: 130.31 LBS | SYSTOLIC BLOOD PRESSURE: 120 MMHG | TEMPERATURE: 98 F | RESPIRATION RATE: 15 BRPM

## 2023-01-01 VITALS
HEART RATE: 97 BPM | TEMPERATURE: 98 F | HEIGHT: 66 IN | WEIGHT: 125.25 LBS | BODY MASS INDEX: 20.13 KG/M2 | DIASTOLIC BLOOD PRESSURE: 100 MMHG | RESPIRATION RATE: 18 BRPM | SYSTOLIC BLOOD PRESSURE: 150 MMHG | OXYGEN SATURATION: 95 %

## 2023-01-01 VITALS
DIASTOLIC BLOOD PRESSURE: 85 MMHG | OXYGEN SATURATION: 99 % | HEART RATE: 66 BPM | BODY MASS INDEX: 19.89 KG/M2 | RESPIRATION RATE: 21 BRPM | TEMPERATURE: 98 F | SYSTOLIC BLOOD PRESSURE: 150 MMHG | WEIGHT: 123.25 LBS

## 2023-01-01 VITALS
BODY MASS INDEX: 20.01 KG/M2 | OXYGEN SATURATION: 100 % | WEIGHT: 124 LBS | DIASTOLIC BLOOD PRESSURE: 51 MMHG | SYSTOLIC BLOOD PRESSURE: 71 MMHG | TEMPERATURE: 96 F | RESPIRATION RATE: 16 BRPM

## 2023-01-01 VITALS
WEIGHT: 121.5 LBS | DIASTOLIC BLOOD PRESSURE: 88 MMHG | BODY MASS INDEX: 19.53 KG/M2 | HEART RATE: 113 BPM | SYSTOLIC BLOOD PRESSURE: 130 MMHG | OXYGEN SATURATION: 99 % | HEIGHT: 66 IN

## 2023-01-01 DIAGNOSIS — J96.00 ACUTE RESPIRATORY FAILURE, UNSPECIFIED WHETHER WITH HYPOXIA OR HYPERCAPNIA: ICD-10-CM

## 2023-01-01 DIAGNOSIS — R00.0 TACHYCARDIA: ICD-10-CM

## 2023-01-01 DIAGNOSIS — R55 SYNCOPE: ICD-10-CM

## 2023-01-01 DIAGNOSIS — I10 HYPERTENSION: ICD-10-CM

## 2023-01-01 DIAGNOSIS — I46.9 CARDIAC ARREST: ICD-10-CM

## 2023-01-01 DIAGNOSIS — M35.00 SECONDARY SJOGREN'S SYNDROME: ICD-10-CM

## 2023-01-01 DIAGNOSIS — R07.9 CHEST PAIN: ICD-10-CM

## 2023-01-01 DIAGNOSIS — H52.7 REFRACTIVE ERRORS: ICD-10-CM

## 2023-01-01 DIAGNOSIS — R41.82 ALTERED MENTAL STATUS: ICD-10-CM

## 2023-01-01 DIAGNOSIS — I61.0 NONTRAUMATIC SUBCORTICAL HEMORRHAGE OF LEFT CEREBRAL HEMISPHERE: ICD-10-CM

## 2023-01-01 DIAGNOSIS — R11.2 NAUSEA AND VOMITING, UNSPECIFIED VOMITING TYPE: Primary | ICD-10-CM

## 2023-01-01 DIAGNOSIS — D64.9 ANEMIA, UNSPECIFIED TYPE: ICD-10-CM

## 2023-01-01 DIAGNOSIS — R41.82 ALTERED MENTAL STATUS, UNSPECIFIED ALTERED MENTAL STATUS TYPE: ICD-10-CM

## 2023-01-01 DIAGNOSIS — I16.1 HYPERTENSIVE EMERGENCY: ICD-10-CM

## 2023-01-01 DIAGNOSIS — H35.40 PERIPHERAL RETINAL DEGENERATION OF BOTH EYES: Primary | ICD-10-CM

## 2023-01-01 DIAGNOSIS — G47.33 OSA ON CPAP: ICD-10-CM

## 2023-01-01 DIAGNOSIS — I61.9 NONTRAUMATIC INTRACEREBRAL HEMORRHAGE, UNSPECIFIED CEREBRAL LOCATION, UNSPECIFIED LATERALITY: Primary | ICD-10-CM

## 2023-01-01 DIAGNOSIS — I48.0 PAROXYSMAL ATRIAL FIBRILLATION: ICD-10-CM

## 2023-01-01 DIAGNOSIS — I63.9 CEREBROVASCULAR ACCIDENT (CVA), UNSPECIFIED MECHANISM: Primary | ICD-10-CM

## 2023-01-01 DIAGNOSIS — R00.0 TACHYCARDIA: Primary | ICD-10-CM

## 2023-01-01 DIAGNOSIS — J96.90 RESPIRATORY FAILURE REQUIRING INTUBATION: ICD-10-CM

## 2023-01-01 DIAGNOSIS — R11.2 NAUSEA AND VOMITING, UNSPECIFIED VOMITING TYPE: ICD-10-CM

## 2023-01-01 DIAGNOSIS — R79.89 ELEVATED TROPONIN: ICD-10-CM

## 2023-01-01 DIAGNOSIS — G45.9 TIA (TRANSIENT ISCHEMIC ATTACK): ICD-10-CM

## 2023-01-01 DIAGNOSIS — I10 ESSENTIAL HYPERTENSION: ICD-10-CM

## 2023-01-01 DIAGNOSIS — H43.813 PVD (POSTERIOR VITREOUS DETACHMENT), BILATERAL: ICD-10-CM

## 2023-01-01 DIAGNOSIS — E86.0 DEHYDRATION: ICD-10-CM

## 2023-01-01 DIAGNOSIS — M35.01 KERATOCONJUNCTIVITIS SICCA OF BOTH EYES: ICD-10-CM

## 2023-01-01 DIAGNOSIS — I62.9 INTRACRANIAL HEMORRHAGE: Primary | ICD-10-CM

## 2023-01-01 DIAGNOSIS — I48.91 ATRIAL FIBRILLATION, UNSPECIFIED TYPE: ICD-10-CM

## 2023-01-01 LAB
ABO + RH BLD: NORMAL
ALBUMIN SERPL BCP-MCNC: 2.9 G/DL (ref 3.5–5.2)
ALBUMIN SERPL BCP-MCNC: 3.1 G/DL (ref 3.5–5.2)
ALBUMIN SERPL BCP-MCNC: 3.2 G/DL (ref 3.5–5.2)
ALBUMIN SERPL BCP-MCNC: 3.6 G/DL (ref 3.5–5.2)
ALLENS TEST: ABNORMAL
ALP SERPL-CCNC: 56 U/L (ref 55–135)
ALP SERPL-CCNC: 59 U/L (ref 55–135)
ALP SERPL-CCNC: 70 U/L (ref 55–135)
ALP SERPL-CCNC: 77 U/L (ref 55–135)
ALT SERPL W/O P-5'-P-CCNC: 42 U/L (ref 10–44)
ALT SERPL W/O P-5'-P-CCNC: 46 U/L (ref 10–44)
ALT SERPL W/O P-5'-P-CCNC: 54 U/L (ref 10–44)
ALT SERPL W/O P-5'-P-CCNC: 56 U/L (ref 10–44)
AMMONIA PLAS-SCNC: 27 UMOL/L (ref 10–50)
AMPHET+METHAMPHET UR QL: NEGATIVE
ANION GAP SERPL CALC-SCNC: 11 MMOL/L (ref 8–16)
ANION GAP SERPL CALC-SCNC: 14 MMOL/L (ref 8–16)
ANION GAP SERPL CALC-SCNC: 14 MMOL/L (ref 8–16)
ANION GAP SERPL CALC-SCNC: 6 MMOL/L (ref 8–16)
ANION GAP SERPL CALC-SCNC: 6 MMOL/L (ref 8–16)
ANION GAP SERPL CALC-SCNC: 9 MMOL/L (ref 8–16)
ANISOCYTOSIS BLD QL SMEAR: SLIGHT
AORTIC ROOT ANNULUS: 3.06 CM
APTT PPP: 21.4 SEC (ref 21–32)
APTT PPP: 23.9 SEC (ref 21–32)
APTT PPP: 26.8 SEC (ref 21–32)
ASCENDING AORTA: 3.56 CM
AST SERPL-CCNC: 35 U/L (ref 10–40)
AST SERPL-CCNC: 44 U/L (ref 10–40)
AST SERPL-CCNC: 48 U/L (ref 10–40)
AST SERPL-CCNC: 50 U/L (ref 10–40)
AV INDEX (PROSTH): 0.71
AV MEAN GRADIENT: 4 MMHG
AV PEAK GRADIENT: 7 MMHG
AV REGURGITATION PRESSURE HALF TIME: 653.34 MS
AV VALVE AREA: 2.62 CM2
AV VELOCITY RATIO: 0.87
BACTERIA #/AREA URNS AUTO: ABNORMAL /HPF
BACTERIA #/AREA URNS HPF: ABNORMAL /HPF
BACTERIA #/AREA URNS HPF: ABNORMAL /HPF
BARBITURATES UR QL SCN>200 NG/ML: NEGATIVE
BASOPHILS # BLD AUTO: 0.01 K/UL (ref 0–0.2)
BASOPHILS # BLD AUTO: 0.02 K/UL (ref 0–0.2)
BASOPHILS # BLD AUTO: 0.03 K/UL (ref 0–0.2)
BASOPHILS NFR BLD: 0.3 % (ref 0–1.9)
BASOPHILS NFR BLD: 0.4 % (ref 0–1.9)
BENZODIAZ UR QL SCN>200 NG/ML: NEGATIVE
BILIRUB SERPL-MCNC: 0.3 MG/DL (ref 0.1–1)
BILIRUB SERPL-MCNC: 0.4 MG/DL (ref 0.1–1)
BILIRUB SERPL-MCNC: 0.4 MG/DL (ref 0.1–1)
BILIRUB SERPL-MCNC: 0.7 MG/DL (ref 0.1–1)
BILIRUB UR QL STRIP: NEGATIVE
BLD GP AB SCN CELLS X3 SERPL QL: NORMAL
BLD PROD TYP BPU: NORMAL
BLOOD UNIT EXPIRATION DATE: NORMAL
BLOOD UNIT TYPE CODE: 5100
BLOOD UNIT TYPE: NORMAL
BNP SERPL-MCNC: 11 PG/ML (ref 0–99)
BNP SERPL-MCNC: 16 PG/ML (ref 0–99)
BSA FOR ECHO PROCEDURE: 1.68 M2
BUN SERPL-MCNC: 15 MG/DL (ref 6–20)
BUN SERPL-MCNC: 21 MG/DL (ref 6–20)
BUN SERPL-MCNC: 22 MG/DL (ref 6–20)
BUN SERPL-MCNC: 22 MG/DL (ref 6–20)
BUN SERPL-MCNC: 24 MG/DL (ref 6–20)
BUN SERPL-MCNC: 31 MG/DL (ref 6–20)
BZE UR QL SCN: NEGATIVE
CALCIUM SERPL-MCNC: 7.5 MG/DL (ref 8.7–10.5)
CALCIUM SERPL-MCNC: 8 MG/DL (ref 8.7–10.5)
CALCIUM SERPL-MCNC: 8.4 MG/DL (ref 8.7–10.5)
CALCIUM SERPL-MCNC: 8.5 MG/DL (ref 8.7–10.5)
CALCIUM SERPL-MCNC: 8.6 MG/DL (ref 8.7–10.5)
CALCIUM SERPL-MCNC: 9.2 MG/DL (ref 8.7–10.5)
CANNABINOIDS UR QL SCN: NEGATIVE
CAOX CRY URNS QL MICRO: ABNORMAL
CHLORIDE SERPL-SCNC: 105 MMOL/L (ref 95–110)
CHLORIDE SERPL-SCNC: 105 MMOL/L (ref 95–110)
CHLORIDE SERPL-SCNC: 107 MMOL/L (ref 95–110)
CHLORIDE SERPL-SCNC: 109 MMOL/L (ref 95–110)
CHLORIDE SERPL-SCNC: 109 MMOL/L (ref 95–110)
CHLORIDE SERPL-SCNC: 113 MMOL/L (ref 95–110)
CLARITY UR REFRACT.AUTO: CLEAR
CLARITY UR: CLEAR
CO2 SERPL-SCNC: 17 MMOL/L (ref 23–29)
CO2 SERPL-SCNC: 21 MMOL/L (ref 23–29)
CO2 SERPL-SCNC: 22 MMOL/L (ref 23–29)
CO2 SERPL-SCNC: 24 MMOL/L (ref 23–29)
CODING SYSTEM: NORMAL
COLOR UR AUTO: ABNORMAL
COLOR UR: COLORLESS
COLOR UR: COLORLESS
COLOR UR: YELLOW
CREAT SERPL-MCNC: 0.8 MG/DL (ref 0.5–1.4)
CREAT SERPL-MCNC: 0.9 MG/DL (ref 0.5–1.4)
CREAT UR-MCNC: 13.2 MG/DL (ref 15–325)
CROSSMATCH INTERPRETATION: NORMAL
CV ECHO LV RWT: 0.53 CM
DELSYS: ABNORMAL
DIFFERENTIAL METHOD: ABNORMAL
DISPENSE STATUS: NORMAL
DOP CALC AO PEAK VEL: 1.33 M/S
DOP CALC AO VTI: 22.1 CM
DOP CALC LVOT AREA: 3.7 CM2
DOP CALC LVOT DIAMETER: 2.16 CM
DOP CALC LVOT PEAK VEL: 1.16 M/S
DOP CALC LVOT STROKE VOLUME: 57.87 CM3
DOP CALC RVOT PEAK VEL: 0.87 M/S
DOP CALC RVOT VTI: 12.4 CM
DOP CALCLVOT PEAK VEL VTI: 15.8 CM
E WAVE DECELERATION TIME: 175.65 MSEC
E/A RATIO: 1.36
ECHO LV POSTERIOR WALL: 1.07 CM (ref 0.6–1.1)
EJECTION FRACTION: 65 %
EOSINOPHIL # BLD AUTO: 0 K/UL (ref 0–0.5)
EOSINOPHIL # BLD AUTO: 0.1 K/UL (ref 0–0.5)
EOSINOPHIL NFR BLD: 0 % (ref 0–8)
EOSINOPHIL NFR BLD: 1.8 % (ref 0–8)
ERYTHROCYTE [DISTWIDTH] IN BLOOD BY AUTOMATED COUNT: 12.9 % (ref 11.5–14.5)
ERYTHROCYTE [DISTWIDTH] IN BLOOD BY AUTOMATED COUNT: 13 % (ref 11.5–14.5)
ERYTHROCYTE [DISTWIDTH] IN BLOOD BY AUTOMATED COUNT: 13.2 % (ref 11.5–14.5)
ERYTHROCYTE [DISTWIDTH] IN BLOOD BY AUTOMATED COUNT: 14.4 % (ref 11.5–14.5)
ERYTHROCYTE [DISTWIDTH] IN BLOOD BY AUTOMATED COUNT: 14.5 % (ref 11.5–14.5)
ERYTHROCYTE [SEDIMENTATION RATE] IN BLOOD BY WESTERGREN METHOD: 18 MM/H
EST. GFR  (NO RACE VARIABLE): >60 ML/MIN/1.73 M^2
FIO2: 100
FRACTIONAL SHORTENING: 35 % (ref 28–44)
GLUCOSE SERPL-MCNC: 108 MG/DL (ref 70–110)
GLUCOSE SERPL-MCNC: 122 MG/DL (ref 70–110)
GLUCOSE SERPL-MCNC: 171 MG/DL (ref 70–110)
GLUCOSE SERPL-MCNC: 71 MG/DL (ref 70–110)
GLUCOSE SERPL-MCNC: 78 MG/DL (ref 70–110)
GLUCOSE SERPL-MCNC: 81 MG/DL (ref 70–110)
GLUCOSE UR QL STRIP: NEGATIVE
HCO3 UR-SCNC: 27.3 MMOL/L (ref 24–28)
HCT VFR BLD AUTO: 26.8 % (ref 37–48.5)
HCT VFR BLD AUTO: 27.6 % (ref 37–48.5)
HCT VFR BLD AUTO: 28.3 % (ref 37–48.5)
HCT VFR BLD AUTO: 31.4 % (ref 37–48.5)
HCT VFR BLD AUTO: 35.3 % (ref 37–48.5)
HGB BLD-MCNC: 10.8 G/DL (ref 12–16)
HGB BLD-MCNC: 8.4 G/DL (ref 12–16)
HGB BLD-MCNC: 8.4 G/DL (ref 12–16)
HGB BLD-MCNC: 8.5 G/DL (ref 12–16)
HGB BLD-MCNC: 9.7 G/DL (ref 12–16)
HGB UR QL STRIP: ABNORMAL
HGB UR QL STRIP: ABNORMAL
HGB UR QL STRIP: NEGATIVE
HGB UR QL STRIP: NEGATIVE
HYALINE CASTS #/AREA URNS LPF: 0 /LPF
HYALINE CASTS #/AREA URNS LPF: 0 /LPF
HYALINE CASTS UR QL AUTO: 0 /LPF
HYPOCHROMIA BLD QL SMEAR: ABNORMAL
IMM GRANULOCYTES # BLD AUTO: 0.01 K/UL (ref 0–0.04)
IMM GRANULOCYTES # BLD AUTO: 0.02 K/UL (ref 0–0.04)
IMM GRANULOCYTES # BLD AUTO: 0.02 K/UL (ref 0–0.04)
IMM GRANULOCYTES # BLD AUTO: 0.05 K/UL (ref 0–0.04)
IMM GRANULOCYTES # BLD AUTO: 0.07 K/UL (ref 0–0.04)
IMM GRANULOCYTES NFR BLD AUTO: 0.3 % (ref 0–0.5)
IMM GRANULOCYTES NFR BLD AUTO: 0.5 % (ref 0–0.5)
IMM GRANULOCYTES NFR BLD AUTO: 0.6 % (ref 0–0.5)
IMM GRANULOCYTES NFR BLD AUTO: 0.7 % (ref 0–0.5)
IMM GRANULOCYTES NFR BLD AUTO: 0.9 % (ref 0–0.5)
INR PPP: 0.9 (ref 0.8–1.2)
INR PPP: 0.9 (ref 0.8–1.2)
INTERVENTRICULAR SEPTUM: 1.1 CM (ref 0.6–1.1)
IVRT: 74.22 MSEC
KETONES UR QL STRIP: NEGATIVE
LA MAJOR: 3.3 CM
LA MINOR: 3.83 CM
LA WIDTH: 2.5 CM
LACTATE SERPL-SCNC: 1.8 MMOL/L (ref 0.5–2.2)
LEFT ATRIUM SIZE: 2.89 CM
LEFT ATRIUM VOLUME INDEX: 12.9 ML/M2
LEFT ATRIUM VOLUME: 21.77 CM3
LEFT INTERNAL DIMENSION IN SYSTOLE: 2.61 CM (ref 2.1–4)
LEFT VENTRICLE DIASTOLIC VOLUME INDEX: 42.38 ML/M2
LEFT VENTRICLE DIASTOLIC VOLUME: 71.63 ML
LEFT VENTRICLE MASS INDEX: 86 G/M2
LEFT VENTRICLE SYSTOLIC VOLUME INDEX: 14.7 ML/M2
LEFT VENTRICLE SYSTOLIC VOLUME: 24.82 ML
LEFT VENTRICULAR INTERNAL DIMENSION IN DIASTOLE: 4.04 CM (ref 3.5–6)
LEFT VENTRICULAR MASS: 144.99 G
LEUKOCYTE ESTERASE UR QL STRIP: NEGATIVE
LVOT MG: 1.77 MMHG
LVOT MV: 0.62 CM/S
LYMPHOCYTES # BLD AUTO: 0.9 K/UL (ref 1–4.8)
LYMPHOCYTES # BLD AUTO: 0.9 K/UL (ref 1–4.8)
LYMPHOCYTES # BLD AUTO: 1 K/UL (ref 1–4.8)
LYMPHOCYTES # BLD AUTO: 1.3 K/UL (ref 1–4.8)
LYMPHOCYTES # BLD AUTO: 2.9 K/UL (ref 1–4.8)
LYMPHOCYTES NFR BLD: 17.6 % (ref 18–48)
LYMPHOCYTES NFR BLD: 22.3 % (ref 18–48)
LYMPHOCYTES NFR BLD: 28.3 % (ref 18–48)
LYMPHOCYTES NFR BLD: 28.9 % (ref 18–48)
LYMPHOCYTES NFR BLD: 36.7 % (ref 18–48)
MAGNESIUM SERPL-MCNC: 2 MG/DL (ref 1.6–2.6)
MAGNESIUM SERPL-MCNC: 2.3 MG/DL (ref 1.6–2.6)
MCH RBC QN AUTO: 28.1 PG (ref 27–31)
MCH RBC QN AUTO: 28.6 PG (ref 27–31)
MCH RBC QN AUTO: 28.7 PG (ref 27–31)
MCH RBC QN AUTO: 28.8 PG (ref 27–31)
MCH RBC QN AUTO: 29 PG (ref 27–31)
MCHC RBC AUTO-ENTMCNC: 30 G/DL (ref 32–36)
MCHC RBC AUTO-ENTMCNC: 30.4 G/DL (ref 32–36)
MCHC RBC AUTO-ENTMCNC: 30.6 G/DL (ref 32–36)
MCHC RBC AUTO-ENTMCNC: 30.9 G/DL (ref 32–36)
MCHC RBC AUTO-ENTMCNC: 31.3 G/DL (ref 32–36)
MCV RBC AUTO: 92 FL (ref 82–98)
MCV RBC AUTO: 92 FL (ref 82–98)
MCV RBC AUTO: 94 FL (ref 82–98)
MCV RBC AUTO: 94 FL (ref 82–98)
MCV RBC AUTO: 95 FL (ref 82–98)
METHADONE UR QL SCN>300 NG/ML: NEGATIVE
MICROSCOPIC COMMENT: ABNORMAL
MODE: ABNORMAL
MONOCYTES # BLD AUTO: 0.1 K/UL (ref 0.3–1)
MONOCYTES # BLD AUTO: 0.2 K/UL (ref 0.3–1)
MONOCYTES # BLD AUTO: 0.3 K/UL (ref 0.3–1)
MONOCYTES # BLD AUTO: 0.3 K/UL (ref 0.3–1)
MONOCYTES # BLD AUTO: 0.4 K/UL (ref 0.3–1)
MONOCYTES NFR BLD: 1.9 % (ref 4–15)
MONOCYTES NFR BLD: 4.9 % (ref 4–15)
MONOCYTES NFR BLD: 5 % (ref 4–15)
MONOCYTES NFR BLD: 8.2 % (ref 4–15)
MONOCYTES NFR BLD: 9.1 % (ref 4–15)
MV PEAK A VEL: 0.53 M/S
MV PEAK E VEL: 0.72 M/S
MV STENOSIS PRESSURE HALF TIME: 50.94 MS
MV VALVE AREA P 1/2 METHOD: 4.32 CM2
NEUTROPHILS # BLD AUTO: 1.9 K/UL (ref 1.8–7.7)
NEUTROPHILS # BLD AUTO: 2.2 K/UL (ref 1.8–7.7)
NEUTROPHILS # BLD AUTO: 2.8 K/UL (ref 1.8–7.7)
NEUTROPHILS # BLD AUTO: 4.4 K/UL (ref 1.8–7.7)
NEUTROPHILS # BLD AUTO: 5.9 K/UL (ref 1.8–7.7)
NEUTROPHILS NFR BLD: 55.2 % (ref 38–73)
NEUTROPHILS NFR BLD: 62 % (ref 38–73)
NEUTROPHILS NFR BLD: 62 % (ref 38–73)
NEUTROPHILS NFR BLD: 72 % (ref 38–73)
NEUTROPHILS NFR BLD: 79.5 % (ref 38–73)
NITRITE UR QL STRIP: NEGATIVE
NRBC BLD-RTO: 0 /100 WBC
OPIATES UR QL SCN: NEGATIVE
PCO2 BLDA: 41 MMHG (ref 35–45)
PCP UR QL SCN>25 NG/ML: NEGATIVE
PEEP: 5
PH SMN: 7.43 [PH] (ref 7.35–7.45)
PH UR STRIP: 7 [PH] (ref 5–8)
PH UR STRIP: 8 [PH] (ref 5–8)
PHOSPHATE SERPL-MCNC: 2.2 MG/DL (ref 2.7–4.5)
PISA AR MAX VEL: 2.91 M/S
PISA MRMAX VEL: 4.05 M/S
PLATELET # BLD AUTO: 109 K/UL (ref 150–450)
PLATELET # BLD AUTO: 130 K/UL (ref 150–450)
PLATELET # BLD AUTO: 131 K/UL (ref 150–450)
PLATELET # BLD AUTO: 142 K/UL (ref 150–450)
PLATELET # BLD AUTO: 152 K/UL (ref 150–450)
PLATELET BLD QL SMEAR: ABNORMAL
PMV BLD AUTO: 10.7 FL (ref 9.2–12.9)
PMV BLD AUTO: 11 FL (ref 9.2–12.9)
PMV BLD AUTO: 11.9 FL (ref 9.2–12.9)
PMV BLD AUTO: 9.9 FL (ref 9.2–12.9)
PMV BLD AUTO: 9.9 FL (ref 9.2–12.9)
PO2 BLDA: 342 MMHG (ref 80–100)
POC BE: 3 MMOL/L
POC SATURATED O2: 100 % (ref 95–100)
POCT GLUCOSE: 64 MG/DL (ref 70–110)
POCT GLUCOSE: 95 MG/DL (ref 70–110)
POIKILOCYTOSIS BLD QL SMEAR: SLIGHT
POLYCHROMASIA BLD QL SMEAR: ABNORMAL
POTASSIUM SERPL-SCNC: 3.4 MMOL/L (ref 3.5–5.1)
POTASSIUM SERPL-SCNC: 3.6 MMOL/L (ref 3.5–5.1)
POTASSIUM SERPL-SCNC: 4.1 MMOL/L (ref 3.5–5.1)
POTASSIUM SERPL-SCNC: 4.2 MMOL/L (ref 3.5–5.1)
POTASSIUM SERPL-SCNC: 4.5 MMOL/L (ref 3.5–5.1)
POTASSIUM SERPL-SCNC: 4.9 MMOL/L (ref 3.5–5.1)
PROT SERPL-MCNC: 6 G/DL (ref 6–8.4)
PROT SERPL-MCNC: 6.5 G/DL (ref 6–8.4)
PROT SERPL-MCNC: 6.9 G/DL (ref 6–8.4)
PROT SERPL-MCNC: 7.7 G/DL (ref 6–8.4)
PROT UR QL STRIP: ABNORMAL
PROT UR QL STRIP: NEGATIVE
PROTHROMBIN TIME: 10.2 SEC (ref 9–12.5)
PROTHROMBIN TIME: 9.8 SEC (ref 9–12.5)
PV MEAN GRADIENT: 1.47 MMHG
RA MAJOR: 3.92 CM
RA PRESSURE: 3 MMHG
RBC # BLD AUTO: 2.93 M/UL (ref 4–5.4)
RBC # BLD AUTO: 2.97 M/UL (ref 4–5.4)
RBC # BLD AUTO: 2.99 M/UL (ref 4–5.4)
RBC # BLD AUTO: 3.34 M/UL (ref 4–5.4)
RBC # BLD AUTO: 3.75 M/UL (ref 4–5.4)
RBC #/AREA URNS AUTO: 36 /HPF (ref 0–4)
RBC #/AREA URNS HPF: 2 /HPF (ref 0–4)
RBC #/AREA URNS HPF: 33 /HPF (ref 0–4)
SAMPLE: ABNORMAL
SCHISTOCYTES BLD QL SMEAR: ABNORMAL
SCHISTOCYTES BLD QL SMEAR: PRESENT
SITE: ABNORMAL
SODIUM SERPL-SCNC: 138 MMOL/L (ref 136–145)
SODIUM SERPL-SCNC: 139 MMOL/L (ref 136–145)
SODIUM SERPL-SCNC: 140 MMOL/L (ref 136–145)
SODIUM SERPL-SCNC: 143 MMOL/L (ref 136–145)
SP GR UR STRIP: 1 (ref 1–1.03)
SP GR UR STRIP: 1.01 (ref 1–1.03)
SP GR UR STRIP: 1.01 (ref 1–1.03)
SP GR UR STRIP: 1.02 (ref 1–1.03)
SPECIMEN OUTDATE: NORMAL
SQUAMOUS #/AREA URNS AUTO: 0 /HPF
STJ: 3.29 CM
TOXICOLOGY INFORMATION: ABNORMAL
TRICUSPID ANNULAR PLANE SYSTOLIC EXCURSION: 1.9 CM
TROPONIN I SERPL DL<=0.01 NG/ML-MCNC: 0.02 NG/ML (ref 0–0.03)
TROPONIN I SERPL DL<=0.01 NG/ML-MCNC: <0.006 NG/ML (ref 0–0.03)
TROPONIN I SERPL DL<=0.01 NG/ML-MCNC: <0.006 NG/ML (ref 0–0.03)
TSH SERPL DL<=0.005 MIU/L-ACNC: 2.41 UIU/ML (ref 0.4–4)
UNIT NUMBER: NORMAL
URN SPEC COLLECT METH UR: ABNORMAL
UROBILINOGEN UR STRIP-ACNC: NEGATIVE EU/DL
VT: 400
WBC # BLD AUTO: 3.07 K/UL (ref 3.9–12.7)
WBC # BLD AUTO: 3.53 K/UL (ref 3.9–12.7)
WBC # BLD AUTO: 3.86 K/UL (ref 3.9–12.7)
WBC # BLD AUTO: 7.43 K/UL (ref 3.9–12.7)
WBC # BLD AUTO: 7.87 K/UL (ref 3.9–12.7)
WBC #/AREA URNS AUTO: 2 /HPF (ref 0–5)
WBC #/AREA URNS HPF: 2 /HPF (ref 0–5)
WBC #/AREA URNS HPF: 3 /HPF (ref 0–5)
WBC CLUMPS URNS QL MICRO: ABNORMAL

## 2023-01-01 PROCEDURE — 99285 EMERGENCY DEPT VISIT HI MDM: CPT | Mod: 25

## 2023-01-01 PROCEDURE — 99285 EMERGENCY DEPT VISIT HI MDM: CPT | Mod: ,,, | Performed by: NURSE PRACTITIONER

## 2023-01-01 PROCEDURE — 83880 ASSAY OF NATRIURETIC PEPTIDE: CPT | Performed by: EMERGENCY MEDICINE

## 2023-01-01 PROCEDURE — 25000003 PHARM REV CODE 250: Performed by: STUDENT IN AN ORGANIZED HEALTH CARE EDUCATION/TRAINING PROGRAM

## 2023-01-01 PROCEDURE — 92004 COMPRE OPH EXAM NEW PT 1/>: CPT | Mod: S$GLB,,, | Performed by: OPTOMETRIST

## 2023-01-01 PROCEDURE — 81001 URINALYSIS AUTO W/SCOPE: CPT | Performed by: STUDENT IN AN ORGANIZED HEALTH CARE EDUCATION/TRAINING PROGRAM

## 2023-01-01 PROCEDURE — 96375 TX/PRO/DX INJ NEW DRUG ADDON: CPT | Mod: 59

## 2023-01-01 PROCEDURE — C9248 INJ, CLEVIDIPINE BUTYRATE: HCPCS | Mod: JZ,JG | Performed by: EMERGENCY MEDICINE

## 2023-01-01 PROCEDURE — 93010 ELECTROCARDIOGRAM REPORT: CPT | Mod: ,,, | Performed by: INTERNAL MEDICINE

## 2023-01-01 PROCEDURE — 51702 INSERT TEMP BLADDER CATH: CPT

## 2023-01-01 PROCEDURE — 25000003 PHARM REV CODE 250: Performed by: EMERGENCY MEDICINE

## 2023-01-01 PROCEDURE — 96365 THER/PROPH/DIAG IV INF INIT: CPT | Mod: 59

## 2023-01-01 PROCEDURE — 96361 HYDRATE IV INFUSION ADD-ON: CPT

## 2023-01-01 PROCEDURE — 99285 EMERGENCY DEPT VISIT HI MDM: CPT | Mod: GC,,, | Performed by: NEUROLOGICAL SURGERY

## 2023-01-01 PROCEDURE — 80053 COMPREHEN METABOLIC PANEL: CPT | Mod: 91 | Performed by: STUDENT IN AN ORGANIZED HEALTH CARE EDUCATION/TRAINING PROGRAM

## 2023-01-01 PROCEDURE — 99900035 HC TECH TIME PER 15 MIN (STAT)

## 2023-01-01 PROCEDURE — 93010 ELECTROCARDIOGRAM REPORT: CPT | Mod: ,,, | Performed by: STUDENT IN AN ORGANIZED HEALTH CARE EDUCATION/TRAINING PROGRAM

## 2023-01-01 PROCEDURE — 81000 URINALYSIS NONAUTO W/SCOPE: CPT | Mod: 59 | Performed by: EMERGENCY MEDICINE

## 2023-01-01 PROCEDURE — 99214 PR OFFICE/OUTPT VISIT, EST, LEVL IV, 30-39 MIN: ICD-10-PCS | Mod: S$GLB,,,

## 2023-01-01 PROCEDURE — 80307 DRUG TEST PRSMV CHEM ANLYZR: CPT | Performed by: EMERGENCY MEDICINE

## 2023-01-01 PROCEDURE — 94761 N-INVAS EAR/PLS OXIMETRY MLT: CPT

## 2023-01-01 PROCEDURE — G0378 HOSPITAL OBSERVATION PER HR: HCPCS

## 2023-01-01 PROCEDURE — 3075F PR MOST RECENT SYSTOLIC BLOOD PRESS GE 130-139MM HG: ICD-10-PCS | Mod: CPTII,S$GLB,,

## 2023-01-01 PROCEDURE — 97166 OT EVAL MOD COMPLEX 45 MIN: CPT

## 2023-01-01 PROCEDURE — 27200966 HC CLOSED SUCTION SYSTEM

## 2023-01-01 PROCEDURE — 83735 ASSAY OF MAGNESIUM: CPT | Performed by: EMERGENCY MEDICINE

## 2023-01-01 PROCEDURE — 3079F PR MOST RECENT DIASTOLIC BLOOD PRESSURE 80-89 MM HG: ICD-10-PCS | Mod: CPTII,S$GLB,,

## 2023-01-01 PROCEDURE — 83605 ASSAY OF LACTIC ACID: CPT | Performed by: EMERGENCY MEDICINE

## 2023-01-01 PROCEDURE — P9035 PLATELET PHERES LEUKOREDUCED: HCPCS | Performed by: NURSE PRACTITIONER

## 2023-01-01 PROCEDURE — 94002 VENT MGMT INPAT INIT DAY: CPT

## 2023-01-01 PROCEDURE — 80053 COMPREHEN METABOLIC PANEL: CPT | Performed by: EMERGENCY MEDICINE

## 2023-01-01 PROCEDURE — 4010F PR ACE/ARB THEARPY RXD/TAKEN: ICD-10-PCS | Mod: CPTII,S$GLB,, | Performed by: OPTOMETRIST

## 2023-01-01 PROCEDURE — P9612 CATHETERIZE FOR URINE SPEC: HCPCS

## 2023-01-01 PROCEDURE — 86900 BLOOD TYPING SEROLOGIC ABO: CPT | Performed by: STUDENT IN AN ORGANIZED HEALTH CARE EDUCATION/TRAINING PROGRAM

## 2023-01-01 PROCEDURE — 93010 EKG 12-LEAD: ICD-10-PCS | Mod: ,,, | Performed by: STUDENT IN AN ORGANIZED HEALTH CARE EDUCATION/TRAINING PROGRAM

## 2023-01-01 PROCEDURE — 99285 PR EMERGENCY DEPT VISIT,LEVEL V: ICD-10-PCS | Mod: ,,, | Performed by: NURSE PRACTITIONER

## 2023-01-01 PROCEDURE — 96365 THER/PROPH/DIAG IV INF INIT: CPT

## 2023-01-01 PROCEDURE — 27100108

## 2023-01-01 PROCEDURE — 63600175 PHARM REV CODE 636 W HCPCS: Performed by: INTERNAL MEDICINE

## 2023-01-01 PROCEDURE — 1160F RVW MEDS BY RX/DR IN RCRD: CPT | Mod: CPTII,S$GLB,,

## 2023-01-01 PROCEDURE — 81000 URINALYSIS NONAUTO W/SCOPE: CPT | Performed by: EMERGENCY MEDICINE

## 2023-01-01 PROCEDURE — 93005 ELECTROCARDIOGRAM TRACING: CPT

## 2023-01-01 PROCEDURE — 1159F MED LIST DOCD IN RCRD: CPT | Mod: CPTII,S$GLB,,

## 2023-01-01 PROCEDURE — 25000003 PHARM REV CODE 250: Performed by: INTERNAL MEDICINE

## 2023-01-01 PROCEDURE — 81003 URINALYSIS AUTO W/O SCOPE: CPT | Performed by: EMERGENCY MEDICINE

## 2023-01-01 PROCEDURE — 96374 THER/PROPH/DIAG INJ IV PUSH: CPT

## 2023-01-01 PROCEDURE — 4010F ACE/ARB THERAPY RXD/TAKEN: CPT | Mod: CPTII,S$GLB,, | Performed by: OPTOMETRIST

## 2023-01-01 PROCEDURE — 36415 COLL VENOUS BLD VENIPUNCTURE: CPT | Performed by: INTERNAL MEDICINE

## 2023-01-01 PROCEDURE — 31500 INSERT EMERGENCY AIRWAY: CPT

## 2023-01-01 PROCEDURE — 93010 ELECTROCARDIOGRAM REPORT: CPT | Mod: 76,,, | Performed by: STUDENT IN AN ORGANIZED HEALTH CARE EDUCATION/TRAINING PROGRAM

## 2023-01-01 PROCEDURE — 97530 THERAPEUTIC ACTIVITIES: CPT

## 2023-01-01 PROCEDURE — 99214 PR OFFICE/OUTPT VISIT, EST, LEVL IV, 30-39 MIN: ICD-10-PCS | Mod: ,,,

## 2023-01-01 PROCEDURE — 1159F PR MEDICATION LIST DOCUMENTED IN MEDICAL RECORD: ICD-10-PCS | Mod: CPTII,S$GLB,,

## 2023-01-01 PROCEDURE — 85025 COMPLETE CBC W/AUTO DIFF WBC: CPT | Performed by: EMERGENCY MEDICINE

## 2023-01-01 PROCEDURE — 92004 PR EYE EXAM, NEW PATIENT,COMPREHESV: ICD-10-PCS | Mod: S$GLB,,, | Performed by: OPTOMETRIST

## 2023-01-01 PROCEDURE — 99214 OFFICE O/P EST MOD 30 MIN: CPT | Mod: S$GLB,,,

## 2023-01-01 PROCEDURE — 84443 ASSAY THYROID STIM HORMONE: CPT | Performed by: EMERGENCY MEDICINE

## 2023-01-01 PROCEDURE — 3008F PR BODY MASS INDEX (BMI) DOCUMENTED: ICD-10-PCS | Mod: CPTII,S$GLB,,

## 2023-01-01 PROCEDURE — 85610 PROTHROMBIN TIME: CPT | Performed by: STUDENT IN AN ORGANIZED HEALTH CARE EDUCATION/TRAINING PROGRAM

## 2023-01-01 PROCEDURE — 87040 BLOOD CULTURE FOR BACTERIA: CPT | Performed by: STUDENT IN AN ORGANIZED HEALTH CARE EDUCATION/TRAINING PROGRAM

## 2023-01-01 PROCEDURE — 85610 PROTHROMBIN TIME: CPT | Performed by: EMERGENCY MEDICINE

## 2023-01-01 PROCEDURE — 85025 COMPLETE CBC W/AUTO DIFF WBC: CPT | Mod: 91 | Performed by: STUDENT IN AN ORGANIZED HEALTH CARE EDUCATION/TRAINING PROGRAM

## 2023-01-01 PROCEDURE — 96360 HYDRATION IV INFUSION INIT: CPT | Mod: 59

## 2023-01-01 PROCEDURE — 93005 ELECTROCARDIOGRAM TRACING: CPT | Mod: 59

## 2023-01-01 PROCEDURE — 82962 GLUCOSE BLOOD TEST: CPT | Mod: 91

## 2023-01-01 PROCEDURE — 82803 BLOOD GASES ANY COMBINATION: CPT

## 2023-01-01 PROCEDURE — 84100 ASSAY OF PHOSPHORUS: CPT | Performed by: INTERNAL MEDICINE

## 2023-01-01 PROCEDURE — 99291 CRITICAL CARE FIRST HOUR: CPT

## 2023-01-01 PROCEDURE — 3079F DIAST BP 80-89 MM HG: CPT | Mod: CPTII,S$GLB,,

## 2023-01-01 PROCEDURE — 99223 PR INITIAL HOSPITAL CARE,LEVL III: ICD-10-PCS | Mod: ,,, | Performed by: PSYCHIATRY & NEUROLOGY

## 2023-01-01 PROCEDURE — 99214 OFFICE O/P EST MOD 30 MIN: CPT | Mod: ,,,

## 2023-01-01 PROCEDURE — 27100171 HC OXYGEN HIGH FLOW UP TO 24 HOURS

## 2023-01-01 PROCEDURE — 99285 PR EMERGENCY DEPT VISIT,LEVEL V: ICD-10-PCS | Mod: GC,,, | Performed by: NEUROLOGICAL SURGERY

## 2023-01-01 PROCEDURE — 85025 COMPLETE CBC W/AUTO DIFF WBC: CPT | Performed by: INTERNAL MEDICINE

## 2023-01-01 PROCEDURE — 3008F BODY MASS INDEX DOCD: CPT | Mod: CPTII,S$GLB,,

## 2023-01-01 PROCEDURE — 99999 PR PBB SHADOW E&M-EST. PATIENT-LVL II: CPT | Mod: PBBFAC,,, | Performed by: OPTOMETRIST

## 2023-01-01 PROCEDURE — 63600175 PHARM REV CODE 636 W HCPCS: Performed by: STUDENT IN AN ORGANIZED HEALTH CARE EDUCATION/TRAINING PROGRAM

## 2023-01-01 PROCEDURE — 84484 ASSAY OF TROPONIN QUANT: CPT | Performed by: EMERGENCY MEDICINE

## 2023-01-01 PROCEDURE — 92015 PR REFRACTION: ICD-10-PCS | Mod: S$GLB,,, | Performed by: OPTOMETRIST

## 2023-01-01 PROCEDURE — 99999 PR PBB SHADOW E&M-EST. PATIENT-LVL III: CPT | Mod: PBBFAC,,,

## 2023-01-01 PROCEDURE — 99900026 HC AIRWAY MAINTENANCE (STAT)

## 2023-01-01 PROCEDURE — 93010 EKG 12-LEAD: ICD-10-PCS | Mod: ,,, | Performed by: INTERNAL MEDICINE

## 2023-01-01 PROCEDURE — 99999 PR PBB SHADOW E&M-EST. PATIENT-LVL III: ICD-10-PCS | Mod: PBBFAC,,,

## 2023-01-01 PROCEDURE — 1159F MED LIST DOCD IN RCRD: CPT | Mod: CPTII,S$GLB,, | Performed by: OPTOMETRIST

## 2023-01-01 PROCEDURE — 80048 BASIC METABOLIC PNL TOTAL CA: CPT | Performed by: INTERNAL MEDICINE

## 2023-01-01 PROCEDURE — 1160F PR REVIEW ALL MEDS BY PRESCRIBER/CLIN PHARMACIST DOCUMENTED: ICD-10-PCS | Mod: CPTII,S$GLB,,

## 2023-01-01 PROCEDURE — 97162 PT EVAL MOD COMPLEX 30 MIN: CPT

## 2023-01-01 PROCEDURE — 85730 THROMBOPLASTIN TIME PARTIAL: CPT | Mod: 91 | Performed by: STUDENT IN AN ORGANIZED HEALTH CARE EDUCATION/TRAINING PROGRAM

## 2023-01-01 PROCEDURE — 92015 DETERMINE REFRACTIVE STATE: CPT | Mod: S$GLB,,, | Performed by: OPTOMETRIST

## 2023-01-01 PROCEDURE — 36600 WITHDRAWAL OF ARTERIAL BLOOD: CPT

## 2023-01-01 PROCEDURE — 3075F SYST BP GE 130 - 139MM HG: CPT | Mod: CPTII,S$GLB,,

## 2023-01-01 PROCEDURE — 1159F PR MEDICATION LIST DOCUMENTED IN MEDICAL RECORD: ICD-10-PCS | Mod: CPTII,S$GLB,, | Performed by: OPTOMETRIST

## 2023-01-01 PROCEDURE — 99223 1ST HOSP IP/OBS HIGH 75: CPT | Mod: ,,, | Performed by: PSYCHIATRY & NEUROLOGY

## 2023-01-01 PROCEDURE — 63600175 PHARM REV CODE 636 W HCPCS: Performed by: NURSE PRACTITIONER

## 2023-01-01 PROCEDURE — 4010F ACE/ARB THERAPY RXD/TAKEN: CPT | Mod: CPTII,S$GLB,,

## 2023-01-01 PROCEDURE — 85730 THROMBOPLASTIN TIME PARTIAL: CPT | Performed by: EMERGENCY MEDICINE

## 2023-01-01 PROCEDURE — 99999 PR PBB SHADOW E&M-EST. PATIENT-LVL II: ICD-10-PCS | Mod: PBBFAC,,, | Performed by: OPTOMETRIST

## 2023-01-01 PROCEDURE — 4010F PR ACE/ARB THEARPY RXD/TAKEN: ICD-10-PCS | Mod: CPTII,S$GLB,,

## 2023-01-01 PROCEDURE — 83735 ASSAY OF MAGNESIUM: CPT | Performed by: INTERNAL MEDICINE

## 2023-01-01 PROCEDURE — 63600175 PHARM REV CODE 636 W HCPCS: Mod: JZ,JG | Performed by: EMERGENCY MEDICINE

## 2023-01-01 PROCEDURE — 82140 ASSAY OF AMMONIA: CPT | Performed by: EMERGENCY MEDICINE

## 2023-01-01 PROCEDURE — 36430 TRANSFUSION BLD/BLD COMPNT: CPT | Mod: 59

## 2023-01-01 PROCEDURE — 96360 HYDRATION IV INFUSION INIT: CPT

## 2023-01-01 RX ORDER — ROCURONIUM BROMIDE 10 MG/ML
INJECTION, SOLUTION INTRAVENOUS
Status: DISCONTINUED
Start: 2023-01-01 | End: 2023-01-01 | Stop reason: WASHOUT

## 2023-01-01 RX ORDER — SODIUM CHLORIDE 9 MG/ML
INJECTION, SOLUTION INTRAVENOUS CONTINUOUS
Status: DISCONTINUED | OUTPATIENT
Start: 2023-01-01 | End: 2023-01-01 | Stop reason: HOSPADM

## 2023-01-01 RX ORDER — SODIUM CHLORIDE 0.9 % (FLUSH) 0.9 %
10 SYRINGE (ML) INJECTION
Status: DISCONTINUED | OUTPATIENT
Start: 2023-01-01 | End: 2023-01-01 | Stop reason: HOSPADM

## 2023-01-01 RX ORDER — TACROLIMUS 1 MG/G
OINTMENT TOPICAL
COMMUNITY
Start: 2023-01-01

## 2023-01-01 RX ORDER — LOSARTAN POTASSIUM 25 MG/1
25 TABLET ORAL DAILY
Start: 2023-01-01

## 2023-01-01 RX ORDER — METOCLOPRAMIDE HYDROCHLORIDE 5 MG/ML
5 INJECTION INTRAMUSCULAR; INTRAVENOUS EVERY 6 HOURS PRN
Status: DISCONTINUED | OUTPATIENT
Start: 2023-01-01 | End: 2023-01-01 | Stop reason: HOSPADM

## 2023-01-01 RX ORDER — ETOMIDATE 2 MG/ML
INJECTION INTRAVENOUS CODE/TRAUMA/SEDATION MEDICATION
Status: COMPLETED | OUTPATIENT
Start: 2023-01-01 | End: 2023-01-01

## 2023-01-01 RX ORDER — POLYETHYLENE GLYCOL 3350 17 G/17G
17 POWDER, FOR SOLUTION ORAL DAILY
Status: DISCONTINUED | OUTPATIENT
Start: 2023-01-01 | End: 2023-01-01 | Stop reason: HOSPADM

## 2023-01-01 RX ORDER — SIMETHICONE 80 MG
160 TABLET,CHEWABLE ORAL EVERY 12 HOURS PRN
COMMUNITY

## 2023-01-01 RX ORDER — PHENYLEPHRINE HCL IN 0.9% NACL 20MG/250ML
0-5 PLASTIC BAG, INJECTION (ML) INTRAVENOUS CONTINUOUS
Status: DISCONTINUED | OUTPATIENT
Start: 2023-01-01 | End: 2023-01-01 | Stop reason: HOSPADM

## 2023-01-01 RX ORDER — GABAPENTIN 100 MG/1
100 CAPSULE ORAL NIGHTLY
COMMUNITY
Start: 2023-01-01

## 2023-01-01 RX ORDER — LEVETIRACETAM 500 MG/5ML
500 INJECTION, SOLUTION, CONCENTRATE INTRAVENOUS EVERY 12 HOURS
Status: DISCONTINUED | OUTPATIENT
Start: 2023-01-01 | End: 2023-01-01 | Stop reason: HOSPADM

## 2023-01-01 RX ORDER — MANNITOL 250 MG/ML
25 INJECTION, SOLUTION INTRAVENOUS
Status: DISCONTINUED | OUTPATIENT
Start: 2023-01-01 | End: 2023-01-01

## 2023-01-01 RX ORDER — DILTIAZEM HYDROCHLORIDE 30 MG/1
30 TABLET, FILM COATED ORAL EVERY 8 HOURS
Qty: 90 TABLET | Refills: 11 | Status: SHIPPED | OUTPATIENT
Start: 2023-01-01 | End: 2023-01-01 | Stop reason: SDUPTHER

## 2023-01-01 RX ORDER — CLOPIDOGREL 300 MG/1
300 TABLET, FILM COATED ORAL
Status: COMPLETED | OUTPATIENT
Start: 2023-01-01 | End: 2023-01-01

## 2023-01-01 RX ORDER — ACETAMINOPHEN 650 MG/1
650 SUPPOSITORY RECTAL EVERY 6 HOURS PRN
Status: DISCONTINUED | OUTPATIENT
Start: 2023-01-01 | End: 2023-01-01 | Stop reason: HOSPADM

## 2023-01-01 RX ORDER — HYDRALAZINE HYDROCHLORIDE 50 MG/1
50 TABLET, FILM COATED ORAL
Status: ON HOLD | COMMUNITY
Start: 2022-05-29 | End: 2023-01-01 | Stop reason: HOSPADM

## 2023-01-01 RX ORDER — FERROUS GLUCONATE 324(38)MG
TABLET ORAL
Status: ON HOLD | COMMUNITY
End: 2023-01-01 | Stop reason: HOSPADM

## 2023-01-01 RX ORDER — ATORVASTATIN CALCIUM 20 MG/1
20 TABLET, FILM COATED ORAL DAILY
Qty: 90 TABLET | Refills: 3 | Status: SHIPPED | OUTPATIENT
Start: 2023-01-01 | End: 2024-05-19

## 2023-01-01 RX ORDER — AMMONIUM LACTATE 12 G/100G
LOTION TOPICAL
Status: ON HOLD | COMMUNITY
Start: 2023-01-01 | End: 2023-01-01 | Stop reason: HOSPADM

## 2023-01-01 RX ORDER — LANOLIN ALCOHOL/MO/W.PET/CERES
400 CREAM (GRAM) TOPICAL DAILY
COMMUNITY
End: 2023-01-01 | Stop reason: ALTCHOICE

## 2023-01-01 RX ORDER — CALCIUM CARBONATE 600 MG
600 TABLET ORAL ONCE
Status: ON HOLD | COMMUNITY
End: 2023-01-01 | Stop reason: HOSPADM

## 2023-01-01 RX ORDER — PROMETHAZINE HYDROCHLORIDE 12.5 MG/1
12.5 TABLET ORAL 2 TIMES DAILY PRN
COMMUNITY
Start: 2023-01-01

## 2023-01-01 RX ORDER — PREDNISONE 5 MG/1
5 TABLET ORAL DAILY
Status: DISCONTINUED | OUTPATIENT
Start: 2023-01-01 | End: 2023-01-01 | Stop reason: HOSPADM

## 2023-01-01 RX ORDER — LACTULOSE 10 G/15ML
10 SOLUTION ORAL; RECTAL 3 TIMES DAILY PRN
Refills: 0
Start: 2023-01-01

## 2023-01-01 RX ORDER — HYDROCODONE BITARTRATE AND ACETAMINOPHEN 500; 5 MG/1; MG/1
TABLET ORAL
Status: DISCONTINUED | OUTPATIENT
Start: 2023-01-01 | End: 2023-01-01 | Stop reason: HOSPADM

## 2023-01-01 RX ORDER — NICARDIPINE HYDROCHLORIDE 0.2 MG/ML
0-15 INJECTION INTRAVENOUS CONTINUOUS
Status: DISCONTINUED | OUTPATIENT
Start: 2023-01-01 | End: 2023-01-01 | Stop reason: HOSPADM

## 2023-01-01 RX ORDER — PROMETHAZINE HYDROCHLORIDE 12.5 MG/1
12.5 TABLET ORAL EVERY 6 HOURS PRN
Status: DISCONTINUED | OUTPATIENT
Start: 2023-01-01 | End: 2023-01-01 | Stop reason: HOSPADM

## 2023-01-01 RX ORDER — DILTIAZEM HYDROCHLORIDE 5 MG/ML
5 INJECTION INTRAVENOUS ONCE
Status: COMPLETED | OUTPATIENT
Start: 2023-01-01 | End: 2023-01-01

## 2023-01-01 RX ORDER — NICARDIPINE HYDROCHLORIDE 0.2 MG/ML
0-15 INJECTION INTRAVENOUS CONTINUOUS
Status: DISCONTINUED | OUTPATIENT
Start: 2023-01-01 | End: 2023-01-01

## 2023-01-01 RX ORDER — ASPIRIN 81 MG/1
81 TABLET ORAL DAILY
Qty: 90 TABLET | Refills: 0 | Status: SHIPPED | OUTPATIENT
Start: 2023-01-01 | End: 2023-08-18

## 2023-01-01 RX ORDER — DILTIAZEM HYDROCHLORIDE 30 MG/1
30 TABLET, FILM COATED ORAL
Status: DISCONTINUED | OUTPATIENT
Start: 2023-01-01 | End: 2023-01-01 | Stop reason: HOSPADM

## 2023-01-01 RX ORDER — ATORVASTATIN CALCIUM 20 MG/1
20 TABLET, FILM COATED ORAL DAILY
Qty: 90 TABLET | Refills: 3 | Status: SHIPPED | OUTPATIENT
Start: 2023-01-01 | End: 2023-01-01 | Stop reason: SDUPTHER

## 2023-01-01 RX ORDER — LACTULOSE 10 G/15ML
SOLUTION ORAL; RECTAL
Status: ON HOLD | COMMUNITY
Start: 2023-01-01 | End: 2023-01-01 | Stop reason: SDUPTHER

## 2023-01-01 RX ORDER — MANNITOL 20 G/100ML
25 INJECTION, SOLUTION INTRAVENOUS
Status: COMPLETED | OUTPATIENT
Start: 2023-01-01 | End: 2023-01-01

## 2023-01-01 RX ORDER — ALPRAZOLAM 0.25 MG/1
0.25 TABLET ORAL ONCE
Status: DISCONTINUED | OUTPATIENT
Start: 2023-01-01 | End: 2023-01-01 | Stop reason: HOSPADM

## 2023-01-01 RX ORDER — BETAMETHASONE DIPROPIONATE 0.5 MG/G
LOTION TOPICAL
Status: ON HOLD | COMMUNITY
Start: 2023-01-01 | End: 2023-01-01 | Stop reason: HOSPADM

## 2023-01-01 RX ORDER — POLYETHYLENE GLYCOL 3350 17 G/17G
POWDER, FOR SOLUTION ORAL
COMMUNITY

## 2023-01-01 RX ORDER — PANTOPRAZOLE SODIUM 40 MG/1
40 TABLET, DELAYED RELEASE ORAL DAILY
COMMUNITY

## 2023-01-01 RX ORDER — LOSARTAN POTASSIUM 25 MG/1
TABLET ORAL
Status: ON HOLD | COMMUNITY
End: 2023-01-01 | Stop reason: SDUPTHER

## 2023-01-01 RX ORDER — DILTIAZEM HYDROCHLORIDE 30 MG/1
30 TABLET, FILM COATED ORAL EVERY 8 HOURS
Qty: 90 TABLET | Refills: 11 | Status: SHIPPED | OUTPATIENT
Start: 2023-01-01 | End: 2023-01-01

## 2023-01-01 RX ORDER — CLONIDINE HYDROCHLORIDE 0.1 MG/1
0.1 TABLET ORAL EVERY 6 HOURS PRN
Status: ON HOLD | COMMUNITY
Start: 2022-05-29 | End: 2023-01-01 | Stop reason: HOSPADM

## 2023-01-01 RX ORDER — MULTIVITAMIN
TABLET ORAL ONCE
COMMUNITY

## 2023-01-01 RX ORDER — CLOPIDOGREL BISULFATE 75 MG/1
75 TABLET ORAL DAILY
Qty: 21 TABLET | Refills: 0 | Status: SHIPPED | OUTPATIENT
Start: 2023-01-01 | End: 2023-01-01 | Stop reason: SDUPTHER

## 2023-01-01 RX ORDER — CETIRIZINE HYDROCHLORIDE 10 MG/1
10 TABLET ORAL DAILY
COMMUNITY

## 2023-01-01 RX ORDER — DILTIAZEM HYDROCHLORIDE 30 MG/1
60 TABLET, FILM COATED ORAL EVERY 8 HOURS
Qty: 180 TABLET | Refills: 11 | Status: SHIPPED | OUTPATIENT
Start: 2023-01-01 | End: 2023-01-01

## 2023-01-01 RX ORDER — PANTOPRAZOLE SODIUM 40 MG/1
40 TABLET, DELAYED RELEASE ORAL DAILY
Status: DISCONTINUED | OUTPATIENT
Start: 2023-01-01 | End: 2023-01-01 | Stop reason: HOSPADM

## 2023-01-01 RX ORDER — CLOPIDOGREL BISULFATE 75 MG/1
75 TABLET ORAL DAILY
Qty: 21 TABLET | Refills: 0 | Status: SHIPPED | OUTPATIENT
Start: 2023-01-01 | End: 2023-01-01 | Stop reason: ALTCHOICE

## 2023-01-01 RX ORDER — LEVETIRACETAM 500 MG/5ML
1000 INJECTION, SOLUTION, CONCENTRATE INTRAVENOUS
Status: COMPLETED | OUTPATIENT
Start: 2023-01-01 | End: 2023-01-01

## 2023-01-01 RX ORDER — DILTIAZEM HYDROCHLORIDE 60 MG/1
60 TABLET, FILM COATED ORAL EVERY 12 HOURS
Qty: 60 TABLET | Refills: 11 | Status: SHIPPED | OUTPATIENT
Start: 2023-01-01 | End: 2024-06-11

## 2023-01-01 RX ORDER — PROMETHAZINE HYDROCHLORIDE 12.5 MG/1
TABLET ORAL
Status: ON HOLD | COMMUNITY
Start: 2023-01-01 | End: 2023-01-01 | Stop reason: HOSPADM

## 2023-01-01 RX ORDER — LORAZEPAM 2 MG/ML
1 INJECTION INTRAMUSCULAR ONCE AS NEEDED
Status: COMPLETED | OUTPATIENT
Start: 2023-01-01 | End: 2023-01-01

## 2023-01-01 RX ADMIN — CEFTRIAXONE 1 G: 1 INJECTION, POWDER, FOR SOLUTION INTRAMUSCULAR; INTRAVENOUS at 10:08

## 2023-01-01 RX ADMIN — LORAZEPAM 1 MG: 2 INJECTION INTRAMUSCULAR; INTRAVENOUS at 08:05

## 2023-01-01 RX ADMIN — CLOPIDOGREL BISULFATE 300 MG: 300 TABLET, FILM COATED ORAL at 11:05

## 2023-01-01 RX ADMIN — SODIUM CHLORIDE 1000 ML: 9 INJECTION, SOLUTION INTRAVENOUS at 12:05

## 2023-01-01 RX ADMIN — SODIUM CHLORIDE 1000 ML: 9 INJECTION, SOLUTION INTRAVENOUS at 05:05

## 2023-01-01 RX ADMIN — Medication 0.5 MCG/KG/MIN: at 01:08

## 2023-01-01 RX ADMIN — PANTOPRAZOLE SODIUM 40 MG: 40 TABLET, DELAYED RELEASE ORAL at 02:05

## 2023-01-01 RX ADMIN — DILTIAZEM HYDROCHLORIDE 30 MG: 30 TABLET, FILM COATED ORAL at 02:05

## 2023-01-01 RX ADMIN — NICARDIPINE HYDROCHLORIDE 5 MG/HR: 0.2 INJECTION, SOLUTION INTRAVENOUS at 08:08

## 2023-01-01 RX ADMIN — SODIUM CHLORIDE: 9 INJECTION, SOLUTION INTRAVENOUS at 05:05

## 2023-01-01 RX ADMIN — DILTIAZEM HYDROCHLORIDE 30 MG: 30 TABLET, FILM COATED ORAL at 05:05

## 2023-01-01 RX ADMIN — DILTIAZEM HYDROCHLORIDE 30 MG: 30 TABLET, FILM COATED ORAL at 12:05

## 2023-01-01 RX ADMIN — LEVETIRACETAM 1000 MG: 500 INJECTION, SOLUTION INTRAVENOUS at 05:08

## 2023-01-01 RX ADMIN — PANTOPRAZOLE SODIUM 40 MG: 40 TABLET, DELAYED RELEASE ORAL at 08:05

## 2023-01-01 RX ADMIN — CLEVIPIDINE 2 MG/HR: 0.5 EMULSION INTRAVENOUS at 05:08

## 2023-01-01 RX ADMIN — SODIUM CHLORIDE 1000 ML: 0.9 INJECTION, SOLUTION INTRAVENOUS at 12:08

## 2023-01-01 RX ADMIN — PREDNISONE 5 MG: 5 TABLET ORAL at 08:05

## 2023-01-01 RX ADMIN — ETOMIDATE 20 MG: 2 INJECTION INTRAVENOUS at 04:08

## 2023-01-01 RX ADMIN — DILTIAZEM HYDROCHLORIDE 30 MG: 30 TABLET, FILM COATED ORAL at 04:05

## 2023-01-01 RX ADMIN — DILTIAZEM HYDROCHLORIDE 5 MG: 5 INJECTION INTRAVENOUS at 04:05

## 2023-01-01 RX ADMIN — DILTIAZEM HYDROCHLORIDE 30 MG: 30 TABLET, FILM COATED ORAL at 08:05

## 2023-01-01 RX ADMIN — METOCLOPRAMIDE 5 MG: 5 INJECTION, SOLUTION INTRAMUSCULAR; INTRAVENOUS at 04:05

## 2023-01-01 RX ADMIN — MANNITOL 25 G: 20 INJECTION, SOLUTION INTRAVENOUS at 05:08

## 2023-01-01 RX ADMIN — LEVETIRACETAM 500 MG: 100 INJECTION, SOLUTION INTRAVENOUS at 08:08

## 2023-01-01 RX ADMIN — SODIUM CHLORIDE: 9 INJECTION, SOLUTION INTRAVENOUS at 02:05

## 2023-05-18 PROBLEM — G45.9 TIA (TRANSIENT ISCHEMIC ATTACK): Status: ACTIVE | Noted: 2023-01-01

## 2023-05-18 PROBLEM — R55 SYNCOPE: Status: ACTIVE | Noted: 2023-01-01

## 2023-05-18 PROBLEM — I48.91 A-FIB: Status: ACTIVE | Noted: 2023-01-01

## 2023-05-18 NOTE — ED PROVIDER NOTES
SCRIBE #1 NOTE: I, Shanda Luna and Jayda Rodgers, am scribing for, and in the presence of, Angela Duron DO. I have scribed the entire note.       History     Chief Complaint   Patient presents with    Altered Mental Status     Pt sent from [facility] center with altered mental status, had an episode of syncope, vomiting, hypotension     Review of patient's allergies indicates:   Allergen Reactions    Cellcept [mycophenolate mofetil] Swelling     Lips      Ace inhibitors Other (See Comments)     unkown    Aldactone [spironolactone] Blisters     Mouth soreness with doses greater than 25mg.    Aspirin Other (See Comments)     G-6-PD def.     Azathioprine sodium     Beta-blockers (beta-adrenergic blocking agts) Other (See Comments)     Cannot take due to Raynaud's syndrome    Clindamycin Other (See Comments)     + allergy test    Clonidine Blisters     Mouth sores and tenderness    Hydrochlorothiazide Other (See Comments)     unknown    Ibuprofen Other (See Comments)     G-6-PD def.    Lasix [furosemide] Other (See Comments)     unknown    Lisinopril Other (See Comments)     unknown    Methatropic     Methotrexate analogues Other (See Comments)     Pancytopenia      Norvasc [amlodipine] Other (See Comments)     Jaw pain    Nsaids (non-steroidal anti-inflammatory drug) Other (See Comments)     G-6-PD def.    Plaquenil [hydroxychloroquine] Other (See Comments)     G-6-PD def.    Zofran [ondansetron hcl (pf)] Other (See Comments)     Chest pain      Sulfa (sulfonamide antibiotics) Rash         History of Present Illness     HPI    5/18/2023, 10:21 AM  History obtained from the patient, nursing home staff, and EMS  HPI and ROS limited due to AMS      History of Present Illness: Ruth Messina is a 57 y.o. female patient with a PMHx of HTN, Lupus, G6PD deficiency, and Raynaud's syndrome who presents to the Emergency Department from her nursing home for evaluation of AMS which onset prior to arrival. According to the nursing  home's caretakers, the pt was staring forward and not responding verbally, then vomited 6 or 7 times. The pt does not recall this episode of staring forward and not responding. Symptoms are episodic and moderate in severity. No mitigating or exacerbating factors reported. Associated sxs include mid abdominal pain and SOB. Patient denies any CP, diarrhea, dizziness, light-headedness, weakness, dysuria, appetite change, and all other sxs at this time. Pt is not on blood thinners. No further complaints or concerns at this time.  Patient has a questionable history of AFib per her report but nursing home denies previous episodes and she is not on any anticoagulation or rate control medications.    Last known well 8:30 a.m. today, syncopal      Arrival mode: Ambulance Service    PCP: Felisha Chi MD        Past Medical History:  Past Medical History:   Diagnosis Date    Anemia     with chronic disease lupus    Encounter for blood transfusion     G-6-PD deficiency     Glaucoma suspect of both eyes     Heart murmur     MVP 1986    Hypertension     Lupus     Raynaud's syndrome     Systemic lupus erythematosus arthritis 5/13/2018       Past Surgical History:  Past Surgical History:   Procedure Laterality Date    BONE MARROW BIOPSY      CARPAL TUNNEL RELEASE Bilateral     HERNIA REPAIR      widsom tooth extraction           Family History:  Family History   Problem Relation Age of Onset    Arthritis Mother     Heart disease Mother     Hypertension Mother     Diabetes Father     Heart disease Father     Hypertension Father     Cancer Brother     Eczema Neg Hx     Lupus Neg Hx     Psoriasis Neg Hx        Social History:  Social History     Tobacco Use    Smoking status: Never    Smokeless tobacco: Never   Substance and Sexual Activity    Alcohol use: No    Drug use: No    Sexual activity: Not Currently        Review of Systems     Review of Systems   Constitutional:  Negative for appetite change.   Respiratory:  Positive  for shortness of breath.    Cardiovascular:  Negative for chest pain.   Gastrointestinal:  Positive for abdominal pain (mid area) and vomiting. Negative for diarrhea.   Genitourinary:  Negative for dysuria.   Neurological:  Negative for dizziness, weakness and light-headedness.      Physical Exam     Initial Vitals [05/18/23 1009]   BP Pulse Resp Temp SpO2   (!) 136/90 100 18 98.1 °F (36.7 °C) 97 %      MAP       --          Physical Exam  Nursing Notes and Vital Signs Reviewed.  Constitutional: Patient is in no acute distress. Well-developed and well-nourished.  Head: Atraumatic. Normocephalic.  Eyes: PERRL. EOM intact. Conjunctivae are not pale. No scleral icterus.  ENT: Mucous membranes are moist.    Neck: Supple. Full ROM. No lymphadenopathy.  Cardiovascular: Irregularly irregular rhythm. Normal rate No murmurs, rubs, or gallops. Distal pulses are 2+ and symmetric.  Pulmonary/Chest: No respiratory distress. Clear to auscultation bilaterally. No wheezing or rales.  Abdominal: Soft and non-distended.  There is no tenderness.  No rebound, guarding, or rigidity. Musculoskeletal: Moves all extremities. No obvious deformities. No edema. No calf tenderness.  Skin: Warm and dry.  Neurological:  Alert, awake, and oriented to person and place, but confused to time.  Normal speech.     ED Course   Critical Care    Date/Time: 5/18/2023 12:36 PM  Performed by: Angela Duron DO  Authorized by: Angela Duron DO   Direct patient critical care time: 10 minutes  Additional history critical care time: 5 minutes  Ordering / reviewing critical care time: 10 minutes  Documentation critical care time: 7 minutes  Consulting other physicians critical care time: 9 minutes  Total critical care time (exclusive of procedural time) : 41 minutes  Critical care time was exclusive of separately billable procedures and treating other patients and teaching time.  Critical care was necessary to treat or prevent imminent or life-threatening  "deterioration of the following conditions: CNS failure or compromise.  Critical care was time spent personally by me on the following activities: development of treatment plan with patient or surrogate, discussions with consultants, evaluation of patient's response to treatment, examination of patient, obtaining history from patient or surrogate, ordering and performing treatments and interventions, ordering and review of laboratory studies, ordering and review of radiographic studies, pulse oximetry and re-evaluation of patient's condition.      ED Vital Signs:  Vitals:    05/18/23 1009 05/18/23 1030 05/18/23 1100 05/18/23 1156   BP: (!) 136/90  (!) 135/91    Pulse: 100 90 94    Resp: 18  14    Temp: 98.1 °F (36.7 °C)      TempSrc: Oral      SpO2: 97%  100%    Weight:    61.2 kg (134 lb 14.7 oz)   Height:    5' 6" (1.676 m)    05/18/23 1200 05/18/23 1222 05/18/23 1302   BP:  (!) 129/95    Pulse: 87 89 89   Resp: 14 16 19   Temp: 98.2 °F (36.8 °C)     TempSrc:      SpO2: 97%  97%   Weight:      Height:          Abnormal Lab Results:  Labs Reviewed   CBC W/ AUTO DIFFERENTIAL - Abnormal; Notable for the following components:       Result Value    WBC 3.53 (*)     RBC 3.75 (*)     Hemoglobin 10.8 (*)     Hematocrit 35.3 (*)     MCHC 30.6 (*)     Platelets 131 (*)     Immature Granulocytes 0.6 (*)     All other components within normal limits   COMPREHENSIVE METABOLIC PANEL - Abnormal; Notable for the following components:    BUN 31 (*)     AST 44 (*)     ALT 54 (*)     All other components within normal limits   URINALYSIS, REFLEX TO URINE CULTURE - Abnormal; Notable for the following components:    Protein, UA 1+ (*)     All other components within normal limits    Narrative:     Specimen Source->Urine   URINALYSIS MICROSCOPIC - Abnormal; Notable for the following components:    WBC Clumps, UA Few (*)     All other components within normal limits    Narrative:     Specimen Source->Urine   POCT GLUCOSE - Abnormal; " Notable for the following components:    POCT Glucose 64 (*)     All other components within normal limits   LACTIC ACID, PLASMA   TROPONIN I   MAGNESIUM   TSH   APTT   PROTIME-INR   POCT GLUCOSE        All Lab Results:  No results found for this or any previous visit.       Imaging Results:  Imaging Results              X-Ray Chest AP Portable (Final result)  Result time 05/18/23 11:48:08      Final result by Mariela Posada MD (05/18/23 11:48:08)                   Impression:      No acute cardiopulmonary abnormality.      Electronically signed by: Mariela Posada  Date:    05/18/2023  Time:    11:48               Narrative:    EXAMINATION:  XR CHEST AP PORTABLE    CLINICAL HISTORY:  altered mental status;.    TECHNIQUE:  Single frontal portable view of the chest was performed.    COMPARISON:  Chest radiograph 10/24/2020    FINDINGS:  Support devices: ECG monitoring leads overlie the chest.    Lungs are clear without focal infiltrate.  No pneumothorax or significant pleural fluid.    The cardiac silhouette is normal in size.    No acute osseous abnormality.                                       CT Head Without Contrast (Final result)  Result time 05/18/23 10:58:21      Final result by Tereso Almazan MD (05/18/23 10:58:21)                   Impression:      Possible small, focal subacute infarction in the right basal ganglia.  No mass effect.  No major vascular distribution infarction.    Alberta stroke programme early CT score (ASPECTS): 10      Electronically signed by: Tereso Almazan  Date:    05/18/2023  Time:    10:58               Narrative:    EXAMINATION:  CT HEAD WITHOUT CONTRAST    CLINICAL HISTORY:  Syncope, recurrent;    TECHNIQUE:  Low dose axial CT images obtained throughout the head without intravenous contrast. Sagittal and coronal reconstructions were performed.  The CT exam was performed using one or more of the following dose reduction techniques- Automated exposure control, adjustment  of the mA and/or kV according to patient size, and/or use of iterative reconstructed technique.    COMPARISON:  None available.    FINDINGS:  Intracranial compartment:    Mild cerebral atrophy.  No extra-axial blood or fluid collections.    Bilateral lacunar infarctions in the basal ganglia and related structures.  Possible subacute infarction in the right basal ganglia and related structures.  No parenchymal mass, hemorrhage, edema or major vascular distribution infarct.    Skull/extracranial contents (limited evaluation): No fracture. Mastoid air cells and paranasal sinuses are essentially clear.                                       The EKG was ordered, reviewed, and independently interpreted by the ED provider.  Interpretation time: 10:22  Rate: 84 BPM  Rhythm: atrial fibrillation  Interpretation: Minimal voltage criteria for LVH, may be normal variant.  No STEMI.         The Emergency Provider reviewed the vital signs and test results, which are outlined above.     ED Discussion     12:39 PM: Pt's NIHSS is now 0.    1:00 PM: Discussed case with Dr. Fragoso ([facility]). Dr. Fragoso agrees with current care and management of pt. Admit to hospital medicine and consult cardiology and neurology.   Admitting Service: Hospital Medicine  Admitting Physician: Dr. Fragoso   Admit to: Obs Tele     1:01 PM: Re-evaluated pt. I have discussed test results, shared treatment plan, and the need for admission with patient and family at bedside. Pt and family express understanding at this time and agree with all information. All questions answered. Pt and family have no further questions or concerns at this time. Pt is ready for admit.     ED Course as of 05/18/23 1334   Thu May 18, 2023   1024 Spoke with Shelby who is caring for patient at [facility].  She reports that patient became unresponsive during physical therapy and had multiple episodes of vomiting with labile bp, low pulse ox, low heart rate that seemed to  resolve spontaneously.  She reports that patient is normally alert and oriented x3.  She has a history of lupus and they recently decreased her prednisone but she denies any other changes. [NF]   1029 EKG shows atrial fibrillation with a rate of 84.  No ST or T-wave changes.  LVH.  QRS 88.  .  Normal axis.   [NF]   1029 POCT Glucose(!): 64  Given apple juice, tolerated PO [NF]   1117 CT Head Without Contrast  Possible small, focal subacute infarction in the right basal ganglia. Patient reports allergies to aspirin secondary to G6PD deficiency.  She passed swallow study therefore will treat with Plavix loading dose. [NF]   1201 57-year-old female with a history of G6 PD deficiency, lupus and hypertension presents with a syncopal episode at nursing home during physical therapy.  Last known well 8:30 a.m., syncopal episode occurred at 9:00 a.m.  No head trauma.  Several episodes of nausea and vomiting.  Patient appears to have new AFib with controlled ventricular response on EKG with a subacute right basal ganglion infarct on head CT.  She is confused to time initially but then oriented on reassessment.  Given Plavix loading doses after passing swallow study due to reported aspirin allergy.  Will need admission for further evaluation and treatment of CVA and AFib.  Last known well 8:30 a.m., syncopal episode occurred at 9:00 a.m..  No previous history of CVA or AFib noted.  Troponin negative for ACS.  CBC shows pancytopenia.  Bleeding times are normal.  Glucose improved from 64 to 95 after drinking juice.  CMP shows no significant electrolyte derangement.  Mildly elevated BUN with a normal creatinine and chronically and mildly elevated LFTs.  TSH shows no signs of hypothyroidism or hyperthyroidism.  Lactic acid is negative for infection.  UA is negative for infection.  Chest x-ray shows no cardiomegaly.  Hospital medicine consulted for admission with Cardiology and Neurology consults. [NF]      ED Course User  Index  [NF] Angela Duron DO     Medical Decision Making:   Clinical Tests:   Lab Tests: Ordered and Reviewed  Radiological Study: Ordered and Reviewed  Medical Tests: Ordered and Reviewed   Additional MDM:     NIH Stroke Scale:   Interval = baseline (upon arrival/admit)  Level of consciousness = 0 - alert  LOC questions = 1 - answers one correctly  LOC commands = 0 - performs both correctly  Best gaze = 0 - normal  Visual = 0 - no visual loss  Facial palsy = 0 - normal  Motor left arm =  0 - no drift  Motor right arm =  0 - no drift  Motor left leg = 0 - no drift  Motor right leg =  0 - no drift  Limb ataxia = 0 - absent  Sensory = 0 - normal  Best language = 0 - no aphasia  Dysarthria = 0 - normal articulation  Extinction and inattention = 0 - no neglect  NIH Stroke Scale Total = 1     ED Medication(s):  Medications   sodium chloride 0.9% bolus 1,000 mL 1,000 mL (1,000 mLs Intravenous New Bag 5/18/23 1250)   ALPRAZolam tablet 0.25 mg (has no administration in time range)   sodium chloride 0.9% flush 10 mL (has no administration in time range)   pantoprazole EC tablet 40 mg (has no administration in time range)   promethazine tablet 12.5 mg (has no administration in time range)   predniSONE tablet 5 mg (has no administration in time range)   polyethylene glycol packet 17 g (has no administration in time range)   clopidogreL tablet 300 mg (300 mg Oral Given 5/18/23 1155)       New Prescriptions    No medications on file               Scribe Attestation:   Scribe #1: I performed the above scribed service and the documentation accurately describes the services I performed. I attest to the accuracy of the note.     Attending:   Physician Attestation Statement for Scribe #1: I, Angela Duron DO, personally performed the services described in this documentation, as scribed by Shanda Luna and Jayda Rodgers, in my presence, and it is both accurate and complete.           Clinical Impression       ICD-10-CM  ICD-9-CM   1. Cerebrovascular accident (CVA), unspecified mechanism  I63.9 434.91   2. Altered mental status  R41.82 780.97   3. Atrial fibrillation, unspecified type  I48.91 427.31   4. Syncope  R55 780.2   5. Nausea and vomiting, unspecified vomiting type  R11.2 787.01       Disposition:   Disposition: Admitted  Condition: Stable      Angela Duron, DO  05/18/23 1335       Angela Duron, DO  05/18/23 1805

## 2023-05-18 NOTE — PLAN OF CARE
Pt convert to Afib RVR .  S/P Cardizem 5 mg IV push .   She  converted to sinus tachycardia .  Started on po Cardizem . She cont with nausea which improved with Reglan . Ab xr show constipation . Cardiology consulted .

## 2023-05-18 NOTE — ED NOTES
Pt eating applesauce in bed at this time. Pt. Resting in bed. No acute distress, RR equal and non labored, VSS. Bed in low, locked, and call light in reach. Side rails up X 2. Awaiting MRI. Sister at bedside. Will continue to monitor.

## 2023-05-18 NOTE — H&P
[facility] - Emergency Dept.  [facility]  History & Physical    Patient Name: Ruth Messina  MRN: [id number]  Patient Class: OP- Observation  Admission Date: 5/18/2023  Attending Physician: Jb Harrington, *   Primary Care Provider: Felisha Chi MD         Patient information was obtained from parent and ER records.     Subjective:     Principal Problem:TIA (transient ischemic attack)    Chief Complaint:   Chief Complaint   Patient presents with    Altered Mental Status     Pt sent from [facility] center with altered mental status, had an episode of syncope, vomiting, hypotension        HPI:   58 y/o [facility] resident with a PMHx of HTN , SLE , Raynaud , Chroninc anemia  and G6-PD deficiency  presented to the ER with a c/o of AMS which onset prior to arrival. According to the nursing home's caretakers, the pt was staring forward and not responding verbally, then vomited 6 or 7 times. The pt does not recall this episode of staring forward and not responding. No mitigating or exacerbating factors reported. Associated sxs include mid abdominal pain and SOB. Patient denies any CP, diarrhea, dizziness, light-headedness, weakness, dysuria, appetite change, and all other sxs at this time. Pt is not on blood thinners. No further complaints or concerns at this time. She was in her usual state of health before today . She has a H/o contipation . Pt her daughter she has episode of intermittent confusion on and off . She has a H/O sinus tachycardia .  ER COURSE: EKG show Afib . CTH : Possible small, focal subacute infarction in the right basal ganglia.  No mass effect.  No major vascular distribution infarction. CXR did not show any acute abnormality .  On my evaluation she is aao x 4 in nad . No focal neurological deficit . S/p plavix 300 mg po x 1 . S/p 1 lt NS bolus .  ER VS :   Initial Vitals [05/18/23 1009]   BP Pulse Resp Temp SpO2   (!) 136/90 100 18 98.1 °F (36.7 °C) 97 %     CODE STATUS : FULL CODE   Pt will be  admitted to observation  with a dx of syncope , TIA   and new onset afib         Past Medical History:   Diagnosis Date    Anemia     with chronic disease lupus    Encounter for blood transfusion     G-6-PD deficiency     Glaucoma suspect of both eyes     Heart murmur     MVP 1986    Hypertension     Lupus     Raynaud's syndrome     Systemic lupus erythematosus arthritis 5/13/2018       Past Surgical History:   Procedure Laterality Date    BONE MARROW BIOPSY      CARPAL TUNNEL RELEASE Bilateral     HERNIA REPAIR      widsom tooth extraction         Review of patient's allergies indicates:   Allergen Reactions    Cellcept [mycophenolate mofetil] Swelling     Lips      Ace inhibitors Other (See Comments)     unkown    Aldactone [spironolactone] Blisters     Mouth soreness with doses greater than 25mg.    Aspirin Other (See Comments)     G-6-PD def.     Azathioprine sodium     Beta-blockers (beta-adrenergic blocking agts) Other (See Comments)     Cannot take due to Raynaud's syndrome    Clindamycin Other (See Comments)     + allergy test    Clonidine Blisters     Mouth sores and tenderness    Hydrochlorothiazide Other (See Comments)     unknown    Ibuprofen Other (See Comments)     G-6-PD def.    Lasix [furosemide] Other (See Comments)     unknown    Lisinopril Other (See Comments)     unknown    Methatropic     Methotrexate analogues Other (See Comments)     Pancytopenia      Norvasc [amlodipine] Other (See Comments)     Jaw pain    Nsaids (non-steroidal anti-inflammatory drug) Other (See Comments)     G-6-PD def.    Plaquenil [hydroxychloroquine] Other (See Comments)     G-6-PD def.    Zofran [ondansetron hcl (pf)] Other (See Comments)     Chest pain      Sulfa (sulfonamide antibiotics) Rash       No current facility-administered medications on file prior to encounter.     Current Outpatient Medications on File Prior to Encounter   Medication Sig    amLODIPine (NORVASC) 5 MG tablet Take 1 tablet (5 mg total) by mouth  once daily.    calcium-vitamin D (CALCIUM 600 + D,3,) 600 mg-10 mcg (400 unit) Tab Take by mouth once.    cetirizine (ZYRTEC) 10 MG tablet Take 10 mg by mouth once daily.    cholecalciferol, vitamin D3, 125 mcg (5,000 unit) Tab Take 5,000 Units by mouth once daily.    cloNIDine (CATAPRES) 0.1 MG tablet Take 0.1 mg by mouth every 6 (six) hours as needed.    gabapentin (NEURONTIN) 100 MG capsule Take 100 mg by mouth once daily.    hydrALAZINE (APRESOLINE) 50 MG tablet Take 50 mg by mouth.    losartan (COZAAR) 25 MG tablet Cozaar Take (oral) 1 time per day No date recorded tablet 1 time per day oral No set duration recorded No set duration amount recorded active 25 mg    magnesium oxide (MAG-OX) 400 mg (241.3 mg magnesium) tablet Take 400 mg by mouth once daily.    multivitamin capsule Take 1 capsule by mouth once daily.    polyethylene glycol (GLYCOLAX) 17 gram PwPk Take by mouth.    predniSONE (DELTASONE) 5 MG tablet Take 1 tablet (5 mg total) by mouth once daily.    simethicone (MYLICON) 80 MG chewable tablet Take 160 mg by mouth every 12 (twelve) hours as needed for Flatulence.    tacrolimus (PROTOPIC) 0.1 % ointment Apply topically.    vitamin B12-folic acid 0.5-1 mg Tab Take 1 tablet by mouth once daily.    ammonium lactate (LAC-HYDRIN) 12 % lotion Apply topically.    betamethasone dipropionate (DIPROLENE) 0.05 % lotion Apply topically.    calcium carbonate (OS-RANJANA) 600 mg calcium (1,500 mg) Tab Take 600 mg by mouth once.    clobetasoL (OLUX) 0.05 % Foam APPLY TOPICALLY TO AFFECTED AREA ONCE DAILY    ferrous gluconate (FERGON) 324 MG tablet Take 324 mg by mouth Take daily with breakfast.    fish oil-omega-3 fatty acids 300-1,000 mg capsule Take 2 g by mouth once daily.    lactulose (CHRONULAC) 10 gram/15 mL solution SMARTSIG:Milliliter(s) By Mouth    pantoprazole (PROTONIX) 40 MG tablet Take 40 mg by mouth.    promethazine (PHENERGAN) 12.5 MG Tab Take by mouth.     Family History       Problem Relation (Age of  Onset)    Arthritis Mother    Cancer Brother    Diabetes Father    Heart disease Mother, Father    Hypertension Mother, Father          Tobacco Use    Smoking status: Never    Smokeless tobacco: Never   Substance and Sexual Activity    Alcohol use: No    Drug use: No    Sexual activity: Not Currently     Review of Systems   Constitutional:  Positive for activity change and fatigue.   HENT: Negative.     Eyes: Negative.    Respiratory: Negative.     Gastrointestinal:  Positive for abdominal pain and nausea.   Endocrine: Negative.    Genitourinary: Negative.    Skin: Negative.    Allergic/Immunologic: Negative.    Neurological: Negative.    Hematological: Negative.    Psychiatric/Behavioral:  Positive for confusion.    Objective:     Vital Signs (Most Recent):  Temp: 98.2 °F (36.8 °C) (05/18/23 1200)  Pulse: 92 (05/18/23 1413)  Resp: 14 (05/18/23 1413)  BP: (!) 135/90 (05/18/23 1413)  SpO2: 96 % (05/18/23 1413) Vital Signs (24h Range):  Temp:  [98.1 °F (36.7 °C)-98.2 °F (36.8 °C)] 98.2 °F (36.8 °C)  Pulse:  [] 92  Resp:  [14-19] 14  SpO2:  [95 %-100 %] 96 %  BP: (129-136)/(90-95) 135/90     Weight: 60.8 kg (134 lb)  Body mass index is 21.63 kg/m².     Physical Exam  Vitals and nursing note reviewed.   Constitutional:       Appearance: She is ill-appearing.   HENT:      Head: Normocephalic.      Nose: Nose normal.      Mouth/Throat:      Mouth: Mucous membranes are dry.   Eyes:      Extraocular Movements: Extraocular movements intact.      Conjunctiva/sclera: Conjunctivae normal.      Pupils: Pupils are equal, round, and reactive to light.   Cardiovascular:      Rate and Rhythm: Normal rate. Rhythm irregular.      Pulses: Normal pulses.      Heart sounds: No murmur heard.  Pulmonary:      Effort: Pulmonary effort is normal. No respiratory distress.      Breath sounds: No stridor. No wheezing or rhonchi.   Abdominal:      General: Abdomen is flat. Bowel sounds are normal. There is no distension.      Palpations:  There is no mass.      Tenderness: There is no abdominal tenderness.      Hernia: No hernia is present.   Musculoskeletal:         General: No swelling or tenderness. Normal range of motion.      Cervical back: Normal range of motion. No rigidity or tenderness.      Right lower leg: No edema.      Left lower leg: No edema.   Skin:     General: Skin is warm.      Coloration: Skin is not jaundiced or pale.      Findings: No bruising or erythema.   Neurological:      General: No focal deficit present.      Mental Status: She is alert. Mental status is at baseline.      Cranial Nerves: No cranial nerve deficit.      Sensory: No sensory deficit.      Motor: No weakness.      Coordination: Coordination normal.   Psychiatric:         Mood and Affect: Mood normal.            CRANIAL NERVES     CN III, IV, VI   Pupils are equal, round, and reactive to light.     Significant Labs: All pertinent labs within the past 24 hours have been reviewed.  Recent Lab Results         05/18/23  1156   05/18/23  1113   05/18/23  1105   05/18/23  1014        Albumin     3.6         Alkaline Phosphatase     56         ALT     54         Anion Gap     14         Appearance, UA Clear             aPTT     21.4  Comment: aPTT therapeutic range = 39-69 seconds         AST     44         Bacteria, UA None             Baso #     0.01         Basophil %     0.3         Bilirubin (UA) Negative             BILIRUBIN TOTAL     0.4  Comment: For infants and newborns, interpretation of results should be based  on gestational age, weight and in agreement with clinical  observations.    Premature Infant recommended reference ranges:  Up to 24 hours.............<8.0 mg/dL  Up to 48 hours............<12.0 mg/dL  3-5 days..................<15.0 mg/dL  6-29 days.................<15.0 mg/dL           BUN     31         Calcium     9.2         Chloride     105         CO2     24         Color, UA Yellow             Creatinine     0.8         Differential Method      Automated         eGFR     >60         Eos #     0.0         Eosinophil %     0.0         Glucose     81         Glucose, UA Negative             Gran # (ANC)     2.2         Gran %     62.0         Hematocrit     35.3         Hemoglobin     10.8         Hyaline Casts, UA 0             Immature Grans (Abs)     0.02  Comment: Mild elevation in immature granulocytes is non specific and   can be seen in a variety of conditions including stress response,   acute inflammation, trauma and pregnancy. Correlation with other   laboratory and clinical findings is essential.           Immature Granulocytes     0.6         INR     0.9  Comment: Coumadin Therapy:  2.0 - 3.0 for INR for all indicators except mechanical heart valves  and antiphospholipid syndromes which should use 2.5 - 3.5.           Ketones, UA Negative             Lactate, Abhishek     1.8  Comment: Falsely low lactic acid results can be found in samples   containing >=13.0 mg/dL total bilirubin and/or >=3.5 mg/dL   direct bilirubin.           Leukocytes, UA Negative             Lymph #     1.0         Lymph %     28.9         Magnesium     2.3         MCH     28.8         MCHC     30.6         MCV     94         Microscopic Comment SEE COMMENT  Comment: Other formed elements not mentioned in the report are not   present in the microscopic examination.                Mono #     0.3         Mono %     8.2         MPV     10.7         NITRITE UA Negative             nRBC     0         Occult Blood UA Negative             pH, UA 7.0             Platelets     131         POCT Glucose   95     64       Potassium     4.1         PROTEIN TOTAL     7.7         Protein, UA 1+  Comment: Recommend a 24 hour urine protein or a urine   protein/creatinine ratio if globulin induced proteinuria is  clinically suspected.               Protime     9.8         RBC     3.75         RBC, UA 2             RDW     12.9         Sodium     143         Specific Gravity, UA 1.020              Specimen UA Urine, Catheterized             Troponin I     <0.006  Comment: The reference interval for Troponin I represents the 99th percentile   cutoff   for our facility and is consistent with 3rd generation assay   performance.           TSH     2.409         UROBILINOGEN UA Negative             WBC Clumps, UA Few             WBC, UA 2             WBC     3.53                 Significant Imaging: I have reviewed all pertinent imaging results/findings within the past 24 hours.    Assessment/Plan:     * TIA (transient ischemic attack)    Antithrombotics for secondary stroke prevention: Antiplatelets: Clopidogrel: 300 mg loading dose x 1, now    Statins for secondary stroke prevention and hyperlipidemia, if present:   Statins: Atorvastatin- 40 mg daily    Aggressive risk factor modification: HTN     Rehab efforts: The patient has been evaluated by a stroke team provider and the therapy needs have been fully considered based off the presenting complaints and exam findings. The following therapy evaluations are needed: PT evaluate and treat, OT evaluate and treat    Diagnostics ordered/pending: Carotid ultrasound to assess vasculature    VTE prophylaxis: Enoxaparin 40 mg SQ every 24 hours    BP parameters: TIA: SBP <220 until imaging confirmation of no infarct         Syncope  CVA vs vasovagal vs afib   IVF  F/U TTE  F/U Carotid US and  MRI brain       A-fib  Patient with Paroxysmal (<7 days) atrial fibrillation which is controlled currently with Beta Blocker. Patient is currently in atrial fibrillation.[id number] Score: 2. HASBLED Score: . Anticoagulation indicated. Anticoagulation done with elequis .    Start low dose BB  F/U TTE  TSH enl       G6PD deficiency  Avoid sulfa med   Cont current tx    Systemic lupus erythematosus  Cont current tx  F/U outpt         Essential hypertension  R/O acute CVA  If negative resume home med           VTE Risk Mitigation (From admission, onward)           Ordered     IP VTE  HIGH RISK PATIENT  Once         05/18/23 1300     Place sequential compression device  Until discontinued         05/18/23 1300                       On 05/18/2023, patient should be placed in hospital observation services under my care.        Jb Harrington MD  Department of Hospital Medicine  '[facility] - Emergency Dept.

## 2023-05-18 NOTE — ASSESSMENT & PLAN NOTE
Patient with Paroxysmal (<7 days) atrial fibrillation which is controlled currently with Beta Blocker. Patient is currently in atrial fibrillation.[id number] Score: 2. HASBLED Score: . Anticoagulation indicated. Anticoagulation done with elequis .    Start low dose BB  F/U TTE  TSH enl

## 2023-05-18 NOTE — SUBJECTIVE & OBJECTIVE
Past Medical History:   Diagnosis Date    Anemia     with chronic disease lupus    Encounter for blood transfusion     G-6-PD deficiency     Glaucoma suspect of both eyes     Heart murmur     MVP 1986    Hypertension     Lupus     Raynaud's syndrome     Systemic lupus erythematosus arthritis 5/13/2018       Past Surgical History:   Procedure Laterality Date    BONE MARROW BIOPSY      CARPAL TUNNEL RELEASE Bilateral     HERNIA REPAIR      widsom tooth extraction         Review of patient's allergies indicates:   Allergen Reactions    Cellcept [mycophenolate mofetil] Swelling     Lips      Ace inhibitors Other (See Comments)     unkown    Aldactone [spironolactone] Blisters     Mouth soreness with doses greater than 25mg.    Aspirin Other (See Comments)     G-6-PD def.     Azathioprine sodium     Beta-blockers (beta-adrenergic blocking agts) Other (See Comments)     Cannot take due to Raynaud's syndrome    Clindamycin Other (See Comments)     + allergy test    Clonidine Blisters     Mouth sores and tenderness    Hydrochlorothiazide Other (See Comments)     unknown    Ibuprofen Other (See Comments)     G-6-PD def.    Lasix [furosemide] Other (See Comments)     unknown    Lisinopril Other (See Comments)     unknown    Methatropic     Methotrexate analogues Other (See Comments)     Pancytopenia      Norvasc [amlodipine] Other (See Comments)     Jaw pain    Nsaids (non-steroidal anti-inflammatory drug) Other (See Comments)     G-6-PD def.    Plaquenil [hydroxychloroquine] Other (See Comments)     G-6-PD def.    Zofran [ondansetron hcl (pf)] Other (See Comments)     Chest pain      Sulfa (sulfonamide antibiotics) Rash       No current facility-administered medications on file prior to encounter.     Current Outpatient Medications on File Prior to Encounter   Medication Sig    amLODIPine (NORVASC) 5 MG tablet Take 1 tablet (5 mg total) by mouth once daily.    calcium-vitamin D (CALCIUM 600 + D,3,) 600 mg-10 mcg (400 unit)  Tab Take by mouth once.    cetirizine (ZYRTEC) 10 MG tablet Take 10 mg by mouth once daily.    cholecalciferol, vitamin D3, 125 mcg (5,000 unit) Tab Take 5,000 Units by mouth once daily.    cloNIDine (CATAPRES) 0.1 MG tablet Take 0.1 mg by mouth every 6 (six) hours as needed.    gabapentin (NEURONTIN) 100 MG capsule Take 100 mg by mouth once daily.    hydrALAZINE (APRESOLINE) 50 MG tablet Take 50 mg by mouth.    losartan (COZAAR) 25 MG tablet Cozaar Take (oral) 1 time per day No date recorded tablet 1 time per day oral No set duration recorded No set duration amount recorded active 25 mg    magnesium oxide (MAG-OX) 400 mg (241.3 mg magnesium) tablet Take 400 mg by mouth once daily.    multivitamin capsule Take 1 capsule by mouth once daily.    polyethylene glycol (GLYCOLAX) 17 gram PwPk Take by mouth.    predniSONE (DELTASONE) 5 MG tablet Take 1 tablet (5 mg total) by mouth once daily.    simethicone (MYLICON) 80 MG chewable tablet Take 160 mg by mouth every 12 (twelve) hours as needed for Flatulence.    tacrolimus (PROTOPIC) 0.1 % ointment Apply topically.    vitamin B12-folic acid 0.5-1 mg Tab Take 1 tablet by mouth once daily.    ammonium lactate (LAC-HYDRIN) 12 % lotion Apply topically.    betamethasone dipropionate (DIPROLENE) 0.05 % lotion Apply topically.    calcium carbonate (OS-RANJANA) 600 mg calcium (1,500 mg) Tab Take 600 mg by mouth once.    clobetasoL (OLUX) 0.05 % Foam APPLY TOPICALLY TO AFFECTED AREA ONCE DAILY    ferrous gluconate (FERGON) 324 MG tablet Take 324 mg by mouth Take daily with breakfast.    fish oil-omega-3 fatty acids 300-1,000 mg capsule Take 2 g by mouth once daily.    lactulose (CHRONULAC) 10 gram/15 mL solution SMARTSIG:Milliliter(s) By Mouth    pantoprazole (PROTONIX) 40 MG tablet Take 40 mg by mouth.    promethazine (PHENERGAN) 12.5 MG Tab Take by mouth.     Family History       Problem Relation (Age of Onset)    Arthritis Mother    Cancer Brother    Diabetes Father    Heart disease  Mother, Father    Hypertension Mother, Father          Tobacco Use    Smoking status: Never    Smokeless tobacco: Never   Substance and Sexual Activity    Alcohol use: No    Drug use: No    Sexual activity: Not Currently     Review of Systems   Constitutional:  Positive for activity change and fatigue.   HENT: Negative.     Eyes: Negative.    Respiratory: Negative.     Gastrointestinal:  Positive for abdominal pain and nausea.   Endocrine: Negative.    Genitourinary: Negative.    Skin: Negative.    Allergic/Immunologic: Negative.    Neurological: Negative.    Hematological: Negative.    Psychiatric/Behavioral:  Positive for confusion.    Objective:     Vital Signs (Most Recent):  Temp: 98.2 °F (36.8 °C) (05/18/23 1200)  Pulse: 92 (05/18/23 1413)  Resp: 14 (05/18/23 1413)  BP: (!) 135/90 (05/18/23 1413)  SpO2: 96 % (05/18/23 1413) Vital Signs (24h Range):  Temp:  [98.1 °F (36.7 °C)-98.2 °F (36.8 °C)] 98.2 °F (36.8 °C)  Pulse:  [] 92  Resp:  [14-19] 14  SpO2:  [95 %-100 %] 96 %  BP: (129-136)/(90-95) 135/90     Weight: 60.8 kg (134 lb)  Body mass index is 21.63 kg/m².     Physical Exam  Vitals and nursing note reviewed.   Constitutional:       Appearance: She is ill-appearing.   HENT:      Head: Normocephalic.      Nose: Nose normal.      Mouth/Throat:      Mouth: Mucous membranes are dry.   Eyes:      Extraocular Movements: Extraocular movements intact.      Conjunctiva/sclera: Conjunctivae normal.      Pupils: Pupils are equal, round, and reactive to light.   Cardiovascular:      Rate and Rhythm: Normal rate. Rhythm irregular.      Pulses: Normal pulses.      Heart sounds: No murmur heard.  Pulmonary:      Effort: Pulmonary effort is normal. No respiratory distress.      Breath sounds: No stridor. No wheezing or rhonchi.   Abdominal:      General: Abdomen is flat. Bowel sounds are normal. There is no distension.      Palpations: There is no mass.      Tenderness: There is no abdominal tenderness.      Hernia:  No hernia is present.   Musculoskeletal:         General: No swelling or tenderness. Normal range of motion.      Cervical back: Normal range of motion. No rigidity or tenderness.      Right lower leg: No edema.      Left lower leg: No edema.   Skin:     General: Skin is warm.      Coloration: Skin is not jaundiced or pale.      Findings: No bruising or erythema.   Neurological:      General: No focal deficit present.      Mental Status: She is alert. Mental status is at baseline.      Cranial Nerves: No cranial nerve deficit.      Sensory: No sensory deficit.      Motor: No weakness.      Coordination: Coordination normal.   Psychiatric:         Mood and Affect: Mood normal.            CRANIAL NERVES     CN III, IV, VI   Pupils are equal, round, and reactive to light.     Significant Labs: All pertinent labs within the past 24 hours have been reviewed.  Recent Lab Results         05/18/23  1156   05/18/23  1113   05/18/23  1105   05/18/23  1014        Albumin     3.6         Alkaline Phosphatase     56         ALT     54         Anion Gap     14         Appearance, UA Clear             aPTT     21.4  Comment: aPTT therapeutic range = 39-69 seconds         AST     44         Bacteria, UA None             Baso #     0.01         Basophil %     0.3         Bilirubin (UA) Negative             BILIRUBIN TOTAL     0.4  Comment: For infants and newborns, interpretation of results should be based  on gestational age, weight and in agreement with clinical  observations.    Premature Infant recommended reference ranges:  Up to 24 hours.............<8.0 mg/dL  Up to 48 hours............<12.0 mg/dL  3-5 days..................<15.0 mg/dL  6-29 days.................<15.0 mg/dL           BUN     31         Calcium     9.2         Chloride     105         CO2     24         Color, UA Yellow             Creatinine     0.8         Differential Method     Automated         eGFR     >60         Eos #     0.0         Eosinophil %      0.0         Glucose     81         Glucose, UA Negative             Gran # (ANC)     2.2         Gran %     62.0         Hematocrit     35.3         Hemoglobin     10.8         Hyaline Casts, UA 0             Immature Grans (Abs)     0.02  Comment: Mild elevation in immature granulocytes is non specific and   can be seen in a variety of conditions including stress response,   acute inflammation, trauma and pregnancy. Correlation with other   laboratory and clinical findings is essential.           Immature Granulocytes     0.6         INR     0.9  Comment: Coumadin Therapy:  2.0 - 3.0 for INR for all indicators except mechanical heart valves  and antiphospholipid syndromes which should use 2.5 - 3.5.           Ketones, UA Negative             Lactate, Abhishek     1.8  Comment: Falsely low lactic acid results can be found in samples   containing >=13.0 mg/dL total bilirubin and/or >=3.5 mg/dL   direct bilirubin.           Leukocytes, UA Negative             Lymph #     1.0         Lymph %     28.9         Magnesium     2.3         MCH     28.8         MCHC     30.6         MCV     94         Microscopic Comment SEE COMMENT  Comment: Other formed elements not mentioned in the report are not   present in the microscopic examination.                Mono #     0.3         Mono %     8.2         MPV     10.7         NITRITE UA Negative             nRBC     0         Occult Blood UA Negative             pH, UA 7.0             Platelets     131         POCT Glucose   95     64       Potassium     4.1         PROTEIN TOTAL     7.7         Protein, UA 1+  Comment: Recommend a 24 hour urine protein or a urine   protein/creatinine ratio if globulin induced proteinuria is  clinically suspected.               Protime     9.8         RBC     3.75         RBC, UA 2             RDW     12.9         Sodium     143         Specific Gravity, UA 1.020             Specimen UA Urine, Catheterized             Troponin I     <0.006  Comment:  The reference interval for Troponin I represents the 99th percentile   cutoff   for our facility and is consistent with 3rd generation assay   performance.           TSH     2.409         UROBILINOGEN UA Negative             WBC Clumps, UA Few             WBC, UA 2             WBC     3.53                 Significant Imaging: I have reviewed all pertinent imaging results/findings within the past 24 hours.

## 2023-05-18 NOTE — ASSESSMENT & PLAN NOTE
Antithrombotics for secondary stroke prevention: Antiplatelets: Clopidogrel: 300 mg loading dose x 1, now    Statins for secondary stroke prevention and hyperlipidemia, if present:   Statins: Atorvastatin- 40 mg daily    Aggressive risk factor modification: HTN     Rehab efforts: The patient has been evaluated by a stroke team provider and the therapy needs have been fully considered based off the presenting complaints and exam findings. The following therapy evaluations are needed: PT evaluate and treat, OT evaluate and treat    Diagnostics ordered/pending: Carotid ultrasound to assess vasculature    VTE prophylaxis: Enoxaparin 40 mg SQ every 24 hours    BP parameters: TIA: SBP <220 until imaging confirmation of no infarct

## 2023-05-18 NOTE — ED NOTES
Pt vomiting and dry heaving. MD notified. New orders received and carried out. Pt is nauseous but the vomiting is controlled at this time. Rhythm change noted on monitor. MD notified. Strips printed and repeat EKG in progress.

## 2023-05-18 NOTE — HPI
58 y/o [facility] resident with a PMHx of HTN , SLE , Raynaud , Chroninc anemia  and G6-PD deficiency  presented to the ER with a c/o of AMS which onset prior to arrival. According to the nursing home's caretakers, the pt was staring forward and not responding verbally, then vomited 6 or 7 times. The pt does not recall this episode of staring forward and not responding. No mitigating or exacerbating factors reported. Associated sxs include mid abdominal pain and SOB. Patient denies any CP, diarrhea, dizziness, light-headedness, weakness, dysuria, appetite change, and all other sxs at this time. Pt is not on blood thinners. No further complaints or concerns at this time. She was in her usual state of health before today . She has a H/o contipation . Pt her daughter she has episode of intermittent confusion on and off . She has a H/O sinus tachycardia .  ER COURSE: EKG show Afib . CTH : Possible small, focal subacute infarction in the right basal ganglia.  No mass effect.  No major vascular distribution infarction. CXR did not show any acute abnormality .  On my evaluation she is aao x 4 in nad . No focal neurological deficit . S/p plavix 300 mg po x 1 . S/p 1 lt NS bolus .  ER VS :   Initial Vitals [05/18/23 1009]   BP Pulse Resp Temp SpO2   (!) 136/90 100 18 98.1 °F (36.7 °C) 97 %     CODE STATUS : FULL CODE   Pt will be admitted to observation  with a dx of syncope , TIA   and new onset afib

## 2023-05-19 NOTE — CONSULTS
"[facility] - Telemetry (Hospital)  Neurology  Consult Note    Patient Name: Ruth Messina  MRN: [id number]  Admission Date: 5/18/2023  Hospital Length of Stay: 0 days  Code Status: Full Code   Attending Provider: Ha Morin MD   Consulting Provider: Iggy Lozano MD  Primary Care Physician: Felisha Chi MD  Principal Problem:TIA (transient ischemic attack)    Inpatient consult to Neurology  Consult performed by: Iggy Lozano MD  Consult ordered by: Angela Duron DO      Subjective:     Chief Complaint:  Episode of non responsiveness     HPI: The patient is right handed.  She is unclear as to why she is in the hospital, but chart review indicates that while at her nursing home, she had a period of blank stare and did not respond for several minutes before episode of emesis.    The main complaint offered this AM is decreased tolerance for ambulation, indicating that "my legs give out on me."  She states that she would like to be more independent, now having to rely on her wheelchair more than in the past.  She is not aware of any weakness of the upper extremities, and denies any change in speech or swallowing.  She denies any change in vision.    The patient was initially in atrial fibrillation, but this now converted to NSR after being given Cardizem.  MRI brain shows only atrophy of frontal and temporal lobes with ventricular enlargement.  No acute stroke seen.    The patient's past history is significant for systemic lupus and G6PD deficiency.    Past Medical History:   Diagnosis Date    Anemia     with chronic disease lupus    Encounter for blood transfusion     G-6-PD deficiency     Glaucoma suspect of both eyes     Heart murmur     MVP 1986    Hypertension     Lupus     Raynaud's syndrome     Systemic lupus erythematosus arthritis 5/13/2018       Past Surgical History:   Procedure Laterality Date    BONE MARROW BIOPSY      CARPAL TUNNEL RELEASE Bilateral     HERNIA REPAIR      widsom tooth " extraction         Review of patient's allergies indicates:   Allergen Reactions    Cellcept [mycophenolate mofetil] Swelling     Lips      Ace inhibitors Other (See Comments)     unkown    Aldactone [spironolactone] Blisters     Mouth soreness with doses greater than 25mg.    Aspirin Other (See Comments)     G-6-PD def.     Azathioprine sodium     Beta-blockers (beta-adrenergic blocking agts) Other (See Comments)     Cannot take due to Raynaud's syndrome    Clindamycin Other (See Comments)     + allergy test    Clonidine Blisters     Mouth sores and tenderness    Hydrochlorothiazide Other (See Comments)     unknown    Ibuprofen Other (See Comments)     G-6-PD def.    Lasix [furosemide] Other (See Comments)     unknown    Lisinopril Other (See Comments)     unknown    Methatropic     Methotrexate analogues Other (See Comments)     Pancytopenia      Norvasc [amlodipine] Other (See Comments)     Jaw pain    Nsaids (non-steroidal anti-inflammatory drug) Other (See Comments)     G-6-PD def.    Plaquenil [hydroxychloroquine] Other (See Comments)     G-6-PD def.    Zofran [ondansetron hcl (pf)] Other (See Comments)     Chest pain      Sulfa (sulfonamide antibiotics) Rash       Current Neurological Medications: See list below    No current facility-administered medications on file prior to encounter.     Current Outpatient Medications on File Prior to Encounter   Medication Sig    amLODIPine (NORVASC) 5 MG tablet Take 1 tablet (5 mg total) by mouth once daily.    calcium-vitamin D (CALCIUM 600 + D,3,) 600 mg-10 mcg (400 unit) Tab Take by mouth once.    cetirizine (ZYRTEC) 10 MG tablet Take 10 mg by mouth once daily.    cholecalciferol, vitamin D3, 125 mcg (5,000 unit) Tab Take 5,000 Units by mouth once daily.    cloNIDine (CATAPRES) 0.1 MG tablet Take 0.1 mg by mouth every 6 (six) hours as needed.    gabapentin (NEURONTIN) 100 MG capsule Take 100 mg by mouth once daily.    hydrALAZINE (APRESOLINE) 50 MG tablet Take 50 mg  by mouth.    losartan (COZAAR) 25 MG tablet Cozaar Take (oral) 1 time per day No date recorded tablet 1 time per day oral No set duration recorded No set duration amount recorded active 25 mg    magnesium oxide (MAG-OX) 400 mg (241.3 mg magnesium) tablet Take 400 mg by mouth once daily.    multivitamin capsule Take 1 capsule by mouth once daily.    polyethylene glycol (GLYCOLAX) 17 gram PwPk Take by mouth.    predniSONE (DELTASONE) 5 MG tablet Take 1 tablet (5 mg total) by mouth once daily.    simethicone (MYLICON) 80 MG chewable tablet Take 160 mg by mouth every 12 (twelve) hours as needed for Flatulence.    tacrolimus (PROTOPIC) 0.1 % ointment Apply topically.    vitamin B12-folic acid 0.5-1 mg Tab Take 1 tablet by mouth once daily.    ammonium lactate (LAC-HYDRIN) 12 % lotion Apply topically.    betamethasone dipropionate (DIPROLENE) 0.05 % lotion Apply topically.    calcium carbonate (OS-RANJANA) 600 mg calcium (1,500 mg) Tab Take 600 mg by mouth once.    clobetasoL (OLUX) 0.05 % Foam APPLY TOPICALLY TO AFFECTED AREA ONCE DAILY    ferrous gluconate (FERGON) 324 MG tablet Take 324 mg by mouth Take daily with breakfast.    fish oil-omega-3 fatty acids 300-1,000 mg capsule Take 2 g by mouth once daily.    lactulose (CHRONULAC) 10 gram/15 mL solution SMARTSIG:Milliliter(s) By Mouth    pantoprazole (PROTONIX) 40 MG tablet Take 40 mg by mouth.    promethazine (PHENERGAN) 12.5 MG Tab Take by mouth.      Family History       Problem Relation (Age of Onset)    Arthritis Mother    Cancer Brother    Diabetes Father    Heart disease Mother, Father    Hypertension Mother, Father          Tobacco Use    Smoking status: Never    Smokeless tobacco: Never   Substance and Sexual Activity    Alcohol use: No    Drug use: No    Sexual activity: Not Currently     Review of Systems    ROS:  GENERAL: No fever, chills, or weight loss.  SKIN: No rashes, itching or changes in color or texture of skin.  HEAD: Denies headache, denies recent  head trauma  EYES: Visual acuity fine. No photophobia, ocular pain or diplopia.  EARS: Denies ear pain, discharge or vertigo.  NOSE: No loss of smell, no epistaxis or postnasal drip.  MOUTH & THROAT: No hoarseness or change in voice. No excessive gum bleeding.  NODES: Denies swollen glands.  CHEST: Denies PERAZA, cyanosis, wheezing, cough and sputum production.  CARDIOVASCULAR: Denies chest pain, PND, orthopnea or reduced exercise tolerance.  ABDOMEN: Appetite fine. No weight loss. Denies diarrhea, abdominal pain, hematemesis or blood in stool.  URINARY: No flank pain, dysuria or hematuria.  PERIPHERAL VASCULAR: No claudication or cyanosis.  MUSCULOSKELETAL: No joint stiffness or swelling. Denies back pain.  NEUROLOGIC: No history of seizures, paralysis, or prior unexplained LOC     Objective:     Vital Signs (Most Recent):  Temp: 98.2 °F (36.8 °C) (05/19/23 0738)  Pulse: 103 (05/19/23 0738)  Resp: 16 (05/19/23 0738)  BP: 131/84 (05/19/23 0738)  SpO2: 99 % (05/19/23 0738) Vital Signs (24h Range):  Temp:  [97.6 °F (36.4 °C)-98.2 °F (36.8 °C)] 98.2 °F (36.8 °C)  Pulse:  [] 103  Resp:  [14-26] 16  SpO2:  [87 %-100 %] 99 %  BP: (121-162)/() 131/84     Weight: 60.8 kg (134 lb)  Body mass index is 21.63 kg/m².    Physical Exam  APPEARANCE: Well nourished, well developed, in no acute distress as she sits by side of bed performing morning care without assistance.  HEAD: Normocephalic, atraumatic.  EYES: PERRL. EOMI.  Non-icteric sclerae.    EARS: TM's intact. Light reflex normal. No retraction or perforation.    NOSE: Mucosa pink. Airway clear.  MOUTH & THROAT: Uvula is midline.  No lesions.  No evidence of recent tongue trauma  NECK: Supple. No bruits.  CHEST: Lungs clear to auscultation.  CARDIOVASCULAR: Regular rhythm without significant murmurs.  MUSCULOSKELETAL:  No bony deformity seen.   NEUROLOGIC:   Mental Status:  The patient is well oriented to person, time, place, and situation.  The patient is attentive  to the environment and cooperative for the exam.  Cranial Nerves: II-XII grossly intact. Fundoscopic exam is normal.  No hemorrhage, exudate or papilledema is present. The extraocular muscles are intact in the cardinal directions of gaze.  No ptosis is present. Facial features are symmetrical.  Speech is normal in fluency, diction, and phrasing.  Tongue protrudes in the midline.    Gait and Station:  Gait not tested, but can march in place while seated.  Motor:  No downdrift of either arm when held at shoulder level.  Manual muscle testing of proximal and distal muscles of both upper and lower extremities is normal. Muscle mass and muscle tone are normal both upper and both lower extremities.  Sensory:  Intact both upper and lower extremities to pin prick, touch, and vibration.  Cerebellar:  Finger to nose done well.  Alternating movements intact.  No involuntary movements or tremor seen.  Reflexes:  Stretch reflexes are 2+ both upper and lower extremities.  Plantar stimulation is flexor bilaterally and no pathological reflexes are seen           Significant Labs: CBC:   Recent Labs   Lab 05/18/23  1105 05/19/23  0534   WBC 3.53* 3.07*   HGB 10.8* 9.7*   HCT 35.3* 31.4*   * 130*     CMP:   Recent Labs   Lab 05/18/23  1105 05/19/23  0534   GLU 81 71    138   K 4.1 4.5    105   CO2 24 24   BUN 31* 21*   CREATININE 0.8 0.8   CALCIUM 9.2 8.6*   MG 2.3 2.0   PROT 7.7  --    ALBUMIN 3.6  --    BILITOT 0.4  --    ALKPHOS 56  --    AST 44*  --    ALT 54*  --    ANIONGAP 14 9     All pertinent lab results from the past 24 hours have been reviewed.    Significant Imaging: I have reviewed and interpreted all pertinent imaging results/findings within the past 24 hours.    Assessment and Plan:     ASSESSMENT   Syncopal event, possibly related to atrial fib  Abnormal MRI brain with frontal temporal atrophy and ventriculomegaly  Gait disturbance, consider possibility of occult hydrocephalous    DISCUSSION  The  patient does not show focal signs of an acute stroke.  The MRI brain is impressive for frontal temporal atrophy and ventricular enlargement.  She needs PT assessment of her gait, considering the possibility of occult hydrocephalous.  She would benefit from aggressive physical therapy.      Active Diagnoses:    Diagnosis Date Noted POA    PRINCIPAL PROBLEM:  TIA (transient ischemic attack) [G45.9] 05/18/2023 Yes    A-fib [I48.91] 05/18/2023 Yes    Syncope [R55] 05/18/2023 Yes    Systemic lupus erythematosus [M32.9] 06/01/2017 Yes    Essential hypertension [I10] 02/27/2017 Yes    G6PD deficiency [D75.A] 05/13/2016 Yes      Problems Resolved During this Admission:       VTE Risk Mitigation (From admission, onward)           Ordered     IP VTE HIGH RISK PATIENT  Once         05/18/23 1300     Place sequential compression device  Until discontinued         05/18/23 1300                    Thank you for your consult. I will follow-up with patient. Please contact us if you have any additional questions.    Iggy Lozano MD  Neurology  [facility] - Telemetry ([facility])

## 2023-05-19 NOTE — PLAN OF CARE
[facility] - Telemetry (Hospital)  Initial Discharge Assessment       Primary Care Provider: Felisha Chi MD    Admission Diagnosis: Syncope [R55]  Altered mental status [R41.82]  Tachycardia [R00.0]  Cerebrovascular accident (CVA), unspecified mechanism [I63.9]  Atrial fibrillation, unspecified type [I48.91]  Nausea and vomiting, unspecified vomiting type [R11.2]    Admission Date: 5/18/2023  Expected Discharge Date:     Transition of Care Barriers: None    Payor: [employer] MEDICARE / Plan: [employer] HMO PPO SPECIAL NEEDS / Product Type: Medicare Advantage /     Extended Emergency Contact Information  Primary Emergency Contact: RobertDenise  Address: [address] [id number] [place] [address]  Home Phone: [phone]  Mobile Phone: [phone]  Relation: Daughter  Preferred language: English   needed? No  Secondary Emergency Contact: Charo Jones  Mobile Phone: [phone]  Relation: Sister  Preferred language: English   needed? No    Discharge Plan A: Return to nursing home  Discharge Plan B: Skilled Nursing Facility      [facility] [address] [place] [address] [facility]  [address] [facility] [address]  Phone: [phone] Fax: [phone]      Initial Assessment (most recent)       Adult Discharge Assessment - 05/19/23 1631          Discharge Assessment    Assessment Type Discharge Planning Assessment     Confirmed/corrected address, phone number and insurance No   updated in Demographics    Source of Information family     Communicated MELVINA with patient/caregiver Date not available/Unable to determine     Reason For Admission stroke symptoms     People in Home facility resident     Facility Arrived From: [facility]     Do you expect to return to your current living situation? Yes     Do you have help at home or someone to help you manage your care at home? Yes     Who are your caregiver(s) and their  phone number(s)? Facility Care Givers     Prior to hospitilization cognitive status: Alert/Oriented     Current cognitive status: Alert/Oriented     Walking or Climbing Stairs ambulation difficulty, requires equipment     Mobility Management ambulates with rolling walker; can transfer self to w/c for long distances     Dressing/Bathing bathing difficulty, assistance 1 person     Dressing/Bathing Management Moderately independent with bathing - needs stand by assistance     Equipment Currently Used at Home walker, rolling;wheelchair     Readmission within 30 days? No     Patient currently being followed by outpatient case management? No     Do you currently have service(s) that help you manage your care at home? No     Do you take prescription medications? Yes     Do you have prescription coverage? Yes     Coverage [employer]     Do you have any problems affording any of your prescribed medications? No     Is the patient taking medications as prescribed? yes     Who is going to help you get home at discharge? facilty transport     How do you get to doctors appointments? family or friend will provide     Are you on dialysis? No     Do you take coumadin? No     Discharge Plan A Return to nursing home     Discharge Plan B Skilled Nursing Facility     DME Needed Upon Discharge  none     Discharge Plan discussed with: Adult children     Transition of Care Barriers None                   Spoke with daughter regarding discharge assessment.  Patient lives at [facility].  She is moderately independent with ADL's.  Discharge plan is to return to nursing home under Skilled Nursing services.

## 2023-05-19 NOTE — PROGRESS NOTES
[facility] ([facility] Medicine  Progress Note    Patient Name: Ruth Messina  MRN: [id number]  Patient Class: OP- Observation   Admission Date: 5/18/2023  Length of Stay: 0 days  Attending Physician: Ha Morin MD  Primary Care Provider: Felisha Chi MD        Subjective:     Principal Problem:TIA (transient ischemic attack)        HPI:  56 y/o AAF NH resident with a PMHx of HTN , SLE , Raynaud , Chroninc anemia  and G6-PD deficiency  presented to the ER with a c/o of AMS which onset prior to arrival. According to the nursing home's caretakers, the pt was staring forward and not responding verbally, then vomited 6 or 7 times. The pt does not recall this episode of staring forward and not responding. No mitigating or exacerbating factors reported. Associated sxs include mid abdominal pain and SOB. Patient denies any CP, diarrhea, dizziness, light-headedness, weakness, dysuria, appetite change, and all other sxs at this time. Pt is not on blood thinners. No further complaints or concerns at this time. She was in her usual state of health before today . She has a H/o contipation . Pt her daughter she has episode of intermittent confusion on and off . She has a H/O sinus tachycardia .  ER COURSE: EKG show Afib . CTH : Possible small, focal subacute infarction in the right basal ganglia.  No mass effect.  No major vascular distribution infarction. CXR did not show any acute abnormality .  On my evaluation she is aao x 4 in nad . No focal neurological deficit . S/p plavix 300 mg po x 1 . S/p 1 lt NS bolus .  ER VS :   Initial Vitals [05/18/23 1009]   BP Pulse Resp Temp SpO2   (!) 136/90 100 18 98.1 °F (36.7 °C) 97 %     CODE STATUS : FULL CODE   Pt will be admitted to observation  with a dx of syncope , TIA   and new onset afib         Overview/Hospital Course:  No notes on file    Interval History: .No acute events overnight. Doing well this AM with no complaints at our encounter. Denies CP,  SOB, Abdominal pain, fevers/chills.      Review of Systems  Objective:     Vital Signs (Most Recent):  Temp: 97.9 °F (36.6 °C) (05/19/23 1512)  Pulse: 103 (05/19/23 1708)  Resp: 20 (05/19/23 1512)  BP: 124/80 (05/19/23 1512)  SpO2: 98 % (05/19/23 1512) Vital Signs (24h Range):  Temp:  [97 °F (36.1 °C)-98.2 °F (36.8 °C)] 97.9 °F (36.6 °C)  Pulse:  [] 103  Resp:  [15-20] 20  SpO2:  [87 %-99 %] 98 %  BP: (108-149)/(71-95) 124/80     Weight: 60.8 kg (134 lb)  Body mass index is 21.63 kg/m².    Intake/Output Summary (Last 24 hours) at 5/19/2023 1737  Last data filed at 5/19/2023 1334  Gross per 24 hour   Intake 360 ml   Output 200 ml   Net 160 ml         Physical Exam      GEN: No acute distress, pleasant, body habitus normal  HEENT: atraumatic and normocephalic  CARDS: regular rate and rhythm, no m/g, pulses palpable in LE  PULM: breathing comfortably on room air, chest symmetric, nonlabored, no abnormal breath sounds on auscultation  ABD: nontender, nondistended, soft, no organomegaly, BS+  Neuro: Alert and oriented x3, CN's I-IX grossly intact, sensation and motor intact; follows directions and answers questions appropriately      Significant Labs: BMP:   Recent Labs   Lab 05/19/23  0534   GLU 71      K 4.5      CO2 24   BUN 21*   CREATININE 0.8   CALCIUM 8.6*   MG 2.0     CBC:   Recent Labs   Lab 05/18/23  1105 05/19/23  0534   WBC 3.53* 3.07*   HGB 10.8* 9.7*   HCT 35.3* 31.4*   * 130*     CMP:   Recent Labs   Lab 05/18/23 1105 05/19/23  0534    138   K 4.1 4.5    105   CO2 24 24   GLU 81 71   BUN 31* 21*   CREATININE 0.8 0.8   CALCIUM 9.2 8.6*   PROT 7.7  --    ALBUMIN 3.6  --    BILITOT 0.4  --    ALKPHOS 56  --    AST 44*  --    ALT 54*  --    ANIONGAP 14 9     Cardiac Markers: No results for input(s): CKMB, MYOGLOBIN, BNP, TROPISTAT in the last 48 hours.  Coagulation:   Recent Labs   Lab 05/18/23  1105   INR 0.9   APTT 21.4     Lactic Acid:   Recent Labs   Lab 05/18/23  1105    LACTATE 1.8     Magnesium:   Recent Labs   Lab 05/18/23  1105 05/19/23  0534   MG 2.3 2.0     Troponin:   Recent Labs   Lab 05/18/23  1105   TROPONINI <0.006     TSH:   Recent Labs   Lab 05/18/23  1105   TSH 2.409     Urine Studies:   Recent Labs   Lab 05/18/23  1156   COLORU Yellow   APPEARANCEUA Clear   PHUR 7.0   SPECGRAV 1.020   PROTEINUA 1+*   GLUCUA Negative   KETONESU Negative   BILIRUBINUA Negative   OCCULTUA Negative   NITRITE Negative   UROBILINOGEN Negative   LEUKOCYTESUR Negative   RBCUA 2   WBCUA 2   BACTERIA None   HYALINECASTS 0         Significant Imaging:   Imaging Results              MRI Brain Without Contrast (Final result)  Result time 05/18/23 20:37:08      Final result by Ion Quinn MD (05/18/23 20:37:08)                   Impression:      No acute abnormality identified.  Moderate microvascular ischemic change.      Electronically signed by: Ion Quinn  Date:    05/18/2023  Time:    20:37               Narrative:    EXAMINATION:  MRI BRAIN WITHOUT CONTRAST    CLINICAL HISTORY:  Stroke, follow up;.    TECHNIQUE:  Multiplanar multisequence MR imaging of the brain was performed without contrast.    COMPARISON:  Multiple priors.    FINDINGS:  Intracranial compartment:    Ventricles and sulci are normal in size for age without evidence of hydrocephalus. No extra-axial blood or fluid collections.    Moderate microvascular ischemic change.  No mass lesion, acute hemorrhage, edema or acute infarct.    Normal vascular flow voids are preserved.    Skull/extracranial contents (limited evaluation): Bone marrow signal intensity is normal.                                       X-Ray Abdomen AP 1 View (Final result)  Result time 05/18/23 16:53:19      Final result by Mack Dykes MD (05/18/23 16:53:19)                   Impression:      No acute abnormality.  Possible mild/moderate constipation.  Consider further follow-up/evaluation as warranted.      Electronically signed by: Mack  Donis  Date:    05/18/2023  Time:    16:53               Narrative:    EXAMINATION:  XR ABDOMEN AP 1 VIEW    CLINICAL HISTORY:  nausea and vomit;    TECHNIQUE:  AP View(s) of the abdomen was performed.    COMPARISON:  Abdominal radiograph 12/15/2022    FINDINGS:  Nonobstructive bowel gas pattern.  No visualized free air.  Incidentally visualized structures appear intact.                                       X-Ray Chest AP Portable (Final result)  Result time 05/18/23 11:48:08      Final result by Mariela Posada MD (05/18/23 11:48:08)                   Impression:      No acute cardiopulmonary abnormality.      Electronically signed by: Mariela Posada  Date:    05/18/2023  Time:    11:48               Narrative:    EXAMINATION:  XR CHEST AP PORTABLE    CLINICAL HISTORY:  altered mental status;.    TECHNIQUE:  Single frontal portable view of the chest was performed.    COMPARISON:  Chest radiograph 10/24/2020    FINDINGS:  Support devices: ECG monitoring leads overlie the chest.    Lungs are clear without focal infiltrate.  No pneumothorax or significant pleural fluid.    The cardiac silhouette is normal in size.    No acute osseous abnormality.                                       CT Head Without Contrast (Final result)  Result time 05/18/23 10:58:21      Final result by Tereso Almazan MD (05/18/23 10:58:21)                   Impression:      Possible small, focal subacute infarction in the right basal ganglia.  No mass effect.  No major vascular distribution infarction.    Alberta stroke programme early CT score (ASPECTS): 10      Electronically signed by: Tereso Almazan  Date:    05/18/2023  Time:    10:58               Narrative:    EXAMINATION:  CT HEAD WITHOUT CONTRAST    CLINICAL HISTORY:  Syncope, recurrent;    TECHNIQUE:  Low dose axial CT images obtained throughout the head without intravenous contrast. Sagittal and coronal reconstructions were performed.  The CT exam was performed using one  or more of the following dose reduction techniques- Automated exposure control, adjustment of the mA and/or kV according to patient size, and/or use of iterative reconstructed technique.    COMPARISON:  None available.    FINDINGS:  Intracranial compartment:    Mild cerebral atrophy.  No extra-axial blood or fluid collections.    Bilateral lacunar infarctions in the basal ganglia and related structures.  Possible subacute infarction in the right basal ganglia and related structures.  No parenchymal mass, hemorrhage, edema or major vascular distribution infarct.    Skull/extracranial contents (limited evaluation): No fracture. Mastoid air cells and paranasal sinuses are essentially clear.                                          Assessment/Plan:      * TIA (transient ischemic attack)    Antithrombotics for secondary stroke prevention: Antiplatelets: Clopidogrel: 300 mg loading dose x 1, now    Statins for secondary stroke prevention and hyperlipidemia, if present:   Statins: Atorvastatin- 40 mg daily    Aggressive risk factor modification: HTN     Rehab efforts: The patient has been evaluated by a stroke team provider and the therapy needs have been fully considered based off the presenting complaints and exam findings. The following therapy evaluations are needed: PT evaluate and treat, OT evaluate and treat    Diagnostics ordered/pending: Carotid ultrasound to assess vasculature    VTE prophylaxis: Enoxaparin 40 mg SQ every 24 hours    BP parameters: TIA: SBP <220 until imaging confirmation of no infarct         Syncope  CVA vs vasovagal vs afib   IVF  F/U TTE  F/U Carotid US and  MRI brain       A-fib  Patient with Paroxysmal (<7 days) atrial fibrillation which is controlled currently with Beta Blocker. Patient is currently in atrial fibrillation.[id number] Score: 2. HASBLED Score: . Anticoagulation indicated. Anticoagulation done with elequis .    Start low dose BB  F/U TTE  TSH enl     05/19/2023  Cardiology  recs: The patient is now presenting with transient ischemic attack (TIA). In consideration of these factors, it is recommended to start the patient on anticoagulation therapy (AC), assuming there are no contraindications. The continuation of Calcium Channel Blockers (CCB) is also advised. The patient is encouraged to follow up with Dr. Arriola in the clinic for further management and monitoring.  --Given the patient's history of G6PD, reached out to heme/onc on call, will follow up                 Regenerate response      G6PD deficiency  Avoid sulfa med   Cont current tx    Systemic lupus erythematosus  Cont current tx  F/U outpt         Essential hypertension  R/O acute CVA  If negative resume home med           VTE Risk Mitigation (From admission, onward)         Ordered     IP VTE HIGH RISK PATIENT  Once         05/18/23 1300     Place sequential compression device  Until discontinued         05/18/23 1300                Discharge Planning   MELVINA:      Code Status: Full Code   Is the patient medically ready for discharge?:     Reason for patient still in hospital (select all that apply): Patient trending condition and Consult recommendations  Discharge Plan A: Return to nursing home        Anticipate discharge in the AM          Ha Morin MD  Department of Hospital Medicine   [facility])

## 2023-05-19 NOTE — SUBJECTIVE & OBJECTIVE
Interval History: .No acute events overnight. Doing well this AM with no complaints at our encounter. Denies CP, SOB, Abdominal pain, fevers/chills.      Review of Systems  Objective:     Vital Signs (Most Recent):  Temp: 97.9 °F (36.6 °C) (05/19/23 1512)  Pulse: 103 (05/19/23 1708)  Resp: 20 (05/19/23 1512)  BP: 124/80 (05/19/23 1512)  SpO2: 98 % (05/19/23 1512) Vital Signs (24h Range):  Temp:  [97 °F (36.1 °C)-98.2 °F (36.8 °C)] 97.9 °F (36.6 °C)  Pulse:  [] 103  Resp:  [15-20] 20  SpO2:  [87 %-99 %] 98 %  BP: (108-149)/(71-95) 124/80     Weight: 60.8 kg (134 lb)  Body mass index is 21.63 kg/m².    Intake/Output Summary (Last 24 hours) at 5/19/2023 1737  Last data filed at 5/19/2023 1334  Gross per 24 hour   Intake 360 ml   Output 200 ml   Net 160 ml         Physical Exam      GEN: No acute distress, pleasant, body habitus normal  HEENT: atraumatic and normocephalic  CARDS: regular rate and rhythm, no m/g, pulses palpable in LE  PULM: breathing comfortably on room air, chest symmetric, nonlabored, no abnormal breath sounds on auscultation  ABD: nontender, nondistended, soft, no organomegaly, BS+  Neuro: Alert and oriented x3, CN's I-IX grossly intact, sensation and motor intact; follows directions and answers questions appropriately      Significant Labs: BMP:   Recent Labs   Lab 05/19/23  0534   GLU 71      K 4.5      CO2 24   BUN 21*   CREATININE 0.8   CALCIUM 8.6*   MG 2.0     CBC:   Recent Labs   Lab 05/18/23  1105 05/19/23  0534   WBC 3.53* 3.07*   HGB 10.8* 9.7*   HCT 35.3* 31.4*   * 130*     CMP:   Recent Labs   Lab 05/18/23 1105 05/19/23  0534    138   K 4.1 4.5    105   CO2 24 24   GLU 81 71   BUN 31* 21*   CREATININE 0.8 0.8   CALCIUM 9.2 8.6*   PROT 7.7  --    ALBUMIN 3.6  --    BILITOT 0.4  --    ALKPHOS 56  --    AST 44*  --    ALT 54*  --    ANIONGAP 14 9     Cardiac Markers: No results for input(s): CKMB, MYOGLOBIN, BNP, TROPISTAT in the last 48  hours.  Coagulation:   Recent Labs   Lab 05/18/23  1105   INR 0.9   APTT 21.4     Lactic Acid:   Recent Labs   Lab 05/18/23  1105   LACTATE 1.8     Magnesium:   Recent Labs   Lab 05/18/23  1105 05/19/23  0534   MG 2.3 2.0     Troponin:   Recent Labs   Lab 05/18/23  1105   TROPONINI <0.006     TSH:   Recent Labs   Lab 05/18/23  1105   TSH 2.409     Urine Studies:   Recent Labs   Lab 05/18/23  1156   COLORU Yellow   APPEARANCEUA Clear   PHUR 7.0   SPECGRAV 1.020   PROTEINUA 1+*   GLUCUA Negative   KETONESU Negative   BILIRUBINUA Negative   OCCULTUA Negative   NITRITE Negative   UROBILINOGEN Negative   LEUKOCYTESUR Negative   RBCUA 2   WBCUA 2   BACTERIA None   HYALINECASTS 0         Significant Imaging:   Imaging Results              MRI Brain Without Contrast (Final result)  Result time 05/18/23 20:37:08      Final result by Ion Quinn MD (05/18/23 20:37:08)                   Impression:      No acute abnormality identified.  Moderate microvascular ischemic change.      Electronically signed by: Ion Quinn  Date:    05/18/2023  Time:    20:37               Narrative:    EXAMINATION:  MRI BRAIN WITHOUT CONTRAST    CLINICAL HISTORY:  Stroke, follow up;.    TECHNIQUE:  Multiplanar multisequence MR imaging of the brain was performed without contrast.    COMPARISON:  Multiple priors.    FINDINGS:  Intracranial compartment:    Ventricles and sulci are normal in size for age without evidence of hydrocephalus. No extra-axial blood or fluid collections.    Moderate microvascular ischemic change.  No mass lesion, acute hemorrhage, edema or acute infarct.    Normal vascular flow voids are preserved.    Skull/extracranial contents (limited evaluation): Bone marrow signal intensity is normal.                                       X-Ray Abdomen AP 1 View (Final result)  Result time 05/18/23 16:53:19      Final result by Mack Dykes MD (05/18/23 16:53:19)                   Impression:      No acute abnormality.   Possible mild/moderate constipation.  Consider further follow-up/evaluation as warranted.      Electronically signed by: Mack Dykes  Date:    05/18/2023  Time:    16:53               Narrative:    EXAMINATION:  XR ABDOMEN AP 1 VIEW    CLINICAL HISTORY:  nausea and vomit;    TECHNIQUE:  AP View(s) of the abdomen was performed.    COMPARISON:  Abdominal radiograph 12/15/2022    FINDINGS:  Nonobstructive bowel gas pattern.  No visualized free air.  Incidentally visualized structures appear intact.                                       X-Ray Chest AP Portable (Final result)  Result time 05/18/23 11:48:08      Final result by Mariela Posada MD (05/18/23 11:48:08)                   Impression:      No acute cardiopulmonary abnormality.      Electronically signed by: Mariela Posada  Date:    05/18/2023  Time:    11:48               Narrative:    EXAMINATION:  XR CHEST AP PORTABLE    CLINICAL HISTORY:  altered mental status;.    TECHNIQUE:  Single frontal portable view of the chest was performed.    COMPARISON:  Chest radiograph 10/24/2020    FINDINGS:  Support devices: ECG monitoring leads overlie the chest.    Lungs are clear without focal infiltrate.  No pneumothorax or significant pleural fluid.    The cardiac silhouette is normal in size.    No acute osseous abnormality.                                       CT Head Without Contrast (Final result)  Result time 05/18/23 10:58:21      Final result by Tereso Almazan MD (05/18/23 10:58:21)                   Impression:      Possible small, focal subacute infarction in the right basal ganglia.  No mass effect.  No major vascular distribution infarction.    Alberta stroke programme early CT score (ASPECTS): 10      Electronically signed by: Tereso Almazan  Date:    05/18/2023  Time:    10:58               Narrative:    EXAMINATION:  CT HEAD WITHOUT CONTRAST    CLINICAL HISTORY:  Syncope, recurrent;    TECHNIQUE:  Low dose axial CT images obtained throughout  the head without intravenous contrast. Sagittal and coronal reconstructions were performed.  The CT exam was performed using one or more of the following dose reduction techniques- Automated exposure control, adjustment of the mA and/or kV according to patient size, and/or use of iterative reconstructed technique.    COMPARISON:  None available.    FINDINGS:  Intracranial compartment:    Mild cerebral atrophy.  No extra-axial blood or fluid collections.    Bilateral lacunar infarctions in the basal ganglia and related structures.  Possible subacute infarction in the right basal ganglia and related structures.  No parenchymal mass, hemorrhage, edema or major vascular distribution infarct.    Skull/extracranial contents (limited evaluation): No fracture. Mastoid air cells and paranasal sinuses are essentially clear.

## 2023-05-19 NOTE — PLAN OF CARE
OT magui completed. Sup>sit min A, sit>stand min A, functional mobility x60ft x2 trials with RW and min A, step>pivot to bedside chair with RW and min A. Recommending SNF at NH upon return.

## 2023-05-19 NOTE — PLAN OF CARE
P.T. EVAL COMPLETE.  PT CURRENTLY REQUIRES [facility] FOR BED MOBILITY, TF'S, AND GAIT WITH RW.  P.T. RECOMMENDS SNF AT D/C

## 2023-05-19 NOTE — PT/OT/SLP EVAL
"Physical Therapy Evaluation    Patient Name:  Ruth Messina   MRN:  [id number]    Recommendations:     Discharge Recommendations: nursing facility, skilled (AT CURRENT NH THAT SHE IS RESIDENT AT)   Discharge Equipment Recommendations: to be determined by next level of care   Barriers to discharge: None    Assessment:     Ruth Messina is a 57 y.o. female admitted with a medical diagnosis of TIA (transient ischemic attack).  She presents with the following impairments/functional limitations: weakness, impaired endurance, impaired functional mobility, gait instability, impaired balance, decreased safety awareness, decreased coordination.    Rehab Prognosis: Good; patient would benefit from acute skilled PT services to address these deficits and reach maximum level of function.    Recent Surgery: * No surgery found *     Plan:     During this hospitalization, patient to be seen 3 x/week to address the identified rehab impairments via gait training, therapeutic activities, therapeutic exercises and progress toward the following goals:    Plan of Care Expires:  06/02/23    Subjective     Chief Complaint: NONE, READY TO WALK  Patient/Family Comments/goals: "I WANT TO BE ABLE TO WALK"  Pain/Comfort:  Pain Rating 1: 0/10    Patients cultural, spiritual, Tenriism conflicts given the current situation:      Living Environment:  PT IS NURSING HOME RESIDENT, AMB WITH RW WITH ASSIST OF STAFF, REPORTS SHE WAS RECEIVING P.T. AT NURSING HOME, REPORTS SHE IS INDEP WITH BED MOBILITY AND TF'S BED<>W/C, SPONGE BATH MARAL, DRESS INDEP  Prior to admission, patients level of function was SBA FROM STAFF WITH RW.  Equipment used at home: walker, rolling, wheelchair.  DME owned (not currently used): none.  Upon discharge, patient will have assistance from STAFF.    Objective:     Communicated with NURSE prior to session.  Patient found supine with telemetry, peripheral IV, PureWick, bed alarm  upon PT entry to room.    General Precautions: " "Standard, fall  Orthopedic Precautions:N/A   Braces: N/A  Respiratory Status: Room air    Exams:  Cognitive Exam:  Patient is oriented to Person, Place, Time, and Situation  Postural Exam:  Patient presented with the following abnormalities:    -       Rounded shoulders  Sensation:    -       Intact  Skin Integrity/Edema:      -       Edema: Mild R KNEE  RLE ROM: WFL  RLE Strength: GROSSLY 3+/5, LACKING FULL KNEE EXT.  LLE ROM: WFL  LLE Strength: GROSSLY 3+/5    Functional Mobility:  Bed Mobility:     Rolling Left:  contact guard assistance  Scooting: contact guard assistance  Supine to Sit: minimum assistance  Transfers:     Sit to Stand:  minimum assistance with rolling walker  Bed to Chair: minimum assistance with  rolling walker  using  Step Transfer  Gait: PT AMB 60' X 2 TRIALS WITH RW AND SANTOS, SLOW PACE, MILDLY UNSTEADY WITH SMALL SHUFFLING STEPS, QUALITY DID IMPROVE WITH CUES BUT REQUIRED CONSTANT CUES TO MAINTAIN IMPROVED QUALITY, R KNEE UNABLE TO MAKE FULL EXT.  Balance: FAIR STATIC SITTING BALANCE, POOR+ DYNAMIC SITTING BALANCE-INCREASED LATERAL SWAY WITH ACTIVITY, POOR+ DYNAMIC BALANCE DURING GAIT    AM-PAC 6 CLICK MOBILITY  Total Score:16     Treatment & Education:  PT EDUCATED IN ROLE OF P.T. AND POC IN ACUTE CARE HOSPITAL SETTING, EDUCATED IN RW USE AND SAFETY DURING TF'S AND GAIT, ENCOURAGED TO INCREASE TIME OOB IN CHAIR TO TOLERANCE, EDUCATED IN AND ENCOURAGED TO PERFORM BLE THEREX WHILE SEATED OR SUPINE THROUGHOUT THE DAY TO TOLERANCE: HIP FLEX/EXT, QUAD SET, LAQ, HEEL SLIDES, AP'S.  PT AGREEABLE TO REQUESTS  PT EDUCATED ON RISK FOR FALLS DUE TO GENERALIZED WEAKNESS, EDUCATED ON "CALL DON'T FALL", ENCOURAGED TO CALL FOR ASSISTANCE WITH ALL NEEDS SUCH AS BED<>CHAIR TRANSFERS OR TRIPS TO BATHROOM, PT AGREEABLE TO SAFETY PRECAUTIONS    Patient left up in chair with all lines intact, call button in reach, chair alarm on, and NURSE notified.    GOALS:   Multidisciplinary Problems       Physical Therapy " Goals          Problem: Physical Therapy    Goal Priority Disciplines Outcome Goal Variances Interventions   Physical Therapy Goal     PT, PT/OT      Description: LTG'S TO BE MET IN 14 DAYS (6-2-23)  PT WILL REQUIRE [facility] FOR BED MOBILITY  PT WILL REQUIRE SBA FOR BED<>CHAIR TF'S  PT WILL  FEET WITH RW AND SBA                         History:     Past Medical History:   Diagnosis Date    Anemia     with chronic disease lupus    Encounter for blood transfusion     G-6-PD deficiency     Glaucoma suspect of both eyes     Heart murmur     MVP 1986    Hypertension     Lupus     Raynaud's syndrome     Systemic lupus erythematosus arthritis 5/13/2018       Past Surgical History:   Procedure Laterality Date    BONE MARROW BIOPSY      CARPAL TUNNEL RELEASE Bilateral     HERNIA REPAIR      widsom tooth extraction         Time Tracking:     PT Received On: 05/19/23  PT Start Time: 0940     PT Stop Time: 1005  PT Total Time (min): 25 min     Billable Minutes: Evaluation 15 and Therapeutic Activity 10    05/19/2023

## 2023-05-19 NOTE — ASSESSMENT & PLAN NOTE
Afib noted on 5/18/23 EKG  Pt reports no ASA due to G6PD  Pt now with TIA  Recommend AC if no contraindications, consider heme/onc consult considering G6PD hx  Cont [facility]

## 2023-05-19 NOTE — PT/OT/SLP EVAL
"Occupational Therapy Evaluation and Treatment    Name: Ruth Messina  MRN: [id number]  Admitting Diagnosis: TIA (transient ischemic attack)  Recent Surgery: * No surgery found *      Recommendations:     Discharge Recommendations: nursing facility, skilled (upon return to NH)  Level of Assistance Recommended: 24 hours light assistance  Discharge Equipment Recommendations: to be determined by next level of care  Barriers to discharge: None    Assessment:     Ruth Messina is a 57 y.o. female with a medical diagnosis of TIA (transient ischemic attack). She presents with performance deficits affecting function including weakness, impaired endurance, impaired self care skills, impaired functional mobility, impaired balance, impaired cardiopulmonary response to activity, decreased safety awareness.    Rehab Prognosis: Fair; patient would benefit from acute OT services to address these deficits and reach maximum level of function.    Plan:     Patient to be seen 2 x/week to address the above listed problems via self-care/home management, therapeutic activities, therapeutic exercises  Plan of Care Expires: 06/02/23  Plan of Care Reviewed with: patient    Subjective     Chief Complaint: Reported "I always want to walk."  Patient Comments/Goals: walk independently  Pain/Comfort:  Pain Rating 1: 0/10    Social History:  Living Environment: Patient is a resident at [facility].  Prior Level of Function: Prior to admission, patient  completed bed mobility and transfers mod I at baseline. Ambulated with staff A and RW. Completed simple ADLs mod I. Reports completing sponge bathing unassisted as of recent.  Roles and Routines: Patient was not driving and not working prior to admission.  Equipment Used at Home: walker, rolling, wheelchair (NH equipment)  DME owned (not currently used): none  Assistance Upon Discharge: facility staff    Objective:     Communicated with nurse and EPIC prior to session. Patient found supine with " peripheral IV, bed alarm, telemetry, PureWick upon OT entry to room.    General Precautions: Standard, fall   Orthopedic Precautions: N/A   Braces: N/A    Respiratory Status: Room air    Occupational Performance    Gait belt applied - Yes    Bed Mobility:   Supine to sit from right side of bed with minimum assistance    Functional Mobility/Transfers:  Sit <> Stand Transfer with minimum assistance with rolling walker  Bed <> Chair Transfer using Step Transfer technique with minimum assistance with rolling walker  Functional Mobility: Patient completed x60ft x2 trials functional mobility with RW and min A to increase dynamic standing balance and activity tolerance needed for ADL completion.    Activities of Daily Living:  Upper Body Dressing: set up assistance  Lower Body Dressing: minimum assistance donned socks while seated EOB    Cognitive/Visual Perceptual:  Cognitive/Psychosocial Skills:    -     Oriented to: Person, Place, Time, Situation  -     Follows Commands/attention: Follows two-step commands    Physical Exam:  Balance:    -     Sitting: contact guard assistance  -     Standing: minimum assistance  Upper Extremity Range of Motion:     -       Right Upper Extremity: WFL  -       Left Upper Extremity: WFL  Upper Extremity Strength: -       Right Upper Extremity: Deficits: grossly 4/5  -       Left Upper Extremity: Deficits: grossly 4/5   Strength:    -       Right Upper Extremity: Deficits: fair  -       Left Upper Extremity: Deficits: fair  Declines sensation deficits.    AMPAC 6 Click ADL:  AMPAC Total Score: 18    Treatment & Education:  Therapist provided facilitation and instruction of proper body mechanics, energy conservation, and fall prevention strategies during tasks listed above  Patient educated on role of OT, POC, and goals for therapy  Patient educated on importance of OOB activities with staff member assistance and sitting OOB majority of the day  Encouraged completion of B UE AROM therex  throughout the day to increase functional strength and activity tolerance needed for ADL completion.    Patient left up in chair with all lines intact, call button in reach, RN notified, and chair alarm on.    GOALS:   Multidisciplinary Problems       Occupational Therapy Goals          Problem: Occupational Therapy    Goal Priority Disciplines Outcome Interventions   Occupational Therapy Goal     OT, PT/OT     Description: Goals to be met by: 6/2/23     Patient will increase functional independence with ADLs by performing:    Toileting from toilet with Supervision for hygiene and clothing management.   Toilet transfer to toilet with Supervision.  Increased functional strength in B UE grossly by 1/2 MM grade.                         History:     Past Medical History:   Diagnosis Date    Anemia     with chronic disease lupus    Encounter for blood transfusion     G-6-PD deficiency     Glaucoma suspect of both eyes     Heart murmur     MVP 1986    Hypertension     Lupus     Raynaud's syndrome     Systemic lupus erythematosus arthritis 5/13/2018         Past Surgical History:   Procedure Laterality Date    BONE MARROW BIOPSY      CARPAL TUNNEL RELEASE Bilateral     HERNIA REPAIR      widsom tooth extraction         Time Tracking:     OT Date of Treatment: 05/19/23  OT Start Time: 0945  OT Stop Time: 1010  OT Total Time (min): 25 min    Billable Minutes: Evaluation 15 and Therapeutic Activity 10    5/19/2023

## 2023-05-19 NOTE — SUBJECTIVE & OBJECTIVE
Past Medical History:   Diagnosis Date    Anemia     with chronic disease lupus    Encounter for blood transfusion     G-6-PD deficiency     Glaucoma suspect of both eyes     Heart murmur     MVP 1986    Hypertension     Lupus     Raynaud's syndrome     Systemic lupus erythematosus arthritis 5/13/2018       Past Surgical History:   Procedure Laterality Date    BONE MARROW BIOPSY      CARPAL TUNNEL RELEASE Bilateral     HERNIA REPAIR      widsom tooth extraction         Review of patient's allergies indicates:   Allergen Reactions    Cellcept [mycophenolate mofetil] Swelling     Lips      Ace inhibitors Other (See Comments)     unkown    Aldactone [spironolactone] Blisters     Mouth soreness with doses greater than 25mg.    Aspirin Other (See Comments)     G-6-PD def.     Azathioprine sodium     Beta-blockers (beta-adrenergic blocking agts) Other (See Comments)     Cannot take due to Raynaud's syndrome    Clindamycin Other (See Comments)     + allergy test    Clonidine Blisters     Mouth sores and tenderness    Hydrochlorothiazide Other (See Comments)     unknown    Ibuprofen Other (See Comments)     G-6-PD def.    Lasix [furosemide] Other (See Comments)     unknown    Lisinopril Other (See Comments)     unknown    Methatropic     Methotrexate analogues Other (See Comments)     Pancytopenia      Norvasc [amlodipine] Other (See Comments)     Jaw pain    Nsaids (non-steroidal anti-inflammatory drug) Other (See Comments)     G-6-PD def.    Plaquenil [hydroxychloroquine] Other (See Comments)     G-6-PD def.    Zofran [ondansetron hcl (pf)] Other (See Comments)     Chest pain      Sulfa (sulfonamide antibiotics) Rash       No current facility-administered medications on file prior to encounter.     Current Outpatient Medications on File Prior to Encounter   Medication Sig    amLODIPine (NORVASC) 5 MG tablet Take 1 tablet (5 mg total) by mouth once daily.    calcium-vitamin D (CALCIUM 600 + D,3,) 600 mg-10 mcg (400 unit)  Tab Take by mouth once.    cetirizine (ZYRTEC) 10 MG tablet Take 10 mg by mouth once daily.    cholecalciferol, vitamin D3, 125 mcg (5,000 unit) Tab Take 5,000 Units by mouth once daily.    cloNIDine (CATAPRES) 0.1 MG tablet Take 0.1 mg by mouth every 6 (six) hours as needed.    gabapentin (NEURONTIN) 100 MG capsule Take 100 mg by mouth once daily.    hydrALAZINE (APRESOLINE) 50 MG tablet Take 50 mg by mouth.    losartan (COZAAR) 25 MG tablet Cozaar Take (oral) 1 time per day No date recorded tablet 1 time per day oral No set duration recorded No set duration amount recorded active 25 mg    magnesium oxide (MAG-OX) 400 mg (241.3 mg magnesium) tablet Take 400 mg by mouth once daily.    multivitamin capsule Take 1 capsule by mouth once daily.    polyethylene glycol (GLYCOLAX) 17 gram PwPk Take by mouth.    predniSONE (DELTASONE) 5 MG tablet Take 1 tablet (5 mg total) by mouth once daily.    simethicone (MYLICON) 80 MG chewable tablet Take 160 mg by mouth every 12 (twelve) hours as needed for Flatulence.    tacrolimus (PROTOPIC) 0.1 % ointment Apply topically.    vitamin B12-folic acid 0.5-1 mg Tab Take 1 tablet by mouth once daily.    ammonium lactate (LAC-HYDRIN) 12 % lotion Apply topically.    betamethasone dipropionate (DIPROLENE) 0.05 % lotion Apply topically.    calcium carbonate (OS-RANJANA) 600 mg calcium (1,500 mg) Tab Take 600 mg by mouth once.    clobetasoL (OLUX) 0.05 % Foam APPLY TOPICALLY TO AFFECTED AREA ONCE DAILY    ferrous gluconate (FERGON) 324 MG tablet Take 324 mg by mouth Take daily with breakfast.    fish oil-omega-3 fatty acids 300-1,000 mg capsule Take 2 g by mouth once daily.    lactulose (CHRONULAC) 10 gram/15 mL solution SMARTSIG:Milliliter(s) By Mouth    pantoprazole (PROTONIX) 40 MG tablet Take 40 mg by mouth.    promethazine (PHENERGAN) 12.5 MG Tab Take by mouth.     Family History       Problem Relation (Age of Onset)    Arthritis Mother    Cancer Brother    Diabetes Father    Heart disease  Mother, Father    Hypertension Mother, Father          Tobacco Use    Smoking status: Never    Smokeless tobacco: Never   Substance and Sexual Activity    Alcohol use: No    Drug use: No    Sexual activity: Not Currently     Review of Systems   Constitutional: Negative.   HENT: Negative.     Eyes: Negative.    Cardiovascular: Negative.    Respiratory: Negative.     Skin: Negative.    Musculoskeletal: Negative.    Gastrointestinal: Negative.    Genitourinary: Negative.    Neurological: Negative.    Psychiatric/Behavioral: Negative.     Objective:     Vital Signs (Most Recent):  Temp: 97 °F (36.1 °C) (05/19/23 1143)  Pulse: 101 (05/19/23 1334)  Resp: 17 (05/19/23 1143)  BP: 108/72 (05/19/23 1143)  SpO2: 97 % (05/19/23 1143) Vital Signs (24h Range):  Temp:  [97 °F (36.1 °C)-98.2 °F (36.8 °C)] 97 °F (36.1 °C)  Pulse:  [] 101  Resp:  [15-26] 17  SpO2:  [87 %-100 %] 97 %  BP: (108-162)/() 108/72     Weight: 60.8 kg (134 lb)  Body mass index is 21.63 kg/m².    SpO2: 97 %         Intake/Output Summary (Last 24 hours) at 5/19/2023 1503  Last data filed at 5/19/2023 1334  Gross per 24 hour   Intake 360 ml   Output 850 ml   Net -490 ml       Lines/Drains/Airways       Peripheral Intravenous Line  Duration                  Peripheral IV - Single Lumen 05/18/23 1415 22 G Anterior;Left Forearm 1 day                     Physical Exam  Cardiovascular:      Rate and Rhythm: Normal rate.        Significant Labs: BMP:   Recent Labs   Lab 05/18/23  1105 05/19/23  0534   GLU 81 71    138   K 4.1 4.5    105   CO2 24 24   BUN 31* 21*   CREATININE 0.8 0.8   CALCIUM 9.2 8.6*   MG 2.3 2.0   , CMP   Recent Labs   Lab 05/18/23  1105 05/19/23  0534    138   K 4.1 4.5    105   CO2 24 24   GLU 81 71   BUN 31* 21*   CREATININE 0.8 0.8   CALCIUM 9.2 8.6*   PROT 7.7  --    ALBUMIN 3.6  --    BILITOT 0.4  --    ALKPHOS 56  --    AST 44*  --    ALT 54*  --    ANIONGAP 14 9   , CBC   Recent Labs   Lab 05/18/23  1105  05/19/23  0534   WBC 3.53* 3.07*   HGB 10.8* 9.7*   HCT 35.3* 31.4*   * 130*   , INR   Recent Labs   Lab 05/18/23  1105   INR 0.9   , Lipid Panel No results for input(s): CHOL, HDL, LDLCALC, TRIG, CHOLHDL in the last 48 hours., Troponin   Recent Labs   Lab 05/18/23  1105   TROPONINI <0.006   , and All pertinent lab results from the last 24 hours have been reviewed.    Significant Imaging: Echocardiogram: Transthoracic echo (TTE) complete (Cupid Only):   Results for orders placed or performed during the hospital encounter of 05/18/23   Echo Saline Bubble? Yes   Result Value Ref Range    BSA 1.68 m2    LA WIDTH 2.50 cm    Left Ventricular Outflow Tract Mean Velocity 0.62 cm/s    Left Ventricular Outflow Tract Mean Gradient 1.77 mmHg    LVIDd 4.04 3.5 - 6.0 cm    IVS 1.10 0.6 - 1.1 cm    Posterior Wall 1.07 0.6 - 1.1 cm    Ao root annulus 3.06 cm    LVIDs 2.61 2.1 - 4.0 cm    FS 35 28 - 44 %    LA volume 21.77 cm3    STJ 3.29 cm    Ascending aorta 3.56 cm    LV mass 144.99 g    LA size 2.89 cm    TAPSE 1.90 cm    Left Ventricle Relative Wall Thickness 0.53 cm    AV regurgitation pressure 1/2 time [id number] ms    AV mean gradient 4 mmHg    AV valve area 2.62 cm2    AV Velocity Ratio 0.87     AV index (prosthetic) 0.71     MV valve area p 1/2 method 4.32 cm2    E/A ratio 1.36     E wave deceleration time 175.65 msec    IVRT 74.22 msec    LVOT diameter 2.16 cm    LVOT area 3.7 cm2    LVOT peak chas 1.16 m/s    LVOT peak VTI 15.80 cm    Ao peak chas 1.33 m/s    Ao VTI 22.1 cm    RVOT peak chas 0.87 m/s    RVOT peak VTI 12.4 cm    Mr max chas 4.05 m/s    LVOT stroke volume 57.87 cm3    AV peak gradient 7 mmHg    PV mean gradient 1.47 mmHg    MV Peak E Chas 0.72 m/s    AR Max Chas 2.91 m/s    MV stenosis pressure 1/2 time 50.94 ms    MV Peak A Chas 0.53 m/s    LV Systolic Volume 24.82 mL    LV Systolic Volume Index 14.7 mL/m2    LV Diastolic Volume 71.63 mL    LV Diastolic Volume Index 42.38 mL/m2    LA Volume Index  12.9 mL/m2    LV Mass Index 86 g/m2    RA Major Axis 3.92 cm    Left Atrium Minor Axis 3.83 cm    Left Atrium Major Axis 3.30 cm    Right Atrial Pressure (from IVC) 3 mmHg    EF 65 %    Narrative    · Evidence of intermittent atrial fibrillation.  · There is no evidence of intracardiac shunting.  · The left ventricle is normal in size with concentric remodeling and   normal systolic function.  · Normal left ventricular diastolic function.  · Normal right ventricular size with normal right ventricular systolic   function.  · Normal central venous pressure (3 mmHg).  · Mild mitral regurgitation.  · The estimated ejection fraction is 65%.      , EKG: reviewed, and X-Ray: CXR: X-Ray Chest 1 View (CXR): No results found for this visit on 05/18/23.

## 2023-05-19 NOTE — HPI
58 y/o A[facility] resident with a PMHx of HTN , SLE , Raynaud , Chroninc anemia  and G6-PD deficiency  presented to the ER with a c/o of AMS which onset prior to arrival. CT head revealed TIA. Cardiology consulted to assist with management, pt seen and examined today, tele room reviewed no Afib found, sinus tachycardia noted. Pt reports she does have a history of afib however has not been on any AC she reports due to G6PD issues. Labs reviewed pt in sinus tach, no afib found since EKG on 5/18. Troponin neg, echo pending

## 2023-05-19 NOTE — CONSULTS
[facility] ([facility])  Cardiology  Consult Note    Patient Name: Ruth Messina  MRN: [id number]  Admission Date: 5/18/2023  Hospital Length of Stay: 0 days  Code Status: Full Code   Attending Provider: Ha Morin MD   Consulting Provider: Erin Thomas NP  Primary Care Physician: Felisha Chi MD  Principal Problem:TIA (transient ischemic attack)    Patient information was obtained from patient, relative(s) and ER records.     Inpatient consult to Cardiology  Consult performed by: Erin Thomas NP  Consult ordered by: Jb Harrington MD        Subjective:     Chief Complaint:  AMS     HPI:   58 y/o AAF NH resident with a PMHx of HTN , SLE , Raynaud , Chroninc anemia  and G6-PD deficiency  presented to the ER with a c/o of AMS which onset prior to arrival. CT head revealed TIA. Cardiology consulted to assist with management, pt seen and examined today, tele room reviewed no Afib found, sinus tachycardia noted. Pt reports she does have a history of afib however has not been on any AC she reports due to G6PD issues. Labs reviewed pt in sinus tach, no afib found since EKG on 5/18. Troponin neg, echo pending      Past Medical History:   Diagnosis Date    Anemia     with chronic disease lupus    Encounter for blood transfusion     G-6-PD deficiency     Glaucoma suspect of both eyes     Heart murmur     MVP 1986    Hypertension     Lupus     Raynaud's syndrome     Systemic lupus erythematosus arthritis 5/13/2018       Past Surgical History:   Procedure Laterality Date    BONE MARROW BIOPSY      CARPAL TUNNEL RELEASE Bilateral     HERNIA REPAIR      widsom tooth extraction         Review of patient's allergies indicates:   Allergen Reactions    Cellcept [mycophenolate mofetil] Swelling     Lips      Ace inhibitors Other (See Comments)     unkown    Aldactone [spironolactone] Blisters     Mouth soreness with doses greater than 25mg.    Aspirin Other (See Comments)     G-6-PD def.      Azathioprine sodium     Beta-blockers (beta-adrenergic blocking agts) Other (See Comments)     Cannot take due to Raynaud's syndrome    Clindamycin Other (See Comments)     + allergy test    Clonidine Blisters     Mouth sores and tenderness    Hydrochlorothiazide Other (See Comments)     unknown    Ibuprofen Other (See Comments)     G-6-PD def.    Lasix [furosemide] Other (See Comments)     unknown    Lisinopril Other (See Comments)     unknown    Methatropic     Methotrexate analogues Other (See Comments)     Pancytopenia      Norvasc [amlodipine] Other (See Comments)     Jaw pain    Nsaids (non-steroidal anti-inflammatory drug) Other (See Comments)     G-6-PD def.    Plaquenil [hydroxychloroquine] Other (See Comments)     G-6-PD def.    Zofran [ondansetron hcl (pf)] Other (See Comments)     Chest pain      Sulfa (sulfonamide antibiotics) Rash       No current facility-administered medications on file prior to encounter.     Current Outpatient Medications on File Prior to Encounter   Medication Sig    amLODIPine (NORVASC) 5 MG tablet Take 1 tablet (5 mg total) by mouth once daily.    calcium-vitamin D (CALCIUM 600 + D,3,) 600 mg-10 mcg (400 unit) Tab Take by mouth once.    cetirizine (ZYRTEC) 10 MG tablet Take 10 mg by mouth once daily.    cholecalciferol, vitamin D3, 125 mcg (5,000 unit) Tab Take 5,000 Units by mouth once daily.    cloNIDine (CATAPRES) 0.1 MG tablet Take 0.1 mg by mouth every 6 (six) hours as needed.    gabapentin (NEURONTIN) 100 MG capsule Take 100 mg by mouth once daily.    hydrALAZINE (APRESOLINE) 50 MG tablet Take 50 mg by mouth.    losartan (COZAAR) 25 MG tablet Cozaar Take (oral) 1 time per day No date recorded tablet 1 time per day oral No set duration recorded No set duration amount recorded active 25 mg    magnesium oxide (MAG-OX) 400 mg (241.3 mg magnesium) tablet Take 400 mg by mouth once daily.    multivitamin capsule Take 1 capsule by mouth once daily.    polyethylene glycol  (GLYCOLAX) 17 gram PwPk Take by mouth.    predniSONE (DELTASONE) 5 MG tablet Take 1 tablet (5 mg total) by mouth once daily.    simethicone (MYLICON) 80 MG chewable tablet Take 160 mg by mouth every 12 (twelve) hours as needed for Flatulence.    tacrolimus (PROTOPIC) 0.1 % ointment Apply topically.    vitamin B12-folic acid 0.5-1 mg Tab Take 1 tablet by mouth once daily.    ammonium lactate (LAC-HYDRIN) 12 % lotion Apply topically.    betamethasone dipropionate (DIPROLENE) 0.05 % lotion Apply topically.    calcium carbonate (OS-RANJANA) 600 mg calcium (1,500 mg) Tab Take 600 mg by mouth once.    clobetasoL (OLUX) 0.05 % Foam APPLY TOPICALLY TO AFFECTED AREA ONCE DAILY    ferrous gluconate (FERGON) 324 MG tablet Take 324 mg by mouth Take daily with breakfast.    fish oil-omega-3 fatty acids 300-1,000 mg capsule Take 2 g by mouth once daily.    lactulose (CHRONULAC) 10 gram/15 mL solution SMARTSIG:Milliliter(s) By Mouth    pantoprazole (PROTONIX) 40 MG tablet Take 40 mg by mouth.    promethazine (PHENERGAN) 12.5 MG Tab Take by mouth.     Family History       Problem Relation (Age of Onset)    Arthritis Mother    Cancer Brother    Diabetes Father    Heart disease Mother, Father    Hypertension Mother, Father          Tobacco Use    Smoking status: Never    Smokeless tobacco: Never   Substance and Sexual Activity    Alcohol use: No    Drug use: No    Sexual activity: Not Currently     Review of Systems   Constitutional: Negative.   HENT: Negative.     Eyes: Negative.    Cardiovascular: Negative.    Respiratory: Negative.     Skin: Negative.    Musculoskeletal: Negative.    Gastrointestinal: Negative.    Genitourinary: Negative.    Neurological: Negative.    Psychiatric/Behavioral: Negative.     Objective:     Vital Signs (Most Recent):  Temp: 97 °F (36.1 °C) (05/19/23 1143)  Pulse: 101 (05/19/23 1334)  Resp: 17 (05/19/23 1143)  BP: 108/72 (05/19/23 1143)  SpO2: 97 % (05/19/23 1143) Vital Signs (24h Range):  Temp:  [97 °F  (36.1 °C)-98.2 °F (36.8 °C)] 97 °F (36.1 °C)  Pulse:  [] 101  Resp:  [15-26] 17  SpO2:  [87 %-100 %] 97 %  BP: (108-162)/() 108/72     Weight: 60.8 kg (134 lb)  Body mass index is 21.63 kg/m².    SpO2: 97 %         Intake/Output Summary (Last 24 hours) at 5/19/2023 1503  Last data filed at 5/19/2023 1334  Gross per 24 hour   Intake 360 ml   Output 850 ml   Net -490 ml       Lines/Drains/Airways       Peripheral Intravenous Line  Duration                  Peripheral IV - Single Lumen 05/18/23 1415 22 G Anterior;Left Forearm 1 day                     Physical Exam  Cardiovascular:      Rate and Rhythm: Normal rate.        Significant Labs: BMP:   Recent Labs   Lab 05/18/23  1105 05/19/23  0534   GLU 81 71    138   K 4.1 4.5    105   CO2 24 24   BUN 31* 21*   CREATININE 0.8 0.8   CALCIUM 9.2 8.6*   MG 2.3 2.0   , CMP   Recent Labs   Lab 05/18/23  1105 05/19/23  0534    138   K 4.1 4.5    105   CO2 24 24   GLU 81 71   BUN 31* 21*   CREATININE 0.8 0.8   CALCIUM 9.2 8.6*   PROT 7.7  --    ALBUMIN 3.6  --    BILITOT 0.4  --    ALKPHOS 56  --    AST 44*  --    ALT 54*  --    ANIONGAP 14 9   , CBC   Recent Labs   Lab 05/18/23  1105 05/19/23  0534   WBC 3.53* 3.07*   HGB 10.8* 9.7*   HCT 35.3* 31.4*   * 130*   , INR   Recent Labs   Lab 05/18/23  1105   INR 0.9   , Lipid Panel No results for input(s): CHOL, HDL, LDLCALC, TRIG, CHOLHDL in the last 48 hours., Troponin   Recent Labs   Lab 05/18/23  1105   TROPONINI <0.006   , and All pertinent lab results from the last 24 hours have been reviewed.    Significant Imaging: Echocardiogram: Transthoracic echo (TTE) complete (Cupid Only):   Results for orders placed or performed during the hospital encounter of 05/18/23   Echo Saline Bubble? Yes   Result Value Ref Range    BSA 1.68 m2    LA WIDTH 2.50 cm    Left Ventricular Outflow Tract Mean Velocity 0.62 cm/s    Left Ventricular Outflow Tract Mean Gradient 1.77 mmHg    LVIDd 4.04 3.5 - 6.0  cm    IVS 1.10 0.6 - 1.1 cm    Posterior Wall 1.07 0.6 - 1.1 cm    Ao root annulus 3.06 cm    LVIDs 2.61 2.1 - 4.0 cm    FS 35 28 - 44 %    LA volume 21.77 cm3    STJ 3.29 cm    Ascending aorta 3.56 cm    LV mass 144.99 g    LA size 2.89 cm    TAPSE 1.90 cm    Left Ventricle Relative Wall Thickness 0.53 cm    AV regurgitation pressure 1/2 time [id number] ms    AV mean gradient 4 mmHg    AV valve area 2.62 cm2    AV Velocity Ratio 0.87     AV index (prosthetic) 0.71     MV valve area p 1/2 method 4.32 cm2    E/A ratio 1.36     E wave deceleration time 175.65 msec    IVRT 74.22 msec    LVOT diameter 2.16 cm    LVOT area 3.7 cm2    LVOT peak chas 1.16 m/s    LVOT peak VTI 15.80 cm    Ao peak chas 1.33 m/s    Ao VTI 22.1 cm    RVOT peak chas 0.87 m/s    RVOT peak VTI 12.4 cm    Mr max chas 4.05 m/s    LVOT stroke volume 57.87 cm3    AV peak gradient 7 mmHg    PV mean gradient 1.47 mmHg    MV Peak E Chas 0.72 m/s    AR Max Chas 2.91 m/s    MV stenosis pressure 1/2 time 50.94 ms    MV Peak A Chas 0.53 m/s    LV Systolic Volume 24.82 mL    LV Systolic Volume Index 14.7 mL/m2    LV Diastolic Volume 71.63 mL    LV Diastolic Volume Index 42.38 mL/m2    LA Volume Index 12.9 mL/m2    LV Mass Index 86 g/m2    RA Major Axis 3.92 cm    Left Atrium Minor Axis 3.83 cm    Left Atrium Major Axis 3.30 cm    Right Atrial Pressure (from IVC) 3 mmHg    EF 65 %    Narrative    · Evidence of intermittent atrial fibrillation.  · There is no evidence of intracardiac shunting.  · The left ventricle is normal in size with concentric remodeling and   normal systolic function.  · Normal left ventricular diastolic function.  · Normal right ventricular size with normal right ventricular systolic   function.  · Normal central venous pressure (3 mmHg).  · Mild mitral regurgitation.  · The estimated ejection fraction is 65%.      , EKG: reviewed, and X-Ray: CXR: X-Ray Chest 1 View (CXR): No results found for this visit on 05/18/23.    Assessment and  Plan:     * TIA (transient ischemic attack)  neurosx following    A-fib  Afib noted on 5/18/23 EKG  Pt reports no ASA due to G6PD  Pt now with TIA  Recommend AC if no contraindications, consider heme/onc consult considering G6PD hx  Cont CCB  Can follow up with Dr. Arriola in clinc    G6PD deficiency  Management per primary team    Essential hypertension  Stable, titrate meds        VTE Risk Mitigation (From admission, onward)           Ordered     IP VTE HIGH RISK PATIENT  Once         05/18/23 1300     Place sequential compression device  Until discontinued         05/18/23 1300                    Thank you for your consult. I will sign-off with patient. Please contact us if you have any additional questions.    Erin Thomas, NP  Cardiology   [facility] Tele[facility] ([facility])

## 2023-05-19 NOTE — PLAN OF CARE
Patient updated on plan of care. Fall and safety precautions in place. Vitals every 4 hours. Bed alarm on Patient remained free from falls. Education provided; questions answered encouraged.  Pt ambulated in the armas and to the bathroom still slightly weak. Pt is disoriented to time. [facility].

## 2023-05-20 NOTE — PLAN OF CARE
Patient discharge instructions reviewed. LDAs removed. . Telemetry discontinued. Pt remained free from falls.  Pt is going back to [facility], report given to Courtney CASE. Packet given to , copy of AVS given to daughter.

## 2023-05-20 NOTE — NURSING
Gave report to Courtney CASE at [facility], verified that she did get the fax of prescriptions.

## 2023-05-20 NOTE — DISCHARGE SUMMARY
[facility] - Tele[facility] ([facility])  [facility] Medicine  Discharge Summary      Patient Name: Ruth Messina  MRN: [id number]  [facility]: [id number]  Patient Class: OP- Observation  Admission Date: 5/18/2023  Hospital Length of Stay: 0 days  Discharge Date and Time:  05/20/2023 4:49 PM  Attending Physician: No att. providers found   Discharging Provider: Ha Morin MD  Primary Care Provider: Felisha Chi MD    Primary Care Team: Networked reference to record PCT     HPI:   56 y/o AAF NH resident with a PMHx of HTN , SLE , Raynaud , Chroninc anemia  and G6-PD deficiency  presented to the ER with a c/o of AMS which onset prior to arrival. According to the nursing home's caretakers, the pt was staring forward and not responding verbally, then vomited 6 or 7 times. The pt does not recall this episode of staring forward and not responding. No mitigating or exacerbating factors reported. Associated sxs include mid abdominal pain and SOB. Patient denies any CP, diarrhea, dizziness, light-headedness, weakness, dysuria, appetite change, and all other sxs at this time. Pt is not on blood thinners. No further complaints or concerns at this time. She was in her usual state of health before today . She has a H/o contipation . Pt her daughter she has episode of intermittent confusion on and off . She has a H/O sinus tachycardia .  ER COURSE: EKG show Afib . CTH : Possible small, focal subacute infarction in the right basal ganglia.  No mass effect.  No major vascular distribution infarction. CXR did not show any acute abnormality .  On my evaluation she is aao x 4 in nad . No focal neurological deficit . S/p plavix 300 mg po x 1 . S/p 1 lt NS bolus .  ER VS :   Initial Vitals [05/18/23 1009]   BP Pulse Resp Temp SpO2   (!) 136/90 100 18 98.1 °F (36.7 °C) 97 %     CODE STATUS : FULL CODE   Pt will be admitted to observation  with a dx of syncope , TIA   and new onset afib         * No surgery found *      Hospital Course:   On  "admission, the patient was evaluated by the cardiology team and she reported a history of atrial fibrillation (afib) but has not been on any anticoagulation due to her concerns about contraindications with G6PD. Telemetry monitoring did not reveal any episodes of Afib, and labs including Troponins were within normal limits. An episode of sinus tachycardia was noted. A Carotid ultrasound was conducted to assess vasculature. The patient was initiated on antithrombotic therapy for secondary stroke prevention, including a loading dose of clopidogrel (300 mg), and atorvastatin (40 mg daily) for hyperlipidemia. Blood pressure was managed aggressively to keep systolic blood pressure less than 220 mmHg. She also received enoxaparin (40 mg SQ every 24 hours) for venous thromboembolism (VTE) prophylaxis. The stroke team evaluated her for therapy needs and recommended physical therapy (PT) and occupational therapy (OT) evaluations and treatments.    During her hospital stay, the patient experienced a transient episode of paroxysmal atrial fibrillation, which was managed with low-dose diltiazem. An echocardiogram (TTE) and thyroid-stimulating hormone (TSH) test were normal. After consultation with the hematology/oncology team, it was concluded that there were no known contraindications to aspirin or clopidogrel in the context of her G6PD deficiency. The patient was monitored overnight after starting new medications and tolerated them well.    The patient is being discharged to her nursing home with instructions to start skilled nursing level of care on Monday. Follow up with Cardiology and Neurology outpatient services for long-term monitoring. Before discharge, a thorough discussion was held with the family explaining her medical condition, treatment plan, and the need for follow-up. They acknowledged understanding and agreed with the plan.    BP (!) 150/100   Pulse 97   Temp 98.3 °F (36.8 °C)   Resp 18   Ht 5' 6" (1.676 " m)   Wt 56.8 kg (125 lb 3.5 oz)   SpO2 95%   BMI 20.21 kg/m²   Physical exam unchanged from previous, at baseline       Goals of Care Treatment Preferences:  Code Status: Full Code      Consults:   Consults (From admission, onward)        Status Ordering Provider     Inpatient consult to Social Work  Once        Provider:  (Not yet assigned)    Completed MATTHEW ALVAREZ     Inpatient consult to Neurology  Once        Provider:  Iggy Lozano MD    Completed RODOLFO PASCUAL     Inpatient consult to Cardiology  Once        Provider:  Abhishek Hu MD    Completed CORINA COKER          No new Assessment & Plan notes have been filed under this hospital service since the last note was generated.  Service: Hospital Medicine    Final Active Diagnoses:    Diagnosis Date Noted POA    PRINCIPAL PROBLEM:  TIA (transient ischemic attack) [G45.9] 05/18/2023 Yes    A-fib [I48.91] 05/18/2023 Yes    Syncope [R55] 05/18/2023 Yes    Systemic lupus erythematosus [M32.9] 06/01/2017 Yes    Essential hypertension [I10] 02/27/2017 Yes    G6PD deficiency [D75.A] 05/13/2016 Yes      Problems Resolved During this Admission:       Discharged Condition: good    Disposition: Home or Self Care    Follow Up:   Follow-up Information     Iggy Lozano MD. Schedule an appointment as soon as possible for a visit.    Specialty: Neurology  Contact information:  [address]  [place] [address]  [phone]             Radha Choe MD Follow up.    Specialty: Rheumatology  Contact information:  [address]  [facility]  ATTN: RONALD  [place] [address] [phone]             Adelaida Cox NP Follow up.    Specialty: Family Medicine  Contact information:  [address]  [facility] [address] [phone]                       Patient Instructions:   No discharge procedures on file.    Significant Diagnostic Studies:   BMP: Recent Labs   Lab 05/19/23  2637  05/20/23  0521   GLU 71 78    140   K 4.5 4.2    113*   CO2 24 21*   BUN 21* 15   CREATININE 0.8 0.8   CALCIUM 8.6* 7.5*   MG 2.0  --      CBC:   Recent Labs   Lab 05/19/23  0534   WBC 3.07*   HGB 9.7*   HCT 31.4*   *     CMP:   Recent Labs   Lab 05/19/23  0534 05/20/23  0521    140   K 4.5 4.2    113*   CO2 24 21*   GLU 71 78   BUN 21* 15   CREATININE 0.8 0.8   CALCIUM 8.6* 7.5*   ANIONGAP 9 6*     Cardiac Markers: No results for input(s): CKMB, MYOGLOBIN, BNP, TROPISTAT in the last 48 hours.  Coagulation: No results for input(s): PT, INR, APTT in the last 48 hours.  Lactic Acid: No results for input(s): LACTATE in the last 48 hours.  Magnesium:   Recent Labs   Lab 05/19/23  0534   MG 2.0     Troponin: No results for input(s): TROPONINI, TROPONINIHS in the last 48 hours.  TSH:   Recent Labs   Lab 05/18/23  1105   TSH 2.409     Urine Studies:   No results for input(s): COLORU, APPEARANCEUA, PHUR, SPECGRAV, PROTEINUA, GLUCUA, KETONESU, BILIRUBINUA, OCCULTUA, NITRITE, UROBILINOGEN, LEUKOCYTESUR, RBCUA, WBCUA, BACTERIA, SQUAMEPITHEL, HYALINECASTS in the last 48 hours.    Invalid input(s): WRIGHTSUR      Pending Diagnostic Studies:     None         Medications:  Reconciled Home Medications:      Medication List      START taking these medications    aspirin 81 MG EC tablet  Commonly known as: ECOTRIN  Take 1 tablet (81 mg total) by mouth once daily.     atorvastatin 20 MG tablet  Commonly known as: LIPITOR  Take 1 tablet (20 mg total) by mouth once daily.     clopidogreL 75 mg tablet  Commonly known as: PLAVIX  Take 1 tablet (75 mg total) by mouth once daily. for 21 days     diltiaZEM 30 MG tablet  Commonly known as: CARDIZEM  Take 1 tablet (30 mg total) by mouth every 8 (eight) hours.        CHANGE how you take these medications    lactulose 10 gram/15 mL solution  Commonly known as: CHRONULAC  Take 15 mLs (10 g total) by mouth 3 (three) times daily as needed.  What changed: See the new  instructions.     losartan 25 MG tablet  Commonly known as: COZAAR  Take 1 tablet (25 mg total) by mouth once daily. Hold medication until follow up with prescribing provider  What changed: See the new instructions.        CONTINUE taking these medications    CALCIUM 600 + D(3) 600 mg-10 mcg (400 unit) Tab  Generic drug: calcium-vitamin D  Take by mouth once.     cetirizine 10 MG tablet  Commonly known as: ZYRTEC  Take 10 mg by mouth once daily.     cholecalciferol (vitamin D3) 125 mcg (5,000 unit) Tab  Take 5,000 Units by mouth once daily.     gabapentin 100 MG capsule  Commonly known as: NEURONTIN  Take 100 mg by mouth once daily.     magnesium oxide 400 mg (241.3 mg magnesium) tablet  Commonly known as: MAG-OX  Take 400 mg by mouth once daily.     multivitamin capsule  Take 1 capsule by mouth once daily.     pantoprazole 40 MG tablet  Commonly known as: PROTONIX  Take 40 mg by mouth.     polyethylene glycol 17 gram Pwpk  Commonly known as: GLYCOLAX  Take by mouth.     predniSONE 5 MG tablet  Commonly known as: DELTASONE  Take 1 tablet (5 mg total) by mouth once daily.     simethicone 80 MG chewable tablet  Commonly known as: MYLICON  Take 160 mg by mouth every 12 (twelve) hours as needed for Flatulence.     tacrolimus 0.1 % ointment  Commonly known as: PROTOPIC  Apply topically.     vitamin B12-folic acid 0.5-1 mg Tab  Take 1 tablet by mouth once daily.        STOP taking these medications    amLODIPine 5 MG tablet  Commonly known as: NORVASC     ammonium lactate 12 % lotion  Commonly known as: LAC-HYDRIN     betamethasone dipropionate 0.05 % lotion  Commonly known as: DIPROLENE     calcium carbonate 600 mg calcium (1,500 mg) Tab  Commonly known as: OS-RANJANA     clobetasoL 0.05 % Foam  Commonly known as: OLUX     cloNIDine 0.1 MG tablet  Commonly known as: CATAPRES     ferrous gluconate 324 MG tablet  Commonly known as: FERGON     fish oil-omega-3 fatty acids 300-1,000 mg capsule     hydrALAZINE 50 MG  tablet  Commonly known as: APRESOLINE     promethazine 12.5 MG Tab  Commonly known as: PHENERGAN            Indwelling Lines/Drains at time of discharge:   Lines/Drains/Airways     None                 Time spent on the discharge of patient: 25 minutes  of time spent on discharge including examining patient, providing discharge instructions, arranging follow-up and documentation.           Ha Morin MD  Department of Hospital Medicine  [facility] ([facility])

## 2023-05-20 NOTE — HOSPITAL COURSE
"On admission, the patient was evaluated by the cardiology team and she reported a history of atrial fibrillation (afib) but has not been on any anticoagulation due to her concerns about contraindications with G6PD. Telemetry monitoring did not reveal any episodes of Afib, and labs including Troponins were within normal limits. An episode of sinus tachycardia was noted. A Carotid ultrasound was conducted to assess vasculature. The patient was initiated on antithrombotic therapy for secondary stroke prevention, including a loading dose of clopidogrel (300 mg), and atorvastatin (40 mg daily) for hyperlipidemia. Blood pressure was managed aggressively to keep systolic blood pressure less than 220 mmHg. She also received enoxaparin (40 mg SQ every 24 hours) for venous thromboembolism (VTE) prophylaxis. The stroke team evaluated her for therapy needs and recommended physical therapy (PT) and occupational therapy (OT) evaluations and treatments.    During her hospital stay, the patient experienced a transient episode of paroxysmal atrial fibrillation, which was managed with low-dose diltiazem. An echocardiogram (TTE) and thyroid-stimulating hormone (TSH) test were normal. After consultation with the hematology/oncology team, it was concluded that there were no known contraindications to aspirin or clopidogrel in the context of her G6PD deficiency. The patient was monitored overnight after starting new medications and tolerated them well.    The patient is being discharged to her nursing home with instructions to start skilled nursing level of care on Monday. Follow up with Cardiology and Neurology outpatient services for long-term monitoring. Before discharge, a thorough discussion was held with the family explaining her medical condition, treatment plan, and the need for follow-up. They acknowledged understanding and agreed with the plan.    BP (!) 150/100   Pulse 97   Temp 98.3 °F (36.8 °C)   Resp 18   Ht 5' 6" " (1.676 m)   Wt 56.8 kg (125 lb 3.5 oz)   SpO2 95%   BMI 20.21 kg/m²   Physical exam unchanged from previous, at baseline

## 2023-05-20 NOTE — PLAN OF CARE
[facility] - Telemetry (Hospital)  Discharge Final Note    Primary Care Provider: Felisha Chi MD    Expected Discharge Date: 5/20/2023    Final Discharge Note (most recent)       Final Note - 05/20/23 1207          Final Note    Assessment Type Final Discharge Note     Anticipated Discharge Disposition intermediate Nursing Home        Post-Acute Status    Discharge Delays None known at this time                     Important Message from Medicare             Contact Info       Iggy Lozano MD   Specialty: Neurology    [address] [facility] [place] [address]   Phone: [phone]       Next Steps: Schedule an appointment as soon as possible for a visit    Radha Choe MD   Specialty: Rheumatology    [facility]  [address]  [facility]  ATTN: RONALD DELGADO [address]   Phone: [phone]       Next Steps: Follow up    Adelaida Cox NP   Specialty: Family Medicine    [address]  [place] [address]   Phone: [phone]       Next Steps: Follow up          Pt has no discharge needs at the time of chart review.

## 2023-05-20 NOTE — PLAN OF CARE
A234/A234 BECKA Messina is a 57 y.o.female admitted on 5/18/2023 for TIA (transient ischemic attack)   Code Status: Full Code  Lead Monitored: Lead II Rhythm: sinus tachycardia      Shift Summary:  NAEON. Continued IV fluids overnight. Patient slept overnight. No significant events or changes to plan of care.     Chart check completed. Continue current plan of care.    Problem: Adult Inpatient Plan of Care  Goal: Plan of Care Review  Outcome: Ongoing, Progressing  Goal: Patient-Specific Goal (Individualized)  Outcome: Ongoing, Progressing  Goal: Absence of Hospital-Acquired Illness or Injury  Outcome: Ongoing, Progressing  Goal: Optimal Comfort and Wellbeing  Outcome: Ongoing, Progressing  Goal: Readiness for Transition of Care  Outcome: Ongoing, Progressing     Problem: Skin Injury Risk Increased  Goal: Skin Health and Integrity  Outcome: Ongoing, Progressing

## 2023-05-22 PROBLEM — R79.89 ELEVATED TROPONIN: Status: ACTIVE | Noted: 2023-01-01

## 2023-05-22 PROBLEM — R41.82 ALTERED MENTAL STATUS: Status: ACTIVE | Noted: 2023-01-01
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
## 2023-05-23 NOTE — ED NOTES
Bed: 14  Expected date:   Expected time:   Means of arrival: Ambulance Service  Comments:  When clean

## 2023-05-24 NOTE — TELEPHONE ENCOUNTER
----- Message from Archana Greco sent at 5/24/2023  8:02 AM CDT -----  Regarding: New Patient Appointment  Contact: Autumn Miranda is in a rehab following a hospital stay and need to follow up with a neurologist.    Dr. Lozano who they requested is no longer with [facility].    Please call [facility] at [phone].     Thanks

## 2023-05-24 NOTE — ED NOTES
2142: called [facility] to get ETA for transportation. [facility] dispatcher stated that [facility] never called in the transfer request.    2143: Called [facility] and spoke with Nathaly regarding transportation for patient and that [facility] did not have the request. [facility] would not approve for transportation by [facility] and that a yellow cab needed to be called by [facility] for transportation of patient from ER to their facility. Nathaly stated she has to have a staff member to ride with patient and does not have one at this time. She states that shift change is at 10pm and she should have a staff member available to ride in cab with patient. Notified Margaux, Charge nurse and updated patient and family.

## 2023-05-24 NOTE — ED NOTES
[facility] [phone] and spoke with Nathaly, patients nurse. Gave nurse report on patient and notified that patient was ready for discharge. Nathaly stated they are calling and arranging transportation with [facility]. Notified patient and family member at bedside

## 2023-06-12 NOTE — PROGRESS NOTES
Subjective:   Patient ID:  Ruth Messina is a 57 y.o. female who presents for evaluation of No chief complaint on file.      HPI57 y/o A[facility] resident with a PMHx of PAF, HTN , SLE , Raynaud, Sjogren's, Chroninc anemia and G6-PD deficiency who presents to clinic today for HFU. Seen at [facility] ED for AMS, CT revealed TIA. Seen by cardiology for possible afib, no afib found on tele room monitors ST noted, however EKG on 5/18 showed AFIB. Pt does reports a history of afib but not on AC due to G6PD. Heme/onc was consulted for ASA and plavix. Here today, poor historian. Denies any CP, angina or anginal equivalent. Reports occasional SOB and dizziness. Reports compliance with medications.    Echo 5/18/23- normal biV function    Past Medical History:   Diagnosis Date    Anemia     with chronic disease lupus    Encounter for blood transfusion     G-6-PD deficiency     Glaucoma suspect of both eyes     Heart murmur     MVP 1986    Hypertension     Lupus     Raynaud's syndrome     Systemic lupus erythematosus arthritis 5/13/2018       Past Surgical History:   Procedure Laterality Date    BONE MARROW BIOPSY      CARPAL TUNNEL RELEASE Bilateral     HERNIA REPAIR      widsom tooth extraction         Social History     Tobacco Use    Smoking status: Never    Smokeless tobacco: Never   Substance Use Topics    Alcohol use: No    Drug use: No       Family History   Problem Relation Age of Onset    Arthritis Mother     Heart disease Mother     Hypertension Mother     Diabetes Father     Heart disease Father     Hypertension Father     Cancer Brother     Eczema Neg Hx     Lupus Neg Hx     Psoriasis Neg Hx        Current Outpatient Medications on File Prior to Visit   Medication Sig Dispense Refill    aspirin (ECOTRIN) 81 MG EC tablet Take 1 tablet (81 mg total) by mouth once daily. 90 tablet 0    atorvastatin (LIPITOR) 20 MG tablet Take 1 tablet (20 mg total) by mouth once daily. 90 tablet 3    calcium-vitamin D (CALCIUM 600 + D,3,)  600 mg-10 mcg (400 unit) Tab Take by mouth once.      cetirizine (ZYRTEC) 10 MG tablet Take 10 mg by mouth once daily.      cholecalciferol, vitamin D3, 125 mcg (5,000 unit) Tab Take 5,000 Units by mouth once daily.      clopidogreL (PLAVIX) 75 mg tablet Take 1 tablet (75 mg total) by mouth once daily. for 21 days 21 tablet 0    diltiaZEM (CARDIZEM) 30 MG tablet Take 1 tablet (30 mg total) by mouth every 8 (eight) hours. 90 tablet 11    gabapentin (NEURONTIN) 100 MG capsule Take 100 mg by mouth once daily.      lactulose (CHRONULAC) 10 gram/15 mL solution Take 15 mLs (10 g total) by mouth 3 (three) times daily as needed.  0    losartan (COZAAR) 25 MG tablet Take 1 tablet (25 mg total) by mouth once daily. Hold medication until follow up with prescribing provider      magnesium oxide (MAG-OX) 400 mg (241.3 mg magnesium) tablet Take 400 mg by mouth once daily.      multivitamin capsule Take 1 capsule by mouth once daily.      pantoprazole (PROTONIX) 40 MG tablet Take 40 mg by mouth.      polyethylene glycol (GLYCOLAX) 17 gram PwPk Take by mouth.      predniSONE (DELTASONE) 5 MG tablet Take 1 tablet (5 mg total) by mouth once daily. 30 tablet 2    simethicone (MYLICON) 80 MG chewable tablet Take 160 mg by mouth every 12 (twelve) hours as needed for Flatulence.      tacrolimus (PROTOPIC) 0.1 % ointment Apply topically.      vitamin B12-folic acid 0.5-1 mg Tab Take 1 tablet by mouth once daily.       No current facility-administered medications on file prior to visit.      Wt Readings from Last 3 Encounters:   05/23/23 59.1 kg (130 lb 4.7 oz)   05/20/23 56.8 kg (125 lb 3.5 oz)   12/15/22 55.8 kg (123 lb 0.3 oz)     Temp Readings from Last 3 Encounters:   05/23/23 98 °F (36.7 °C) (Oral)   05/20/23 98.3 °F (36.8 °C)   12/15/22 98.7 °F (37.1 °C) (Oral)     BP Readings from Last 3 Encounters:   05/23/23 120/70   05/20/23 (!) 150/100   12/16/22 136/87     Pulse Readings from Last 3 Encounters:   05/23/23 80   05/20/23 97    12/16/22 (!) 119        Review of Systems   Reason unable to perform ROS: limited pt poor historian.   Constitutional: Positive for malaise/fatigue.   Eyes: Negative.    Cardiovascular: Negative.    Respiratory:  Positive for shortness of breath.    Skin: Negative.    Genitourinary: Negative.    Neurological:  Positive for dizziness.   Psychiatric/Behavioral: Negative.       Objective:   Physical Exam  Vitals and nursing note reviewed.   Constitutional:       Appearance: Normal appearance.   HENT:      Head: Normocephalic.   Eyes:      Pupils: Pupils are equal, round, and reactive to light.   Cardiovascular:      Rate and Rhythm: Regular rhythm. Tachycardia present.      Heart sounds: Normal heart sounds, S1 normal and S2 normal. No murmur heard.    No S3 or S4 sounds.   Pulmonary:      Effort: Pulmonary effort is normal.      Breath sounds: Normal breath sounds.   Abdominal:      General: Bowel sounds are normal.      Palpations: Abdomen is soft.   Musculoskeletal:         General: Normal range of motion.      Cervical back: Normal range of motion.   Skin:     Capillary Refill: Capillary refill takes less than 2 seconds.   Neurological:      General: No focal deficit present.      Mental Status: She is alert and oriented to person, place, and time.      Motor: Weakness present.   Psychiatric:         Mood and Affect: Mood normal.         Speech: Speech is delayed.         Behavior: Behavior normal.         Thought Content: Thought content normal.       Lab Results   Component Value Date    CHOL 190 10/17/2020    CHOL 258 (H) 12/03/2019    CHOL 258 (H) 12/03/2019     Lab Results   Component Value Date    HDL 29 (L) 10/17/2020    HDL 41 12/03/2019    HDL 41 12/03/2019     Lab Results   Component Value Date    LDLCALC 125.8 10/17/2020    LDLCALC 184.8 (H) 12/03/2019    LDLCALC 184.8 (H) 12/03/2019     Lab Results   Component Value Date    TRIG 176 (H) 10/17/2020    TRIG 161 (H) 12/03/2019    TRIG 161 (H) 12/03/2019      Lab Results   Component Value Date    CHOLHDL 15.3 (L) 10/17/2020    CHOLHDL 15.9 (L) 12/03/2019    CHOLHDL 15.9 (L) 12/03/2019       Chemistry        Component Value Date/Time     05/23/2023 1729    K 4.9 05/23/2023 1729     05/23/2023 1729    CO2 24 05/23/2023 1729    BUN 24 (H) 05/23/2023 1729    CREATININE 0.9 05/23/2023 1729     05/23/2023 1729        Component Value Date/Time    CALCIUM 8.4 (L) 05/23/2023 1729    ALKPHOS 59 05/23/2023 1729    AST 35 05/23/2023 1729    ALT 42 05/23/2023 1729    BILITOT 0.3 05/23/2023 1729    ESTGFRAFRICA >60.0 11/17/2021 1239    EGFRNONAA >60.0 11/17/2021 1239          Lab Results   Component Value Date    TSH 2.409 05/18/2023     Lab Results   Component Value Date    INR 0.9 05/18/2023    INR 1.0 02/09/2017     @RESUFAST(WBC,HGB,HCT,MCV,PLT)  @LABRCNTIP(BNP,BNPTRIAGEBLO)@  CrCl cannot be calculated (Patient's most recent lab result is older than the maximum 7 days allowed.).     Results for orders placed during the hospital encounter of 05/18/23    Echo Saline Bubble? Yes    Interpretation Summary  · Evidence of intermittent atrial fibrillation.  · There is no evidence of intracardiac shunting.  · The left ventricle is normal in size with concentric remodeling and normal systolic function.  · Normal left ventricular diastolic function.  · Normal right ventricular size with normal right ventricular systolic function.  · Normal central venous pressure (3 mmHg).  · Mild mitral regurgitation.  · The estimated ejection fraction is 65%.     No results found for this or any previous visit.     Assessment:      1. Tachycardia    2. Essential hypertension    3. Paroxysmal atrial fibrillation    4. JAROCHO on CPAP        Plan:   Tachycardia    Essential hypertension    Paroxysmal atrial fibrillation    JAROCHO on CPAP    Cont ASA, plavix, diltiazem- PAF  Losartan, diltiazem- HTN  Statin- HLD    Increased diltiazem to 60mg BID  Holter  Follow up 3 mos  Pt requesting to  follow up with Dr. Timoteo Thomas, [facility], Cardiology

## 2023-08-02 PROBLEM — Z99.11 ON MECHANICALLY ASSISTED VENTILATION: Status: ACTIVE | Noted: 2023-01-01

## 2023-08-02 PROBLEM — R53.81 DEBILITY: Status: ACTIVE | Noted: 2023-01-01

## 2023-08-02 PROBLEM — I61.5 IVH (INTRAVENTRICULAR HEMORRHAGE): Status: ACTIVE | Noted: 2023-01-01

## 2023-08-02 PROBLEM — I61.0 NONTRAUMATIC SUBCORTICAL HEMORRHAGE OF LEFT CEREBRAL HEMISPHERE: Status: ACTIVE | Noted: 2023-01-01

## 2023-08-02 PROBLEM — G91.1 OBSTRUCTIVE HYDROCEPHALUS: Status: ACTIVE | Noted: 2023-01-01

## 2023-08-02 NOTE — ED PROVIDER NOTES
Encounter Date: 8/2/2023       History     Chief Complaint   Patient presents with    Transfer Head bleed with shift     Coming from [facility], untreated hypertension 200/100, found down at Nursing home, snoring respirations, on no sedation upon [place] [facility] [place] GCS 3     HPI  Patient is a 57-year-old female with history of lupus, G6 PD deficiency, hypertension, on ASA and Plavix who presents as a transfer for a head bleed.  Patient initially presented to [facility] from [facility] and rehab for altered mental status.  Patient reportedly had elevated blood pressures all day yesterday with systolics greater than 200.  They later found the patient unresponsive with sonorous respirations.  She was intubated emergently for acute hypoxic respiratory failure and airway protection.  Patient intubated with etomidate only.  Imaging with left-sided basal ganglionic region parenchymal hematoma measuring 6 x 3 x 4 x 4.1 cm with rightward midline shift at the level of the 3rd ventricle with intraventricular extension.  Patient given mannitol and started on Cleviprex for elevated blood pressures.  She was also given Keppra.  Patient transferred here for neurosurgical evaluation.        Review of patient's allergies indicates:   Allergen Reactions    Cellcept [mycophenolate mofetil] Swelling     Lips      Ace inhibitors Other (See Comments)     unkown    Aldactone [spironolactone] Blisters     Mouth soreness with doses greater than 25mg.    Aspirin Other (See Comments)     G-6-PD def.     Azathioprine sodium     Beta-blockers (beta-adrenergic blocking agts) Other (See Comments)     Cannot take due to Raynaud's syndrome    Clindamycin Other (See Comments)     + allergy test    Clonidine Blisters     Mouth sores and tenderness    Hydrochlorothiazide Other (See Comments)     unknown    Ibuprofen Other (See Comments)     G-6-PD def.    Lasix [furosemide] Other (See Comments)     unknown    Lisinopril  Other (See Comments)     unknown    Methatropic     Methotrexate analogues Other (See Comments)     Pancytopenia      Norvasc [amlodipine] Other (See Comments)     Jaw pain    Nsaids (non-steroidal anti-inflammatory drug) Other (See Comments)     G-6-PD def.    Plaquenil [hydroxychloroquine] Other (See Comments)     G-6-PD def.    Zofran [ondansetron hcl (pf)] Other (See Comments)     Chest pain      Sulfa (sulfonamide antibiotics) Rash     Past Medical History:   Diagnosis Date    Anemia     with chronic disease lupus    Encounter for blood transfusion     G-6-PD deficiency     Glaucoma suspect of both eyes     Heart murmur     MVP 1986    Hypertension     Lupus     Raynaud's syndrome     Systemic lupus erythematosus arthritis 5/13/2018     Past Surgical History:   Procedure Laterality Date    BONE MARROW BIOPSY      CARPAL TUNNEL RELEASE Bilateral     HERNIA REPAIR      widsom tooth extraction       Family History   Problem Relation Age of Onset    Arthritis Mother     Heart disease Mother     Hypertension Mother     Diabetes Father     Heart disease Father     Hypertension Father     Cancer Brother     Eczema Neg Hx     Lupus Neg Hx     Psoriasis Neg Hx      Social History     Tobacco Use    Smoking status: Never    Smokeless tobacco: Never   Substance Use Topics    Alcohol use: No    Drug use: No     Review of Systems  Unable to obtain due to clinical condition.    Physical Exam     Initial Vitals   BP Pulse Resp Temp SpO2   08/02/23 0740 08/02/23 0740 08/02/23 0740 08/02/23 0744 08/02/23 0740   (!) 185/101 68 16 96.6 °F (35.9 °C) 99 %      MAP       --                Physical Exam  Constitutional:  Intubated  HENT: Normocephalic, atraumatic  Eyes:  Pupils 5 mm bilaterally, fixed  Respiratory:  Intubated, lungs clear on the vent  Cardiovascular: Well perfused, normal rate, regular rhythm  Gastrointestinal: Soft, non-tender, non-distended  Genitourinary: Butt in place  Integumentary: Warm and  dry  Musculoskeletal: No deformity  Neurological: GCS 5T (E1/V intubated/M4), patient withdraws to painful stimuli in her right lower and bilateral upper extremities, she does not withdrawal in her left lower extremity       ED Course   Procedures  Labs Reviewed   CBC W/ AUTO DIFFERENTIAL - Abnormal; Notable for the following components:       Result Value    RBC 2.97 (*)     Hemoglobin 8.5 (*)     Hematocrit 28.3 (*)     MCHC 30.0 (*)     Platelets 142 (*)     Immature Granulocytes 0.7 (*)     Immature Grans (Abs) 0.05 (*)     Mono # 0.1 (*)     Gran % 79.5 (*)     Lymph % 17.6 (*)     Mono % 1.9 (*)     All other components within normal limits   COMPREHENSIVE METABOLIC PANEL - Abnormal; Notable for the following components:    Potassium 3.4 (*)     CO2 17 (*)     Glucose 122 (*)     BUN 22 (*)     Calcium 8.0 (*)     Albumin 2.9 (*)     AST 48 (*)     ALT 46 (*)     All other components within normal limits   URINALYSIS, REFLEX TO URINE CULTURE - Abnormal; Notable for the following components:    Protein, UA 2+ (*)     Occult Blood UA 2+ (*)     All other components within normal limits    Narrative:     Specimen Source->Urine   URINALYSIS MICROSCOPIC - Abnormal; Notable for the following components:    RBC, UA 36 (*)     All other components within normal limits    Narrative:     Specimen Source->Urine   CULTURE, BLOOD   CULTURE, BLOOD   APTT   PROTIME-INR   TYPE & SCREEN   PREPARE PLATELETS (DOSE) SOFT          Imaging Results              X-Ray Chest AP Portable (Final result)  Result time 08/02/23 09:47:13      Final result by Sukhi Amaya MD (08/02/23 09:47:13)                   Impression:      Continued intubation with tip above eunice    Right lung consolidation, as above.  Clinical correlation and follow-up recommended.      Electronically signed by: Sukhi Amaya MD  Date:    08/02/2023  Time:    09:47               Narrative:    EXAMINATION:  XR CHEST AP PORTABLE    CLINICAL  HISTORY:  Respiratory failure, unspecified, unspecified whether with hypoxia or hypercapnia    TECHNIQUE:  Single frontal view of the chest was performed.    COMPARISON:  08/02/2023 at 04:55    FINDINGS:  Endotracheal and gastric tubes remain in place.  The ET tip is about 4 cm above the eunice.  Other tubing and monitor wires overlie the chest as well.    Heart size is normal.  Mediastinal structures are midline.  The lungs are expanded.  Right middle and lower lung zones again show some opacification with mixed ground-glass and consolidation opacities.  This finding could represent pneumonia, aspiration, asymmetric edema, etcetera.  Left lung is relatively clear.  No significant pleural fluid is detected.  Skeletal structures show no acute finding.                                       Medications   niCARdipine 40 mg/200 mL (0.2 mg/mL) infusion (0 mg/hr Intravenous Stopped 8/2/23 0924)   levETIRAcetam injection 500 mg (500 mg Intravenous Given 8/2/23 0839)   acetaminophen suppository 650 mg (has no administration in time range)   0.9%  NaCl infusion (for blood administration) (has no administration in time range)   PHENYLephrine (ROSALIE-SYNEPHRINE) 20 mg in sodium chloride 0.9% 250ml (titrating) (0 mcg/kg/min × 56.2 kg Intravenous Stopped 8/2/23 1432)   cefTRIAXone (ROCEPHIN) 1 g in dextrose 5 % in water (D5W) 100 mL IVPB (MB+) (0 g Intravenous Stopped 8/2/23 1114)   sodium chloride 0.9% bolus 1,000 mL 1,000 mL (0 mLs Intravenous Stopped 8/2/23 1441)     Medical Decision Making:   History:   I obtained history from: EMS provider.  Old Records Summarized: records from another hospital.       <> Summary of Records: EKG from outside hospital reviewed shows sinus bradycardia, rate 59, no STEMI.  Clinical Tests:   Lab Tests: Ordered and Reviewed  ED Management:  Patient presents as a transfer from [facility] for head bleed for neurosurgery and neuro critical care evaluation.  Outside hospital records reviewed.   Patient intubated emergently on arrival.  She was treated with Keppra, mannitol and started on Cleviprex or her blood pressure.  On arrival, GCS 5T.  She is not on any sedation or medications for her blood pressure.  Chest x-ray ordered to confirm ET tube placement.  Repeat labs including coags ordered.  Patient is on aspirin and Plavix.  Will keep systolic blood pressure less than 140.  Nicardipine ordered as patient's blood pressure is rising in his now in the 180s systolic.  Neurosurgery and neuro critical care consulted.    Neurosurgery and neuro critical care able to both assess the patient. Per neurosurgery, no intervention. Family elected to make the patient DNR with plans to withdraw care tomorrow when the rest of the family in town. Neuro critical care working on having hospice see the patient to see if she would qualify for inpatient hospice. If not, neuro critical care plans to admit until family withdraws care.     Patient becoming more hypotensive and tachycardic. IVF and pressors ordered. Discussed with critical care. Family still wants to pursue pressors at this time.    1425 - I was called to the patient's bedside. HR now in the 30s and no detectable BP. Family at bedside. They confirm patient's DNR status. I did not feel a pulse. Rhythm appears to be PEA and then went into v-fib followed by asystole. No cardiac activity on bedside US. Patient agreeable with taking the patient off the ventilator. On exam, no spontaneous respirations, pupils fixed and dilated, no pulse or cardiac activity. Time of death called at 1436. Family at bedside. Neuro critical care updated.     Critical Care  Date: 08/02/2023  Performed by: Angela Stoddard MD   Authorized by: Angela Stoddard MD    Total critical care time (exclusive of procedural time) : 61 minutes  Critical care was necessary to treat or prevent imminent or life-threatening deterioration of the following conditions:  ICH, acute respiratory failure            Other:   I have discussed this case with another health care provider.       <> Summary of the Discussion: Neuro critical care, neurosurgery             ED Course as of 08/02/23 1452   Wed Aug 02, 2023   0806 Patient's blood pressure elevated upon arrival.  Will start the patient on nicardipine with SBP goal less than 140. [NN]   0808 Neurosurgery paged awaiting call back. [NN]   0808 Discussed with neuro critical care.  They plan to come see the patient. [NN]   0817 Neuro critical care at bedside. [NN]   0926 Hemoglobin(!): 8.5  baseline [NN]   1021 Impression:     Continued intubation with tip above euince     Right lung consolidation, as above.  Clinical correlation and follow-up recommended. [NN]   1022 Chest x-ray with new consolidation.  Could be due to aspiration.  Antibiotics ordered as well as blood cultures. [NN]      ED Course User Index  [NN] Angela Stoddard MD                 Clinical Impression:   Final diagnoses:  [J96.90] Respiratory failure requiring intubation  [I61.9] Nontraumatic intracerebral hemorrhage, unspecified cerebral location, unspecified laterality (Primary)  [I46.9] Cardiac arrest               Angela Stoddard MD  08/02/23 1452

## 2023-08-02 NOTE — CONSULTS
Consult received, patient evaluated. Full consult to follow pending Neurosurgery discussion with family. 1 unit platelets ordered per NSGY for DAPT reversal.

## 2023-08-02 NOTE — CONSULTS
[facility] Dept  Neurocritical Care  Consult Note    Admit Date: 8/2/2023  Service Date: 08/02/2023  Length of Stay: 0    Consults  Subjective:     Chief Complaint: Nontraumatic subcortical hemorrhage of left cerebral hemisphere    History of Present Illness:   Ruth Messina is a 57 yr old female with a PMH as below, who lives in a NH. She was found unresponsive and hypertensive in the 200s, and was brought to the OSH where she was intubated for airway protection. CTH revealed large L Basal ganglia ICH with IVH and midline shift. She was transferred to [facility] for neurosurgery eval, higher level of care. She was on DAPT for previous CVA in May and for AF which were reversed with 1 unit of platelets on arrival. She was deemed not a surgical candidate by Neurosurgery team. Family at bedside and updated. They understand that further treatment with medical management will not improve the patient's condition. They would like to wait for more family to come see the patient before withdrawing care. IP hospice consulted.     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/RNP antibody 10/31/2019    Immunosuppressed status 08/14/2019    Primary insomnia 05/08/2019    S/p bilateral carpal tunnel release 09/17/2018    Bilateral occipital neuralgia 09/17/2018    Systemic lupus erythematosus arthritis 05/13/2018    Long term (current) use of systemic steroids 03/29/2018    Long-term current use of high risk medication other than anticoagulant 03/29/2018    Atypical face pain 10/04/2017    Glaucoma suspect of both eyes 09/11/2017    Keratoconjunctivitis sicca of both eyes 09/11/2017    Peripheral retinal degeneration of both eyes 09/11/2017    Posterior vitreous  detachment of both eyes 09/11/2017    Trigeminal neuralgia of right side of face 09/11/2017    Systemic lupus erythematosus 06/01/2017    Secondary Sjogren's syndrome 06/01/2017    Essential hypertension 02/27/2017    Tachycardia 02/10/2017    CNS lupus 12/08/2016    G6PD deficiency 05/13/2016       Past Medical History:   Diagnosis Date    Anemia     with chronic disease lupus    Encounter for blood transfusion     G-6-PD deficiency     Glaucoma suspect of both eyes     Heart murmur     MVP 1986    Hypertension     Lupus     Raynaud's syndrome     Systemic lupus erythematosus arthritis 5/13/2018     Past Surgical History:   Procedure Laterality Date    BONE MARROW BIOPSY      CARPAL TUNNEL RELEASE Bilateral     HERNIA REPAIR      widsom tooth extraction         Current Facility-Administered Medications on File Prior to Encounter   Medication Dose Route Frequency Provider Last Rate Last Admin    [COMPLETED] etomidate injection   Intravenous Code/trauma/sedation Med Jm Casillas Jr., MD   20 mg at 08/02/23 0431    [COMPLETED] levETIRAcetam injection 1,000 mg  1,000 mg Intravenous ED 1 Time Jm Casillas Jr., MD   1,000 mg at 08/02/23 0509    [COMPLETED] mannitol 20% infusion 25 g  25 g Intravenous ED 1 Time Jm Casillas Jr., MD   Stopped at 08/02/23 0607    [DISCONTINUED] clevidipine (CLEVIPREX) 25 mg/50 mL infusion  0-16 mg/hr Intravenous Continuous Jm Casillas Jr., MD   Paused at 08/02/23 0623    [DISCONTINUED] mannitol 25% injection 25 g  25 g Intravenous ED 1 Time Jm Casillas Jr., MD        [DISCONTINUED] rocuronium 10 mg/mL injection              Current Outpatient Medications on File Prior to Encounter   Medication Sig Dispense Refill    aspirin (ECOTRIN) 81 MG EC tablet Take 1 tablet (81 mg total) by mouth once daily. 90 tablet 0    atorvastatin (LIPITOR) 20 MG tablet Take 1 tablet (20 mg total) by mouth once daily. 90 tablet 3    calcium-vitamin D 600 mg-10  mcg (400 unit) Tab Take by mouth once.      cetirizine (ZYRTEC) 10 MG tablet Take 10 mg by mouth once daily.      cholecalciferol, vitamin D3, 125 mcg (5,000 unit) Tab Take 5,000 Units by mouth once daily.      clopidogreL (PLAVIX) 75 mg tablet Take 1 tablet (75 mg total) by mouth once daily. for 21 days 21 tablet 0    diltiaZEM (CARDIZEM) 60 MG tablet Take 1 tablet (60 mg total) by mouth every 12 (twelve) hours. 60 tablet 11    gabapentin (NEURONTIN) 100 MG capsule Take 100 mg by mouth once daily.      lactulose (CHRONULAC) 10 gram/15 mL solution Take 15 mLs (10 g total) by mouth 3 (three) times daily as needed.  0    losartan (COZAAR) 25 MG tablet Take 1 tablet (25 mg total) by mouth once daily. Hold medication until follow up with prescribing provider      magnesium oxide (MAG-OX) 400 mg (241.3 mg magnesium) tablet Take 400 mg by mouth once daily.      multivitamin capsule Take 1 capsule by mouth once daily.      pantoprazole (PROTONIX) 40 MG tablet Take 40 mg by mouth.      polyethylene glycol (GLYCOLAX) 17 gram PwPk Take by mouth.      predniSONE (DELTASONE) 5 MG tablet Take 1 tablet (5 mg total) by mouth once daily. 30 tablet 2    promethazine (PHENERGAN) 12.5 MG Tab Take by mouth.      simethicone (MYLICON) 80 MG chewable tablet Take 160 mg by mouth every 12 (twelve) hours as needed for Flatulence.      tacrolimus (PROTOPIC) 0.1 % ointment Apply topically.      vitamin B12-folic acid 0.5-1 mg Tab Take 1 tablet by mouth once daily.       Allergies: Cellcept [mycophenolate mofetil], Ace inhibitors, Aldactone [spironolactone], Aspirin, Azathioprine sodium, Beta-blockers (beta-adrenergic blocking agts), Clindamycin, Clonidine, Hydrochlorothiazide, Ibuprofen, Lasix [furosemide], Lisinopril, Methatropic, Methotrexate analogues, Norvasc [amlodipine], Nsaids (non-steroidal anti-inflammatory drug), Plaquenil [hydroxychloroquine], Zofran [ondansetron hcl (pf)], and Sulfa (sulfonamide  antibiotics)    Family History   Problem Relation Age of Onset    Arthritis Mother     Heart disease Mother     Hypertension Mother     Diabetes Father     Heart disease Father     Hypertension Father     Cancer Brother     Eczema Neg Hx     Lupus Neg Hx     Psoriasis Neg Hx        Social History     Tobacco Use    Smoking status: Never    Smokeless tobacco: Never   Substance Use Topics    Alcohol use: No    Drug use: No      Review of Systems: Unable to obtain a complete ROS due to level of consciousness.     Vitals:   Temp: 96.6 °F (35.9 °C)  Pulse: 77  BP: 116/69  MAP (mmHg): 86  Resp: 16  SpO2: 100 %  Oxygen Concentration (%): 40  Vent Mode: A/C  Set Rate: 16 BPM  Vt Set: 450 mL  PEEP/CPAP: 5 cmH20  Peak Airway Pressure: 21 cmH20  Mean Airway Pressure: 8.4 cmH20  Plateau Pressure: 0 cmH20    Temp  Min: 96.6 °F (35.9 °C)  Max: 97.8 °F (36.6 °C)  Pulse  Min: 56  Max: 106  BP  Min: 116/69  Max: 224/134  MAP (mmHg)  Min: 86  Max: 173  Resp  Min: 10  Max: 27  SpO2  Min: 98 %  Max: 100 %  Oxygen Concentration (%)  Min: 40  Max: 40    No intake/output data recorded.         Examination:   Constitutional: Chronically ill appearing. No apparent distress.   Eyes: Conjunctiva clear, anicteric. Lids no lesions.  Head/Ears/Nose/Mouth/Throat/Neck: Moist mucous membranes. External ears, nose atraumatic.   Cardiovascular: Regular rhythm. No leg edema.  Respiratory: Intubated. Comfortable respirations. Clear to auscultation.  Gastrointestinal: Soft, nondistended, nontender. + bowel sounds.    Neurologic:   -E 1 V 1t M 3  -Sedation: none  -Does not open eyes to voice or noxious stimuli. Unable to follow commands.  -Cranial nerves: Pupils bilaterally 4mm, fixed, nonreactive, weak corneal reflex bilaterally, no cough/gag  -Motor: BUE abnormal flexion, BLE triple flexion to noxious stimuli  Unable to test orientation, language, memory, judgment, insight, fund of knowledge, shoulder shrug, tongue protrusion,  coordination, gait due to level of consciousness.    Today I independently reviewed pertinent medications, lines/drains/airways, imaging, cardiology results, laboratory results, microbiology results, notably:   CTH: Large L Basal ganglia hemorrhage with IVH, hydrocephalus, and 10mm MLS      Assessment/Plan:     Neuro  * Nontraumatic subcortical hemorrhage of left cerebral hemisphere  57 yr old female with hx of Lupus, G6PD deficiency, prior CVA , AF (on DAPT) who was found unresponsive at her NH this morning  CTH shows large L BG ICH with IVH and hydrocephalus, and 10 mm MLS, 1 unit of platelets ordered for DAPT reversal  Neurosurgery consulted, and no surgical intervention recommended    -Admit to IP hospice  -SBP Goal < 140  -Pending family arrival for withdrawal of care, likely tomorrow  - services for support  -Family is aware of the patient's condition and understand that surgical intervention is not an option, and that medical management will not improve the patient's condition. They would like to proceed with withdrawal of care and transition to comfort care once more family has arrived, likely tomorrow. They are okay with vasopressors if needed, but no other escalation of care and have made the patient a DNR.  -[facility] hospice has been consulted    Obstructive hydrocephalus  See primary problem    IVH (intraventricular hemorrhage)  See primary problem    Pulmonary  On mechanically assisted ventilation  Intubated for airway protection due to decreased LOC  Family planning to withdraw care once more family has arrived    Other  Debility  See primary problem      Activity Orders          Bed rest starting at 08/02 0809    Elevate HOB 30 (30-45 Degrees) starting at 08/02 0809    Diet NPO: NPO starting at 08/02 0809        DNR    Adelaida Granda NP  Neurocritical Care  [facility] - [facility] Dept

## 2023-08-02 NOTE — ASSESSMENT & PLAN NOTE
Intubated for airway protection due to decreased LOC  Family planning to withdraw care once more family has arrived
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
## 2023-08-02 NOTE — ACP (ADVANCE CARE PLANNING)
Advance Care Planning     Date: 08/02/2023    Code Status  In light of the patients advanced and life limiting illness,I engaged the the  Sister and daughter  in a voluntary conversation about the patient's preferences for care  at the very end of life. The patient wishes to have a natural, peaceful death.  Along those lines, the patient does not wish to have CPR or other invasive treatments performed when her heart and/or breathing stops. I communicated to the family that a DNR order would be placed in her medical record to reflect this preference.  I spent a total of 20 minutes engaging the patient in this advance care planning discussion.

## 2023-08-02 NOTE — HPI
57 F PMHx SLE, ASA/Plavix use, NH resident, dependent on most daily activities, consulted to NSGY for large left BG ICH with IVH (ICH score 4). According to daughter, pt was found lethargic by nursing home staff at 3 AM with very high blood pressure and further decompensated at OSH. She was found to have large ICH on CTH prompting transfer to [facility].

## 2023-08-02 NOTE — ED NOTES
MD Stoddard notified that patient hemodynamic status is worsening--, BP 71/51. MD Stoddard advises that she is communicating with NCC about how to advance patient care at this time. Awaiting orders.

## 2023-08-02 NOTE — RESPIRATORY THERAPY
Pt was transferred in intubated with a 7.5 ETT secured 24 cm at the lip. Pt was placed on the vent at the documented settings. Will continue to monitor.

## 2023-08-02 NOTE — ED NOTES
Bed: 02  Expected date: 8/2/23  Expected time: 5:30 AM  Means of arrival:   Comments:  [place] [facility] -[facility]

## 2023-08-02 NOTE — ED NOTES
This RN went in room to titrate drip, BP not reading, pulse dropped from 75 to 30s. No palpable pulse noted by this RN. MD called to bedside. V-fib HR, then asystole. No cardiac rhythm noted by ultrasound machine per MD. Vent turned off, no spontaneous breathing, and no pupil activity noted per MD.

## 2023-08-02 NOTE — ASSESSMENT & PLAN NOTE
57 yr old female with hx of Lupus, G6PD deficiency, prior CVA , AF (on DAPT) who was found unresponsive at her NH this morning  CTH shows large L BG ICH with IVH and hydrocephalus, and 10 mm MLS, 1 unit of platelets ordered for DAPT reversal  Neurosurgery consulted, and no surgical intervention recommended    -Admit to IP hospice  -SBP Goal < 140  -Pending family arrival for withdrawal of care, likely tomorrow  - services for support  -Family is aware of the patient's condition and understand that surgical intervention is not an option, and that medical management will not improve the patient's condition. They would like to proceed with withdrawal of care and transition to comfort care once more family has arrived, likely tomorrow. They are okay with vasopressors if needed, but no other escalation of care and have made the patient a DNR.  -[facility] hospice has been consulted

## 2023-08-02 NOTE — CONSULTS
[facility] Dept  Neurosurgery  Consult Note    Subjective:     History of Present Illness: 57 F PMHx SLE, ASA/Plavix use, NH resident, dependent on most daily activities, consulted to NSGY for large left BG ICH with IVH (ICH score 4). According to daughter, pt was found lethargic by nursing home staff at 3 AM with very high blood pressure and further decompensated at OSH. She was found to have large ICH on CTH prompting transfer to [facility].       Post-Op Info:  * No surgery found *         Past Medical History:   Diagnosis Date    Anemia     with chronic disease lupus    Encounter for blood transfusion     G-6-PD deficiency     Glaucoma suspect of both eyes     Heart murmur     MVP 1986    Hypertension     Lupus     Raynaud's syndrome     Systemic lupus erythematosus arthritis 5/13/2018       Past Surgical History:   Procedure Laterality Date    BONE MARROW BIOPSY      CARPAL TUNNEL RELEASE Bilateral     HERNIA REPAIR      widsom tooth extraction         Social History     Socioeconomic History    Marital status: Single   Tobacco Use    Smoking status: Never    Smokeless tobacco: Never   Substance and Sexual Activity    Alcohol use: No    Drug use: No    Sexual activity: Not Currently     Partners: Male   Other Topics Concern    Are you pregnant or think you may be? No    Breast-feeding No     Social Determinants of Health     Financial Resource Strain: Medium Risk (8/14/2019)    Overall Financial Resource Strain (CARDIA)     Difficulty of Paying Living Expenses: Somewhat hard   Transportation Needs: Unmet Transportation Needs (11/12/2021)    PRAPARE - Transportation     Lack of Transportation (Medical): Yes     Lack of Transportation (Non-Medical): Yes   Housing Stability: High Risk (11/9/2021)    Housing Stability Vital Sign     Unable to Pay for Housing in the Last Year: No     Unstable Housing in the Last Year: 1       Family History   Problem Relation Age of Onset    Arthritis  Mother     Heart disease Mother     Hypertension Mother     Diabetes Father     Heart disease Father     Hypertension Father     Cancer Brother     Eczema Neg Hx     Lupus Neg Hx     Psoriasis Neg Hx        Review of patient's allergies indicates:   Allergen Reactions    Cellcept [mycophenolate mofetil] Swelling     Lips      Ace inhibitors Other (See Comments)     unkown    Aldactone [spironolactone] Blisters     Mouth soreness with doses greater than 25mg.    Aspirin Other (See Comments)     G-6-PD def.     Azathioprine sodium     Beta-blockers (beta-adrenergic blocking agts) Other (See Comments)     Cannot take due to Raynaud's syndrome    Clindamycin Other (See Comments)     + allergy test    Clonidine Blisters     Mouth sores and tenderness    Hydrochlorothiazide Other (See Comments)     unknown    Ibuprofen Other (See Comments)     G-6-PD def.    Lasix [furosemide] Other (See Comments)     unknown    Lisinopril Other (See Comments)     unknown    Methatropic     Methotrexate analogues Other (See Comments)     Pancytopenia      Norvasc [amlodipine] Other (See Comments)     Jaw pain    Nsaids (non-steroidal anti-inflammatory drug) Other (See Comments)     G-6-PD def.    Plaquenil [hydroxychloroquine] Other (See Comments)     G-6-PD def.    Zofran [ondansetron hcl (pf)] Other (See Comments)     Chest pain      Sulfa (sulfonamide antibiotics) Rash         Medications:  Continuous Infusions:   niCARdipine Stopped (08/02/23 0924)     Scheduled Meds:   levETIRAcetam (Keppra) IV (PEDS and ADULTS)  500 mg Intravenous Q12H     PRN Meds:0.9%  NaCl infusion (for blood administration), acetaminophen       Objective:     Weight: 56.2 kg (124 lb)  Body mass index is 20.01 kg/m².  Vital Signs (Most Recent):  Temp: 96.1 °F (35.6 °C) (08/02/23 1059)  Pulse: 69 (08/02/23 1115)  Resp: 16 (08/02/23 1115)  BP: 100/60 (08/02/23 1115)  SpO2: 99 % (08/02/23 1115) Vital Signs (24h Range):  Temp:  [96.1 °F  (35.6 °C)-97.8 °F (36.6 °C)] 96.1 °F (35.6 °C)  Pulse:  [] 69  Resp:  [10-27] 16  SpO2:  [98 %-100 %] 99 %  BP: (100-224)/() 100/60     Date 08/02/23 0700 - 08/03/23 0659   Shift 6686-5146 7244-7952 1268-6793 24 Hour Total   INTAKE   Blood 383   383   Shift Total(mL/kg) 383(6.8)   383(6.8)   OUTPUT   Shift Total(mL/kg)       Weight (kg) 56.2 56.2 56.2 56.2              Vent Mode: A/C  Oxygen Concentration (%):  [40] 40  Resp Rate Total:  [16 br/min-20 br/min] 16 br/min  Vt Set:  [400 mL-450 mL] 450 mL  PEEP/CPAP:  [5 cmH20] 5 cmH20  Mean Airway Pressure:  [6.9 cmH20-8.4 cmH20] 8.4 cmH20             Urethral Catheter 08/02/23 0754 (Active)          Physical Exam         Neurosurgery Physical Exam    Physical Exam:    Constitutional: comatose    HEENT: atraumatic/normocephalic    Cardiovascular: Regular rhythm.     Pulm: intubated    Abdominal: Soft.     Psych/Behavior: AMS    Neuro:   3T  Pupils 7mm fixed  No cough/gag/corneals/OC  BUE extensor  BLE TF      Significant Labs:  Recent Labs   Lab 08/02/23  0506 08/02/23  0837   * 122*    140   K 3.6 3.4*    109   CO2 22* 17*   BUN 22* 22*   CREATININE 0.9 0.9   CALCIUM 8.5* 8.0*     Recent Labs   Lab 08/02/23  0447 08/02/23  0837   WBC 7.87 7.43   HGB 8.4* 8.5*   HCT 27.6* 28.3*    142*     Recent Labs   Lab 08/02/23  0617 08/02/23  0837   INR  --  0.9   APTT 23.9 26.8     Microbiology Results (last 7 days)       Procedure Component Value Units Date/Time    Blood culture #2 **CANNOT BE ORDERED STAT** [[id number]] Collected: 08/02/23 1042    Order Status: Sent Specimen: Blood from Peripheral, Antecubital, Right Updated: 08/02/23 1109    Blood culture #1 **CANNOT BE ORDERED STAT** [[id number]] Collected: 08/02/23 1043    Order Status: Sent Specimen: Blood from Peripheral, Forearm, Right Updated: 08/02/23 1109          All pertinent labs from the last 24 hours have been reviewed.    Significant Diagnostics:  I have reviewed and  interpreted all pertinent imaging results/findings within the past 24 hours.    Assessment/Plan:     * Nontraumatic subcortical hemorrhage of left cerebral hemisphere  57 F PMHx SLE, ASA/Plavix use, NH resident, dependent on most daily activities, consulted to NSGY for large left BG ICH with IVH (ICH score 4).    CTH 8/2: 6.5x4 cm L BG ICH with significant midbrain compression and midline shift. Extensive IVH tracking to 4th ventricle and early hydrocephalus.     -- given severity of findings on CTH and very poor neurologic exam, it is highly unlikely that any surgical intervention will provide any meaningful benefit to the patient.   -- No surgical intervention will be offered,  -- Recommend TEU/NCC assistance  -- NSGY will sign off at this time        Ras Vaughan MD  Neurosurgery  [facility]

## 2023-08-02 NOTE — ASSESSMENT & PLAN NOTE
57 F PMHx SLE, ASA/Plavix use, NH resident, dependent on most daily activities, consulted to NSGY for large left BG ICH with IVH (ICH score 4).    CTH 8/2: 6.5x4 cm L BG ICH with significant midbrain compression and midline shift. Extensive IVH tracking to 4th ventricle and early hydrocephalus.     -- given severity of findings on CTH and very poor neurologic exam, it is highly unlikely that any surgical intervention will provide any meaningful benefit to the patient.   -- No surgical intervention will be offered,  -- Recommend TEU/NCC assistance  -- NSGY will sign off at this time

## 2023-08-02 NOTE — HPI
Ruth Messina is a 57 yr old female with a PMH as below, who lives in a NH. She was found unresponsive and hypertensive in the 200s, and was brought to the OSH where she was intubated for airway protection. CTH revealed large L Basal ganglia ICH with IVH and midline shift. She was transferred to [facility] for neurosurgery eval, higher level of care. She was on DAPT for previous CVA in May and for AF which were reversed with 1 unit of platelets on arrival. She was deemed not a surgical candidate by Neurosurgery team. Family at bedside and updated. They understand that further treatment with medical management will not improve the patient's condition. They would like to wait for more family to come see the patient before withdrawing care. IP hospice consulted.     Patient Active Problem List    Diagnosis Date Noted    Altered mental status 05/22/2023    Elevated troponin 05/22/2023    TIA (transient ischemic attack) 05/18/2023    A-fib 05/18/2023    Syncope 05/18/2023    JAROCHO on CPAP 12/08/2020    Anemia of chronic illness 11/07/2019    Positive sm/RNP antibody 10/31/2019    Immunosuppressed status 08/14/2019    Primary insomnia 05/08/2019    S/p bilateral carpal tunnel release 09/17/2018    Bilateral occipital neuralgia 09/17/2018    Systemic lupus erythematosus arthritis 05/13/2018    Long term (current) use of systemic steroids 03/29/2018    Long-term current use of high risk medication other than anticoagulant 03/29/2018    Atypical face pain 10/04/2017    Glaucoma suspect of both eyes 09/11/2017    Keratoconjunctivitis sicca of both eyes 09/11/2017    Peripheral retinal degeneration of both eyes 09/11/2017    Posterior vitreous detachment of both eyes 09/11/2017    Trigeminal neuralgia of right side of face 09/11/2017    Systemic lupus erythematosus 06/01/2017    Secondary Sjogren's syndrome 06/01/2017    Essential hypertension 02/27/2017    Tachycardia 02/10/2017    CNS lupus 12/08/2016    G6PD deficiency 05/13/2016

## 2023-08-02 NOTE — ED PROVIDER NOTES
SCRIBE #1 NOTE: I, Jori Walsh, am scribing for, and in the presence of, Jm Casillas Jr., MD. I have scribed the entire note.       History     Chief Complaint   Patient presents with    Altered Mental Status     Sent from [facility], per nursing staff report the pt has had BP 200s/100s all day, was not treated with any medications, and had had snoring respirations. Pt arrived on NRB and unresponsive      Review of patient's allergies indicates:   Allergen Reactions    Cellcept [mycophenolate mofetil] Swelling     Lips      Ace inhibitors Other (See Comments)     unkown    Aldactone [spironolactone] Blisters     Mouth soreness with doses greater than 25mg.    Aspirin Other (See Comments)     G-6-PD def.     Azathioprine sodium     Beta-blockers (beta-adrenergic blocking agts) Other (See Comments)     Cannot take due to Raynaud's syndrome    Clindamycin Other (See Comments)     + allergy test    Clonidine Blisters     Mouth sores and tenderness    Hydrochlorothiazide Other (See Comments)     unknown    Ibuprofen Other (See Comments)     G-6-PD def.    Lasix [furosemide] Other (See Comments)     unknown    Lisinopril Other (See Comments)     unknown    Methatropic     Methotrexate analogues Other (See Comments)     Pancytopenia      Norvasc [amlodipine] Other (See Comments)     Jaw pain    Nsaids (non-steroidal anti-inflammatory drug) Other (See Comments)     G-6-PD def.    Plaquenil [hydroxychloroquine] Other (See Comments)     G-6-PD def.    Zofran [ondansetron hcl (pf)] Other (See Comments)     Chest pain      Sulfa (sulfonamide antibiotics) Rash         History of Present Illness     HPI    8/2/2023, 4:42 AM  History obtained from the [facility]    Hx limited due to AMS      History of Present Illness: Ruth Messina is a 57 y.o. female patient with a PMHx of HTN who presents to the Emergency Department for evaluation of AMS which onset yesterday. Pt is a resident at a nursing home.  Nursing staff reports the pt has had untreated high blood pressure in the 200s all day. They report to [facility] that the patient is lethargic and less verbal. Pt arrives on NRB.       Arrival mode: [facility]    PCP: Felisha Chi MD        Past Medical History:  Past Medical History:   Diagnosis Date    Anemia     with chronic disease lupus    Encounter for blood transfusion     G-6-PD deficiency     Glaucoma suspect of both eyes     Heart murmur     MVP 1986    Hypertension     Lupus     Raynaud's syndrome     Systemic lupus erythematosus arthritis 5/13/2018       Past Surgical History:  Past Surgical History:   Procedure Laterality Date    BONE MARROW BIOPSY      CARPAL TUNNEL RELEASE Bilateral     HERNIA REPAIR      widsom tooth extraction           Family History:  Family History   Problem Relation Age of Onset    Arthritis Mother     Heart disease Mother     Hypertension Mother     Diabetes Father     Heart disease Father     Hypertension Father     Cancer Brother     Eczema Neg Hx     Lupus Neg Hx     Psoriasis Neg Hx        Social History:  Social History     Tobacco Use    Smoking status: Never    Smokeless tobacco: Never   Substance and Sexual Activity    Alcohol use: No    Drug use: No    Sexual activity: Not Currently     Partners: Male        Review of Systems     Review of Systems   Unable to perform ROS: Mental status change        Physical Exam     Initial Vitals   BP Pulse Resp Temp SpO2   08/02/23 0432 08/02/23 0432 08/02/23 0432 08/02/23 0438 08/02/23 0438   (!) 221/125 99 10 97.8 °F (36.6 °C) 100 %      MAP       --                 Physical Exam  Nursing Notes and Vital Signs Reviewed.  Constitutional: Patient is in no acute distress. Well-developed and well-nourished.  Head: Atraumatic. Normocephalic.  Eyes:  Left pupil is dilated. Right pupil is constricted. No scleral icterus.  ENT: Mucous membranes are moist.  Nares clear   Neck:  Full ROM. No JVD.  Cardiovascular:  Regular rate. Regular rhythm No murmurs, rubs, or gallops. Distal pulses are 2+ and symmetric  Pulmonary/Chest: No respiratory distress. Clear to auscultation bilaterally. No wheezing or rales.  Equal chest wall rise bilaterally  Abdominal: Soft and non-distended.  There is no tenderness.  No rebound, guarding, or rigidity. Good bowel sounds.  Genitourinary: No CVA tenderness.  No suprapubic tenderness  Musculoskeletal: Moves all extremities. No obvious deformities.  5 x 5 strength in all extremities   Skin: Warm and dry.  Neurological:  GCS of 5.  Patient grunting.    Psychiatric: Normal affect. Good eye contact. Appropriate in content.     ED Course   Critical Care    Date/Time: 8/2/2023 5:01 AM    Performed by: Jm Casillas Jr., MD  Authorized by: Jm Casillas Jr., MD  Direct patient critical care time: 25 minutes  Additional history critical care time: 10 minutes  Ordering / reviewing critical care time: 10 minutes  Documentation critical care time: 5 minutes  Total critical care time (exclusive of procedural time) : 50 minutes  Critical care time was exclusive of separately billable procedures and treating other patients and teaching time.  Critical care was necessary to treat or prevent imminent or life-threatening deterioration of the following conditions: intracranial hemorrhage and respiratory failure.  Critical care was time spent personally by me on the following activities: blood draw for specimens, development of treatment plan with patient or surrogate, discussions with consultants, interpretation of cardiac output measurements, evaluation of patient's response to treatment, examination of patient, obtaining history from patient or surrogate, ordering and performing treatments and interventions, ordering and review of laboratory studies, ordering and review of radiographic studies, pulse oximetry, re-evaluation of patient's condition and review of old charts.      Intubation    Date/Time:  8/2/2023 5:04 AM  Location procedure was performed: [facility] EMERGENCY DEPARTMENT    Performed by: Jm Casillas Jr., MD  Authorized by: Jm Casillas Jr., MD  Consent Done: Emergent Situation  Indications: respiratory failure  Intubation method: direct  Patient status: sedated  Preoxygenation: BVM  Sedatives: etomidate  Laryngoscope size: Aguilar 4  Tube size: 7.5 mm  Tube type: cuffed  Number of attempts: 3  Ventilation between attempts: BVM  Cricoid pressure: yes  Cords visualized: yes  Post-procedure assessment: chest rise  Cuff inflated: yes  ETT to lip: 24 cm  Tube secured with: ETT hollingsworth and adhesive tape  Chest x-ray interpreted by me.  Chest x-ray findings: endotracheal tube in appropriate position  Patient tolerance: Patient tolerated the procedure well with no immediate complications  Complications: No  Specimens: No  Implants: No        ED Vital Signs:  Vitals:    08/02/23 0432 08/02/23 0433 08/02/23 0438 08/02/23 0457   BP: (!) 221/125   (!) 184/96   Pulse: 99 106 62 67   Resp: 10   18   Temp:   97.8 °F (36.6 °C)    TempSrc:   Rectal    SpO2:   100%    Weight:        08/02/23 0458 08/02/23 0502 08/02/23 0507 08/02/23 0512   BP:  (!) 208/115 (!) 224/134 (!) 183/103   Pulse:  70 77 66   Resp:  18 19 18   Temp:       TempSrc:       SpO2:    100%   Weight: 55.9 kg (123 lb 3.8 oz)       08/02/23 0515 08/02/23 0522 08/02/23 0527 08/02/23 0532   BP: (!) 165/115 (!) 168/103 (!) 168/94 (!) 172/87   Pulse: 60 (!) 56 60 67   Resp: 18 19  (!) 21   Temp:       TempSrc:       SpO2: 99% 99% 99% 98%   Weight:        08/02/23 0537 08/02/23 0541 08/02/23 0547   BP: (!) 196/112 (!) 189/91 (!) 173/100   Pulse: (!) 59 66 74   Resp: 20 19 19   Temp:      TempSrc:      SpO2: 99% 100% 100%   Weight:          Abnormal Lab Results:  Labs Reviewed   CBC W/ AUTO DIFFERENTIAL - Abnormal; Notable for the following components:       Result Value    RBC 2.99 (*)     Hemoglobin 8.4 (*)     Hematocrit 27.6 (*)     MCHC 30.4 (*)      Immature Granulocytes 0.9 (*)     Immature Grans (Abs) 0.07 (*)     All other components within normal limits   URINALYSIS, REFLEX TO URINE CULTURE - Abnormal; Notable for the following components:    Color, UA Colorless (*)     Protein, UA 2+ (*)     Occult Blood UA 2+ (*)     All other components within normal limits    Narrative:     Specimen Source->Urine   DRUG SCREEN PANEL, URINE EMERGENCY - Abnormal; Notable for the following components:    Creatinine, Urine 13.2 (*)     All other components within normal limits    Narrative:     Specimen Source->Urine   COMPREHENSIVE METABOLIC PANEL - Abnormal; Notable for the following components:    CO2 22 (*)     Glucose 171 (*)     BUN 22 (*)     Calcium 8.5 (*)     Albumin 3.2 (*)     AST 50 (*)     ALT 56 (*)     All other components within normal limits   URINALYSIS MICROSCOPIC - Abnormal; Notable for the following components:    RBC, UA 33 (*)     All other components within normal limits    Narrative:     Specimen Source->Urine   ISTAT PROCEDURE - Abnormal; Notable for the following components:    POC PO2 342 (*)     All other components within normal limits   B-TYPE NATRIURETIC PEPTIDE   AMMONIA   TROPONIN I   APTT        All Lab Results:  Results for orders placed or performed during the hospital encounter of 08/02/23   CBC auto differential   Result Value Ref Range    WBC 7.87 3.90 - 12.70 K/uL    RBC 2.99 (L) 4.00 - 5.40 M/uL    Hemoglobin 8.4 (L) 12.0 - 16.0 g/dL    Hematocrit 27.6 (L) 37.0 - 48.5 %    MCV 92 82 - 98 fL    MCH 28.1 27.0 - 31.0 pg    MCHC 30.4 (L) 32.0 - 36.0 g/dL    RDW 14.5 11.5 - 14.5 %    Platelets 152 150 - 450 K/uL    MPV 11.9 9.2 - 12.9 fL    Immature Granulocytes 0.9 (H) 0.0 - 0.5 %    Gran # (ANC) 4.4 1.8 - 7.7 K/uL    Immature Grans (Abs) 0.07 (H) 0.00 - 0.04 K/uL    Lymph # 2.9 1.0 - 4.8 K/uL    Mono # 0.4 0.3 - 1.0 K/uL    Eos # 0.1 0.0 - 0.5 K/uL    Baso # 0.03 0.00 - 0.20 K/uL    nRBC 0 0 /100 WBC    Gran % 55.2 38.0 - 73.0 %     Lymph % 36.7 18.0 - 48.0 %    Mono % 5.0 4.0 - 15.0 %    Eosinophil % 1.8 0.0 - 8.0 %    Basophil % 0.4 0.0 - 1.9 %    Differential Method Automated    Urinalysis, Reflex to Urine Culture Urine, Catheterized    Specimen: Urine   Result Value Ref Range    Specimen UA Urine, Catheterized     Color, UA Colorless (A) Yellow, Straw, April    Appearance, UA Clear Clear    pH, UA 8.0 5.0 - 8.0    Specific Gravity, UA 1.010 1.005 - 1.030    Protein, UA 2+ (A) Negative    Glucose, UA Negative Negative    Ketones, UA Negative Negative    Bilirubin (UA) Negative Negative    Occult Blood UA 2+ (A) Negative    Nitrite, UA Negative Negative    Urobilinogen, UA Negative <2.0 EU/dL    Leukocytes, UA Negative Negative   Drug screen panel, emergency   Result Value Ref Range    Benzodiazepines Negative Negative    Methadone metabolites Negative Negative    Cocaine (Metab.) Negative Negative    Opiate Scrn, Ur Negative Negative    Barbiturate Screen, Ur Negative Negative    Amphetamine Screen, Ur Negative Negative    THC Negative Negative    Phencyclidine Negative Negative    Creatinine, Urine 13.2 (L) 15.0 - 325.0 mg/dL    Toxicology Information SEE COMMENT    Brain natriuretic peptide   Result Value Ref Range    BNP 16 0 - 99 pg/mL   Ammonia   Result Value Ref Range    Ammonia 27 10 - 50 umol/L   Comprehensive metabolic panel   Result Value Ref Range    Sodium 140 136 - 145 mmol/L    Potassium 3.6 3.5 - 5.1 mmol/L    Chloride 107 95 - 110 mmol/L    CO2 22 (L) 23 - 29 mmol/L    Glucose 171 (H) 70 - 110 mg/dL    BUN 22 (H) 6 - 20 mg/dL    Creatinine 0.9 0.5 - 1.4 mg/dL    Calcium 8.5 (L) 8.7 - 10.5 mg/dL    Total Protein 6.9 6.0 - 8.4 g/dL    Albumin 3.2 (L) 3.5 - 5.2 g/dL    Total Bilirubin 0.4 0.1 - 1.0 mg/dL    Alkaline Phosphatase 77 55 - 135 U/L    AST 50 (H) 10 - 40 U/L    ALT 56 (H) 10 - 44 U/L    eGFR >60 >60 mL/min/1.73 m^2    Anion Gap 11 8 - 16 mmol/L   Troponin I   Result Value Ref Range    Troponin I 0.017 0.000 - 0.026  ng/mL   Urinalysis Microscopic   Result Value Ref Range    RBC, UA 33 (H) 0 - 4 /hpf    WBC, UA 3 0 - 5 /hpf    Bacteria None None-Occ /hpf    Hyaline Casts, UA 0 0-1/lpf /lpf    Ca Oxalate Martina, UA Rare None-Moderate    Microscopic Comment SEE COMMENT    ISTAT PROCEDURE   Result Value Ref Range    POC PH 7.431 7.35 - 7.45    POC PCO2 41.0 35 - 45 mmHg    POC PO2 342 (H) 80 - 100 mmHg    POC HCO3 27.3 24 - 28 mmol/L    POC BE 3 -2 to 2 mmol/L    POC SATURATED O2 100 95 - 100 %    Rate 18     Sample ARTERIAL     Site LR     Allens Test Pass     DelSys Adult Vent     Mode AC/PRVC     Vt 400     PEEP 5     FiO2 100          Imaging Results:  Imaging Results              X-Ray Chest AP Portable (In process)                      CT Head Without Contrast (In process)                    5:08 AM: Per Gaurav Sewell MD from [facility] radiology, pt's CT head without IV contrast results: (1) Left-sided basal ganglionic region parenchymal hematoma measures 6 x 3.4 4.1 cm for a volume of 42 cc. (2) Rightward midline shift at the level of the third ventricle measures 12 mm. (3) Intraventricular extension is noted.       The EKG was ordered, reviewed, and independently interpreted by the ED provider.  Interpretation time: 04:27  Rate: 59 BPM  Rhythm: Sinus bradycardia with marked sinus arrhythmia   Interpretation: Minimal voltage criteria for LVH, may be normal variant (Sokolow-Bell). Septal infarct, age undetermined. No STEMI.    The Emergency Provider reviewed the vital signs and test results, which are outlined above.     ED Discussion       5:11 AM: Re-evaluated pt. Informed pt and family that there are no neurosurgery services available at this time. I have discussed test results, shared treatment plan, and the need for transfer with patient and family at bedside. All historical, clinical, radiographic, and laboratory findings were reviewed with the patient/family in detail. Patient will be transferred by [facility] services with care  required en route. Patient understands that there could be unforeseen motor vehicle accidents or loss of vital signs that could result in potential death or permanent disability. Pt and family express understanding at this time and agree with all information. All questions answered. Pt and family have no further questions or concerns at this time. Pt is ready for transfer.       5:12 AM: Neurosurgery accepting. Per chart, it appears to be Dr. Joyce (Neurosurgery) to be the accepting physician at [facility]. There are no neurosurgery services, which the patient requires, offered at [facility] at this time. Dr. Joyce expresses understanding and will accept transfer for intracranial hemorrhage.  Accepting Facility: [facility]  Accepting Physician: Dr. Joyce     ED Course as of 08/02/23 0556   Wed Aug 02, 2023   0524 Patient's daughter Melina Messina notified via telephone of her mother's condition as well as the plan of care to transfer via air med to [facility].  She is aware of the patient's unstable condition as well as all interventions currently being utilized.  She expresses her appreciation and understanding [RT]   0549 [facility] transfer center notes a 45 minute delay and Helo from [facility] arriving here due to crew change.  We have requested they contacted [facility].  They are unable to do so.  We have contacted the [place] emergency response at work to attempt to expedite transfer. [RT]   0555 We have called transfer center again requesting immediate transfer. [RT]      ED Course User Index  [RT] Jm Casillas Jr., MD     Medical Decision Making:   Initial Assessment:   Patient arrived altered with a markedly elevated blood pressure.  Initial examination revealed a blown pupil on the left.  GCS was 5.  Patient was intubated in the ED and brought expeditiously to CT scan.  I visualized the CT scan upon completion.  Patient with massive brain  bleed.  Differential Diagnosis:   Intracranial hemorrhage, herniated brainstem, respiratory failure, sepsis, altered mental status, stroke  Clinical Tests:   Lab Tests: Ordered and Reviewed  Radiological Study: Ordered and Reviewed  Medical Tests: Ordered and Reviewed  ED Management:  Patient was evaluated expeditiously on arrival.  She was sent immediately to CT scan after intubation for GCS of 3.  Massive brain bleed was noted in transfer was arranged transfer center.  Patient was given Keppra, clevidipine drip was ordered, and mannitol was ordered due to midline shift.  Patient was accepted at [facility]. [place] in route.  Will initiate all therapies as quickly as possible with rapid transfer being the most beneficial to the patient at this point.           ED Medication(s):  Medications   clevidipine (CLEVIPREX) 25 mg/50 mL infusion (2 mg/hr Intravenous Rate/Dose Change 8/2/23 0547)   mannitol 20% infusion 25 g (25 g Intravenous New Bag 8/2/23 6657)   etomidate injection (20 mg Intravenous Given 8/2/23 0898)   levETIRAcetam injection 1,000 mg (1,000 mg Intravenous Given 8/2/23 4036)       New Prescriptions    No medications on file               Scribe Attestation:   Scribe #1: I performed the above scribed service and the documentation accurately describes the services I performed. I attest to the accuracy of the note.     Attending:   Physician Attestation Statement for Scribe #1: I, Jm Casillas Jr., MD, personally performed the services described in this documentation, as scribed by Jori Walsh, in my presence, and it is both accurate and complete.           Clinical Impression       ICD-10-CM ICD-9-CM   1. Intracranial hemorrhage  I62.9 432.9   2. Hypertension  I10 401.9   3. Hypertensive emergency  I16.1 401.9   4. Acute respiratory failure, unspecified whether with hypoxia or hypercapnia  J96.00 518.81       Disposition:   Disposition: Transferred  Condition: Critical         Jm Casillas Jr.,  MD  08/02/23 0553       Jm Casillas Jr., MD  08/02/23 0556

## 2023-08-02 NOTE — SUBJECTIVE & OBJECTIVE
Past Medical History:   Diagnosis Date    Anemia     with chronic disease lupus    Encounter for blood transfusion     G-6-PD deficiency     Glaucoma suspect of both eyes     Heart murmur     MVP 1986    Hypertension     Lupus     Raynaud's syndrome     Systemic lupus erythematosus arthritis 5/13/2018       Past Surgical History:   Procedure Laterality Date    BONE MARROW BIOPSY      CARPAL TUNNEL RELEASE Bilateral     HERNIA REPAIR      widsom tooth extraction         Social History     Socioeconomic History    Marital status: Single   Tobacco Use    Smoking status: Never    Smokeless tobacco: Never   Substance and Sexual Activity    Alcohol use: No    Drug use: No    Sexual activity: Not Currently     Partners: Male   Other Topics Concern    Are you pregnant or think you may be? No    Breast-feeding No     Social Determinants of Health     Financial Resource Strain: Medium Risk (8/14/2019)    Overall Financial Resource Strain (CARDIA)     Difficulty of Paying Living Expenses: Somewhat hard   Transportation Needs: Unmet Transportation Needs (11/12/2021)    PRAPARE - Transportation     Lack of Transportation (Medical): Yes     Lack of Transportation (Non-Medical): Yes   Housing Stability: High Risk (11/9/2021)    Housing Stability Vital Sign     Unable to Pay for Housing in the Last Year: No     Unstable Housing in the Last Year: 1       Family History   Problem Relation Age of Onset    Arthritis Mother     Heart disease Mother     Hypertension Mother     Diabetes Father     Heart disease Father     Hypertension Father     Cancer Brother     Eczema Neg Hx     Lupus Neg Hx     Psoriasis Neg Hx        Review of patient's allergies indicates:   Allergen Reactions    Cellcept [mycophenolate mofetil] Swelling     Lips      Ace inhibitors Other (See Comments)     unkown    Aldactone [spironolactone] Blisters     Mouth soreness with doses greater than 25mg.    Aspirin Other (See Comments)     G-6-PD def.     Azathioprine  sodium     Beta-blockers (beta-adrenergic blocking agts) Other (See Comments)     Cannot take due to Raynaud's syndrome    Clindamycin Other (See Comments)     + allergy test    Clonidine Blisters     Mouth sores and tenderness    Hydrochlorothiazide Other (See Comments)     unknown    Ibuprofen Other (See Comments)     G-6-PD def.    Lasix [furosemide] Other (See Comments)     unknown    Lisinopril Other (See Comments)     unknown    Methatropic     Methotrexate analogues Other (See Comments)     Pancytopenia      Norvasc [amlodipine] Other (See Comments)     Jaw pain    Nsaids (non-steroidal anti-inflammatory drug) Other (See Comments)     G-6-PD def.    Plaquenil [hydroxychloroquine] Other (See Comments)     G-6-PD def.    Zofran [ondansetron hcl (pf)] Other (See Comments)     Chest pain      Sulfa (sulfonamide antibiotics) Rash         Medications:  Continuous Infusions:   niCARdipine Stopped (08/02/23 0924)     Scheduled Meds:   levETIRAcetam (Keppra) IV (PEDS and ADULTS)  500 mg Intravenous Q12H     PRN Meds:0.9%  NaCl infusion (for blood administration), acetaminophen       Objective:     Weight: 56.2 kg (124 lb)  Body mass index is 20.01 kg/m².  Vital Signs (Most Recent):  Temp: 96.1 °F (35.6 °C) (08/02/23 1059)  Pulse: 69 (08/02/23 1115)  Resp: 16 (08/02/23 1115)  BP: 100/60 (08/02/23 1115)  SpO2: 99 % (08/02/23 1115) Vital Signs (24h Range):  Temp:  [96.1 °F (35.6 °C)-97.8 °F (36.6 °C)] 96.1 °F (35.6 °C)  Pulse:  [] 69  Resp:  [10-27] 16  SpO2:  [98 %-100 %] 99 %  BP: (100-224)/() 100/60     Date 08/02/23 0700 - 08/03/23 0659   Shift 3060-6886 8681-9716 3356-4992 24 Hour Total   INTAKE   Blood 383   383   Shift Total(mL/kg) 383(6.8)   383(6.8)   OUTPUT   Shift Total(mL/kg)       Weight (kg) 56.2 56.2 56.2 56.2              Vent Mode: A/C  Oxygen Concentration (%):  [40] 40  Resp Rate Total:  [16 br/min-20 br/min] 16 br/min  Vt Set:  [400 mL-450 mL] 450 mL  PEEP/CPAP:  [5 cmH20] 5 cmH20  Mean  Airway Pressure:  [6.9 cmH20-8.4 cmH20] 8.4 cmH20             Urethral Catheter 08/02/23 0754 (Active)          Physical Exam         Neurosurgery Physical Exam    Physical Exam:    Constitutional: comatose    HEENT: atraumatic/normocephalic    Cardiovascular: Regular rhythm.     Pulm: intubated    Abdominal: Soft.     Psych/Behavior: AMS    Neuro:   3T  Pupils 7mm fixed  No cough/gag/corneals/OC  BUE extensor  BLE TF      Significant Labs:  Recent Labs   Lab 08/02/23  0506 08/02/23  0837   * 122*    140   K 3.6 3.4*    109   CO2 22* 17*   BUN 22* 22*   CREATININE 0.9 0.9   CALCIUM 8.5* 8.0*     Recent Labs   Lab 08/02/23  0447 08/02/23  0837   WBC 7.87 7.43   HGB 8.4* 8.5*   HCT 27.6* 28.3*    142*     Recent Labs   Lab 08/02/23  0617 08/02/23  0837   INR  --  0.9   APTT 23.9 26.8     Microbiology Results (last 7 days)       Procedure Component Value Units Date/Time    Blood culture #2 **CANNOT BE ORDERED STAT** [[id number]] Collected: 08/02/23 1042    Order Status: Sent Specimen: Blood from Peripheral, Antecubital, Right Updated: 08/02/23 1109    Blood culture #1 **CANNOT BE ORDERED STAT** [[id number]] Collected: 08/02/23 1043    Order Status: Sent Specimen: Blood from Peripheral, Forearm, Right Updated: 08/02/23 1109          All pertinent labs from the last 24 hours have been reviewed.    Significant Diagnostics:  I have reviewed and interpreted all pertinent imaging results/findings within the past 24 hours.

## 2023-08-02 NOTE — SUBJECTIVE & OBJECTIVE
Past Medical History:   Diagnosis Date    Anemia     with chronic disease lupus    Encounter for blood transfusion     G-6-PD deficiency     Glaucoma suspect of both eyes     Heart murmur     MVP 1986    Hypertension     Lupus     Raynaud's syndrome     Systemic lupus erythematosus arthritis 5/13/2018     Past Surgical History:   Procedure Laterality Date    BONE MARROW BIOPSY      CARPAL TUNNEL RELEASE Bilateral     HERNIA REPAIR      widsom tooth extraction         Current Facility-Administered Medications on File Prior to Encounter   Medication Dose Route Frequency Provider Last Rate Last Admin    [COMPLETED] etomidate injection   Intravenous Code/trauma/sedation Med Jm Casillas Jr., MD   20 mg at 08/02/23 0431    [COMPLETED] levETIRAcetam injection 1,000 mg  1,000 mg Intravenous ED 1 Time Jm Casillas Jr., MD   1,000 mg at 08/02/23 0509    [COMPLETED] mannitol 20% infusion 25 g  25 g Intravenous ED 1 Time Jm Casillas Jr., MD   Stopped at 08/02/23 0607    [DISCONTINUED] clevidipine (CLEVIPREX) 25 mg/50 mL infusion  0-16 mg/hr Intravenous Continuous mJ Casillas Jr., MD   Paused at 08/02/23 0623    [DISCONTINUED] mannitol 25% injection 25 g  25 g Intravenous ED 1 Time Jm Casillas Jr., MD        [DISCONTINUED] rocuronium 10 mg/mL injection              Current Outpatient Medications on File Prior to Encounter   Medication Sig Dispense Refill    aspirin (ECOTRIN) 81 MG EC tablet Take 1 tablet (81 mg total) by mouth once daily. 90 tablet 0    atorvastatin (LIPITOR) 20 MG tablet Take 1 tablet (20 mg total) by mouth once daily. 90 tablet 3    calcium-vitamin D 600 mg-10 mcg (400 unit) Tab Take by mouth once.      cetirizine (ZYRTEC) 10 MG tablet Take 10 mg by mouth once daily.      cholecalciferol, vitamin D3, 125 mcg (5,000 unit) Tab Take 5,000 Units by mouth once daily.      clopidogreL (PLAVIX) 75 mg tablet Take 1 tablet (75 mg total) by mouth once daily. for 21 days 21 tablet 0    diltiaZEM  (CARDIZEM) 60 MG tablet Take 1 tablet (60 mg total) by mouth every 12 (twelve) hours. 60 tablet 11    gabapentin (NEURONTIN) 100 MG capsule Take 100 mg by mouth once daily.      lactulose (CHRONULAC) 10 gram/15 mL solution Take 15 mLs (10 g total) by mouth 3 (three) times daily as needed.  0    losartan (COZAAR) 25 MG tablet Take 1 tablet (25 mg total) by mouth once daily. Hold medication until follow up with prescribing provider      magnesium oxide (MAG-OX) 400 mg (241.3 mg magnesium) tablet Take 400 mg by mouth once daily.      multivitamin capsule Take 1 capsule by mouth once daily.      pantoprazole (PROTONIX) 40 MG tablet Take 40 mg by mouth.      polyethylene glycol (GLYCOLAX) 17 gram PwPk Take by mouth.      predniSONE (DELTASONE) 5 MG tablet Take 1 tablet (5 mg total) by mouth once daily. 30 tablet 2    promethazine (PHENERGAN) 12.5 MG Tab Take by mouth.      simethicone (MYLICON) 80 MG chewable tablet Take 160 mg by mouth every 12 (twelve) hours as needed for Flatulence.      tacrolimus (PROTOPIC) 0.1 % ointment Apply topically.      vitamin B12-folic acid 0.5-1 mg Tab Take 1 tablet by mouth once daily.       Allergies: Cellcept [mycophenolate mofetil], Ace inhibitors, Aldactone [spironolactone], Aspirin, Azathioprine sodium, Beta-blockers (beta-adrenergic blocking agts), Clindamycin, Clonidine, Hydrochlorothiazide, Ibuprofen, Lasix [furosemide], Lisinopril, Methatropic, Methotrexate analogues, Norvasc [amlodipine], Nsaids (non-steroidal anti-inflammatory drug), Plaquenil [hydroxychloroquine], Zofran [ondansetron hcl (pf)], and Sulfa (sulfonamide antibiotics)    Family History   Problem Relation Age of Onset    Arthritis Mother     Heart disease Mother     Hypertension Mother     Diabetes Father     Heart disease Father     Hypertension Father     Cancer Brother     Eczema Neg Hx     Lupus Neg Hx     Psoriasis Neg Hx        Social History     Tobacco Use    Smoking status: Never    Smokeless tobacco: Never    Substance Use Topics    Alcohol use: No    Drug use: No      Review of Systems: Unable to obtain a complete ROS due to level of consciousness.     Vitals:   Temp: 96.6 °F (35.9 °C)  Pulse: 77  BP: 116/69  MAP (mmHg): 86  Resp: 16  SpO2: 100 %  Oxygen Concentration (%): 40  Vent Mode: A/C  Set Rate: 16 BPM  Vt Set: 450 mL  PEEP/CPAP: 5 cmH20  Peak Airway Pressure: 21 cmH20  Mean Airway Pressure: 8.4 cmH20  Plateau Pressure: 0 cmH20    Temp  Min: 96.6 °F (35.9 °C)  Max: 97.8 °F (36.6 °C)  Pulse  Min: 56  Max: 106  BP  Min: 116/69  Max: 224/134  MAP (mmHg)  Min: 86  Max: 173  Resp  Min: 10  Max: 27  SpO2  Min: 98 %  Max: 100 %  Oxygen Concentration (%)  Min: 40  Max: 40    No intake/output data recorded.         Examination:   Constitutional: Chronically ill appearing. No apparent distress.   Eyes: Conjunctiva clear, anicteric. Lids no lesions.  Head/Ears/Nose/Mouth/Throat/Neck: Moist mucous membranes. External ears, nose atraumatic.   Cardiovascular: Regular rhythm. No leg edema.  Respiratory: Intubated. Comfortable respirations. Clear to auscultation.  Gastrointestinal: Soft, nondistended, nontender. + bowel sounds.    Neurologic:   -E 1 V 1t M 3  -Sedation: none  -Does not open eyes to voice or noxious stimuli. Unable to follow commands.  -Cranial nerves: Pupils bilaterally 4mm, fixed, nonreactive, weak corneal reflex bilaterally, no cough/gag  -Motor: BUE abnormal flexion, BLE triple flexion to noxious stimuli  Unable to test orientation, language, memory, judgment, insight, fund of knowledge, shoulder shrug, tongue protrusion, coordination, gait due to level of consciousness.    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

## 2023-08-07 LAB
BACTERIA BLD CULT: NORMAL
BACTERIA BLD CULT: NORMAL

## 2023-11-29 NOTE — PROGRESS NOTES
jardiance    Take 1/2 pill only    Call me if problems      Please do labs in January let me know that MRI in February please see me again in March have a great holiday season to both Subjective:      Patient ID: Ruth Messina is a 51 y.o. female.    Chief Complaint: Pain of the Left Hand    HPI   The patient is seen today status post left carpal tunnel release performed on March 24.  She does not report any increased pain, swelling, incision drainage or numbness and tingling.  The patient is here today to have sutures removed.    Review of Systems   Constitution: Negative for chills, fever and night sweats.   Respiratory: Negative for cough, shortness of breath and wheezing.    Gastrointestinal: Negative for diarrhea, nausea and vomiting.   Neurological: Negative for brief paralysis.   Psychiatric/Behavioral: Negative for altered mental status.         Objective:            Ortho/SPM Exam      On exam, the patient's incision is clean, dry and intact.  There is no increased redness, warmth or evidence of infection.  The patient has mild tenderness over and surrounding her incision.  There is residual ecchymosis present on anterior forearm which is expected from carpal tunnel release surgery.            Assessment:       Encounter Diagnoses   Name Primary?    Status post carpal tunnel release Yes    Postop check           Plan:       Ruth was seen today for pain.    Diagnoses and all orders for this visit:    Status post carpal tunnel release    Postop check      The patient had sutures removed today without incident.  She was educated on at-home exercises, squeezing exercises with a ball and desensitizing massage.  The patient will return for what may be a final checkup in 4 weeks of her left hand.    Patient is interested in getting a right carpal tunnel release performed at the end of April or early May.  We will notify her of a date as soon as possible.  The patient would prefer to have surgery for this on a Friday if possible.          Suzanne Quesada PA-C

## (undated) DEVICE — SYR 10CC LUER LOCK

## (undated) DEVICE — ELECTRODE REM PLYHSV RETURN 9

## (undated) DEVICE — BLADE SURG #15 CARBON STEEL

## (undated) DEVICE — POSITIONER HEAD DONUT 9IN FOAM

## (undated) DEVICE — STOCKINET 4INX48

## (undated) DEVICE — SOL 9P NACL IRR PIC IL

## (undated) DEVICE — DECANTER VIAL ASEPTIC TRANSFER

## (undated) DEVICE — COVER OVERHEAD SURG LT BLUE

## (undated) DEVICE — APPLICATOR CHLORAPREP ORN 26ML

## (undated) DEVICE — SEE MEDLINE ITEM 157117

## (undated) DEVICE — SEE MEDLINE ITEM 152522

## (undated) DEVICE — SEE MEDLINE ITEM 146308

## (undated) DEVICE — SUPPORT ULNA NERVE PROTECTOR

## (undated) DEVICE — SEE MEDLINE ITEM 157131

## (undated) DEVICE — GAUZE SPONGE 4X4 12PLY

## (undated) DEVICE — GLOVE BIOGEL PI ORTHO PRO SZ7

## (undated) DEVICE — MANIFOLD 4 PORT

## (undated) DEVICE — GLOVE SURGICAL LATEX SZ 6.5

## (undated) DEVICE — SUT 4-0 ETHILON 18 PS-2

## (undated) DEVICE — PAD CAST SPECIALIST STRL 4

## (undated) DEVICE — SEE MEDLINE ITEM 157173

## (undated) DEVICE — SEE MEDLINE ITEM 157027

## (undated) DEVICE — DRESSING N ADH OIL EMUL 3X16

## (undated) DEVICE — CAUTERY TIP 2 3/4

## (undated) DEVICE — NDL SAFETY 25G X 1.5 ECLIPSE

## (undated) DEVICE — TOURNIQUET SB QC DP 18X4IN